# Patient Record
Sex: FEMALE | Race: WHITE | NOT HISPANIC OR LATINO | Employment: OTHER | URBAN - METROPOLITAN AREA
[De-identification: names, ages, dates, MRNs, and addresses within clinical notes are randomized per-mention and may not be internally consistent; named-entity substitution may affect disease eponyms.]

---

## 2017-01-09 DIAGNOSIS — E78.5 HLD (HYPERLIPIDEMIA): ICD-10-CM

## 2017-01-09 RX ORDER — SIMVASTATIN 40 MG/1
TABLET, FILM COATED ORAL
Qty: 90 TABLET | Refills: 0 | Status: SHIPPED | OUTPATIENT
Start: 2017-01-09 | End: 2017-04-09 | Stop reason: SDUPTHER

## 2017-01-11 ENCOUNTER — PATIENT MESSAGE (OUTPATIENT)
Dept: FAMILY MEDICINE | Facility: CLINIC | Age: 67
End: 2017-01-11

## 2017-01-12 RX ORDER — BUPROPION HYDROCHLORIDE 150 MG/1
150 TABLET, EXTENDED RELEASE ORAL 2 TIMES DAILY
Qty: 60 TABLET | Refills: 2 | Status: SHIPPED | OUTPATIENT
Start: 2017-01-12 | End: 2017-04-07 | Stop reason: SDUPTHER

## 2017-01-12 NOTE — TELEPHONE ENCOUNTER
I have started on the cpap machine and saw Dr Chavira on 10-. She was suppose to send the results to Dr Pimentel. Since all the fresh air has been going to my brain the craving for smoking has increase since I began using the sleep machine. If Dr Pimentel could re-fill the prescription for the meds that help stop the cravings I would be grateful. Not the heavy duty drugs but the simple one. I have some meds from Dr Kohler but Dr Pimentel like the one she prescribed for me in the beginning. Let me know. Thanks

## 2017-03-26 ENCOUNTER — PATIENT MESSAGE (OUTPATIENT)
Dept: FAMILY MEDICINE | Facility: CLINIC | Age: 67
End: 2017-03-26

## 2017-03-27 ENCOUNTER — PATIENT MESSAGE (OUTPATIENT)
Dept: FAMILY MEDICINE | Facility: CLINIC | Age: 67
End: 2017-03-27

## 2017-04-07 RX ORDER — BUPROPION HYDROCHLORIDE 150 MG/1
TABLET, EXTENDED RELEASE ORAL
Qty: 60 TABLET | Refills: 5 | Status: SHIPPED | OUTPATIENT
Start: 2017-04-07 | End: 2017-10-17 | Stop reason: SDUPTHER

## 2017-04-09 DIAGNOSIS — E78.5 HLD (HYPERLIPIDEMIA): ICD-10-CM

## 2017-04-10 RX ORDER — SIMVASTATIN 40 MG/1
TABLET, FILM COATED ORAL
Qty: 90 TABLET | Refills: 0 | Status: SHIPPED | OUTPATIENT
Start: 2017-04-10 | End: 2017-07-10 | Stop reason: SDUPTHER

## 2017-04-11 ENCOUNTER — HOSPITAL ENCOUNTER (OUTPATIENT)
Dept: RADIOLOGY | Facility: HOSPITAL | Age: 67
Discharge: HOME | End: 2017-04-11
Attending: FAMILY MEDICINE
Payer: MEDICARE

## 2017-04-11 DIAGNOSIS — Z12.11 SCREEN FOR COLON CANCER: ICD-10-CM

## 2017-04-11 PROCEDURE — 77080 DXA BONE DENSITY AXIAL: CPT | Mod: TC,GZ,PO

## 2017-04-11 PROCEDURE — 77080 DXA BONE DENSITY AXIAL: CPT | Mod: 26,,, | Performed by: RADIOLOGY

## 2017-04-18 ENCOUNTER — OFFICE VISIT (OUTPATIENT)
Dept: FAMILY MEDICINE | Facility: CLINIC | Age: 67
End: 2017-04-18
Payer: MEDICARE

## 2017-04-18 ENCOUNTER — TELEPHONE (OUTPATIENT)
Dept: FAMILY MEDICINE | Facility: CLINIC | Age: 67
End: 2017-04-18

## 2017-04-18 VITALS
SYSTOLIC BLOOD PRESSURE: 130 MMHG | HEART RATE: 64 BPM | HEIGHT: 66 IN | WEIGHT: 259.25 LBS | BODY MASS INDEX: 41.66 KG/M2 | TEMPERATURE: 98 F | DIASTOLIC BLOOD PRESSURE: 84 MMHG

## 2017-04-18 DIAGNOSIS — E55.9 VITAMIN D DEFICIENCY: ICD-10-CM

## 2017-04-18 DIAGNOSIS — R53.83 FATIGUE, UNSPECIFIED TYPE: ICD-10-CM

## 2017-04-18 DIAGNOSIS — R60.9 FLUID RETENTION: ICD-10-CM

## 2017-04-18 DIAGNOSIS — M17.0 OSTEOARTHRITIS OF BOTH KNEES, UNSPECIFIED OSTEOARTHRITIS TYPE: Primary | ICD-10-CM

## 2017-04-18 DIAGNOSIS — R80.9 PROTEINURIA, UNSPECIFIED TYPE: ICD-10-CM

## 2017-04-18 DIAGNOSIS — M25.562 CHRONIC PAIN OF BOTH KNEES: Primary | ICD-10-CM

## 2017-04-18 DIAGNOSIS — I48.0 PAROXYSMAL ATRIAL FIBRILLATION: ICD-10-CM

## 2017-04-18 DIAGNOSIS — R73.09 ELEVATED HEMOGLOBIN A1C: ICD-10-CM

## 2017-04-18 DIAGNOSIS — G89.29 CHRONIC PAIN OF BOTH KNEES: Primary | ICD-10-CM

## 2017-04-18 DIAGNOSIS — L21.9 SEBORRHEA: ICD-10-CM

## 2017-04-18 DIAGNOSIS — E04.1 THYROID NODULE: ICD-10-CM

## 2017-04-18 DIAGNOSIS — M25.561 CHRONIC PAIN OF BOTH KNEES: Primary | ICD-10-CM

## 2017-04-18 PROCEDURE — 1160F RVW MEDS BY RX/DR IN RCRD: CPT | Mod: S$GLB,,, | Performed by: FAMILY MEDICINE

## 2017-04-18 PROCEDURE — 99215 OFFICE O/P EST HI 40 MIN: CPT | Mod: S$GLB,,, | Performed by: FAMILY MEDICINE

## 2017-04-18 PROCEDURE — 1125F AMNT PAIN NOTED PAIN PRSNT: CPT | Mod: S$GLB,,, | Performed by: FAMILY MEDICINE

## 2017-04-18 PROCEDURE — 3079F DIAST BP 80-89 MM HG: CPT | Mod: S$GLB,,, | Performed by: FAMILY MEDICINE

## 2017-04-18 PROCEDURE — 1159F MED LIST DOCD IN RCRD: CPT | Mod: S$GLB,,, | Performed by: FAMILY MEDICINE

## 2017-04-18 PROCEDURE — 3075F SYST BP GE 130 - 139MM HG: CPT | Mod: S$GLB,,, | Performed by: FAMILY MEDICINE

## 2017-04-18 PROCEDURE — 99499 UNLISTED E&M SERVICE: CPT | Mod: S$GLB,,, | Performed by: FAMILY MEDICINE

## 2017-04-18 PROCEDURE — 99999 PR PBB SHADOW E&M-EST. PATIENT-LVL III: CPT | Mod: PBBFAC,,, | Performed by: FAMILY MEDICINE

## 2017-04-18 RX ORDER — KETOCONAZOLE 20 MG/ML
SHAMPOO, SUSPENSION TOPICAL WEEKLY
Qty: 120 ML | Refills: 5 | Status: SHIPPED | OUTPATIENT
Start: 2017-04-18 | End: 2017-11-06

## 2017-04-18 NOTE — MR AVS SNAPSHOT
[facility]  [address]  [place] [address]  Phone: [phone]  Fax: [phone]                  Allyson Henriquez   2017 10:20 AM   Office Visit    Description:  Female : 1950   Provider:  Gilma Pimentel MD   Department:  [facility]           Reason for Visit     Knee Pain           Diagnoses this Visit        Comments    Chronic pain of both knees    -  Primary     Thyroid nodule                To Do List           Future Appointments        Provider Department Dept Phone    2017 2:20 PM Gilma Pimentel MD [facility] [phone]      Goals (5 Years of Data)     None      Follow-Up and Disposition     Return if symptoms worsen or fail to improve.       These Medications        Disp Refills Start End    ketoconazole (NIZORAL) 2 % shampoo 120 mL 5 2017     Apply topically once a week. - Topical (Top)    Pharmacy: [facility] [address] [place], [place] [address] [address] AT [address] #: [phone]         [facility]     [facility] Nurse Care Line -  Assistance  Unless otherwise directed by your provider, please contact [facility], our nurse care line that is available for  assistance.     Registered nurses in the [facility] provide: appointment scheduling, clinical advisement, health education, and other advisory services.  Call: [phone] (toll free)               Medications           Message regarding Medications     Verify the changes and/or additions to your medication regime listed below are the same as discussed with your clinician today.  If any of these changes or additions are incorrect, please notify your healthcare provider.        START taking these NEW medications        Refills    ketoconazole (NIZORAL) 2 % shampoo 5    Sig: Apply topically once a week.    Class: Normal    Route: Topical (Top)           Verify that the  "below list of medications is an accurate representation of the medications you are currently taking.  If none reported, the list may be blank. If incorrect, please contact your healthcare provider. Carry this list with you in case of emergency.           Current Medications     fluticasone (FLONASE) 50 mcg/actuation nasal spray 2 sprays by Each Nare route once daily.    hydrochlorothiazide (HYDRODIURIL) 12.5 MG Tab TAKE 1 TABLET(12.5 MG) BY MOUTH EVERY DAY    losartan (COZAAR) 50 MG tablet Take 1 tablet (50 mg total) by mouth once daily.    metoprolol tartrate (LOPRESSOR) 50 MG tablet Take 25 mg by mouth 2 (two) times daily.     simvastatin (ZOCOR) 40 MG tablet TAKE 1 TABLET(40 MG) BY MOUTH EVERY EVENING    buPROPion (WELLBUTRIN SR) 150 MG TBSR 12 hr tablet TAKE 1 TABLET(150 MG) BY MOUTH TWICE DAILY    ketoconazole (NIZORAL) 2 % shampoo Apply topically once a week.    multivitamin (ONE DAILY MULTIVITAMIN) per tablet Take 1 tablet by mouth once daily.           Clinical Reference Information           Your Vitals Were     BP Pulse Temp Height Weight BMI    130/84 64 98.3 °F (36.8 °C) 5' 6" (1.676 m) 117.6 kg (259 lb 4.2 oz) 41.85 kg/m2      Blood Pressure          Most Recent Value    BP  130/84      Allergies as of 4/18/2017     Contrast Media    Albuterol    Pcn [Penicillins]      Immunizations Administered on Date of Encounter - 4/18/2017     None      Orders Placed During Today's Visit     Future Labs/Procedures Expected by Expires    US Soft Tissue Head Neck Thyroid  4/18/2017 4/18/2018    X-ray Knee Ortho Bilateral with Flexion  4/18/2017 4/18/2018      Language Assistance Services     ATTENTION: Language assistance services are available, free of charge. Please call [phone].      ATENCIÓN: Si lonny padilla, tiene a zuñiga disposición servicios gratuitos de asistencia lingüística. Llame al [phone].     CHÚ Ý: N?u b?n nói Ti?ng Vi?t, có các d?ch v? h? tr? ngôn ng? mi?n phí dành cho b?n. G?i s? " [phone].         [facility] complies with applicable Federal civil rights laws and does not discriminate on the basis of race, color, national origin, age, disability, or sex.

## 2017-04-18 NOTE — PROGRESS NOTES
"Subjective:       Patient ID: Allyson Henriquez is a 67 y.o. female.    Chief Complaint: Knee Pain (pt is c/o extreme bilat knee pain when she rises from a sitting position, pain when she walks, lower back apin x6-8 weeks. )    HPI   Patient here today, accompanied by her , to discuss knee pain.  1. Knee pain x several weeks. Worse when rising from a sitting position or inclined walking. Pain is well-localized to the knee joint, and does not occur with rest or other activities. Both knees equally affected. Current exercise is a circuit class that incl leg extensions, recently increased to 20reps at 15#.  2. Concerned about vit D deficiency, not currently supplementing.  3. Went to a screening at [facility]. Of note thyroid nodule. She recalls that this was found by previous physician and has been imaged for stability before. Thyroid labs reviewed from 2016 and normal.  4. Not currently on the wellbutrin. Finds that tobacco cravings are ok, and thus doesn't need it. Also, was concerned that it was contributing to some morning blurriness.   Eye exams are up to date, and prev cataract repair.  5. Concerned about intermittent swelling to BLE. Does not time it to intake of salt. Sx occur and resolve spontaneously. Has not seen it in at least a few days.   Recalls previously worse during afib.  6. Echo was updated with [facility] this past week. Few episodes of recurrent afib, otherwise doing ok.  7. Ongoing and chronic fatigue. Worse by the afternoons. Discussed multiple contributors to this incl medications. However, admittedly improved from previous 6mos ago or so.  8. Previous dx of seborrhea to scalp. Otc shampoos affected the texture and color of her hair. Would like alternatives.   9. Due for updated renal functions re: proteinuria.  10. Has cleaned up her diet, still with some "cheats" like ice cream, eating out less. Unhappy with weight gain despite this and exercise. Offered nutrition consult.   Has episodes of " urgency with diarrhea, particularly after eating out.    Review of Systems   Constitutional: Positive for fatigue and unexpected weight change. Negative for activity change.   HENT: Positive for trouble swallowing (attributes this to her thyroid nodule). Negative for hearing loss and rhinorrhea.    Eyes: Positive for visual disturbance. Negative for discharge.   Respiratory: Negative for cough, chest tightness, shortness of breath and wheezing.    Cardiovascular: Positive for palpitations (afib) and leg swelling (intermittent). Negative for chest pain.   Gastrointestinal: Positive for diarrhea (see hpi). Negative for blood in stool, constipation and vomiting.   Endocrine: Negative for polydipsia and polyuria.   Genitourinary: Negative for difficulty urinating, dysuria, hematuria and menstrual problem.   Musculoskeletal: Positive for arthralgias (knee pain, bilateral, see hpi). Negative for joint swelling.   Neurological: Negative for weakness and headaches.   Psychiatric/Behavioral: Negative for confusion and dysphoric mood.       Objective:      Physical Exam   Constitutional: She is oriented to person, place, and time. She appears well-developed and well-nourished. No distress.   HENT:   Head: Normocephalic and atraumatic.   Eyes: Conjunctivae are normal. Right eye exhibits no discharge. Left eye exhibits no discharge. No scleral icterus.   Neck: Normal range of motion. Neck supple.   Cardiovascular: Normal rate and regular rhythm.    Pulmonary/Chest: Effort normal. No respiratory distress.   Abdominal: Soft. She exhibits no distension.   Musculoskeletal: Normal range of motion. She exhibits no edema.   Neurological: She is alert and oriented to person, place, and time.   Skin: Skin is warm and dry. No rash noted.   Psychiatric: Judgment and thought content normal.   Nursing note and vitals reviewed.        Chronic pain of both knees  -     X-ray Knee Ortho Bilateral with Flexion; Future; Expected date:  4/18/17  Update imaging, and can decide PT vs ortho/MRI. Reviewed need for quad strengthening, continued weight loss.  Thyroid nodule  -     US Soft Tissue Head Neck Thyroid; Future; Expected date: 4/18/17  Update imaging for stability, ensure no worrisome traits noted. Reviewed tsh/ft4 from 2016 wnl.  Vitamin D deficiency  Supplement 5000iu/day and recheck with next lab draw. Plan to decrease dose if within range.  Fluid retention  Discussed contribution of salt intake, encouraged moderation, particularly when eating out, and maintain physical activity.  Paroxysmal atrial fibrillation  Per cards, reported stable with few recurrences.  Fatigue, unspecified type  Likely multifactorial. Of note, sleep apnea is managed, no previously noted metabolic contributions. I do find that she is improved from previous, but still with afternoon sx.    May need to consider further discussion re: depression sx which can contribute to this.  Seborrhea  Trial rx ketoconazole and test patch hair. If still affecting hair color/texture, can stop apply lamisil topical.  Proteinuria, unspecified type  Update lab and urine and send to nephrology for review.  Other orders  -     ketoconazole (NIZORAL) 2 % shampoo; Apply topically once a week.  Dispense: 120 mL; Refill: 5

## 2017-04-18 NOTE — TELEPHONE ENCOUNTER
1. Arthritis to both knees with changes noted to the joints as well. I'd like for her to see ortho as this may require more than PT for the time being. They will also be able to order an MRI if needed.  Referral done.    2. Thyroid nodule confirmed. Based on size and surrounding calcifications, it is recommended that we biopsy. This is done through the endocrine dept. Referral done.

## 2017-04-20 ENCOUNTER — PATIENT MESSAGE (OUTPATIENT)
Dept: FAMILY MEDICINE | Facility: CLINIC | Age: 67
End: 2017-04-20

## 2017-04-20 NOTE — TELEPHONE ENCOUNTER
Reviewed results with pt, she expressed understanding. Offered to schedule consults. Pt refused and stated she wants time to think about it and asked for names of [facility] providers in [place], gave her list of our providers. She stated she will send a [facility] message when she is ready to schedule.

## 2017-04-20 NOTE — TELEPHONE ENCOUNTER
----- Message from Alexandria Odell sent at 4/20/2017 10:31 AM CDT -----  Returning your call.  Please call [phone]

## 2017-04-21 ENCOUNTER — PATIENT MESSAGE (OUTPATIENT)
Dept: FAMILY MEDICINE | Facility: CLINIC | Age: 67
End: 2017-04-21

## 2017-04-21 NOTE — TELEPHONE ENCOUNTER
I think Dr Pimentel would prefer Dr Daniels as that is the one she mentioned at the office visit. Please confirm this is her preference.   Also, with the thyroid - if the biopsy is done - while I am still 'on the table' is the sample immediately reviewed by a pathologist?? and if there is an 'issue' can removal be done at that time??

## 2017-04-23 ENCOUNTER — PATIENT MESSAGE (OUTPATIENT)
Dept: FAMILY MEDICINE | Facility: CLINIC | Age: 67
End: 2017-04-23

## 2017-04-24 ENCOUNTER — PATIENT MESSAGE (OUTPATIENT)
Dept: FAMILY MEDICINE | Facility: CLINIC | Age: 67
End: 2017-04-24

## 2017-05-03 ENCOUNTER — PATIENT MESSAGE (OUTPATIENT)
Dept: FAMILY MEDICINE | Facility: CLINIC | Age: 67
End: 2017-05-03

## 2017-05-03 ENCOUNTER — PATIENT MESSAGE (OUTPATIENT)
Dept: ORTHOPEDICS | Facility: CLINIC | Age: 67
End: 2017-05-03

## 2017-05-03 ENCOUNTER — TELEPHONE (OUTPATIENT)
Dept: ENDOCRINOLOGY | Facility: CLINIC | Age: 67
End: 2017-05-03

## 2017-05-03 ENCOUNTER — OFFICE VISIT (OUTPATIENT)
Dept: ORTHOPEDICS | Facility: CLINIC | Age: 67
End: 2017-05-03
Payer: MEDICARE

## 2017-05-03 VITALS
HEIGHT: 66 IN | HEART RATE: 63 BPM | BODY MASS INDEX: 38.57 KG/M2 | SYSTOLIC BLOOD PRESSURE: 148 MMHG | DIASTOLIC BLOOD PRESSURE: 78 MMHG | WEIGHT: 240 LBS

## 2017-05-03 DIAGNOSIS — M17.0 PRIMARY OSTEOARTHRITIS OF BOTH KNEES: Primary | ICD-10-CM

## 2017-05-03 PROCEDURE — 1159F MED LIST DOCD IN RCRD: CPT | Mod: S$GLB,,, | Performed by: ORTHOPAEDIC SURGERY

## 2017-05-03 PROCEDURE — 3078F DIAST BP <80 MM HG: CPT | Mod: S$GLB,,, | Performed by: ORTHOPAEDIC SURGERY

## 2017-05-03 PROCEDURE — 1160F RVW MEDS BY RX/DR IN RCRD: CPT | Mod: S$GLB,,, | Performed by: ORTHOPAEDIC SURGERY

## 2017-05-03 PROCEDURE — 1125F AMNT PAIN NOTED PAIN PRSNT: CPT | Mod: S$GLB,,, | Performed by: ORTHOPAEDIC SURGERY

## 2017-05-03 PROCEDURE — 99999 PR PBB SHADOW E&M-EST. PATIENT-LVL III: CPT | Mod: PBBFAC,,, | Performed by: ORTHOPAEDIC SURGERY

## 2017-05-03 PROCEDURE — 3077F SYST BP >= 140 MM HG: CPT | Mod: S$GLB,,, | Performed by: ORTHOPAEDIC SURGERY

## 2017-05-03 PROCEDURE — 99203 OFFICE O/P NEW LOW 30 MIN: CPT | Mod: S$GLB,,, | Performed by: ORTHOPAEDIC SURGERY

## 2017-05-03 RX ORDER — LOSARTAN POTASSIUM 50 MG/1
TABLET ORAL
Qty: 90 TABLET | Refills: 1 | Status: SHIPPED | OUTPATIENT
Start: 2017-05-03 | End: 2018-06-11 | Stop reason: SDUPTHER

## 2017-05-03 NOTE — TELEPHONE ENCOUNTER
----- Message from Charisse Joe sent at 5/2/2017  4:28 PM CDT -----  Contact: self  Pt called in about wanting to get an urgent appt for her thyroid. Pt would like the nurse to give her a call back in regards to this matter        Pt can be reached at [phone]      Thank You

## 2017-05-03 NOTE — PROGRESS NOTES
Past Medical History:   Diagnosis Date    Acquired deafness of left ear     Bell's palsy     with fatigue and stress    COPD (chronic obstructive pulmonary disease)     History of tobacco abuse     Hyperlipidemia     Hypertension     TRISHA (obstructive sleep apnea)     Proteinuria        Past Surgical History:   Procedure Laterality Date    ADENOIDECTOMY      APPENDECTOMY      COLONOSCOPY N/A 11/14/2016    Procedure: COLONOSCOPY;  Surgeon: Destin Cardona MD;  [facility];  Service: Endoscopy;  Laterality: N/A;    EYE SURGERY      cataract OU    NEUROMA SURGERY Left     acoustic    TONSILLECTOMY         Current Outpatient Prescriptions   Medication Sig    buPROPion (WELLBUTRIN SR) 150 MG TBSR 12 hr tablet TAKE 1 TABLET(150 MG) BY MOUTH TWICE DAILY    cholecalciferol, vitamin D3, (VITAMIN D3) 2,000 unit Cap Take 1 capsule by mouth once daily.    fluticasone (FLONASE) 50 mcg/actuation nasal spray 2 sprays by Each Nare route once daily.    hydrochlorothiazide (HYDRODIURIL) 12.5 MG Tab TAKE 1 TABLET(12.5 MG) BY MOUTH EVERY DAY    ketoconazole (NIZORAL) 2 % shampoo Apply topically once a week.    losartan (COZAAR) 50 MG tablet Take 1 tablet (50 mg total) by mouth once daily.    metoprolol tartrate (LOPRESSOR) 50 MG tablet Take 25 mg by mouth 2 (two) times daily.     multivitamin (ONE DAILY MULTIVITAMIN) per tablet Take 1 tablet by mouth once daily.    simvastatin (ZOCOR) 40 MG tablet TAKE 1 TABLET(40 MG) BY MOUTH EVERY EVENING     No current facility-administered medications for this visit.        Review of patient's allergies indicates:   Allergen Reactions    Contrast media Anaphylaxis    Albuterol Other (See Comments)     Used during PFTs and put pt in afib    Pcn [penicillins]      Pt states did well on cephalexin.  No adverse reaction or side effect.        Family History   Problem Relation Age of Onset    Cancer Paternal Uncle      colon cancer    Diabetes Neg Hx     Kidney  disease Neg Hx     Stroke Neg Hx     Heart disease Neg Hx        Social History     Social History    Marital status:      Spouse name: N/A    Number of children: N/A    Years of education: N/A     Occupational History    Not on file.     Social History Main Topics    Smoking status: Former Smoker     Packs/day: 1.00     Years: 40.00     Quit date: 3/6/2016    Smokeless tobacco: Never Used    Alcohol use 0.0 oz/week     0 Standard drinks or equivalent per week      Comment: occ social    Drug use: No    Sexual activity: Not on file     Other Topics Concern    Not on file     Social History Narrative    Works in insurance collection.       Chief Complaint:   Chief Complaint   Patient presents with    Knee Pain     bilateral       History of present illness: Is a 67-year-old female seen for bilateral knee pain since about December.  Patient notes that it started after she stopped smoking and recently started exercising.  She is to take some Aleve which helped but was asked to stop due to some questionable kidney issues.  Patient denies an injury or trauma.  Pain is with walking or standing for prolonged periods of time.  Majority of the pain is getting up from a sitting position.  She denies swelling or mechanical symptoms.  Pain is an 8 out of 10.    Answers for HPI/ROS submitted by the patient on 5/1/2017   Leg pain  unexpected weight change: Yes  appetite change : Yes  sleep disturbance: Yes  IMMUNOCOMPROMISED: Yes  nervous/ anxious: Yes  dysphoric mood: Yes  rash: No  visual disturbance: Yes  eye redness: No  eye pain: No  ear pain: No  tinnitus: No  hearing loss: Yes  sinus pressure : No  nosebleeds: No  enviro allergies: No  food allergies: No  cough: No  shortness of breath: Yes  sweating: Yes  dysuria: No  frequency: No  difficulty urinating: No  hematuria: Yes  painful intercourse: No  chest pain: No  palpitations: Yes  nausea: No  vomiting: No  diarrhea: Yes  constipation: No  headaches:  No  dizziness: No  numbness: No  seizures: No  joint swelling: No  myalgia: No  weakness: Yes  back pain: Yes  Pain Chronicity: chronic  History of trauma: No  Onset: more than 1 month ago  Frequency: constantly  Progression since onset: gradually worsening  injury location: at school  pain- numeric: 6/10  pain location: other  pain quality: sharp  Radiating Pain: No  Aggravating factors: standing  fever: No  inability to bear weight: Yes  itching: No  joint locking: No  limited range of motion: Yes  stiffness: Yes  tingling: No  Treatments tried: exercise  physical therapy: not tried  Improvement on treatment: no relief      Physical Examination:    Vital Signs:    Vitals:    05/03/17 0821   BP: (!) 148/78   Pulse: 63       Body mass index is 38.74 kg/(m^2).    This a well-developed, well nourished patient in no acute distress.  They are alert and oriented and cooperative to examination.  Pt. walks without an antalgic gait.      Examination of bilateral knees shows no rashes or erythema. There are no masses ecchymosis or effusion. Patient has full range of motion from 0-130°. Patient is nontender to palpation over lateral joint line and mildly tender to palpation over the medial joint line. Patient has a - Lachman exam, - anterior drawer exam, and - posterior drawer exam. - Vilma's exam. Knee is stable to varus and valgus stress. 5 out of 5 motor strength. Palpable distal pulses. Intact light touch sensation. Negative Patellofemoral crepitus      X-rays: X-rays of both knees are available for review which show some mild to moderate arthritic changes     Assessment:: Bilateral knee arthritis    Plan:  I spent a long time discussing her knee arthritis with her today.  I reviewed her x-rays.  We discussed treatments for knee arthritis including NSAIDs, exercise, injections and ultimately surgery.  The patient cannot take NSAIDs at this time.  I recommended a Visco supplementation series as well as instructed her on  a knee conditioning program which she will start immediately.  Follow-up in a couple of weeks to start the Euflexxa.    This note was created using Dragon voice recognition software that occasionally misinterpreted phrases or words.    Consult note is delivered via Epic messaging service.

## 2017-05-03 NOTE — LETTER
May 3, 2017      Gilma Pimentel MD  [address]  [facility] [address]           [facility]  [address] [facility] [address] [place] [address]  Phone: [phone]          Patient: Allyson Henriquez   MR Number: [id number]   YOB: 1950   Date of Visit: 5/3/2017       Dear Dr. Gilma Pimentel:    Thank you for referring Allyson Henriquez to me for evaluation. Attached you will find relevant portions of my assessment and plan of care.    If you have questions, please do not hesitate to call me. I look forward to following Allyson Henriquez along with you.    Sincerely,    Jose Rafael Barney MD    Enclosure  CC:  No Recipients    If you would like to receive this communication electronically, please contact [email or web address] or [phone] to request more information on [employer] Link access.    For providers and/or their staff who would like to refer a patient to [facility], please contact us through our one-stop-shop provider referral line, [facility], at [phone].    If you feel you have received this communication in error or would no longer like to receive these types of communications, please e-mail [email or web address]

## 2017-05-08 ENCOUNTER — OFFICE VISIT (OUTPATIENT)
Dept: ENDOCRINOLOGY | Facility: CLINIC | Age: 67
End: 2017-05-08
Payer: MEDICARE

## 2017-05-08 VITALS
HEIGHT: 66 IN | SYSTOLIC BLOOD PRESSURE: 158 MMHG | DIASTOLIC BLOOD PRESSURE: 84 MMHG | BODY MASS INDEX: 42.1 KG/M2 | WEIGHT: 261.94 LBS | HEART RATE: 67 BPM

## 2017-05-08 DIAGNOSIS — E04.2 MULTINODULAR GOITER (NONTOXIC): ICD-10-CM

## 2017-05-08 PROCEDURE — 3077F SYST BP >= 140 MM HG: CPT | Mod: S$GLB,,, | Performed by: INTERNAL MEDICINE

## 2017-05-08 PROCEDURE — 1160F RVW MEDS BY RX/DR IN RCRD: CPT | Mod: S$GLB,,, | Performed by: INTERNAL MEDICINE

## 2017-05-08 PROCEDURE — 3079F DIAST BP 80-89 MM HG: CPT | Mod: S$GLB,,, | Performed by: INTERNAL MEDICINE

## 2017-05-08 PROCEDURE — 1126F AMNT PAIN NOTED NONE PRSNT: CPT | Mod: S$GLB,,, | Performed by: INTERNAL MEDICINE

## 2017-05-08 PROCEDURE — 99499 UNLISTED E&M SERVICE: CPT | Mod: S$GLB,,, | Performed by: INTERNAL MEDICINE

## 2017-05-08 PROCEDURE — 99999 PR PBB SHADOW E&M-EST. PATIENT-LVL III: CPT | Mod: PBBFAC,,, | Performed by: INTERNAL MEDICINE

## 2017-05-08 PROCEDURE — 1159F MED LIST DOCD IN RCRD: CPT | Mod: S$GLB,,, | Performed by: INTERNAL MEDICINE

## 2017-05-08 PROCEDURE — 99204 OFFICE O/P NEW MOD 45 MIN: CPT | Mod: S$GLB,,, | Performed by: INTERNAL MEDICINE

## 2017-05-08 NOTE — PATIENT INSTRUCTIONS
Thyroid Fine Needle Aspiration (FNA) Biopsy    The thyroid is a gland at the front of the neck. A thyroid fine needle aspiration (FNA) biopsy is a procedure to remove a small piece of tissue from your thyroid gland. The tissue is removed with a small, hollow needle. The sample is sent to a lab to be examined to find a diagnosis.  Why thyroid FNA biopsy is done  Hard nodules can sometimes form inside the thyroid gland. You may notice a small bump in the gland area. Thyroid nodules are common. The nodules are often not dangerous. But in some cases they can be thyroid cancer. A thyroid FNA biopsy can test for cancer.  Risks of thyroid FNA biopsy  All procedures have some risks. The risks of thyroid FNA biopsy include:  · Bleeding at the biopsy site  · Infection  · Damage to areas near the thyroid (rare)  · Need for a repeat biopsy if the test result is in doubt  Getting ready for your procedure  Talk with your healthcare provider how to get ready. Tell him or her about all the medicines you take. This includes over-the-counter medicines such as ibuprofen. It also includes vitamins, herbs, and other supplements. You may need to stop taking some medicines before the procedure, such as blood thinners and aspirin. You should be able to eat and drink normally before the procedure.  On the day of your procedure  Your procedure may be done in a hospital or a medical clinic. You should be able to go home the same day. A typical procedure goes like this:  · You may be given a medicine to help you relax, if needed.        · A local anesthetic (numbing medicine) may be injected in the area in the front of your neck. Because the biopsy needle is so small, this may not be needed.  · Your healthcare provider may use an ultrasound machine during the biopsy. This machine uses sound waves and a computer to show live images of the tissues in your neck. This helps your healthcare provider guide the needle to the right spot. A gel will  be put on your neck to help the ultrasound wand maintain contact with your skin and enhance the signal generated by the sound waves.   · The biopsy area will be cleaned. A thin, fine (narrow) needle will be put through your skin and into your thyroid gland. You may feel a small amount of pain or pressure. The needle will be gently pushed into the nodule. A syringe attached to the needle will use gentle suction to remove a small piece of tissue from the nodule. This process may be repeated several times in the same nodule to get different samples from all parts of the nodule. You will need to be very still and not cough, talk, or swallow while the needle is put in.   · The needle will then be removed. A small bandage will be put on the needle insertion site. The tissue will be sent to a pathology lab to be looked at for signs of cancer.  After your procedure  Youll likely be able to go home that day and can go back to your normal activities right away. You can remove your bandage within a few hours.  The site of the biopsy may be sore for a day or two after the procedure. You can take over-the-counter pain medicine if needed. Follow any other instructions that your healthcare provider gives you.  Follow-up care  Ask your healthcare provider when to expect to get your results from the biopsy. It may take several days. In some cases, the biopsy result may be inconclusive. This means the lab cant be sure if your nodule is cancer. You may need a repeat biopsy or surgery.  If your thyroid nodule is not cancer, you may not need any treatment. Your healthcare provider may want to keep track of your nodule. You may need another biopsy in the future.  If your nodule is cancer, you may need surgery or other treatment. Your healthcare provider will tell you more about what to expect and what needs to be done next.     When to call your healthcare provider  Call your healthcare provider right away if you have any of the  following:  · Fever of 100.4°F (38°C) or higher  · Redness, swelling, bleeding, or fluid leaking from the biopsy site  · Pain around the biopsy site that gets worse  Be sure you know how to reach your healthcare provider after office hours and on weekends and holidays.   Date Last Reviewed: 2/9/2016  © 2330-2785 [employer]. [address] [place] [address]. All rights reserved. This information is not intended as a substitute for professional medical care. Always follow your healthcare professional's instructions.

## 2017-05-08 NOTE — MR AVS SNAPSHOT
[place] - Endocrinology  [address] [facility] [address]  [facility] [address]  Phone: [phone]  Fax: [phone]                  Allyson Henriquez   2017 2:00 PM   Office Visit    Description:  Female : 1950   Provider:  Courtney Trinidad MD   Department:  [place] - Endocrinology           Reason for Visit     Thyroid Nodule                To Do List           Future Appointments        Provider Department Dept Phone    2017 8:15 AM Jose Rafael Barney MD [facility] [phone]    2017 3:45 PM Jose Rafael Barney MD [facility] [phone]    2017 2:20 PM Gilma Pimentel MD [facility] [phone]    2017 3:45 PM Jose Rafael Barney MD [facility] [phone]    10/18/2017 8:00 AM [facility] [phone]      Goals (5 Years of Data)     None      [facility]     [facility] Care [facility] -  Assistance  Unless otherwise directed by your provider, please contact [facility], our nurse care line that is available for  assistance.     Registered nurses in the [facility] provide: appointment scheduling, clinical advisement, health education, and other advisory services.  Call: [phone] (toll free)               Medications           Message regarding Medications     Verify the changes and/or additions to your medication regime listed below are the same as discussed with your clinician today.  If any of these changes or additions are incorrect, please notify your healthcare provider.        STOP taking these medications     multivitamin (ONE DAILY MULTIVITAMIN) per tablet Take 1 tablet by mouth once daily.           Verify that the below list of medications is an accurate representation of the medications you are currently taking.  If none reported, the list may be blank. If incorrect, please contact your healthcare provider. Carry this list with you in  "case of emergency.           Current Medications     buPROPion (WELLBUTRIN SR) 150 MG TBSR 12 hr tablet TAKE 1 TABLET(150 MG) BY MOUTH TWICE DAILY    cholecalciferol, vitamin D3, (VITAMIN D3) 2,000 unit Cap Take 1 capsule by mouth once daily.    fluticasone (FLONASE) 50 mcg/actuation nasal spray 2 sprays by Each Nare route once daily.    hydrochlorothiazide (HYDRODIURIL) 12.5 MG Tab TAKE 1 TABLET(12.5 MG) BY MOUTH EVERY DAY    ketoconazole (NIZORAL) 2 % shampoo Apply topically once a week.    losartan (COZAAR) 50 MG tablet TAKE 1 TABLET(50 MG) BY MOUTH EVERY DAY    metoprolol tartrate (LOPRESSOR) 50 MG tablet Take 25 mg by mouth 2 (two) times daily.     simvastatin (ZOCOR) 40 MG tablet TAKE 1 TABLET(40 MG) BY MOUTH EVERY EVENING           Clinical Reference Information           Your Vitals Were     BP Pulse Height Weight BMI    158/84 (BP Method: Manual) 67 5' 6" (1.676 m) 118.8 kg (261 lb 14.5 oz) 42.27 kg/m2      Blood Pressure          Most Recent Value    BP  (!)  158/84      Allergies as of 5/8/2017     Contrast Media    Albuterol    Pcn [Penicillins]      Immunizations Administered on Date of Encounter - 5/8/2017     None      Instructions      Thyroid Fine Needle Aspiration (FNA) Biopsy    The thyroid is a gland at the front of the neck. A thyroid fine needle aspiration (FNA) biopsy is a procedure to remove a small piece of tissue from your thyroid gland. The tissue is removed with a small, hollow needle. The sample is sent to a lab to be examined to find a diagnosis.  Why thyroid FNA biopsy is done  Hard nodules can sometimes form inside the thyroid gland. You may notice a small bump in the gland area. Thyroid nodules are common. The nodules are often not dangerous. But in some cases they can be thyroid cancer. A thyroid FNA biopsy can test for cancer.  Risks of thyroid FNA biopsy  All procedures have some risks. The risks of thyroid FNA biopsy include:  · Bleeding at the biopsy site  · Infection  · Damage " to areas near the thyroid (rare)  · Need for a repeat biopsy if the test result is in doubt  Getting ready for your procedure  Talk with your healthcare provider how to get ready. Tell him or her about all the medicines you take. This includes over-the-counter medicines such as ibuprofen. It also includes vitamins, herbs, and other supplements. You may need to stop taking some medicines before the procedure, such as blood thinners and aspirin. You should be able to eat and drink normally before the procedure.  On the day of your procedure  Your procedure may be done in a hospital or a medical clinic. You should be able to go home the same day. A typical procedure goes like this:  · You may be given a medicine to help you relax, if needed.        · A local anesthetic (numbing medicine) may be injected in the area in the front of your neck. Because the biopsy needle is so small, this may not be needed.  · Your healthcare provider may use an ultrasound machine during the biopsy. This machine uses sound waves and a computer to show live images of the tissues in your neck. This helps your healthcare provider guide the needle to the right spot. A gel will be put on your neck to help the ultrasound wand maintain contact with your skin and enhance the signal generated by the sound waves.   · The biopsy area will be cleaned. A thin, fine (narrow) needle will be put through your skin and into your thyroid gland. You may feel a small amount of pain or pressure. The needle will be gently pushed into the nodule. A syringe attached to the needle will use gentle suction to remove a small piece of tissue from the nodule. This process may be repeated several times in the same nodule to get different samples from all parts of the nodule. You will need to be very still and not cough, talk, or swallow while the needle is put in.   · The needle will then be removed. A small bandage will be put on the needle insertion site. The tissue  will be sent to a pathology lab to be looked at for signs of cancer.  After your procedure  Youll likely be able to go home that day and can go back to your normal activities right away. You can remove your bandage within a few hours.  The site of the biopsy may be sore for a day or two after the procedure. You can take over-the-counter pain medicine if needed. Follow any other instructions that your healthcare provider gives you.  Follow-up care  Ask your healthcare provider when to expect to get your results from the biopsy. It may take several days. In some cases, the biopsy result may be inconclusive. This means the lab cant be sure if your nodule is cancer. You may need a repeat biopsy or surgery.  If your thyroid nodule is not cancer, you may not need any treatment. Your healthcare provider may want to keep track of your nodule. You may need another biopsy in the future.  If your nodule is cancer, you may need surgery or other treatment. Your healthcare provider will tell you more about what to expect and what needs to be done next.     When to call your healthcare provider  Call your healthcare provider right away if you have any of the following:  · Fever of 100.4°F (38°C) or higher  · Redness, swelling, bleeding, or fluid leaking from the biopsy site  · Pain around the biopsy site that gets worse  Be sure you know how to reach your healthcare provider after office hours and on weekends and holidays.   Date Last Reviewed: 2/9/2016  © 1979-6900 [employer]. [address] [place] [address]. All rights reserved. This information is not intended as a substitute for professional medical care. Always follow your healthcare professional's instructions.             Language Assistance Services     ATTENTION: Language assistance services are available, free of charge. Please call [phone].      ATENCIÓN: Si habla español, tiene a zuñiga disposición servicios gratuitos de asistencia  lingüística. Ángel al [phone].     LINDSEY Ý: N?u b?n nói Ti?ng Vi?t, có các d?ch v? h? tr? ngôn ng? mi?n phí dành cho b?n. G?i s? [phone].         [facility] complies with applicable Federal civil rights laws and does not discriminate on the basis of race, color, national origin, age, disability, or sex.

## 2017-05-08 NOTE — LETTER
May 8, 2017      Gilma Pimentel MD  [address]  [place] [address]           [facility]  [address] [facility] [address] [place] [address]  Phone: [phone]  Fax: [phone]          Patient: Allyson Henriquez   MR Number: [id number]   YOB: 1950   Date of Visit: 5/8/2017       Dear Dr. Gilma Pimentel:    Thank you for referring Allyson Henriquez to me for evaluation. Attached you will find relevant portions of my assessment and plan of care.    If you have questions, please do not hesitate to call me. I look forward to following Allyson Henriquez along with you.    Sincerely,    Courtney Trinidad MD    Enclosure  CC:  No Recipients    If you would like to receive this communication electronically, please contact [email or web address] or [phone] to request more information on [employer] Link access.    For providers and/or their staff who would like to refer a patient to [facility], please contact us through our one-stop-shop provider referral line, [facility], at [phone].    If you feel you have received this communication in error or would no longer like to receive these types of communications, please e-mail [email or web address]

## 2017-05-08 NOTE — PROGRESS NOTES
Subjective:      Patient ID: Allyson Henriquez is a 67 y.o. female.    Chief Complaint:  Thyroid Nodule      History of Present Illness    Mrs.Barbara BETY Henriquez is referred to [facility]     Found to have nodule in her thyroid on routine screening at [facility] fair     Denies change in size of neck, difficulty swallowing, shortness of breath in the recumbent position, pain or pressure around the neck. Denies history of hypo or hyperthyroidism.     Patient denies history of radiation to the head or neck. Denies history of colon or breast cancer. Denies family history of thyroid cancer or thyroid disease.       Sister and nephew with thyroid cancer   Daughter with thyroid nodule   Former smoker        Review of Systems   HENT: Negative for trouble swallowing and voice change.    Eyes: Negative for visual disturbance.   Respiratory: Positive for shortness of breath (COPD).    Cardiovascular: Negative for chest pain.   Gastrointestinal: Positive for diarrhea (occassional ).   Musculoskeletal: Positive for arthralgias (follows for right knee pain - arthritis ).       Objective:   Physical Exam   Constitutional: She appears well-developed.   HENT:   Right Ear: External ear normal.   Left Ear: External ear normal.   Nose: Nose normal.   Neck: No tracheal deviation present. Thyromegaly present.   Pulmonary/Chest: Effort normal. No respiratory distress.   Abdominal: Soft. There is no tenderness. No hernia.   Musculoskeletal: She exhibits no edema.   Neurological: She displays normal reflexes. No cranial nerve deficit.   Skin: No rash noted.          Psychiatric: She has a normal mood and affect. Judgment normal.   Vitals reviewed.      Lab Review:   ULTRASOUND THYROID    CPT: 04089    Clinical data:  Enlarged thyroid    Ultrasound of the thyroid was performed.    Findings: The right thyroid lobe is 5.2 x 2.4 x 3.1 cm. The isthmus measures 2.8 mm. The left thyroid lobe is 4.0 x 1.1 x 1.6 cm. there is a 2.6 x 2.3 x 2.9 cm  predominantly cystic nodule with multiple shadowing calcifications within measures 2.6 x 2.3 x 2.9 cm.  This is located lower pole right thyroid lobe.  Within the upper pole of the right thyroid lobe there is a 0.7 x 0.4 x 0.7 cm nodule.  Within the lower pole left thyroid lobe there is a 0.7 x 0.4 x 0.6 cm nodule.       Impression        1. Complex dominant nodule of the lower pole right thyroid lobe cystic and solid with multiple calcifications within.  Recommend biopsy as this nodule does meet biopsy criteria.      Electronically signed by: SHANNA LINARES MD  Date: 04/18/17  Time: 14:36              Assessment:     1. Multinodular goiter (nontoxic)          #MNG  TSH yearly  Last TSH normal   Risk factors FH of thyroid cancer   Reviewed US thyroid independently complex nodule with mainly cystic component with some solid tissue     I have reviewed management options including observation, FNA or surgery.  All of the patients questions were answered and handout was provided.     Discussed indications for a FNA  Will observe for now as per her choice  Will schedule for FNA if she will request        RTC in 6 months  with TSH and thyroid u/s prior        Plan:     Follow up:  TSH with 's blood work   US thyroid in 6 months   RTC in 6 months

## 2017-05-25 ENCOUNTER — PATIENT MESSAGE (OUTPATIENT)
Dept: FAMILY MEDICINE | Facility: CLINIC | Age: 67
End: 2017-05-25

## 2017-05-25 ENCOUNTER — PATIENT MESSAGE (OUTPATIENT)
Dept: ORTHOPEDICS | Facility: CLINIC | Age: 67
End: 2017-05-25

## 2017-05-29 ENCOUNTER — PATIENT OUTREACH (OUTPATIENT)
Dept: ADMINISTRATIVE | Facility: HOSPITAL | Age: 67
End: 2017-05-29

## 2017-05-31 ENCOUNTER — OFFICE VISIT (OUTPATIENT)
Dept: ORTHOPEDICS | Facility: CLINIC | Age: 67
End: 2017-05-31
Payer: MEDICARE

## 2017-05-31 ENCOUNTER — PATIENT MESSAGE (OUTPATIENT)
Dept: FAMILY MEDICINE | Facility: CLINIC | Age: 67
End: 2017-05-31

## 2017-05-31 VITALS — BODY MASS INDEX: 41.95 KG/M2 | HEIGHT: 66 IN | WEIGHT: 261 LBS

## 2017-05-31 DIAGNOSIS — M17.0 PRIMARY OSTEOARTHRITIS OF BOTH KNEES: Primary | ICD-10-CM

## 2017-05-31 PROCEDURE — 20610 DRAIN/INJ JOINT/BURSA W/O US: CPT | Mod: 50,S$GLB,, | Performed by: ORTHOPAEDIC SURGERY

## 2017-05-31 PROCEDURE — 99999 PR PBB SHADOW E&M-EST. PATIENT-LVL II: CPT | Mod: PBBFAC,,, | Performed by: ORTHOPAEDIC SURGERY

## 2017-05-31 PROCEDURE — 99499 UNLISTED E&M SERVICE: CPT | Mod: S$GLB,,, | Performed by: ORTHOPAEDIC SURGERY

## 2017-05-31 RX ORDER — HYALURONATE SODIUM 20 MG/2 ML
20 SYRINGE (ML) INTRAARTICULAR
Status: DISCONTINUED | OUTPATIENT
Start: 2017-05-31 | End: 2017-05-31 | Stop reason: HOSPADM

## 2017-05-31 RX ORDER — HYDROCHLOROTHIAZIDE 12.5 MG/1
TABLET ORAL
Qty: 90 TABLET | Refills: 1 | Status: SHIPPED | OUTPATIENT
Start: 2017-05-31 | End: 2017-11-26 | Stop reason: SDUPTHER

## 2017-05-31 RX ADMIN — Medication 20 MG: at 07:05

## 2017-05-31 NOTE — PROCEDURES
Large Joint Aspiration/Injection  Date/Time: 5/31/2017 7:58 AM  Performed by: MERVAT AVILA  Authorized by: MERVAT AVILA     Consent Done?:  Yes (Verbal)  Indications:  Pain  Procedure site marked: Yes    Timeout: Prior to procedure the correct patient, procedure, and site was verified      Location:  Knee  Site:  L knee and R knee  Prep: Patient was prepped and draped in usual sterile fashion    Needle size:  20 G  Approach:  Anterolateral  Medications:  20 mg EUFLEXXA 10 mg/mL(mw 2.4 -3.6 million); 20 mg EUFLEXXA 10 mg/mL(mw 2.4 -3.6 million)  Patient tolerance:  Patient tolerated the procedure well with no immediate complications

## 2017-05-31 NOTE — PROGRESS NOTES
Past Medical History:   Diagnosis Date    Acquired deafness of left ear     Bell's palsy     with fatigue and stress    COPD (chronic obstructive pulmonary disease)     History of tobacco abuse     Hyperlipidemia     Hypertension     Multinodular goiter (nontoxic) 5/8/2017    TRISHA (obstructive sleep apnea)     Proteinuria        Past Surgical History:   Procedure Laterality Date    ADENOIDECTOMY      APPENDECTOMY      COLONOSCOPY N/A 11/14/2016    Procedure: COLONOSCOPY;  Surgeon: Destin Cardona MD;  Location: [facility];  Service: Endoscopy;  Laterality: N/A;    EYE SURGERY      cataract OU    NEUROMA SURGERY Left     acoustic    TONSILLECTOMY         Current Outpatient Prescriptions   Medication Sig    buPROPion (WELLBUTRIN SR) 150 MG TBSR 12 hr tablet TAKE 1 TABLET(150 MG) BY MOUTH TWICE DAILY    cholecalciferol, vitamin D3, (VITAMIN D3) 2,000 unit Cap Take 1 capsule by mouth once daily.    fluticasone (FLONASE) 50 mcg/actuation nasal spray 2 sprays by Each Nare route once daily.    hydrochlorothiazide (HYDRODIURIL) 12.5 MG Tab TAKE 1 TABLET(12.5 MG) BY MOUTH EVERY DAY    ketoconazole (NIZORAL) 2 % shampoo Apply topically once a week.    losartan (COZAAR) 50 MG tablet TAKE 1 TABLET(50 MG) BY MOUTH EVERY DAY    metoprolol tartrate (LOPRESSOR) 50 MG tablet Take 25 mg by mouth 2 (two) times daily.     simvastatin (ZOCOR) 40 MG tablet TAKE 1 TABLET(40 MG) BY MOUTH EVERY EVENING     No current facility-administered medications for this visit.        Review of patient's allergies indicates:   Allergen Reactions    Contrast media Anaphylaxis    Albuterol Other (See Comments)     Used during PFTs and put pt in afib    Pcn [penicillins]      Pt states did well on cephalexin.  No adverse reaction or side effect.        Family History   Problem Relation Age of Onset    Cancer Paternal Uncle      colon cancer    Diabetes Neg Hx     Kidney disease Neg Hx     Stroke Neg Hx     Heart disease  Neg Hx        Social History     Social History    Marital status:      Spouse name: N/A    Number of children: N/A    Years of education: N/A     Occupational History    Not on file.     Social History Main Topics    Smoking status: Former Smoker     Packs/day: 1.00     Years: 40.00     Quit date: 3/6/2016    Smokeless tobacco: Never Used    Alcohol use 0.0 oz/week      Comment: occ social    Drug use: No    Sexual activity: Not on file     Other Topics Concern    Not on file     Social History Narrative    Works in insurance collection.       Chief Complaint:   Chief Complaint   Patient presents with    Left Knee - Injections    Right Knee - Injections       History of present illness: Is a 67-year-old female seen for bilateral knee pain since about December.  Patient notes that it started after she stopped smoking and recently started exercising.  She is to take some Aleve which helped but was asked to stop due to some questionable kidney issues.  Patient denies an injury or trauma.  Pain is with walking or standing for prolonged periods of time.  Majority of the pain is getting up from a sitting position.  She denies swelling or mechanical symptoms.  Pain is a 7 out of 10.  Here for Euflexxa.    Answers for HPI/ROS submitted by the patient on 5/1/2017   Leg pain  unexpected weight change: Yes  appetite change : Yes  sleep disturbance: Yes  IMMUNOCOMPROMISED: Yes  nervous/ anxious: Yes  dysphoric mood: Yes  rash: No  visual disturbance: Yes  eye redness: No  eye pain: No  ear pain: No  tinnitus: No  hearing loss: Yes  sinus pressure : No  nosebleeds: No  enviro allergies: No  food allergies: No  cough: No  shortness of breath: Yes  sweating: Yes  dysuria: No  frequency: No  difficulty urinating: No  hematuria: Yes  painful intercourse: No  chest pain: No  palpitations: Yes  nausea: No  vomiting: No  diarrhea: Yes  constipation: No  headaches: No  dizziness: No  numbness: No  seizures: No  joint  swelling: No  myalgia: No  weakness: Yes  back pain: Yes  Pain Chronicity: chronic  History of trauma: No  Onset: more than 1 month ago  Frequency: constantly  Progression since onset: gradually worsening  injury location: at school  pain- numeric: 6/10  pain location: other  pain quality: sharp  Radiating Pain: No  Aggravating factors: standing  fever: No  inability to bear weight: Yes  itching: No  joint locking: No  limited range of motion: Yes  stiffness: Yes  tingling: No  Treatments tried: exercise  physical therapy: not tried  Improvement on treatment: no relief      Physical Examination:    Vital Signs:    There were no vitals filed for this visit.    Body mass index is 42.13 kg/m².    This a well-developed, well nourished patient in no acute distress.  They are alert and oriented and cooperative to examination.  Pt. walks without an antalgic gait.      Examination of bilateral knees shows no rashes or erythema. There are no masses ecchymosis or effusion. Patient has full range of motion from 0-130°. Patient is nontender to palpation over lateral joint line and mildly tender to palpation over the medial joint line. Patient has a - Lachman exam, - anterior drawer exam, and - posterior drawer exam. - Vilma's exam. Knee is stable to varus and valgus stress. 5 out of 5 motor strength. Palpable distal pulses. Intact light touch sensation. Negative Patellofemoral crepitus      X-rays: X-rays of both knees are available for review which show some mild to moderate arthritic changes     Assessment:: Bilateral knee arthritis    Plan:  I injected both knees with Euflexxa 1 of 3.  Follow-up next week    This note was created using Dragon voice recognition software that occasionally misinterpreted phrases or words.    Consult note is delivered via Epic messaging service.

## 2017-06-07 ENCOUNTER — OFFICE VISIT (OUTPATIENT)
Dept: ORTHOPEDICS | Facility: CLINIC | Age: 67
End: 2017-06-07
Payer: MEDICARE

## 2017-06-07 DIAGNOSIS — M17.0 PRIMARY OSTEOARTHRITIS OF BOTH KNEES: Primary | ICD-10-CM

## 2017-06-07 PROCEDURE — 99999 PR PBB SHADOW E&M-EST. PATIENT-LVL II: CPT | Mod: PBBFAC,,, | Performed by: ORTHOPAEDIC SURGERY

## 2017-06-07 PROCEDURE — 20610 DRAIN/INJ JOINT/BURSA W/O US: CPT | Mod: 50,S$GLB,, | Performed by: ORTHOPAEDIC SURGERY

## 2017-06-07 PROCEDURE — 99499 UNLISTED E&M SERVICE: CPT | Mod: S$GLB,,, | Performed by: ORTHOPAEDIC SURGERY

## 2017-06-07 RX ORDER — HYALURONATE SODIUM 20 MG/2 ML
20 SYRINGE (ML) INTRAARTICULAR
Status: DISCONTINUED | OUTPATIENT
Start: 2017-06-07 | End: 2017-06-07 | Stop reason: HOSPADM

## 2017-06-07 RX ADMIN — Medication 20 MG: at 03:06

## 2017-06-07 NOTE — PROCEDURES
Large Joint Aspiration/Injection  Date/Time: 6/7/2017 3:43 PM  Performed by: MERVAT AVILA  Authorized by: MERVAT AVILA     Consent Done?:  Yes (Verbal)  Indications:  Pain  Procedure site marked: Yes    Timeout: Prior to procedure the correct patient, procedure, and site was verified      Location:  Knee  Site:  L knee and R knee  Prep: Patient was prepped and draped in usual sterile fashion    Needle size:  20 G  Approach:  Anterolateral  Medications:  20 mg EUFLEXXA 10 mg/mL(mw 2.4 -3.6 million); 20 mg EUFLEXXA 10 mg/mL(mw 2.4 -3.6 million)  Patient tolerance:  Patient tolerated the procedure well with no immediate complications

## 2017-06-07 NOTE — PROGRESS NOTES
Past Medical History:   Diagnosis Date    Acquired deafness of left ear     Bell's palsy     with fatigue and stress    COPD (chronic obstructive pulmonary disease)     History of tobacco abuse     Hyperlipidemia     Hypertension     Multinodular goiter (nontoxic) 5/8/2017    TRISHA (obstructive sleep apnea)     Proteinuria        Past Surgical History:   Procedure Laterality Date    ADENOIDECTOMY      APPENDECTOMY      COLONOSCOPY N/A 11/14/2016    Procedure: COLONOSCOPY;  Surgeon: Destin Cardona MD;  Location: [facility];  Service: Endoscopy;  Laterality: N/A;    EYE SURGERY      cataract OU    NEUROMA SURGERY Left     acoustic    TONSILLECTOMY         Current Outpatient Prescriptions   Medication Sig    buPROPion (WELLBUTRIN SR) 150 MG TBSR 12 hr tablet TAKE 1 TABLET(150 MG) BY MOUTH TWICE DAILY    cholecalciferol, vitamin D3, (VITAMIN D3) 2,000 unit Cap Take 1 capsule by mouth once daily.    fluticasone (FLONASE) 50 mcg/actuation nasal spray 2 sprays by Each Nare route once daily.    hydrochlorothiazide (HYDRODIURIL) 12.5 MG Tab TAKE 1 TABLET(12.5 MG) BY MOUTH EVERY DAY    ketoconazole (NIZORAL) 2 % shampoo Apply topically once a week.    losartan (COZAAR) 50 MG tablet TAKE 1 TABLET(50 MG) BY MOUTH EVERY DAY    metoprolol tartrate (LOPRESSOR) 50 MG tablet Take 25 mg by mouth 2 (two) times daily.     simvastatin (ZOCOR) 40 MG tablet TAKE 1 TABLET(40 MG) BY MOUTH EVERY EVENING     No current facility-administered medications for this visit.        Review of patient's allergies indicates:   Allergen Reactions    Contrast media Anaphylaxis    Albuterol Other (See Comments)     Used during PFTs and put pt in afib    Pcn [penicillins]      Pt states did well on cephalexin.  No adverse reaction or side effect.        Family History   Problem Relation Age of Onset    Cancer Paternal Uncle      colon cancer    Diabetes Neg Hx     Kidney disease Neg Hx     Stroke Neg Hx     Heart disease  Neg Hx        Social History     Social History    Marital status:      Spouse name: N/A    Number of children: N/A    Years of education: N/A     Occupational History    Not on file.     Social History Main Topics    Smoking status: Former Smoker     Packs/day: 1.00     Years: 40.00     Quit date: 3/6/2016    Smokeless tobacco: Never Used    Alcohol use 0.0 oz/week      Comment: occ social    Drug use: No    Sexual activity: Not on file     Other Topics Concern    Not on file     Social History Narrative    Works in insurance collection.       Chief Complaint:   Chief Complaint   Patient presents with    Knee Pain     bilateral knee-Euflexxa 2/3        History of present illness: Is a 67-year-old female seen for bilateral knee pain since about December.  Patient notes that it started after she stopped smoking and recently started exercising.  She is to take some Aleve which helped but was asked to stop due to some questionable kidney issues.  Patient denies an injury or trauma.  Pain is with walking or standing for prolonged periods of time.  Majority of the pain is getting up from a sitting position.  She denies swelling or mechanical symptoms.  Pain is a 7 out of 10.  Here for Euflexxa.    Answers for HPI/ROS submitted by the patient on 5/1/2017   Leg pain  unexpected weight change: Yes  appetite change : Yes  sleep disturbance: Yes  IMMUNOCOMPROMISED: Yes  nervous/ anxious: Yes  dysphoric mood: Yes  rash: No  visual disturbance: Yes  eye redness: No  eye pain: No  ear pain: No  tinnitus: No  hearing loss: Yes  sinus pressure : No  nosebleeds: No  enviro allergies: No  food allergies: No  cough: No  shortness of breath: Yes  sweating: Yes  dysuria: No  frequency: No  difficulty urinating: No  hematuria: Yes  painful intercourse: No  chest pain: No  palpitations: Yes  nausea: No  vomiting: No  diarrhea: Yes  constipation: No  headaches: No  dizziness: No  numbness: No  seizures: No  joint  swelling: No  myalgia: No  weakness: Yes  back pain: Yes  Pain Chronicity: chronic  History of trauma: No  Onset: more than 1 month ago  Frequency: constantly  Progression since onset: gradually worsening  injury location: at school  pain- numeric: 6/10  pain location: other  pain quality: sharp  Radiating Pain: No  Aggravating factors: standing  fever: No  inability to bear weight: Yes  itching: No  joint locking: No  limited range of motion: Yes  stiffness: Yes  tingling: No  Treatments tried: exercise  physical therapy: not tried  Improvement on treatment: no relief      Physical Examination:    Vital Signs:    There were no vitals filed for this visit.    There is no height or weight on file to calculate BMI.    This a well-developed, well nourished patient in no acute distress.  They are alert and oriented and cooperative to examination.  Pt. walks without an antalgic gait.      Examination of bilateral knees shows no rashes or erythema. There are no masses ecchymosis or effusion. Patient has full range of motion from 0-130°. Patient is nontender to palpation over lateral joint line and mildly tender to palpation over the medial joint line. Patient has a - Lachman exam, - anterior drawer exam, and - posterior drawer exam. - Vilma's exam. Knee is stable to varus and valgus stress. 5 out of 5 motor strength. Palpable distal pulses. Intact light touch sensation. Negative Patellofemoral crepitus      X-rays: X-rays of both knees are available for review which show some mild to moderate arthritic changes     Assessment:: Bilateral knee arthritis    Plan:  I injected both knees with Euflexxa 2 of 3.  Follow-up next week    This note was created using Dragon voice recognition software that occasionally misinterpreted phrases or words.    Consult note is delivered via Epic messaging service.

## 2017-06-12 ENCOUNTER — OFFICE VISIT (OUTPATIENT)
Dept: FAMILY MEDICINE | Facility: CLINIC | Age: 67
End: 2017-06-12
Payer: MEDICARE

## 2017-06-12 VITALS
SYSTOLIC BLOOD PRESSURE: 110 MMHG | DIASTOLIC BLOOD PRESSURE: 60 MMHG | HEART RATE: 60 BPM | TEMPERATURE: 98 F | BODY MASS INDEX: 41.57 KG/M2 | HEIGHT: 66 IN | WEIGHT: 258.69 LBS | OXYGEN SATURATION: 97 %

## 2017-06-12 DIAGNOSIS — G47.33 OSA (OBSTRUCTIVE SLEEP APNEA): ICD-10-CM

## 2017-06-12 DIAGNOSIS — R80.9 PROTEINURIA, UNSPECIFIED TYPE: ICD-10-CM

## 2017-06-12 DIAGNOSIS — L68.9 EXCESSIVE HAIR GROWTH: ICD-10-CM

## 2017-06-12 DIAGNOSIS — Z87.891 HISTORY OF TOBACCO ABUSE: ICD-10-CM

## 2017-06-12 DIAGNOSIS — E04.2 MULTINODULAR GOITER (NONTOXIC): ICD-10-CM

## 2017-06-12 DIAGNOSIS — N18.30 BENIGN HYPERTENSIVE HEART AND KIDNEY DISEASE WITH CHF, NYHA CLASS 1 AND CKD STAGE 3: ICD-10-CM

## 2017-06-12 DIAGNOSIS — I13.0 BENIGN HYPERTENSIVE HEART AND KIDNEY DISEASE WITH CHF, NYHA CLASS 1 AND CKD STAGE 3: ICD-10-CM

## 2017-06-12 DIAGNOSIS — R73.09 ELEVATED HEMOGLOBIN A1C: ICD-10-CM

## 2017-06-12 DIAGNOSIS — I48.91 ATRIAL FIBRILLATION, UNSPECIFIED TYPE: ICD-10-CM

## 2017-06-12 DIAGNOSIS — J44.9 CHRONIC OBSTRUCTIVE PULMONARY DISEASE, UNSPECIFIED COPD TYPE: Primary | ICD-10-CM

## 2017-06-12 PROCEDURE — 99214 OFFICE O/P EST MOD 30 MIN: CPT | Mod: S$GLB,,, | Performed by: FAMILY MEDICINE

## 2017-06-12 PROCEDURE — 1125F AMNT PAIN NOTED PAIN PRSNT: CPT | Mod: S$GLB,,, | Performed by: FAMILY MEDICINE

## 2017-06-12 PROCEDURE — 1159F MED LIST DOCD IN RCRD: CPT | Mod: S$GLB,,, | Performed by: FAMILY MEDICINE

## 2017-06-12 PROCEDURE — 99999 PR PBB SHADOW E&M-EST. PATIENT-LVL III: CPT | Mod: PBBFAC,,, | Performed by: FAMILY MEDICINE

## 2017-06-12 PROCEDURE — 99499 UNLISTED E&M SERVICE: CPT | Mod: S$GLB,,, | Performed by: FAMILY MEDICINE

## 2017-06-12 NOTE — PROGRESS NOTES
Subjective:       Patient ID: Allyson Henriquez is a 67 y.o. female.    Chief Complaint: Follow-up (knee pain much improved. )    HPI   Patient here today for f/u.  Interrupted sleep bc her  reads on his phone late during the night. Attributes some fatigue to this.  2 afib related episodes ~2am. Woke up in a sweat. 1 event was evaluated in the ER.    Appt in mid-July for cpap re-eval, new mask.  Since then, she has had a lot more burping which is not generally an issue.  Worried about med interactions, but is seeing a lot of facial hair - blond,    Knee inj have made significant improvement in chronic knee pain.   Quit wellbutrin bc of worry re: interactions with other meds. Does recognize some depression sx. Her brother-in-law recently passed.   Considering long term.     Review of Systems   Constitutional: Negative for activity change and unexpected weight change.   HENT: Positive for hearing loss. Negative for rhinorrhea and trouble swallowing.    Eyes: Negative for discharge and visual disturbance.   Respiratory: Negative for chest tightness and wheezing.    Cardiovascular: Positive for palpitations. Negative for chest pain.   Gastrointestinal: Negative for blood in stool, constipation, diarrhea and vomiting.   Endocrine: Negative for polydipsia and polyuria.   Genitourinary: Negative for difficulty urinating, hematuria and menstrual problem.   Musculoskeletal: Positive for arthralgias. Negative for joint swelling and neck pain.   Neurological: Positive for weakness. Negative for headaches.   Psychiatric/Behavioral: Negative for confusion and dysphoric mood.       Objective:      Physical Exam   Constitutional: She is oriented to person, place, and time. She appears well-developed and well-nourished. No distress.   HENT:   Head: Normocephalic and atraumatic.   Eyes: Conjunctivae are normal. Right eye exhibits no discharge. Left eye exhibits no discharge. No scleral icterus.   Neck: Normal range of  motion. Neck supple.   Cardiovascular: Normal rate.    Pulmonary/Chest: Effort normal. No respiratory distress.   Abdominal: Soft. She exhibits no distension.   Musculoskeletal: Normal range of motion. She exhibits no edema.   Neurological: She is alert and oriented to person, place, and time.   Skin: Skin is warm and dry. No rash noted.   Psychiatric: She has a normal mood and affect. Her behavior is normal.   Nursing note and vitals reviewed.        Chronic obstructive pulmonary disease, unspecified COPD type  Stable, doing well overall.  Atrial fibrillation, unspecified type  2 recurrences.   Proteinuria, unspecified type  -     Urinalysis; Future  Need improved BS control. Follow with routine lab. Patient previously est with nephr.  Benign hypertensive heart and kidney disease with CHF, NYHA class 1 and CKD stage 3  Per cards.   History of tobacco abuse  Very rare cig use. Recommend she resume wellbutrin for anxiety and mood.  Multinodular goiter (nontoxic)  F/u with endocrine with labus scheduled later this year.  TRISHA (obstructive sleep apnea)  Cont mask compliance. appt next month with sleep clinic for mask eval.  Elevated hemoglobin A1c  -     Comprehensive metabolic panel; Future; Expected date: 06/12/2017  -     Hemoglobin A1c; Future; Expected date: 06/12/2017  In range that is diagnostic of DM. Needs recheck with cbc. Discussed dietary mods such as high-protein/low-carb. If confirmed, we'll need to discuss metformin, nutrition consult, etc.  Excess hair growth  -     eflornithine (VANIQA) 13.9 % cream; Apply topically 2 (two) times daily with meals.  Dispense: 45 g; Refill: 1 patient aware that this is likely not covered by her ins, and she will need to pay oop.    Time spent in room on counseling and coordination of care, 25mins.

## 2017-06-14 ENCOUNTER — OFFICE VISIT (OUTPATIENT)
Dept: ORTHOPEDICS | Facility: CLINIC | Age: 67
End: 2017-06-14
Payer: MEDICARE

## 2017-06-14 DIAGNOSIS — M17.0 PRIMARY OSTEOARTHRITIS OF BOTH KNEES: Primary | ICD-10-CM

## 2017-06-14 PROCEDURE — 99999 PR PBB SHADOW E&M-EST. PATIENT-LVL II: CPT | Mod: PBBFAC,,, | Performed by: ORTHOPAEDIC SURGERY

## 2017-06-14 PROCEDURE — 20610 DRAIN/INJ JOINT/BURSA W/O US: CPT | Mod: 50,S$GLB,, | Performed by: ORTHOPAEDIC SURGERY

## 2017-06-14 PROCEDURE — 99499 UNLISTED E&M SERVICE: CPT | Mod: S$GLB,,, | Performed by: ORTHOPAEDIC SURGERY

## 2017-06-14 RX ORDER — HYALURONATE SODIUM 20 MG/2 ML
20 SYRINGE (ML) INTRAARTICULAR
Status: DISCONTINUED | OUTPATIENT
Start: 2017-06-14 | End: 2017-06-14 | Stop reason: HOSPADM

## 2017-06-14 RX ADMIN — Medication 20 MG: at 04:06

## 2017-06-14 NOTE — PROGRESS NOTES
Past Medical History:   Diagnosis Date    Acquired deafness of left ear     Bell's palsy     with fatigue and stress    COPD (chronic obstructive pulmonary disease)     History of tobacco abuse     Hyperlipidemia     Hypertension     Multinodular goiter (nontoxic) 5/8/2017    TRISHA (obstructive sleep apnea)     Proteinuria        Past Surgical History:   Procedure Laterality Date    ADENOIDECTOMY      APPENDECTOMY      COLONOSCOPY N/A 11/14/2016    Procedure: COLONOSCOPY;  Surgeon: Destin Cardona MD;  Location: [facility];  Service: Endoscopy;  Laterality: N/A;    EYE SURGERY      cataract OU    NEUROMA SURGERY Left     acoustic    TONSILLECTOMY         Current Outpatient Prescriptions   Medication Sig    buPROPion (WELLBUTRIN SR) 150 MG TBSR 12 hr tablet TAKE 1 TABLET(150 MG) BY MOUTH TWICE DAILY    cholecalciferol, vitamin D3, (VITAMIN D3) 2,000 unit Cap Take 1 capsule by mouth once daily.    eflornithine (VANIQA) 13.9 % cream Apply topically 2 (two) times daily with meals.    fluticasone (FLONASE) 50 mcg/actuation nasal spray 2 sprays by Each Nare route once daily.    hydrochlorothiazide (HYDRODIURIL) 12.5 MG Tab TAKE 1 TABLET(12.5 MG) BY MOUTH EVERY DAY    ketoconazole (NIZORAL) 2 % shampoo Apply topically once a week.    losartan (COZAAR) 50 MG tablet TAKE 1 TABLET(50 MG) BY MOUTH EVERY DAY    metoprolol tartrate (LOPRESSOR) 50 MG tablet Take 25 mg by mouth 2 (two) times daily.     simvastatin (ZOCOR) 40 MG tablet TAKE 1 TABLET(40 MG) BY MOUTH EVERY EVENING    vitamins  A,C,E-zinc-copper (PRESERVISION AREDS) 14,320-226-200 unit-mg-unit Cap Take by mouth 2 (two) times daily.     No current facility-administered medications for this visit.        Review of patient's allergies indicates:   Allergen Reactions    Contrast media Anaphylaxis    Albuterol Other (See Comments)     Used during PFTs and put pt in afib    Pcn [penicillins]      Pt states did well on cephalexin.  No adverse  reaction or side effect.        Family History   Problem Relation Age of Onset    Cancer Paternal Uncle      colon cancer    Diabetes Neg Hx     Kidney disease Neg Hx     Stroke Neg Hx     Heart disease Neg Hx        Social History     Social History    Marital status:      Spouse name: N/A    Number of children: N/A    Years of education: N/A     Occupational History    Not on file.     Social History Main Topics    Smoking status: Former Smoker     Packs/day: 1.00     Years: 40.00     Quit date: 3/6/2016    Smokeless tobacco: Never Used    Alcohol use 0.0 oz/week      Comment: occ social    Drug use: No    Sexual activity: Not on file     Other Topics Concern    Not on file     Social History Narrative    Works in insurance collection.       Chief Complaint:   Chief Complaint   Patient presents with    Knee Pain     bilateral knee-Euflexxa 3/3       Interval history: Is a 67-year-old female seen for bilateral knee pain since about December.  Patient notes that it started after she stopped smoking and recently started exercising.  She is to take some Aleve which helped but was asked to stop due to some questionable kidney issues.  Patient denies an injury or trauma.  Pain is with walking or standing for prolonged periods of time.  Majority of the pain is getting up from a sitting position.  She denies swelling or mechanical symptoms.     Present: Here for Euflexxa.  They are helping.  Her pain is down to a 2 out of 10.    Answers for HPI/ROS submitted by the patient on 5/1/2017   Leg pain  unexpected weight change: Yes  appetite change : Yes  sleep disturbance: Yes  IMMUNOCOMPROMISED: Yes  nervous/ anxious: Yes  dysphoric mood: Yes  rash: No  visual disturbance: Yes  eye redness: No  eye pain: No  ear pain: No  tinnitus: No  hearing loss: Yes  sinus pressure : No  nosebleeds: No  enviro allergies: No  food allergies: No  cough: No  shortness of breath: Yes  sweating: Yes  dysuria:  No  frequency: No  difficulty urinating: No  hematuria: Yes  painful intercourse: No  chest pain: No  palpitations: Yes  nausea: No  vomiting: No  diarrhea: Yes  constipation: No  headaches: No  dizziness: No  numbness: No  seizures: No  joint swelling: No  myalgia: No  weakness: Yes  back pain: Yes  Pain Chronicity: chronic  History of trauma: No  Onset: more than 1 month ago  Frequency: constantly  Progression since onset: gradually worsening  injury location: at school  pain- numeric: 6/10  pain location: other  pain quality: sharp  Radiating Pain: No  Aggravating factors: standing  fever: No  inability to bear weight: Yes  itching: No  joint locking: No  limited range of motion: Yes  stiffness: Yes  tingling: No  Treatments tried: exercise  physical therapy: not tried  Improvement on treatment: no relief      Physical Examination:    Vital Signs:    There were no vitals filed for this visit.    There is no height or weight on file to calculate BMI.    This a well-developed, well nourished patient in no acute distress.  They are alert and oriented and cooperative to examination.  Pt. walks without an antalgic gait.      Examination of bilateral knees shows no rashes or erythema. There are no masses ecchymosis or effusion. Patient has full range of motion from 0-130°. Patient is nontender to palpation over lateral joint line and mildly tender to palpation over the medial joint line. Patient has a - Lachman exam, - anterior drawer exam, and - posterior drawer exam. - Vilma's exam. Knee is stable to varus and valgus stress. 5 out of 5 motor strength. Palpable distal pulses. Intact light touch sensation. Negative Patellofemoral crepitus      X-rays: X-rays of both knees are available for review which show some mild to moderate arthritic changes     Assessment:: Bilateral knee arthritis    Plan:  I injected both knees with Euflexxa 3 of 3.  Follow-up about 6 weeks.    This note was created using Dragon voice  recognition software that occasionally misinterpreted phrases or words.    Consult note is delivered via Epic messaging service.

## 2017-06-14 NOTE — PROCEDURES
Large Joint Aspiration/Injection  Date/Time: 6/14/2017 4:05 PM  Performed by: MERVAT AVILA  Authorized by: MERVAT AVILA     Consent Done?:  Yes (Verbal)  Indications:  Pain  Procedure site marked: Yes    Timeout: Prior to procedure the correct patient, procedure, and site was verified      Location:  Knee  Site:  L knee and R knee  Prep: Patient was prepped and draped in usual sterile fashion    Needle size:  20 G  Approach:  Anterolateral  Medications:  20 mg EUFLEXXA 10 mg/mL(mw 2.4 -3.6 million); 20 mg EUFLEXXA 10 mg/mL(mw 2.4 -3.6 million)  Patient tolerance:  Patient tolerated the procedure well with no immediate complications

## 2017-06-26 RX ORDER — HYALURONATE SODIUM 20 MG/2 ML
20 SYRINGE (ML) INTRAARTICULAR
Status: SHIPPED | OUTPATIENT
Start: 2017-06-14

## 2017-07-10 DIAGNOSIS — E78.5 HLD (HYPERLIPIDEMIA): ICD-10-CM

## 2017-07-10 RX ORDER — SIMVASTATIN 40 MG/1
TABLET, FILM COATED ORAL
Qty: 90 TABLET | Refills: 1 | Status: SHIPPED | OUTPATIENT
Start: 2017-07-10 | End: 2018-01-05 | Stop reason: SDUPTHER

## 2017-07-18 ENCOUNTER — PATIENT MESSAGE (OUTPATIENT)
Dept: FAMILY MEDICINE | Facility: CLINIC | Age: 67
End: 2017-07-18

## 2017-07-18 ENCOUNTER — OFFICE VISIT (OUTPATIENT)
Dept: FAMILY MEDICINE | Facility: CLINIC | Age: 67
End: 2017-07-18
Payer: MEDICARE

## 2017-07-18 VITALS
HEIGHT: 66 IN | TEMPERATURE: 99 F | HEART RATE: 68 BPM | DIASTOLIC BLOOD PRESSURE: 72 MMHG | SYSTOLIC BLOOD PRESSURE: 130 MMHG | WEIGHT: 255.38 LBS | BODY MASS INDEX: 41.04 KG/M2

## 2017-07-18 DIAGNOSIS — H60.20 MALIGNANT OTITIS EXTERNA, UNSPECIFIED CHRONICITY, UNSPECIFIED LATERALITY: ICD-10-CM

## 2017-07-18 DIAGNOSIS — H92.02 OTALGIA, LEFT: Primary | ICD-10-CM

## 2017-07-18 PROCEDURE — 99214 OFFICE O/P EST MOD 30 MIN: CPT | Mod: S$GLB,,, | Performed by: FAMILY MEDICINE

## 2017-07-18 PROCEDURE — 1125F AMNT PAIN NOTED PAIN PRSNT: CPT | Mod: S$GLB,,, | Performed by: FAMILY MEDICINE

## 2017-07-18 PROCEDURE — 1159F MED LIST DOCD IN RCRD: CPT | Mod: S$GLB,,, | Performed by: FAMILY MEDICINE

## 2017-07-18 PROCEDURE — 99999 PR PBB SHADOW E&M-EST. PATIENT-LVL III: CPT | Mod: PBBFAC,,, | Performed by: FAMILY MEDICINE

## 2017-07-18 RX ORDER — DOXYCYCLINE 100 MG/1
100 CAPSULE ORAL 2 TIMES DAILY
Qty: 14 CAPSULE | Refills: 0 | Status: SHIPPED | OUTPATIENT
Start: 2017-07-18 | End: 2017-07-25

## 2017-07-18 RX ORDER — CIPROFLOXACIN AND DEXAMETHASONE 3; 1 MG/ML; MG/ML
4 SUSPENSION/ DROPS AURICULAR (OTIC) 2 TIMES DAILY
Qty: 7.5 ML | Refills: 1 | Status: SHIPPED | OUTPATIENT
Start: 2017-07-18 | End: 2017-07-28

## 2017-07-18 NOTE — PROGRESS NOTES
Subjective:       Patient ID: Allyson Henriquez is a 67 y.o. female.    Chief Complaint: Otalgia (pt is c/o left ear pain, started itchign a couple days ago, feels swelling, has been using cipro drops since yesterday morning. )    HPI   Patient here today with c/o L ear pain x 2 days with itching. Started using ciprodex subsequent, and has seen some improvement. Gets to visit with friends at the beach this week, and worried about progression. She reports sensation of swelling of ear canal and pain last night.   Afebrile. The ear is already deaf, hearing unaffected.  R ear is unaffected.     Review of Systems   Constitutional: Negative for fatigue and unexpected weight change.   HENT: Positive for ear pain and hearing loss (chronic loss, L). Negative for congestion, ear discharge, facial swelling, postnasal drip and rhinorrhea.    Respiratory: Negative for cough and shortness of breath.    Gastrointestinal: Negative for constipation and diarrhea.   Genitourinary: Negative for difficulty urinating and dysuria.   Musculoskeletal: Negative for arthralgias and back pain.   Neurological: Negative for dizziness and headaches.       Objective:      Physical Exam   Constitutional: She is oriented to person, place, and time. She appears well-developed and well-nourished. No distress.   HENT:   Head: Normocephalic and atraumatic.   Left Ear: There is swelling (TM not visualized due to canal swelling) and tenderness (on traction of pinna). Decreased hearing is noted.   Nose: Nose normal.   Mouth/Throat: Oropharynx is clear and moist. No oropharyngeal exudate.   Eyes: Conjunctivae are normal. Right eye exhibits no discharge. Left eye exhibits no discharge. No scleral icterus.   Neck: Normal range of motion. Neck supple.   Cardiovascular: Normal rate.    Pulmonary/Chest: Effort normal. No respiratory distress.   Abdominal: Soft. She exhibits no distension.   Musculoskeletal: Normal range of motion. She exhibits no edema.    Neurological: She is alert and oriented to person, place, and time.   Skin: Skin is warm and dry. No rash noted.   Psychiatric: She has a normal mood and affect. Her behavior is normal.   Nursing note and vitals reviewed.        Otalgia, left  Malignant otitis externa, unspecified chronicity, unspecified laterality  -     doxycycline (MONODOX) 100 MG capsule; Take 1 capsule (100 mg total) by mouth 2 (two) times daily.  Dispense: 14 capsule; Refill: 0  -     ciprofloxacin-dexamethasone 0.3-0.1% (CIPRODEX) 0.3-0.1 % DrpS; Place 4 drops into both ears 2 (two) times daily.  Dispense: 7.5 mL; Refill: 1  Side effects and precautions of medication use reviewed with patient, expressed understanding. No questions or concerns. Expected course of illness and sx tx incl otc med use reviewed. Notify MD if sx persist or worsen.   Keep ear clean and dry.

## 2017-08-01 ENCOUNTER — PATIENT MESSAGE (OUTPATIENT)
Dept: ORTHOPEDICS | Facility: CLINIC | Age: 67
End: 2017-08-01

## 2017-10-17 RX ORDER — BUPROPION HYDROCHLORIDE 150 MG/1
TABLET, EXTENDED RELEASE ORAL
Qty: 60 TABLET | Refills: 0 | Status: SHIPPED | OUTPATIENT
Start: 2017-10-17 | End: 2017-11-06

## 2017-10-18 ENCOUNTER — HOSPITAL ENCOUNTER (OUTPATIENT)
Dept: RADIOLOGY | Facility: HOSPITAL | Age: 67
Discharge: HOME | End: 2017-10-18
Attending: FAMILY MEDICINE
Payer: MEDICARE

## 2017-10-18 DIAGNOSIS — E04.2 MULTINODULAR GOITER: ICD-10-CM

## 2017-10-18 PROCEDURE — 76536 US EXAM OF HEAD AND NECK: CPT | Mod: 26,,, | Performed by: RADIOLOGY

## 2017-10-18 PROCEDURE — 76536 US EXAM OF HEAD AND NECK: CPT | Mod: TC,PO

## 2017-10-20 ENCOUNTER — TELEPHONE (OUTPATIENT)
Dept: FAMILY MEDICINE | Facility: CLINIC | Age: 67
End: 2017-10-20

## 2017-10-20 DIAGNOSIS — Z12.39 SCREENING BREAST EXAMINATION: Primary | ICD-10-CM

## 2017-10-20 NOTE — TELEPHONE ENCOUNTER
Pt is requesting a mammo and lab work.     Repeated orders from previous mammo.     Lab work from last office visit still open, will schedule from those.      Pt message from anna marie:   I would like Dr Pimentel to order my annual mammogram - I usually go to [facility] on [address] ([phone]) but if can be done on a Monday 11- or 11- either [facility] or [facility] that is fine - whatever Dr Pimentel wants.   I would like Dr Pimentel to order the blood work at [facility] - I have an appointment w/Dr Triniadd on [facility] [address] on Wednesday 10- @ 8:00 am - if I could do the blood work either @ 7am thru 7:45 am that would be great or right after the [facility] visit.    After the Dr Trinidad visit and the blood work is done - I will need a follow up visit w/Dr Pimentel. What does her November schedule look like - early in morning - if possible. She wanted to see me when she got back from leave.   Thanks   Allyson Foster)

## 2017-10-23 NOTE — TELEPHONE ENCOUNTER
Spoke w/ pt. Informed pt about lab orders and mammo placed. appts for lab and 3 month f/u made per pt. Pt verbalized understanding.     Lab 10-24-17 @ 930.    3 month f/u 11-6-17 @ 1040.

## 2017-10-25 ENCOUNTER — LAB VISIT (OUTPATIENT)
Dept: LAB | Facility: HOSPITAL | Age: 67
End: 2017-10-25
Attending: FAMILY MEDICINE
Payer: MEDICARE

## 2017-10-25 ENCOUNTER — OFFICE VISIT (OUTPATIENT)
Dept: ENDOCRINOLOGY | Facility: CLINIC | Age: 67
End: 2017-10-25
Payer: MEDICARE

## 2017-10-25 VITALS
BODY MASS INDEX: 41.38 KG/M2 | SYSTOLIC BLOOD PRESSURE: 130 MMHG | DIASTOLIC BLOOD PRESSURE: 72 MMHG | HEIGHT: 66 IN | WEIGHT: 257.5 LBS | HEART RATE: 65 BPM

## 2017-10-25 DIAGNOSIS — R73.9 HYPERGLYCEMIA: ICD-10-CM

## 2017-10-25 DIAGNOSIS — R73.09 ELEVATED HEMOGLOBIN A1C: ICD-10-CM

## 2017-10-25 DIAGNOSIS — E04.2 MULTINODULAR GOITER (NONTOXIC): Primary | ICD-10-CM

## 2017-10-25 LAB
ALBUMIN SERPL BCP-MCNC: 3.5 G/DL
ALP SERPL-CCNC: 71 U/L
ALT SERPL W/O P-5'-P-CCNC: 26 U/L
ANION GAP SERPL CALC-SCNC: 10 MMOL/L
ANION GAP SERPL CALC-SCNC: 10 MMOL/L
AST SERPL-CCNC: 21 U/L
BASOPHILS # BLD AUTO: 0.06 K/UL
BASOPHILS NFR BLD: 0.7 %
BILIRUB SERPL-MCNC: 0.6 MG/DL
BUN SERPL-MCNC: 17 MG/DL
BUN SERPL-MCNC: 17 MG/DL
CALCIUM SERPL-MCNC: 9.6 MG/DL
CALCIUM SERPL-MCNC: 9.6 MG/DL
CHLORIDE SERPL-SCNC: 106 MMOL/L
CHLORIDE SERPL-SCNC: 106 MMOL/L
CO2 SERPL-SCNC: 25 MMOL/L
CO2 SERPL-SCNC: 25 MMOL/L
CREAT SERPL-MCNC: 1.3 MG/DL
CREAT SERPL-MCNC: 1.3 MG/DL
DIFFERENTIAL METHOD: ABNORMAL
EOSINOPHIL # BLD AUTO: 0.2 K/UL
EOSINOPHIL NFR BLD: 2.9 %
ERYTHROCYTE [DISTWIDTH] IN BLOOD BY AUTOMATED COUNT: 15 %
EST. GFR  (AFRICAN AMERICAN): 48.7 ML/MIN/1.73 M^2
EST. GFR  (AFRICAN AMERICAN): 48.7 ML/MIN/1.73 M^2
EST. GFR  (NON AFRICAN AMERICAN): 42.3 ML/MIN/1.73 M^2
EST. GFR  (NON AFRICAN AMERICAN): 42.3 ML/MIN/1.73 M^2
ESTIMATED AVG GLUCOSE: 134 MG/DL
ESTIMATED AVG GLUCOSE: 134 MG/DL
GLUCOSE SERPL-MCNC: 144 MG/DL
GLUCOSE SERPL-MCNC: 144 MG/DL
HBA1C MFR BLD HPLC: 6.3 %
HBA1C MFR BLD HPLC: 6.3 %
HCT VFR BLD AUTO: 39.7 %
HGB BLD-MCNC: 13 G/DL
IMM GRANULOCYTES # BLD AUTO: 0.03 K/UL
IMM GRANULOCYTES NFR BLD AUTO: 0.4 %
LYMPHOCYTES # BLD AUTO: 2.1 K/UL
LYMPHOCYTES NFR BLD: 25.3 %
MCH RBC QN AUTO: 27.3 PG
MCHC RBC AUTO-ENTMCNC: 32.7 G/DL
MCV RBC AUTO: 83 FL
MONOCYTES # BLD AUTO: 0.7 K/UL
MONOCYTES NFR BLD: 8 %
NEUTROPHILS # BLD AUTO: 5.2 K/UL
NEUTROPHILS NFR BLD: 62.7 %
NRBC BLD-RTO: 0 /100 WBC
PLATELET # BLD AUTO: 220 K/UL
PMV BLD AUTO: 12.3 FL
POTASSIUM SERPL-SCNC: 3.9 MMOL/L
POTASSIUM SERPL-SCNC: 3.9 MMOL/L
PROT SERPL-MCNC: 6.9 G/DL
RBC # BLD AUTO: 4.76 M/UL
SODIUM SERPL-SCNC: 141 MMOL/L
SODIUM SERPL-SCNC: 141 MMOL/L
WBC # BLD AUTO: 8.23 K/UL

## 2017-10-25 PROCEDURE — 99214 OFFICE O/P EST MOD 30 MIN: CPT | Mod: S$GLB,,, | Performed by: INTERNAL MEDICINE

## 2017-10-25 PROCEDURE — 99499 UNLISTED E&M SERVICE: CPT | Mod: S$GLB,,, | Performed by: INTERNAL MEDICINE

## 2017-10-25 PROCEDURE — 36415 COLL VENOUS BLD VENIPUNCTURE: CPT | Mod: PO

## 2017-10-25 PROCEDURE — 85025 COMPLETE CBC W/AUTO DIFF WBC: CPT

## 2017-10-25 PROCEDURE — 83036 HEMOGLOBIN GLYCOSYLATED A1C: CPT

## 2017-10-25 PROCEDURE — 80053 COMPREHEN METABOLIC PANEL: CPT

## 2017-10-25 PROCEDURE — 99999 PR PBB SHADOW E&M-EST. PATIENT-LVL III: CPT | Mod: PBBFAC,,, | Performed by: INTERNAL MEDICINE

## 2017-10-25 RX ORDER — METOPROLOL TARTRATE 25 MG/1
TABLET, FILM COATED ORAL
Refills: 3 | COMMUNITY
Start: 2017-10-03 | End: 2018-06-11 | Stop reason: SDUPTHER

## 2017-10-25 NOTE — PROGRESS NOTES
Subjective:      Patient ID: Allyson Henriquez is a 67 y.o. female.    Chief Complaint:  Goiter      History of Present Illness    Mrs.Barbara BETY Henriquez is referred to me [facility]     Comes for f/u today     Found to have nodule in her thyroid on [facility]     Denies change in size of neck, difficulty swallowing, shortness of breath in the recumbent position, pain or pressure around the neck. Denies history of hypo or hyperthyroidism.     Patient denies history of radiation to the head or neck. Denies history of colon or breast cancer. Denies family history of thyroid cancer or thyroid disease.       Sister and nephew with thyroid cancer   Daughter with thyroid nodule   Former smoker        Review of Systems   Constitutional: Positive for unexpected weight change (unable to lose weight ).   HENT: Negative for trouble swallowing and voice change.    Eyes: Negative for visual disturbance.   Respiratory: Positive for shortness of breath (COPD).    Cardiovascular: Negative for chest pain.   Musculoskeletal: Positive for arthralgias (follows for right knee pain - arthritis ).       Objective:   Physical Exam   Neck: Thyromegaly (right ) present.   Cardiovascular: Normal rate.    Vitals reviewed.      Lab Review:   ULTRASOUND THYROID    CPT: 55789    Clinical data:  Enlarged thyroid    Ultrasound of the thyroid was performed.    Findings: The right thyroid lobe is 5.2 x 2.4 x 3.1 cm. The isthmus measures 2.8 mm. The left thyroid lobe is 4.0 x 1.1 x 1.6 cm. there is a 2.6 x 2.3 x 2.9 cm predominantly cystic nodule with multiple shadowing calcifications within measures 2.6 x 2.3 x 2.9 cm.  This is located lower pole right thyroid lobe.  Within the upper pole of the right thyroid lobe there is a 0.7 x 0.4 x 0.7 cm nodule.  Within the lower pole left thyroid lobe there is a 0.7 x 0.4 x 0.6 cm nodule.       Impression        1. Complex dominant nodule of the lower pole right thyroid lobe cystic and solid with  multiple calcifications within.  Recommend biopsy as this nodule does meet biopsy criteria.      Electronically signed by: SHANNA LINARES MD  Date: 04/18/17  Time: 14:36              Assessment:     1. Multinodular goiter (nontoxic)  US Soft Tissue Head Neck Thyroid        #[facility]  TSH yearly  Last TSH normal   Risk factors FH of thyroid cancer   Reviewed US thyroid independently complex nodule with mainly cystic component with some solid tissue   Increase in cystic area, offered FNA of solid part ( with high chances of unsat. Or FLUS ) vs surgery vs observation.    I have reviewed management options including observation, FNA or surgery.  All of the patients questions were answered and handout was provided.     Discussed indications for a FNA      RTC in 6 months  with TSH and thyroid u/s prior    # 2 T2DM newly diagnosed   She has repeat blood work scheduled with    Advised diet and exercise       Plan:     Follow up:    US thyroid in 6 months   RTC in 6 months

## 2017-10-25 NOTE — LETTER
October 25, 2017      Gilma Pimentel MD  [address]  [place] [address]           [facility]  [address] [facility] [address] [place] [address]  Phone: [phone]  Fax: [phone]          Patient: Allyson Henriquez   MR Number: [id number]   YOB: 1950   Date of Visit: 10/25/2017       Dear Dr. Gilma Pimentel:    Thank you for referring Allyson Henriquez to me for evaluation. Attached you will find relevant portions of my assessment and plan of care.    If you have questions, please do not hesitate to call me. I look forward to following Allyson Henriquez along with you.    Sincerely,    Courtney Trinidad MD    Enclosure  CC:  No Recipients    If you would like to receive this communication electronically, please contact [email or web address] or [phone] to request more information on [employer] Link access.    For providers and/or their staff who would like to refer a patient to [facility], please contact us through our one-stop-shop provider referral line, [facility] [place], at [phone].    If you feel you have received this communication in error or would no longer like to receive these types of communications, please e-mail [email or web address]

## 2017-10-30 RX ORDER — LOSARTAN POTASSIUM 50 MG/1
TABLET ORAL
Qty: 90 TABLET | Refills: 1 | Status: SHIPPED | OUTPATIENT
Start: 2017-10-30 | End: 2017-11-06 | Stop reason: SDUPTHER

## 2017-11-06 ENCOUNTER — OFFICE VISIT (OUTPATIENT)
Dept: FAMILY MEDICINE | Facility: CLINIC | Age: 67
End: 2017-11-06
Payer: MEDICARE

## 2017-11-06 VITALS
SYSTOLIC BLOOD PRESSURE: 136 MMHG | WEIGHT: 252.75 LBS | RESPIRATION RATE: 16 BRPM | HEART RATE: 63 BPM | TEMPERATURE: 99 F | OXYGEN SATURATION: 97 % | DIASTOLIC BLOOD PRESSURE: 74 MMHG | HEIGHT: 66 IN | BODY MASS INDEX: 40.62 KG/M2

## 2017-11-06 DIAGNOSIS — L21.9 SEBORRHEA: ICD-10-CM

## 2017-11-06 DIAGNOSIS — I48.91 ATRIAL FIBRILLATION, UNSPECIFIED TYPE: ICD-10-CM

## 2017-11-06 DIAGNOSIS — E66.01 MORBID OBESITY: ICD-10-CM

## 2017-11-06 DIAGNOSIS — R73.09 ELEVATED HEMOGLOBIN A1C: Primary | ICD-10-CM

## 2017-11-06 DIAGNOSIS — N18.30 CKD (CHRONIC KIDNEY DISEASE) STAGE 3, GFR 30-59 ML/MIN: ICD-10-CM

## 2017-11-06 PROCEDURE — 99499 UNLISTED E&M SERVICE: CPT | Mod: S$GLB,,, | Performed by: FAMILY MEDICINE

## 2017-11-06 PROCEDURE — 99214 OFFICE O/P EST MOD 30 MIN: CPT | Mod: S$GLB,,, | Performed by: FAMILY MEDICINE

## 2017-11-06 PROCEDURE — 99999 PR PBB SHADOW E&M-EST. PATIENT-LVL III: CPT | Mod: PBBFAC,,, | Performed by: FAMILY MEDICINE

## 2017-11-06 RX ORDER — L. ACIDOPHILUS/L.BULGARICUS 100MM CELL
1 GRANULES IN PACKET (EA) ORAL 2 TIMES DAILY
COMMUNITY
End: 2018-03-28

## 2017-11-06 NOTE — Clinical Note
Navdeep Rogers, I'm having Allyson schedule a f/u with you. Do you have specific labs you want prior. I'll make a note for future ref. Thanks, gisselle

## 2017-11-06 NOTE — PROGRESS NOTES
"Subjective:       Patient ID: Allyson Henriquez is a 67 y.o. female.    Chief Complaint: Follow-up (3 month); Atrial Fibrillation (11/04/2017 lasted 2 hours, did not go to ED); and Knee Pain (bilat knee pain)    HPI   Patient here today for f/u, accompanied by .  Labs 10/2017 rev. Of note, A1c improved back under 6.5%. She is making some dietary changes. Lots of cravings. Discussed contrave now available and indications/risks.   Episode of afib this week that lasted 2 hours. BP and pulse were "very high". Unprovoked as far as they know. She refused to go to the ER, but had it not resolved within another 10 mins she would have gone. Has not yet notified cards. Not currently on anti-coag or rhythm control.   Fatigue still an ongoing issue.  CKD3 with gfr changes noted. Will fwd to nephr/Elliott.     Review of Systems   Constitutional: Positive for activity change. Negative for unexpected weight change.   HENT: Negative for hearing loss, rhinorrhea and trouble swallowing.    Eyes: Negative for discharge and visual disturbance.   Respiratory: Negative for chest tightness and wheezing.    Cardiovascular: Negative for chest pain and palpitations.   Gastrointestinal: Negative for blood in stool, constipation, diarrhea and vomiting.   Endocrine: Positive for polydipsia. Negative for polyuria.   Genitourinary: Negative for difficulty urinating, dysuria, hematuria and menstrual problem.   Musculoskeletal: Negative for joint swelling and neck pain.   Neurological: Negative for weakness and headaches.   Psychiatric/Behavioral: Negative for confusion and dysphoric mood.       Objective:      Physical Exam   Constitutional: She is oriented to person, place, and time. She appears well-developed and well-nourished. No distress.   HENT:   Head: Normocephalic and atraumatic.   Right Ear: External ear normal.   Left Ear: External ear normal.   Nose: Nose normal.   Mouth/Throat: Oropharynx is clear and moist. No oropharyngeal " exudate.   Eyes: Conjunctivae and EOM are normal. Pupils are equal, round, and reactive to light.   Neck: Normal range of motion. Neck supple. No thyromegaly present.   Cardiovascular: Normal rate and regular rhythm.    Pulmonary/Chest: Effort normal and breath sounds normal. No respiratory distress. She has no wheezes.   Abdominal: Soft. Bowel sounds are normal. She exhibits no distension and no mass. There is no tenderness. There is no rebound and no guarding.   Musculoskeletal: Normal range of motion. She exhibits no edema.   Lymphadenopathy:     She has no cervical adenopathy.   Neurological: She is alert and oriented to person, place, and time.   Skin: Skin is warm and dry.   Psychiatric: She has a normal mood and affect. Her behavior is normal.   Nursing note and vitals reviewed.        Seborrhea  otc t-sal and topical lotrimin bid until clear.  Elevated hemoglobin A1c  Cont dietary mods. Recheck lab 4-6 mos. Reviewed risks of progression to DM2.   Atrial fibrillation, unspecified type  Patient instructed to notify cards of episode.   To ER with any recurrence.   CKD (chronic kidney disease) stage 3, GFR 30-59 ml/min  Needs review with nephro. Some dip in GFR possibly related to BS increase at previous lab draw as well.   Obesity  Goal weight reviewed as well as need for lifestyle modifications to incl caloric restriction, high protein/low carb, water intake.

## 2017-11-07 PROBLEM — E66.01 MORBID OBESITY: Status: ACTIVE | Noted: 2017-11-07

## 2017-11-07 RX ORDER — BUPROPION HYDROCHLORIDE 150 MG/1
TABLET, EXTENDED RELEASE ORAL
Refills: 5 | COMMUNITY
Start: 2017-09-16 | End: 2018-03-28

## 2017-11-10 ENCOUNTER — LAB VISIT (OUTPATIENT)
Dept: LAB | Facility: HOSPITAL | Age: 67
End: 2017-11-10
Attending: FAMILY MEDICINE
Payer: MEDICARE

## 2017-11-10 DIAGNOSIS — R80.9 PROTEINURIA, UNSPECIFIED TYPE: ICD-10-CM

## 2017-11-10 PROCEDURE — 81001 URINALYSIS AUTO W/SCOPE: CPT

## 2017-11-13 ENCOUNTER — PATIENT MESSAGE (OUTPATIENT)
Dept: FAMILY MEDICINE | Facility: CLINIC | Age: 67
End: 2017-11-13

## 2017-11-13 LAB
BACTERIA #/AREA URNS AUTO: NORMAL /HPF
BILIRUB UR QL STRIP: NEGATIVE
CLARITY UR REFRACT.AUTO: CLEAR
COLOR UR AUTO: ABNORMAL
GLUCOSE UR QL STRIP: NEGATIVE
HGB UR QL STRIP: NEGATIVE
HYALINE CASTS UR QL AUTO: 0 /LPF
KETONES UR QL STRIP: NEGATIVE
LEUKOCYTE ESTERASE UR QL STRIP: NEGATIVE
MICROSCOPIC COMMENT: NORMAL
NITRITE UR QL STRIP: NEGATIVE
PH UR STRIP: 6 [PH] (ref 5–8)
PROT UR QL STRIP: ABNORMAL
RBC #/AREA URNS AUTO: 0 /HPF (ref 0–4)
SP GR UR STRIP: 1.01 (ref 1–1.03)
SQUAMOUS #/AREA URNS AUTO: 1 /HPF
URN SPEC COLLECT METH UR: ABNORMAL
UROBILINOGEN UR STRIP-ACNC: NEGATIVE EU/DL
WBC #/AREA URNS AUTO: 2 /HPF (ref 0–5)

## 2017-11-27 RX ORDER — HYDROCHLOROTHIAZIDE 12.5 MG/1
TABLET ORAL
Qty: 90 TABLET | Refills: 1 | Status: SHIPPED | OUTPATIENT
Start: 2017-11-27 | End: 2019-05-30 | Stop reason: SDUPTHER

## 2018-01-05 DIAGNOSIS — E78.5 HLD (HYPERLIPIDEMIA): ICD-10-CM

## 2018-01-05 RX ORDER — SIMVASTATIN 40 MG/1
TABLET, FILM COATED ORAL
Qty: 90 TABLET | Refills: 1 | Status: SHIPPED | OUTPATIENT
Start: 2018-01-05 | End: 2019-08-27 | Stop reason: SDUPTHER

## 2018-03-26 ENCOUNTER — HOSPITAL ENCOUNTER (OUTPATIENT)
Dept: RADIOLOGY | Facility: HOSPITAL | Age: 68
Discharge: HOME | End: 2018-03-26
Attending: INTERNAL MEDICINE
Payer: MEDICARE

## 2018-03-26 DIAGNOSIS — E04.2 MULTINODULAR GOITER (NONTOXIC): ICD-10-CM

## 2018-03-26 PROCEDURE — 76536 US EXAM OF HEAD AND NECK: CPT | Mod: TC,PO

## 2018-03-26 PROCEDURE — 76536 US EXAM OF HEAD AND NECK: CPT | Mod: 26,,, | Performed by: RADIOLOGY

## 2018-03-28 ENCOUNTER — OFFICE VISIT (OUTPATIENT)
Dept: ENDOCRINOLOGY | Facility: CLINIC | Age: 68
End: 2018-03-28
Payer: MEDICARE

## 2018-03-28 ENCOUNTER — LAB VISIT (OUTPATIENT)
Dept: LAB | Facility: HOSPITAL | Age: 68
End: 2018-03-28
Attending: INTERNAL MEDICINE
Payer: MEDICARE

## 2018-03-28 VITALS
BODY MASS INDEX: 41.43 KG/M2 | HEART RATE: 71 BPM | WEIGHT: 257.81 LBS | DIASTOLIC BLOOD PRESSURE: 80 MMHG | HEIGHT: 66 IN | SYSTOLIC BLOOD PRESSURE: 162 MMHG

## 2018-03-28 DIAGNOSIS — E04.2 MULTINODULAR GOITER (NONTOXIC): Primary | ICD-10-CM

## 2018-03-28 DIAGNOSIS — E04.2 MULTINODULAR GOITER (NONTOXIC): ICD-10-CM

## 2018-03-28 LAB — TSH SERPL DL<=0.005 MIU/L-ACNC: 1.14 UIU/ML

## 2018-03-28 PROCEDURE — 84305 ASSAY OF SOMATOMEDIN: CPT

## 2018-03-28 PROCEDURE — 36415 COLL VENOUS BLD VENIPUNCTURE: CPT | Mod: PO

## 2018-03-28 PROCEDURE — 84443 ASSAY THYROID STIM HORMONE: CPT

## 2018-03-28 PROCEDURE — 99213 OFFICE O/P EST LOW 20 MIN: CPT | Mod: S$GLB,,, | Performed by: INTERNAL MEDICINE

## 2018-03-28 PROCEDURE — 3077F SYST BP >= 140 MM HG: CPT | Mod: S$GLB,,, | Performed by: INTERNAL MEDICINE

## 2018-03-28 PROCEDURE — 99999 PR PBB SHADOW E&M-EST. PATIENT-LVL III: CPT | Mod: PBBFAC,,, | Performed by: INTERNAL MEDICINE

## 2018-03-28 PROCEDURE — 3079F DIAST BP 80-89 MM HG: CPT | Mod: S$GLB,,, | Performed by: INTERNAL MEDICINE

## 2018-03-28 NOTE — PROGRESS NOTES
Subjective:      Patient ID: Allyson Henriquez is a 68 y.o. female.    Chief Complaint:  Multinodular goiter      History of Present Illness    Mrs.Barbara BETY Henriquez is referred to me MNG     Comes for f/u today     Found to have nodule in her thyroid on routine screening [facility]     Denies change in size of neck, difficulty swallowing, shortness of breath in the recumbent position, pain or pressure around the neck. Denies history of hypo or hyperthyroidism.     Patient denies history of radiation to the head or neck. Denies history of colon or breast cancer. Denies family history of thyroid cancer or thyroid disease.       Sister and nephew with thyroid cancer   Daughter with thyroid nodule   Former smoker        Review of Systems   Constitutional: Positive for fatigue and unexpected weight change (unable to lose weight ).   HENT: Negative for trouble swallowing and voice change.    Eyes: Negative for visual disturbance.   Respiratory: Positive for shortness of breath (COPD).    Cardiovascular: Negative for chest pain.   Musculoskeletal: Positive for arthralgias (follows for right knee pain - arthritis ).   ring is tighter so not wearing ehr wedding ring     Objective:   Physical Exam   Neck: Thyromegaly (right ) present.   Cardiovascular: Normal rate.    Vitals reviewed.      Lab Review:   ULTRASOUND THYROID    CPT: 01490    Clinical data:  Enlarged thyroid    Ultrasound of the thyroid was performed.    Findings: The right thyroid lobe is 5.2 x 2.4 x 3.1 cm. The isthmus measures 2.8 mm. The left thyroid lobe is 4.0 x 1.1 x 1.6 cm. there is a 2.6 x 2.3 x 2.9 cm predominantly cystic nodule with multiple shadowing calcifications within measures 2.6 x 2.3 x 2.9 cm.  This is located lower pole right thyroid lobe.  Within the upper pole of the right thyroid lobe there is a 0.7 x 0.4 x 0.7 cm nodule.  Within the lower pole left thyroid lobe there is a 0.7 x 0.4 x 0.6 cm nodule.       Impression        1. Complex  dominant nodule of the lower pole right thyroid lobe cystic and solid with multiple calcifications within.  Recommend biopsy as this nodule does meet biopsy criteria.      Electronically signed by: SHANNA LINARES MD  Date: 04/18/17  Time: 14:36              Assessment:     No diagnosis found.     #MNG  Get TSH  Last TSH normal   Risk factors FH of thyroid cancer   Reviewed US thyroid independently complex nodule with mainly cystic component with some solid tissue   Increase in cystic area last US now stable, offered FNA of solid part ( with high chances of unsat. Or FLUS ) vs surgery vs observation.    I have reviewed management options including observation, FNA or surgery.  All of the patients questions were answered and handout was provided.     Discussed indications for a FNA  She opted for observation     RTC in 6 months  with  thyroid u/s prior    #  T2DM controlled   Follow with     # get IGF today      Plan:     Follow up:  TSH and IGF today   US thyroid in 6 months   RTC in 6 months

## 2018-04-03 LAB
IGF-I SERPL-MCNC: 113 NG/ML (ref 34–194)
IGF-I Z-SCORE SERPL: 0.52 SD

## 2018-04-30 RX ORDER — LOSARTAN POTASSIUM 50 MG/1
TABLET ORAL
Qty: 90 TABLET | Refills: 1 | Status: SHIPPED | OUTPATIENT
Start: 2018-04-30 | End: 2018-09-17 | Stop reason: DRUGHIGH

## 2018-05-07 ENCOUNTER — PATIENT MESSAGE (OUTPATIENT)
Dept: FAMILY MEDICINE | Facility: CLINIC | Age: 68
End: 2018-05-07

## 2018-05-18 ENCOUNTER — PATIENT MESSAGE (OUTPATIENT)
Dept: FAMILY MEDICINE | Facility: CLINIC | Age: 68
End: 2018-05-18

## 2018-05-30 ENCOUNTER — PATIENT MESSAGE (OUTPATIENT)
Dept: FAMILY MEDICINE | Facility: CLINIC | Age: 68
End: 2018-05-30

## 2018-06-04 ENCOUNTER — PATIENT MESSAGE (OUTPATIENT)
Dept: FAMILY MEDICINE | Facility: CLINIC | Age: 68
End: 2018-06-04

## 2018-06-04 ENCOUNTER — PATIENT MESSAGE (OUTPATIENT)
Dept: CARDIOLOGY | Facility: CLINIC | Age: 68
End: 2018-06-04

## 2018-06-07 ENCOUNTER — PATIENT MESSAGE (OUTPATIENT)
Dept: FAMILY MEDICINE | Facility: CLINIC | Age: 68
End: 2018-06-07

## 2018-06-11 ENCOUNTER — OFFICE VISIT (OUTPATIENT)
Dept: CARDIOLOGY | Facility: CLINIC | Age: 68
End: 2018-06-11
Payer: MEDICARE

## 2018-06-11 VITALS
DIASTOLIC BLOOD PRESSURE: 66 MMHG | HEART RATE: 94 BPM | BODY MASS INDEX: 41.17 KG/M2 | SYSTOLIC BLOOD PRESSURE: 138 MMHG | WEIGHT: 256.19 LBS | HEIGHT: 66 IN

## 2018-06-11 DIAGNOSIS — I48.92 ATRIAL FLUTTER WITH RAPID VENTRICULAR RESPONSE: Primary | ICD-10-CM

## 2018-06-11 DIAGNOSIS — Z79.01 CURRENT USE OF LONG TERM ANTICOAGULATION: ICD-10-CM

## 2018-06-11 DIAGNOSIS — Z79.899 LONG TERM CURRENT USE OF ANTIARRHYTHMIC DRUG: ICD-10-CM

## 2018-06-11 DIAGNOSIS — E66.01 OBESITY, CLASS III, BMI 40-49.9 (MORBID OBESITY): ICD-10-CM

## 2018-06-11 DIAGNOSIS — I10 ESSENTIAL HYPERTENSION: ICD-10-CM

## 2018-06-11 DIAGNOSIS — I51.7 LVH (LEFT VENTRICULAR HYPERTROPHY): ICD-10-CM

## 2018-06-11 DIAGNOSIS — R93.1 AGATSTON CAC SCORE, <100: ICD-10-CM

## 2018-06-11 PROBLEM — E66.813 OBESITY, CLASS III, BMI 40-49.9 (MORBID OBESITY): Status: ACTIVE | Noted: 2017-11-07

## 2018-06-11 PROCEDURE — 99499 UNLISTED E&M SERVICE: CPT | Mod: S$GLB,,, | Performed by: INTERNAL MEDICINE

## 2018-06-11 PROCEDURE — 99999 PR PBB SHADOW E&M-EST. PATIENT-LVL III: CPT | Mod: PBBFAC,,, | Performed by: INTERNAL MEDICINE

## 2018-06-11 PROCEDURE — 3075F SYST BP GE 130 - 139MM HG: CPT | Mod: S$GLB,,, | Performed by: INTERNAL MEDICINE

## 2018-06-11 PROCEDURE — 99203 OFFICE O/P NEW LOW 30 MIN: CPT | Mod: S$GLB,,, | Performed by: INTERNAL MEDICINE

## 2018-06-11 PROCEDURE — 3078F DIAST BP <80 MM HG: CPT | Mod: S$GLB,,, | Performed by: INTERNAL MEDICINE

## 2018-06-11 RX ORDER — METOPROLOL TARTRATE 25 MG/1
TABLET, FILM COATED ORAL
Qty: 60 TABLET | Refills: 1 | Status: SHIPPED | OUTPATIENT
Start: 2018-06-11 | End: 2018-06-21 | Stop reason: ALTCHOICE

## 2018-06-11 RX ORDER — DABIGATRAN ETEXILATE 150 MG/1
150 CAPSULE ORAL 2 TIMES DAILY
Qty: 60 CAPSULE | Refills: 1 | Status: SHIPPED | OUTPATIENT
Start: 2018-06-11 | End: 2018-07-02 | Stop reason: SDUPTHER

## 2018-06-11 RX ORDER — PROPAFENONE HYDROCHLORIDE 225 MG/1
TABLET, FILM COATED ORAL
COMMUNITY
End: 2018-06-11

## 2018-06-11 NOTE — LETTER
June 12, 2018      Gilma Pimentel MD  [address]  [facility] [address]           [facility]  [address] [facility] [address] [place] [address]  Phone: [phone]          Patient: Allyson Henriquez   MR Number: [id number]   YOB: 1950   Date of Visit: 6/11/2018       Dear Dr. Gilma Pimentel:    Thank you for referring Allyson Henriquez to me for evaluation. Attached you will find relevant portions of my assessment and plan of care.    If you have questions, please do not hesitate to call me. I look forward to following Allyson Henriquez along with you.    Sincerely,    Simone Humphries MD    Enclosure  CC:  No Recipients    If you would like to receive this communication electronically, please contact [email or web address] or [phone] to request more information on [facility] Link access.    For providers and/or their staff who would like to refer a patient to [facility], please contact us through our one-stop-shop provider referral line, [facility], at [phone].    If you feel you have received this communication in error or would no longer like to receive these types of communications, please e-mail [email or web address]

## 2018-06-11 NOTE — PROGRESS NOTES
Subjective:    Patient ID:  Allyson Henriquez is a 68 y.o. female who presents for Atrial Fibrillation (Fluttgerson alelfego and sees Dr. Willis) and Hypertension        Atrial Fibrillation   Presents for follow-up visit. Symptoms include palpitations, shortness of breath and tachycardia. Symptoms are negative for chest pain, dizziness, syncope and weakness. The symptoms have been worsening. Past medical history includes atrial fibrillation and hyperlipidemia.   Hypertension   Associated symptoms include palpitations and shortness of breath. Pertinent negatives include no blurred vision, chest pain, malaise/fatigue, orthopnea or PND.   Hyperlipidemia   Associated symptoms include shortness of breath. Pertinent negatives include no chest pain or focal weakness.     NEW PATIENT EVALUATION, A FIB, HAS BEEN DX 6 MO AGO, SAW DR WILLIS,, TX WITH MEDS, PROPAFENONE, COULD NOT TOLERATE  E SOTALOL, PROPAFENONE INCREASED,HELPED FOR SOME TIME BUT LATELY INCREASED HR, DOES NOT FEEL BAD, HAD AN ECHO, LVH,  TRACE M R, CA SCORE 91, HR UP ON FIT BIT, SAW DR WILLIS CARDIOLOGY THIS AM, WAS GIVEN METOPROLOL,WAS ON PRADAXA 2 YEARS , SEE ROS        Past Medical History:   Diagnosis Date    Acquired deafness of left ear     Atrial fibrillation     Bell's palsy     with fatigue and stress    COPD (chronic obstructive pulmonary disease)     History of tobacco abuse     Hyperlipidemia     Hypertension     Multinodular goiter (nontoxic) 5/8/2017    TRISHA (obstructive sleep apnea)     Proteinuria      Past Surgical History:   Procedure Laterality Date    ADENOIDECTOMY      APPENDECTOMY      COLONOSCOPY N/A 11/14/2016    Procedure: COLONOSCOPY;  Surgeon: Destin Cardona MD;  Location: [facility];  Service: Endoscopy;  Laterality: N/A;    EYE SURGERY      cataract OU    NEUROMA SURGERY Left     acoustic    TONSILLECTOMY       Family History   Problem Relation Age of Onset    Cancer Paternal Uncle         colon cancer    Diabetes  "Neg Hx     Kidney disease Neg Hx     Stroke Neg Hx     Heart disease Neg Hx      Social History     Social History    Marital status:      Spouse name: N/A    Number of children: N/A    Years of education: N/A     Social History Main Topics    Smoking status: Former Smoker     Packs/day: 1.00     Years: 40.00     Quit date: 3/6/2016    Smokeless tobacco: Never Used    Alcohol use 0.0 oz/week      Comment: occ social    Drug use: No    Sexual activity: Not Asked     Other Topics Concern    None     Social History Narrative    Works in insurance collection.       Review of patient's allergies indicates:   Allergen Reactions    Contrast media Anaphylaxis    Albuterol Other (See Comments)     Used during PFTs and put pt in afib    Pcn [penicillins]      Pt states did well on cephalexin.  No adverse reaction or side effect.        Current Outpatient Prescriptions:     cholecalciferol, vitamin D3, (VITAMIN D3) 2,000 unit Cap, Take 1 capsule by mouth once daily., Disp: , Rfl:     hydroCHLOROthiazide (HYDRODIURIL) 12.5 MG Tab, TAKE 1 TABLET(12.5 MG) BY MOUTH EVERY DAY, Disp: 90 tablet, Rfl: 1    losartan (COZAAR) 50 MG tablet, TAKE 1 TABLET(50 MG) BY MOUTH EVERY DAY, Disp: 90 tablet, Rfl: 1    simvastatin (ZOCOR) 40 MG tablet, TAKE 1 TABLET(40 MG) BY MOUTH EVERY EVENING, Disp: 90 tablet, Rfl: 1    vitamins  A,C,E-zinc-copper (PRESERVISION AREDS) 14,320-226-200 unit-mg-unit Cap, Take by mouth 2 (two) times daily., Disp: , Rfl:     dabigatran etexilate (PRADAXA) 150 mg Cap, Take 1 capsule (150 mg total) by mouth 2 (two) times daily. "Do NOT break, chew, or open capsules.", Disp: 60 capsule, Rfl: 1    metoprolol tartrate (LOPRESSOR) 25 MG tablet, TK 1 T PO BID, Disp: , Rfl: 3  No current facility-administered medications for this visit.     Facility-Administered Medications Ordered in Other Visits:     EUFLEXXA 10 mg/mL(mw 2.4 -3.6 million) injection Syrg 20 mg, 20 mg, Intra-articular, , Jose Rafael " "John Barney MD, 20 mg at 06/14/17 1605    Review of Systems   Constitution: Negative for chills, diaphoresis, weakness, malaise/fatigue and night sweats.   HENT: Negative for congestion, hearing loss and nosebleeds.    Eyes: Negative for blurred vision, discharge, double vision and visual disturbance.   Cardiovascular: Positive for irregular heartbeat and palpitations. Negative for chest pain, claudication, cyanosis, dyspnea on exertion, leg swelling, near-syncope, orthopnea, paroxysmal nocturnal dyspnea and syncope.   Respiratory: Positive for shortness of breath. Negative for cough, hemoptysis, sleep disturbances due to breathing, sputum production and wheezing.    Endocrine: Negative for cold intolerance, heat intolerance and polyuria.   Hematologic/Lymphatic: Negative for adenopathy and bleeding problem. Does not bruise/bleed easily.   Skin: Negative for color change, itching and rash.   Musculoskeletal: Negative for back pain, falls and joint pain (KNEES).   Gastrointestinal: Negative for abdominal pain, change in bowel habit, constipation, dysphagia, heartburn, hematemesis, jaundice, melena and vomiting.   Genitourinary: Negative for dysuria, flank pain and frequency.   Neurological: Negative for brief paralysis, dizziness, focal weakness, light-headedness, loss of balance, numbness (OCC HANDS), paresthesias, seizures, sensory change and tremors.   Psychiatric/Behavioral: Negative for altered mental status, depression and memory loss. The patient is not nervous/anxious.    Allergic/Immunologic: Negative for hives and persistent infections.        Objective:      Vitals:    06/11/18 1502   BP: 138/66   Pulse: 94   Weight: 116.2 kg (256 lb 2.8 oz)   Height: 5' 6" (1.676 m)   PainSc: 0-No pain     Body mass index is 41.35 kg/m².    Physical Exam   Constitutional: She is oriented to person, place, and time. She appears well-developed and well-nourished. She is active.   HENT:   Head: Normocephalic and " atraumatic.   Mouth/Throat: Oropharynx is clear and moist and mucous membranes are normal.   Eyes: Conjunctivae and EOM are normal. Pupils are equal, round, and reactive to light.   Neck: Trachea normal and normal range of motion. Neck supple. Normal carotid pulses, no hepatojugular reflux and no JVD present. Carotid bruit is not present. No tracheal deviation, no edema and no erythema present. No thyromegaly present.   Cardiovascular: Normal heart sounds.  An irregular rhythm present.  No extrasystoles are present. Tachycardia present.  PMI is not displaced.  Exam reveals no gallop and no friction rub.    No murmur heard.  Pulses:       Carotid pulses are 2+ on the right side, and 2+ on the left side.       Radial pulses are 2+ on the right side, and 2+ on the left side.        Femoral pulses are 2+ on the right side, and 2+ on the left side.       Dorsalis pedis pulses are 2+ on the right side, and 2+ on the left side.        Posterior tibial pulses are 2+ on the right side, and 2+ on the left side.   Pulmonary/Chest: Effort normal and breath sounds normal. No tachypnea and no bradypnea. She has no wheezes. She has no rales. She exhibits no tenderness.   Abdominal: Soft. Bowel sounds are normal. She exhibits no distension and no mass. There is no hepatosplenomegaly. There is no tenderness. There is no guarding and no CVA tenderness.   Musculoskeletal: Normal range of motion. She exhibits no edema or tenderness.   Lymphadenopathy:     She has no cervical adenopathy.   Neurological: She is alert and oriented to person, place, and time. She has normal strength. She displays no tremor. No cranial nerve deficit. She exhibits normal muscle tone. Coordination normal.   Skin: Skin is warm and dry. No rash noted. No cyanosis or erythema. No pallor.   Psychiatric: She has a normal mood and affect. Her speech is normal and behavior is normal. Judgment and thought content normal.               ..    Chemistry        Component  Value Date/Time     10/25/2017 0858     10/25/2017 0858    K 3.9 10/25/2017 0858    K 3.9 10/25/2017 0858     10/25/2017 0858     10/25/2017 0858    CO2 25 10/25/2017 0858    CO2 25 10/25/2017 0858    BUN 17 10/25/2017 0858    BUN 17 10/25/2017 0858    CREATININE 1.3 10/25/2017 0858    CREATININE 1.3 10/25/2017 0858     (H) 10/25/2017 0858     (H) 10/25/2017 0858        Component Value Date/Time    CALCIUM 9.6 10/25/2017 0858    CALCIUM 9.6 10/25/2017 0858    ALKPHOS 71 10/25/2017 0858    AST 21 10/25/2017 0858    ALT 26 10/25/2017 0858    BILITOT 0.6 10/25/2017 0858    ESTGFRAFRICA 48.7 (A) 10/25/2017 0858    ESTGFRAFRICA 48.7 (A) 10/25/2017 0858    EGFRNONAA 42.3 (A) 10/25/2017 0858    EGFRNONAA 42.3 (A) 10/25/2017 0858            ..  Lab Results   Component Value Date    CHOL 164 06/06/2017    CHOL 174 10/06/2016    CHOL 227 (H) 07/12/2016     Lab Results   Component Value Date    HDL 45 06/06/2017    HDL 49 10/06/2016    HDL 43 07/12/2016     Lab Results   Component Value Date    LDLCALC 81.8 06/06/2017    LDLCALC 79.6 10/06/2016    LDLCALC 144.2 07/12/2016     Lab Results   Component Value Date    TRIG 186 (H) 06/06/2017    TRIG 227 (H) 10/06/2016    TRIG 199 (H) 07/12/2016     Lab Results   Component Value Date    CHOLHDL 27.4 06/06/2017    CHOLHDL 28.2 10/06/2016    CHOLHDL 18.9 (L) 07/12/2016     ..  Lab Results   Component Value Date    WBC 8.23 10/25/2017    HGB 13.0 10/25/2017    HCT 39.7 10/25/2017    MCV 83 10/25/2017     10/25/2017       Test(s) Reviewed  I have reviewed the following in detail:  [] Stress test   [] Angiography   [x] Echocardiogram   [x] Labs   [x] Other:       Assessment:         ICD-10-CM ICD-9-CM   1. Atrial flutter with rapid ventricular response I48.92 427.32   2. Essential hypertension I10 401.9   3. Current use of long term anticoagulation Z79.01 V58.61     Problem List Items Addressed This Visit        Cardiac/Vascular    Atrial  "flutter with rapid ventricular response - Primary    Essential hypertension       Hematology    Current use of long term anticoagulation           Plan:     EKG A FLUTTER WITH RVR, DC PROPAFENONE PROBABLY CAUSING A FLUTTER, INTOLERANT OF SOTALOL, , START METOPROLOL, PRADAXA, CHADS-VAS SCORE OF 2, THEN VICENTE CADIOVERSION, PT IS RELUCTANT, ALSO DISCUSSED AMIODARONE, PT AND  HAD RELATIVES WITH BAD EXPIRIENCE WITH AMIODARONE, LONG DISCUSSION ABOUT OPTIONS, RFA--, ALL OTHER CV CLINICALLY STABLE, NO ANGINA, NO HF, NO TIA,CHANGE  CURRENT MEDS, EDUCATION, DIET, EXERCISE, WEIGHT LOSS, RTC IN 1 WEEK WITH AN EKG      Atrial flutter with rapid ventricular response    Essential hypertension    Current use of long term anticoagulation    Other orders  -     dabigatran etexilate (PRADAXA) 150 mg Cap; Take 1 capsule (150 mg total) by mouth 2 (two) times daily. "Do NOT break, chew, or open capsules."  Dispense: 60 capsule; Refill: 1    RTC Low level/low impact aerobic exercise 5x's/wk. Heart healthy diet and risk factor modification.    See labs and med orders.    Aerobic exercise 5x's/wk. Heart healthy diet and risk factor modification.    See labs and med orders.        "

## 2018-06-12 PROBLEM — Z79.899 LONG TERM CURRENT USE OF ANTIARRHYTHMIC DRUG: Status: ACTIVE | Noted: 2018-06-12

## 2018-06-12 PROBLEM — I51.7 LVH (LEFT VENTRICULAR HYPERTROPHY): Status: ACTIVE | Noted: 2018-06-12

## 2018-06-12 PROBLEM — R93.1 AGATSTON CAC SCORE, <100: Status: ACTIVE | Noted: 2018-06-12

## 2018-06-14 ENCOUNTER — PATIENT MESSAGE (OUTPATIENT)
Dept: CARDIOLOGY | Facility: CLINIC | Age: 68
End: 2018-06-14

## 2018-06-21 ENCOUNTER — PATIENT MESSAGE (OUTPATIENT)
Dept: CARDIOLOGY | Facility: CLINIC | Age: 68
End: 2018-06-21

## 2018-06-21 ENCOUNTER — OFFICE VISIT (OUTPATIENT)
Dept: CARDIOLOGY | Facility: CLINIC | Age: 68
End: 2018-06-21
Payer: MEDICARE

## 2018-06-21 VITALS
HEIGHT: 66 IN | DIASTOLIC BLOOD PRESSURE: 79 MMHG | SYSTOLIC BLOOD PRESSURE: 139 MMHG | HEART RATE: 74 BPM | WEIGHT: 256.81 LBS | BODY MASS INDEX: 41.27 KG/M2

## 2018-06-21 DIAGNOSIS — I48.0 PAF (PAROXYSMAL ATRIAL FIBRILLATION): Primary | ICD-10-CM

## 2018-06-21 DIAGNOSIS — I10 ESSENTIAL HYPERTENSION: ICD-10-CM

## 2018-06-21 DIAGNOSIS — Z79.01 CURRENT USE OF LONG TERM ANTICOAGULATION: ICD-10-CM

## 2018-06-21 DIAGNOSIS — E66.01 OBESITY, CLASS III, BMI 40-49.9 (MORBID OBESITY): ICD-10-CM

## 2018-06-21 PROCEDURE — 3075F SYST BP GE 130 - 139MM HG: CPT | Mod: S$GLB,,, | Performed by: INTERNAL MEDICINE

## 2018-06-21 PROCEDURE — 99999 PR PBB SHADOW E&M-EST. PATIENT-LVL III: CPT | Mod: PBBFAC,,, | Performed by: INTERNAL MEDICINE

## 2018-06-21 PROCEDURE — 3078F DIAST BP <80 MM HG: CPT | Mod: S$GLB,,, | Performed by: INTERNAL MEDICINE

## 2018-06-21 PROCEDURE — 99213 OFFICE O/P EST LOW 20 MIN: CPT | Mod: S$GLB,,, | Performed by: INTERNAL MEDICINE

## 2018-06-21 RX ORDER — METOPROLOL SUCCINATE 50 MG/1
50 TABLET, EXTENDED RELEASE ORAL DAILY
Qty: 30 TABLET | Refills: 2 | Status: SHIPPED | OUTPATIENT
Start: 2018-06-21 | End: 2018-07-02 | Stop reason: SDUPTHER

## 2018-06-21 NOTE — PROGRESS NOTES
Subjective:    Patient ID:  Allyson Henriquez is a 68 y.o. female who presents for Atrial Fibrillation        HPI   HAD EKG AT DR WILLIS LAST WEEK 6/15, WAS NSR, STILL HAS INTERMITTENT PAF, LAST NIGHT, LASTED AROUND 40 MIN,FELT BAD WITH IT, HR 'S, FIT BIT LOG NOTED,  SEE ROS      Past Medical History:   Diagnosis Date    Acquired deafness of left ear     Atrial fibrillation     Bell's palsy     with fatigue and stress    COPD (chronic obstructive pulmonary disease)     History of tobacco abuse     Hyperlipidemia     Hypertension     Multinodular goiter (nontoxic) 5/8/2017    TRISHA (obstructive sleep apnea)     Proteinuria      Past Surgical History:   Procedure Laterality Date    ADENOIDECTOMY      APPENDECTOMY      COLONOSCOPY N/A 11/14/2016    Procedure: COLONOSCOPY;  Surgeon: Destin Cardona MD;  Location: [facility];  Service: Endoscopy;  Laterality: N/A;    EYE SURGERY      cataract OU    NEUROMA SURGERY Left     acoustic    TONSILLECTOMY       Family History   Problem Relation Age of Onset    Cancer Paternal Uncle         colon cancer    Diabetes Neg Hx     Kidney disease Neg Hx     Stroke Neg Hx     Heart disease Neg Hx      Social History     Social History    Marital status:      Spouse name: N/A    Number of children: N/A    Years of education: N/A     Social History Main Topics    Smoking status: Former Smoker     Packs/day: 1.00     Years: 40.00     Quit date: 3/6/2016    Smokeless tobacco: Never Used    Alcohol use 0.0 oz/week      Comment: occ social    Drug use: No    Sexual activity: Not on file     Other Topics Concern    Not on file     Social History Narrative    Works in insurance collection.       Review of patient's allergies indicates:   Allergen Reactions    Contrast media Anaphylaxis    Albuterol Other (See Comments)     Used during PFTs and put pt in afib    Pcn [penicillins]      Pt states did well on cephalexin.  No adverse reaction or side  "effect.        Current Outpatient Prescriptions:     cholecalciferol, vitamin D3, (VITAMIN D3) 2,000 unit Cap, Take 1 capsule by mouth once daily., Disp: , Rfl:     dabigatran etexilate (PRADAXA) 150 mg Cap, Take 1 capsule (150 mg total) by mouth 2 (two) times daily. "Do NOT break, chew, or open capsules.", Disp: 60 capsule, Rfl: 1    hydroCHLOROthiazide (HYDRODIURIL) 12.5 MG Tab, TAKE 1 TABLET(12.5 MG) BY MOUTH EVERY DAY, Disp: 90 tablet, Rfl: 1    losartan (COZAAR) 50 MG tablet, TAKE 1 TABLET(50 MG) BY MOUTH EVERY DAY, Disp: 90 tablet, Rfl: 1    metoprolol tartrate (LOPRESSOR) 25 MG tablet, Take one tablet by mouth twice daily, Disp: 60 tablet, Rfl: 1    simvastatin (ZOCOR) 40 MG tablet, TAKE 1 TABLET(40 MG) BY MOUTH EVERY EVENING, Disp: 90 tablet, Rfl: 1    vitamins  A,C,E-zinc-copper (PRESERVISION AREDS) 14,320-226-200 unit-mg-unit Cap, Take by mouth 2 (two) times daily., Disp: , Rfl:   No current facility-administered medications for this visit.     Facility-Administered Medications Ordered in Other Visits:     EUFLEXXA 10 mg/mL(mw 2.4 -3.6 million) injection Syrg 20 mg, 20 mg, Intra-articular, , Jose Rafael Barney MD, 20 mg at 06/14/17 1605    Review of Systems   Constitution: Negative for chills, diaphoresis, weakness, malaise/fatigue and night sweats.   HENT: Negative for congestion, hearing loss and nosebleeds.    Eyes: Negative for blurred vision and visual disturbance.   Cardiovascular: Positive for palpitations. Negative for chest pain, claudication, cyanosis, dyspnea on exertion, leg swelling, near-syncope, orthopnea, paroxysmal nocturnal dyspnea and syncope.   Respiratory: Negative for cough, hemoptysis, shortness of breath, sleep disturbances due to breathing, sputum production and wheezing.    Endocrine: Negative for cold intolerance, heat intolerance and polyuria.   Hematologic/Lymphatic: Negative for adenopathy and bleeding problem. Does not bruise/bleed easily.   Skin: Negative for " "color change, itching and rash.   Musculoskeletal: Negative for back pain, falls and joint pain (KNEES).   Gastrointestinal: Negative for abdominal pain, change in bowel habit, heartburn, hematemesis, jaundice, melena and vomiting.   Genitourinary: Negative for dysuria, flank pain and frequency.   Neurological: Negative for brief paralysis, dizziness, focal weakness, light-headedness, numbness (OCC HANDS), paresthesias, seizures, sensory change and tremors.   Psychiatric/Behavioral: Negative for altered mental status and memory loss.        Objective:      Vitals:    06/21/18 1435   BP: 139/79   Pulse: 74   Weight: 116.5 kg (256 lb 13.4 oz)   Height: 5' 6" (1.676 m)   PainSc: 0-No pain     Body mass index is 41.45 kg/m².    Physical Exam   Constitutional: She is oriented to person, place, and time. She appears well-developed and well-nourished. She is active.   HENT:   Head: Normocephalic and atraumatic.   Mouth/Throat: Oropharynx is clear and moist and mucous membranes are normal.   Eyes: Conjunctivae and EOM are normal. Pupils are equal, round, and reactive to light.   Neck: Trachea normal and normal range of motion. Neck supple. Normal carotid pulses, no hepatojugular reflux and no JVD present. Carotid bruit is not present. No tracheal deviation, no edema and no erythema present. No thyromegaly present.   Cardiovascular: Normal rate, regular rhythm and normal heart sounds.   No extrasystoles are present. PMI is not displaced.  Exam reveals no gallop and no friction rub.    No murmur heard.  Pulses:       Carotid pulses are 2+ on the right side, and 2+ on the left side.       Radial pulses are 2+ on the right side, and 2+ on the left side.        Femoral pulses are 2+ on the right side, and 2+ on the left side.       Dorsalis pedis pulses are 2+ on the right side, and 2+ on the left side.        Posterior tibial pulses are 2+ on the right side, and 2+ on the left side.   Pulmonary/Chest: Effort normal and breath " sounds normal. No tachypnea and no bradypnea. She has no wheezes. She has no rales. She exhibits no tenderness.   Abdominal: Soft. Bowel sounds are normal. She exhibits no distension and no mass. There is no hepatosplenomegaly. There is no tenderness. There is no guarding and no CVA tenderness.   Musculoskeletal: Normal range of motion. She exhibits no edema or tenderness.   Lymphadenopathy:     She has no cervical adenopathy.   Neurological: She is alert and oriented to person, place, and time. She has normal strength. She displays no tremor. No cranial nerve deficit. She exhibits normal muscle tone. Coordination normal.   Skin: Skin is warm and dry. No rash noted. No cyanosis or erythema. No pallor.   Psychiatric: She has a normal mood and affect. Her speech is normal and behavior is normal. Judgment and thought content normal.               ..    Chemistry        Component Value Date/Time     10/25/2017 0858     10/25/2017 0858    K 3.9 10/25/2017 0858    K 3.9 10/25/2017 0858     10/25/2017 0858     10/25/2017 0858    CO2 25 10/25/2017 0858    CO2 25 10/25/2017 0858    BUN 17 10/25/2017 0858    BUN 17 10/25/2017 0858    CREATININE 1.3 10/25/2017 0858    CREATININE 1.3 10/25/2017 0858     (H) 10/25/2017 0858     (H) 10/25/2017 0858        Component Value Date/Time    CALCIUM 9.6 10/25/2017 0858    CALCIUM 9.6 10/25/2017 0858    ALKPHOS 71 10/25/2017 0858    AST 21 10/25/2017 0858    ALT 26 10/25/2017 0858    BILITOT 0.6 10/25/2017 0858    ESTGFRAFRICA 48.7 (A) 10/25/2017 0858    ESTGFRAFRICA 48.7 (A) 10/25/2017 0858    EGFRNONAA 42.3 (A) 10/25/2017 0858    EGFRNONAA 42.3 (A) 10/25/2017 0858            ..  Lab Results   Component Value Date    CHOL 164 06/06/2017    CHOL 174 10/06/2016    CHOL 227 (H) 07/12/2016     Lab Results   Component Value Date    HDL 45 06/06/2017    HDL 49 10/06/2016    HDL 43 07/12/2016     Lab Results   Component Value Date    LDLCALC 81.8 06/06/2017     LDLCALC 79.6 10/06/2016    LDLCALC 144.2 07/12/2016     Lab Results   Component Value Date    TRIG 186 (H) 06/06/2017    TRIG 227 (H) 10/06/2016    TRIG 199 (H) 07/12/2016     Lab Results   Component Value Date    CHOLHDL 27.4 06/06/2017    CHOLHDL 28.2 10/06/2016    CHOLHDL 18.9 (L) 07/12/2016     ..  Lab Results   Component Value Date    WBC 8.23 10/25/2017    HGB 13.0 10/25/2017    HCT 39.7 10/25/2017    MCV 83 10/25/2017     10/25/2017       Test(s) Reviewed  I have reviewed the following in detail:  [] Stress test   [] Angiography   [] Echocardiogram   [] Labs   [x] Other:       Assessment:         ICD-10-CM ICD-9-CM   1. PAF (paroxysmal atrial fibrillation) I48.0 427.31   2. Current use of long term anticoagulation Z79.01 V58.61   3. Obesity, Class III, BMI 40-49.9 (morbid obesity) E66.01 278.01     Problem List Items Addressed This Visit        Hematology    Current use of long term anticoagulation       Endocrine    Obesity, Class III, BMI 40-49.9 (morbid obesity)      Other Visit Diagnoses     PAF (paroxysmal atrial fibrillation)    -  Primary           Plan:     PA BREAKTHROUGH, DISCUSSED OPTIONS, INTOLERANT OF ANTI-ARRHYTHMIC MEDS, DECLINES AMIODARONE, NEXT RFA, FOR NOW CHANGE METOPROLOL TO LONG ACTING,  PRN EXTRA 1/2 TOPROL, DISCUSSED WITH PT/ , AVOID STIMULANTS, AND ETOH, DIET , WEIGHT LOSS,RTC IN 3 MO      PAF (paroxysmal atrial fibrillation)    Current use of long term anticoagulation    Obesity, Class III, BMI 40-49.9 (morbid obesity)    RTC Low level/low impact aerobic exercise 5x's/wk. Heart healthy diet and risk factor modification.    See labs and med orders.    Aerobic exercise 5x's/wk. Heart healthy diet and risk factor modification.    See labs and med orders.

## 2018-06-25 ENCOUNTER — PATIENT MESSAGE (OUTPATIENT)
Dept: FAMILY MEDICINE | Facility: CLINIC | Age: 68
End: 2018-06-25

## 2018-06-25 ENCOUNTER — PATIENT MESSAGE (OUTPATIENT)
Dept: CARDIOLOGY | Facility: CLINIC | Age: 68
End: 2018-06-25

## 2018-06-25 DIAGNOSIS — Z00.00 ROUTINE GENERAL MEDICAL EXAMINATION AT A HEALTH CARE FACILITY: Primary | ICD-10-CM

## 2018-07-02 RX ORDER — METOPROLOL SUCCINATE 50 MG/1
50 TABLET, EXTENDED RELEASE ORAL DAILY
Qty: 90 TABLET | Refills: 3 | Status: SHIPPED | OUTPATIENT
Start: 2018-07-02 | End: 2019-01-10 | Stop reason: SDUPTHER

## 2018-07-02 RX ORDER — DABIGATRAN ETEXILATE 150 MG/1
150 CAPSULE ORAL 2 TIMES DAILY
Qty: 180 CAPSULE | Refills: 1 | Status: SHIPPED | OUTPATIENT
Start: 2018-07-02 | End: 2018-12-31 | Stop reason: SDUPTHER

## 2018-07-13 ENCOUNTER — PATIENT MESSAGE (OUTPATIENT)
Dept: CARDIOLOGY | Facility: CLINIC | Age: 68
End: 2018-07-13

## 2018-07-17 ENCOUNTER — PATIENT MESSAGE (OUTPATIENT)
Dept: CARDIOLOGY | Facility: CLINIC | Age: 68
End: 2018-07-17

## 2018-07-25 ENCOUNTER — PATIENT MESSAGE (OUTPATIENT)
Dept: FAMILY MEDICINE | Facility: CLINIC | Age: 68
End: 2018-07-25

## 2018-08-14 ENCOUNTER — PATIENT MESSAGE (OUTPATIENT)
Dept: CARDIOLOGY | Facility: CLINIC | Age: 68
End: 2018-08-14

## 2018-08-14 ENCOUNTER — TELEPHONE (OUTPATIENT)
Dept: CARDIOLOGY | Facility: CLINIC | Age: 68
End: 2018-08-14

## 2018-08-14 ENCOUNTER — NURSE TRIAGE (OUTPATIENT)
Dept: ADMINISTRATIVE | Facility: CLINIC | Age: 68
End: 2018-08-14

## 2018-08-14 NOTE — TELEPHONE ENCOUNTER
Spoke to pt and advised to take the metoprolol as prescribed by Dr. Humphries & also that if chest pain occurs at all to go to the ED. Pt verbalized understanding.

## 2018-08-14 NOTE — TELEPHONE ENCOUNTER
Reason for Disposition   Heart beating very rapidly (e.g., > 140 / minute) and not present now (EXCEPTION: during exercise)    Protocols used: ST HEART RATE AND HEARTBEAT [id number]    Allyson is calling because her heart rate is running high.  Allyson states she sent an email this AM to Dr. Humphries and has been awaiting reply but she is getting anxious.  States heart rate jumps up to the 150's then back down to 100's.  States she has been taking 2.5 mg of metoprolol as instructed for elevated heart rate but does not know how often she should do so.  At time of call heart rate is 105.  No symptoms to report. Please contact Allyson as soon as possible to advise.  She can be reached at [phone].

## 2018-08-14 NOTE — TELEPHONE ENCOUNTER
----- Message from John Paul SAUL Nitzaalexsandra sent at 8/14/2018 10:49 AM CDT -----  Contact: same  FYI--Patient called in and stated she sent a message thru [facility] about her heart rate being high.  Patient stated currently it is at 145. Patient stated she didn't know if she needed the ER or not. Patient transferred to [facility]  RN On Call per protocol.

## 2018-08-31 ENCOUNTER — PATIENT MESSAGE (OUTPATIENT)
Dept: FAMILY MEDICINE | Facility: CLINIC | Age: 68
End: 2018-08-31

## 2018-09-06 ENCOUNTER — PATIENT MESSAGE (OUTPATIENT)
Dept: FAMILY MEDICINE | Facility: CLINIC | Age: 68
End: 2018-09-06

## 2018-09-06 ENCOUNTER — LAB VISIT (OUTPATIENT)
Dept: LAB | Facility: HOSPITAL | Age: 68
End: 2018-09-06
Attending: FAMILY MEDICINE
Payer: MEDICARE

## 2018-09-06 DIAGNOSIS — Z87.891 HISTORY OF TOBACCO ABUSE: ICD-10-CM

## 2018-09-06 DIAGNOSIS — J44.9 CHRONIC OBSTRUCTIVE PULMONARY DISEASE, UNSPECIFIED COPD TYPE: Primary | ICD-10-CM

## 2018-09-06 DIAGNOSIS — Z00.00 ROUTINE GENERAL MEDICAL EXAMINATION AT A HEALTH CARE FACILITY: ICD-10-CM

## 2018-09-06 LAB
ALBUMIN SERPL BCP-MCNC: 3.7 G/DL
ALP SERPL-CCNC: 74 U/L
ALT SERPL W/O P-5'-P-CCNC: 17 U/L
ANION GAP SERPL CALC-SCNC: 10 MMOL/L
AST SERPL-CCNC: 17 U/L
BILIRUB SERPL-MCNC: 0.6 MG/DL
BUN SERPL-MCNC: 18 MG/DL
CALCIUM SERPL-MCNC: 10.2 MG/DL
CHLORIDE SERPL-SCNC: 103 MMOL/L
CHOLEST SERPL-MCNC: 156 MG/DL
CHOLEST/HDLC SERPL: 3.8 {RATIO}
CO2 SERPL-SCNC: 27 MMOL/L
CREAT SERPL-MCNC: 1.2 MG/DL
EST. GFR  (AFRICAN AMERICAN): 53.3 ML/MIN/1.73 M^2
EST. GFR  (NON AFRICAN AMERICAN): 46.2 ML/MIN/1.73 M^2
ESTIMATED AVG GLUCOSE: 134 MG/DL
GLUCOSE SERPL-MCNC: 147 MG/DL
HBA1C MFR BLD HPLC: 6.3 %
HDLC SERPL-MCNC: 41 MG/DL
HDLC SERPL: 26.3 %
LDLC SERPL CALC-MCNC: 72 MG/DL
NONHDLC SERPL-MCNC: 115 MG/DL
POTASSIUM SERPL-SCNC: 4.2 MMOL/L
PROT SERPL-MCNC: 7.1 G/DL
SODIUM SERPL-SCNC: 140 MMOL/L
TRIGL SERPL-MCNC: 215 MG/DL

## 2018-09-06 PROCEDURE — 83036 HEMOGLOBIN GLYCOSYLATED A1C: CPT

## 2018-09-06 PROCEDURE — 36415 COLL VENOUS BLD VENIPUNCTURE: CPT | Mod: PO

## 2018-09-06 PROCEDURE — 80053 COMPREHEN METABOLIC PANEL: CPT

## 2018-09-06 PROCEDURE — 80061 LIPID PANEL: CPT

## 2018-09-07 ENCOUNTER — PATIENT MESSAGE (OUTPATIENT)
Dept: FAMILY MEDICINE | Facility: CLINIC | Age: 68
End: 2018-09-07

## 2018-09-07 NOTE — TELEPHONE ENCOUNTER
Please see message and advise. Labs completed yesterday were lipid, cmp and a1c. Chest xray was not ordered and I do not see a urine. Please advise if you would like to order these test.

## 2018-09-17 ENCOUNTER — OFFICE VISIT (OUTPATIENT)
Dept: CARDIOLOGY | Facility: CLINIC | Age: 68
End: 2018-09-17
Payer: MEDICARE

## 2018-09-17 VITALS
SYSTOLIC BLOOD PRESSURE: 139 MMHG | HEIGHT: 66 IN | DIASTOLIC BLOOD PRESSURE: 78 MMHG | BODY MASS INDEX: 41.27 KG/M2 | HEART RATE: 65 BPM | WEIGHT: 256.81 LBS

## 2018-09-17 DIAGNOSIS — I51.7 LVH (LEFT VENTRICULAR HYPERTROPHY): ICD-10-CM

## 2018-09-17 DIAGNOSIS — I48.0 PAF (PAROXYSMAL ATRIAL FIBRILLATION): Primary | ICD-10-CM

## 2018-09-17 DIAGNOSIS — E66.01 OBESITY, CLASS III, BMI 40-49.9 (MORBID OBESITY): ICD-10-CM

## 2018-09-17 DIAGNOSIS — I10 ESSENTIAL HYPERTENSION: ICD-10-CM

## 2018-09-17 DIAGNOSIS — Z79.01 CURRENT USE OF LONG TERM ANTICOAGULATION: ICD-10-CM

## 2018-09-17 PROCEDURE — 3075F SYST BP GE 130 - 139MM HG: CPT | Mod: ,,, | Performed by: INTERNAL MEDICINE

## 2018-09-17 PROCEDURE — 99213 OFFICE O/P EST LOW 20 MIN: CPT | Mod: PBBFAC,PO | Performed by: INTERNAL MEDICINE

## 2018-09-17 PROCEDURE — 99214 OFFICE O/P EST MOD 30 MIN: CPT | Mod: S$PBB,,, | Performed by: INTERNAL MEDICINE

## 2018-09-17 PROCEDURE — 1101F PT FALLS ASSESS-DOCD LE1/YR: CPT | Mod: ,,, | Performed by: INTERNAL MEDICINE

## 2018-09-17 PROCEDURE — 99999 PR PBB SHADOW E&M-EST. PATIENT-LVL III: CPT | Mod: PBBFAC,,, | Performed by: INTERNAL MEDICINE

## 2018-09-17 PROCEDURE — 3078F DIAST BP <80 MM HG: CPT | Mod: ,,, | Performed by: INTERNAL MEDICINE

## 2018-09-17 RX ORDER — LOSARTAN POTASSIUM 100 MG/1
100 TABLET ORAL DAILY
Qty: 90 TABLET | Refills: 1 | Status: SHIPPED | OUTPATIENT
Start: 2018-09-17 | End: 2019-01-10 | Stop reason: SDUPTHER

## 2018-09-17 RX ORDER — METOPROLOL TARTRATE 25 MG/1
TABLET, FILM COATED ORAL
Qty: 60 TABLET | Refills: 1 | Status: SHIPPED | OUTPATIENT
Start: 2018-09-17 | End: 2018-11-12 | Stop reason: SDUPTHER

## 2018-09-17 RX ORDER — METOPROLOL TARTRATE 25 MG/1
TABLET, FILM COATED ORAL
COMMUNITY
End: 2018-09-17 | Stop reason: SDUPTHER

## 2018-09-17 RX ORDER — METOPROLOL TARTRATE 25 MG/1
TABLET, FILM COATED ORAL
Qty: 180 TABLET | Refills: 1 | OUTPATIENT
Start: 2018-09-17

## 2018-09-17 RX ORDER — METRONIDAZOLE 7.5 MG/G
CREAM TOPICAL
Refills: 6 | COMMUNITY
Start: 2018-09-12 | End: 2019-03-11

## 2018-09-17 NOTE — PROGRESS NOTES
Subjective:    Patient ID:  Allyson Henriquez is a 68 y.o. female who presents for Atrial Fibrillation (Labs) and Hypertension        HPI  RECENT LABS NOTED, LDL 72, OCC TACHYCARDIA BETTER WITH METOPROLOL, BP ??? MOSTLY  OK, SOMETIMES UP, NOT CHECKED FREQUENTLY, SEE ROS    Past Medical History:   Diagnosis Date    Acquired deafness of left ear     Atrial fibrillation     Bell's palsy     with fatigue and stress    COPD (chronic obstructive pulmonary disease)     History of tobacco abuse     Hyperlipidemia     Hypertension     Multinodular goiter (nontoxic) 2017    TRISHA (obstructive sleep apnea)     Proteinuria      Past Surgical History:   Procedure Laterality Date    ADENOIDECTOMY      APPENDECTOMY      COLONOSCOPY N/A 2016    Procedure: COLONOSCOPY;  Surgeon: Destin Cardona MD;  Location: [facility] ENDO;  Service: Endoscopy;  Laterality: N/A;    COLONOSCOPY N/A 2016    Performed by Destin Cardona MD at [facility] ENDO    EYE SURGERY      cataract OU    NEUROMA SURGERY Left     acoustic    TONSILLECTOMY       Family History   Problem Relation Age of Onset    Cancer Paternal Uncle         colon cancer    Diabetes Neg Hx     Kidney disease Neg Hx     Stroke Neg Hx     Heart disease Neg Hx      Social History     Socioeconomic History    Marital status:      Spouse name: Not on file    Number of children: Not on file    Years of education: Not on file    Highest education level: Not on file   Social Needs    Financial resource strain: Not on file    Food insecurity - worry: Not on file    Food insecurity - inability: Not on file    Transportation needs - medical: Not on file    Transportation needs - non-medical: Not on file   Occupational History    Not on file   Tobacco Use    Smoking status: Former Smoker     Packs/day: 1.00     Years: 40.00     Pack years: 40.00     Last attempt to quit: 3/6/2016     Years since quittin.5    Smokeless tobacco: Never Used  "  Substance and Sexual Activity    Alcohol use: Yes     Alcohol/week: 0.0 oz     Comment: occ social    Drug use: No    Sexual activity: Not on file   Other Topics Concern    Not on file   Social History Narrative    Works in insurance collection.       Review of patient's allergies indicates:   Allergen Reactions    Contrast media Anaphylaxis    Albuterol Other (See Comments)     Used during PFTs and put pt in afib    Pcn [penicillins]      Pt states did well on cephalexin.  No adverse reaction or side effect.     Doxycycline Palpitations       Current Outpatient Medications:     cholecalciferol, vitamin D3, (VITAMIN D3) 2,000 unit Cap, Take 1 capsule by mouth once daily., Disp: , Rfl:     dabigatran etexilate (PRADAXA) 150 mg Cap, Take 1 capsule (150 mg total) by mouth 2 (two) times daily. "Do NOT break, chew, or open capsules.", Disp: 180 capsule, Rfl: 1    hydroCHLOROthiazide (HYDRODIURIL) 12.5 MG Tab, TAKE 1 TABLET(12.5 MG) BY MOUTH EVERY DAY, Disp: 90 tablet, Rfl: 1    metoprolol succinate (TOPROL-XL) 50 MG 24 hr tablet, Take 1 tablet (50 mg total) by mouth once daily., Disp: 90 tablet, Rfl: 3    metoprolol tartrate (LOPRESSOR) 25 MG tablet, metoprolol tartrate 25 mg tablet  TK 1 T PO BID, Disp: , Rfl:     metronidazole 0.75% (METROCREAM) 0.75 % Crea, BERNA TOPICALLY AA ON THE SKIN D., Disp: , Rfl: 6    simvastatin (ZOCOR) 40 MG tablet, TAKE 1 TABLET(40 MG) BY MOUTH EVERY EVENING, Disp: 90 tablet, Rfl: 1    vitamins  A,C,E-zinc-copper (PRESERVISION AREDS) 14,320-226-200 unit-mg-unit Cap, Take by mouth 2 (two) times daily., Disp: , Rfl:     losartan (COZAAR) 100 MG tablet, Take 1 tablet (100 mg total) by mouth once daily., Disp: 90 tablet, Rfl: 1  No current facility-administered medications for this visit.     Facility-Administered Medications Ordered in Other Visits:     EUFLEXXA 10 mg/mL(mw 2.4 -3.6 million) injection Syrg 20 mg, 20 mg, Intra-articular, , Jose Rafael Barney MD, 20 mg at " "06/14/17 1605    Review of Systems   Constitution: Negative for chills, diaphoresis, weakness, malaise/fatigue and night sweats.   HENT: Negative for congestion, hearing loss and nosebleeds.    Eyes: Negative for blurred vision and visual disturbance.   Cardiovascular: Negative for chest pain, claudication, cyanosis, dyspnea on exertion, irregular heartbeat (OCC), leg swelling, near-syncope, orthopnea (OCC), palpitations, paroxysmal nocturnal dyspnea and syncope.   Respiratory: Negative for cough, hemoptysis, shortness of breath and wheezing.    Endocrine: Negative for cold intolerance, heat intolerance and polyuria.   Hematologic/Lymphatic: Negative for adenopathy and bleeding problem. Does not bruise/bleed easily.   Skin: Negative for color change, itching and rash.   Musculoskeletal: Negative for back pain, falls and joint pain (KNEES).   Gastrointestinal: Negative for abdominal pain, change in bowel habit, heartburn, hematemesis, jaundice, melena and vomiting.   Genitourinary: Negative for dysuria, flank pain and frequency.   Neurological: Negative for brief paralysis, dizziness, focal weakness, light-headedness, numbness and paresthesias.   Psychiatric/Behavioral: Negative for altered mental status and memory loss.        Objective:      Vitals:    09/17/18 0912   BP: 139/78   Pulse: 65   Weight: 116.5 kg (256 lb 13.4 oz)   Height: 5' 6" (1.676 m)   PainSc: 0-No pain     Body mass index is 41.45 kg/m².    Physical Exam   Constitutional: She is oriented to person, place, and time. She appears well-developed and well-nourished. She is active.   HENT:   Head: Normocephalic and atraumatic.   Mouth/Throat: Oropharynx is clear and moist and mucous membranes are normal.   Eyes: Conjunctivae and EOM are normal. Pupils are equal, round, and reactive to light.   Neck: Trachea normal and normal range of motion. Neck supple. Normal carotid pulses, no hepatojugular reflux and no JVD present. Carotid bruit is not present. No " tracheal deviation, no edema and no erythema present. No thyromegaly present.   Cardiovascular: Normal rate, regular rhythm and normal heart sounds.  No extrasystoles are present. PMI is not displaced. Exam reveals no gallop and no friction rub.   No murmur heard.  Pulses:       Carotid pulses are 2+ on the right side, and 2+ on the left side.       Radial pulses are 2+ on the right side, and 2+ on the left side.        Femoral pulses are 2+ on the right side, and 2+ on the left side.       Dorsalis pedis pulses are 2+ on the right side, and 2+ on the left side.        Posterior tibial pulses are 2+ on the right side, and 2+ on the left side.   Pulmonary/Chest: Effort normal and breath sounds normal. No tachypnea and no bradypnea. She has no wheezes. She has no rales. She exhibits no tenderness.   Abdominal: Soft. Bowel sounds are normal. She exhibits no distension and no mass. There is no hepatosplenomegaly. There is no tenderness. There is no guarding and no CVA tenderness.   Musculoskeletal: Normal range of motion. She exhibits no edema or tenderness.   Lymphadenopathy:     She has no cervical adenopathy.   Neurological: She is alert and oriented to person, place, and time. She has normal strength. She displays no tremor. No cranial nerve deficit.   Skin: Skin is warm and dry. No rash noted. No cyanosis or erythema. No pallor.   Psychiatric: She has a normal mood and affect. Her speech is normal and behavior is normal.               ..    Chemistry        Component Value Date/Time     09/06/2018 0901    K 4.2 09/06/2018 0901     09/06/2018 0901    CO2 27 09/06/2018 0901    BUN 18 09/06/2018 0901    CREATININE 1.2 09/06/2018 0901     (H) 09/06/2018 0901        Component Value Date/Time    CALCIUM 10.2 09/06/2018 0901    ALKPHOS 74 09/06/2018 0901    AST 17 09/06/2018 0901    ALT 17 09/06/2018 0901    BILITOT 0.6 09/06/2018 0901    ESTGFRAFRICA 53.3 (A) 09/06/2018 0901    EGFRNONAA 46.2 (A)  09/06/2018 0901            ..  Lab Results   Component Value Date    CHOL 156 09/06/2018    CHOL 164 06/06/2017    CHOL 174 10/06/2016     Lab Results   Component Value Date    HDL 41 09/06/2018    HDL 45 06/06/2017    HDL 49 10/06/2016     Lab Results   Component Value Date    LDLCALC 72.0 09/06/2018    LDLCALC 81.8 06/06/2017    LDLCALC 79.6 10/06/2016     Lab Results   Component Value Date    TRIG 215 (H) 09/06/2018    TRIG 186 (H) 06/06/2017    TRIG 227 (H) 10/06/2016     Lab Results   Component Value Date    CHOLHDL 26.3 09/06/2018    CHOLHDL 27.4 06/06/2017    CHOLHDL 28.2 10/06/2016     ..  Lab Results   Component Value Date    WBC 8.23 10/25/2017    HGB 13.0 10/25/2017    HCT 39.7 10/25/2017    MCV 83 10/25/2017     10/25/2017       Test(s) Reviewed  I have reviewed the following in detail:  [] Stress test   [] Angiography   [] Echocardiogram   [] Labs   [] Other:       Assessment:         ICD-10-CM ICD-9-CM   1. PAF (paroxysmal atrial fibrillation) I48.0 427.31   2. Essential hypertension I10 401.9   3. Current use of long term anticoagulation Z79.01 V58.61   4. Obesity, Class III, BMI 40-49.9 (morbid obesity) E66.01 278.01   5. LVH (left ventricular hypertrophy) I51.7 429.3     Problem List Items Addressed This Visit        Cardiac/Vascular    PAF (paroxysmal atrial fibrillation) - Primary    Essential hypertension    LVH (left ventricular hypertrophy)       Hematology    Current use of long term anticoagulation       Endocrine    Obesity, Class III, BMI 40-49.9 (morbid obesity)           Plan:     INCREASE LOSARTAN  MG, WATCH BP, ALL OTHER  CV CLINICALLY STABLE, NO ANGINA, NO HF, NO TIA, STABLE CLINICAL ARRHYTHMIA,CONTINUE CURRENT MEDS, EDUCATION, DIET, EXERCISE, WEIGHT L;OSS, RTC IN 6 MO       PAF (paroxysmal atrial fibrillation)    Essential hypertension    Current use of long term anticoagulation    Obesity, Class III, BMI 40-49.9 (morbid obesity)    LVH (left ventricular  hypertrophy)    Other orders  -     losartan (COZAAR) 100 MG tablet; Take 1 tablet (100 mg total) by mouth once daily.  Dispense: 90 tablet; Refill: 1    RTC Low level/low impact aerobic exercise 5x's/wk. Heart healthy diet and risk factor modification.    See labs and med orders.    Aerobic exercise 5x's/wk. Heart healthy diet and risk factor modification.    See labs and med orders.

## 2018-09-24 ENCOUNTER — HOSPITAL ENCOUNTER (OUTPATIENT)
Dept: RADIOLOGY | Facility: HOSPITAL | Age: 68
Discharge: HOME | End: 2018-09-24
Attending: INTERNAL MEDICINE
Payer: MEDICARE

## 2018-09-24 DIAGNOSIS — E04.2 MULTINODULAR GOITER (NONTOXIC): ICD-10-CM

## 2018-09-24 PROCEDURE — 76536 US EXAM OF HEAD AND NECK: CPT | Mod: 26,,, | Performed by: RADIOLOGY

## 2018-09-24 PROCEDURE — 76536 US EXAM OF HEAD AND NECK: CPT | Mod: TC,PO

## 2018-10-02 ENCOUNTER — OFFICE VISIT (OUTPATIENT)
Dept: FAMILY MEDICINE | Facility: CLINIC | Age: 68
End: 2018-10-02
Payer: MEDICARE

## 2018-10-02 ENCOUNTER — HOSPITAL ENCOUNTER (OUTPATIENT)
Dept: RADIOLOGY | Facility: HOSPITAL | Age: 68
Discharge: HOME | End: 2018-10-02
Attending: FAMILY MEDICINE
Payer: MEDICARE

## 2018-10-02 VITALS
SYSTOLIC BLOOD PRESSURE: 118 MMHG | TEMPERATURE: 98 F | DIASTOLIC BLOOD PRESSURE: 84 MMHG | WEIGHT: 253.44 LBS | RESPIRATION RATE: 18 BRPM | HEART RATE: 140 BPM | OXYGEN SATURATION: 98 % | BODY MASS INDEX: 40.73 KG/M2 | HEIGHT: 66 IN

## 2018-10-02 DIAGNOSIS — I10 ESSENTIAL HYPERTENSION: ICD-10-CM

## 2018-10-02 DIAGNOSIS — Z00.00 ROUTINE HEALTH MAINTENANCE: Primary | ICD-10-CM

## 2018-10-02 DIAGNOSIS — J44.9 CHRONIC OBSTRUCTIVE PULMONARY DISEASE, UNSPECIFIED COPD TYPE: ICD-10-CM

## 2018-10-02 DIAGNOSIS — H91.90 HEARING LOSS, UNSPECIFIED HEARING LOSS TYPE, UNSPECIFIED LATERALITY: ICD-10-CM

## 2018-10-02 DIAGNOSIS — R73.03 PREDIABETES: ICD-10-CM

## 2018-10-02 DIAGNOSIS — Z23 IMMUNIZATION DUE: ICD-10-CM

## 2018-10-02 DIAGNOSIS — R80.9 PROTEINURIA, UNSPECIFIED TYPE: ICD-10-CM

## 2018-10-02 DIAGNOSIS — I13.0 BENIGN HYPERTENSIVE HEART AND KIDNEY DISEASE WITH CHF, NYHA CLASS 1 AND CKD STAGE 3: ICD-10-CM

## 2018-10-02 DIAGNOSIS — Z87.891 HISTORY OF TOBACCO ABUSE: ICD-10-CM

## 2018-10-02 DIAGNOSIS — N18.30 BENIGN HYPERTENSIVE HEART AND KIDNEY DISEASE WITH CHF, NYHA CLASS 1 AND CKD STAGE 3: ICD-10-CM

## 2018-10-02 LAB
CREAT UR-MCNC: 33 MG/DL
PROT UR-MCNC: 157 MG/DL
PROT/CREAT UR: 4.76 MG/G{CREAT}

## 2018-10-02 PROCEDURE — 3079F DIAST BP 80-89 MM HG: CPT | Mod: ,,, | Performed by: FAMILY MEDICINE

## 2018-10-02 PROCEDURE — 71046 X-RAY EXAM CHEST 2 VIEWS: CPT | Mod: 26,,, | Performed by: RADIOLOGY

## 2018-10-02 PROCEDURE — 99214 OFFICE O/P EST MOD 30 MIN: CPT | Mod: PBBFAC,25,PN | Performed by: FAMILY MEDICINE

## 2018-10-02 PROCEDURE — 71046 X-RAY EXAM CHEST 2 VIEWS: CPT | Mod: TC,PN

## 2018-10-02 PROCEDURE — 82570 ASSAY OF URINE CREATININE: CPT

## 2018-10-02 PROCEDURE — 81001 URINALYSIS AUTO W/SCOPE: CPT

## 2018-10-02 PROCEDURE — 99397 PER PM REEVAL EST PAT 65+ YR: CPT | Mod: S$PBB,,, | Performed by: FAMILY MEDICINE

## 2018-10-02 PROCEDURE — 3074F SYST BP LT 130 MM HG: CPT | Mod: ,,, | Performed by: FAMILY MEDICINE

## 2018-10-02 PROCEDURE — 99999 PR PBB SHADOW E&M-EST. PATIENT-LVL IV: CPT | Mod: PBBFAC,,, | Performed by: FAMILY MEDICINE

## 2018-10-02 RX ORDER — FLUTICASONE PROPIONATE 50 MCG
1 SPRAY, SUSPENSION (ML) NASAL 2 TIMES DAILY
Status: ON HOLD | COMMUNITY
End: 2021-09-14 | Stop reason: HOSPADM

## 2018-10-02 RX ORDER — CIPROFLOXACIN AND DEXAMETHASONE 3; 1 MG/ML; MG/ML
4 SUSPENSION/ DROPS AURICULAR (OTIC) 2 TIMES DAILY
Qty: 7.5 ML | Refills: 5 | Status: SHIPPED | OUTPATIENT
Start: 2018-10-02 | End: 2020-07-10 | Stop reason: SDUPTHER

## 2018-10-02 NOTE — PROGRESS NOTES
"Subjective:       Patient ID: Allyson Henriquez is a 68 y.o. female.    Chief Complaint: Annual Exam      Allyson Henriquez is in the office for annual exam.    HPI   No updates to medical hx.  Past Medical History:   Diagnosis Date    Acquired deafness of left ear     Atrial fibrillation     Bell's palsy     with fatigue and stress    COPD (chronic obstructive pulmonary disease)     History of tobacco abuse     Hyperlipidemia     Hypertension     Multinodular goiter (nontoxic) 5/8/2017    TRISHA (obstructive sleep apnea)     Proteinuria      Seeing derm for rosacea that is attributed to the cpap. Topicals were not as quick or effective. Notes that she had a spike in heart rate attributed to one dose of doxy.   Discussed vaccinations. Pt declines flu.  Pt in for annual physical with  who is also a Pt. Notes an episode of palpitations for 1.5 hours this morning consistent with her previous episodes and below the three hour kashmir that her cardiologist has prescribed for her to present to the ED. Notes fatigue after the palpitations consistent with her previous episodes. Pt also notes she sleeps with CPAP and has been having 3-4 hours of deep sleep followed by awakening and several hours of what she describes as poor quality "twilight sleep". Pt also notes over the last 6 weeks she has been experiencing episodes of numbness without tingling to one or both hands at random times through the day, occasionally waking her from sleep.    Current Outpatient Medications:     cholecalciferol, vitamin D3, (VITAMIN D3) 2,000 unit Cap, Take 1 capsule by mouth once daily., Disp: , Rfl:     dabigatran etexilate (PRADAXA) 150 mg Cap, Take 1 capsule (150 mg total) by mouth 2 (two) times daily. "Do NOT break, chew, or open capsules.", Disp: 180 capsule, Rfl: 1    fluticasone (FLONASE ALLERGY RELIEF) 50 mcg/actuation nasal spray, 1 spray by Nasal route 2 (two) times daily as needed., Disp: , Rfl:     hydroCHLOROthiazide " (HYDRODIURIL) 12.5 MG Tab, TAKE 1 TABLET(12.5 MG) BY MOUTH EVERY DAY, Disp: 90 tablet, Rfl: 1    losartan (COZAAR) 100 MG tablet, Take 1 tablet (100 mg total) by mouth once daily., Disp: 90 tablet, Rfl: 1    metoprolol succinate (TOPROL-XL) 50 MG 24 hr tablet, Take 1 tablet (50 mg total) by mouth once daily., Disp: 90 tablet, Rfl: 3    metoprolol tartrate (LOPRESSOR) 25 MG tablet, metoprolol tartrate 25 mg tablet  TK 1 T PO BID PRN (Patient taking differently: Take 25 mg by mouth 2 (two) times daily. metoprolol tartrate 25 mg tablet  TK 1 T PO BID PRN), Disp: 60 tablet, Rfl: 1    metronidazole 0.75% (METROCREAM) 0.75 % Crea, BERNA TOPICALLY AA ON THE SKIN D., Disp: , Rfl: 6    simvastatin (ZOCOR) 40 MG tablet, TAKE 1 TABLET(40 MG) BY MOUTH EVERY EVENING, Disp: 90 tablet, Rfl: 1    vit C/E/Zn/coppr/lutein/zeaxan (OCUVITE LUTEIN AND ZEAXANTHIN ORAL), Take 1 capsule by mouth once daily., Disp: , Rfl:   No current facility-administered medications for this visit.     Facility-Administered Medications Ordered in Other Visits:     EUFLEXXA 10 mg/mL(mw 2.4 -3.6 million) injection Syrg 20 mg, 20 mg, Intra-articular, , Jose Rafael Barney MD, 20 mg at 06/14/17 1605    The 10-year ASCVD risk score (Tevin GEORGIA Jr., et al., 2013) is: 9.6%    Values used to calculate the score:      Age: 68 years      Sex: Female      Is Non- : No      Diabetic: No      Tobacco smoker: No      Systolic Blood Pressure: 118 mmHg      Is BP treated: Yes      HDL Cholesterol: 41 mg/dL      Total Cholesterol: 156 mg/dL     Lab Results   Component Value Date    HGBA1C 6.3 (H) 09/06/2018    HGBA1C 6.3 (H) 10/25/2017    HGBA1C 6.3 (H) 10/25/2017     Lab Results   Component Value Date    LDLCALC 72.0 09/06/2018    CREATININE 1.2 09/06/2018   Labs 2018 rev.    Review of Systems   Constitutional: Negative for activity change, fatigue (pm fatigue) and unexpected weight change.   HENT: Negative for congestion, hearing loss,  rhinorrhea, sore throat and trouble swallowing.    Eyes: Negative for discharge and visual disturbance.   Respiratory: Negative for cough, chest tightness, shortness of breath and wheezing.    Cardiovascular: Positive for palpitations. Negative for chest pain and leg swelling.   Gastrointestinal: Negative for abdominal pain, blood in stool, constipation, diarrhea, nausea and vomiting.        No gerd c/o. +belching. Pt notes she continues to experience a feeling of urgency before BM after eating out.   Endocrine: Negative for polydipsia and polyuria.   Genitourinary: Negative for difficulty urinating, dysuria, hematuria and menstrual problem.   Musculoskeletal: Negative for arthralgias, joint swelling and neck pain.   Neurological: Negative for dizziness, weakness, light-headedness and headaches.   Psychiatric/Behavioral: Positive for sleep disturbance (issues with her cpap). Negative for confusion and dysphoric mood.       Objective:      Physical Exam   Constitutional: She is oriented to person, place, and time. She appears well-developed and well-nourished. No distress.   HENT:   Head: Normocephalic and atraumatic.   Right Ear: External ear normal.   Left Ear: External ear normal.   Nose: Nose normal.   Mouth/Throat: Oropharynx is clear and moist. No oropharyngeal exudate.   Eyes: Conjunctivae and EOM are normal. Pupils are equal, round, and reactive to light.   Neck: Normal range of motion. Neck supple. No thyromegaly present.   Cardiovascular: Regular rhythm and intact distal pulses.   Tachycardic at approximately 130   Pulmonary/Chest: Effort normal and breath sounds normal. No respiratory distress. She has no wheezes.   Abdominal: Soft. Bowel sounds are normal. She exhibits no distension and no mass. There is no tenderness. There is no rebound and no guarding.   Exam limited by habitus.   Musculoskeletal: Normal range of motion. She exhibits no edema.   Lymphadenopathy:     She has no cervical adenopathy.    Neurological: She is alert and oriented to person, place, and time.   Skin: Skin is warm and dry.   Psychiatric: She has a normal mood and affect. Her behavior is normal.   Nursing note and vitals reviewed.      Screening recommendations appropriate to age and health status were reviewed.    Immunization due  In cov.  Routine health maintenance  Anticipatory guidance reviewed.  Essential hypertension  -     Urinalysis; Future  -     Protein / creatinine ratio, urine  Controlled, cont regimen.  Discussed fluid retention, monitoring, possible increase prior to/during afib episodes. Pt will monitor. We discussed diuresis if needed.  Proteinuria, unspecified type  Update studies today.  Chronic obstructive pulmonary disease, unspecified COPD type  Stable. Cont regimen.  Benign hypertensive heart and kidney disease with CHF, NYHA class 1 and CKD stage 3  Stable.   Obesity  Encouraged continued exercise, weight loss.  Other orders  -     (In Office Administered) Pneumococcal Conjugate Vaccine (13 Valent) (IM)  Prediabetes  Cont dietary mods, activity to improve.

## 2018-10-03 ENCOUNTER — PATIENT MESSAGE (OUTPATIENT)
Dept: CARDIOLOGY | Facility: CLINIC | Age: 68
End: 2018-10-03

## 2018-10-03 ENCOUNTER — PATIENT MESSAGE (OUTPATIENT)
Dept: FAMILY MEDICINE | Facility: CLINIC | Age: 68
End: 2018-10-03

## 2018-10-03 LAB
BACTERIA #/AREA URNS AUTO: NORMAL /HPF
BILIRUB UR QL STRIP: NEGATIVE
CLARITY UR REFRACT.AUTO: CLEAR
COLOR UR AUTO: ABNORMAL
GLUCOSE UR QL STRIP: NEGATIVE
HGB UR QL STRIP: NEGATIVE
HYALINE CASTS UR QL AUTO: 0 /LPF
KETONES UR QL STRIP: NEGATIVE
LEUKOCYTE ESTERASE UR QL STRIP: NEGATIVE
MICROSCOPIC COMMENT: NORMAL
NITRITE UR QL STRIP: NEGATIVE
PH UR STRIP: 6 [PH] (ref 5–8)
PROT UR QL STRIP: ABNORMAL
RBC #/AREA URNS AUTO: 1 /HPF (ref 0–4)
SP GR UR STRIP: 1 (ref 1–1.03)
SQUAMOUS #/AREA URNS AUTO: 1 /HPF
URN SPEC COLLECT METH UR: ABNORMAL
UROBILINOGEN UR STRIP-ACNC: NEGATIVE EU/DL
WBC #/AREA URNS AUTO: 0 /HPF (ref 0–5)

## 2018-10-11 ENCOUNTER — OFFICE VISIT (OUTPATIENT)
Dept: ENDOCRINOLOGY | Facility: CLINIC | Age: 68
End: 2018-10-11
Payer: MEDICARE

## 2018-10-11 VITALS
DIASTOLIC BLOOD PRESSURE: 64 MMHG | HEART RATE: 63 BPM | BODY MASS INDEX: 40.85 KG/M2 | HEIGHT: 66 IN | WEIGHT: 254.19 LBS | SYSTOLIC BLOOD PRESSURE: 130 MMHG

## 2018-10-11 DIAGNOSIS — E04.2 MULTINODULAR GOITER: Primary | ICD-10-CM

## 2018-10-11 DIAGNOSIS — I10 BENIGN ESSENTIAL HTN: ICD-10-CM

## 2018-10-11 PROCEDURE — 99213 OFFICE O/P EST LOW 20 MIN: CPT | Mod: PBBFAC,PO | Performed by: INTERNAL MEDICINE

## 2018-10-11 PROCEDURE — 3075F SYST BP GE 130 - 139MM HG: CPT | Mod: ,,, | Performed by: INTERNAL MEDICINE

## 2018-10-11 PROCEDURE — 99999 PR PBB SHADOW E&M-EST. PATIENT-LVL III: CPT | Mod: PBBFAC,,, | Performed by: INTERNAL MEDICINE

## 2018-10-11 PROCEDURE — 1101F PT FALLS ASSESS-DOCD LE1/YR: CPT | Mod: ,,, | Performed by: INTERNAL MEDICINE

## 2018-10-11 PROCEDURE — 99214 OFFICE O/P EST MOD 30 MIN: CPT | Mod: S$PBB,,, | Performed by: INTERNAL MEDICINE

## 2018-10-11 PROCEDURE — 3078F DIAST BP <80 MM HG: CPT | Mod: ,,, | Performed by: INTERNAL MEDICINE

## 2018-10-11 NOTE — PROGRESS NOTES
CHIEF COMPLAINT: MNG  68 year old being seen as a new patient to me. Had been seeing Dr. Trinidad and last seen by her 3/28/18. Pt opted for observation at that time. . No Dysphagia. No change in voice. Overall feeling well.       PAST MEDICAL HISTORY/PAST SURGICAL HISTORY:  Reviewed in EPIC    SOCIAL HISTORY: Smoker and quit in 60's    FAMILY HISTORY:  Sister with thyroid cancer.     MEDICATIONS/ALLERGIES: The patient's MedCard has been updated and reviewed.      ROS:   Constitutional: No recent significant weight change  Eyes: No recent visual changes  ENT: No dysphagia  Cardiovascular: Denies current anginal symptoms  Respiratory: Denies current respiratory difficulty  Gastrointestinal: Denies recent bowel disturbances  GenitoUrinary - No dysuria  Skin: No new skin rash  Neurologic: No focal neurologic complaints  Remainder ROS negative        PE:    GENERAL: Well developed, well nourished.  PSYCH:  appropriate mood and affect  EYES:  PERRL, EOM intact.  ENT: Nares patent, oropharynx clear, mucosa pink,   NECK: Supple, trachea midline, left nodule palpable.   CHEST: Resp even and unlabored, CTA bilateral.  CARDIAC: RRR, S1, S2 heard, no murmurs, rubs, S3, or S4  ABDOMEN: Soft, non-tender, non-distended;  No organomegaly  VASCULAR:  DP pulses +2/4 bilaterally, no edema  NEURO: Gait steady, CN II-VII grossly intact  SKIN: No areas of breakdown, no acanthosis nigracans.    LABS   US SOFT TISSUE HEAD NECK THYROID    CLINICAL HISTORY:  Nontoxic multinodular goiter    TECHNIQUE:  Ultrasound of the thyroid and cervical lymph nodes was performed.    COMPARISON:  Thyroid ultrasound dated 03/26/2018.    FINDINGS:  The thyroid gland remains mildly enlarged with a multinodular appearance.  The right lobe measures 5.6 x 2.2 x 2.4 cm with an estimated volume of 15 mL.  This is 3 times is largest of the left lobe.  The thyroid isthmus measures 4 mm in thickness.  The left lobe measures 4.6 x 1.8 x 1.2 cm with an estimated volume  of 5 mL.  Total thyroid volume is 20 mL, no significant change compared to 19 mL on the prior study.    There is a stable 7 x 6 x 7 mm solid nodule in the superior pole of the right lobe.  Similarly, there is a complex mostly cystic nodule with macrocalcifications in the lower pole measuring 3.2 x 2.8 x 3.2 cm without change.    There is a 1.2 x 0.6 x 0.7 cm cystic nodule in the lower pole of the left lobe.  There is now a 1.2 x 0.7 x 0.9 cm solid nodule with microcalcification in the upper pole of the left lobe which was not clearly present on the prior study.    There are sonographically normal bilateral neck lymph nodes.      Impression       1. There is a subtle change in the appearance of the thyroid gland compared to the prior study.  The thyroid gland is mildly enlarged with a multinodular appearance.  The largest complex nodule with calcifications in the right lower pole is unchanged.  There are small nodules in the superior pole of the right lobe and lower pole of the left lobe.  2. There is a 1.2 x 0.7 x 0.7 cm hypoechoic nodule with microcalcification in the upper pole of the left lobe which was not clearly demonstrated on the prior study.  This nodule could be considered for FNA or biopsy.  3. There is no pathologic lymphadenopathy.  There are normal bilateral neck lymph nodes.  This report was flagged in Epic as abnormal.         ASSESSMENT/PLAN:  1. Multiple thyroid Nodules- Discussed that the US does show a nodule that meets criteria for FNA.  Pt does not want FNA and opted to monitor at this time. Aware of nodule with microcalcifications. Discussed risks,. TSH WNL and no evidence of thyroid hormone dysfunction.     2. HTN- BP controlled.     FOLLOWUP  F/U 6 months with thyroid US

## 2018-10-25 ENCOUNTER — PATIENT MESSAGE (OUTPATIENT)
Dept: CARDIOLOGY | Facility: CLINIC | Age: 68
End: 2018-10-25

## 2018-10-25 ENCOUNTER — TELEPHONE (OUTPATIENT)
Dept: CARDIOLOGY | Facility: CLINIC | Age: 68
End: 2018-10-25

## 2018-11-12 RX ORDER — METOPROLOL TARTRATE 25 MG/1
TABLET, FILM COATED ORAL
Qty: 60 TABLET | Refills: 3 | Status: SHIPPED | OUTPATIENT
Start: 2018-11-12 | End: 2019-03-11 | Stop reason: ALTCHOICE

## 2018-11-16 ENCOUNTER — PES CALL (OUTPATIENT)
Dept: ADMINISTRATIVE | Facility: CLINIC | Age: 68
End: 2018-11-16

## 2018-11-18 ENCOUNTER — PATIENT MESSAGE (OUTPATIENT)
Dept: FAMILY MEDICINE | Facility: CLINIC | Age: 68
End: 2018-11-18

## 2018-11-19 ENCOUNTER — PATIENT MESSAGE (OUTPATIENT)
Dept: FAMILY MEDICINE | Facility: CLINIC | Age: 68
End: 2018-11-19

## 2018-11-27 ENCOUNTER — PATIENT MESSAGE (OUTPATIENT)
Dept: FAMILY MEDICINE | Facility: CLINIC | Age: 68
End: 2018-11-27

## 2018-11-29 ENCOUNTER — OFFICE VISIT (OUTPATIENT)
Dept: FAMILY MEDICINE | Facility: CLINIC | Age: 68
End: 2018-11-29
Payer: MEDICARE

## 2018-11-29 ENCOUNTER — PES CALL (OUTPATIENT)
Dept: ADMINISTRATIVE | Facility: CLINIC | Age: 68
End: 2018-11-29

## 2018-11-29 VITALS
HEIGHT: 66 IN | BODY MASS INDEX: 40.32 KG/M2 | DIASTOLIC BLOOD PRESSURE: 78 MMHG | HEART RATE: 74 BPM | WEIGHT: 250.88 LBS | RESPIRATION RATE: 19 BRPM | SYSTOLIC BLOOD PRESSURE: 134 MMHG | OXYGEN SATURATION: 98 % | TEMPERATURE: 98 F

## 2018-11-29 DIAGNOSIS — I70.0 CALCIFICATION OF ABDOMINAL AORTA: ICD-10-CM

## 2018-11-29 DIAGNOSIS — M54.30 SCIATICA, UNSPECIFIED LATERALITY: Primary | ICD-10-CM

## 2018-11-29 DIAGNOSIS — M54.50 LOW BACK PAIN, NON-SPECIFIC: ICD-10-CM

## 2018-11-29 DIAGNOSIS — R29.898 TRANSIENT RIGHT LEG WEAKNESS: ICD-10-CM

## 2018-11-29 PROCEDURE — 99214 OFFICE O/P EST MOD 30 MIN: CPT | Mod: S$GLB,,, | Performed by: FAMILY MEDICINE

## 2018-11-29 PROCEDURE — 3075F SYST BP GE 130 - 139MM HG: CPT | Mod: S$GLB,,, | Performed by: FAMILY MEDICINE

## 2018-11-29 PROCEDURE — 3078F DIAST BP <80 MM HG: CPT | Mod: S$GLB,,, | Performed by: FAMILY MEDICINE

## 2018-11-29 PROCEDURE — 1101F PT FALLS ASSESS-DOCD LE1/YR: CPT | Mod: S$GLB,,, | Performed by: FAMILY MEDICINE

## 2018-11-29 PROCEDURE — 99999 PR PBB SHADOW E&M-EST. PATIENT-LVL IV: CPT | Mod: PBBFAC,,, | Performed by: FAMILY MEDICINE

## 2018-11-29 RX ORDER — TIZANIDINE 4 MG/1
4 TABLET ORAL EVERY 8 HOURS PRN
Qty: 30 TABLET | Refills: 0 | Status: SHIPPED | OUTPATIENT
Start: 2018-11-29 | End: 2018-12-03 | Stop reason: SDUPTHER

## 2018-11-29 RX ORDER — DIAZEPAM 5 MG/1
5 TABLET ORAL
Qty: 1 TABLET | Refills: 0 | Status: SHIPPED | OUTPATIENT
Start: 2018-11-29 | End: 2018-12-13 | Stop reason: ALTCHOICE

## 2018-11-29 NOTE — TELEPHONE ENCOUNTER
She can come at 1 tomorrow, but may have a wait.  i'm not scheduling any in next week bc of jury duty

## 2018-11-29 NOTE — PROGRESS NOTES
"Subjective:       Patient ID: Allyson Henriquez is a 68 y.o. female.    Chief Complaint: Back Pain    HPI  Patient in the office for f/u back pain.  Notes significant lower back pain starting last weekend after several days of travel by car. Initially [place]. Moved to [place]. Was seen emergently by chiro/fay due to severity of pain. Adjustment did help marginally. However, she started having a pinch in her thigh which worsened. She was retreated. Significant improvement when in traction, also with walking. Pain recurs at random. R leg has tried to give out on her at times, particularly when pain peaks.    She notes normal bowel function, but seeing bladder urgency moreso than normal. No persistent areas of numbness, but transient sensory change with pain, primarily to thigh. No swelling or color change.   Has been doing treadmill, icepacks, and taking ibuprofen tid. Limiting activity at home other than treadmill walking.     Also, imaging in chiro office noted for calcium in aorta.     Review of Systems:  Review of Systems   Constitutional: Negative for fatigue and unexpected weight change.   Respiratory: Negative for cough and shortness of breath.    Cardiovascular: Negative for leg swelling.   Gastrointestinal: Negative for constipation, diarrhea and nausea.   Genitourinary: Positive for urgency (associated with the back pain, noticed a loss of some control related to pain). Negative for difficulty urinating, dysuria and hematuria.   Musculoskeletal: Positive for back pain, gait problem and myalgias.        With back pain, shuffling gait, walking with limp   Skin: Negative for rash and wound.       Objective:     Vitals:    11/29/18 1745   BP: 134/78   BP Location: Right arm   Patient Position: Sitting   BP Method: X-Large (Manual)   Pulse: 74   Resp: 19   Temp: 97.9 °F (36.6 °C)   TempSrc: Oral   SpO2: 98%   Weight: 113.8 kg (250 lb 14.1 oz)   Height: 5' 6" (1.676 m)        Physical Exam   Constitutional: She is " oriented to person, place, and time. She appears well-developed and well-nourished. No distress.   HENT:   Head: Normocephalic and atraumatic.   Eyes: Conjunctivae are normal. Right eye exhibits no discharge. Left eye exhibits no discharge. No scleral icterus.   Neck: Normal range of motion. Neck supple.   Cardiovascular: Normal rate.   Pulmonary/Chest: Effort normal. No respiratory distress.   Abdominal: Soft. She exhibits no distension.   Musculoskeletal: She exhibits no edema.        Lumbar back: She exhibits decreased range of motion (flex/ext limited by pain, no side bending or rotation; modified slr neg, leg ext precipitates pain), bony tenderness (R sciatic notch) and spasm (parapsinal, R>L).   Ambulates with assistance, slow, R leg with mild shuffle.   Neurological: She is alert and oriented to person, place, and time.   Skin: Skin is warm and dry. No rash noted.   Psychiatric: Her behavior is normal. Her mood appears anxious.   Nursing note and vitals reviewed.        Assessment & Plan:  Sciatica, unspecified laterality  Transient right leg weakness  Significant pain, some paresthesias. Bladder effect with urgency (new). Some improvement on traction.   Reviewed nsaids. zanaflex prn. Side effects and precautions of medication use reviewed with patient, expressed understanding. No questions or concerns.   Pt declined pain med.   MRI lumbar with valium for sedation prn.   Calcification of abdominal aorta  -     US Abdominal Aorta; Future; Expected date: 11/29/2018  Update and f/u with cards.   Low back pain, non-specific  -     MRI Lumbar Spine Without Contrast; Future; Expected date: 11/29/2018  Other orders  -     tiZANidine (ZANAFLEX) 4 MG tablet; Take 1 tablet (4 mg total) by mouth every 8 (eight) hours as needed.  Dispense: 30 tablet; Refill: 0  -     diazePAM (VALIUM) 5 MG tablet; Take 1 tablet (5 mg total) by mouth On call Procedure for Anxiety.  Dispense: 1 tablet; Refill: 0  Side effects and  precautions of medication use reviewed with patient, expressed understanding. No questions or concerns.

## 2018-12-03 RX ORDER — TIZANIDINE 4 MG/1
4 TABLET ORAL EVERY 8 HOURS PRN
Qty: 30 TABLET | Refills: 3 | Status: SHIPPED | OUTPATIENT
Start: 2018-12-03 | End: 2018-12-06 | Stop reason: SDUPTHER

## 2018-12-03 NOTE — TELEPHONE ENCOUNTER
Pt Of Gilma Pimentel MD    Last seen on: 11/29/2018    Next appt: 12/13/2018    Last refill: 11/29/2018    Allergies:   Review of patient's allergies indicates:   Allergen Reactions    Contrast media Anaphylaxis    Albuterol Other (See Comments)     Used during PFTs and put pt in afib    Pcn [penicillins]      Pt states did well on cephalexin.  No adverse reaction or side effect.     Doxycycline Palpitations       Pharmacy: [address] [id number]  Please review! Thank you!

## 2018-12-06 ENCOUNTER — PATIENT MESSAGE (OUTPATIENT)
Dept: FAMILY MEDICINE | Facility: CLINIC | Age: 68
End: 2018-12-06

## 2018-12-06 NOTE — TELEPHONE ENCOUNTER
Pt Of Gilma Pimentel MD    Last seen on: 11/29/2018    Next appt: 12/13/2018    Last refill: 12/3/2018    PHARM REQUESTING 90 DAY REFILL    Allergies:   Review of patient's allergies indicates:   Allergen Reactions    Contrast media Anaphylaxis    Albuterol Other (See Comments)     Used during PFTs and put pt in afib    Pcn [penicillins]      Pt states did well on cephalexin.  No adverse reaction or side effect.     Doxycycline Palpitations       Pharmacy: [address] [id number]    Please review! Thank you!

## 2018-12-07 RX ORDER — TIZANIDINE 4 MG/1
4 TABLET ORAL EVERY 8 HOURS PRN
Qty: 30 TABLET | Refills: 3 | Status: SHIPPED | OUTPATIENT
Start: 2018-12-07 | End: 2018-12-07 | Stop reason: SDUPTHER

## 2018-12-07 RX ORDER — TIZANIDINE 4 MG/1
4 TABLET ORAL EVERY 8 HOURS PRN
Qty: 90 TABLET | Refills: 3 | Status: SHIPPED | OUTPATIENT
Start: 2018-12-07 | End: 2018-12-17

## 2018-12-12 ENCOUNTER — HOSPITAL ENCOUNTER (OUTPATIENT)
Dept: RADIOLOGY | Facility: HOSPITAL | Age: 68
Discharge: HOME | End: 2018-12-12
Attending: FAMILY MEDICINE
Payer: MEDICARE

## 2018-12-12 DIAGNOSIS — M54.50 LOW BACK PAIN, NON-SPECIFIC: ICD-10-CM

## 2018-12-12 DIAGNOSIS — I70.0 CALCIFICATION OF ABDOMINAL AORTA: ICD-10-CM

## 2018-12-12 PROCEDURE — 76775 US EXAM ABDO BACK WALL LIM: CPT | Mod: TC,PO

## 2018-12-12 PROCEDURE — 72148 MRI LUMBAR SPINE W/O DYE: CPT | Mod: 26,,, | Performed by: RADIOLOGY

## 2018-12-12 PROCEDURE — 72148 MRI LUMBAR SPINE W/O DYE: CPT | Mod: TC,PO

## 2018-12-12 PROCEDURE — 76775 US EXAM ABDO BACK WALL LIM: CPT | Mod: 26,,, | Performed by: RADIOLOGY

## 2018-12-13 ENCOUNTER — OFFICE VISIT (OUTPATIENT)
Dept: FAMILY MEDICINE | Facility: CLINIC | Age: 68
End: 2018-12-13
Payer: MEDICARE

## 2018-12-13 VITALS
OXYGEN SATURATION: 97 % | SYSTOLIC BLOOD PRESSURE: 118 MMHG | RESPIRATION RATE: 18 BRPM | HEIGHT: 66 IN | WEIGHT: 249.56 LBS | DIASTOLIC BLOOD PRESSURE: 72 MMHG | HEART RATE: 72 BPM | BODY MASS INDEX: 40.11 KG/M2 | TEMPERATURE: 98 F

## 2018-12-13 DIAGNOSIS — G89.29 CHRONIC BILATERAL LOW BACK PAIN WITH RIGHT-SIDED SCIATICA: Primary | ICD-10-CM

## 2018-12-13 DIAGNOSIS — M54.41 CHRONIC BILATERAL LOW BACK PAIN WITH RIGHT-SIDED SCIATICA: Primary | ICD-10-CM

## 2018-12-13 PROCEDURE — 1101F PT FALLS ASSESS-DOCD LE1/YR: CPT | Mod: S$GLB,,, | Performed by: FAMILY MEDICINE

## 2018-12-13 PROCEDURE — 3074F SYST BP LT 130 MM HG: CPT | Mod: S$GLB,,, | Performed by: FAMILY MEDICINE

## 2018-12-13 PROCEDURE — 99213 OFFICE O/P EST LOW 20 MIN: CPT | Mod: S$GLB,,, | Performed by: FAMILY MEDICINE

## 2018-12-13 PROCEDURE — 3078F DIAST BP <80 MM HG: CPT | Mod: S$GLB,,, | Performed by: FAMILY MEDICINE

## 2018-12-13 PROCEDURE — 99999 PR PBB SHADOW E&M-EST. PATIENT-LVL IV: CPT | Mod: PBBFAC,,, | Performed by: FAMILY MEDICINE

## 2018-12-13 NOTE — LETTER
December 13, 2018                 [facility]  [address]  [place] [address]  Phone: [phone]  Fax: [phone]   December 13, 2018     Patient: Allyson Henriquez   YOB: 1950   Date of Visit: 12/13/2018       To Whom it May Concern:    Allyson Henriquez was seen in my clinic on 12/13/2018. She may return to work on 12/17/2018.    If you have any questions or concerns, please don't hesitate to call.    Sincerely,         Gilma Pimentel MD

## 2018-12-13 NOTE — PROGRESS NOTES
"Subjective:       Patient ID: Allyson Henriquez is a 68 y.o. female.    Chief Complaint: Back Pain    HPI  Patient in the office for f/u of back pain and sciatica. She notes that it is continuing to improve. She walks on the treadmill daily for 20 mins. She has continued chiro adjustments. No leg weakness or paresthesias. No bowel or bladder sx.   Agreeable to pain mgmt consult in the event that sx recur.    Review of Systems:  Review of Systems   Constitutional: Positive for activity change (improving). Negative for fever.   HENT: Positive for hearing loss. Negative for ear discharge and ear pain.    Cardiovascular: Negative for chest pain and leg swelling.   Gastrointestinal: Negative for abdominal pain and blood in stool.   Genitourinary: Negative for dysuria, hematuria and pelvic pain.   Musculoskeletal: Positive for arthralgias and back pain (improved).   Neurological: Positive for numbness (improving from prev). Negative for weakness and headaches.       Objective:     Vitals:    12/13/18 0935   BP: 118/72   BP Location: Left arm   Patient Position: Sitting   BP Method: X-Large (Manual)   Pulse: 72   Resp: 18   Temp: 98.1 °F (36.7 °C)   TempSrc: Oral   SpO2: 97%   Weight: 113.2 kg (249 lb 9 oz)   Height: 5' 6" (1.676 m)        Physical Exam   Constitutional: She is oriented to person, place, and time. She appears well-developed and well-nourished. No distress.   HENT:   Head: Normocephalic and atraumatic.   Eyes: Conjunctivae are normal. Right eye exhibits no discharge. Left eye exhibits no discharge. No scleral icterus.   Neck: Normal range of motion. Neck supple.   Cardiovascular: Normal rate.   Pulmonary/Chest: Effort normal. No respiratory distress.   Abdominal: Soft. She exhibits no distension.   Musculoskeletal: She exhibits no edema.        Lumbar back: She exhibits normal range of motion, no bony tenderness (R sciatic notch) and no spasm.   Normal ambulation.   Neurological: She is alert and oriented " to person, place, and time.   Skin: Skin is warm and dry. No rash noted.   Psychiatric: She has a normal mood and affect. Her behavior is normal.   Nursing note and vitals reviewed.      Assessment & Plan:  Chronic bilateral low back pain with right-sided sciatica  -     Ambulatory referral to Pain Clinic  -     Ambulatory consult to Audiology    schedule consult with pain mgmt at convenience to discuss tx options in the event sx recur. Cont walking, stretches, chiro prn.

## 2018-12-31 DIAGNOSIS — N18.30 BENIGN HYPERTENSIVE HEART AND KIDNEY DISEASE WITH CHF, NYHA CLASS 1 AND CKD STAGE 3: Primary | ICD-10-CM

## 2018-12-31 DIAGNOSIS — I13.0 BENIGN HYPERTENSIVE HEART AND KIDNEY DISEASE WITH CHF, NYHA CLASS 1 AND CKD STAGE 3: Primary | ICD-10-CM

## 2018-12-31 RX ORDER — DABIGATRAN ETEXILATE MESYLATE 150 MG/1
CAPSULE ORAL
Qty: 180 CAPSULE | Refills: 0 | Status: SHIPPED | OUTPATIENT
Start: 2018-12-31 | End: 2019-03-11 | Stop reason: SDUPTHER

## 2018-12-31 RX ORDER — DABIGATRAN ETEXILATE MESYLATE 150 MG/1
CAPSULE ORAL
Qty: 180 CAPSULE | Refills: 0 | Status: SHIPPED | OUTPATIENT
Start: 2018-12-31 | End: 2019-01-16 | Stop reason: SDUPTHER

## 2019-01-10 ENCOUNTER — PATIENT MESSAGE (OUTPATIENT)
Dept: FAMILY MEDICINE | Facility: CLINIC | Age: 69
End: 2019-01-10

## 2019-01-10 RX ORDER — LOSARTAN POTASSIUM 100 MG/1
100 TABLET ORAL DAILY
Qty: 90 TABLET | Refills: 0 | Status: SHIPPED | OUTPATIENT
Start: 2019-01-10 | End: 2019-05-30 | Stop reason: SDUPTHER

## 2019-01-10 RX ORDER — METOPROLOL SUCCINATE 50 MG/1
50 TABLET, EXTENDED RELEASE ORAL DAILY
Qty: 90 TABLET | Refills: 0 | Status: SHIPPED | OUTPATIENT
Start: 2019-01-10 | End: 2019-05-30 | Stop reason: SDUPTHER

## 2019-01-15 ENCOUNTER — PATIENT MESSAGE (OUTPATIENT)
Dept: OTOLARYNGOLOGY | Facility: CLINIC | Age: 69
End: 2019-01-15

## 2019-01-16 ENCOUNTER — OFFICE VISIT (OUTPATIENT)
Dept: OTOLARYNGOLOGY | Facility: CLINIC | Age: 69
End: 2019-01-16
Payer: MEDICARE

## 2019-01-16 ENCOUNTER — CLINICAL SUPPORT (OUTPATIENT)
Dept: AUDIOLOGY | Facility: CLINIC | Age: 69
End: 2019-01-16
Payer: MEDICARE

## 2019-01-16 VITALS
WEIGHT: 252.44 LBS | BODY MASS INDEX: 40.57 KG/M2 | DIASTOLIC BLOOD PRESSURE: 67 MMHG | HEART RATE: 66 BPM | HEIGHT: 66 IN | SYSTOLIC BLOOD PRESSURE: 135 MMHG

## 2019-01-16 DIAGNOSIS — H90.3 ASYMMETRIC SNHL (SENSORINEURAL HEARING LOSS): Primary | ICD-10-CM

## 2019-01-16 DIAGNOSIS — H61.22 IMPACTED CERUMEN OF LEFT EAR: Primary | ICD-10-CM

## 2019-01-16 DIAGNOSIS — H93.8X3 PRESSURE SENSATION IN EAR, BILATERAL: ICD-10-CM

## 2019-01-16 DIAGNOSIS — G47.33 OBSTRUCTIVE SLEEP APNEA ON CPAP: ICD-10-CM

## 2019-01-16 DIAGNOSIS — H91.92 DEAFNESS IN LEFT EAR: ICD-10-CM

## 2019-01-16 DIAGNOSIS — H90.5 HIGH FREQUENCY SENSORINEURAL HEARING LOSS OF RIGHT EAR: ICD-10-CM

## 2019-01-16 PROCEDURE — 1101F PR PT FALLS ASSESS DOC 0-1 FALLS W/OUT INJ PAST YR: ICD-10-PCS | Mod: S$GLB,,, | Performed by: NURSE PRACTITIONER

## 2019-01-16 PROCEDURE — G0268 PR REMOVAL OF IMPACTED WAX MD: ICD-10-PCS | Mod: S$GLB,,, | Performed by: NURSE PRACTITIONER

## 2019-01-16 PROCEDURE — 1101F PT FALLS ASSESS-DOCD LE1/YR: CPT | Mod: S$GLB,,, | Performed by: NURSE PRACTITIONER

## 2019-01-16 PROCEDURE — 99999 PR PBB SHADOW E&M-EST. PATIENT-LVL III: CPT | Mod: PBBFAC,,, | Performed by: NURSE PRACTITIONER

## 2019-01-16 PROCEDURE — 99999 PR PBB SHADOW E&M-EST. PATIENT-LVL I: CPT | Mod: PBBFAC,,,

## 2019-01-16 PROCEDURE — 99999 PR PBB SHADOW E&M-EST. PATIENT-LVL III: ICD-10-PCS | Mod: PBBFAC,,, | Performed by: NURSE PRACTITIONER

## 2019-01-16 PROCEDURE — 92557 COMPREHENSIVE HEARING TEST: CPT | Mod: 52,PBBFAC,PO | Performed by: AUDIOLOGIST

## 2019-01-16 PROCEDURE — 99999 PR PBB SHADOW E&M-EST. PATIENT-LVL I: ICD-10-PCS | Mod: PBBFAC,,,

## 2019-01-16 PROCEDURE — 92567 TYMPANOMETRY: CPT | Mod: PBBFAC,PO | Performed by: AUDIOLOGIST

## 2019-01-16 PROCEDURE — G0268 REMOVAL OF IMPACTED WAX MD: HCPCS | Mod: S$GLB,,, | Performed by: NURSE PRACTITIONER

## 2019-01-16 PROCEDURE — 99203 OFFICE O/P NEW LOW 30 MIN: CPT | Mod: 25,S$GLB,, | Performed by: NURSE PRACTITIONER

## 2019-01-16 PROCEDURE — 99211 OFF/OP EST MAY X REQ PHY/QHP: CPT | Mod: PBBFAC,PO,25

## 2019-01-16 PROCEDURE — 99203 PR OFFICE/OUTPT VISIT, NEW, LEVL III, 30-44 MIN: ICD-10-PCS | Mod: 25,S$GLB,, | Performed by: NURSE PRACTITIONER

## 2019-01-16 RX ORDER — OXYMETAZOLINE HCL 0.05 %
2 SPRAY, NON-AEROSOL (ML) NASAL WEEKLY
COMMUNITY
End: 2020-09-21

## 2019-01-16 NOTE — PROGRESS NOTES
Allyson Henriquez was seen 01/16/2019 for an audiological evaluation.     Patient is deaf in her left ear secondary to an acoustic neuroma.  She does not wear amplification.  She has some decreased hearing in her right ear.  She has difficulty hearing speech clearly, especially when in noisy environments.     Audiogram results revealed normal hearing from 250-1KHz sloping to a mild-to-moderate sensorineural hearing loss for the right ear, and a profound sensorineural hearing loss for the left ear.  A Speech Reception Threshold (SRT) was obtained at  30 dBHL with excellent word discrimination for the right ear.   Tympanograms were Type Ad for the right ear and Type A for the left ear.    Audiogram results were reviewed in detail with patient and all questions were answered. Results will be reviewed by ENT at the completion of this note.     Recommendations:   1. BICROS amplification pending medical clearance  2. Repeat audiogram in one year due to asymmetrical SNHL

## 2019-01-16 NOTE — PROGRESS NOTES
Subjective:       Patient ID: Allyson Henriquez is a 68 y.o. female.    Chief Complaint: Cerumen Impaction; Hearing Loss; and Ear Fullness (right ear)    HPI   Patient is deaf AS following treatment of left acoustic neuroma with gamma-knife radiation in 2004 by Dr. Michael Solis at [facility].   Patient finds slowly progressing difficulty hearing particularly if ambient noise is present or when person speaking is not directly facing her.   She denies noise exposure. No family h/o HL. No other otologic hx. She is on BiPAP/CPAP for OSAS which causes ear pressure, particularly in early morning then progressively improves throughout the day.     Review of Systems   Constitutional: Negative.    HENT: Positive for hearing loss and tinnitus.    Eyes: Negative.    Respiratory: Negative.    Cardiovascular: Negative.    Gastrointestinal: Negative.    Musculoskeletal: Negative.    Skin: Negative.    Neurological: Negative.    Hematological: Negative.    Psychiatric/Behavioral: Negative.        Objective:      Physical Exam   Constitutional: She is oriented to person, place, and time. Vital signs are normal. She appears well-developed and well-nourished. She is cooperative. She does not appear ill. No distress.   HENT:   Head: Normocephalic and atraumatic.   Right Ear: Hearing, tympanic membrane, external ear and ear canal normal. Tympanic membrane is not erythematous. No middle ear effusion.   Left Ear: Hearing, tympanic membrane, external ear and ear canal normal. Tympanic membrane is not erythematous.  No middle ear effusion.   Nose: Nose normal.   Mouth/Throat: Uvula is midline, oropharynx is clear and moist and mucous membranes are normal.     SEPARATE PROCEDURE IN OFFICE:   Procedure: Removal of impacted cerumen, LEFT   Pre Procedure Diagnosis: Cerumen Impaction   Post Procedure Diagnosis: Cerumen Impaction   Verbal informed consent in regards to risk of trauma to ear canal, ear drum or hearing, discomfort during procedure  and/or inability to remove cerumen impaction in one session or unforeseen events or complications.   No anesthesia.     Procedure in detail:   Ear canal visualized bilateral with appropriate size ear speculum utilizing Operating Head Binocular Otomicroscope   Utilizing the following: suction cannula was used. The impacted cerumen of the ear canals was removed atraumatically. The TM and EAC were then inspected and found to be clear of wax. See description of TMs/EACs in PE above.   Complications: No   Condition: Improved/Good     Eyes: EOM and lids are normal. Right eye exhibits no discharge. Left eye exhibits no discharge. No scleral icterus.   Neck: Trachea normal and normal range of motion. Neck supple. No tracheal deviation present.   Cardiovascular: Normal rate.   Pulmonary/Chest: Effort normal. No stridor. No respiratory distress. She has no wheezes.   Musculoskeletal: Normal range of motion.   Neurological: She is alert and oriented to person, place, and time. She has normal strength. Coordination and gait normal.   Skin: Skin is warm, dry and intact. She is not diaphoretic. No cyanosis. No pallor.   Psychiatric: She has a normal mood and affect. Her speech is normal and behavior is normal. Judgment and thought content normal. Cognition and memory are normal.   Nursing note and vitals reviewed.      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   Today's audiogram reveals the patient is a candidate for amplification. Audiogram is reviewed in detail with the patient. The audiologist's recommendation that the patient have amplification/hearing aids is discussed and questions answered. Patient has been given information by the audiologist on how to schedule a hearing aid consultation. Patient is  encouraged to wear ear protection in loud noise and return annually for hearing test. Return to clinic as needed for further ENT concerns.

## 2019-01-16 NOTE — LETTER
January 16, 2019      Gilma Pimentel MD  [address]  [place] [address]           [place] - [facility]  [address] [facility] [address] [place] [address]  Phone: [phone]  Fax: [phone]          Patient: Allyson Henriquez   MR Number: [id number]   YOB: 1950   Date of Visit: 1/16/2019       Dear Dr. Gilma Pimentel:    Thank you for referring Allyson Henriquez to me for evaluation. Attached you will find relevant portions of my assessment and plan of care.    If you have questions, please do not hesitate to call me. I look forward to following Allyson Henriquez along with you.    Sincerely,    Yocasta Babcock NP    Enclosure  CC:  No Recipients    If you would like to receive this communication electronically, please contact [email or web address] or [phone] to request more information on [employer] Link access.    For providers and/or their staff who would like to refer a patient to [facility], please contact us through our one-stop-shop provider referral line, [facility], at [phone].    If you feel you have received this communication in error or would no longer like to receive these types of communications, please e-mail [email or web address]

## 2019-01-30 ENCOUNTER — CLINICAL SUPPORT (OUTPATIENT)
Dept: AUDIOLOGY | Facility: CLINIC | Age: 69
End: 2019-01-30
Payer: MEDICARE

## 2019-01-30 DIAGNOSIS — H90.3 ASYMMETRIC SNHL (SENSORINEURAL HEARING LOSS): Primary | ICD-10-CM

## 2019-01-30 DIAGNOSIS — H93.11 TINNITUS, RIGHT: ICD-10-CM

## 2019-01-30 PROCEDURE — 92557 COMPREHENSIVE HEARING TEST: CPT | Mod: 52,PBBFAC | Performed by: AUDIOLOGIST

## 2019-01-30 PROCEDURE — 99499 NO LOS: ICD-10-PCS | Mod: S$GLB,,, | Performed by: OTOLARYNGOLOGY

## 2019-01-30 PROCEDURE — 99499 UNLISTED E&M SERVICE: CPT | Mod: S$GLB,,, | Performed by: OTOLARYNGOLOGY

## 2019-01-30 NOTE — PROGRESS NOTES
Mrs. Henriquez was seen for a hearing aid consultation.  We discussed BiCros hearing aids and discussed her options that would be best for her hearing.  Her  asked numerous questions.  Pricing was discussed.  She and her  both need aids and they are trying to decide how to handle the situation financially.    They will call if they decide to stay at [facility].

## 2019-01-30 NOTE — PROGRESS NOTES
1/30/2019    AUDIOLOGICAL EVALUATION:    Allyson Henriquez was seen for an audiological evaluation on 1/30/2019 due to tinnitus in her right ear following recent audiometric testing.  Mrs. Henriquez reported a loud noise during testing with tinnitus following in the right ear for >48hrs.  She has a history of profound sensorineural hearing loss in her left ear following acoustic tumor removal.  Mrs. Henriquez reported difficulty with communication especially in environments with background noise present.    Pure tone threshold testing revealed a high frequency sensorineural hearing loss for the right ear.  There was no significant change in hearing levels for her right ear since her last audiogram.      The results of audiometric testing were reviewed today and compared to previous test results.  The patient was reassured.  Amplification options were reviewed today.    Recommend:  1.  Hearing aid evaluation.  2.  Annual evaluation to monitor hearing levels.

## 2019-03-11 ENCOUNTER — OFFICE VISIT (OUTPATIENT)
Dept: CARDIOLOGY | Facility: CLINIC | Age: 69
End: 2019-03-11
Payer: MEDICARE

## 2019-03-11 VITALS
DIASTOLIC BLOOD PRESSURE: 67 MMHG | HEIGHT: 66 IN | BODY MASS INDEX: 40.26 KG/M2 | SYSTOLIC BLOOD PRESSURE: 134 MMHG | HEART RATE: 64 BPM | WEIGHT: 250.5 LBS

## 2019-03-11 DIAGNOSIS — Z79.01 CURRENT USE OF LONG TERM ANTICOAGULATION: ICD-10-CM

## 2019-03-11 DIAGNOSIS — E66.01 OBESITY, CLASS III, BMI 40-49.9 (MORBID OBESITY): ICD-10-CM

## 2019-03-11 DIAGNOSIS — G47.33 OBSTRUCTIVE SLEEP APNEA: ICD-10-CM

## 2019-03-11 DIAGNOSIS — I10 ESSENTIAL HYPERTENSION: ICD-10-CM

## 2019-03-11 DIAGNOSIS — I51.7 LVH (LEFT VENTRICULAR HYPERTROPHY): ICD-10-CM

## 2019-03-11 DIAGNOSIS — I48.0 PAF (PAROXYSMAL ATRIAL FIBRILLATION): Primary | ICD-10-CM

## 2019-03-11 DIAGNOSIS — R93.1 AGATSTON CAC SCORE, <100: ICD-10-CM

## 2019-03-11 PROBLEM — Z79.899 LONG TERM CURRENT USE OF ANTIARRHYTHMIC DRUG: Status: RESOLVED | Noted: 2018-06-12 | Resolved: 2019-03-11

## 2019-03-11 PROCEDURE — 3075F SYST BP GE 130 - 139MM HG: CPT | Mod: S$GLB,,, | Performed by: INTERNAL MEDICINE

## 2019-03-11 PROCEDURE — 1101F PT FALLS ASSESS-DOCD LE1/YR: CPT | Mod: S$GLB,,, | Performed by: INTERNAL MEDICINE

## 2019-03-11 PROCEDURE — 99214 OFFICE O/P EST MOD 30 MIN: CPT | Mod: S$GLB,,, | Performed by: INTERNAL MEDICINE

## 2019-03-11 PROCEDURE — 99999 PR PBB SHADOW E&M-EST. PATIENT-LVL IV: CPT | Mod: PBBFAC,,, | Performed by: INTERNAL MEDICINE

## 2019-03-11 PROCEDURE — 3078F PR MOST RECENT DIASTOLIC BLOOD PRESSURE < 80 MM HG: ICD-10-PCS | Mod: S$GLB,,, | Performed by: INTERNAL MEDICINE

## 2019-03-11 PROCEDURE — 3078F DIAST BP <80 MM HG: CPT | Mod: S$GLB,,, | Performed by: INTERNAL MEDICINE

## 2019-03-11 PROCEDURE — 99999 PR PBB SHADOW E&M-EST. PATIENT-LVL IV: ICD-10-PCS | Mod: PBBFAC,,, | Performed by: INTERNAL MEDICINE

## 2019-03-11 PROCEDURE — 3075F PR MOST RECENT SYSTOLIC BLOOD PRESS GE 130-139MM HG: ICD-10-PCS | Mod: S$GLB,,, | Performed by: INTERNAL MEDICINE

## 2019-03-11 PROCEDURE — 99214 PR OFFICE/OUTPT VISIT, EST, LEVL IV, 30-39 MIN: ICD-10-PCS | Mod: S$GLB,,, | Performed by: INTERNAL MEDICINE

## 2019-03-11 PROCEDURE — 1101F PR PT FALLS ASSESS DOC 0-1 FALLS W/OUT INJ PAST YR: ICD-10-PCS | Mod: S$GLB,,, | Performed by: INTERNAL MEDICINE

## 2019-03-11 RX ORDER — DABIGATRAN ETEXILATE 150 MG/1
CAPSULE ORAL
Qty: 180 CAPSULE | Refills: 1 | Status: SHIPPED | OUTPATIENT
Start: 2019-03-11 | End: 2019-07-10 | Stop reason: SDUPTHER

## 2019-03-11 RX ORDER — METOPROLOL SUCCINATE 50 MG/1
50 TABLET, EXTENDED RELEASE ORAL DAILY
COMMUNITY
End: 2019-10-21 | Stop reason: SDUPTHER

## 2019-03-11 NOTE — PROGRESS NOTES
Subjective:    Patient ID:  Allyson Henriquez is a 69 y.o. female who presents for Atrial Fibrillation and Hypertension        HPI  NO LABS,LAST IN October,  INTERMITTENT PAF RESPONDS TO EXTRA METOPROLOL, RETIRED, NO CP,USES CPAP AND BENEFITS FROM IT,  SEE ROS    Past Medical History:   Diagnosis Date    Acoustic neuroma     left ear    Acquired deafness of left ear     Atrial fibrillation     Bell's palsy     with fatigue and stress    COPD (chronic obstructive pulmonary disease)     History of tobacco abuse     Hyperlipidemia     Hypertension     Multinodular goiter (nontoxic) 5/8/2017    TRISHA (obstructive sleep apnea)     Proteinuria      Past Surgical History:   Procedure Laterality Date    ADENOIDECTOMY      APPENDECTOMY      COLONOSCOPY N/A 11/14/2016    Performed [facility] Destin [facility] at [facility] ENDO    EYE SURGERY      cataract OU    NEUROMA SURGERY Left     acoustic    TONSILLECTOMY       Family History   Problem Relation Age of Onset    Cancer Paternal Uncle         colon cancer    Diabetes Neg Hx     Kidney disease Neg Hx     Stroke Neg Hx     Heart disease Neg Hx      Social History     Socioeconomic History    Marital status:      Spouse name: None    Number of children: None    Years of education: None    Highest education level: None   Social Needs    Financial resource strain: None    Food insecurity - worry: None    Food insecurity - inability: None    Transportation needs - medical: None    Transportation needs - non-medical: None   Occupational History    None   Tobacco Use    Smoking status: Former Smoker     Packs/day: 1.00     Years: 40.00     Pack years: 40.00     Last attempt to quit: 3/6/2016     Years since quitting: 3.0    Smokeless tobacco: Never Used   Substance and Sexual Activity    Alcohol use: Yes     Alcohol/week: 0.0 oz     Comment: occ social    Drug use: No    Sexual activity: None   Other Topics Concern    None   Social History  "Narrative    Works in insurance collection.       Review of patient's allergies indicates:   Allergen Reactions    Contrast media Anaphylaxis    Albuterol Other (See Comments)     Used during PFTs and put pt in afib    Pcn [penicillins]      Pt states did well on cephalexin.  No adverse reaction or side effect.     Doxycycline Palpitations       Current Outpatient Medications:     cholecalciferol, vitamin D3, (VITAMIN D3) 2,000 unit Cap, Take 1 capsule by mouth once daily., Disp: , Rfl:     ciprofloxacin-dexamethasone 0.3-0.1% (CIPRODEX) 0.3-0.1 % DrpS, Place 4 drops into the right ear 2 (two) times daily., Disp: 7.5 mL, Rfl: 5    dabigatran etexilate (PRADAXA) 150 mg Cap, TAKE 1 CAPSULE(150 MG) BY MOUTH TWICE DAILY. DO NOT BREAK, CHEW, OR OPEN CAPSULES.", Disp: 180 capsule, Rfl: 1    fluticasone (FLONASE ALLERGY RELIEF) 50 mcg/actuation nasal spray, 1 spray by Nasal route 2 (two) times daily as needed., Disp: , Rfl:     hydroCHLOROthiazide (HYDRODIURIL) 12.5 MG Tab, TAKE 1 TABLET(12.5 MG) BY MOUTH EVERY DAY, Disp: 90 tablet, Rfl: 1    losartan (COZAAR) 100 MG tablet, Take 1 tablet (100 mg total) by mouth once daily., Disp: 90 tablet, Rfl: 0    metoprolol succinate (TOPROL-XL) 50 MG 24 hr tablet, Take 1 tablet (50 mg total) by mouth once daily., Disp: 90 tablet, Rfl: 0    metoprolol succinate (TOPROL-XL) 50 MG 24 hr tablet, Take 50 mg by mouth once daily., Disp: , Rfl:     oxymetazoline (AFRIN) 0.05 % nasal spray, 2 sprays by Nasal route once a week., Disp: , Rfl:     simvastatin (ZOCOR) 40 MG tablet, TAKE 1 TABLET(40 MG) BY MOUTH EVERY EVENING, Disp: 90 tablet, Rfl: 1    vit C/E/Zn/coppr/lutein/zeaxan (OCUVITE LUTEIN AND ZEAXANTHIN ORAL), Take 1 capsule by mouth once daily., Disp: , Rfl:   No current facility-administered medications for this visit.     Facility-Administered Medications Ordered in Other Visits:     EUFLEXXA 10 mg/mL(mw 2.4 -3.6 million) injection Syrg 20 mg, 20 mg, Intra-articular, " ", Jose Rafael Barney MD, 20 mg at 06/14/17 1605    Review of Systems   Constitution: Negative for chills, diaphoresis, fever, weakness, malaise/fatigue and night sweats. Weight gain: SOME.   HENT: Negative for congestion, hearing loss and nosebleeds.    Eyes: Negative for blurred vision and visual disturbance.   Cardiovascular: Negative for chest pain, claudication, cyanosis, dyspnea on exertion, irregular heartbeat (OCC), leg swelling, near-syncope, orthopnea, paroxysmal nocturnal dyspnea and syncope.   Respiratory: Negative for cough, hemoptysis, shortness of breath and wheezing.    Endocrine: Negative for cold intolerance, heat intolerance and polyuria.   Hematologic/Lymphatic: Negative for adenopathy and bleeding problem. Does not bruise/bleed easily.   Skin: Negative for color change, itching and rash.   Musculoskeletal: Negative for back pain, falls and joint pain (KNEES).   Gastrointestinal: Negative for abdominal pain, change in bowel habit, heartburn, hematemesis, jaundice, melena and vomiting.   Genitourinary: Negative for dysuria, flank pain and frequency.   Neurological: Negative for brief paralysis, dizziness, focal weakness, light-headedness and numbness (OCC POSITIONAL).   Psychiatric/Behavioral: Negative for altered mental status and memory loss.   Allergic/Immunologic: Negative for hives and persistent infections.        Objective:      Vitals:    03/11/19 0901   BP: 134/67   Pulse: 64   Weight: 113.6 kg (250 lb 8.1 oz)   Height: 5' 6" (1.676 m)   PainSc: 0-No pain     Body mass index is 40.43 kg/m².    Physical Exam   Constitutional: She is oriented to person, place, and time. She appears well-developed and well-nourished. She is active.   HENT:   Head: Normocephalic and atraumatic.   Mouth/Throat: Oropharynx is clear and moist and mucous membranes are normal.   Eyes: Conjunctivae and EOM are normal. Pupils are equal, round, and reactive to light.   Neck: Trachea normal and normal range of " motion. Neck supple. Normal carotid pulses, no hepatojugular reflux and no JVD present. Carotid bruit is not present. No edema and no erythema present. No thyromegaly present.   Cardiovascular: Normal rate, regular rhythm and normal heart sounds.  No extrasystoles are present. PMI is not displaced. Exam reveals no gallop and no friction rub.   No murmur heard.  Pulses:       Carotid pulses are 2+ on the right side, and 2+ on the left side.       Radial pulses are 2+ on the right side, and 2+ on the left side.        Femoral pulses are 2+ on the right side, and 2+ on the left side.       Dorsalis pedis pulses are 2+ on the right side, and 2+ on the left side.        Posterior tibial pulses are 2+ on the right side, and 2+ on the left side.   Pulmonary/Chest: Effort normal and breath sounds normal. No tachypnea and no bradypnea. She has no wheezes. She has no rales. She exhibits no tenderness.   Abdominal: Soft. Bowel sounds are normal. She exhibits no distension and no mass. There is no hepatosplenomegaly. There is no guarding and no CVA tenderness.   Musculoskeletal: Normal range of motion. She exhibits no edema or tenderness.   Lymphadenopathy:     She has no cervical adenopathy.   Neurological: She is alert and oriented to person, place, and time. She has normal strength. She displays no tremor. No cranial nerve deficit.   Skin: Skin is warm and dry. No cyanosis or erythema. No pallor.   Psychiatric: She has a normal mood and affect. Her speech is normal and behavior is normal.               ..    Chemistry        Component Value Date/Time     09/06/2018 0901    K 4.2 09/06/2018 0901     09/06/2018 0901    CO2 27 09/06/2018 0901    BUN 18 09/06/2018 0901    CREATININE 1.2 09/06/2018 0901     (H) 09/06/2018 0901        Component Value Date/Time    CALCIUM 10.2 09/06/2018 0901    ALKPHOS 74 09/06/2018 0901    AST 17 09/06/2018 0901    ALT 17 09/06/2018 0901    BILITOT 0.6 09/06/2018 0901     ESTGFRAFRICA 53.3 (A) 09/06/2018 0901    EGFRNONAA 46.2 (A) 09/06/2018 0901            ..  Lab Results   Component Value Date    CHOL 156 09/06/2018    CHOL 164 06/06/2017    CHOL 174 10/06/2016     Lab Results   Component Value Date    HDL 41 09/06/2018    HDL 45 06/06/2017    HDL 49 10/06/2016     Lab Results   Component Value Date    LDLCALC 72.0 09/06/2018    LDLCALC 81.8 06/06/2017    LDLCALC 79.6 10/06/2016     Lab Results   Component Value Date    TRIG 215 (H) 09/06/2018    TRIG 186 (H) 06/06/2017    TRIG 227 (H) 10/06/2016     Lab Results   Component Value Date    CHOLHDL 26.3 09/06/2018    CHOLHDL 27.4 06/06/2017    CHOLHDL 28.2 10/06/2016     ..  Lab Results   Component Value Date    WBC 8.23 10/25/2017    HGB 13.0 10/25/2017    HCT 39.7 10/25/2017    MCV 83 10/25/2017     10/25/2017       Test(s) Reviewed  I have reviewed the following in detail:  [] Stress test   [] Angiography   [] Echocardiogram   [] Labs   [x] Other:       Assessment:         ICD-10-CM ICD-9-CM   1. PAF (paroxysmal atrial fibrillation) I48.0 427.31   2. Essential hypertension I10 401.9   3. LVH (left ventricular hypertrophy) I51.7 429.3   4. Current use of long term anticoagulation Z79.01 V58.61   5. Agatston CAC score, <100 R93.1 793.2   6. Obesity, Class III, BMI 40-49.9 (morbid obesity) E66.01 278.01   7. Obstructive sleep apnea G47.33 327.23     Problem List Items Addressed This Visit        Cardiac/Vascular    PAF (paroxysmal atrial fibrillation) - Primary    Relevant Orders    Comprehensive metabolic panel    Essential hypertension    Relevant Orders    Comprehensive metabolic panel    Agatston CAC score, <100    Overview     93         Relevant Orders    Comprehensive metabolic panel    LVH (left ventricular hypertrophy)    Relevant Orders    Comprehensive metabolic panel       Hematology    Current use of long term anticoagulation    Relevant Orders    Comprehensive metabolic panel    Hemoglobin       Endocrine     "Obesity, Class III, BMI 40-49.9 (morbid obesity)       Other    Obstructive sleep apnea    Relevant Orders    Comprehensive metabolic panel           Plan:     ALL CV CLINICALLY RELATIVELYSTABLE, NO ANGINA, NO HF, NO TIA, STABLE CLINICAL ARRHYTHMIA,CONTINUE CURRENT MEDS, EDUCATION, DIET, EXERCISE, WEIGHT LOSS, DISCUSSED RFA, RTC IN 6 MO WITH LABS      PAF (paroxysmal atrial fibrillation)  -     Comprehensive metabolic panel; Future; Expected date: 03/11/2019    Essential hypertension  -     Comprehensive metabolic panel; Future; Expected date: 03/11/2019    LVH (left ventricular hypertrophy)  -     Comprehensive metabolic panel; Future; Expected date: 03/11/2019    Current use of long term anticoagulation  -     Comprehensive metabolic panel; Future; Expected date: 03/11/2019  -     Hemoglobin; Future; Expected date: 03/11/2019    Agatston CAC score, <100  -     Comprehensive metabolic panel; Future; Expected date: 03/11/2019    Obesity, Class III, BMI 40-49.9 (morbid obesity)    Obstructive sleep apnea  -     Comprehensive metabolic panel; Future; Expected date: 03/11/2019    Other orders  -     dabigatran etexilate (PRADAXA) 150 mg Cap; TAKE 1 CAPSULE(150 MG) BY MOUTH TWICE DAILY. DO NOT BREAK, CHEW, OR OPEN CAPSULES."  Dispense: 180 capsule; Refill: 1    RTC Low level/low impact aerobic exercise 5x's/wk. Heart healthy diet and risk factor modification.    See labs and med orders.    Aerobic exercise 5x's/wk. Heart healthy diet and risk factor modification.    See labs and med orders.        "

## 2019-03-12 ENCOUNTER — OFFICE VISIT (OUTPATIENT)
Dept: PAIN MEDICINE | Facility: CLINIC | Age: 69
End: 2019-03-12
Payer: MEDICARE

## 2019-03-12 VITALS
OXYGEN SATURATION: 97 % | SYSTOLIC BLOOD PRESSURE: 145 MMHG | TEMPERATURE: 97 F | RESPIRATION RATE: 18 BRPM | HEIGHT: 66 IN | WEIGHT: 252.56 LBS | DIASTOLIC BLOOD PRESSURE: 63 MMHG | HEART RATE: 64 BPM | BODY MASS INDEX: 40.59 KG/M2

## 2019-03-12 DIAGNOSIS — M51.36 DDD (DEGENERATIVE DISC DISEASE), LUMBAR: ICD-10-CM

## 2019-03-12 DIAGNOSIS — M48.061 SPINAL STENOSIS OF LUMBAR REGION WITHOUT NEUROGENIC CLAUDICATION: Primary | ICD-10-CM

## 2019-03-12 DIAGNOSIS — M47.816 LUMBAR SPONDYLOSIS: ICD-10-CM

## 2019-03-12 PROCEDURE — 99204 PR OFFICE/OUTPT VISIT, NEW, LEVL IV, 45-59 MIN: ICD-10-PCS | Mod: S$GLB,,, | Performed by: ANESTHESIOLOGY

## 2019-03-12 PROCEDURE — 99204 OFFICE O/P NEW MOD 45 MIN: CPT | Mod: S$GLB,,, | Performed by: ANESTHESIOLOGY

## 2019-03-12 PROCEDURE — 3078F PR MOST RECENT DIASTOLIC BLOOD PRESSURE < 80 MM HG: ICD-10-PCS | Mod: S$GLB,,, | Performed by: ANESTHESIOLOGY

## 2019-03-12 PROCEDURE — 99999 PR PBB SHADOW E&M-EST. PATIENT-LVL IV: ICD-10-PCS | Mod: PBBFAC,,, | Performed by: ANESTHESIOLOGY

## 2019-03-12 PROCEDURE — 1101F PR PT FALLS ASSESS DOC 0-1 FALLS W/OUT INJ PAST YR: ICD-10-PCS | Mod: S$GLB,,, | Performed by: ANESTHESIOLOGY

## 2019-03-12 PROCEDURE — 1101F PT FALLS ASSESS-DOCD LE1/YR: CPT | Mod: S$GLB,,, | Performed by: ANESTHESIOLOGY

## 2019-03-12 PROCEDURE — 99999 PR PBB SHADOW E&M-EST. PATIENT-LVL IV: CPT | Mod: PBBFAC,,, | Performed by: ANESTHESIOLOGY

## 2019-03-12 PROCEDURE — 3077F PR MOST RECENT SYSTOLIC BLOOD PRESSURE >= 140 MM HG: ICD-10-PCS | Mod: S$GLB,,, | Performed by: ANESTHESIOLOGY

## 2019-03-12 PROCEDURE — 3078F DIAST BP <80 MM HG: CPT | Mod: S$GLB,,, | Performed by: ANESTHESIOLOGY

## 2019-03-12 PROCEDURE — 3077F SYST BP >= 140 MM HG: CPT | Mod: S$GLB,,, | Performed by: ANESTHESIOLOGY

## 2019-03-12 NOTE — LETTER
March 12, 2019      Gilma Pimentel MD  [address]  [place] [address]           [place] - [facility]  [address] [facility] [address]  Phone: [phone]  Fax: [phone]          Patient: Allyson Henriquez   MR Number: [id number]   YOB: 1950   Date of Visit: 3/12/2019       Dear Dr. Gilma Pimentel:    Thank you for referring Allyson Henriquez to me for evaluation. Attached you will find relevant portions of my assessment and plan of care.    If you have questions, please do not hesitate to call me. I look forward to following Allyson Henriquez along with you.    Sincerely,    Dionisio Nguyen MD    Enclosure  CC:  No Recipients    If you would like to receive this communication electronically, please contact [email or web address] or [phone] to request more information on [employer] Link access.    For providers and/or their staff who would like to refer a patient to [facility], please contact us through our one-stop-shop provider referral line, [facility], at [phone].    If you feel you have received this communication in error or would no longer like to receive these types of communications, please e-mail [email or web address]

## 2019-03-12 NOTE — PROGRESS NOTES
This note was completed with dictation software and grammatical errors may exist.    CC:  Back pain, bilateral leg heaviness    HPI:  The patient is a 69-year-old woman with a history of atrial fibrillation, COPD, TRISHA who presents in referral from Dr. Pimentel for back pain.  The patient reports that she has had no major back pain over the years but around Thanksgiving, 2018 she was much more active and feels that this may have caused some pain in the low back.  However got so severe that it was causing pain into the legs and difficulty walking to the point where she was not able to walk for about four days.  She ended up seeing a chiropractor once every two days for several weeks and this began to improve her symptoms.  She currently states that her pain has almost completely resolved and she does not have any major symptoms.  She does get some discomfort on the right side occasionally.  Her pain can be worse with standing or walking but instead of pain she describes it as more heaviness in her legs, left side worse than right side.  When she did have pain she described as grabbing, deep, sharp and shooting.  Worse with sitting too long or standing and walking.  She does get some relief with activity and walking.  She has been walking on a treadmill recently, trying to exercise more regularly.  She denies any bowel or bladder incontinence, no renetta weakness.      ROS:  She reports back pain, skin changes with rosacea.  Otherwise balance of review of systems is negative.    Past Medical History:   Diagnosis Date    Acoustic neuroma     left ear    Acquired deafness of left ear     Atrial fibrillation     Bell's palsy     with fatigue and stress    COPD (chronic obstructive pulmonary disease)     History of tobacco abuse     Hyperlipidemia     Hypertension     Multinodular goiter (nontoxic) 5/8/2017    TRISHA (obstructive sleep apnea)     Proteinuria        Past Surgical History:   Procedure Laterality Date     "ADENOIDECTOMY      APPENDECTOMY      COLONOSCOPY N/A 11/14/2016    Performed by Destin Cardona MD at [facility]    EYE SURGERY      cataract OU    NEUROMA SURGERY Left     acoustic    TONSILLECTOMY         Social History     Socioeconomic History    Marital status:      Spouse name: None    Number of children: None    Years of education: None    Highest education level: None   Social Needs    Financial resource strain: None    Food insecurity - worry: None    Food insecurity - inability: None    Transportation needs - medical: None    Transportation needs - non-medical: None   Occupational History    None   Tobacco Use    Smoking status: Former Smoker     Packs/day: 1.00     Years: 40.00     Pack years: 40.00     Last attempt to quit: 3/6/2016     Years since quitting: 3.0    Smokeless tobacco: Never Used   Substance and Sexual Activity    Alcohol use: Yes     Alcohol/week: 0.0 oz     Comment: occ social    Drug use: No    Sexual activity: None   Other Topics Concern    None   Social History Narrative    Works in insurance collection.         Medications/Allergies: See med card    Vitals:    03/12/19 1017   BP: (!) 145/63   Pulse: 64   Resp: 18   Temp: 97.4 °F (36.3 °C)   TempSrc: Oral   SpO2: 97%   Weight: 114.5 kg (252 lb 8.6 oz)   Height: 5' 6" (1.676 m)   PainSc: 0-No pain         Physical exam:  Gen: A and O x3, pleasant, well-groomed  Skin: No rashes or obvious lesions  HEENT: PERRLA, no obvious deformities on ears or in canals. Trachea midline.  CVS: Regular rate and rhythm, normal palpable pulses.  Resp:No increased work of breathing, symmetrical chest rise.  Abdomen: Soft, NT/ND.  Musculoskeletal: No antalgic gait.     Neuro:  Lower extremities: 5/5 strength bilaterally  Reflexes: Patellar 2+, Achilles 2+ bilaterally.  Sensory:  Intact and symmetrical to light touch and pinprick in L2-S1 dermatomes bilaterally.    Lumbar spine:  Lumbar spine: ROM is full with flexion " extension and oblique extension with no increased pain.    Travis's test causes no increased pain on either side.    Supine straight leg raise is negative bilaterally.    Internal and external rotation of the hip causes no increased pain on either side.  Myofascial exam: No tenderness to palpation across lumbar paraspinous muscles.    Imagin18 MRI L-spine  T12-L1: There is a mild disc bulge and mild facet joint arthropathy.  There is no spinal canal or significant foraminal stenosis.  L1-2: There is mild-to-moderate facet joint arthropathy.  There is no spinal canal or significant foraminal stenosis.  L2-3: There is a diffuse disc bulge.  There is moderate-to-marked facet joint arthropathy with ligamentum flavum thickening and there is prominent dorsal epidural fat.  There is marked spinal stenosis.  AP measurement of the dural sac is 6.3 mm.  There is no significant foraminal stenosis.  L3-4: There is a diffuse disc bulge.  There is marked facet joint arthropathy with ligamentum flavum thickening.  There is severe spinal stenosis.  AP measurement of the dural sac is 4 mm.  There is crowding of the lateral recess bilaterally and there is mild bilateral foraminal stenosis.  L4-5: There is marked facet joint arthropathy with ligamentum flavum thickening.  There is a diffuse disc bulge eccentric to the right.  There is severe spinal stenosis.  AP measurement of the dural sac is approximately 3.6 mm.  There is severe right lateral recess stenosis.  There is mild-to-moderate right and mild left foraminal stenosis.  L5-S1: There is marked disc space narrowing.  There is moderate facet joint arthropathy.  There is a diffuse disc bulge with osteophytic ridging.  There is a superimposed central disc protrusion.  There is mild spinal stenosis with mild-to-moderate left and moderate right foraminal stenosis.    Assessment:  The patient is a 69-year-old woman with a history of atrial fibrillation, COPD, TRISHA who  presents in referral from Dr. Pimentel for back pain.   1. Spinal stenosis of lumbar region without neurogenic claudication     2. DDD (degenerative disc disease), lumbar     3. Lumbar spondylosis           Plan:  1.  We discussed that she has severe stenosis at L3/4 and L4/5 related to her significant facet hypertrophy and disc bulging.  Since she is relatively asymptomatic, I explained that there is nothing first do right now but I had a long discussion about exercise, weight loss, appropriate activities.  She plans to continue working on the treadmill and join a Pilates class.  At this point I will see her as needed, contact me if having any severe worsening.    Thank you for referring this interesting patient, and I look forward to continuing to collaborate in her care.

## 2019-05-12 ENCOUNTER — PATIENT MESSAGE (OUTPATIENT)
Dept: ENDOCRINOLOGY | Facility: CLINIC | Age: 69
End: 2019-05-12

## 2019-05-13 ENCOUNTER — PATIENT MESSAGE (OUTPATIENT)
Dept: ENDOCRINOLOGY | Facility: CLINIC | Age: 69
End: 2019-05-13

## 2019-05-16 ENCOUNTER — HOSPITAL ENCOUNTER (OUTPATIENT)
Dept: RADIOLOGY | Facility: HOSPITAL | Age: 69
Discharge: HOME | End: 2019-05-16
Attending: INTERNAL MEDICINE
Payer: MEDICARE

## 2019-05-16 DIAGNOSIS — E04.2 MULTINODULAR GOITER: ICD-10-CM

## 2019-05-16 PROCEDURE — 76536 US SOFT TISSUE HEAD NECK THYROID: ICD-10-PCS | Mod: 26,,, | Performed by: RADIOLOGY

## 2019-05-16 PROCEDURE — 76536 US EXAM OF HEAD AND NECK: CPT | Mod: 26,,, | Performed by: RADIOLOGY

## 2019-05-16 PROCEDURE — 76536 US EXAM OF HEAD AND NECK: CPT | Mod: TC,PO

## 2019-05-18 ENCOUNTER — PATIENT MESSAGE (OUTPATIENT)
Dept: FAMILY MEDICINE | Facility: CLINIC | Age: 69
End: 2019-05-18

## 2019-05-23 ENCOUNTER — PATIENT MESSAGE (OUTPATIENT)
Dept: ENDOCRINOLOGY | Facility: CLINIC | Age: 69
End: 2019-05-23

## 2019-05-30 ENCOUNTER — PATIENT MESSAGE (OUTPATIENT)
Dept: CARDIOLOGY | Facility: CLINIC | Age: 69
End: 2019-05-30

## 2019-05-30 DIAGNOSIS — I13.0 BENIGN HYPERTENSIVE HEART AND KIDNEY DISEASE WITH CHF, NYHA CLASS 1 AND CKD STAGE 3: ICD-10-CM

## 2019-05-30 DIAGNOSIS — N18.30 BENIGN HYPERTENSIVE HEART AND KIDNEY DISEASE WITH CHF, NYHA CLASS 1 AND CKD STAGE 3: ICD-10-CM

## 2019-05-30 RX ORDER — LOSARTAN POTASSIUM 100 MG/1
100 TABLET ORAL DAILY
Qty: 90 TABLET | Refills: 1 | Status: SHIPPED | OUTPATIENT
Start: 2019-05-30 | End: 2019-11-22 | Stop reason: SDUPTHER

## 2019-05-30 RX ORDER — METOPROLOL SUCCINATE 50 MG/1
50 TABLET, EXTENDED RELEASE ORAL DAILY
Qty: 90 TABLET | Refills: 1 | Status: SHIPPED | OUTPATIENT
Start: 2019-05-30 | End: 2019-11-22 | Stop reason: SDUPTHER

## 2019-05-30 RX ORDER — HYDROCHLOROTHIAZIDE 12.5 MG/1
TABLET ORAL
Qty: 90 TABLET | Refills: 1 | Status: SHIPPED | OUTPATIENT
Start: 2019-05-30 | End: 2019-11-29 | Stop reason: SDUPTHER

## 2019-06-24 ENCOUNTER — OFFICE VISIT (OUTPATIENT)
Dept: ENDOCRINOLOGY | Facility: CLINIC | Age: 69
End: 2019-06-24
Payer: MEDICARE

## 2019-06-24 VITALS
HEIGHT: 66 IN | HEART RATE: 151 BPM | WEIGHT: 251.31 LBS | DIASTOLIC BLOOD PRESSURE: 82 MMHG | BODY MASS INDEX: 40.39 KG/M2 | SYSTOLIC BLOOD PRESSURE: 124 MMHG

## 2019-06-24 DIAGNOSIS — I48.91 ATRIAL FIBRILLATION, UNSPECIFIED TYPE: ICD-10-CM

## 2019-06-24 DIAGNOSIS — E04.2 MULTINODULAR GOITER: Primary | ICD-10-CM

## 2019-06-24 DIAGNOSIS — I10 BENIGN ESSENTIAL HTN: ICD-10-CM

## 2019-06-24 PROCEDURE — 1101F PR PT FALLS ASSESS DOC 0-1 FALLS W/OUT INJ PAST YR: ICD-10-PCS | Mod: CPTII,S$GLB,, | Performed by: INTERNAL MEDICINE

## 2019-06-24 PROCEDURE — 99214 PR OFFICE/OUTPT VISIT, EST, LEVL IV, 30-39 MIN: ICD-10-PCS | Mod: S$GLB,,, | Performed by: INTERNAL MEDICINE

## 2019-06-24 PROCEDURE — 3079F DIAST BP 80-89 MM HG: CPT | Mod: CPTII,S$GLB,, | Performed by: INTERNAL MEDICINE

## 2019-06-24 PROCEDURE — 99214 OFFICE O/P EST MOD 30 MIN: CPT | Mod: S$GLB,,, | Performed by: INTERNAL MEDICINE

## 2019-06-24 PROCEDURE — 1101F PT FALLS ASSESS-DOCD LE1/YR: CPT | Mod: CPTII,S$GLB,, | Performed by: INTERNAL MEDICINE

## 2019-06-24 PROCEDURE — 99499 RISK ADDL DX/OHS AUDIT: ICD-10-PCS | Mod: S$GLB,,, | Performed by: INTERNAL MEDICINE

## 2019-06-24 PROCEDURE — 99499 UNLISTED E&M SERVICE: CPT | Mod: S$GLB,,, | Performed by: INTERNAL MEDICINE

## 2019-06-24 PROCEDURE — 3074F PR MOST RECENT SYSTOLIC BLOOD PRESSURE < 130 MM HG: ICD-10-PCS | Mod: CPTII,S$GLB,, | Performed by: INTERNAL MEDICINE

## 2019-06-24 PROCEDURE — 3079F PR MOST RECENT DIASTOLIC BLOOD PRESSURE 80-89 MM HG: ICD-10-PCS | Mod: CPTII,S$GLB,, | Performed by: INTERNAL MEDICINE

## 2019-06-24 PROCEDURE — 3074F SYST BP LT 130 MM HG: CPT | Mod: CPTII,S$GLB,, | Performed by: INTERNAL MEDICINE

## 2019-06-24 PROCEDURE — 99999 PR PBB SHADOW E&M-EST. PATIENT-LVL III: CPT | Mod: PBBFAC,,, | Performed by: INTERNAL MEDICINE

## 2019-06-24 PROCEDURE — 99999 PR PBB SHADOW E&M-EST. PATIENT-LVL III: ICD-10-PCS | Mod: PBBFAC,,, | Performed by: INTERNAL MEDICINE

## 2019-06-24 RX ORDER — METOPROLOL TARTRATE 25 MG/1
25 TABLET, FILM COATED ORAL DAILY PRN
COMMUNITY
End: 2020-09-21 | Stop reason: SDUPTHER

## 2019-06-24 NOTE — PROGRESS NOTES
CHIEF COMPLAINT: MNG  69 year old being seen as a f/u. Had been seeing Dr. Trinidad and last seen by her 3/28/18. Pt opted for observation at that time. No difficulty swallowing. Overall feeling well. No change in voice. No tremors          PAST MEDICAL HISTORY/PAST SURGICAL HISTORY:  Reviewed in EPIC    SOCIAL HISTORY: Smoker and quit in 60's    FAMILY HISTORY:  Sister with thyroid cancer.     MEDICATIONS/ALLERGIES: The patient's MedCard has been updated and reviewed.      ROS:   Constitutional: weight stable.   Eyes: No recent visual changes  ENT: No dysphagia  Cardiovascular: seeing Cardiology for afib.   Respiratory: Denies current respiratory difficulty  Gastrointestinal: Denies recent bowel disturbances  GenitoUrinary - No dysuria  Skin: No new skin rash  Neurologic: No focal neurologic complaints  Remainder ROS negative        PE:    GENERAL: Well developed, well nourished.  NECK: Supple, trachea midline, left nodule palpable.   CHEST: Resp even and unlabored, CTA bilateral.  CARDIAC: Tachycardic, S1, S2 heard, no murmurs, rubs, S3, or S4      US SOFT TISSUE HEAD NECK THYROID    CLINICAL HISTORY:  Nontoxic multinodular goiter    TECHNIQUE:  Ultrasound of the thyroid and cervical lymph nodes was performed.    COMPARISON:  09/24/2018    FINDINGS:  The right hemithyroid measures 5.5 x 2.6 x 2.2 cm and the left 4.8 x 1.6 x 1.7 cm.  The thyroid isthmus measures 4 mm.  Total thyroid volume is 24 cc.    The thyroid parenchyma is homogeneous without hypervascularity.    Multiple nodules are present bilaterally.  The largest on the right is a lower pole predominately cystic nodule within nodular soft tissue component, measuring 3.3 cm in total without significant change..  The largest on the left is a 1.1 cm upper pole nodule demonstrating hypoechoic solid echotexture with a few microcalcifications, without significant change.  There is also a predominantly isoechoic lower pole solid nodule measuring 11 mm, without  significant change in size however previously appearing cystic..    No evidence for cervical lymphadenopathy.      Impression       Mildly enlarged, multinodular thyroid gland.  Stable appearance of a complex predominantly cystic lower pole nodule on the right measuring 3.3 cm.  Stable appearance of a solid 11 mm upper pole nodule on the left with a few microcalcifications, meeting criteria for fine-needle aspiration.  Stable size of a left lower pole nodule although now appearing solid rather than cystic.             ASSESSMENT/PLAN:  1. Multiple thyroid Nodules- Discussed that the US does show a nodule that meets criteria for FNA.  Pt does not want FNA and opted to monitor at this time. Left lower nodule has more of a solid component. Overall size not increased. Continue to monitor.      2. HTN- BP controlled.     3. Afib- HR decreased to 110. Just took beta blocker. Seeing cardiology.     FOLLOWUP  F/U 1 yr with thyroid US, TSH

## 2019-06-27 ENCOUNTER — TELEPHONE (OUTPATIENT)
Dept: PAIN MEDICINE | Facility: CLINIC | Age: 69
End: 2019-06-27

## 2019-06-27 ENCOUNTER — PATIENT MESSAGE (OUTPATIENT)
Dept: PAIN MEDICINE | Facility: CLINIC | Age: 69
End: 2019-06-27

## 2019-06-27 NOTE — TELEPHONE ENCOUNTER
----- Message from Teri Ruiz sent at 6/27/2019  8:03 AM CDT -----  Contact: Allyson gooden  Type:  Sooner Apoointment Request    Caller is requesting a sooner appointment.  Caller declined first available appointment listed below.  Caller will not accept being placed on the waitlist and is requesting a message be sent to doctor.    Name of Caller:  Allyson  When is the first available appointment?  N/a dept book  Symptoms:  Intense pain in her lower back  Best Call Back Number:  [phone]  Additional Information:  Pls call pt to see if she can be fit in today

## 2019-07-10 ENCOUNTER — PATIENT MESSAGE (OUTPATIENT)
Dept: CARDIOLOGY | Facility: CLINIC | Age: 69
End: 2019-07-10

## 2019-07-10 RX ORDER — DABIGATRAN ETEXILATE 150 MG/1
CAPSULE ORAL
Qty: 180 CAPSULE | Refills: 1 | Status: SHIPPED | OUTPATIENT
Start: 2019-07-10 | End: 2019-09-09 | Stop reason: SDUPTHER

## 2019-08-15 ENCOUNTER — PATIENT MESSAGE (OUTPATIENT)
Dept: FAMILY MEDICINE | Facility: CLINIC | Age: 69
End: 2019-08-15

## 2019-08-20 ENCOUNTER — PATIENT MESSAGE (OUTPATIENT)
Dept: FAMILY MEDICINE | Facility: CLINIC | Age: 69
End: 2019-08-20

## 2019-08-21 ENCOUNTER — PATIENT MESSAGE (OUTPATIENT)
Dept: FAMILY MEDICINE | Facility: CLINIC | Age: 69
End: 2019-08-21

## 2019-08-26 ENCOUNTER — PATIENT MESSAGE (OUTPATIENT)
Dept: CARDIOLOGY | Facility: CLINIC | Age: 69
End: 2019-08-26

## 2019-08-26 DIAGNOSIS — E78.5 HLD (HYPERLIPIDEMIA): ICD-10-CM

## 2019-08-27 RX ORDER — SIMVASTATIN 40 MG/1
TABLET, FILM COATED ORAL
Qty: 90 TABLET | Refills: 0 | Status: SHIPPED | OUTPATIENT
Start: 2019-08-27 | End: 2019-11-22 | Stop reason: SDUPTHER

## 2019-09-06 ENCOUNTER — LAB VISIT (OUTPATIENT)
Dept: LAB | Facility: HOSPITAL | Age: 69
End: 2019-09-06
Attending: INTERNAL MEDICINE
Payer: MEDICARE

## 2019-09-06 DIAGNOSIS — I51.7 LVH (LEFT VENTRICULAR HYPERTROPHY): ICD-10-CM

## 2019-09-06 DIAGNOSIS — G47.33 OBSTRUCTIVE SLEEP APNEA: ICD-10-CM

## 2019-09-06 DIAGNOSIS — R93.1 AGATSTON CAC SCORE, <100: ICD-10-CM

## 2019-09-06 DIAGNOSIS — Z79.01 CURRENT USE OF LONG TERM ANTICOAGULATION: ICD-10-CM

## 2019-09-06 DIAGNOSIS — I10 ESSENTIAL HYPERTENSION: ICD-10-CM

## 2019-09-06 DIAGNOSIS — I48.0 PAF (PAROXYSMAL ATRIAL FIBRILLATION): ICD-10-CM

## 2019-09-06 LAB
ALBUMIN SERPL BCP-MCNC: 3.7 G/DL (ref 3.5–5.2)
ALP SERPL-CCNC: 62 U/L (ref 55–135)
ALT SERPL W/O P-5'-P-CCNC: 15 U/L (ref 10–44)
ANION GAP SERPL CALC-SCNC: 11 MMOL/L (ref 8–16)
AST SERPL-CCNC: 14 U/L (ref 10–40)
BILIRUB SERPL-MCNC: 0.7 MG/DL (ref 0.1–1)
BUN SERPL-MCNC: 23 MG/DL (ref 8–23)
CALCIUM SERPL-MCNC: 10.2 MG/DL (ref 8.7–10.5)
CHLORIDE SERPL-SCNC: 104 MMOL/L (ref 95–110)
CO2 SERPL-SCNC: 27 MMOL/L (ref 23–29)
CREAT SERPL-MCNC: 1.3 MG/DL (ref 0.5–1.4)
EST. GFR  (AFRICAN AMERICAN): 48 ML/MIN/1.73 M^2
EST. GFR  (NON AFRICAN AMERICAN): 41.7 ML/MIN/1.73 M^2
GLUCOSE SERPL-MCNC: 142 MG/DL (ref 70–110)
HGB BLD-MCNC: 13 G/DL (ref 12–16)
POTASSIUM SERPL-SCNC: 4.4 MMOL/L (ref 3.5–5.1)
PROT SERPL-MCNC: 6.7 G/DL (ref 6–8.4)
SODIUM SERPL-SCNC: 142 MMOL/L (ref 136–145)

## 2019-09-06 PROCEDURE — 36415 COLL VENOUS BLD VENIPUNCTURE: CPT | Mod: PO

## 2019-09-06 PROCEDURE — 80053 COMPREHEN METABOLIC PANEL: CPT

## 2019-09-06 PROCEDURE — 85018 HEMOGLOBIN: CPT

## 2019-09-09 ENCOUNTER — PATIENT MESSAGE (OUTPATIENT)
Dept: ENDOCRINOLOGY | Facility: CLINIC | Age: 69
End: 2019-09-09

## 2019-09-09 ENCOUNTER — OFFICE VISIT (OUTPATIENT)
Dept: CARDIOLOGY | Facility: CLINIC | Age: 69
End: 2019-09-09
Payer: MEDICARE

## 2019-09-09 VITALS
WEIGHT: 251.13 LBS | DIASTOLIC BLOOD PRESSURE: 66 MMHG | SYSTOLIC BLOOD PRESSURE: 104 MMHG | BODY MASS INDEX: 40.36 KG/M2 | HEART RATE: 62 BPM | HEIGHT: 66 IN

## 2019-09-09 DIAGNOSIS — G47.33 OBSTRUCTIVE SLEEP APNEA: ICD-10-CM

## 2019-09-09 DIAGNOSIS — I48.0 PAF (PAROXYSMAL ATRIAL FIBRILLATION): ICD-10-CM

## 2019-09-09 DIAGNOSIS — E66.01 OBESITY, CLASS III, BMI 40-49.9 (MORBID OBESITY): ICD-10-CM

## 2019-09-09 DIAGNOSIS — Z79.01 CURRENT USE OF LONG TERM ANTICOAGULATION: ICD-10-CM

## 2019-09-09 DIAGNOSIS — I10 ESSENTIAL HYPERTENSION: ICD-10-CM

## 2019-09-09 DIAGNOSIS — E78.00 HYPERCHOLESTEROLEMIA: ICD-10-CM

## 2019-09-09 DIAGNOSIS — R07.89 ATYPICAL CHEST PAIN: Primary | ICD-10-CM

## 2019-09-09 PROCEDURE — 1101F PR PT FALLS ASSESS DOC 0-1 FALLS W/OUT INJ PAST YR: ICD-10-PCS | Mod: CPTII,S$GLB,, | Performed by: INTERNAL MEDICINE

## 2019-09-09 PROCEDURE — 99499 UNLISTED E&M SERVICE: CPT | Mod: S$GLB,,, | Performed by: INTERNAL MEDICINE

## 2019-09-09 PROCEDURE — 3074F SYST BP LT 130 MM HG: CPT | Mod: CPTII,S$GLB,, | Performed by: INTERNAL MEDICINE

## 2019-09-09 PROCEDURE — 99999 PR PBB SHADOW E&M-EST. PATIENT-LVL III: CPT | Mod: PBBFAC,,, | Performed by: INTERNAL MEDICINE

## 2019-09-09 PROCEDURE — 99499 RISK ADDL DX/OHS AUDIT: ICD-10-PCS | Mod: S$GLB,,, | Performed by: INTERNAL MEDICINE

## 2019-09-09 PROCEDURE — 99999 PR PBB SHADOW E&M-EST. PATIENT-LVL III: ICD-10-PCS | Mod: PBBFAC,,, | Performed by: INTERNAL MEDICINE

## 2019-09-09 PROCEDURE — 99214 PR OFFICE/OUTPT VISIT, EST, LEVL IV, 30-39 MIN: ICD-10-PCS | Mod: S$GLB,,, | Performed by: INTERNAL MEDICINE

## 2019-09-09 PROCEDURE — 3078F PR MOST RECENT DIASTOLIC BLOOD PRESSURE < 80 MM HG: ICD-10-PCS | Mod: CPTII,S$GLB,, | Performed by: INTERNAL MEDICINE

## 2019-09-09 PROCEDURE — 1101F PT FALLS ASSESS-DOCD LE1/YR: CPT | Mod: CPTII,S$GLB,, | Performed by: INTERNAL MEDICINE

## 2019-09-09 PROCEDURE — 3074F PR MOST RECENT SYSTOLIC BLOOD PRESSURE < 130 MM HG: ICD-10-PCS | Mod: CPTII,S$GLB,, | Performed by: INTERNAL MEDICINE

## 2019-09-09 PROCEDURE — 3078F DIAST BP <80 MM HG: CPT | Mod: CPTII,S$GLB,, | Performed by: INTERNAL MEDICINE

## 2019-09-09 PROCEDURE — 99214 OFFICE O/P EST MOD 30 MIN: CPT | Mod: S$GLB,,, | Performed by: INTERNAL MEDICINE

## 2019-09-09 RX ORDER — DABIGATRAN ETEXILATE 150 MG/1
CAPSULE ORAL
Qty: 180 CAPSULE | Refills: 1 | Status: SHIPPED | OUTPATIENT
Start: 2019-09-09 | End: 2020-01-09 | Stop reason: SDUPTHER

## 2019-09-09 NOTE — PROGRESS NOTES
Subjective:    Patient ID:  Allyson Henriquez is a 69 y.o. female who presents for Atrial Fibrillation (labs) and Hypertension        HPI  DISCUSSED LABS AND GOALS, CMP OK, , , HB 13, DOING OK, OCC CP, L SIDED, NON EXERTIONAL, USES CPAP AND BENEFITS FROM IT, SOME  CHEST PAIN ATYPICAL FOR ANGINA, NO SIGNIFICANT PALPITATION NO TIA TYPE SYMPTOMS NO NEAR-SYNCOPE, SEE ROS    Past Medical History:   Diagnosis Date    Acoustic neuroma     left ear    Acquired deafness of left ear     Atrial fibrillation     Bell's palsy     with fatigue and stress    COPD (chronic obstructive pulmonary disease)     History of tobacco abuse     Hyperlipidemia     Hypertension     Multinodular goiter (nontoxic) 5/8/2017    TRISHA (obstructive sleep apnea)     Proteinuria      Past Surgical History:   Procedure Laterality Date    ADENOIDECTOMY      APPENDECTOMY      COLONOSCOPY N/A 11/14/2016    [facility] by [facility]    EYE SURGERY      cataract OU    NEUROMA SURGERY Left     acoustic    TONSILLECTOMY       Family History   Problem Relation Age of Onset    Cancer Paternal Uncle         colon cancer    Diabetes Neg Hx     Kidney disease Neg Hx     Stroke Neg Hx     Heart disease Neg Hx      Social History     Socioeconomic History    Marital status:      Spouse name: Not on file    Number of children: Not on file    Years of education: Not on file    Highest education level: Not on file   Occupational History    Not on file   Social Needs    Financial resource strain: Not on file    Food insecurity:     Worry: Not on file     Inability: Not on file    Transportation needs:     Medical: Not on file     Non-medical: Not on file   Tobacco Use    Smoking status: Former Smoker     Packs/day: 1.00     Years: 40.00     Pack years: 40.00     Last attempt to quit: 3/6/2016     Years since quitting: 3.5    Smokeless tobacco: Never Used   Substance and Sexual Activity    Alcohol use:  "Yes     Alcohol/week: 0.0 oz     Comment: occ social    Drug use: No    Sexual activity: Not on file   Lifestyle    Physical activity:     Days per week: Not on file     Minutes per session: Not on file    Stress: Not on file   Relationships    Social connections:     Talks on phone: Not on file     Gets together: Not on file     Attends Mandaen service: Not on file     Active member of club or organization: Not on file     Attends meetings of clubs or organizations: Not on file     Relationship status: Not on file   Other Topics Concern    Not on file   Social History Narrative    Works in insurance collection.       Review of patient's allergies indicates:   Allergen Reactions    Contrast media Anaphylaxis    Albuterol Other (See Comments)     Used during PFTs and put pt in afib    Pcn [penicillins]      Pt states did well on cephalexin.  No adverse reaction or side effect.     Doxycycline Palpitations       Current Outpatient Medications:     cholecalciferol, vitamin D3, (VITAMIN D3) 2,000 unit Cap, Take 1 capsule by mouth once daily., Disp: , Rfl:     ciprofloxacin-dexamethasone 0.3-0.1% (CIPRODEX) 0.3-0.1 % DrpS, Place 4 drops into the right ear 2 (two) times daily., Disp: 7.5 mL, Rfl: 5    dabigatran etexilate (PRADAXA) 150 mg Cap, TAKE 1 CAPSULE(150 MG) BY MOUTH TWICE DAILY. DO NOT BREAK, CHEW, OR OPEN CAPSULES.", Disp: 180 capsule, Rfl: 1    fluticasone (FLONASE ALLERGY RELIEF) 50 mcg/actuation nasal spray, 1 spray by Nasal route 2 (two) times daily as needed., Disp: , Rfl:     hydroCHLOROthiazide (HYDRODIURIL) 12.5 MG Tab, TAKE 1 TABLET(12.5 MG) BY MOUTH EVERY DAY, Disp: 90 tablet, Rfl: 1    losartan (COZAAR) 100 MG tablet, Take 1 tablet (100 mg total) by mouth once daily., Disp: 90 tablet, Rfl: 1    metoprolol succinate (TOPROL-XL) 50 MG 24 hr tablet, Take 50 mg by mouth once daily., Disp: , Rfl:     metoprolol succinate (TOPROL-XL) 50 MG 24 hr tablet, Take 1 tablet (50 mg total) by " mouth once daily., Disp: 90 tablet, Rfl: 1    metoprolol tartrate (LOPRESSOR) 25 MG tablet, Take 25 mg by mouth daily as needed., Disp: , Rfl:     oxymetazoline (AFRIN) 0.05 % nasal spray, 2 sprays by Nasal route once a week., Disp: , Rfl:     simvastatin (ZOCOR) 40 MG tablet, TAKE 1 TABLET(40 MG) BY MOUTH EVERY EVENING, Disp: 90 tablet, Rfl: 0    vit C/E/Zn/coppr/lutein/zeaxan (OCUVITE LUTEIN AND ZEAXANTHIN ORAL), Take 1 capsule by mouth once daily., Disp: , Rfl:   No current facility-administered medications for this visit.     Facility-Administered Medications Ordered in Other Visits:     EUFLEXXA 10 mg/mL(mw 2.4 -3.6 million) injection Syrg 20 mg, 20 mg, Intra-articular, , Jose Rafael Barney MD, 20 mg at 06/14/17 1605    Review of Systems   Constitution: Negative for chills, diaphoresis, fever, malaise/fatigue (SOME) and night sweats. Weight loss: DIET.   HENT: Positive for hearing loss. Negative for congestion and nosebleeds.    Eyes: Negative for blurred vision and visual disturbance.   Cardiovascular: Negative for chest pain (OCC SHARP L SIDED), claudication, cyanosis, dyspnea on exertion (MILD), irregular heartbeat (OCC), leg swelling (SOME), near-syncope, orthopnea, palpitations (OCC), paroxysmal nocturnal dyspnea and syncope.   Respiratory: Negative for cough, hemoptysis, shortness of breath and wheezing.    Endocrine: Negative for cold intolerance, heat intolerance and polyuria.   Hematologic/Lymphatic: Negative for adenopathy and bleeding problem. Does not bruise/bleed easily.   Skin: Negative for color change, itching and rash.   Musculoskeletal: Negative for back pain, falls and joint pain (KNEES).   Gastrointestinal: Negative for abdominal pain, change in bowel habit, heartburn, hematemesis, jaundice, melena and vomiting.   Genitourinary: Negative for dysuria, flank pain and frequency.   Neurological: Negative for brief paralysis, dizziness, focal weakness, light-headedness, numbness  "(HANDS,NIGHT) and weakness.   Psychiatric/Behavioral: Negative for altered mental status and memory loss.   Allergic/Immunologic: Negative for hives and persistent infections.        Objective:      Vitals:    09/09/19 0831   BP: 104/66   Pulse: 62   Weight: 113.9 kg (251 lb 1.7 oz)   Height: 5' 6" (1.676 m)   PainSc: 0-No pain     Body mass index is 40.53 kg/m².    Physical Exam   Constitutional: She is oriented to person, place, and time. She appears well-developed and well-nourished. She is active.   HENT:   Head: Normocephalic and atraumatic.   Mouth/Throat: Oropharynx is clear and moist and mucous membranes are normal.   Eyes: Pupils are equal, round, and reactive to light. Conjunctivae and EOM are normal.   Neck: Trachea normal and normal range of motion. Neck supple. Normal carotid pulses, no hepatojugular reflux and no JVD present. Carotid bruit is not present. No edema and no erythema present. No thyromegaly present.   Cardiovascular: Normal rate, regular rhythm and normal heart sounds.  No extrasystoles are present. PMI is not displaced. Exam reveals no gallop and no friction rub.   No murmur heard.  Pulses:       Carotid pulses are 2+ on the right side, and 2+ on the left side.       Radial pulses are 2+ on the right side, and 2+ on the left side.        Femoral pulses are 2+ on the right side, and 2+ on the left side.       Dorsalis pedis pulses are 2+ on the right side, and 2+ on the left side.        Posterior tibial pulses are 2+ on the right side, and 2+ on the left side.   Pulmonary/Chest: Effort normal and breath sounds normal. No tachypnea and no bradypnea. She has no wheezes. She has no rales. She exhibits no tenderness.   Abdominal: Soft. Bowel sounds are normal. She exhibits no distension and no mass. There is no hepatosplenomegaly. There is no guarding and no CVA tenderness.   Musculoskeletal: Normal range of motion. She exhibits no edema or tenderness.   Lymphadenopathy:     She has no " cervical adenopathy.   Neurological: She is alert and oriented to person, place, and time. She has normal strength. She displays no tremor. No cranial nerve deficit (HEARING AID).   Skin: Skin is warm and dry. No cyanosis or erythema. No pallor.   Psychiatric: She has a normal mood and affect. Her speech is normal and behavior is normal.               ..    Chemistry        Component Value Date/Time     09/06/2019 0825    K 4.4 09/06/2019 0825     09/06/2019 0825    CO2 27 09/06/2019 0825    BUN 23 09/06/2019 0825    CREATININE 1.3 09/06/2019 0825     (H) 09/06/2019 0825        Component Value Date/Time    CALCIUM 10.2 09/06/2019 0825    ALKPHOS 62 09/06/2019 0825    AST 14 09/06/2019 0825    ALT 15 09/06/2019 0825    BILITOT 0.7 09/06/2019 0825    ESTGFRAFRICA 48.0 (A) 09/06/2019 0825    EGFRNONAA 41.7 (A) 09/06/2019 0825            ..  Lab Results   Component Value Date    CHOL 156 09/06/2018    CHOL 164 06/06/2017    CHOL 174 10/06/2016     Lab Results   Component Value Date    HDL 41 09/06/2018    HDL 45 06/06/2017    HDL 49 10/06/2016     Lab Results   Component Value Date    LDLCALC 72.0 09/06/2018    LDLCALC 81.8 06/06/2017    LDLCALC 79.6 10/06/2016     Lab Results   Component Value Date    TRIG 215 (H) 09/06/2018    TRIG 186 (H) 06/06/2017    TRIG 227 (H) 10/06/2016     Lab Results   Component Value Date    CHOLHDL 26.3 09/06/2018    CHOLHDL 27.4 06/06/2017    CHOLHDL 28.2 10/06/2016     ..  Lab Results   Component Value Date    WBC 8.23 10/25/2017    HGB 13.0 09/06/2019    HCT 39.7 10/25/2017    MCV 83 10/25/2017     10/25/2017       Test(s) Reviewed  I have reviewed the following in detail:  [] Stress test   [] Angiography   [] Echocardiogram   [x] Labs   [] Other:       Assessment:         ICD-10-CM ICD-9-CM   1. Atypical chest pain R07.89 786.59   2. Essential hypertension I10 401.9   3. PAF (paroxysmal atrial fibrillation) I48.0 427.31   4. Current use of long term  anticoagulation Z79.01 V58.61   5. Obesity, Class III, BMI 40-49.9 (morbid obesity) E66.01 278.01   6. Obstructive sleep apnea G47.33 327.23   7. Hypercholesterolemia E78.00 272.0     Problem List Items Addressed This Visit        Cardiac/Vascular    PAF (paroxysmal atrial fibrillation)    Relevant Orders    Stress Echo    Comprehensive metabolic panel    Essential hypertension    Relevant Orders    Stress Echo    Comprehensive metabolic panel    Hypercholesterolemia    Relevant Orders    Comprehensive metabolic panel    Lipid panel       Hematology    Current use of long term anticoagulation    Relevant Orders    Stress Echo    Comprehensive metabolic panel       Endocrine    Obesity, Class III, BMI 40-49.9 (morbid obesity)    Relevant Orders    Stress Echo       Other    Obstructive sleep apnea    Relevant Orders    Stress Echo    Comprehensive metabolic panel      Other Visit Diagnoses     Atypical chest pain    -  Primary    Relevant Orders    Stress Echo    Comprehensive metabolic panel           Plan:         WILL NEED FURTHER EVALUATION, CHECK DOBUTAMINE STRESS ECHO/ DSE,ALL OTHER CV CLINICALLY STABLE, NO DEFINITE ANGINA, NO HF, NO TIA, NO CLINICAL ARRHYTHMIA,CONTINUE CURRENT MEDS, EDUCATION, DIET, EXERCISE WEIGHT LOSS, RETURN TO CLINIC IN 6 MO WITH LABS, EXPLAINED PLAN TO THE PATIENT AND HER   Atypical chest pain  -     Stress Echo; Future  -     Comprehensive metabolic panel; Future; Expected date: 03/09/2020    Essential hypertension  -     Stress Echo; Future  -     Comprehensive metabolic panel; Future; Expected date: 03/09/2020    PAF (paroxysmal atrial fibrillation)  -     Stress Echo; Future  -     Comprehensive metabolic panel; Future; Expected date: 03/09/2020    Current use of long term anticoagulation  -     Stress Echo; Future  -     Comprehensive metabolic panel; Future; Expected date: 03/09/2020    Obesity, Class III, BMI 40-49.9 (morbid obesity)  -     Stress Echo; Future    Obstructive  "sleep apnea  -     Stress Echo; Future  -     Comprehensive metabolic panel; Future; Expected date: 03/09/2020    Hypercholesterolemia  -     Comprehensive metabolic panel; Future; Expected date: 03/09/2020  -     Lipid panel; Future; Expected date: 03/09/2020    Other orders  -     dabigatran etexilate (PRADAXA) 150 mg Cap; TAKE 1 CAPSULE(150 MG) BY MOUTH TWICE DAILY. DO NOT BREAK, CHEW, OR OPEN CAPSULES."  Dispense: 180 capsule; Refill: 1    RTC Low level/low impact aerobic exercise 5x's/wk. Heart healthy diet and risk factor modification.    See labs and med orders.    Aerobic exercise 5x's/wk. Heart healthy diet and risk factor modification.    See labs and med orders.        "

## 2019-09-14 ENCOUNTER — PATIENT MESSAGE (OUTPATIENT)
Dept: CARDIOLOGY | Facility: CLINIC | Age: 69
End: 2019-09-14

## 2019-09-16 ENCOUNTER — PATIENT MESSAGE (OUTPATIENT)
Dept: CARDIOLOGY | Facility: CLINIC | Age: 69
End: 2019-09-16

## 2019-09-17 ENCOUNTER — PATIENT MESSAGE (OUTPATIENT)
Dept: CARDIOLOGY | Facility: CLINIC | Age: 69
End: 2019-09-17

## 2019-09-20 ENCOUNTER — PATIENT MESSAGE (OUTPATIENT)
Dept: CARDIOLOGY | Facility: CLINIC | Age: 69
End: 2019-09-20

## 2019-09-24 ENCOUNTER — CLINICAL SUPPORT (OUTPATIENT)
Dept: CARDIOLOGY | Facility: CLINIC | Age: 69
End: 2019-09-24
Attending: INTERNAL MEDICINE
Payer: MEDICARE

## 2019-09-24 DIAGNOSIS — I48.0 PAF (PAROXYSMAL ATRIAL FIBRILLATION): ICD-10-CM

## 2019-09-24 DIAGNOSIS — E66.01 OBESITY, CLASS III, BMI 40-49.9 (MORBID OBESITY): ICD-10-CM

## 2019-09-24 DIAGNOSIS — G47.33 OBSTRUCTIVE SLEEP APNEA: ICD-10-CM

## 2019-09-24 DIAGNOSIS — I10 ESSENTIAL HYPERTENSION: ICD-10-CM

## 2019-09-24 DIAGNOSIS — R07.89 ATYPICAL CHEST PAIN: ICD-10-CM

## 2019-09-24 DIAGNOSIS — Z79.01 CURRENT USE OF LONG TERM ANTICOAGULATION: ICD-10-CM

## 2019-10-01 ENCOUNTER — CLINICAL SUPPORT (OUTPATIENT)
Dept: CARDIOLOGY | Facility: CLINIC | Age: 69
End: 2019-10-01
Attending: INTERNAL MEDICINE
Payer: MEDICARE

## 2019-10-01 DIAGNOSIS — I48.92 ATRIAL FLUTTER WITH RAPID VENTRICULAR RESPONSE: ICD-10-CM

## 2019-10-01 DIAGNOSIS — I10 ESSENTIAL HYPERTENSION: Primary | ICD-10-CM

## 2019-10-01 DIAGNOSIS — I13.0 BENIGN HYPERTENSIVE HEART AND KIDNEY DISEASE WITH CHF, NYHA CLASS 1 AND CKD STAGE 3: ICD-10-CM

## 2019-10-01 DIAGNOSIS — R94.31 ABNORMAL ELECTROCARDIOGRAM (ECG) (EKG): ICD-10-CM

## 2019-10-01 DIAGNOSIS — N18.30 BENIGN HYPERTENSIVE HEART AND KIDNEY DISEASE WITH CHF, NYHA CLASS 1 AND CKD STAGE 3: ICD-10-CM

## 2019-10-03 ENCOUNTER — PATIENT MESSAGE (OUTPATIENT)
Dept: CARDIOLOGY | Facility: CLINIC | Age: 69
End: 2019-10-03

## 2019-10-09 ENCOUNTER — HOSPITAL ENCOUNTER (OUTPATIENT)
Dept: RADIOLOGY | Facility: HOSPITAL | Age: 69
Discharge: HOME | End: 2019-10-09
Attending: INTERNAL MEDICINE
Payer: MEDICARE

## 2019-10-09 DIAGNOSIS — N18.30 BENIGN HYPERTENSIVE HEART AND KIDNEY DISEASE WITH CHF, NYHA CLASS 1 AND CKD STAGE 3: ICD-10-CM

## 2019-10-09 DIAGNOSIS — I48.92 ATRIAL FLUTTER WITH RAPID VENTRICULAR RESPONSE: ICD-10-CM

## 2019-10-09 DIAGNOSIS — I13.0 BENIGN HYPERTENSIVE HEART AND KIDNEY DISEASE WITH CHF, NYHA CLASS 1 AND CKD STAGE 3: ICD-10-CM

## 2019-10-09 DIAGNOSIS — R94.31 ABNORMAL ELECTROCARDIOGRAM (ECG) (EKG): ICD-10-CM

## 2019-10-09 DIAGNOSIS — I10 ESSENTIAL HYPERTENSION: ICD-10-CM

## 2019-10-09 PROCEDURE — 93015 STRESS TEST WITH MYOCARDIAL PERFUSION (CUPID ONLY): ICD-10-PCS | Mod: ,,, | Performed by: INTERNAL MEDICINE

## 2019-10-09 PROCEDURE — 78452 HT MUSCLE IMAGE SPECT MULT: CPT | Mod: 26,,, | Performed by: INTERNAL MEDICINE

## 2019-10-09 PROCEDURE — 93015 CV STRESS TEST SUPVJ I&R: CPT | Mod: ,,, | Performed by: INTERNAL MEDICINE

## 2019-10-09 PROCEDURE — 78452 STRESS TEST WITH MYOCARDIAL PERFUSION (CUPID ONLY): ICD-10-PCS | Mod: 26,,, | Performed by: INTERNAL MEDICINE

## 2019-10-10 ENCOUNTER — PATIENT MESSAGE (OUTPATIENT)
Dept: FAMILY MEDICINE | Facility: CLINIC | Age: 69
End: 2019-10-10

## 2019-10-10 DIAGNOSIS — R73.03 PREDIABETES: ICD-10-CM

## 2019-10-10 DIAGNOSIS — Z79.899 HIGH RISK MEDICATIONS (NOT ANTICOAGULANTS) LONG-TERM USE: ICD-10-CM

## 2019-10-10 DIAGNOSIS — I10 ESSENTIAL HYPERTENSION: Primary | ICD-10-CM

## 2019-10-10 LAB
CV STRESS BASE HR: 110 BPM
DIASTOLIC BLOOD PRESSURE: 86 MMHG
NUC REST EJECTION FRACTION: 50
NUC STRESS EJECTION FRACTION: 49 %
OHS CV CPX 1 MINUTE RECOVERY HEART RATE: 142 BPM
OHS CV CPX 85 PERCENT MAX PREDICTED HEART RATE MALE: 123
OHS CV CPX MAX PREDICTED HEART RATE: 144
OHS CV CPX PATIENT IS FEMALE: 1
OHS CV CPX PATIENT IS MALE: 0
OHS CV CPX PEAK DIASTOLIC BLOOD PRESSURE: 98 MMHG
OHS CV CPX PEAK HEAR RATE: 164 BPM
OHS CV CPX PEAK RATE PRESSURE PRODUCT: NORMAL
OHS CV CPX PEAK SYSTOLIC BLOOD PRESSURE: 166 MMHG
OHS CV CPX PERCENT MAX PREDICTED HEART RATE ACHIEVED: 114
OHS CV CPX RATE PRESSURE PRODUCT PRESENTING: NORMAL
STRESS ECHO TARGET HR: 127.5 BPM
SYSTOLIC BLOOD PRESSURE: 151 MMHG

## 2019-10-11 ENCOUNTER — PATIENT MESSAGE (OUTPATIENT)
Dept: FAMILY MEDICINE | Facility: CLINIC | Age: 69
End: 2019-10-11

## 2019-10-11 ENCOUNTER — PATIENT MESSAGE (OUTPATIENT)
Dept: CARDIOLOGY | Facility: CLINIC | Age: 69
End: 2019-10-11

## 2019-10-15 ENCOUNTER — LAB VISIT (OUTPATIENT)
Dept: LAB | Facility: HOSPITAL | Age: 69
End: 2019-10-15
Attending: FAMILY MEDICINE
Payer: MEDICARE

## 2019-10-15 DIAGNOSIS — I10 ESSENTIAL HYPERTENSION: ICD-10-CM

## 2019-10-15 DIAGNOSIS — Z79.899 HIGH RISK MEDICATIONS (NOT ANTICOAGULANTS) LONG-TERM USE: ICD-10-CM

## 2019-10-15 DIAGNOSIS — R73.03 PREDIABETES: ICD-10-CM

## 2019-10-15 LAB
ANION GAP SERPL CALC-SCNC: 9 MMOL/L (ref 8–16)
BUN SERPL-MCNC: 14 MG/DL (ref 8–23)
CALCIUM SERPL-MCNC: 10 MG/DL (ref 8.7–10.5)
CHLORIDE SERPL-SCNC: 105 MMOL/L (ref 95–110)
CHOLEST SERPL-MCNC: 139 MG/DL (ref 120–199)
CHOLEST/HDLC SERPL: 3.6 {RATIO} (ref 2–5)
CO2 SERPL-SCNC: 28 MMOL/L (ref 23–29)
CREAT SERPL-MCNC: 1.2 MG/DL (ref 0.5–1.4)
EST. GFR  (AFRICAN AMERICAN): 52.9 ML/MIN/1.73 M^2
EST. GFR  (NON AFRICAN AMERICAN): 45.9 ML/MIN/1.73 M^2
ESTIMATED AVG GLUCOSE: 128 MG/DL (ref 68–131)
GLUCOSE SERPL-MCNC: 138 MG/DL (ref 70–110)
HBA1C MFR BLD HPLC: 6.1 % (ref 4–5.6)
HDLC SERPL-MCNC: 39 MG/DL (ref 40–75)
HDLC SERPL: 28.1 % (ref 20–50)
LDLC SERPL CALC-MCNC: 63 MG/DL (ref 63–159)
NONHDLC SERPL-MCNC: 100 MG/DL
POTASSIUM SERPL-SCNC: 3.9 MMOL/L (ref 3.5–5.1)
SODIUM SERPL-SCNC: 142 MMOL/L (ref 136–145)
TRIGL SERPL-MCNC: 185 MG/DL (ref 30–150)
TSH SERPL DL<=0.005 MIU/L-ACNC: 1.43 UIU/ML (ref 0.4–4)

## 2019-10-15 PROCEDURE — 84443 ASSAY THYROID STIM HORMONE: CPT

## 2019-10-15 PROCEDURE — 80061 LIPID PANEL: CPT

## 2019-10-15 PROCEDURE — 83036 HEMOGLOBIN GLYCOSYLATED A1C: CPT

## 2019-10-15 PROCEDURE — 80048 BASIC METABOLIC PNL TOTAL CA: CPT

## 2019-10-15 PROCEDURE — 36415 COLL VENOUS BLD VENIPUNCTURE: CPT | Mod: PO

## 2019-10-21 ENCOUNTER — OFFICE VISIT (OUTPATIENT)
Dept: FAMILY MEDICINE | Facility: CLINIC | Age: 69
End: 2019-10-21
Payer: MEDICARE

## 2019-10-21 VITALS
TEMPERATURE: 98 F | RESPIRATION RATE: 16 BRPM | HEART RATE: 78 BPM | HEIGHT: 66 IN | BODY MASS INDEX: 39.97 KG/M2 | SYSTOLIC BLOOD PRESSURE: 132 MMHG | DIASTOLIC BLOOD PRESSURE: 80 MMHG | WEIGHT: 248.69 LBS

## 2019-10-21 DIAGNOSIS — Z87.891 PERSONAL HISTORY OF NICOTINE DEPENDENCE: ICD-10-CM

## 2019-10-21 DIAGNOSIS — Z23 IMMUNIZATION DUE: ICD-10-CM

## 2019-10-21 DIAGNOSIS — E11.22 TYPE 2 DIABETES MELLITUS WITH CHRONIC KIDNEY DISEASE, WITHOUT LONG-TERM CURRENT USE OF INSULIN, UNSPECIFIED CKD STAGE: ICD-10-CM

## 2019-10-21 DIAGNOSIS — Z12.9 SCREENING FOR CANCER: ICD-10-CM

## 2019-10-21 DIAGNOSIS — J44.9 CHRONIC OBSTRUCTIVE PULMONARY DISEASE, UNSPECIFIED COPD TYPE: ICD-10-CM

## 2019-10-21 DIAGNOSIS — Z00.00 ROUTINE GENERAL MEDICAL EXAMINATION AT A HEALTH CARE FACILITY: Primary | ICD-10-CM

## 2019-10-21 PROCEDURE — 90662 FLU VACCINE - HIGH DOSE (65+) PRESERVATIVE FREE IM: ICD-10-PCS | Mod: S$GLB,,, | Performed by: FAMILY MEDICINE

## 2019-10-21 PROCEDURE — 3075F PR MOST RECENT SYSTOLIC BLOOD PRESS GE 130-139MM HG: ICD-10-PCS | Mod: CPTII,S$GLB,, | Performed by: FAMILY MEDICINE

## 2019-10-21 PROCEDURE — 3044F PR MOST RECENT HEMOGLOBIN A1C LEVEL <7.0%: ICD-10-PCS | Mod: CPTII,S$GLB,, | Performed by: FAMILY MEDICINE

## 2019-10-21 PROCEDURE — 99499 UNLISTED E&M SERVICE: CPT | Mod: S$GLB,,, | Performed by: FAMILY MEDICINE

## 2019-10-21 PROCEDURE — 3044F HG A1C LEVEL LT 7.0%: CPT | Mod: CPTII,S$GLB,, | Performed by: FAMILY MEDICINE

## 2019-10-21 PROCEDURE — G0008 FLU VACCINE - HIGH DOSE (65+) PRESERVATIVE FREE IM: ICD-10-PCS | Mod: S$GLB,,, | Performed by: FAMILY MEDICINE

## 2019-10-21 PROCEDURE — 99214 PR OFFICE/OUTPT VISIT, EST, LEVL IV, 30-39 MIN: ICD-10-PCS | Mod: 25,S$GLB,, | Performed by: FAMILY MEDICINE

## 2019-10-21 PROCEDURE — G0008 ADMIN INFLUENZA VIRUS VAC: HCPCS | Mod: S$GLB,,, | Performed by: FAMILY MEDICINE

## 2019-10-21 PROCEDURE — 3075F SYST BP GE 130 - 139MM HG: CPT | Mod: CPTII,S$GLB,, | Performed by: FAMILY MEDICINE

## 2019-10-21 PROCEDURE — 3079F PR MOST RECENT DIASTOLIC BLOOD PRESSURE 80-89 MM HG: ICD-10-PCS | Mod: CPTII,S$GLB,, | Performed by: FAMILY MEDICINE

## 2019-10-21 PROCEDURE — 99499 RISK ADDL DX/OHS AUDIT: ICD-10-PCS | Mod: S$GLB,,, | Performed by: FAMILY MEDICINE

## 2019-10-21 PROCEDURE — 99999 PR PBB SHADOW E&M-EST. PATIENT-LVL IV: CPT | Mod: PBBFAC,,, | Performed by: FAMILY MEDICINE

## 2019-10-21 PROCEDURE — 3079F DIAST BP 80-89 MM HG: CPT | Mod: CPTII,S$GLB,, | Performed by: FAMILY MEDICINE

## 2019-10-21 PROCEDURE — 99214 OFFICE O/P EST MOD 30 MIN: CPT | Mod: 25,S$GLB,, | Performed by: FAMILY MEDICINE

## 2019-10-21 PROCEDURE — 99999 PR PBB SHADOW E&M-EST. PATIENT-LVL IV: ICD-10-PCS | Mod: PBBFAC,,, | Performed by: FAMILY MEDICINE

## 2019-10-21 PROCEDURE — 90662 IIV NO PRSV INCREASED AG IM: CPT | Mod: S$GLB,,, | Performed by: FAMILY MEDICINE

## 2019-10-21 RX ORDER — FLUOCINOLONE ACETONIDE 0.1 MG/G
CREAM TOPICAL
COMMUNITY
End: 2020-09-21

## 2019-10-23 PROBLEM — E11.22 TYPE 2 DIABETES MELLITUS WITH CHRONIC KIDNEY DISEASE, WITHOUT LONG-TERM CURRENT USE OF INSULIN: Status: ACTIVE | Noted: 2019-10-23

## 2019-10-23 NOTE — PROGRESS NOTES
"Subjective:       Patient ID: Allyson Henriquez is a 69 y.o. female.    Chief Complaint: Annual Exam      Allyson Henriquez is in the office for annual exam and review.    HPI  Medical hx reviewed.   She recently underwent cardiac screening with cards/amkieh - all reassuring. She is maintained on pradaxa for aflutter.   Past Medical History:   Diagnosis Date    Acoustic neuroma     left ear    Acquired deafness of left ear     Atrial fibrillation     Bell's palsy     with fatigue and stress    COPD (chronic obstructive pulmonary disease)     History of tobacco abuse     Hyperlipidemia     Hypertension     Multinodular goiter (nontoxic) 5/8/2017    TRISHA (obstructive sleep apnea)     Proteinuria      Feeling relatively well. Energy levels can dip after too much activity. Aware she needs to start exercising more regularly.     Current Outpatient Medications:     ciprofloxacin-dexamethasone 0.3-0.1% (CIPRODEX) 0.3-0.1 % DrpS, Place 4 drops into the right ear 2 (two) times daily., Disp: 7.5 mL, Rfl: 5    dabigatran etexilate (PRADAXA) 150 mg Cap, TAKE 1 CAPSULE(150 MG) BY MOUTH TWICE DAILY. DO NOT BREAK, CHEW, OR OPEN CAPSULES.", Disp: 180 capsule, Rfl: 1    fluocinolone (VANOS) 0.01 % cream, fluocinolone 0.01 % topical solution, Disp: , Rfl:     fluticasone (FLONASE ALLERGY RELIEF) 50 mcg/actuation nasal spray, 1 spray by Nasal route 2 (two) times daily as needed., Disp: , Rfl:     hydroCHLOROthiazide (HYDRODIURIL) 12.5 MG Tab, TAKE 1 TABLET(12.5 MG) BY MOUTH EVERY DAY, Disp: 90 tablet, Rfl: 1    losartan (COZAAR) 100 MG tablet, Take 1 tablet (100 mg total) by mouth once daily., Disp: 90 tablet, Rfl: 1    metoprolol succinate (TOPROL-XL) 50 MG 24 hr tablet, Take 1 tablet (50 mg total) by mouth once daily., Disp: 90 tablet, Rfl: 1    metoprolol tartrate (LOPRESSOR) 25 MG tablet, Take 25 mg by mouth daily as needed., Disp: , Rfl:     oxymetazoline (AFRIN) 0.05 % nasal spray, 2 sprays by Nasal route once " a week., Disp: , Rfl:     simvastatin (ZOCOR) 40 MG tablet, TAKE 1 TABLET(40 MG) BY MOUTH EVERY EVENING, Disp: 90 tablet, Rfl: 0    vit C/E/Zn/coppr/lutein/zeaxan (OCUVITE LUTEIN AND ZEAXANTHIN ORAL), Take 1 capsule by mouth once daily., Disp: , Rfl:   No current facility-administered medications for this visit.     Facility-Administered Medications Ordered in Other Visits:     EUFLEXXA 10 mg/mL(mw 2.4 -3.6 million) injection Syrg 20 mg, 20 mg, Intra-articular, , Jose Rafael Barney MD, 20 mg at 06/14/17 1605    The 10-year ASCVD risk score (Tevin GEORGIA Jr., et al., 2013) is: 12.8%    Values used to calculate the score:      Age: 69 years      Sex: Female      Is Non- : No      Diabetic: No      Tobacco smoker: No      Systolic Blood Pressure: 132 mmHg      Is BP treated: Yes      HDL Cholesterol: 39 mg/dL      Total Cholesterol: 139 mg/dL   On asa/statin.     Lab Results   Component Value Date    HGBA1C 6.1 (H) 10/15/2019    HGBA1C 6.3 (H) 09/06/2018    HGBA1C 6.3 (H) 10/25/2017    HGBA1C 6.3 (H) 10/25/2017     Lab Results   Component Value Date    LDLCALC 63.0 10/15/2019    CREATININE 1.2 10/15/2019   labs 2019 rev.    Review of Systems   Constitutional: Negative for activity change, fatigue (pm fatigue) and unexpected weight change.   HENT: Negative for congestion, hearing loss, rhinorrhea and sore throat.    Eyes: Negative for discharge and visual disturbance.   Respiratory: Negative for cough, shortness of breath and wheezing.    Cardiovascular: Positive for palpitations (occ). Negative for chest pain and leg swelling.   Gastrointestinal: Negative for blood in stool, constipation, diarrhea and nausea.        No gerd c/o.    Endocrine: Negative for polydipsia and polyuria.   Genitourinary: Negative for difficulty urinating and dysuria.   Musculoskeletal: Negative for arthralgias, joint swelling and neck pain.   Neurological: Negative for dizziness and headaches.    Psychiatric/Behavioral: Positive for sleep disturbance (issues with her cpap). Negative for confusion and dysphoric mood.           Objective:      Physical Exam   Constitutional: She is oriented to person, place, and time. She appears well-developed and well-nourished. No distress.   HENT:   Head: Normocephalic and atraumatic.   Right Ear: External ear normal.   Left Ear: External ear normal.   Nose: Nose normal.   Mouth/Throat: Oropharynx is clear and moist. No oropharyngeal exudate.   Eyes: Pupils are equal, round, and reactive to light. Conjunctivae and EOM are normal. Right eye exhibits no discharge. Left eye exhibits no discharge. No scleral icterus.   Neck: Normal range of motion. Neck supple. No thyromegaly present.   Cardiovascular: Normal rate and intact distal pulses.   Pulmonary/Chest: Effort normal and breath sounds normal. No respiratory distress. She has no wheezes.   Abdominal: Soft. There is no guarding.   Exam limited by habitus.   Musculoskeletal: Normal range of motion. She exhibits no edema.   Neurological: She is alert and oriented to person, place, and time.   Skin: Skin is warm and dry. No rash noted.   Psychiatric: She has a normal mood and affect. Her behavior is normal.   Nursing note and vitals reviewed.          Screening recommendations appropriate to age and health status were reviewed.    Assessment & Plan:    Routine general medical examination at a health care facility  Anticipatory guidance reviewed.   Encouraged continued tobacco avoidance.  Screening for cancer  -     CT Chest Lung Screening Low Dose; Future; Expected date: 10/21/2019  Personal history of nicotine dependence   -     CT Chest Lung Screening Low Dose; Future; Expected date: 10/21/2019  Reviewed screening recs.  Type 2 diabetes mellitus with chronic kidney disease, without long-term current use of insulin, unspecified CKD stage  Controlled per previous. Cont q6mo monitoring. Encouraged exercise as able.  Chronic  obstructive pulmonary disease, unspecified COPD type  Breathing is stable. Currently does not require any controller meds.   Other orders  -     Influenza - High Dose (65+) (PF) (IM)

## 2019-11-04 ENCOUNTER — PATIENT MESSAGE (OUTPATIENT)
Dept: FAMILY MEDICINE | Facility: CLINIC | Age: 69
End: 2019-11-04

## 2019-11-04 ENCOUNTER — TELEPHONE (OUTPATIENT)
Dept: ORTHOPEDICS | Facility: CLINIC | Age: 69
End: 2019-11-04

## 2019-11-04 DIAGNOSIS — M79.603 PAIN OF UPPER EXTREMITY, UNSPECIFIED LATERALITY: Primary | ICD-10-CM

## 2019-11-04 NOTE — TELEPHONE ENCOUNTER
Dr. Pimentel has ordered a Low Dose Cat Scan as part of a routine screening for lung cancer detection.   Your Pneumovax 23 Vaccine that you are due for is the 2nd dose of the 2 part series. You first had the Prevnar 13 on 10/11/2018 and are now due for the Pneumovax 23 as the Booster, this is available at the pharmacy as well. If you need assistance in scheduling the Cat Scan, please give us a call at [phone].

## 2019-11-05 ENCOUNTER — TELEPHONE (OUTPATIENT)
Dept: FAMILY MEDICINE | Facility: CLINIC | Age: 69
End: 2019-11-05

## 2019-11-05 ENCOUNTER — PATIENT MESSAGE (OUTPATIENT)
Dept: FAMILY MEDICINE | Facility: CLINIC | Age: 69
End: 2019-11-05

## 2019-11-05 DIAGNOSIS — M79.601 RIGHT ARM PAIN: Primary | ICD-10-CM

## 2019-11-05 DIAGNOSIS — N28.1 RENAL CYST: Primary | ICD-10-CM

## 2019-11-06 ENCOUNTER — OFFICE VISIT (OUTPATIENT)
Dept: ORTHOPEDICS | Facility: CLINIC | Age: 69
End: 2019-11-06
Payer: MEDICARE

## 2019-11-06 ENCOUNTER — PATIENT MESSAGE (OUTPATIENT)
Dept: ORTHOPEDICS | Facility: CLINIC | Age: 69
End: 2019-11-06

## 2019-11-06 ENCOUNTER — IMMUNIZATION (OUTPATIENT)
Dept: PHARMACY | Facility: CLINIC | Age: 69
End: 2019-11-06
Payer: MEDICARE

## 2019-11-06 ENCOUNTER — HOSPITAL ENCOUNTER (OUTPATIENT)
Dept: RADIOLOGY | Facility: HOSPITAL | Age: 69
Discharge: HOME | End: 2019-11-06
Attending: ORTHOPAEDIC SURGERY
Payer: MEDICARE

## 2019-11-06 ENCOUNTER — HOSPITAL ENCOUNTER (OUTPATIENT)
Dept: RADIOLOGY | Facility: HOSPITAL | Age: 69
Discharge: HOME | End: 2019-11-06
Attending: FAMILY MEDICINE
Payer: MEDICARE

## 2019-11-06 VITALS — HEIGHT: 66 IN | BODY MASS INDEX: 39.86 KG/M2 | WEIGHT: 248 LBS

## 2019-11-06 DIAGNOSIS — M25.511 CHRONIC RIGHT SHOULDER PAIN: ICD-10-CM

## 2019-11-06 DIAGNOSIS — Z12.9 SCREENING FOR CANCER: ICD-10-CM

## 2019-11-06 DIAGNOSIS — M75.81 ROTATOR CUFF TENDINITIS, RIGHT: Primary | ICD-10-CM

## 2019-11-06 DIAGNOSIS — E11.22 TYPE 2 DIABETES MELLITUS WITH CHRONIC KIDNEY DISEASE, WITHOUT LONG-TERM CURRENT USE OF INSULIN, UNSPECIFIED CKD STAGE: ICD-10-CM

## 2019-11-06 DIAGNOSIS — J44.9 CHRONIC OBSTRUCTIVE PULMONARY DISEASE, UNSPECIFIED COPD TYPE: Primary | ICD-10-CM

## 2019-11-06 DIAGNOSIS — G89.29 CHRONIC RIGHT SHOULDER PAIN: ICD-10-CM

## 2019-11-06 DIAGNOSIS — Z87.891 PERSONAL HISTORY OF NICOTINE DEPENDENCE: ICD-10-CM

## 2019-11-06 DIAGNOSIS — M79.603 PAIN OF UPPER EXTREMITY, UNSPECIFIED LATERALITY: ICD-10-CM

## 2019-11-06 DIAGNOSIS — M79.601 RIGHT ARM PAIN: ICD-10-CM

## 2019-11-06 DIAGNOSIS — E66.01 OBESITY, CLASS III, BMI 40-49.9 (MORBID OBESITY): ICD-10-CM

## 2019-11-06 PROCEDURE — 73060 X-RAY EXAM OF HUMERUS: CPT | Mod: 26,RT,, | Performed by: RADIOLOGY

## 2019-11-06 PROCEDURE — 99213 OFFICE O/P EST LOW 20 MIN: CPT | Mod: S$GLB,,, | Performed by: ORTHOPAEDIC SURGERY

## 2019-11-06 PROCEDURE — 99999 PR PBB SHADOW E&M-EST. PATIENT-LVL III: ICD-10-PCS | Mod: PBBFAC,,, | Performed by: ORTHOPAEDIC SURGERY

## 2019-11-06 PROCEDURE — 73060 XR HUMERUS 2 VIEW RIGHT: ICD-10-PCS | Mod: 26,RT,, | Performed by: RADIOLOGY

## 2019-11-06 PROCEDURE — 3044F HG A1C LEVEL LT 7.0%: CPT | Mod: CPTII,S$GLB,, | Performed by: ORTHOPAEDIC SURGERY

## 2019-11-06 PROCEDURE — 99999 PR PBB SHADOW E&M-EST. PATIENT-LVL III: CPT | Mod: PBBFAC,,, | Performed by: ORTHOPAEDIC SURGERY

## 2019-11-06 PROCEDURE — 73060 X-RAY EXAM OF HUMERUS: CPT | Mod: TC,PO,RT

## 2019-11-06 PROCEDURE — 99213 PR OFFICE/OUTPT VISIT, EST, LEVL III, 20-29 MIN: ICD-10-PCS | Mod: S$GLB,,, | Performed by: ORTHOPAEDIC SURGERY

## 2019-11-06 PROCEDURE — G0297 LDCT FOR LUNG CA SCREEN: HCPCS | Mod: 26,,, | Performed by: RADIOLOGY

## 2019-11-06 PROCEDURE — 1101F PR PT FALLS ASSESS DOC 0-1 FALLS W/OUT INJ PAST YR: ICD-10-PCS | Mod: CPTII,S$GLB,, | Performed by: ORTHOPAEDIC SURGERY

## 2019-11-06 PROCEDURE — 1101F PT FALLS ASSESS-DOCD LE1/YR: CPT | Mod: CPTII,S$GLB,, | Performed by: ORTHOPAEDIC SURGERY

## 2019-11-06 PROCEDURE — G0297 LDCT FOR LUNG CA SCREEN: HCPCS | Mod: TC,PO

## 2019-11-06 PROCEDURE — G0297 CT CHEST LUNG SCREENING LOW DOSE: ICD-10-PCS | Mod: 26,,, | Performed by: RADIOLOGY

## 2019-11-06 PROCEDURE — 3044F PR MOST RECENT HEMOGLOBIN A1C LEVEL <7.0%: ICD-10-PCS | Mod: CPTII,S$GLB,, | Performed by: ORTHOPAEDIC SURGERY

## 2019-11-06 NOTE — TELEPHONE ENCOUNTER
Discussed with patient. Needs orders, I will schedule after 9am Tuesday through Thursday next week preferably

## 2019-11-06 NOTE — LETTER
November 6, 2019      Gilma Pimentel MD  [address]  [facility] [address]           [facility]  [address] [place] [address]  Phone: [phone]          Patient: Allyson Henriquez   MR Number: [id number]   YOB: 1950   Date of Visit: 11/6/2019       Dear Dr. Gilma Pimentel:    Thank you for referring Allyson Henriquez to me for evaluation. Attached you will find relevant portions of my assessment and plan of care.    If you have questions, please do not hesitate to call me. I look forward to following Allyson Henriquez along with you.    Sincerely,    Adams Stephen MD    Enclosure  CC:  No Recipients    If you would like to receive this communication electronically, please contact [email or web address] or [phone] to request more information on [facility] access.    For providers and/or their staff who would like to refer a patient to [facility], please contact us through our one-stop-shop provider referral line, [facility], at [phone].    If you feel you have received this communication in error or would no longer like to receive these types of communications, please e-mail [email or web address]

## 2019-11-06 NOTE — TELEPHONE ENCOUNTER
Lung screening had no concerning lung findings. To repeat in 1 year.   A cyst on the R kidney was noted. This was seen on us in 2016. It May have increased in size. I'd like to u/s it for review and she should plan to f/u with nephrology/boo.

## 2019-11-06 NOTE — PROGRESS NOTES
"Dictation #1  MRN:[id number]  CSN:[id number]  Further History  Aching pain  Worse with activity  Relieved with rest  No other associated symptoms  No other radiation    Further Exam  Alert and oriented  Pleasant  Contralateral limb has appropriate range of motion for age and condition  Contralateral limb has appropriate strength for age and condition  Contralateral limb has appropriate stability  for age and condition  No adenopathy  Pulses are appropriate for current condition  Skin is intact        Chief Complaint    Chief Complaint   Patient presents with    Right Upper Arm - Pain       HPI  Allyson Henriquez is a 69 y.o.  female who presents with       Past Medical History  Past Medical History:   Diagnosis Date    Acoustic neuroma     left ear    Acquired deafness of left ear     Atrial fibrillation     Bell's palsy     with fatigue and stress    COPD (chronic obstructive pulmonary disease)     History of tobacco abuse     Hyperlipidemia     Hypertension     Multinodular goiter (nontoxic) 5/8/2017    TRISHA (obstructive sleep apnea)     Proteinuria        Past Surgical History  Past Surgical History:   Procedure Laterality Date    ADENOIDECTOMY      APPENDECTOMY      COLONOSCOPY N/A 11/14/2016    Procedure: COLONOSCOPY;  Surgeon: Destin Cardona MD;  [facility];  Service: Endoscopy;  Laterality: N/A;    EYE SURGERY      cataract OU    NEUROMA SURGERY Left     acoustic    TONSILLECTOMY         Medications  Current Outpatient Medications   Medication Sig    ciprofloxacin-dexamethasone 0.3-0.1% (CIPRODEX) 0.3-0.1 % DrpS Place 4 drops into the right ear 2 (two) times daily.    dabigatran etexilate (PRADAXA) 150 mg Cap TAKE 1 CAPSULE(150 MG) BY MOUTH TWICE DAILY. DO NOT BREAK, CHEW, OR OPEN CAPSULES."    fluocinolone (VANOS) 0.01 % cream fluocinolone 0.01 % topical solution    fluticasone (FLONASE ALLERGY RELIEF) 50 mcg/actuation nasal spray 1 spray by Nasal route 2 (two) times daily as " needed.    hydroCHLOROthiazide (HYDRODIURIL) 12.5 MG Tab TAKE 1 TABLET(12.5 MG) BY MOUTH EVERY DAY    losartan (COZAAR) 100 MG tablet Take 1 tablet (100 mg total) by mouth once daily.    metoprolol succinate (TOPROL-XL) 50 MG 24 hr tablet Take 1 tablet (50 mg total) by mouth once daily.    metoprolol tartrate (LOPRESSOR) 25 MG tablet Take 25 mg by mouth daily as needed.    oxymetazoline (AFRIN) 0.05 % nasal spray 2 sprays by Nasal route once a week.    simvastatin (ZOCOR) 40 MG tablet TAKE 1 TABLET(40 MG) BY MOUTH EVERY EVENING    vit C/E/Zn/coppr/lutein/zeaxan (OCUVITE LUTEIN AND ZEAXANTHIN ORAL) Take 1 capsule by mouth once daily.     No current facility-administered medications for this visit.      Facility-Administered Medications Ordered in Other Visits   Medication    EUFLEXXA 10 mg/mL(mw 2.4 -3.6 million) injection Syrg 20 mg       Allergies  Review of patient's allergies indicates:   Allergen Reactions    Contrast media Anaphylaxis    Albuterol Other (See Comments)     Used during PFTs and put pt in afib    Pcn [penicillins]      Pt states did well on cephalexin.  No adverse reaction or side effect.     Doxycycline Palpitations       Family History  Family History   Problem Relation Age of Onset    Cancer Paternal Uncle         colon cancer    Diabetes Neg Hx     Kidney disease Neg Hx     Stroke Neg Hx     Heart disease Neg Hx        Social History  Social History     Socioeconomic History    Marital status:      Spouse name: Not on file    Number of children: Not on file    Years of education: Not on file    Highest education level: Not on file   Occupational History    Not on file   Social Needs    Financial resource strain: Not on file    Food insecurity:     Worry: Not on file     Inability: Not on file    Transportation needs:     Medical: Not on file     Non-medical: Not on file   Tobacco Use    Smoking status: Former Smoker     Packs/day: 1.00     Years: 40.00     Pack  years: 40.00     Last attempt to quit: 3/6/2016     Years since quitting: 3.6    Smokeless tobacco: Never Used   Substance and Sexual Activity    Alcohol use: Yes     Alcohol/week: 0.0 standard drinks     Comment: occ social    Drug use: No    Sexual activity: Not on file   Lifestyle    Physical activity:     Days per week: Not on file     Minutes per session: Not on file    Stress: Not at all   Relationships    Social connections:     Talks on phone: Not on file     Gets together: Not on file     Attends Restorationist service: Not on file     Active member of club or organization: Not on file     Attends meetings of clubs or organizations: Not on file     Relationship status: Not on file   Other Topics Concern    Not on file   Social History Narrative    Works in insurance collection.               Review of Systems     Constitutional: Negative    HENT: Negative  Eyes: Negative  Respiratory: Negative  Cardiovascular: Negative  Musculoskeletal: HPI  Skin: Negative  Neurological: Negative  Hematological: Negative  Endocrine: Negative                 Physical Exam    There were no vitals filed for this visit.  Body mass index is 40.03 kg/m².  Physical Examination:     General appearance -  well appearing, and in no distress  Mental status - awake  Neck - supple  Chest -  symmetric air entry  Heart - normal rate   Abdomen - soft      Assessment     1. Chronic obstructive pulmonary disease, unspecified COPD type    2. Pain of upper extremity, unspecified laterality    3. Obesity, Class III, BMI 40-49.9 (morbid obesity)    4. Type 2 diabetes mellitus with chronic kidney disease, without long-term current use of insulin, unspecified CKD stage          Plan

## 2019-11-07 NOTE — PROGRESS NOTES
HISTORY OF PRESENT ILLNESS:  A 69 years old, complaining of pain in the right   shoulder and arm area for about 6 weeks' time.  No trauma.  Aching pain, 8/10 on   the pain scale, worse with overhead activity and reaching behind her back.    PHYSICAL EXAMINATION:  Today shows cervical motion does not reproduce her   symptoms.  Positive Neer, Osorio and impingement sign, weak and painful cuff   strength.    X-rays show degenerative type changes.    ASSESSMENT:  Rotator cuff disease, probable tear in this 69-year-old.    PLAN:  Physical therapy.  We offered her Kenalog injection.  She wants to hold   off on that.  We will check her back in several weeks' time to see how things   are coming along.      MAXIM/DENISE  dd: 11/06/2019 08:38:22 (CST)  td: 11/06/2019 19:28:09 (CST)  Doc ID   #[id number]  Job ID #[id number]    CC:

## 2019-11-11 ENCOUNTER — PATIENT MESSAGE (OUTPATIENT)
Dept: CARDIOLOGY | Facility: CLINIC | Age: 69
End: 2019-11-11

## 2019-11-12 ENCOUNTER — PATIENT MESSAGE (OUTPATIENT)
Dept: FAMILY MEDICINE | Facility: CLINIC | Age: 69
End: 2019-11-12

## 2019-11-12 ENCOUNTER — HOSPITAL ENCOUNTER (OUTPATIENT)
Dept: RADIOLOGY | Facility: HOSPITAL | Age: 69
Discharge: HOME | End: 2019-11-12
Attending: FAMILY MEDICINE
Payer: MEDICARE

## 2019-11-12 ENCOUNTER — PATIENT MESSAGE (OUTPATIENT)
Dept: NEPHROLOGY | Facility: CLINIC | Age: 69
End: 2019-11-12

## 2019-11-12 DIAGNOSIS — N28.1 RENAL CYST: ICD-10-CM

## 2019-11-12 PROCEDURE — 76770 US EXAM ABDO BACK WALL COMP: CPT | Mod: 26,,, | Performed by: RADIOLOGY

## 2019-11-12 PROCEDURE — 76770 US EXAM ABDO BACK WALL COMP: CPT | Mod: TC,PO

## 2019-11-12 PROCEDURE — 76770 US RETROPERITONEAL COMPLETE: ICD-10-PCS | Mod: 26,,, | Performed by: RADIOLOGY

## 2019-11-13 ENCOUNTER — PATIENT MESSAGE (OUTPATIENT)
Dept: CARDIOLOGY | Facility: CLINIC | Age: 69
End: 2019-11-13

## 2019-11-13 NOTE — TELEPHONE ENCOUNTER
Multiple, but relatively small renal cysts noted on US. No alarming findings on the u/s, but a dedicated CT may still be needed given the difference in the reports. Ok to see Elliott in Dec.

## 2019-11-14 ENCOUNTER — CLINICAL SUPPORT (OUTPATIENT)
Dept: REHABILITATION | Facility: HOSPITAL | Age: 69
End: 2019-11-14
Attending: ORTHOPAEDIC SURGERY
Payer: MEDICARE

## 2019-11-14 ENCOUNTER — PATIENT MESSAGE (OUTPATIENT)
Dept: ORTHOPEDICS | Facility: CLINIC | Age: 69
End: 2019-11-14

## 2019-11-14 DIAGNOSIS — R26.89 DECREASED FUNCTIONAL MOBILITY: ICD-10-CM

## 2019-11-14 DIAGNOSIS — M25.60 DECREASED RANGE OF MOTION: ICD-10-CM

## 2019-11-14 DIAGNOSIS — M62.81 MUSCLE WEAKNESS: ICD-10-CM

## 2019-11-14 DIAGNOSIS — R52 PAIN: ICD-10-CM

## 2019-11-14 PROCEDURE — 97161 PT EVAL LOW COMPLEX 20 MIN: CPT | Mod: PN

## 2019-11-14 PROCEDURE — G8979 MOBILITY GOAL STATUS: HCPCS | Mod: CJ,PN

## 2019-11-14 PROCEDURE — 97110 THERAPEUTIC EXERCISES: CPT | Mod: PN

## 2019-11-14 PROCEDURE — G8978 MOBILITY CURRENT STATUS: HCPCS | Mod: CK,PN

## 2019-11-14 NOTE — PLAN OF CARE
"[facility]  Physical Therapy Initial Evaluation    Name: Allyson Henriquez  Clinic Number: [id number]    Therapy Diagnosis:   Encounter Diagnoses   Name Primary?    Muscle weakness     Decreased range of motion     Decreased functional mobility     Pain      Physician: Adams Stephen MD    Physician Orders: PT Eval and Treat   Medical Diagnosis from Referral: Rotator cuff tendinitis, right, Chronic right shoulder pain  Evaluation Date: 11/14/2019  Authorization Period Expiration: 12/10/19  Plan of Care Expiration: 1/10/20  Visit # / Visits authorized: 1/ 6    Time In: 2:00  Time Out: 3:10  Total Billable Time: 60 minutes    Precautions: Standard and Afib    Subjective   Date of onset: 6 weeks ago, saw orthopedist on 11/7/19  History of current condition - Allyson reports: pt states she started having a "twinge" to R arm while swiping on her tablet and it gradually got worse.     Medical History:   Past Medical History:   Diagnosis Date    Acoustic neuroma     left ear    Acquired deafness of left ear     Atrial fibrillation     Bell's palsy     with fatigue and stress    COPD (chronic obstructive pulmonary disease)     History of tobacco abuse     Hyperlipidemia     Hypertension     Multinodular goiter (nontoxic) 5/8/2017    TRISHA (obstructive sleep apnea)     Proteinuria        Surgical History:   Allyson Henriquez  has a past surgical history that includes Adenoidectomy; Appendectomy; Tonsillectomy; Neuroma surgery (Left); Eye surgery; and Colonoscopy (N/A, 11/14/2016).    Medications:   Allyson has a current medication list which includes the following prescription(s): ciprofloxacin-dexamethasone 0.3-0.1%, dabigatran etexilate, fluocinolone, fluticasone propionate, hydrochlorothiazide, losartan, metoprolol succinate, metoprolol tartrate, oxymetazoline, simvastatin, and vit c/e/zn/coppr/lutein/zeaxan, and the following Facility-Administered Medications: " euflexxa.    Allergies:   Review of patient's allergies indicates:   Allergen Reactions    Contrast media Anaphylaxis    Albuterol Other (See Comments)     Used during PFTs and put pt in afib    Pcn [penicillins]      Pt states did well on cephalexin.  No adverse reaction or side effect.     Doxycycline Palpitations        Imaging, x-ray: There is moderate hypertrophic degenerative change of the right acromioclavicular joint.  No fracture, dislocation, or osseous destructive process appreciated radiographically.  There is triceps tendon insertion olecranon enthesophyte formation.  There is enthesophyte formation at the lateral epicondyle of the distal right humerus.  Please correlate for symptoms of lateral epicondylitis.    Prior Therapy: none this year, PT in low back  Social History: pt lives with their spouse in a 2 story house  Occupation: retired  Prior Level of Function: able to bathe, dress and groom hair without pain  Current Level of Function: difficulty and pain with lifting overhead, grooming hair, donning deodarant, donning bras    Pain:  Current 8/10, worst 8/10, best 4/10   Location: R shoulder and upper R arm  Description: dull aching  Aggravating Factors: reaching overhead, donning shirt/jacket/deodarant/bras  Easing Factors: biofreeze     Pt states she wakes up early in the morning to use restroom, but does not wake up with pain to R shoulder    Pts goals: decreased R shoulder pain    Objective     Observation: pt is pleasant and cooperative    Posture: fwd head,  rounded shoulders, kyphosis, R scap more protracted     Active Range of Motion: (deg)  Shoulder Left Right   Flexion 136 80 ant sh pain   Abduction 125 57, pain to ant sh   ER at 0 50 50   ER at 90 68 Unable to perform   IR L1, but unable to get to midline To R side with pain to bicep      Upper Extremity Strength  (R) UE  (L) UE    Shoulder flexion: 3-/5 Shoulder flexion: 4+/5   Shoulder Abduction: 3-/5 Shoulder abduction: 4+/5    Shoulder ER at side 4/5 Shoulder ER 4/5   Shoulder IR at side 4+/5 Shoulder IR 4+/5       Special Tests:  AC Joint Left Right   Empty Can Test - +   Speed's test - +       Joint Mobility: hypomobility inferior GH glide of R shoulder    Palpation: TTP to R supraspinatus, R bicep tendon insertions (long head and short head)    Sensation: grossly intact to light touch    Scapular Control/Dyskinesis:    Normal / Subtle / Obvious  Comments    Left  normal     Right  Obvious scap protracted     CMS Impairment/Limitation/Restriction for FOTO Shoulder Survey  Status Limitation G-Code CMS Severity Modifier  Intake 48% 52% Current Status CK - At least 40 percent but less than 60 percent  Predicted 66% 34% Goal Status+ CJ - At least 20 percent but less than 40 percent      TREATMENT   Treatment Time In: 2:50  Treatment Time Out: 3:10  Total Treatment time separate from Evaluation: 20 minutes    Allyson received therapeutic exercises to develop strength, ROM, flexibility and posture for 10 minutes including:   SL scap depression 10x5 sec   Supine AA sh flex 1# dowel 2x5  Seated scap retraction x10    Also performed SL sh flex x5 and SL sh ER x10 (not included in HEP due to requiring cues to perform correctly and without pain)    Allyson received the following manual therapy techniques:  for 5 minutes, including: inferior GH jt mobs (grd III), cross friction massage to R bicep tendon      Home Exercises and Patient Education Provided    Education provided:   - pt instructed in avoiding painful motions and applying ice pack to decrease inflammation  -role of PT  -anatomy of shoulder girdle    Written Home Exercises Provided: yes.  Exercises were reviewed and Allyson was able to demonstrate them prior to the end of the session.  Allyson demonstrated good  understanding of the education provided.     See EMR under Patient Instructions for exercises provided 11/14/10.    Assessment   Allyson is a 69 y.o. female referred to  outpatient Physical Therapy with a medical diagnosis of Rotator cuff tendinitis, right, chronic R shoulder pain. Pt presents with R shoulder pain, decreased R shoulder ROM, decreased R UE strength, decreased functional mobility    Pt prognosis is Good.   Pt will benefit from skilled outpatient Physical Therapy to address the deficits stated above and in the chart below, provide pt/family education, and to maximize pt's level of independence.     Plan of care discussed with patient: Yes  Pt's spiritual, cultural and educational needs considered and patient is agreeable to the plan of care and goals as stated below:     Anticipated Barriers for therapy: none    Medical Necessity is demonstrated by the following  History  Co-morbidities and personal factors that may impact the plan of care Co-morbidities:   high BMI and HTN    Personal Factors:   lifestyle     high   Examination  Body Structures and Functions, activity limitations and participation restrictions that may impact the plan of care Body Regions:   upper extremities  trunk    Body Systems:    ROM  strength    Participation Restrictions:       Activity limitations:   Learning and applying knowledge  no deficits    General Tasks and Commands  no deficits    Communication  no deficits    Mobility  lifting and carrying objects    Self care  washing oneself (bathing, drying, washing hands)  caring for body parts (brushing teeth, shaving, grooming)  dressing    Domestic Life  shopping  cooking  doing house work (cleaning house, washing dishes, laundry)    Interactions/Relationships  no deficits    Life Areas  no deficits    Community and Social Life  community life         high   Clinical Presentation stable and uncomplicated low   Decision Making/ Complexity Score: low       GOALS: Short Term Goals:  4 weeks  1.Report decreased    R shoulder    pain  <   / =  4  /10 at its worst  to increase tolerance for donning shirt  2. Increased R shoulder AROM flexion to  100-110 deg for increased ease with reaching overhead  3. Increased R shoulder AROM abduction to  deg for increased ease reaching overhead.  4. Increased R UE strength by 1/3 muscle grade in all deficient planes to increase tolerance for ADLs.  5. Pt to tolerate HEP to improve ROM and independence with ADL's  6. Pt able to reach behind back to midline with R UE for increased ease with toileting.    Long Term Goals: 8 weeks  1.Report decreased    R shoulder    pain  <   / =  2  /10  to increase tolerance for ADLs  2. Increased R shoulder AROM flexion to 120 deg for increased ease with reaching overhead  3.  Increased R shoulder AROM abduction to 120 deg for increased ease reaching overhead.  3. Increased R UE strength by 1 muscle grade in all deficient planes to increase tolerance for ADLs.  5. Pt to tolerate HEP to improve ROM and independence with ADL's        Plan   Plan of care Certification: 11/14/2019 to 1/10/20.    Outpatient Physical Therapy 2 times weekly for 8 weeks to include the following interventions: Electrical Stimulation  , Manual Therapy, Moist Heat/ Ice, Patient Education, Self Care, Therapeutic Activites, Therapeutic Exercise, Ultrasound and dry needling.     Geri Vieyra, PT

## 2019-11-18 ENCOUNTER — CLINICAL SUPPORT (OUTPATIENT)
Dept: REHABILITATION | Facility: HOSPITAL | Age: 69
End: 2019-11-18
Attending: ORTHOPAEDIC SURGERY
Payer: MEDICARE

## 2019-11-18 DIAGNOSIS — M25.60 DECREASED RANGE OF MOTION: ICD-10-CM

## 2019-11-18 DIAGNOSIS — R52 PAIN: ICD-10-CM

## 2019-11-18 DIAGNOSIS — R26.89 DECREASED FUNCTIONAL MOBILITY: ICD-10-CM

## 2019-11-18 DIAGNOSIS — M62.81 MUSCLE WEAKNESS: ICD-10-CM

## 2019-11-18 PROCEDURE — 97110 THERAPEUTIC EXERCISES: CPT | Mod: PN

## 2019-11-18 PROCEDURE — 97140 MANUAL THERAPY 1/> REGIONS: CPT | Mod: PN

## 2019-11-18 NOTE — PROGRESS NOTES
Physical Therapy Daily Treatment Note     Name: Allyson Henriquez  Clinic Number: [id number]    Therapy Diagnosis:   Encounter Diagnoses   Name Primary?    Muscle weakness     Decreased range of motion     Decreased functional mobility     Pain      Physician: Adams Stephen MD    Visit Date: 11/18/2019    Physician Orders: PT Eval and Treat   Medical Diagnosis from Referral: Rotator cuff tendinitis, right, Chronic right shoulder pain  Evaluation Date: 11/14/2019  Authorization Period Expiration: 12/10/19  Plan of Care Expiration: 1/10/20  Visit # / Visits authorized: 2/ 6    Time In: 2:00  Time Out: 3:10  Total Billable Time: 60 minutes    Precautions: Standard and Afib      Subjective     Pt reports: pt states she iced her R shoulder all weekend which helped to decrease her pain.  She states she was able to bring her R arm up higher to groom hair, dress, and cruz deodarant.  She was not compliant with home exercise program.  Response to previous treatment: decreased overall pain  Functional change: increased R shoulder ROM for ADLs     Pain: 1/10  Location: right shoulder      Objective     Allyson received therapeutic exercises to develop strength, ROM, flexibility and posture for 40 minutes including:  SL scap depression 10x5 sec   Supine AA sh flex 1# dowel x10, 5 sec hold  Seated scap retraction x10  SL sh flex 2x5  (vc and mc for scap depression and to maintain elbow extension)   SL sh ER x10 (vc and mc to maintain elbow flex and for scap retraction)  R bicep stretch 3x20 sec (vc to go to gentle pull, not pain)  pulleys for flex x2 min  sh IR with YTB x10    Will add:  bicep curls,  ER with YTB    Allyson received the following manual therapy techniques:  for 15 minutes, including: inferior GH jt mobs (grd III), STM to R sub      Home Exercises Provided and Patient Education Provided     Education provided:   - pt educated on importance of compliance with HEP  -need to place a barrier (pillow case or  shirt) between skin and ice pack  Pt gave verbal understanding to all education provided    Written Home Exercises Provided: Patient instructed to cont prior HEP.  Exercises were reviewed and Allyson was able to demonstrate them prior to the end of the session.  Allyson demonstrated good  understanding of the education provided.     See EMR under Patient Instructions for exercises provided 11/14/19.    Assessment     Pt presented with decreased overall R shoulder pain.  She required frequent verbal and manual cues to perform ex with proper form as listed above.  She presented with decreased R GH inferior glides and tightness to R subscapularis, which both improved after manual therapy.  Increased PROM and AAROM for R shoulder compared to initial eval noted.  She reported 0/10 pain to R shoulder post tx.  Pt requires encouragement to perform exercises during session and for HEP to achieve goals.  Allyson is progressing well towards her goals.   Pt prognosis is Good.     Pt will continue to benefit from skilled outpatient physical therapy to address the deficits listed in the problem list box on initial evaluation, provide pt/family education and to maximize pt's level of independence in the home and community environment.     Pt's spiritual, cultural and educational needs considered and pt agreeable to plan of care and goals.     Anticipated barriers to physical therapy: motivation to perform HEP    Goals:   Short Term Goals:  4 weeks (progressing)  1.Report decreased    R shoulder    pain  <   / =  4  /10 at its worst  to increase tolerance for donning shirt  2. Increased R shoulder AROM flexion to 100-110 deg for increased ease with reaching overhead  3. Increased R shoulder AROM abduction to  deg for increased ease reaching overhead.  4. Increased R UE strength by 1/3 muscle grade in all deficient planes to increase tolerance for ADLs.  5. Pt to tolerate HEP to improve ROM and independence with ADL's  6. Pt  able to reach behind back to midline with R UE for increased ease with toileting.    Long Term Goals: 8 weeks (progressing)  1.Report decreased    R shoulder    pain  <   / =  2  /10  to increase tolerance for ADLs  2. Increased R shoulder AROM flexion to 120 deg for increased ease with reaching overhead  3.  Increased R shoulder AROM abduction to 120 deg for increased ease reaching overhead.  3. Increased R UE strength by 1 muscle grade in all deficient planes to increase tolerance for ADLs.  5. Pt to tolerate HEP to improve ROM and independence with ADL's      Plan     Continue PT towards established goals.  Will add:  sari pond,  SHARON with DEVONTE Vieyra, PT

## 2019-11-20 ENCOUNTER — PATIENT MESSAGE (OUTPATIENT)
Dept: FAMILY MEDICINE | Facility: CLINIC | Age: 69
End: 2019-11-20

## 2019-11-20 ENCOUNTER — CLINICAL SUPPORT (OUTPATIENT)
Dept: REHABILITATION | Facility: HOSPITAL | Age: 69
End: 2019-11-20
Attending: ORTHOPAEDIC SURGERY
Payer: MEDICARE

## 2019-11-20 DIAGNOSIS — R52 PAIN: ICD-10-CM

## 2019-11-20 DIAGNOSIS — M62.81 MUSCLE WEAKNESS: ICD-10-CM

## 2019-11-20 DIAGNOSIS — M25.60 DECREASED RANGE OF MOTION: ICD-10-CM

## 2019-11-20 DIAGNOSIS — R26.89 DECREASED FUNCTIONAL MOBILITY: ICD-10-CM

## 2019-11-20 PROCEDURE — 97110 THERAPEUTIC EXERCISES: CPT | Mod: PN

## 2019-11-20 PROCEDURE — 97140 MANUAL THERAPY 1/> REGIONS: CPT | Mod: PN

## 2019-11-20 NOTE — PROGRESS NOTES
Physical Therapy Daily Treatment Note     Name: Allyson Henriquez  Clinic Number: [id number]    Therapy Diagnosis:   Encounter Diagnoses   Name Primary?    Muscle weakness     Decreased range of motion     Decreased functional mobility     Pain      Physician: Adams Stephen MD    Visit Date: 11/20/2019    Physician Orders: PT Eval and Treat   Medical Diagnosis from Referral: Rotator cuff tendinitis, right, Chronic right shoulder pain  Evaluation Date: 11/14/2019  Authorization Period Expiration: 12/10/19  Plan of Care Expiration: 1/10/20  Visit # / Visits authorized: 3/ 6    Time In: 10:05  Time Out: 11:00  Total Billable Time: 60 minutes    Precautions: Standard and Afib      Subjective     Pt reports: pt states she did her exercises but had to put ice on afterwards because her shoulder was hurting.   She was compliant with home exercise program.  Response to previous treatment: decreased overall pain  Functional change: increased R shoulder ROM for ADLs     Pain: 2/10  Location: right shoulder      Objective     Allyson received therapeutic exercises to develop strength, ROM, flexibility and posture for 40 minutes including:  SL scap depression 10x5 sec   Supine AA sh flex 1# dowel x10, 5 sec hold  Seated scap retraction x10  SL sh flex 2x5  (vc and mc for scap depression and to maintain elbow extension)   SL sh ER x10 (vc and mc to maintain elbow flex and for scap retraction)  SL sh abd 2 x 5 (vc and mc for scap depression and pain free range)   R bicep stretch 3x20 sec (vc to go to gentle pull, not pain)  pulleys for flex x2 min  sh IR with YTB x10  sh ER with YTB x10 (vc and mc for scap depression)    Will add:  bicep curls    Allyson received the following manual therapy techniques:  for 15 minutes, including: inferior GH jt mobs (grd III), STM to R sub and R biceps tendons     Allyson received an ice pack for 10 mins to the R shoulder to reduce pain and soreness       Home Exercises Provided and  Patient Education Provided     Education provided:   - pt educated on importance of compliance with HEP  - Icing shoulder 20 minutes at a time.   Pt gave verbal understanding to all education provided    Written Home Exercises Provided: Patient instructed to cont prior HEP.  Exercises were reviewed and Allyson was able to demonstrate them prior to the end of the session.  Allyson demonstrated good  understanding of the education provided.     See EMR under Patient Instructions for exercises provided 11/14/19.    Assessment   Pt presented with decreased mobility with R GH inferior glides and tightness and TTP to the R bicep tendons, which improved with manual therapy. Pt demonstrated improved tolerance for exercise and exercise progressions with no increase in pain, but continues to require verbal and manual cues for scap retraction and to avoid UT compensations.     Allyson is progressing well towards her goals.   Pt prognosis is Good.     Pt will continue to benefit from skilled outpatient physical therapy to address the deficits listed in the problem list box on initial evaluation, provide pt/family education and to maximize pt's level of independence in the home and community environment.     Pt's spiritual, cultural and educational needs considered and pt agreeable to plan of care and goals.     Anticipated barriers to physical therapy: motivation to perform HEP    Goals:   Short Term Goals:  4 weeks (progressing)  1.Report decreased    R shoulder    pain  <   / =  4  /10 at its worst  to increase tolerance for donning shirt  2. Increased R shoulder AROM flexion to 100-110 deg for increased ease with reaching overhead  3. Increased R shoulder AROM abduction to  deg for increased ease reaching overhead.  4. Increased R UE strength by 1/3 muscle grade in all deficient planes to increase tolerance for ADLs.  5. Pt to tolerate HEP to improve ROM and independence with ADL's  6. Pt able to reach behind back to  midline with R UE for increased ease with toileting.    Long Term Goals: 8 weeks (progressing)  1.Report decreased    R shoulder    pain  <   / =  2  /10  to increase tolerance for ADLs  2. Increased R shoulder AROM flexion to 120 deg for increased ease with reaching overhead  3.  Increased R shoulder AROM abduction to 120 deg for increased ease reaching overhead.  3. Increased R UE strength by 1 muscle grade in all deficient planes to increase tolerance for ADLs.  5. Pt to tolerate HEP to improve ROM and independence with ADL's      Plan     Continue PT towards established goals.  Will add:  sari Vieyra, PT

## 2019-11-21 ENCOUNTER — PATIENT MESSAGE (OUTPATIENT)
Dept: ADMINISTRATIVE | Facility: HOSPITAL | Age: 69
End: 2019-11-21

## 2019-11-22 DIAGNOSIS — N18.30 BENIGN HYPERTENSIVE HEART AND KIDNEY DISEASE WITH CHF, NYHA CLASS 1 AND CKD STAGE 3: ICD-10-CM

## 2019-11-22 DIAGNOSIS — I13.0 BENIGN HYPERTENSIVE HEART AND KIDNEY DISEASE WITH CHF, NYHA CLASS 1 AND CKD STAGE 3: ICD-10-CM

## 2019-11-22 DIAGNOSIS — E78.5 HLD (HYPERLIPIDEMIA): ICD-10-CM

## 2019-11-22 RX ORDER — LOSARTAN POTASSIUM 100 MG/1
TABLET ORAL
Qty: 90 TABLET | Refills: 0 | Status: SHIPPED | OUTPATIENT
Start: 2019-11-22 | End: 2020-02-18

## 2019-11-22 RX ORDER — METOPROLOL SUCCINATE 50 MG/1
TABLET, EXTENDED RELEASE ORAL
Qty: 90 TABLET | Refills: 0 | Status: SHIPPED | OUTPATIENT
Start: 2019-11-22 | End: 2020-02-18

## 2019-11-22 RX ORDER — SIMVASTATIN 40 MG/1
TABLET, FILM COATED ORAL
Qty: 90 TABLET | Refills: 0 | Status: SHIPPED | OUTPATIENT
Start: 2019-11-22 | End: 2020-02-18

## 2019-11-22 RX ORDER — METOPROLOL TARTRATE 25 MG/1
TABLET, FILM COATED ORAL
Qty: 60 TABLET | Refills: 0 | Status: SHIPPED | OUTPATIENT
Start: 2019-11-22 | End: 2020-07-28

## 2019-11-25 ENCOUNTER — CLINICAL SUPPORT (OUTPATIENT)
Dept: REHABILITATION | Facility: HOSPITAL | Age: 69
End: 2019-11-25
Attending: ORTHOPAEDIC SURGERY
Payer: MEDICARE

## 2019-11-25 DIAGNOSIS — R52 PAIN: ICD-10-CM

## 2019-11-25 DIAGNOSIS — R26.89 DECREASED FUNCTIONAL MOBILITY: ICD-10-CM

## 2019-11-25 DIAGNOSIS — M25.60 DECREASED RANGE OF MOTION: ICD-10-CM

## 2019-11-25 DIAGNOSIS — M62.81 MUSCLE WEAKNESS: ICD-10-CM

## 2019-11-25 PROCEDURE — 97140 MANUAL THERAPY 1/> REGIONS: CPT | Mod: PN

## 2019-11-25 PROCEDURE — 97110 THERAPEUTIC EXERCISES: CPT | Mod: PN

## 2019-11-25 NOTE — PROGRESS NOTES
Physical Therapy Daily Treatment Note     Name: Allyson Henriquez  Clinic Number: [id number]    Therapy Diagnosis:   Encounter Diagnoses   Name Primary?    Muscle weakness     Decreased range of motion     Decreased functional mobility     Pain      Physician: Adams Stephen MD    Visit Date: 11/25/2019    Physician Orders: PT Eval and Treat   Medical Diagnosis from Referral: Rotator cuff tendinitis, right, Chronic right shoulder pain  Evaluation Date: 11/14/2019  Authorization Period Expiration: 12/10/19  Plan of Care Expiration: 1/10/20  Visit # / Visits authorized: 4/ 6    Time In: 2:55  Time Out: 3:55  Total Billable Time: 50 minutes    Precautions: Standard and Afib      Subjective     Pt reports: pt states her R shoulder is feeling okay. Not much pain usually but it bothers her every now and then. She states her R shoulder does not hurt like it did in the past.   She was compliant with home exercise program.  Response to previous treatment: decreased overall pain  Functional change: increased R shoulder ROM for ADLs     Pain: 3/10   Location: right shoulder      Objective     Allyson received therapeutic exercises to develop strength, ROM, flexibility and posture for 40 minutes including:  SL scap depression 10x5 sec   Supine AA sh flex 1# dowel x10, 5 sec hold  Seated scap retraction x10  SL sh flex 2x5  (vc and mc for scap depression and to maintain elbow extension)   SL sh ER x10 (vc and mc to maintain elbow flex and for scap retraction)  SL sh abd 2 x 5 (vc and mc for scap depression and pain free range)   R bicep stretch 3x20 sec (vc to go to gentle pull, not pain)  pulleys for flex x2 min  sh IR with YTB x10  sh ER with YTB x10 (vc and mc for scap depression)  Standing Row YTB x 10   Brueggers x 10   Bicep Curl x 10     Will add: shoulder ext YTB    Allyson received the following manual therapy techniques:  for 10 minutes, including: inferior GH jt mobs (grd III), STM to R sub and R biceps tendons      Allyson received an ice pack for 10 mins to the R shoulder to reduce pain and soreness       Home Exercises Provided and Patient Education Provided     Education provided:   - New exercise and updated HEP.   Pt gave verbal understanding to all education provided    Written Home Exercises Provided: yes.  Exercises were reviewed and Allyson was able to demonstrate them prior to the end of the session.  Allyson demonstrated good  understanding of the education provided.     See EMR under Patient Instructions for exercises provided 11/14/19, 11/25/2019    Assessment   Pt presented with mild tightness and TTP to the R bicep tendons and decreased R GH inferior glide mobility, which improved with manual therapy. Pt demonstrated improved tolerance for exercise and had no increase in pain with progressions made today. Pt demonstrated improved technique with exercises and only required minimal verbal and manual cues to avoid UT compensations. She reported 0/10 pain to R shoulder post tx.    Allyson is progressing well towards her goals.   Pt prognosis is Good.     Pt will continue to benefit from skilled outpatient physical therapy to address the deficits listed in the problem list box on initial evaluation, provide pt/family education and to maximize pt's level of independence in the home and community environment.     Pt's spiritual, cultural and educational needs considered and pt agreeable to plan of care and goals.     Anticipated barriers to physical therapy: motivation to perform HEP    Goals:   Short Term Goals:  4 weeks (progressing)  1.Report decreased    R shoulder    pain  <   / =  4  /10 at its worst  to increase tolerance for donning shirt  2. Increased R shoulder AROM flexion to 100-110 deg for increased ease with reaching overhead  3. Increased R shoulder AROM abduction to  deg for increased ease reaching overhead.  4. Increased R UE strength by 1/3 muscle grade in all deficient planes to increase  tolerance for ADLs.  5. Pt to tolerate HEP to improve ROM and independence with ADL's  6. Pt able to reach behind back to midline with R UE for increased ease with toileting.    Long Term Goals: 8 weeks (progressing)  1.Report decreased    R shoulder    pain  <   / =  2  /10  to increase tolerance for ADLs  2. Increased R shoulder AROM flexion to 120 deg for increased ease with reaching overhead  3.  Increased R shoulder AROM abduction to 120 deg for increased ease reaching overhead.  3. Increased R UE strength by 1 muscle grade in all deficient planes to increase tolerance for ADLs.  5. Pt to tolerate HEP to improve ROM and independence with ADL's      Plan     Continue PT towards established goals. Will add shoulder ext with YTB next visit.       Geri Vieyra, PT

## 2019-11-26 ENCOUNTER — PATIENT MESSAGE (OUTPATIENT)
Dept: CARDIOLOGY | Facility: CLINIC | Age: 69
End: 2019-11-26

## 2019-11-29 ENCOUNTER — PATIENT MESSAGE (OUTPATIENT)
Dept: FAMILY MEDICINE | Facility: CLINIC | Age: 69
End: 2019-11-29

## 2019-11-29 DIAGNOSIS — I51.7 LVH (LEFT VENTRICULAR HYPERTROPHY): Primary | ICD-10-CM

## 2019-11-30 RX ORDER — HYDROCHLOROTHIAZIDE 12.5 MG/1
TABLET ORAL
Qty: 90 TABLET | Refills: 1 | Status: SHIPPED | OUTPATIENT
Start: 2019-11-30 | End: 2020-03-16 | Stop reason: SDUPTHER

## 2019-12-02 ENCOUNTER — PATIENT MESSAGE (OUTPATIENT)
Dept: CARDIOLOGY | Facility: CLINIC | Age: 69
End: 2019-12-02

## 2019-12-02 ENCOUNTER — CLINICAL SUPPORT (OUTPATIENT)
Dept: REHABILITATION | Facility: HOSPITAL | Age: 69
End: 2019-12-02
Attending: ORTHOPAEDIC SURGERY
Payer: MEDICARE

## 2019-12-02 DIAGNOSIS — R26.89 DECREASED FUNCTIONAL MOBILITY: ICD-10-CM

## 2019-12-02 DIAGNOSIS — M62.81 MUSCLE WEAKNESS: ICD-10-CM

## 2019-12-02 DIAGNOSIS — R52 PAIN: ICD-10-CM

## 2019-12-02 DIAGNOSIS — M25.60 DECREASED RANGE OF MOTION: ICD-10-CM

## 2019-12-02 PROCEDURE — 97110 THERAPEUTIC EXERCISES: CPT | Mod: PN

## 2019-12-02 PROCEDURE — 97140 MANUAL THERAPY 1/> REGIONS: CPT | Mod: PN

## 2019-12-02 NOTE — TELEPHONE ENCOUNTER
Please check with nephrology.   And let pt know that we are checking with them as to whether any additional lab/urine is needed prior to visit.

## 2019-12-02 NOTE — PROGRESS NOTES
Physical Therapy Daily Treatment Note     Name: Allyson Henriquez  Clinic Number: [id number]    Therapy Diagnosis:   Encounter Diagnoses   Name Primary?    Muscle weakness     Decreased range of motion     Decreased functional mobility     Pain      Physician: Adams Stephen MD    Visit Date: 12/2/2019    Physician Orders: PT Eval and Treat   Medical Diagnosis from Referral: Rotator cuff tendinitis, right, Chronic right shoulder pain  Evaluation Date: 11/14/2019  Authorization Period Expiration: 12/10/19  Plan of Care Expiration: 1/10/20  Visit # / Visits authorized: 5/ 6    Time In: 2:05  Time Out: 3:05  Total Billable Time: 50 minutes    Precautions: Standard and Afib      Subjective     Pt reports: pt states she is starting to have some shooting pains down her right arm (2-3 times since previous visit). Pt reports she also has been waking up with numb hands but that is nothing new.   She was not compliant with home exercise program.  Response to previous treatment: decreased overall pain  Functional change: increased R shoulder ROM for ADLs     Pain: 3/10   Location: right shoulder      Objective     Allyson received therapeutic exercises to develop strength, ROM, flexibility and posture for 40 minutes including:  SL scap depression 10x5 sec   Supine AA sh flex 1# dowel x10, 5 sec hold  (NP) Seated scap retraction x10  SL sh flex x10  (vc and mc for scap depression and to maintain elbow extension)   SL sh ER x10 (vc and mc to maintain elbow flex and for scap retraction)  SL sh abd 2 x 5 (vc and mc for scap depression and pain free range)   R bicep stretch 3x20 sec (vc to go to gentle pull, not pain)  pulleys for flex x2 min  sh IR with YTB x10  sh ER with YTB x10 (vc and mc for scap depression)  Standing Row YTB 2 x 10   Brueggers x 10   Bicep Curl x 10   Shoulder ext YTB x 10     Allyson received the following manual therapy techniques:  for 10 minutes, including: inferior GH jt mobs (grd III), STM to R  biceps tendons     Allyson received an ice pack for 10 mins to the R shoulder to reduce pain and soreness       Home Exercises Provided and Patient Education Provided     Education provided:   - New exercise and updated HEP.   - Avoiding aggravating movements and retracting scapula with R UE use.   Pt gave verbal understanding to all education provided    Written Home Exercises Provided: yes.  Exercises were reviewed and Allyson was able to demonstrate them prior to the end of the session.  Allyson demonstrated good  understanding of the education provided.     See EMR under Patient Instructions for exercises provided 11/14/19, 11/25/2019    Assessment   Pt continues to present with increased tightness and TTP to the R bicep tendons, which improves with manual. Pt required constant verbal and tactile cueing during exercises today to avoid UT compensation and to retract the scapula. Pt demonstrated good tolerance for exercise with no increase in pain when cued to perform proper technique. Pt responded well overall to treatment with 0/10 pain post treatment.     Allyson is progressing well towards her goals.   Pt prognosis is Good.     Pt will continue to benefit from skilled outpatient physical therapy to address the deficits listed in the problem list box on initial evaluation, provide pt/family education and to maximize pt's level of independence in the home and community environment.     Pt's spiritual, cultural and educational needs considered and pt agreeable to plan of care and goals.     Anticipated barriers to physical therapy: motivation to perform HEP    Goals:   Short Term Goals:  4 weeks (progressing)  1.Report decreased    R shoulder    pain  <   / =  4  /10 at its worst  to increase tolerance for donning shirt  2. Increased R shoulder AROM flexion to 100-110 deg for increased ease with reaching overhead  3. Increased R shoulder AROM abduction to  deg for increased ease reaching overhead.  4.  Increased R UE strength by 1/3 muscle grade in all deficient planes to increase tolerance for ADLs.  5. Pt to tolerate HEP to improve ROM and independence with ADL's  6. Pt able to reach behind back to midline with R UE for increased ease with toileting.    Long Term Goals: 8 weeks (progressing)  1.Report decreased    R shoulder    pain  <   / =  2  /10  to increase tolerance for ADLs  2. Increased R shoulder AROM flexion to 120 deg for increased ease with reaching overhead  3.  Increased R shoulder AROM abduction to 120 deg for increased ease reaching overhead.  3. Increased R UE strength by 1 muscle grade in all deficient planes to increase tolerance for ADLs.  5. Pt to tolerate HEP to improve ROM and independence with ADL's      Plan     Continue PT towards established goals.       Geri Vieyra, PT

## 2019-12-03 ENCOUNTER — PATIENT MESSAGE (OUTPATIENT)
Dept: CARDIOLOGY | Facility: CLINIC | Age: 69
End: 2019-12-03

## 2019-12-04 ENCOUNTER — CLINICAL SUPPORT (OUTPATIENT)
Dept: REHABILITATION | Facility: HOSPITAL | Age: 69
End: 2019-12-04
Attending: ORTHOPAEDIC SURGERY
Payer: MEDICARE

## 2019-12-04 DIAGNOSIS — M25.60 DECREASED RANGE OF MOTION: ICD-10-CM

## 2019-12-04 DIAGNOSIS — M62.81 MUSCLE WEAKNESS: ICD-10-CM

## 2019-12-04 DIAGNOSIS — R52 PAIN: ICD-10-CM

## 2019-12-04 DIAGNOSIS — R26.89 DECREASED FUNCTIONAL MOBILITY: ICD-10-CM

## 2019-12-04 PROCEDURE — 97140 MANUAL THERAPY 1/> REGIONS: CPT | Mod: PN

## 2019-12-04 PROCEDURE — G8979 MOBILITY GOAL STATUS: HCPCS | Mod: CJ,PN

## 2019-12-04 PROCEDURE — 97110 THERAPEUTIC EXERCISES: CPT | Mod: PN

## 2019-12-04 PROCEDURE — G8978 MOBILITY CURRENT STATUS: HCPCS | Mod: CJ,PN

## 2019-12-04 NOTE — PROGRESS NOTES
Physical Therapy Daily Treatment Note     Name: Allyson Henriquez  Clinic Number: [id number]    Therapy Diagnosis:   Encounter Diagnoses   Name Primary?    Muscle weakness     Decreased range of motion     Decreased functional mobility     Pain      Physician: Adams Stephen MD    Visit Date: 12/4/2019    Physician Orders: PT Eval and Treat   Medical Diagnosis from Referral: Rotator cuff tendinitis, right, Chronic right shoulder pain  Evaluation Date: 11/14/2019  Authorization Period Expiration: 12/10/19  Plan of Care Expiration: 1/10/20  Visit # / Visits authorized: 6/ 6    Time In: 10:00  Time Out: 11:05  Total Billable Time: 55 minutes    Precautions: Standard and Afib      Subjective     Pt reports: pt reports she did her HEP and it really helps. Able to wash her hair without pain.   She was compliant with home exercise program.  Response to previous treatment: decreased overall pain, able to wash hair no pain.   Functional change: increased R shoulder ROM for ADLs     Pain: 2/10 ; 3/10 at worst over the last week.  Location: right shoulder      Objective     Posture: fwd head,  rounded shoulders, kyphosis, R scap more protracted     Active Range of Motion: (deg)  Shoulder Left Right   Flexion 136 95 ant sh pain   Abduction 125 60, pain to ant sh   ER at 0 50 50   ER at 90 68 Unable to perform   IR L1, but unable to get to midline To R glute, no pain.       Upper Extremity Strength  (R) UE   (L) UE     Shoulder flexion: 4-/5 Shoulder flexion: 4+/5   Shoulder Abduction: 3+/5 Shoulder abduction: 4+/5   Shoulder ER at side 4+/5 Shoulder ER 4+/5   Shoulder IR at side 5/5 Shoulder IR 5/5         Special Tests:  AC Joint Left Right   Empty Can Test - +   Speed's test - +         Joint Mobility: hypomobility inferior GH glide of R shoulder     Palpation: R bicep tendon insertions (long head and short head)     Sensation: grossly intact to light touch     Scapular Control/Dyskinesis:     Normal / Subtle / Obvious   Comments    Left  normal     Right  Obvious scap protracted       Allyson received therapeutic exercises to develop strength, ROM, flexibility and posture for 45 minutes including:  SL scap depression 10x5 sec   Supine AA sh flex 1# dowel 2x10, 5 sec hold  (NP) Seated scap retraction x10  SL sh flex x10  (vc and mc for scap depression and to maintain elbow extension)   SL sh ER x10 (vc and mc to maintain elbow flex and for scap retraction)  SL sh abd x 10 (vc and mc for scap depression and pain free range)   (NP) R bicep stretch 3x20 sec (vc to go to gentle pull, not pain)  pulleys for flex x2 min  sh IR with YTB x10  sh ER with YTB x10 (vc and mc for scap depression)  Standing Row YTB 2 x 10   Brueggers x 10   Bicep Curl 2 x 10   Shoulder ext YTB 2 x 10     Allyson received the following manual therapy techniques:  for 10 minutes, including: inferior GH jt mobs (grd III), STM to R biceps tendons     Allyson received an ice pack for 10 mins to the R shoulder to reduce pain and soreness       Home Exercises Provided and Patient Education Provided     Education provided:   -  Updated HEP.   - Avoiding aggravating movements and retracting scapula with R UE use.   Pt gave verbal understanding to all education provided    Written Home Exercises Provided: yes.  Exercises were reviewed and Allyson was able to demonstrate them prior to the end of the session.  Allyson demonstrated good  understanding of the education provided.     See EMR under Patient Instructions for exercises provided 12/4/2019    CMS Impairment/Limitation/Restriction for FOTO Shoulder Survey  Status Limitation G-Code CMS Severity Modifier  Intake 48% 52%  Predicted 66% 34% Goal Status+ CJ - At least 20 percent but less than 40 percent  12/4/2019 63% 37% Current Status CJ - At least 20 percent but less than 40 percent    Assessment   Pt reassessed today and demonstrated improvements in R shoulder AROM and strength. Pt has also reported a significant  reduction in pain and improvements in function of the right shoulder. Pt has met 50% of her short term goals and is progressing well towards those goals she has not met. However, pt still presents with pain and deficits in the strength and ROM of the right shoulder that would benefit from continued therapy services. Pt presented with improved soft tissue quality with less tightness and TTP in the R bicep tendons, and continues to respond well to manual therapy. Pt demonstrated improved exercise tolerance requiring fewer rest breaks and was able to increase sets in repetitions today. Pt required less cuing with exercises to avoid UT compensation and to retract her scapula. Plan to continue POC and progress as tolerated toward pt goals.     Allyson is progressing well towards her goals.   Pt prognosis is Good.     Pt will continue to benefit from skilled outpatient physical therapy to address the deficits listed in the problem list box on initial evaluation, provide pt/family education and to maximize pt's level of independence in the home and community environment.     Pt's spiritual, cultural and educational needs considered and pt agreeable to plan of care and goals.     Anticipated barriers to physical therapy: motivation to perform HEP    Goals:   Short Term Goals:  4 weeks (progressing)  1.Report decreased    R shoulder    pain  <   / =  4  /10 at its worst  to increase tolerance for donning shirt (met)   2. Increased R shoulder AROM flexion to 100-110 deg for increased ease with reaching overhead (progressing)  3. Increased R shoulder AROM abduction to  deg for increased ease reaching overhead. (progressing)  4. Increased R UE strength by 1/3 muscle grade in all deficient planes to increase tolerance for ADLs. (met)  5. Pt to tolerate HEP to improve ROM and independence with ADL's (met)  6. Pt able to reach behind back to midline with R UE for increased ease with toileting. (progressing)    Long Term  Goals: 8 weeks (progressing)  1.Report decreased    R shoulder    pain  <   / =  2  /10  to increase tolerance for ADLs  2. Increased R shoulder AROM flexion to 120 deg for increased ease with reaching overhead  3.  Increased R shoulder AROM abduction to 120 deg for increased ease reaching overhead.  3. Increased R UE strength by 1 muscle grade in all deficient planes to increase tolerance for ADLs.  5. Pt to tolerate HEP to improve ROM and independence with ADL's    Unmet goals remain appropriate within 4 weeks.      Plan     Continue PT towards established goals. Progress shoulder ROM and periscap strengthening as tolerated.      Geri Vieyra, PT

## 2019-12-05 ENCOUNTER — PATIENT MESSAGE (OUTPATIENT)
Dept: CARDIOLOGY | Facility: CLINIC | Age: 69
End: 2019-12-05

## 2019-12-05 ENCOUNTER — TELEPHONE (OUTPATIENT)
Dept: CARDIOLOGY | Facility: CLINIC | Age: 69
End: 2019-12-05

## 2019-12-05 ENCOUNTER — OFFICE VISIT (OUTPATIENT)
Dept: NEPHROLOGY | Facility: CLINIC | Age: 69
End: 2019-12-05
Payer: MEDICARE

## 2019-12-05 VITALS
HEIGHT: 66 IN | SYSTOLIC BLOOD PRESSURE: 138 MMHG | WEIGHT: 251.56 LBS | HEART RATE: 94 BPM | BODY MASS INDEX: 40.43 KG/M2 | DIASTOLIC BLOOD PRESSURE: 76 MMHG | OXYGEN SATURATION: 97 %

## 2019-12-05 DIAGNOSIS — E11.22 TYPE 2 DIABETES MELLITUS WITH STAGE 2 CHRONIC KIDNEY DISEASE, WITHOUT LONG-TERM CURRENT USE OF INSULIN: ICD-10-CM

## 2019-12-05 DIAGNOSIS — N18.2 TYPE 2 DIABETES MELLITUS WITH STAGE 2 CHRONIC KIDNEY DISEASE, WITHOUT LONG-TERM CURRENT USE OF INSULIN: ICD-10-CM

## 2019-12-05 DIAGNOSIS — N28.1 ACQUIRED CYST OF KIDNEY: ICD-10-CM

## 2019-12-05 DIAGNOSIS — I10 ESSENTIAL HYPERTENSION: ICD-10-CM

## 2019-12-05 DIAGNOSIS — R80.9 PROTEINURIA, UNSPECIFIED TYPE: Primary | ICD-10-CM

## 2019-12-05 PROCEDURE — 1159F PR MEDICATION LIST DOCUMENTED IN MEDICAL RECORD: ICD-10-PCS | Mod: S$GLB,,, | Performed by: INTERNAL MEDICINE

## 2019-12-05 PROCEDURE — 99999 PR PBB SHADOW E&M-EST. PATIENT-LVL III: ICD-10-PCS | Mod: PBBFAC,,, | Performed by: INTERNAL MEDICINE

## 2019-12-05 PROCEDURE — 3078F PR MOST RECENT DIASTOLIC BLOOD PRESSURE < 80 MM HG: ICD-10-PCS | Mod: CPTII,S$GLB,, | Performed by: INTERNAL MEDICINE

## 2019-12-05 PROCEDURE — 3075F SYST BP GE 130 - 139MM HG: CPT | Mod: CPTII,S$GLB,, | Performed by: INTERNAL MEDICINE

## 2019-12-05 PROCEDURE — 99204 OFFICE O/P NEW MOD 45 MIN: CPT | Mod: S$GLB,,, | Performed by: INTERNAL MEDICINE

## 2019-12-05 PROCEDURE — 1159F MED LIST DOCD IN RCRD: CPT | Mod: S$GLB,,, | Performed by: INTERNAL MEDICINE

## 2019-12-05 PROCEDURE — 3044F PR MOST RECENT HEMOGLOBIN A1C LEVEL <7.0%: ICD-10-PCS | Mod: CPTII,S$GLB,, | Performed by: INTERNAL MEDICINE

## 2019-12-05 PROCEDURE — 1101F PR PT FALLS ASSESS DOC 0-1 FALLS W/OUT INJ PAST YR: ICD-10-PCS | Mod: CPTII,S$GLB,, | Performed by: INTERNAL MEDICINE

## 2019-12-05 PROCEDURE — 1101F PT FALLS ASSESS-DOCD LE1/YR: CPT | Mod: CPTII,S$GLB,, | Performed by: INTERNAL MEDICINE

## 2019-12-05 PROCEDURE — 3044F HG A1C LEVEL LT 7.0%: CPT | Mod: CPTII,S$GLB,, | Performed by: INTERNAL MEDICINE

## 2019-12-05 PROCEDURE — 99204 PR OFFICE/OUTPT VISIT, NEW, LEVL IV, 45-59 MIN: ICD-10-PCS | Mod: S$GLB,,, | Performed by: INTERNAL MEDICINE

## 2019-12-05 PROCEDURE — 3078F DIAST BP <80 MM HG: CPT | Mod: CPTII,S$GLB,, | Performed by: INTERNAL MEDICINE

## 2019-12-05 PROCEDURE — 99999 PR PBB SHADOW E&M-EST. PATIENT-LVL III: CPT | Mod: PBBFAC,,, | Performed by: INTERNAL MEDICINE

## 2019-12-05 PROCEDURE — 3075F PR MOST RECENT SYSTOLIC BLOOD PRESS GE 130-139MM HG: ICD-10-PCS | Mod: CPTII,S$GLB,, | Performed by: INTERNAL MEDICINE

## 2019-12-05 NOTE — LETTER
December 5, 2019      Gilma Pimentel MD  [address]  [place] [address]           [facility] [address] [place] [address]  Phone: [phone]          Patient: Allyson Henriquez   MR Number: [id number]   YOB: 1950   Date of Visit: 12/5/2019       Dear Dr. Gilma Pimentel:    Thank you for referring Allyson Henriquez to me for evaluation. Attached you will find relevant portions of my assessment and plan of care.    If you have questions, please do not hesitate to call me. I look forward to following Allyson Henriquez along with you.    Sincerely,    Ken Gallagher MD    Enclosure  CC:  No Recipients    If you would like to receive this communication electronically, please contact [email or web address] or [phone] to request more information on [email or web address] Link access.    For providers and/or their staff who would like to refer a patient to [facility], please contact us through our one-stop-shop provider referral line, [facility], at [phone].    If you feel you have received this communication in error or would no longer like to receive these types of communications, please e-mail [email or web address]

## 2019-12-05 NOTE — TELEPHONE ENCOUNTER
----- Message from Esthre Thomas sent at 12/5/2019 11:48 AM CST -----  Contact: self [phone]   Patient is requesting a call back from the nurse concerning PRADAXA, patient upset she unable to speak to any of the nursing staff at the clinic.    Please call the patient upon request at phone number [phone]

## 2019-12-05 NOTE — TELEPHONE ENCOUNTER
Spoke to patient and informed that we have received a fax from [employer] and that the fax number they had was not to our direct office. Informed patient that I would fax back the form we had and include her most recent stress test and office note. Patient verbalized understanding and stated she would check with them this afternoon to make sure it is received.

## 2019-12-05 NOTE — PROGRESS NOTES
"Subjective:       Patient ID: Allyson Henriquez is a 69 y.o. White female who presents for return patient evaluation for chronic renal failure.    She has been lost to follow-up since 2016.  She has no uremic or urinary symptoms and is in her usual state of health.  There have been no recent illnesses, hospitalizations or procedures.        Review of Systems   Constitutional: Negative for appetite change, chills and fever.   Eyes: Negative for visual disturbance.   Respiratory: Negative for cough and shortness of breath.    Cardiovascular: Negative for chest pain and leg swelling.   Gastrointestinal: Negative for diarrhea, nausea and vomiting.   Genitourinary: Negative for difficulty urinating, dysuria and hematuria.   Musculoskeletal: Negative for myalgias.   Skin: Negative for rash.   Neurological: Negative for headaches.   Psychiatric/Behavioral: Negative for sleep disturbance.       The past medical, family and social histories were reviewed for this encounter.     /76 (BP Location: Left arm, Patient Position: Sitting)   Pulse 94   Ht 5' 6" (1.676 m)   Wt 114.1 kg (251 lb 8.7 oz)   SpO2 97%   BMI 40.60 kg/m²     Objective:      Physical Exam   Constitutional: She is oriented to person, place, and time. She appears well-developed and well-nourished. No distress.   HENT:   Head: Normocephalic and atraumatic.   Eyes: Conjunctivae are normal.   Neck: Neck supple. No JVD present.   Cardiovascular: Normal rate, regular rhythm and normal heart sounds. Exam reveals no gallop and no friction rub.   No murmur heard.  Pulmonary/Chest: Effort normal and breath sounds normal. No respiratory distress. She has no wheezes. She has no rales.   Abdominal: Soft. Bowel sounds are normal. She exhibits no distension. There is no tenderness.   Musculoskeletal: She exhibits no edema.   Neurological: She is alert and oriented to person, place, and time.   Skin: Skin is warm and dry. No rash noted.   Psychiatric: She has a " normal mood and affect.   Vitals reviewed.      Assessment:       1. Proteinuria, unspecified type    2. Acquired cyst of kidney    3. Type 2 diabetes mellitus with stage 2 chronic kidney disease, without long-term current use of insulin    4. Essential hypertension        Plan:   Return to clinic in 6 months.  Labs for next visit include rp, ua, upc.  UA and upc today.  Baseline creatinine is 1.2.  Renal US shows R 11.0 cm, L 10.2 cm.  Her cysts are stable radiographically.  Blood pressure is controlled on the current regimen.  Please have patient follow-up with your PCP or Endocrinology regarding the control and management of diabetes.  Continue ARB for her proteinuria.  We discussed making lifestyle changes and using a Mediterranean diet for health benefits and weight loss.    Renal US shows R 11.0 cm, L 10.2 cm.

## 2019-12-06 ENCOUNTER — PATIENT MESSAGE (OUTPATIENT)
Dept: NEPHROLOGY | Facility: CLINIC | Age: 69
End: 2019-12-06

## 2019-12-09 ENCOUNTER — TELEPHONE (OUTPATIENT)
Dept: CARDIOLOGY | Facility: CLINIC | Age: 69
End: 2019-12-09

## 2019-12-09 NOTE — TELEPHONE ENCOUNTER
----- Message from Patito Hammonds sent at 12/9/2019 11:52 AM CST -----  Contact: Geri([facility])  Would like authorization on (dabigatran etexilate (PRADAXA) 150 mg Cap)    Please advise, can be reached at [phone] Geri([facility])

## 2019-12-10 ENCOUNTER — TELEPHONE (OUTPATIENT)
Dept: CARDIOLOGY | Facility: CLINIC | Age: 69
End: 2019-12-10

## 2019-12-10 NOTE — TELEPHONE ENCOUNTER
----- Message from Ladarius Prescott sent at 12/10/2019  3:22 PM CST -----  Contact: Angle with [facility]  Type: Needs Medical Advice    Who Called:  Angle    Best Call Back Number: [phone] ext [phone]  Additional Information: Calling to addtional information for a PA for dabigatran etexilate (PRADAXA) 150 mg Cap.

## 2020-01-09 ENCOUNTER — PATIENT MESSAGE (OUTPATIENT)
Dept: CARDIOLOGY | Facility: CLINIC | Age: 70
End: 2020-01-09

## 2020-01-09 ENCOUNTER — PATIENT MESSAGE (OUTPATIENT)
Dept: FAMILY MEDICINE | Facility: CLINIC | Age: 70
End: 2020-01-09

## 2020-01-10 RX ORDER — DABIGATRAN ETEXILATE 150 MG/1
CAPSULE ORAL
Qty: 180 CAPSULE | Refills: 1 | Status: SHIPPED | OUTPATIENT
Start: 2020-01-10 | End: 2020-04-01 | Stop reason: SDUPTHER

## 2020-02-11 ENCOUNTER — PATIENT MESSAGE (OUTPATIENT)
Dept: FAMILY MEDICINE | Facility: CLINIC | Age: 70
End: 2020-02-11

## 2020-02-12 ENCOUNTER — PATIENT MESSAGE (OUTPATIENT)
Dept: FAMILY MEDICINE | Facility: CLINIC | Age: 70
End: 2020-02-12

## 2020-02-12 NOTE — TELEPHONE ENCOUNTER
Ok to overbook after lunch. We'll look at doing a possible steroid injection and muscle relaxer. She may still end up needing to see Mino for an adjustment.

## 2020-02-18 ENCOUNTER — PATIENT MESSAGE (OUTPATIENT)
Dept: FAMILY MEDICINE | Facility: CLINIC | Age: 70
End: 2020-02-18

## 2020-02-18 DIAGNOSIS — I13.0 BENIGN HYPERTENSIVE HEART AND KIDNEY DISEASE WITH CHF, NYHA CLASS 1 AND CKD STAGE 3: ICD-10-CM

## 2020-02-18 DIAGNOSIS — E78.5 HLD (HYPERLIPIDEMIA): ICD-10-CM

## 2020-02-18 DIAGNOSIS — N18.30 BENIGN HYPERTENSIVE HEART AND KIDNEY DISEASE WITH CHF, NYHA CLASS 1 AND CKD STAGE 3: ICD-10-CM

## 2020-02-18 RX ORDER — LOSARTAN POTASSIUM 100 MG/1
TABLET ORAL
Qty: 90 TABLET | Refills: 0 | Status: SHIPPED | OUTPATIENT
Start: 2020-02-18 | End: 2020-05-15

## 2020-02-18 RX ORDER — METOPROLOL SUCCINATE 50 MG/1
TABLET, EXTENDED RELEASE ORAL
Qty: 90 TABLET | Refills: 0 | Status: SHIPPED | OUTPATIENT
Start: 2020-02-18 | End: 2020-05-15

## 2020-02-18 RX ORDER — SIMVASTATIN 40 MG/1
TABLET, FILM COATED ORAL
Qty: 90 TABLET | Refills: 0 | Status: SHIPPED | OUTPATIENT
Start: 2020-02-18 | End: 2020-05-15

## 2020-02-28 ENCOUNTER — PATIENT MESSAGE (OUTPATIENT)
Dept: ORTHOPEDICS | Facility: CLINIC | Age: 70
End: 2020-02-28

## 2020-02-28 ENCOUNTER — PATIENT OUTREACH (OUTPATIENT)
Dept: ADMINISTRATIVE | Facility: OTHER | Age: 70
End: 2020-02-28

## 2020-03-02 ENCOUNTER — OFFICE VISIT (OUTPATIENT)
Dept: ORTHOPEDICS | Facility: CLINIC | Age: 70
End: 2020-03-02
Payer: MEDICARE

## 2020-03-02 ENCOUNTER — PATIENT MESSAGE (OUTPATIENT)
Dept: CARDIOLOGY | Facility: CLINIC | Age: 70
End: 2020-03-02

## 2020-03-02 VITALS — BODY MASS INDEX: 40.34 KG/M2 | HEIGHT: 66 IN | WEIGHT: 251 LBS

## 2020-03-02 DIAGNOSIS — E11.22 TYPE 2 DIABETES MELLITUS WITH STAGE 2 CHRONIC KIDNEY DISEASE, WITHOUT LONG-TERM CURRENT USE OF INSULIN: ICD-10-CM

## 2020-03-02 DIAGNOSIS — M25.511 CHRONIC RIGHT SHOULDER PAIN: ICD-10-CM

## 2020-03-02 DIAGNOSIS — E66.01 OBESITY, CLASS III, BMI 40-49.9 (MORBID OBESITY): ICD-10-CM

## 2020-03-02 DIAGNOSIS — N18.2 TYPE 2 DIABETES MELLITUS WITH STAGE 2 CHRONIC KIDNEY DISEASE, WITHOUT LONG-TERM CURRENT USE OF INSULIN: ICD-10-CM

## 2020-03-02 DIAGNOSIS — G89.29 CHRONIC RIGHT SHOULDER PAIN: ICD-10-CM

## 2020-03-02 DIAGNOSIS — M75.81 ROTATOR CUFF TENDINITIS, RIGHT: Primary | ICD-10-CM

## 2020-03-02 PROCEDURE — 20610 DRAIN/INJ JOINT/BURSA W/O US: CPT | Mod: RT,S$GLB,, | Performed by: ORTHOPAEDIC SURGERY

## 2020-03-02 PROCEDURE — 1101F PT FALLS ASSESS-DOCD LE1/YR: CPT | Mod: S$GLB,,, | Performed by: ORTHOPAEDIC SURGERY

## 2020-03-02 PROCEDURE — 99214 PR OFFICE/OUTPT VISIT, EST, LEVL IV, 30-39 MIN: ICD-10-PCS | Mod: 25,S$GLB,, | Performed by: ORTHOPAEDIC SURGERY

## 2020-03-02 PROCEDURE — 1159F MED LIST DOCD IN RCRD: CPT | Mod: S$GLB,,, | Performed by: ORTHOPAEDIC SURGERY

## 2020-03-02 PROCEDURE — 1125F PR PAIN SEVERITY QUANTIFIED, PAIN PRESENT: ICD-10-PCS | Mod: S$GLB,,, | Performed by: ORTHOPAEDIC SURGERY

## 2020-03-02 PROCEDURE — 1125F AMNT PAIN NOTED PAIN PRSNT: CPT | Mod: S$GLB,,, | Performed by: ORTHOPAEDIC SURGERY

## 2020-03-02 PROCEDURE — 99999 PR PBB SHADOW E&M-EST. PATIENT-LVL III: CPT | Mod: PBBFAC,,, | Performed by: ORTHOPAEDIC SURGERY

## 2020-03-02 PROCEDURE — 20610 LARGE JOINT ASPIRATION/INJECTION: R SUBACROMIAL BURSA: ICD-10-PCS | Mod: RT,S$GLB,, | Performed by: ORTHOPAEDIC SURGERY

## 2020-03-02 PROCEDURE — 99999 PR PBB SHADOW E&M-EST. PATIENT-LVL III: ICD-10-PCS | Mod: PBBFAC,,, | Performed by: ORTHOPAEDIC SURGERY

## 2020-03-02 PROCEDURE — 1159F PR MEDICATION LIST DOCUMENTED IN MEDICAL RECORD: ICD-10-PCS | Mod: S$GLB,,, | Performed by: ORTHOPAEDIC SURGERY

## 2020-03-02 PROCEDURE — 99214 OFFICE O/P EST MOD 30 MIN: CPT | Mod: 25,S$GLB,, | Performed by: ORTHOPAEDIC SURGERY

## 2020-03-02 PROCEDURE — 1101F PR PT FALLS ASSESS DOC 0-1 FALLS W/OUT INJ PAST YR: ICD-10-PCS | Mod: S$GLB,,, | Performed by: ORTHOPAEDIC SURGERY

## 2020-03-02 RX ORDER — TRIAMCINOLONE ACETONIDE 40 MG/ML
80 INJECTION, SUSPENSION INTRA-ARTICULAR; INTRAMUSCULAR
Status: DISCONTINUED | OUTPATIENT
Start: 2020-03-02 | End: 2020-03-02 | Stop reason: HOSPADM

## 2020-03-02 RX ADMIN — TRIAMCINOLONE ACETONIDE 80 MG: 40 INJECTION, SUSPENSION INTRA-ARTICULAR; INTRAMUSCULAR at 09:03

## 2020-03-02 NOTE — PROGRESS NOTES
69 years old seen here today in follow-up of her right shoulder pain which she has had now for several months time. She had gone to therapy with some relief.  Symptoms have recurred.  Pain mostly with overhead activity or reaching behind her back.  Ice seems to help    Exam shows weak and painful cuff strength, positive Neer Osorio impingement sign    Assessment:  Rotator cuff disease, probable cuff tear in this 69-year-old    Plan:  Kenalog injection right shoulder, physical therapy, follow-up as needed

## 2020-03-02 NOTE — PROCEDURES
Large Joint Aspiration/Injection: R subacromial bursa  Performed by: Adams Stephen MD  Authorized by: Adams Stephen MD  Date/Time: 3/2/2020 9:15 AM    Consent Done?:  Yes (Verbal)  Indications: Prep:Patient was prepped and draped in the usual sterile fashion.  Procedure site marked: Yes     Details:   Needle size: 21 G   Approach: posterior  Location:  Shoulder  Site:  R subacromial bursa    Medications: 80 mg triamcinolone acetonide 40 mg/mL  Patient tolerance:  patient tolerated the procedure well with no immediate complications

## 2020-03-03 ENCOUNTER — PATIENT MESSAGE (OUTPATIENT)
Dept: FAMILY MEDICINE | Facility: CLINIC | Age: 70
End: 2020-03-03

## 2020-03-03 ENCOUNTER — PATIENT MESSAGE (OUTPATIENT)
Dept: ORTHOPEDICS | Facility: CLINIC | Age: 70
End: 2020-03-03

## 2020-03-04 ENCOUNTER — PATIENT MESSAGE (OUTPATIENT)
Dept: ORTHOPEDICS | Facility: CLINIC | Age: 70
End: 2020-03-04

## 2020-03-06 ENCOUNTER — LAB VISIT (OUTPATIENT)
Dept: LAB | Facility: HOSPITAL | Age: 70
End: 2020-03-06
Attending: INTERNAL MEDICINE
Payer: MEDICARE

## 2020-03-06 DIAGNOSIS — R07.89 ATYPICAL CHEST PAIN: ICD-10-CM

## 2020-03-06 DIAGNOSIS — Z79.01 CURRENT USE OF LONG TERM ANTICOAGULATION: ICD-10-CM

## 2020-03-06 DIAGNOSIS — E78.00 HYPERCHOLESTEROLEMIA: ICD-10-CM

## 2020-03-06 DIAGNOSIS — I10 ESSENTIAL HYPERTENSION: ICD-10-CM

## 2020-03-06 DIAGNOSIS — I48.0 PAF (PAROXYSMAL ATRIAL FIBRILLATION): ICD-10-CM

## 2020-03-06 DIAGNOSIS — G47.33 OBSTRUCTIVE SLEEP APNEA: ICD-10-CM

## 2020-03-06 LAB
ALBUMIN SERPL BCP-MCNC: 3.8 G/DL (ref 3.5–5.2)
ALP SERPL-CCNC: 69 U/L (ref 55–135)
ALT SERPL W/O P-5'-P-CCNC: 18 U/L (ref 10–44)
ANION GAP SERPL CALC-SCNC: 10 MMOL/L (ref 8–16)
AST SERPL-CCNC: 16 U/L (ref 10–40)
BILIRUB SERPL-MCNC: 0.8 MG/DL (ref 0.1–1)
BUN SERPL-MCNC: 26 MG/DL (ref 8–23)
CALCIUM SERPL-MCNC: 9.9 MG/DL (ref 8.7–10.5)
CHLORIDE SERPL-SCNC: 104 MMOL/L (ref 95–110)
CHOLEST SERPL-MCNC: 162 MG/DL (ref 120–199)
CHOLEST/HDLC SERPL: 3.4 {RATIO} (ref 2–5)
CO2 SERPL-SCNC: 28 MMOL/L (ref 23–29)
CREAT SERPL-MCNC: 1.4 MG/DL (ref 0.5–1.4)
EST. GFR  (AFRICAN AMERICAN): 43.6 ML/MIN/1.73 M^2
EST. GFR  (NON AFRICAN AMERICAN): 37.8 ML/MIN/1.73 M^2
GLUCOSE SERPL-MCNC: 137 MG/DL (ref 70–110)
HDLC SERPL-MCNC: 48 MG/DL (ref 40–75)
HDLC SERPL: 29.6 % (ref 20–50)
LDLC SERPL CALC-MCNC: 77.2 MG/DL (ref 63–159)
NONHDLC SERPL-MCNC: 114 MG/DL
POTASSIUM SERPL-SCNC: 3.9 MMOL/L (ref 3.5–5.1)
PROT SERPL-MCNC: 7 G/DL (ref 6–8.4)
SODIUM SERPL-SCNC: 142 MMOL/L (ref 136–145)
TRIGL SERPL-MCNC: 184 MG/DL (ref 30–150)

## 2020-03-06 PROCEDURE — 80053 COMPREHEN METABOLIC PANEL: CPT

## 2020-03-06 PROCEDURE — 36415 COLL VENOUS BLD VENIPUNCTURE: CPT | Mod: PN

## 2020-03-06 PROCEDURE — 80061 LIPID PANEL: CPT

## 2020-03-10 ENCOUNTER — PATIENT MESSAGE (OUTPATIENT)
Dept: CARDIOLOGY | Facility: CLINIC | Age: 70
End: 2020-03-10

## 2020-03-12 ENCOUNTER — PATIENT OUTREACH (OUTPATIENT)
Dept: ADMINISTRATIVE | Facility: OTHER | Age: 70
End: 2020-03-12

## 2020-03-16 ENCOUNTER — OFFICE VISIT (OUTPATIENT)
Dept: CARDIOLOGY | Facility: CLINIC | Age: 70
End: 2020-03-16
Payer: MEDICARE

## 2020-03-16 VITALS
SYSTOLIC BLOOD PRESSURE: 137 MMHG | DIASTOLIC BLOOD PRESSURE: 73 MMHG | BODY MASS INDEX: 39.25 KG/M2 | HEART RATE: 102 BPM | HEIGHT: 66 IN | WEIGHT: 244.25 LBS

## 2020-03-16 DIAGNOSIS — E66.01 SEVERE OBESITY (BMI 35.0-39.9) WITH COMORBIDITY: ICD-10-CM

## 2020-03-16 DIAGNOSIS — Z79.01 CURRENT USE OF LONG TERM ANTICOAGULATION: ICD-10-CM

## 2020-03-16 DIAGNOSIS — I51.7 LVH (LEFT VENTRICULAR HYPERTROPHY): ICD-10-CM

## 2020-03-16 DIAGNOSIS — I48.0 PAF (PAROXYSMAL ATRIAL FIBRILLATION): Primary | ICD-10-CM

## 2020-03-16 DIAGNOSIS — I10 ESSENTIAL HYPERTENSION: ICD-10-CM

## 2020-03-16 PROCEDURE — 1126F AMNT PAIN NOTED NONE PRSNT: CPT | Mod: S$GLB,,, | Performed by: INTERNAL MEDICINE

## 2020-03-16 PROCEDURE — 1126F PR PAIN SEVERITY QUANTIFIED, NO PAIN PRESENT: ICD-10-PCS | Mod: S$GLB,,, | Performed by: INTERNAL MEDICINE

## 2020-03-16 PROCEDURE — 99999 PR PBB SHADOW E&M-EST. PATIENT-LVL III: CPT | Mod: PBBFAC,,, | Performed by: INTERNAL MEDICINE

## 2020-03-16 PROCEDURE — 1159F MED LIST DOCD IN RCRD: CPT | Mod: S$GLB,,, | Performed by: INTERNAL MEDICINE

## 2020-03-16 PROCEDURE — 3075F PR MOST RECENT SYSTOLIC BLOOD PRESS GE 130-139MM HG: ICD-10-PCS | Mod: S$GLB,,, | Performed by: INTERNAL MEDICINE

## 2020-03-16 PROCEDURE — 1159F PR MEDICATION LIST DOCUMENTED IN MEDICAL RECORD: ICD-10-PCS | Mod: S$GLB,,, | Performed by: INTERNAL MEDICINE

## 2020-03-16 PROCEDURE — 3078F PR MOST RECENT DIASTOLIC BLOOD PRESSURE < 80 MM HG: ICD-10-PCS | Mod: S$GLB,,, | Performed by: INTERNAL MEDICINE

## 2020-03-16 PROCEDURE — 1101F PR PT FALLS ASSESS DOC 0-1 FALLS W/OUT INJ PAST YR: ICD-10-PCS | Mod: S$GLB,,, | Performed by: INTERNAL MEDICINE

## 2020-03-16 PROCEDURE — 1101F PT FALLS ASSESS-DOCD LE1/YR: CPT | Mod: S$GLB,,, | Performed by: INTERNAL MEDICINE

## 2020-03-16 PROCEDURE — 3075F SYST BP GE 130 - 139MM HG: CPT | Mod: S$GLB,,, | Performed by: INTERNAL MEDICINE

## 2020-03-16 PROCEDURE — 99214 OFFICE O/P EST MOD 30 MIN: CPT | Mod: S$GLB,,, | Performed by: INTERNAL MEDICINE

## 2020-03-16 PROCEDURE — 3078F DIAST BP <80 MM HG: CPT | Mod: S$GLB,,, | Performed by: INTERNAL MEDICINE

## 2020-03-16 PROCEDURE — 99999 PR PBB SHADOW E&M-EST. PATIENT-LVL III: ICD-10-PCS | Mod: PBBFAC,,, | Performed by: INTERNAL MEDICINE

## 2020-03-16 PROCEDURE — 99214 PR OFFICE/OUTPT VISIT, EST, LEVL IV, 30-39 MIN: ICD-10-PCS | Mod: S$GLB,,, | Performed by: INTERNAL MEDICINE

## 2020-03-16 RX ORDER — HYDROCHLOROTHIAZIDE 12.5 MG/1
TABLET ORAL
Qty: 90 TABLET | Refills: 1 | Status: SHIPPED | OUTPATIENT
Start: 2020-03-16 | End: 2020-08-14

## 2020-03-16 NOTE — PROGRESS NOTES
Subjective:    Patient ID:  Allyson Henriquez is a 70 y.o. female who presents for Atrial Fibrillation (Labs) and Hypertension        HPI    DISCUSSED LABS AND GOALS CR 1.40, LDL 77 UP, HR MOSTLY OK,OCC EXTRA PRN METOPROLOL, HAS BEEN EXERCISING, NO CHEST PAIN NO SIGNIFICANT SHORTNESS OF BREATH NO TIA TYPE SYMPTOMS NO NEAR-SYNCOPE, , SEE ROS  Past Medical History:   Diagnosis Date    Acoustic neuroma     left ear    Acquired deafness of left ear     Atrial fibrillation     Bell's palsy     with fatigue and stress    COPD (chronic obstructive pulmonary disease)     History of tobacco abuse     Hyperlipidemia     Hypertension     Multinodular goiter (nontoxic) 2017    TRISHA (obstructive sleep apnea)     Proteinuria      Past Surgical History:   Procedure Laterality Date    ADENOIDECTOMY      APPENDECTOMY      CATARACT EXTRACTION      COLONOSCOPY N/A 2016    Procedure: COLONOSCOPY;  Surgeon: Destin Cardona MD;  Location: [facility];  Service: Endoscopy;  Laterality: N/A;    EYE SURGERY      cataract OU    NEUROMA SURGERY Left     acoustic    TONSILLECTOMY       Family History   Problem Relation Age of Onset    Cancer Paternal Uncle         colon cancer    Diabetes Neg Hx     Kidney disease Neg Hx     Stroke Neg Hx     Heart disease Neg Hx      Social History     Socioeconomic History    Marital status:      Spouse name: Not on file    Number of children: Not on file    Years of education: Not on file    Highest education level: Not on file   Occupational History    Not on file   Social Needs    Financial resource strain: Not on file    Food insecurity:     Worry: Not on file     Inability: Not on file    Transportation needs:     Medical: Not on file     Non-medical: Not on file   Tobacco Use    Smoking status: Former Smoker     Packs/day: 1.00     Years: 40.00     Pack years: 40.00     Last attempt to quit: 3/6/2016     Years since quittin.0    Smokeless tobacco:  "Never Used   Substance and Sexual Activity    Alcohol use: Yes     Alcohol/week: 0.0 standard drinks     Comment: occ social    Drug use: No    Sexual activity: Not on file   Lifestyle    Physical activity:     Days per week: Not on file     Minutes per session: Not on file    Stress: Not at all   Relationships    Social connections:     Talks on phone: Not on file     Gets together: Not on file     Attends [employer] service: Not on file     Active member of club or organization: Not on file     Attends meetings of clubs or organizations: Not on file     Relationship status: Not on file   Other Topics Concern    Not on file   Social History Narrative    Works in insurance collection.       Review of patient's allergies indicates:   Allergen Reactions    Contrast media Anaphylaxis    Albuterol Other (See Comments)     Used during PFTs and put pt in afib    Dye     Pcn [penicillins]      Pt states did well on cephalexin.  No adverse reaction or side effect.     Doxycycline Palpitations       Current Outpatient Medications:     dabigatran etexilate (PRADAXA) 150 mg Cap, TAKE 1 CAPSULE(150 MG) BY MOUTH TWICE DAILY. DO NOT BREAK, CHEW, OR OPEN CAPSULES.", Disp: 180 capsule, Rfl: 1    fluocinolone (VANOS) 0.01 % cream, fluocinolone 0.01 % topical solution, Disp: , Rfl:     fluticasone (FLONASE ALLERGY RELIEF) 50 mcg/actuation nasal spray, 1 spray by Nasal route 2 (two) times daily as needed., Disp: , Rfl:     hydroCHLOROthiazide (HYDRODIURIL) 12.5 MG Tab, TAKE 1 TABLET(12.5 MG) BY MOUTH EVERY DAY, Disp: 90 tablet, Rfl: 1    losartan (COZAAR) 100 MG tablet, TAKE 1 TABLET(100 MG) BY MOUTH EVERY DAY, Disp: 90 tablet, Rfl: 0    metoprolol succinate (TOPROL-XL) 50 MG 24 hr tablet, TAKE 1 TABLET(50 MG) BY MOUTH EVERY DAY, Disp: 90 tablet, Rfl: 0    metoprolol tartrate (LOPRESSOR) 25 MG tablet, Take 25 mg by mouth daily as needed., Disp: , Rfl:     metoprolol tartrate (LOPRESSOR) 25 MG tablet, TAKE 1 TABLET " BY MOUTH TWICE DAILY AS NEEDED, Disp: 60 tablet, Rfl: 0    oxymetazoline (AFRIN) 0.05 % nasal spray, 2 sprays by Nasal route once a week., Disp: , Rfl:     simvastatin (ZOCOR) 40 MG tablet, TAKE 1 TABLET(40 MG) BY MOUTH EVERY EVENING, Disp: 90 tablet, Rfl: 0    vit C/E/Zn/coppr/lutein/zeaxan (OCUVITE LUTEIN AND ZEAXANTHIN ORAL), Take 1 capsule by mouth once daily. , Disp: , Rfl:     ciprofloxacin-dexamethasone 0.3-0.1% (CIPRODEX) 0.3-0.1 % DrpS, Place 4 drops into the right ear 2 (two) times daily. (Patient not taking: Reported on 12/5/2019), Disp: 7.5 mL, Rfl: 5  No current facility-administered medications for this visit.     Facility-Administered Medications Ordered in Other Visits:     EUFLEXXA 10 mg/mL(mw 2.4 -3.6 million) injection Syrg 20 mg, 20 mg, Intra-articular, , Jose Rafael Barney MD, 20 mg at 06/14/17 1605    Review of Systems   Constitution: Negative for chills, diaphoresis, fever, malaise/fatigue (SOME) and night sweats. Weight loss: DIET.   HENT: Negative for congestion and nosebleeds.    Eyes: Negative for blurred vision and visual disturbance.   Cardiovascular: Negative for chest pain, claudication, cyanosis, dyspnea on exertion (MILD,STAIRS), irregular heartbeat (OCC), leg swelling (OCC ANKLES), near-syncope, orthopnea, palpitations (OCC), paroxysmal nocturnal dyspnea and syncope.   Respiratory: Negative for cough, hemoptysis, shortness of breath and wheezing.    Endocrine: Negative for cold intolerance, heat intolerance and polyuria.   Hematologic/Lymphatic: Negative for adenopathy and bleeding problem. Does not bruise/bleed easily.   Skin: Negative for color change, itching and rash.   Musculoskeletal: Negative for back pain, falls and joint pain ( R ROTATOR CUFF BETTER, INJECTIONS).   Gastrointestinal: Negative for abdominal pain, change in bowel habit, heartburn, hematemesis, jaundice, melena and vomiting.   Genitourinary: Negative for dysuria, flank pain and frequency.  "  Neurological: Negative for brief paralysis, dizziness, focal weakness, light-headedness, numbness (HANDS,NIGHT) and weakness.   Psychiatric/Behavioral: Negative for altered mental status and memory loss.   Allergic/Immunologic: Negative for hives and persistent infections.        Objective:      Vitals:    03/16/20 0811   BP: 137/73   Pulse: 102   Weight: 110.8 kg (244 lb 4.3 oz)   Height: 5' 6" (1.676 m)   PainSc: 0-No pain     Body mass index is 39.43 kg/m².    Physical Exam   Constitutional: She is oriented to person, place, and time. She appears well-developed and well-nourished. She is active.   HENT:   Head: Normocephalic and atraumatic.   Mouth/Throat: Oropharynx is clear and moist and mucous membranes are normal.   Eyes: Pupils are equal, round, and reactive to light. Conjunctivae and EOM are normal.   Neck: Trachea normal and normal range of motion. Neck supple. Normal carotid pulses, no hepatojugular reflux and no JVD present. Carotid bruit is not present. No edema and no erythema present. No thyromegaly present.   Cardiovascular: Normal rate, regular rhythm and normal heart sounds.  No extrasystoles are present. PMI is not displaced. Exam reveals no gallop and no friction rub.   No murmur heard.  Pulses:       Carotid pulses are 2+ on the right side, and 2+ on the left side.       Radial pulses are 2+ on the right side, and 2+ on the left side.        Femoral pulses are 2+ on the right side, and 2+ on the left side.       Dorsalis pedis pulses are 2+ on the right side, and 2+ on the left side.        Posterior tibial pulses are 2+ on the right side, and 2+ on the left side.   Pulmonary/Chest: Effort normal and breath sounds normal. No tachypnea and no bradypnea. She has no wheezes. She has no rales. She exhibits no tenderness.   Abdominal: Soft. Bowel sounds are normal. She exhibits no distension and no mass. There is no hepatosplenomegaly. There is no guarding and no CVA tenderness.   Musculoskeletal: " Normal range of motion. She exhibits no edema or tenderness.   Lymphadenopathy:     She has no cervical adenopathy.   Neurological: She is alert and oriented to person, place, and time. She has normal strength. She displays no tremor. No cranial nerve deficit (HEARING AID).   Skin: Skin is warm and dry. No cyanosis or erythema. No pallor.   Psychiatric: She has a normal mood and affect. Her speech is normal and behavior is normal.               ..    Chemistry        Component Value Date/Time     03/06/2020 1017    K 3.9 03/06/2020 1017     03/06/2020 1017    CO2 28 03/06/2020 1017    BUN 26 (H) 03/06/2020 1017    CREATININE 1.4 03/06/2020 1017     (H) 03/06/2020 1017        Component Value Date/Time    CALCIUM 9.9 03/06/2020 1017    ALKPHOS 69 03/06/2020 1017    AST 16 03/06/2020 1017    ALT 18 03/06/2020 1017    BILITOT 0.8 03/06/2020 1017    ESTGFRAFRICA 43.6 (A) 03/06/2020 1017    EGFRNONAA 37.8 (A) 03/06/2020 1017            ..  Lab Results   Component Value Date    CHOL 162 03/06/2020    CHOL 139 10/15/2019    CHOL 156 09/06/2018     Lab Results   Component Value Date    HDL 48 03/06/2020    HDL 39 (L) 10/15/2019    HDL 41 09/06/2018     Lab Results   Component Value Date    LDLCALC 77.2 03/06/2020    LDLCALC 63.0 10/15/2019    LDLCALC 72.0 09/06/2018     Lab Results   Component Value Date    TRIG 184 (H) 03/06/2020    TRIG 185 (H) 10/15/2019    TRIG 215 (H) 09/06/2018     Lab Results   Component Value Date    CHOLHDL 29.6 03/06/2020    CHOLHDL 28.1 10/15/2019    CHOLHDL 26.3 09/06/2018     ..  Lab Results   Component Value Date    WBC 8.23 10/25/2017    HGB 13.0 09/06/2019    HCT 39.7 10/25/2017    MCV 83 10/25/2017     10/25/2017       Test(s) Reviewed  I have reviewed the following in detail:  [] Stress test   [] Angiography   [] Echocardiogram   [x] Labs   [] Other:       Assessment:         ICD-10-CM ICD-9-CM   1. PAF (paroxysmal atrial fibrillation) I48.0 427.31   2. Essential  hypertension I10 401.9   3. Current use of long term anticoagulation Z79.01 V58.61   4. LVH (left ventricular hypertrophy) I51.7 429.3   5. Severe obesity (BMI 35.0-39.9) with comorbidity E66.01 278.01     Problem List Items Addressed This Visit        Cardiac/Vascular    PAF (paroxysmal atrial fibrillation) - Primary    Relevant Orders    Comprehensive metabolic panel    Essential hypertension    Relevant Orders    Comprehensive metabolic panel    LVH (left ventricular hypertrophy)    Relevant Medications    hydroCHLOROthiazide (HYDRODIURIL) 12.5 MG Tab    Other Relevant Orders    Comprehensive metabolic panel       Hematology    Current use of long term anticoagulation    Relevant Orders    Comprehensive metabolic panel    Hemoglobin       Endocrine    Severe obesity (BMI 35.0-39.9) with comorbidity           Plan:     INTERMITTENT PAROXYSMAL AFIB PATIENT STILL NOT WILLING TO CHANGE ANYTHING MORE PROCEED WITH ABLATION CLINICALLY OVERALL STABLE,ALL CV CLINICALLY STABLE, NO ANGINA, NO HF, NO TIA, STABLE CLINICAL ARRHYTHMIA,CONTINUE CURRENT MEDS, EDUCATION, DIET, EXERCISE, WEIGHT LOSS, WATCH HEART RATE AND BLOOD PRESSURE, RETURN TO CLINIC IN 6 MO WITH LABS, DISCUSSED PLAN WITH THE PATIENT AND HER       PAF (paroxysmal atrial fibrillation)  -     Comprehensive metabolic panel; Future; Expected date: 09/16/2020    Essential hypertension  -     Comprehensive metabolic panel; Future; Expected date: 09/16/2020    Current use of long term anticoagulation  -     Comprehensive metabolic panel; Future; Expected date: 09/16/2020  -     Hemoglobin; Future; Expected date: 09/16/2020    LVH (left ventricular hypertrophy)  -     hydroCHLOROthiazide (HYDRODIURIL) 12.5 MG Tab; TAKE 1 TABLET(12.5 MG) BY MOUTH EVERY DAY  Dispense: 90 tablet; Refill: 1  -     Comprehensive metabolic panel; Future; Expected date: 09/16/2020    Severe obesity (BMI 35.0-39.9) with comorbidity    RTC Low level/low impact aerobic exercise 5x's/wk.  Heart healthy diet and risk factor modification.    See labs and med orders.    Aerobic exercise 5x's/wk. Heart healthy diet and risk factor modification.    See labs and med orders.

## 2020-03-23 ENCOUNTER — DOCUMENTATION ONLY (OUTPATIENT)
Dept: REHABILITATION | Facility: HOSPITAL | Age: 70
End: 2020-03-23

## 2020-03-23 DIAGNOSIS — R26.89 DECREASED FUNCTIONAL MOBILITY: ICD-10-CM

## 2020-03-23 DIAGNOSIS — M25.60 DECREASED RANGE OF MOTION: ICD-10-CM

## 2020-03-23 DIAGNOSIS — R52 PAIN: ICD-10-CM

## 2020-03-23 DIAGNOSIS — M62.81 MUSCLE WEAKNESS: ICD-10-CM

## 2020-03-23 NOTE — PROGRESS NOTES
Outpatient Therapy Discharge Summary     Name: Allyson Henriquez  Clinic Number: [id number]    Therapy Diagnosis:   Encounter Diagnoses   Name Primary?    Muscle weakness     Decreased range of motion     Decreased functional mobility     Pain      Physician: Adams Stephen MD  Physician Orders: PT Eval and Treat   Medical Diagnosis: Rotator cuff tendinitis, right, Chronic right shoulder pain  Evaluation Date: 11/14/2019      Date of Last visit: 12/4/2019  Total Visits Received: 6  Cancelled Visits: 5  No Show Visits: 0    Assessment    Goals:   Short Term Goals:  4 weeks (progressing)  1.Report decreased    R shoulder    pain  <   / =  4  /10 at its worst  to increase tolerance for donning shirt (met)   2. Increased R shoulder AROM flexion to 100-110 deg for increased ease with reaching overhead (progressing)  3. Increased R shoulder AROM abduction to  deg for increased ease reaching overhead. (progressing)  4. Increased R UE strength by 1/3 muscle grade in all deficient planes to increase tolerance for ADLs. (met)  5. Pt to tolerate HEP to improve ROM and independence with ADL's (met)  6. Pt able to reach behind back to midline with R UE for increased ease with toileting. (progressing)     Long Term Goals: 8 weeks (progressing)  1.Report decreased    R shoulder    pain  <   / =  2  /10  to increase tolerance for ADLs  2. Increased R shoulder AROM flexion to 120 deg for increased ease with reaching overhead  3.  Increased R shoulder AROM abduction to 120 deg for increased ease reaching overhead.  3. Increased R UE strength by 1 muscle grade in all deficient planes to increase tolerance for ADLs.  5. Pt to tolerate HEP to improve ROM and independence with ADL's    Discharge reason: Patient has not attended therapy since 12/4/19    Plan   This patient is discharged from Physical Therapy

## 2020-03-25 ENCOUNTER — PATIENT MESSAGE (OUTPATIENT)
Dept: NEPHROLOGY | Facility: CLINIC | Age: 70
End: 2020-03-25

## 2020-03-25 ENCOUNTER — LAB VISIT (OUTPATIENT)
Dept: LAB | Facility: HOSPITAL | Age: 70
End: 2020-03-25
Attending: INTERNAL MEDICINE
Payer: MEDICARE

## 2020-03-25 DIAGNOSIS — N30.00 ACUTE CYSTITIS WITHOUT HEMATURIA: Primary | ICD-10-CM

## 2020-03-25 DIAGNOSIS — N30.00 ACUTE CYSTITIS WITHOUT HEMATURIA: ICD-10-CM

## 2020-03-25 LAB
BACTERIA #/AREA URNS AUTO: ABNORMAL /HPF
BILIRUB UR QL STRIP: NEGATIVE
CLARITY UR REFRACT.AUTO: ABNORMAL
COLOR UR AUTO: YELLOW
GLUCOSE UR QL STRIP: NEGATIVE
HGB UR QL STRIP: ABNORMAL
HYALINE CASTS UR QL AUTO: 0 /LPF
KETONES UR QL STRIP: NEGATIVE
LEUKOCYTE ESTERASE UR QL STRIP: ABNORMAL
MICROSCOPIC COMMENT: ABNORMAL
NITRITE UR QL STRIP: NEGATIVE
PH UR STRIP: 5 [PH] (ref 5–8)
PROT UR QL STRIP: ABNORMAL
RBC #/AREA URNS AUTO: 2 /HPF (ref 0–4)
SP GR UR STRIP: 1.01 (ref 1–1.03)
SQUAMOUS #/AREA URNS AUTO: 0 /HPF
URN SPEC COLLECT METH UR: ABNORMAL
WBC #/AREA URNS AUTO: 38 /HPF (ref 0–5)
WBC CLUMPS UR QL AUTO: ABNORMAL

## 2020-03-25 PROCEDURE — 87186 SC STD MICRODIL/AGAR DIL: CPT

## 2020-03-25 PROCEDURE — 87077 CULTURE AEROBIC IDENTIFY: CPT

## 2020-03-25 PROCEDURE — 87088 URINE BACTERIA CULTURE: CPT

## 2020-03-25 PROCEDURE — 81001 URINALYSIS AUTO W/SCOPE: CPT

## 2020-03-25 PROCEDURE — 87086 URINE CULTURE/COLONY COUNT: CPT

## 2020-03-26 ENCOUNTER — PATIENT MESSAGE (OUTPATIENT)
Dept: NEPHROLOGY | Facility: CLINIC | Age: 70
End: 2020-03-26

## 2020-03-26 RX ORDER — CIPROFLOXACIN 250 MG/1
250 TABLET, FILM COATED ORAL EVERY 12 HOURS
Qty: 14 TABLET | Refills: 0 | Status: SHIPPED | OUTPATIENT
Start: 2020-03-26 | End: 2020-06-26 | Stop reason: SDUPTHER

## 2020-03-27 LAB — BACTERIA UR CULT: ABNORMAL

## 2020-04-01 ENCOUNTER — PATIENT MESSAGE (OUTPATIENT)
Dept: CARDIOLOGY | Facility: CLINIC | Age: 70
End: 2020-04-01

## 2020-04-01 ENCOUNTER — PATIENT MESSAGE (OUTPATIENT)
Dept: ENDOCRINOLOGY | Facility: CLINIC | Age: 70
End: 2020-04-01

## 2020-04-01 RX ORDER — DABIGATRAN ETEXILATE 150 MG/1
CAPSULE ORAL
Qty: 180 CAPSULE | Refills: 1 | Status: SHIPPED | OUTPATIENT
Start: 2020-04-01 | End: 2020-09-28 | Stop reason: SDUPTHER

## 2020-04-02 ENCOUNTER — PATIENT MESSAGE (OUTPATIENT)
Dept: FAMILY MEDICINE | Facility: CLINIC | Age: 70
End: 2020-04-02

## 2020-04-02 ENCOUNTER — PATIENT MESSAGE (OUTPATIENT)
Dept: ENDOCRINOLOGY | Facility: CLINIC | Age: 70
End: 2020-04-02

## 2020-04-08 ENCOUNTER — PATIENT MESSAGE (OUTPATIENT)
Dept: NEPHROLOGY | Facility: CLINIC | Age: 70
End: 2020-04-08

## 2020-05-06 ENCOUNTER — PATIENT MESSAGE (OUTPATIENT)
Dept: ADMINISTRATIVE | Facility: HOSPITAL | Age: 70
End: 2020-05-06

## 2020-05-15 DIAGNOSIS — E78.5 HLD (HYPERLIPIDEMIA): ICD-10-CM

## 2020-05-15 DIAGNOSIS — N18.30 BENIGN HYPERTENSIVE HEART AND KIDNEY DISEASE WITH CHF, NYHA CLASS 1 AND CKD STAGE 3: ICD-10-CM

## 2020-05-15 DIAGNOSIS — I13.0 BENIGN HYPERTENSIVE HEART AND KIDNEY DISEASE WITH CHF, NYHA CLASS 1 AND CKD STAGE 3: ICD-10-CM

## 2020-05-15 RX ORDER — METOPROLOL SUCCINATE 50 MG/1
TABLET, EXTENDED RELEASE ORAL
Qty: 90 TABLET | Refills: 0 | Status: SHIPPED | OUTPATIENT
Start: 2020-05-15 | End: 2020-08-14

## 2020-05-15 RX ORDER — SIMVASTATIN 40 MG/1
TABLET, FILM COATED ORAL
Qty: 90 TABLET | Refills: 0 | Status: SHIPPED | OUTPATIENT
Start: 2020-05-15 | End: 2020-08-14

## 2020-05-15 RX ORDER — LOSARTAN POTASSIUM 100 MG/1
TABLET ORAL
Qty: 90 TABLET | Refills: 0 | Status: SHIPPED | OUTPATIENT
Start: 2020-05-15 | End: 2020-08-14

## 2020-05-27 ENCOUNTER — PATIENT MESSAGE (OUTPATIENT)
Dept: NEPHROLOGY | Facility: CLINIC | Age: 70
End: 2020-05-27

## 2020-05-29 ENCOUNTER — PATIENT MESSAGE (OUTPATIENT)
Dept: CARDIOLOGY | Facility: CLINIC | Age: 70
End: 2020-05-29

## 2020-05-29 ENCOUNTER — PATIENT MESSAGE (OUTPATIENT)
Dept: NEPHROLOGY | Facility: CLINIC | Age: 70
End: 2020-05-29

## 2020-05-29 DIAGNOSIS — E11.22 TYPE 2 DIABETES MELLITUS WITH STAGE 2 CHRONIC KIDNEY DISEASE, WITHOUT LONG-TERM CURRENT USE OF INSULIN: Primary | ICD-10-CM

## 2020-05-29 DIAGNOSIS — N18.2 TYPE 2 DIABETES MELLITUS WITH STAGE 2 CHRONIC KIDNEY DISEASE, WITHOUT LONG-TERM CURRENT USE OF INSULIN: Primary | ICD-10-CM

## 2020-05-31 ENCOUNTER — PATIENT MESSAGE (OUTPATIENT)
Dept: NEPHROLOGY | Facility: CLINIC | Age: 70
End: 2020-05-31

## 2020-06-02 ENCOUNTER — LAB VISIT (OUTPATIENT)
Dept: LAB | Facility: HOSPITAL | Age: 70
End: 2020-06-02
Attending: INTERNAL MEDICINE
Payer: MEDICARE

## 2020-06-02 DIAGNOSIS — R80.9 PROTEINURIA, UNSPECIFIED TYPE: ICD-10-CM

## 2020-06-02 LAB
BACTERIA #/AREA URNS AUTO: ABNORMAL /HPF
BILIRUB UR QL STRIP: NEGATIVE
CLARITY UR REFRACT.AUTO: ABNORMAL
COLOR UR AUTO: YELLOW
GLUCOSE UR QL STRIP: NEGATIVE
HGB UR QL STRIP: NEGATIVE
HYALINE CASTS UR QL AUTO: 0 /LPF
KETONES UR QL STRIP: NEGATIVE
LEUKOCYTE ESTERASE UR QL STRIP: ABNORMAL
MICROSCOPIC COMMENT: ABNORMAL
NITRITE UR QL STRIP: NEGATIVE
PH UR STRIP: 7 [PH] (ref 5–8)
PROT UR QL STRIP: ABNORMAL
RBC #/AREA URNS AUTO: 1 /HPF (ref 0–4)
SP GR UR STRIP: 1.01 (ref 1–1.03)
SQUAMOUS #/AREA URNS AUTO: 7 /HPF
URN SPEC COLLECT METH UR: ABNORMAL
WBC #/AREA URNS AUTO: 6 /HPF (ref 0–5)

## 2020-06-02 PROCEDURE — 84156 ASSAY OF PROTEIN URINE: CPT

## 2020-06-02 PROCEDURE — 81001 URINALYSIS AUTO W/SCOPE: CPT

## 2020-06-03 LAB
CREAT UR-MCNC: 100 MG/DL (ref 15–325)
PROT UR-MCNC: 179 MG/DL (ref 0–15)
PROT/CREAT UR: 1.79 MG/G{CREAT} (ref 0–0.2)

## 2020-06-08 ENCOUNTER — PATIENT OUTREACH (OUTPATIENT)
Dept: ADMINISTRATIVE | Facility: OTHER | Age: 70
End: 2020-06-08

## 2020-06-10 ENCOUNTER — OFFICE VISIT (OUTPATIENT)
Dept: NEPHROLOGY | Facility: CLINIC | Age: 70
End: 2020-06-10
Payer: MEDICARE

## 2020-06-10 ENCOUNTER — OFFICE VISIT (OUTPATIENT)
Dept: ORTHOPEDICS | Facility: CLINIC | Age: 70
End: 2020-06-10
Payer: MEDICARE

## 2020-06-10 VITALS
DIASTOLIC BLOOD PRESSURE: 80 MMHG | OXYGEN SATURATION: 98 % | SYSTOLIC BLOOD PRESSURE: 122 MMHG | HEIGHT: 66 IN | WEIGHT: 249.13 LBS | HEIGHT: 66 IN | HEART RATE: 110 BPM | WEIGHT: 249.13 LBS | BODY MASS INDEX: 40.04 KG/M2 | BODY MASS INDEX: 40.04 KG/M2

## 2020-06-10 DIAGNOSIS — M25.511 CHRONIC RIGHT SHOULDER PAIN: ICD-10-CM

## 2020-06-10 DIAGNOSIS — E11.22 TYPE 2 DIABETES MELLITUS WITH STAGE 2 CHRONIC KIDNEY DISEASE, WITHOUT LONG-TERM CURRENT USE OF INSULIN: ICD-10-CM

## 2020-06-10 DIAGNOSIS — N18.2 TYPE 2 DIABETES MELLITUS WITH STAGE 2 CHRONIC KIDNEY DISEASE, WITHOUT LONG-TERM CURRENT USE OF INSULIN: ICD-10-CM

## 2020-06-10 DIAGNOSIS — R80.9 PROTEINURIA, UNSPECIFIED TYPE: Primary | ICD-10-CM

## 2020-06-10 DIAGNOSIS — I10 ESSENTIAL HYPERTENSION: ICD-10-CM

## 2020-06-10 DIAGNOSIS — G89.29 CHRONIC RIGHT SHOULDER PAIN: ICD-10-CM

## 2020-06-10 DIAGNOSIS — N28.1 ACQUIRED CYST OF KIDNEY: ICD-10-CM

## 2020-06-10 DIAGNOSIS — M75.81 ROTATOR CUFF TENDINITIS, RIGHT: Primary | ICD-10-CM

## 2020-06-10 PROCEDURE — 99214 PR OFFICE/OUTPT VISIT, EST, LEVL IV, 30-39 MIN: ICD-10-PCS | Mod: S$GLB,,, | Performed by: INTERNAL MEDICINE

## 2020-06-10 PROCEDURE — 1159F MED LIST DOCD IN RCRD: CPT | Mod: S$GLB,,, | Performed by: INTERNAL MEDICINE

## 2020-06-10 PROCEDURE — 3074F PR MOST RECENT SYSTOLIC BLOOD PRESSURE < 130 MM HG: ICD-10-PCS | Mod: S$GLB,,, | Performed by: ORTHOPAEDIC SURGERY

## 2020-06-10 PROCEDURE — 3044F PR MOST RECENT HEMOGLOBIN A1C LEVEL <7.0%: ICD-10-PCS | Mod: S$GLB,,, | Performed by: INTERNAL MEDICINE

## 2020-06-10 PROCEDURE — 3078F PR MOST RECENT DIASTOLIC BLOOD PRESSURE < 80 MM HG: ICD-10-PCS | Mod: S$GLB,,, | Performed by: ORTHOPAEDIC SURGERY

## 2020-06-10 PROCEDURE — 99999 PR PBB SHADOW E&M-EST. PATIENT-LVL II: CPT | Mod: PBBFAC,,, | Performed by: ORTHOPAEDIC SURGERY

## 2020-06-10 PROCEDURE — 3079F PR MOST RECENT DIASTOLIC BLOOD PRESSURE 80-89 MM HG: ICD-10-PCS | Mod: S$GLB,,, | Performed by: INTERNAL MEDICINE

## 2020-06-10 PROCEDURE — 1159F MED LIST DOCD IN RCRD: CPT | Mod: S$GLB,,, | Performed by: ORTHOPAEDIC SURGERY

## 2020-06-10 PROCEDURE — 99213 PR OFFICE/OUTPT VISIT, EST, LEVL III, 20-29 MIN: ICD-10-PCS | Mod: S$GLB,,, | Performed by: ORTHOPAEDIC SURGERY

## 2020-06-10 PROCEDURE — 99213 OFFICE O/P EST LOW 20 MIN: CPT | Mod: S$GLB,,, | Performed by: ORTHOPAEDIC SURGERY

## 2020-06-10 PROCEDURE — 99999 PR PBB SHADOW E&M-EST. PATIENT-LVL III: CPT | Mod: PBBFAC,,, | Performed by: INTERNAL MEDICINE

## 2020-06-10 PROCEDURE — 3044F HG A1C LEVEL LT 7.0%: CPT | Mod: S$GLB,,, | Performed by: INTERNAL MEDICINE

## 2020-06-10 PROCEDURE — 99999 PR PBB SHADOW E&M-EST. PATIENT-LVL III: ICD-10-PCS | Mod: PBBFAC,,, | Performed by: INTERNAL MEDICINE

## 2020-06-10 PROCEDURE — 1101F PT FALLS ASSESS-DOCD LE1/YR: CPT | Mod: S$GLB,,, | Performed by: ORTHOPAEDIC SURGERY

## 2020-06-10 PROCEDURE — 1101F PR PT FALLS ASSESS DOC 0-1 FALLS W/OUT INJ PAST YR: ICD-10-PCS | Mod: S$GLB,,, | Performed by: ORTHOPAEDIC SURGERY

## 2020-06-10 PROCEDURE — 1159F PR MEDICATION LIST DOCUMENTED IN MEDICAL RECORD: ICD-10-PCS | Mod: S$GLB,,, | Performed by: INTERNAL MEDICINE

## 2020-06-10 PROCEDURE — 99214 OFFICE O/P EST MOD 30 MIN: CPT | Mod: S$GLB,,, | Performed by: INTERNAL MEDICINE

## 2020-06-10 PROCEDURE — 1101F PT FALLS ASSESS-DOCD LE1/YR: CPT | Mod: S$GLB,,, | Performed by: INTERNAL MEDICINE

## 2020-06-10 PROCEDURE — 3074F SYST BP LT 130 MM HG: CPT | Mod: S$GLB,,, | Performed by: ORTHOPAEDIC SURGERY

## 2020-06-10 PROCEDURE — 99999 PR PBB SHADOW E&M-EST. PATIENT-LVL II: ICD-10-PCS | Mod: PBBFAC,,, | Performed by: ORTHOPAEDIC SURGERY

## 2020-06-10 PROCEDURE — 1125F AMNT PAIN NOTED PAIN PRSNT: CPT | Mod: S$GLB,,, | Performed by: ORTHOPAEDIC SURGERY

## 2020-06-10 PROCEDURE — 3074F SYST BP LT 130 MM HG: CPT | Mod: S$GLB,,, | Performed by: INTERNAL MEDICINE

## 2020-06-10 PROCEDURE — 1125F PR PAIN SEVERITY QUANTIFIED, PAIN PRESENT: ICD-10-PCS | Mod: S$GLB,,, | Performed by: ORTHOPAEDIC SURGERY

## 2020-06-10 PROCEDURE — 3074F PR MOST RECENT SYSTOLIC BLOOD PRESSURE < 130 MM HG: ICD-10-PCS | Mod: S$GLB,,, | Performed by: INTERNAL MEDICINE

## 2020-06-10 PROCEDURE — 3078F DIAST BP <80 MM HG: CPT | Mod: S$GLB,,, | Performed by: ORTHOPAEDIC SURGERY

## 2020-06-10 PROCEDURE — 1159F PR MEDICATION LIST DOCUMENTED IN MEDICAL RECORD: ICD-10-PCS | Mod: S$GLB,,, | Performed by: ORTHOPAEDIC SURGERY

## 2020-06-10 PROCEDURE — 3079F DIAST BP 80-89 MM HG: CPT | Mod: S$GLB,,, | Performed by: INTERNAL MEDICINE

## 2020-06-10 PROCEDURE — 1101F PR PT FALLS ASSESS DOC 0-1 FALLS W/OUT INJ PAST YR: ICD-10-PCS | Mod: S$GLB,,, | Performed by: INTERNAL MEDICINE

## 2020-06-10 NOTE — PROGRESS NOTES
70 years old and has improved since we gave her an injection into right shoulder 3 months ago.  Pain is starting to return slightly.  She was unable go to therapy but does have therapy set up here in the near future.  Comes to our office today because she was in the area and thought that she should get an injection into her shoulder    Assessment:  Rotator cuff disease, probable cuff tear in this 69-year-old    Plan:  Physical therapy, follow up as needed

## 2020-06-10 NOTE — PROGRESS NOTES
"Subjective:       Patient ID: Allyson Henriquez is a 70 y.o. White female who presents for return patient evaluation for chronic renal failure.    She has been lost to follow-up since 2016.  She has no uremic or urinary symptoms and is in her usual state of health.  There have been no recent illnesses, hospitalizations or procedures.        Review of Systems   Constitutional: Negative for appetite change, chills and fever.   Eyes: Negative for visual disturbance.   Respiratory: Positive for cough (chronic) and shortness of breath (with exertion).    Cardiovascular: Positive for leg swelling (ankles B). Negative for chest pain.   Gastrointestinal: Negative for diarrhea, nausea and vomiting.   Genitourinary: Negative for difficulty urinating, dysuria and hematuria.   Musculoskeletal: Positive for arthralgias (R shoulder). Negative for myalgias.   Skin: Negative for rash.   Neurological: Negative for headaches.   Psychiatric/Behavioral: Negative for sleep disturbance.        The past medical, family and social histories were reviewed for this encounter.     /80 (BP Location: Right arm, Patient Position: Sitting)   Pulse 110   Ht 5' 6" (1.676 m)   Wt 113 kg (249 lb 1.9 oz)   SpO2 98%   BMI 40.21 kg/m²     Objective:      Physical Exam   Constitutional: She is oriented to person, place, and time. She appears well-developed and well-nourished. No distress.   HENT:   Head: Normocephalic and atraumatic.   Eyes: Conjunctivae are normal.   Neck: Neck supple. No JVD present.   Cardiovascular: Normal rate and normal heart sounds. Exam reveals no gallop and no friction rub.   No murmur heard.  Irregularly irregular   Pulmonary/Chest: Effort normal and breath sounds normal. No respiratory distress. She has no wheezes. She has no rales.   Abdominal: Soft. Bowel sounds are normal. She exhibits no distension. There is no tenderness.   Musculoskeletal: She exhibits edema (trace-1+ BLE).   Neurological: She is alert and " oriented to person, place, and time.   Skin: Skin is warm and dry. No rash noted.   Psychiatric: She has a normal mood and affect.   Vitals reviewed.      Assessment:       1. Proteinuria, unspecified type    2. Essential hypertension    3. Acquired cyst of kidney    4. Type 2 diabetes mellitus with stage 2 chronic kidney disease, without long-term current use of insulin        Plan:   Return to clinic in 8 months.  Labs for next visit include rp, upc, renal US.    Baseline creatinine is 1.2.  Renal US shows R 11.0 cm, L 10.2 cm.  Her cysts are stable radiographically.  Blood pressure is controlled on the current regimen.  Please have patient follow-up with your PCP or Endocrinology regarding the control and management of diabetes.  UPC is 1.79.  Continue ARB for her proteinuria.  We discussed making lifestyle changes and using a Mediterranean diet for health benefits and weight loss.  Renal US shows R 11.0 cm, L 10.2 cm.

## 2020-06-23 ENCOUNTER — LAB VISIT (OUTPATIENT)
Dept: LAB | Facility: HOSPITAL | Age: 70
End: 2020-06-23
Attending: INTERNAL MEDICINE
Payer: MEDICARE

## 2020-06-23 ENCOUNTER — PATIENT MESSAGE (OUTPATIENT)
Dept: NEPHROLOGY | Facility: CLINIC | Age: 70
End: 2020-06-23

## 2020-06-23 DIAGNOSIS — R30.0 DYSURIA: Primary | ICD-10-CM

## 2020-06-23 DIAGNOSIS — R30.0 DYSURIA: ICD-10-CM

## 2020-06-23 LAB
BACTERIA #/AREA URNS AUTO: ABNORMAL /HPF
BILIRUB UR QL STRIP: NEGATIVE
CLARITY UR REFRACT.AUTO: CLEAR
COLOR UR AUTO: ABNORMAL
GLUCOSE UR QL STRIP: NEGATIVE
HGB UR QL STRIP: ABNORMAL
HYALINE CASTS UR QL AUTO: 0 /LPF
KETONES UR QL STRIP: NEGATIVE
LEUKOCYTE ESTERASE UR QL STRIP: ABNORMAL
MICROSCOPIC COMMENT: ABNORMAL
NITRITE UR QL STRIP: NEGATIVE
PH UR STRIP: 6 [PH] (ref 5–8)
PROT UR QL STRIP: ABNORMAL
RBC #/AREA URNS AUTO: 7 /HPF (ref 0–4)
SP GR UR STRIP: 1 (ref 1–1.03)
SQUAMOUS #/AREA URNS AUTO: 3 /HPF
URN SPEC COLLECT METH UR: ABNORMAL
WBC #/AREA URNS AUTO: 27 /HPF (ref 0–5)
WBC CLUMPS UR QL AUTO: ABNORMAL

## 2020-06-23 PROCEDURE — 87186 SC STD MICRODIL/AGAR DIL: CPT

## 2020-06-23 PROCEDURE — 87088 URINE BACTERIA CULTURE: CPT

## 2020-06-23 PROCEDURE — 87077 CULTURE AEROBIC IDENTIFY: CPT

## 2020-06-23 PROCEDURE — 87086 URINE CULTURE/COLONY COUNT: CPT

## 2020-06-23 PROCEDURE — 81001 URINALYSIS AUTO W/SCOPE: CPT

## 2020-06-24 ENCOUNTER — TELEPHONE (OUTPATIENT)
Dept: NEPHROLOGY | Facility: CLINIC | Age: 70
End: 2020-06-24

## 2020-06-24 DIAGNOSIS — R30.0 DYSURIA: Primary | ICD-10-CM

## 2020-06-24 RX ORDER — NITROFURANTOIN (MACROCRYSTALS) 100 MG/1
100 CAPSULE ORAL EVERY 12 HOURS
Qty: 14 CAPSULE | Refills: 0 | Status: SHIPPED | OUTPATIENT
Start: 2020-06-24 | End: 2020-06-26 | Stop reason: ALTCHOICE

## 2020-06-24 NOTE — TELEPHONE ENCOUNTER
Urine is very cloudy, burning when using the bathroom in morning (first time in morning). Pressure in morning, she is drinking plenty of water.

## 2020-06-25 ENCOUNTER — PATIENT MESSAGE (OUTPATIENT)
Dept: ORTHOPEDICS | Facility: CLINIC | Age: 70
End: 2020-06-25

## 2020-06-25 DIAGNOSIS — M75.81 ROTATOR CUFF TENDINITIS, RIGHT: Primary | ICD-10-CM

## 2020-06-26 DIAGNOSIS — M75.81 ROTATOR CUFF TENDINITIS, RIGHT: Primary | ICD-10-CM

## 2020-06-26 LAB — BACTERIA UR CULT: ABNORMAL

## 2020-06-26 RX ORDER — CIPROFLOXACIN 250 MG/1
250 TABLET, FILM COATED ORAL EVERY 12 HOURS
Qty: 14 TABLET | Refills: 0 | Status: SHIPPED | OUTPATIENT
Start: 2020-06-26 | End: 2020-09-21

## 2020-06-29 ENCOUNTER — PATIENT MESSAGE (OUTPATIENT)
Dept: NEPHROLOGY | Facility: CLINIC | Age: 70
End: 2020-06-29

## 2020-06-29 NOTE — TELEPHONE ENCOUNTER
Spoke to the pt and she was very upset that she was prescribed an antibiotic and then it was changed.  She had the understanding that someone other then Dr Gallagher called in the medication until the final results were in.    It was explained to her that when she is symptomatic an antibiotic is ordered to make her feel better, but then it may not always be sensitive to it.  She is on the correct medication and is feeling much better, no pain, fever or dysuria.    The pt also requested that her chart be marked that NO antibiotic will be ordered until the final results are received.

## 2020-06-30 LAB
LEFT EYE DM RETINOPATHY: NEGATIVE
RIGHT EYE DM RETINOPATHY: NEGATIVE

## 2020-07-10 ENCOUNTER — PATIENT MESSAGE (OUTPATIENT)
Dept: FAMILY MEDICINE | Facility: CLINIC | Age: 70
End: 2020-07-10

## 2020-07-10 RX ORDER — CIPROFLOXACIN AND DEXAMETHASONE 3; 1 MG/ML; MG/ML
4 SUSPENSION/ DROPS AURICULAR (OTIC) 2 TIMES DAILY
Qty: 7.5 ML | Refills: 5 | Status: SHIPPED | OUTPATIENT
Start: 2020-07-10 | End: 2021-06-21

## 2020-09-02 ENCOUNTER — TELEPHONE (OUTPATIENT)
Dept: FAMILY MEDICINE | Facility: CLINIC | Age: 70
End: 2020-09-02

## 2020-09-02 ENCOUNTER — PATIENT MESSAGE (OUTPATIENT)
Dept: FAMILY MEDICINE | Facility: CLINIC | Age: 70
End: 2020-09-02

## 2020-09-02 DIAGNOSIS — E78.00 HYPERCHOLESTEROLEMIA: Primary | ICD-10-CM

## 2020-09-02 NOTE — TELEPHONE ENCOUNTER
Spoke with pt and pt's  regarding upcoming annual appointment and [employer] message requesting labs prior to visit. Rescheduled pt and pt's  for annual on 9/21/20 and scheduled both pt's for lab work on 9/11/20.

## 2020-09-02 NOTE — TELEPHONE ENCOUNTER
Pt requesting lab work done prior to her annual appointment with Dr. Pimentel. Lipid and A1C WOG orders placed.

## 2020-09-10 ENCOUNTER — PATIENT OUTREACH (OUTPATIENT)
Dept: ADMINISTRATIVE | Facility: HOSPITAL | Age: 70
End: 2020-09-10

## 2020-09-10 NOTE — PROGRESS NOTES
Health Maintenance Due   Topic Date Due    Foot Exam  1960    Shingles Vaccine (2 of 3) 2014    Eye Exam  2017    DEXA SCAN  2020    Influenza Vaccine (1) 2020       Chart review completed 09/10/2020.  Care Everywhere updates requested and reviewed.  Immunizations reconciled. Media reports reviewed.  Duplicate HM overrides and  orders removed.  Overdue HM topic chart audit and/or requested.  Overdue lab testing linked to upcoming lab appointments if applies.

## 2020-09-11 ENCOUNTER — LAB VISIT (OUTPATIENT)
Dept: LAB | Facility: HOSPITAL | Age: 70
End: 2020-09-11
Attending: INTERNAL MEDICINE
Payer: MEDICARE

## 2020-09-11 DIAGNOSIS — I51.7 LVH (LEFT VENTRICULAR HYPERTROPHY): ICD-10-CM

## 2020-09-11 DIAGNOSIS — E78.00 HYPERCHOLESTEROLEMIA: ICD-10-CM

## 2020-09-11 DIAGNOSIS — I48.0 PAF (PAROXYSMAL ATRIAL FIBRILLATION): ICD-10-CM

## 2020-09-11 DIAGNOSIS — Z79.01 CURRENT USE OF LONG TERM ANTICOAGULATION: ICD-10-CM

## 2020-09-11 DIAGNOSIS — I10 ESSENTIAL HYPERTENSION: ICD-10-CM

## 2020-09-11 LAB — HGB BLD-MCNC: 14 G/DL (ref 12–16)

## 2020-09-11 PROCEDURE — 80053 COMPREHEN METABOLIC PANEL: CPT

## 2020-09-11 PROCEDURE — 80061 LIPID PANEL: CPT

## 2020-09-11 PROCEDURE — 36415 COLL VENOUS BLD VENIPUNCTURE: CPT | Mod: PN

## 2020-09-11 PROCEDURE — 83036 HEMOGLOBIN GLYCOSYLATED A1C: CPT

## 2020-09-11 PROCEDURE — 85018 HEMOGLOBIN: CPT

## 2020-09-12 LAB
ALBUMIN SERPL BCP-MCNC: 3.8 G/DL (ref 3.5–5.2)
ALP SERPL-CCNC: 65 U/L (ref 55–135)
ALT SERPL W/O P-5'-P-CCNC: 13 U/L (ref 10–44)
ANION GAP SERPL CALC-SCNC: 11 MMOL/L (ref 8–16)
AST SERPL-CCNC: 16 U/L (ref 10–40)
BILIRUB SERPL-MCNC: 0.8 MG/DL (ref 0.1–1)
BUN SERPL-MCNC: 22 MG/DL (ref 8–23)
CALCIUM SERPL-MCNC: 10 MG/DL (ref 8.7–10.5)
CHLORIDE SERPL-SCNC: 102 MMOL/L (ref 95–110)
CHOLEST SERPL-MCNC: 145 MG/DL (ref 120–199)
CHOLEST/HDLC SERPL: 3.3 {RATIO} (ref 2–5)
CO2 SERPL-SCNC: 28 MMOL/L (ref 23–29)
CREAT SERPL-MCNC: 1.4 MG/DL (ref 0.5–1.4)
EST. GFR  (AFRICAN AMERICAN): 43.6 ML/MIN/1.73 M^2
EST. GFR  (NON AFRICAN AMERICAN): 37.8 ML/MIN/1.73 M^2
ESTIMATED AVG GLUCOSE: 134 MG/DL (ref 68–131)
GLUCOSE SERPL-MCNC: 133 MG/DL (ref 70–110)
HBA1C MFR BLD HPLC: 6.3 % (ref 4–5.6)
HDLC SERPL-MCNC: 44 MG/DL (ref 40–75)
HDLC SERPL: 30.3 % (ref 20–50)
LDLC SERPL CALC-MCNC: 49.6 MG/DL (ref 63–159)
NONHDLC SERPL-MCNC: 101 MG/DL
POTASSIUM SERPL-SCNC: 4.6 MMOL/L (ref 3.5–5.1)
PROT SERPL-MCNC: 6.8 G/DL (ref 6–8.4)
SODIUM SERPL-SCNC: 141 MMOL/L (ref 136–145)
TRIGL SERPL-MCNC: 257 MG/DL (ref 30–150)

## 2020-09-21 ENCOUNTER — OFFICE VISIT (OUTPATIENT)
Dept: FAMILY MEDICINE | Facility: CLINIC | Age: 70
End: 2020-09-21
Payer: MEDICARE

## 2020-09-21 VITALS
BODY MASS INDEX: 39.9 KG/M2 | TEMPERATURE: 98 F | HEART RATE: 84 BPM | HEIGHT: 66 IN | OXYGEN SATURATION: 97 % | DIASTOLIC BLOOD PRESSURE: 74 MMHG | SYSTOLIC BLOOD PRESSURE: 130 MMHG | WEIGHT: 248.25 LBS

## 2020-09-21 DIAGNOSIS — M25.512 CHRONIC PAIN OF BOTH SHOULDERS: Primary | ICD-10-CM

## 2020-09-21 DIAGNOSIS — R26.89 BALANCE PROBLEM: ICD-10-CM

## 2020-09-21 DIAGNOSIS — G89.29 CHRONIC PAIN OF BOTH SHOULDERS: Primary | ICD-10-CM

## 2020-09-21 DIAGNOSIS — J44.9 CHRONIC OBSTRUCTIVE PULMONARY DISEASE, UNSPECIFIED COPD TYPE: ICD-10-CM

## 2020-09-21 DIAGNOSIS — N28.1 RENAL CYST: ICD-10-CM

## 2020-09-21 DIAGNOSIS — I13.0 BENIGN HYPERTENSIVE HEART AND KIDNEY DISEASE WITH CHF, NYHA CLASS 1 AND CKD STAGE 3: ICD-10-CM

## 2020-09-21 DIAGNOSIS — M25.511 CHRONIC PAIN OF BOTH SHOULDERS: Primary | ICD-10-CM

## 2020-09-21 DIAGNOSIS — R73.03 PREDIABETES: ICD-10-CM

## 2020-09-21 DIAGNOSIS — N18.30 BENIGN HYPERTENSIVE HEART AND KIDNEY DISEASE WITH CHF, NYHA CLASS 1 AND CKD STAGE 3: ICD-10-CM

## 2020-09-21 DIAGNOSIS — Z78.0 POSTMENOPAUSAL STATE: ICD-10-CM

## 2020-09-21 DIAGNOSIS — I73.9 CLAUDICATION: ICD-10-CM

## 2020-09-21 DIAGNOSIS — Z00.00 ROUTINE GENERAL MEDICAL EXAMINATION AT A HEALTH CARE FACILITY: ICD-10-CM

## 2020-09-21 PROCEDURE — 3078F PR MOST RECENT DIASTOLIC BLOOD PRESSURE < 80 MM HG: ICD-10-PCS | Mod: S$GLB,,, | Performed by: FAMILY MEDICINE

## 2020-09-21 PROCEDURE — 3075F SYST BP GE 130 - 139MM HG: CPT | Mod: S$GLB,,, | Performed by: FAMILY MEDICINE

## 2020-09-21 PROCEDURE — 99397 PR PREVENTIVE VISIT,EST,65 & OVER: ICD-10-PCS | Mod: S$GLB,,, | Performed by: FAMILY MEDICINE

## 2020-09-21 PROCEDURE — 3075F PR MOST RECENT SYSTOLIC BLOOD PRESS GE 130-139MM HG: ICD-10-PCS | Mod: S$GLB,,, | Performed by: FAMILY MEDICINE

## 2020-09-21 PROCEDURE — 99999 PR PBB SHADOW E&M-EST. PATIENT-LVL IV: CPT | Mod: PBBFAC,,, | Performed by: FAMILY MEDICINE

## 2020-09-21 PROCEDURE — 99397 PER PM REEVAL EST PAT 65+ YR: CPT | Mod: S$GLB,,, | Performed by: FAMILY MEDICINE

## 2020-09-21 PROCEDURE — 3078F DIAST BP <80 MM HG: CPT | Mod: S$GLB,,, | Performed by: FAMILY MEDICINE

## 2020-09-21 PROCEDURE — 99999 PR PBB SHADOW E&M-EST. PATIENT-LVL IV: ICD-10-PCS | Mod: PBBFAC,,, | Performed by: FAMILY MEDICINE

## 2020-09-21 NOTE — PROGRESS NOTES
"Subjective:       Patient ID: Allyson Henriquez is a 70 y.o. female.    Chief Complaint: Annual Exam      Allyson Henriquez is in the office for annual exam.    HPI  No updates to medical hx.   Past Medical History:   Diagnosis Date    Acoustic neuroma     left ear    Acquired deafness of left ear     Atrial fibrillation     Bell's palsy     with fatigue and stress    COPD (chronic obstructive pulmonary disease)     History of tobacco abuse     Hyperlipidemia     Hypertension     Multinodular goiter (nontoxic) 5/8/2017    TRISHA (obstructive sleep apnea)     Proteinuria      R shoulder pain - improved with injection and PT. Would like to go to outside PT for continued work.  occ pain the RLQ in space of prev appy scar. Intermittent and infrequent (1/mth), lasts for 7-10 mins before resolving. Improves when she changes position, walks around.  Was walking on the treadmill, previously, she started having increased pain in her thigh, going down legs. Muscle tension while walking and resolved after.   BMs improved when she switched coffee types.   occ feels shaky and has to eat to feel better.   Has upcoming appt with cards/[facility]. occ squeeze under the L breast, lasts a second or two and then clears. Not exertional.       Current Outpatient Medications:     ciprofloxacin-dexamethasone 0.3-0.1% (CIPRODEX) 0.3-0.1 % DrpS, Place 4 drops into the right ear 2 (two) times daily., Disp: 7.5 mL, Rfl: 5    dabigatran etexilate (PRADAXA) 150 mg Cap, TAKE 1 CAPSULE(150 MG) BY MOUTH TWICE DAILY. DO NOT BREAK, CHEW, OR OPEN CAPSULES.", Disp: 180 capsule, Rfl: 1    fluticasone (FLONASE ALLERGY RELIEF) 50 mcg/actuation nasal spray, 1 spray by Nasal route 2 (two) times daily as needed., Disp: , Rfl:     hydroCHLOROthiazide (HYDRODIURIL) 12.5 MG Tab, TAKE 1 TABLET(12.5 MG) BY MOUTH EVERY DAY, Disp: 90 tablet, Rfl: 1    losartan (COZAAR) 100 MG tablet, TAKE 1 TABLET(100 MG) BY MOUTH EVERY DAY, Disp: 90 tablet, Rfl: 0    " metoprolol succinate (TOPROL-XL) 50 MG 24 hr tablet, TAKE 1 TABLET BY MOUTH EVERY DAY, Disp: 90 tablet, Rfl: 0    metoprolol tartrate (LOPRESSOR) 25 MG tablet, TAKE 1 TABLET BY MOUTH TWICE DAILY AS NEEDED, Disp: 60 tablet, Rfl: 1    simvastatin (ZOCOR) 40 MG tablet, TAKE 1 TABLET(40 MG) BY MOUTH EVERY EVENING, Disp: 90 tablet, Rfl: 0    vit C/E/Zn/coppr/lutein/zeaxan (OCUVITE LUTEIN AND ZEAXANTHIN ORAL), Take 1 capsule by mouth once daily. , Disp: , Rfl:   No current facility-administered medications for this visit.     Facility-Administered Medications Ordered in Other Visits:     EUFLEXXA 10 mg/mL(mw 2.4 -3.6 million) injection Syrg 20 mg, 20 mg, Intra-articular, , Jose Rafael Barney MD, 20 mg at 06/14/17 1605    The 10-year ASCVD risk score (Tevinayanna HO Jr., et al., 2013) is: 24.9%    Values used to calculate the score:      Age: 70 years      Sex: Female      Is Non- : No      Diabetic: Yes      Tobacco smoker: No      Systolic Blood Pressure: 130 mmHg      Is BP treated: Yes      HDL Cholesterol: 44 mg/dL      Total Cholesterol: 145 mg/dL     Lab Results   Component Value Date    HGBA1C 6.3 (H) 09/11/2020    HGBA1C 6.1 (H) 06/02/2020    HGBA1C 6.1 (H) 10/15/2019     Lab Results   Component Value Date    LDLCALC 49.6 (L) 09/11/2020    CREATININE 1.4 09/11/2020   labs 2020 rev.    Review of Systems   Constitutional: Negative for activity change (decreased treadmill use given leg symptoms), fatigue and unexpected weight change.   HENT: Negative for congestion, hearing loss, rhinorrhea and sore throat.    Eyes: Negative for discharge and visual disturbance.   Respiratory: Negative for cough, shortness of breath and wheezing.    Cardiovascular: Negative for chest pain, palpitations (occ) and leg swelling.   Gastrointestinal: Negative for blood in stool, constipation, diarrhea and nausea.        No gerd c/o.    Genitourinary: Negative for difficulty urinating and dysuria.    Musculoskeletal: Positive for arthralgias (bilateral shoulders) and myalgias (legs start to bother her after walking on the treadmill). Negative for joint swelling and neck pain.        Notices that she's less secure on her feet in certain activities   Neurological: Negative for dizziness and headaches.   Psychiatric/Behavioral: Negative for dysphoric mood and sleep disturbance.           Objective:      Physical Exam  Vitals signs and nursing note reviewed.   Constitutional:       General: She is not in acute distress.     Appearance: She is well-developed. She is obese.   HENT:      Head: Normocephalic and atraumatic.      Right Ear: External ear normal.      Left Ear: External ear normal.      Nose: Nose normal.      Mouth/Throat:      Pharynx: No oropharyngeal exudate.   Eyes:      General: No scleral icterus.        Right eye: No discharge.         Left eye: No discharge.      Conjunctiva/sclera: Conjunctivae normal.      Pupils: Pupils are equal, round, and reactive to light.   Neck:      Musculoskeletal: Normal range of motion and neck supple.      Thyroid: No thyromegaly.   Cardiovascular:      Rate and Rhythm: Normal rate.      Heart sounds: No murmur.   Pulmonary:      Effort: Pulmonary effort is normal. No respiratory distress.      Breath sounds: Normal breath sounds. No wheezing.   Abdominal:      Palpations: Abdomen is soft.      Tenderness: There is no guarding.      Comments: Exam limited by habitus.   Musculoskeletal: Normal range of motion.   Skin:     General: Skin is warm and dry.      Findings: No rash.   Neurological:      General: No focal deficit present.      Mental Status: She is alert and oriented to person, place, and time.      Cranial Nerves: No cranial nerve deficit.   Psychiatric:         Mood and Affect: Mood normal.         Behavior: Behavior normal.             Screening recommendations appropriate to age and health status were reviewed.    Assessment & Plan:    Chronic pain of  both shoulders  Comments:  ongoing issues with both shoulders  Orders:  -     Ambulatory referral/consult to Physical/Occupational Therapy; Future; Expected date: 09/28/2020    Balance problem  Comments:  PT recommended for fall prevention  Orders:  -     Ambulatory referral/consult to Physical/Occupational Therapy; Future; Expected date: 09/28/2020    Claudication  Comments:  leg symptoms occur only with focused exercise, update CHELLY for possible claudication, sx may also be related to known issues in lumbar spine  Orders:  -     VAS US Ankle Brachial Indices with Exercise; Future    Postmenopausal state  -     DXA Bone Density Spine And Hip; Future; Expected date: 09/21/2020    Prediabetes  Comments:  A1c is stable, patient aware of need to continue healthy eating habits and regular exercise    Renal cyst  Comments:  maintains follow-up with nephro/boo    Routine general medical examination at a health care facility  Comments:  anticipatory guidance reviewed    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

## 2020-09-22 ENCOUNTER — PATIENT MESSAGE (OUTPATIENT)
Dept: FAMILY MEDICINE | Facility: CLINIC | Age: 70
End: 2020-09-22

## 2020-09-22 DIAGNOSIS — I73.9 CLAUDICATION: Primary | ICD-10-CM

## 2020-09-22 PROBLEM — E11.22 TYPE 2 DIABETES MELLITUS WITH CHRONIC KIDNEY DISEASE, WITHOUT LONG-TERM CURRENT USE OF INSULIN: Status: RESOLVED | Noted: 2019-10-23 | Resolved: 2020-09-22

## 2020-09-22 PROBLEM — Z86.39 HISTORY OF DIABETES MELLITUS: Status: RESOLVED | Noted: 2020-09-22 | Resolved: 2020-09-22

## 2020-09-22 PROBLEM — Z86.39 HISTORY OF DIABETES MELLITUS: Status: ACTIVE | Noted: 2020-09-22

## 2020-09-22 NOTE — TELEPHONE ENCOUNTER
CHELLY's were ordered as VAS and need to be ordered as RAD (US) order pended  Please sign and I will call pt

## 2020-09-22 NOTE — TELEPHONE ENCOUNTER
The referral I placed was for [facility]. I'm not sure why [facility] called her. Please fax the referral to [facility].

## 2020-09-25 ENCOUNTER — PATIENT OUTREACH (OUTPATIENT)
Dept: ADMINISTRATIVE | Facility: OTHER | Age: 70
End: 2020-09-25

## 2020-09-25 NOTE — PROGRESS NOTES
LINKS immunization registry updated  Care Everywhere updated  Health Maintenance updated  Chart reviewed for overdue Proactive [facility] Encounters (JT) health maintenance testing (CRS, Breast Ca, Diabetic Eye Exam)   Orders entered:N/A

## 2020-09-28 ENCOUNTER — PATIENT MESSAGE (OUTPATIENT)
Dept: CARDIOLOGY | Facility: CLINIC | Age: 70
End: 2020-09-28

## 2020-09-28 ENCOUNTER — HOSPITAL ENCOUNTER (OUTPATIENT)
Dept: RADIOLOGY | Facility: HOSPITAL | Age: 70
Discharge: HOME | End: 2020-09-28
Attending: FAMILY MEDICINE
Payer: MEDICARE

## 2020-09-28 ENCOUNTER — OFFICE VISIT (OUTPATIENT)
Dept: CARDIOLOGY | Facility: CLINIC | Age: 70
End: 2020-09-28
Payer: MEDICARE

## 2020-09-28 VITALS
DIASTOLIC BLOOD PRESSURE: 72 MMHG | SYSTOLIC BLOOD PRESSURE: 124 MMHG | OXYGEN SATURATION: 97 % | HEIGHT: 66 IN | HEART RATE: 75 BPM | WEIGHT: 248.88 LBS | BODY MASS INDEX: 40 KG/M2

## 2020-09-28 DIAGNOSIS — I48.0 PAF (PAROXYSMAL ATRIAL FIBRILLATION): Primary | ICD-10-CM

## 2020-09-28 DIAGNOSIS — E66.01 OBESITY, MORBID, BMI 40.0-49.9: ICD-10-CM

## 2020-09-28 DIAGNOSIS — E78.00 HYPERCHOLESTEROLEMIA: ICD-10-CM

## 2020-09-28 DIAGNOSIS — Z78.0 POSTMENOPAUSAL STATE: ICD-10-CM

## 2020-09-28 DIAGNOSIS — I10 ESSENTIAL HYPERTENSION: ICD-10-CM

## 2020-09-28 DIAGNOSIS — G47.33 OBSTRUCTIVE SLEEP APNEA: ICD-10-CM

## 2020-09-28 PROCEDURE — 3074F SYST BP LT 130 MM HG: CPT | Mod: S$GLB,,, | Performed by: INTERNAL MEDICINE

## 2020-09-28 PROCEDURE — 3074F PR MOST RECENT SYSTOLIC BLOOD PRESSURE < 130 MM HG: ICD-10-PCS | Mod: S$GLB,,, | Performed by: INTERNAL MEDICINE

## 2020-09-28 PROCEDURE — 99214 PR OFFICE/OUTPT VISIT, EST, LEVL IV, 30-39 MIN: ICD-10-PCS | Mod: S$GLB,,, | Performed by: INTERNAL MEDICINE

## 2020-09-28 PROCEDURE — 1126F AMNT PAIN NOTED NONE PRSNT: CPT | Mod: S$GLB,,, | Performed by: INTERNAL MEDICINE

## 2020-09-28 PROCEDURE — 77080 DXA BONE DENSITY AXIAL: CPT | Mod: TC,PO

## 2020-09-28 PROCEDURE — 77080 DXA BONE DENSITY AXIAL: CPT | Mod: 26,,, | Performed by: RADIOLOGY

## 2020-09-28 PROCEDURE — 3008F PR BODY MASS INDEX (BMI) DOCUMENTED: ICD-10-PCS | Mod: S$GLB,,, | Performed by: INTERNAL MEDICINE

## 2020-09-28 PROCEDURE — 3078F PR MOST RECENT DIASTOLIC BLOOD PRESSURE < 80 MM HG: ICD-10-PCS | Mod: S$GLB,,, | Performed by: INTERNAL MEDICINE

## 2020-09-28 PROCEDURE — 1159F PR MEDICATION LIST DOCUMENTED IN MEDICAL RECORD: ICD-10-PCS | Mod: S$GLB,,, | Performed by: INTERNAL MEDICINE

## 2020-09-28 PROCEDURE — 3078F DIAST BP <80 MM HG: CPT | Mod: S$GLB,,, | Performed by: INTERNAL MEDICINE

## 2020-09-28 PROCEDURE — 99214 OFFICE O/P EST MOD 30 MIN: CPT | Mod: S$GLB,,, | Performed by: INTERNAL MEDICINE

## 2020-09-28 PROCEDURE — 1159F MED LIST DOCD IN RCRD: CPT | Mod: S$GLB,,, | Performed by: INTERNAL MEDICINE

## 2020-09-28 PROCEDURE — 1101F PR PT FALLS ASSESS DOC 0-1 FALLS W/OUT INJ PAST YR: ICD-10-PCS | Mod: S$GLB,,, | Performed by: INTERNAL MEDICINE

## 2020-09-28 PROCEDURE — 99999 PR PBB SHADOW E&M-EST. PATIENT-LVL IV: ICD-10-PCS | Mod: PBBFAC,,, | Performed by: INTERNAL MEDICINE

## 2020-09-28 PROCEDURE — 77080 DEXA BONE DENSITY SPINE HIP: ICD-10-PCS | Mod: 26,,, | Performed by: RADIOLOGY

## 2020-09-28 PROCEDURE — 99999 PR PBB SHADOW E&M-EST. PATIENT-LVL IV: CPT | Mod: PBBFAC,,, | Performed by: INTERNAL MEDICINE

## 2020-09-28 PROCEDURE — 1101F PT FALLS ASSESS-DOCD LE1/YR: CPT | Mod: S$GLB,,, | Performed by: INTERNAL MEDICINE

## 2020-09-28 PROCEDURE — 3008F BODY MASS INDEX DOCD: CPT | Mod: S$GLB,,, | Performed by: INTERNAL MEDICINE

## 2020-09-28 PROCEDURE — 1126F PR PAIN SEVERITY QUANTIFIED, NO PAIN PRESENT: ICD-10-PCS | Mod: S$GLB,,, | Performed by: INTERNAL MEDICINE

## 2020-09-28 RX ORDER — DABIGATRAN ETEXILATE 150 MG/1
CAPSULE ORAL
Qty: 180 CAPSULE | Refills: 1 | Status: SHIPPED | OUTPATIENT
Start: 2020-09-28 | End: 2021-04-01

## 2020-09-28 NOTE — PROGRESS NOTES
Subjective:    Patient ID:  Allyson Henriquez is a 70 y.o. female who presents for Atrial Fibrillation (labs), Hypertension, and Hyperlipidemia        HPI    DISCUSSED LABS AND GOALS, LDL 49.6, CMP OK, HBA1C 6.3 #, INTERMITTENT PALPITATIONS, , OOD DAYS AND BAD DAYS,LEGS HURTING , TO HAVE BONE SCAN,  USES CPAP REG AND BENEFITS FROM IT, SEE ROS  Past Medical History:   Diagnosis Date    Acoustic neuroma     left ear    Acquired deafness of left ear     Atrial fibrillation     Bell's palsy     with fatigue and stress    COPD (chronic obstructive pulmonary disease)     History of diabetes mellitus 9/22/2020    History of tobacco abuse     Hyperlipidemia     Hypertension     Multinodular goiter (nontoxic) 5/8/2017    TRISHA (obstructive sleep apnea)     Proteinuria      Past Surgical History:   Procedure Laterality Date    ADENOIDECTOMY      APPENDECTOMY      CATARACT EXTRACTION      COLONOSCOPY N/A 11/14/2016    Procedure: COLONOSCOPY;  Surgeon: Destin Cardona MD;  Location: [facility];  Service: Endoscopy;  Laterality: N/A;    EYE SURGERY      cataract OU    NEUROMA SURGERY Left     acoustic    TONSILLECTOMY       Family History   Problem Relation Age of Onset    Cancer Paternal Uncle         colon cancer    Diabetes Neg Hx     Kidney disease Neg Hx     Stroke Neg Hx     Heart disease Neg Hx      Social History     Socioeconomic History    Marital status:      Spouse name: Not on file    Number of children: Not on file    Years of education: Not on file    Highest education level: Not on file   Occupational History    Not on file   Social Needs    Financial resource strain: Not on file    Food insecurity     Worry: Not on file     Inability: Not on file    Transportation needs     Medical: Not on file     Non-medical: Not on file   Tobacco Use    Smoking status: Former Smoker     Packs/day: 1.00     Years: 40.00     Pack years: 40.00     Quit date: 3/6/2016     Years since  "quittin.5    Smokeless tobacco: Never Used   Substance and Sexual Activity    Alcohol use: Yes     Alcohol/week: 0.0 standard drinks     Comment: occ social    Drug use: No    Sexual activity: Not on file   Lifestyle    Physical activity     Days per week: Not on file     Minutes per session: Not on file    Stress: Not at all   Relationships    Social connections     Talks on phone: Not on file     Gets together: Not on file     Attends [employer] service: Not on file     Active member of club or organization: Not on file     Attends meetings of clubs or organizations: Not on file     Relationship status: Not on file   Other Topics Concern    Not on file   Social History Narrative    Works in insurance collection.       Review of patient's allergies indicates:   Allergen Reactions    Contrast media Anaphylaxis    Albuterol Other (See Comments)     Used during PFTs and put pt in afib    Dye     Pcn [penicillins]      Pt states did well on cephalexin.  No adverse reaction or side effect.     Doxycycline Palpitations       Current Outpatient Medications:     dabigatran etexilate (PRADAXA) 150 mg Cap, TAKE 1 CAPSULE(150 MG) BY MOUTH TWICE DAILY. DO NOT BREAK, CHEW, OR OPEN CAPSULES.", Disp: 180 capsule, Rfl: 1    fluticasone (FLONASE ALLERGY RELIEF) 50 mcg/actuation nasal spray, 1 spray by Nasal route 2 (two) times daily as needed., Disp: , Rfl:     hydroCHLOROthiazide (HYDRODIURIL) 12.5 MG Tab, TAKE 1 TABLET(12.5 MG) BY MOUTH EVERY DAY, Disp: 90 tablet, Rfl: 1    losartan (COZAAR) 100 MG tablet, TAKE 1 TABLET(100 MG) BY MOUTH EVERY DAY, Disp: 90 tablet, Rfl: 0    metoprolol succinate (TOPROL-XL) 50 MG 24 hr tablet, TAKE 1 TABLET BY MOUTH EVERY DAY, Disp: 90 tablet, Rfl: 0    metoprolol tartrate (LOPRESSOR) 25 MG tablet, TAKE 1 TABLET BY MOUTH TWICE DAILY AS NEEDED (Patient taking differently: 25 mg. TAKE 1 TABLET BY MOUTH DAILY AS NEEDED), Disp: 60 tablet, Rfl: 1    simvastatin (ZOCOR) 40 MG " tablet, TAKE 1 TABLET(40 MG) BY MOUTH EVERY EVENING, Disp: 90 tablet, Rfl: 0    vit C/E/Zn/coppr/lutein/zeaxan (OCUVITE LUTEIN AND ZEAXANTHIN ORAL), Take 1 capsule by mouth 2 (two) times a day. , Disp: , Rfl:     ciprofloxacin-dexamethasone 0.3-0.1% (CIPRODEX) 0.3-0.1 % DrpS, Place 4 drops into the right ear 2 (two) times daily., Disp: 7.5 mL, Rfl: 5  No current facility-administered medications for this visit.     Facility-Administered Medications Ordered in Other Visits:     EUFLEXXA 10 mg/mL(mw 2.4 -3.6 million) injection Syrg 20 mg, 20 mg, Intra-articular, , Jose Rafael Barney MD, 20 mg at 06/14/17 1605    Review of Systems   Constitution: Positive for malaise/fatigue and weight gain. Negative for chills, diaphoresis, fever and night sweats.   HENT: Negative for congestion and nosebleeds.    Eyes: Negative for blurred vision and visual disturbance.   Cardiovascular: Positive for palpitations. Negative for chest pain, claudication, cyanosis, dyspnea on exertion (MILD,STAIRS), irregular heartbeat (MORE FREQUENT), leg swelling, near-syncope, orthopnea, paroxysmal nocturnal dyspnea and syncope.   Respiratory: Negative for cough, hemoptysis, shortness of breath and wheezing.    Endocrine: Negative for cold intolerance, heat intolerance and polyuria.   Hematologic/Lymphatic: Negative for adenopathy and bleeding problem. Does not bruise/bleed easily.   Skin: Negative for color change, itching and rash.   Musculoskeletal: Positive for joint pain (LEGS). Negative for back pain and falls.   Gastrointestinal: Negative for abdominal pain, change in bowel habit, heartburn, hematemesis, jaundice, melena and vomiting.   Genitourinary: Negative for dysuria, flank pain and frequency.   Neurological: Negative for brief paralysis, dizziness, focal weakness, light-headedness, numbness (HANDS,NIGHT) and weakness.   Psychiatric/Behavioral: Negative for altered mental status and memory loss.   Allergic/Immunologic: Negative for  "hives and persistent infections.        Objective:      Vitals:    09/28/20 0831   BP: 124/72   Pulse: 75   SpO2: 97%   Weight: 112.9 kg (248 lb 14.4 oz)   Height: 5' 6" (1.676 m)   PainSc: 0-No pain     Body mass index is 40.17 kg/m².    Physical Exam   Constitutional: She is oriented to person, place, and time. She appears well-developed and well-nourished. She is active.   OBESE   HENT:   Head: Normocephalic and atraumatic.   Mouth/Throat: Oropharynx is clear and moist and mucous membranes are normal.   Eyes: Pupils are equal, round, and reactive to light. Conjunctivae and EOM are normal.   Neck: Trachea normal. Normal carotid pulses, no hepatojugular reflux and no JVD present. Carotid bruit is not present. No thyromegaly present.   Cardiovascular: Normal rate and normal heart sounds. An irregular rhythm present.  No extrasystoles are present. PMI is not displaced. Exam reveals no gallop and no friction rub.   No murmur heard.  Pulses:       Carotid pulses are 2+ on the right side and 2+ on the left side.       Radial pulses are 2+ on the right side and 2+ on the left side.        Femoral pulses are 2+ on the right side and 2+ on the left side.       Posterior tibial pulses are 2+ on the right side and 2+ on the left side.   Pulmonary/Chest: Effort normal and breath sounds normal. No tachypnea and no bradypnea. She has no wheezes. She has no rales. She exhibits no tenderness.   Abdominal: Soft. Bowel sounds are normal. There is no hepatosplenomegaly. There is no abdominal tenderness. There is no CVA tenderness.   Musculoskeletal: Normal range of motion.         General: No tenderness or edema.      Comments: NO ANKLE EDEMA   Neurological: She is alert and oriented to person, place, and time. She has normal strength. She displays no tremor. No cranial nerve deficit (HEARING AID).   Skin: Skin is warm and dry. No rash noted. No cyanosis.   Psychiatric: She has a normal mood and affect. Her speech is normal and " behavior is normal.               ..    Chemistry        Component Value Date/Time     09/11/2020 0926    K 4.6 09/11/2020 0926     09/11/2020 0926    CO2 28 09/11/2020 0926    BUN 22 09/11/2020 0926    CREATININE 1.4 09/11/2020 0926     (H) 09/11/2020 0926        Component Value Date/Time    CALCIUM 10.0 09/11/2020 0926    ALKPHOS 65 09/11/2020 0926    AST 16 09/11/2020 0926    ALT 13 09/11/2020 0926    BILITOT 0.8 09/11/2020 0926    ESTGFRAFRICA 43.6 (A) 09/11/2020 0926    EGFRNONAA 37.8 (A) 09/11/2020 0926            ..  Lab Results   Component Value Date    CHOL 145 09/11/2020    CHOL 162 03/06/2020    CHOL 139 10/15/2019     Lab Results   Component Value Date    HDL 44 09/11/2020    HDL 48 03/06/2020    HDL 39 (L) 10/15/2019     Lab Results   Component Value Date    LDLCALC 49.6 (L) 09/11/2020    LDLCALC 77.2 03/06/2020    LDLCALC 63.0 10/15/2019     Lab Results   Component Value Date    TRIG 257 (H) 09/11/2020    TRIG 184 (H) 03/06/2020    TRIG 185 (H) 10/15/2019     Lab Results   Component Value Date    CHOLHDL 30.3 09/11/2020    CHOLHDL 29.6 03/06/2020    CHOLHDL 28.1 10/15/2019     ..  Lab Results   Component Value Date    WBC 8.23 10/25/2017    HGB 14.0 09/11/2020    HCT 39.7 10/25/2017    MCV 83 10/25/2017     10/25/2017       Test(s) Reviewed  I have reviewed the following in detail:  [] Stress test   [] Angiography   [] Echocardiogram   [x] Labs   [] Other:       Assessment:         ICD-10-CM ICD-9-CM   1. PAF (paroxysmal atrial fibrillation)  I48.0 427.31   2. Essential hypertension  I10 401.9   3. Obstructive sleep apnea  G47.33 327.23   4. Obesity, morbid, BMI 40.0-49.9  E66.01 278.01   5. Hypercholesterolemia  E78.00 272.0     Problem List Items Addressed This Visit        Cardiac/Vascular    PAF (paroxysmal atrial fibrillation) - Primary    Relevant Orders    Holter monitor - 24 hour    Ambulatory referral/consult to Electrophysiology    Essential hypertension     "Hypercholesterolemia       Endocrine    Obesity, morbid, BMI 40.0-49.9       Other    Obstructive sleep apnea           Plan:     CONSIDER ANTI-ARRHYTMIC TX, PATIENT IS HESITANT, VS RFA, REFER TO EPS, MORE SYMPTOMS AND MORE PAROXYSMAL ATRIAL FIBRILLATION EPISODE AS ALSO NOTED ON HER APPLE WATCH, WILL CHECK HOLTER SOON, DAILY METOPROLOL EXTRA P.R.N. AS INSTRUCTED ,ALL OTHER CV CLINICALLY STABLE, NO ANGINA, NO HF, NO TIA, WORSENING CLINICAL ARRHYTHMIA,CONTINUE CURRENT MEDS, EDUCATION, DIET, EXERCISE, WEIGHT LOSS, DISCUSSED PLAN WITH PATIENT AND HER       PAF (paroxysmal atrial fibrillation)  -     Holter monitor - 24 hour; Future  -     Ambulatory referral/consult to Electrophysiology; Future; Expected date: 10/05/2020    Essential hypertension    Obstructive sleep apnea    Obesity, morbid, BMI 40.0-49.9    Hypercholesterolemia    Other orders  -     dabigatran etexilate (PRADAXA) 150 mg Cap; TAKE 1 CAPSULE(150 MG) BY MOUTH TWICE DAILY. DO NOT BREAK, CHEW, OR OPEN CAPSULES."  Dispense: 180 capsule; Refill: 1    RTC Low level/low impact aerobic exercise 5x's/wk. Heart healthy diet and risk factor modification.    See labs and med orders.    Aerobic exercise 5x's/wk. Heart healthy diet and risk factor modification.    See labs and med orders.          "

## 2020-09-29 ENCOUNTER — PATIENT MESSAGE (OUTPATIENT)
Dept: ENDOCRINOLOGY | Facility: CLINIC | Age: 70
End: 2020-09-29

## 2020-09-30 ENCOUNTER — PATIENT MESSAGE (OUTPATIENT)
Dept: ENDOCRINOLOGY | Facility: CLINIC | Age: 70
End: 2020-09-30

## 2020-10-02 ENCOUNTER — TELEPHONE (OUTPATIENT)
Dept: NEPHROLOGY | Facility: CLINIC | Age: 70
End: 2020-10-02

## 2020-10-02 ENCOUNTER — HOSPITAL ENCOUNTER (OUTPATIENT)
Dept: RADIOLOGY | Facility: HOSPITAL | Age: 70
Discharge: HOME | End: 2020-10-02
Attending: FAMILY MEDICINE
Payer: MEDICARE

## 2020-10-02 DIAGNOSIS — I73.9 CLAUDICATION: ICD-10-CM

## 2020-10-02 PROCEDURE — 93922 US ANKLE/BRACH INDICES EXT LTD W/O STR 1-2 LEVELS: ICD-10-PCS | Mod: 26,,, | Performed by: RADIOLOGY

## 2020-10-02 PROCEDURE — 93922 UPR/L XTREMITY ART 2 LEVELS: CPT | Mod: TC,PO

## 2020-10-02 PROCEDURE — 93922 UPR/L XTREMITY ART 2 LEVELS: CPT | Mod: 26,,, | Performed by: RADIOLOGY

## 2020-10-04 ENCOUNTER — PATIENT MESSAGE (OUTPATIENT)
Dept: NEPHROLOGY | Facility: CLINIC | Age: 70
End: 2020-10-04

## 2020-10-04 ENCOUNTER — PATIENT MESSAGE (OUTPATIENT)
Dept: FAMILY MEDICINE | Facility: CLINIC | Age: 70
End: 2020-10-04

## 2020-10-15 ENCOUNTER — OFFICE VISIT (OUTPATIENT)
Dept: FAMILY MEDICINE | Facility: CLINIC | Age: 70
End: 2020-10-15
Payer: MEDICARE

## 2020-10-15 VITALS
HEART RATE: 104 BPM | OXYGEN SATURATION: 98 % | TEMPERATURE: 98 F | HEIGHT: 66 IN | DIASTOLIC BLOOD PRESSURE: 80 MMHG | SYSTOLIC BLOOD PRESSURE: 146 MMHG | BODY MASS INDEX: 40.16 KG/M2 | WEIGHT: 249.88 LBS

## 2020-10-15 DIAGNOSIS — R73.03 PREDIABETES: Primary | ICD-10-CM

## 2020-10-15 DIAGNOSIS — M25.512 CHRONIC PAIN OF BOTH SHOULDERS: ICD-10-CM

## 2020-10-15 DIAGNOSIS — M25.511 CHRONIC PAIN OF BOTH SHOULDERS: ICD-10-CM

## 2020-10-15 DIAGNOSIS — G89.29 CHRONIC PAIN OF BOTH SHOULDERS: ICD-10-CM

## 2020-10-15 DIAGNOSIS — R05.9 COUGH: ICD-10-CM

## 2020-10-15 PROCEDURE — 99999 PR PBB SHADOW E&M-EST. PATIENT-LVL III: ICD-10-PCS | Mod: PBBFAC,,, | Performed by: FAMILY MEDICINE

## 2020-10-15 PROCEDURE — 1159F PR MEDICATION LIST DOCUMENTED IN MEDICAL RECORD: ICD-10-PCS | Mod: S$GLB,,, | Performed by: FAMILY MEDICINE

## 2020-10-15 PROCEDURE — 3079F DIAST BP 80-89 MM HG: CPT | Mod: S$GLB,,, | Performed by: FAMILY MEDICINE

## 2020-10-15 PROCEDURE — 1101F PR PT FALLS ASSESS DOC 0-1 FALLS W/OUT INJ PAST YR: ICD-10-PCS | Mod: S$GLB,,, | Performed by: FAMILY MEDICINE

## 2020-10-15 PROCEDURE — 90694 VACC AIIV4 NO PRSRV 0.5ML IM: CPT | Mod: S$GLB,,, | Performed by: FAMILY MEDICINE

## 2020-10-15 PROCEDURE — 3079F PR MOST RECENT DIASTOLIC BLOOD PRESSURE 80-89 MM HG: ICD-10-PCS | Mod: S$GLB,,, | Performed by: FAMILY MEDICINE

## 2020-10-15 PROCEDURE — 3077F SYST BP >= 140 MM HG: CPT | Mod: S$GLB,,, | Performed by: FAMILY MEDICINE

## 2020-10-15 PROCEDURE — G0008 FLU VACCINE - QUADRIVALENT - ADJUVANTED: ICD-10-PCS | Mod: S$GLB,,, | Performed by: FAMILY MEDICINE

## 2020-10-15 PROCEDURE — 3008F BODY MASS INDEX DOCD: CPT | Mod: S$GLB,,, | Performed by: FAMILY MEDICINE

## 2020-10-15 PROCEDURE — 99214 PR OFFICE/OUTPT VISIT, EST, LEVL IV, 30-39 MIN: ICD-10-PCS | Mod: 25,S$GLB,, | Performed by: FAMILY MEDICINE

## 2020-10-15 PROCEDURE — 3077F PR MOST RECENT SYSTOLIC BLOOD PRESSURE >= 140 MM HG: ICD-10-PCS | Mod: S$GLB,,, | Performed by: FAMILY MEDICINE

## 2020-10-15 PROCEDURE — 90694 FLU VACCINE - QUADRIVALENT - ADJUVANTED: ICD-10-PCS | Mod: S$GLB,,, | Performed by: FAMILY MEDICINE

## 2020-10-15 PROCEDURE — 3008F PR BODY MASS INDEX (BMI) DOCUMENTED: ICD-10-PCS | Mod: S$GLB,,, | Performed by: FAMILY MEDICINE

## 2020-10-15 PROCEDURE — 1101F PT FALLS ASSESS-DOCD LE1/YR: CPT | Mod: S$GLB,,, | Performed by: FAMILY MEDICINE

## 2020-10-15 PROCEDURE — 99999 PR PBB SHADOW E&M-EST. PATIENT-LVL III: CPT | Mod: PBBFAC,,, | Performed by: FAMILY MEDICINE

## 2020-10-15 PROCEDURE — 1159F MED LIST DOCD IN RCRD: CPT | Mod: S$GLB,,, | Performed by: FAMILY MEDICINE

## 2020-10-15 PROCEDURE — G0008 ADMIN INFLUENZA VIRUS VAC: HCPCS | Mod: S$GLB,,, | Performed by: FAMILY MEDICINE

## 2020-10-15 PROCEDURE — 99214 OFFICE O/P EST MOD 30 MIN: CPT | Mod: 25,S$GLB,, | Performed by: FAMILY MEDICINE

## 2020-10-15 NOTE — PROGRESS NOTES
"Subjective:       Patient ID: Allyson Henriquez is a 70 y.o. female.    Chief Complaint: Follow-up (review results) and abdominal issues    HPI  1. Lower abdominal ache that woke her from sleep on 10/6, L>R that lasted approx 1hr and then resolved. Has not recurred.   2. Coughing after she eats. Throat clearing type cough. Some phlegm. Does not occur after talking for a period of time. Not all meals.   3. Had great results with L shoulder thru [facility], 100% improvement.     Review of Systems:  Review of Systems   Constitutional: Negative for fever and unexpected weight change.   HENT: Negative for congestion and sinus pressure.    Respiratory: Negative for cough and shortness of breath.    Cardiovascular: Negative for palpitations and leg swelling.   Gastrointestinal: Positive for abdominal pain. Negative for constipation, diarrhea, nausea and vomiting.   Genitourinary: Negative for dysuria, frequency and hematuria.   Musculoskeletal: Negative for arthralgias and myalgias.   Neurological: Negative for headaches.   Psychiatric/Behavioral: Negative for dysphoric mood and sleep disturbance.       Objective:     Vitals:    10/15/20 1410   BP: (!) 146/80   BP Location: Right arm   Patient Position: Sitting   BP Method: Large (Manual)   Pulse: 104   Temp: 98.1 °F (36.7 °C)   TempSrc: Temporal   SpO2: 98%   Weight: 113.3 kg (249 lb 14.3 oz)   Height: 5' 6" (1.676 m)          Physical Exam  Vitals signs and nursing note reviewed.   Constitutional:       General: She is not in acute distress.     Appearance: She is well-developed. She is obese.   HENT:      Head: Normocephalic and atraumatic.   Eyes:      General: No scleral icterus.        Right eye: No discharge.         Left eye: No discharge.      Conjunctiva/sclera: Conjunctivae normal.   Neck:      Musculoskeletal: Normal range of motion and neck supple.   Cardiovascular:      Rate and Rhythm: Normal rate and regular rhythm.   Pulmonary:      Effort: Pulmonary " effort is normal. No respiratory distress.      Breath sounds: Normal breath sounds.   Abdominal:      General: There is no distension.      Tenderness: There is no guarding.   Musculoskeletal: Normal range of motion.   Skin:     General: Skin is warm and dry.      Findings: No rash.   Neurological:      General: No focal deficit present.      Mental Status: She is alert and oriented to person, place, and time.   Psychiatric:         Mood and Affect: Mood normal.         Behavior: Behavior normal.           Assessment & Plan:  There are no diagnoses linked to this encounter.

## 2020-10-16 ENCOUNTER — LAB VISIT (OUTPATIENT)
Dept: LAB | Facility: HOSPITAL | Age: 70
End: 2020-10-16
Attending: INTERNAL MEDICINE
Payer: MEDICARE

## 2020-10-16 DIAGNOSIS — E04.2 MULTINODULAR GOITER: ICD-10-CM

## 2020-10-16 LAB — TSH SERPL DL<=0.005 MIU/L-ACNC: 1.25 UIU/ML (ref 0.4–4)

## 2020-10-16 PROCEDURE — 36415 COLL VENOUS BLD VENIPUNCTURE: CPT | Mod: PN

## 2020-10-16 PROCEDURE — 84443 ASSAY THYROID STIM HORMONE: CPT

## 2020-10-19 ENCOUNTER — HOSPITAL ENCOUNTER (OUTPATIENT)
Dept: RADIOLOGY | Facility: HOSPITAL | Age: 70
Discharge: HOME | End: 2020-10-19
Attending: INTERNAL MEDICINE
Payer: MEDICARE

## 2020-10-19 DIAGNOSIS — E04.2 MULTINODULAR GOITER: ICD-10-CM

## 2020-10-19 PROCEDURE — 76536 US EXAM OF HEAD AND NECK: CPT | Mod: 26,,, | Performed by: RADIOLOGY

## 2020-10-19 PROCEDURE — 76536 US EXAM OF HEAD AND NECK: CPT | Mod: TC,PO

## 2020-10-19 PROCEDURE — 76536 US SOFT TISSUE HEAD NECK THYROID: ICD-10-PCS | Mod: 26,,, | Performed by: RADIOLOGY

## 2020-10-23 ENCOUNTER — PATIENT MESSAGE (OUTPATIENT)
Dept: ENDOCRINOLOGY | Facility: CLINIC | Age: 70
End: 2020-10-23

## 2020-10-24 ENCOUNTER — PATIENT MESSAGE (OUTPATIENT)
Dept: FAMILY MEDICINE | Facility: CLINIC | Age: 70
End: 2020-10-24

## 2020-10-26 ENCOUNTER — OFFICE VISIT (OUTPATIENT)
Dept: ENDOCRINOLOGY | Facility: CLINIC | Age: 70
End: 2020-10-26
Payer: MEDICARE

## 2020-10-26 VITALS
HEART RATE: 120 BPM | BODY MASS INDEX: 40.41 KG/M2 | SYSTOLIC BLOOD PRESSURE: 128 MMHG | RESPIRATION RATE: 18 BRPM | DIASTOLIC BLOOD PRESSURE: 80 MMHG | WEIGHT: 251.44 LBS | HEIGHT: 66 IN

## 2020-10-26 DIAGNOSIS — E04.2 MULTINODULAR GOITER: Primary | ICD-10-CM

## 2020-10-26 DIAGNOSIS — I10 BENIGN ESSENTIAL HTN: ICD-10-CM

## 2020-10-26 PROCEDURE — 99213 PR OFFICE/OUTPT VISIT, EST, LEVL III, 20-29 MIN: ICD-10-PCS | Mod: S$GLB,,, | Performed by: INTERNAL MEDICINE

## 2020-10-26 PROCEDURE — 3079F DIAST BP 80-89 MM HG: CPT | Mod: S$GLB,,, | Performed by: INTERNAL MEDICINE

## 2020-10-26 PROCEDURE — 3074F PR MOST RECENT SYSTOLIC BLOOD PRESSURE < 130 MM HG: ICD-10-PCS | Mod: S$GLB,,, | Performed by: INTERNAL MEDICINE

## 2020-10-26 PROCEDURE — 99213 OFFICE O/P EST LOW 20 MIN: CPT | Mod: S$GLB,,, | Performed by: INTERNAL MEDICINE

## 2020-10-26 PROCEDURE — 3008F BODY MASS INDEX DOCD: CPT | Mod: S$GLB,,, | Performed by: INTERNAL MEDICINE

## 2020-10-26 PROCEDURE — 1159F MED LIST DOCD IN RCRD: CPT | Mod: S$GLB,,, | Performed by: INTERNAL MEDICINE

## 2020-10-26 PROCEDURE — 1126F PR PAIN SEVERITY QUANTIFIED, NO PAIN PRESENT: ICD-10-PCS | Mod: S$GLB,,, | Performed by: INTERNAL MEDICINE

## 2020-10-26 PROCEDURE — 3079F PR MOST RECENT DIASTOLIC BLOOD PRESSURE 80-89 MM HG: ICD-10-PCS | Mod: S$GLB,,, | Performed by: INTERNAL MEDICINE

## 2020-10-26 PROCEDURE — 99999 PR PBB SHADOW E&M-EST. PATIENT-LVL IV: CPT | Mod: PBBFAC,,, | Performed by: INTERNAL MEDICINE

## 2020-10-26 PROCEDURE — 99999 PR PBB SHADOW E&M-EST. PATIENT-LVL IV: ICD-10-PCS | Mod: PBBFAC,,, | Performed by: INTERNAL MEDICINE

## 2020-10-26 PROCEDURE — 1101F PT FALLS ASSESS-DOCD LE1/YR: CPT | Mod: S$GLB,,, | Performed by: INTERNAL MEDICINE

## 2020-10-26 PROCEDURE — 1126F AMNT PAIN NOTED NONE PRSNT: CPT | Mod: S$GLB,,, | Performed by: INTERNAL MEDICINE

## 2020-10-26 PROCEDURE — 1101F PR PT FALLS ASSESS DOC 0-1 FALLS W/OUT INJ PAST YR: ICD-10-PCS | Mod: S$GLB,,, | Performed by: INTERNAL MEDICINE

## 2020-10-26 PROCEDURE — 1159F PR MEDICATION LIST DOCUMENTED IN MEDICAL RECORD: ICD-10-PCS | Mod: S$GLB,,, | Performed by: INTERNAL MEDICINE

## 2020-10-26 PROCEDURE — 3008F PR BODY MASS INDEX (BMI) DOCUMENTED: ICD-10-PCS | Mod: S$GLB,,, | Performed by: INTERNAL MEDICINE

## 2020-10-26 PROCEDURE — 3074F SYST BP LT 130 MM HG: CPT | Mod: S$GLB,,, | Performed by: INTERNAL MEDICINE

## 2020-10-26 NOTE — PROGRESS NOTES
CHIEF COMPLAINT: MNG  70 y.o.  being seen as a f/u. Had been seeing Dr. Trinidad and last seen by her 3/28/18. Pt opted for observation at that time. No XRT to head/neck. No difficulty swallowing. No change in voice. No tremors          PAST MEDICAL HISTORY/PAST SURGICAL HISTORY:  Reviewed in [facility]    SOCIAL HISTORY: Smoker and quit in 60's    FAMILY HISTORY:  Sister with thyroid cancer.     MEDICATIONS/ALLERGIES: The patient's MedCard has been updated and reviewed.      ROS:   Constitutional: weight stable.   Eyes: No recent visual changes  ENT: No dysphagia  Cardiovascular: seeing Cardiology for afib. Awaiting Holter but states will do it next year.   Respiratory: Denies current respiratory difficulty  Gastrointestinal: No Diarrhea or constipation.   GenitoUrinary - No dysuria  Skin: No new skin rash  Neurologic: No focal neurologic complaints  Remainder ROS negative        PE:    GENERAL: Well developed, well nourished.  NECK: Supple, trachea midline, left nodule palpable.   CHEST: Resp even and unlabored, CTA bilateral.  CARDIAC: Irreg Irreg no murmurs, rubs, S3, or S4    Results for BLANCA SINGLETON (MRN [id number]) as of 10/26/2020 13:34   Ref. Range 10/16/2020 10:08   TSH Latest Ref Range: 0.400 - 4.000 uIU/mL 1.253       US SOFT TISSUE HEAD NECK THYROID     CLINICAL HISTORY:  Nontoxic multinodular goiter     TECHNIQUE:  Ultrasound of the thyroid and cervical lymph nodes was performed.     COMPARISON:  May 16, 2019     FINDINGS:  The right lobe of the thyroid measures 5.7 x 2.4 x 2.1 cm in the left lobe measures 4.8 x 1.4 x 1.7 cm there are multiple thyroid nodules the largest of which is seen within the inferior pole of the right lobe of the thyroid.  This nodule is over 50% cystic with a large coarse calcification.  Its appearance is unchanged from prior exam.  Overall the thyroid and its nodules appear similar to what was seen in 2019.     There are reactive appearing lymph nodes seen within the  neck.     Impression:     There are multiple thyroid nodules that appears similar to what was seen on May 16, 2019.  There are no nodules meeting criteria for FNA recommendation.         ASSESSMENT/PLAN:  1. Multiple thyroid Nodules- Discussed that the US does show a nodule that meets criteria for FNA.  Pt does not want FNA and opted to monitor at this time. Left lower nodule has more of a solid component. Overall size not increased. Continue to monitor.      2. HTN- BP controlled after repeat check      FOLLOWUP  Check BP again  F/U 1 yr with thyroid US, TSH

## 2020-11-03 ENCOUNTER — PATIENT MESSAGE (OUTPATIENT)
Dept: CARDIOLOGY | Facility: CLINIC | Age: 70
End: 2020-11-03

## 2020-11-09 ENCOUNTER — PATIENT MESSAGE (OUTPATIENT)
Dept: FAMILY MEDICINE | Facility: CLINIC | Age: 70
End: 2020-11-09

## 2020-11-09 ENCOUNTER — TELEPHONE (OUTPATIENT)
Dept: FAMILY MEDICINE | Facility: CLINIC | Age: 70
End: 2020-11-09

## 2020-11-09 DIAGNOSIS — R10.9 ABDOMINAL PAIN, UNSPECIFIED ABDOMINAL LOCATION: ICD-10-CM

## 2020-11-09 NOTE — TELEPHONE ENCOUNTER
Responded to message via [employer]  Advised pt will contact per service team    Hyponatremia - better at 126; off paxil and Buspar but still on Prolixin which can also cause SIADH, t/c changing if possible  Add Nacl tabs; keep POF to 1000 cc/day  CKD creat sl better; sono unremarkable  need baseline creatinine; check creat clearance once creat stable  Anemia - check iron panel  may need Procrit if not better  FOBT

## 2020-11-09 NOTE — TELEPHONE ENCOUNTER
----- Message from Karyn Tomlinson sent at 11/9/2020  2:30 PM CST -----  Pt is calling regarding CT scan schedule for 11/10.The scan needs authorization [facility]. Please call back at [phone]

## 2020-11-09 NOTE — TELEPHONE ENCOUNTER
Spoke with patient and scheduled CT for 11/10 at 2:00. Patient adamant that she will not drink or have IV dye due to anaphylactic response previously.

## 2020-11-10 ENCOUNTER — HOSPITAL ENCOUNTER (OUTPATIENT)
Dept: RADIOLOGY | Facility: HOSPITAL | Age: 70
Discharge: HOME | End: 2020-11-10
Attending: FAMILY MEDICINE
Payer: MEDICARE

## 2020-11-10 DIAGNOSIS — R10.9 ABDOMINAL PAIN, UNSPECIFIED ABDOMINAL LOCATION: ICD-10-CM

## 2020-11-10 PROCEDURE — 74176 CT ABD & PELVIS W/O CONTRAST: CPT | Mod: 26,,, | Performed by: RADIOLOGY

## 2020-11-10 PROCEDURE — 74176 CT ABDOMEN PELVIS WITHOUT CONTRAST: ICD-10-PCS | Mod: 26,,, | Performed by: RADIOLOGY

## 2020-11-10 PROCEDURE — 74176 CT ABD & PELVIS W/O CONTRAST: CPT | Mod: TC,PO

## 2020-11-13 ENCOUNTER — PATIENT MESSAGE (OUTPATIENT)
Dept: FAMILY MEDICINE | Facility: CLINIC | Age: 70
End: 2020-11-13

## 2020-11-20 ENCOUNTER — PATIENT MESSAGE (OUTPATIENT)
Dept: CARDIOLOGY | Facility: CLINIC | Age: 70
End: 2020-11-20

## 2020-11-20 DIAGNOSIS — I48.92 ATRIAL FLUTTER WITH RAPID VENTRICULAR RESPONSE: Primary | ICD-10-CM

## 2020-11-20 RX ORDER — METOPROLOL TARTRATE 25 MG/1
TABLET, FILM COATED ORAL
Qty: 90 TABLET | Refills: 1 | Status: SHIPPED | OUTPATIENT
Start: 2020-11-20 | End: 2021-04-05

## 2020-12-14 ENCOUNTER — PATIENT MESSAGE (OUTPATIENT)
Dept: CARDIOLOGY | Facility: CLINIC | Age: 70
End: 2020-12-14

## 2020-12-14 DIAGNOSIS — I13.0 BENIGN HYPERTENSIVE HEART AND KIDNEY DISEASE WITH CHF, NYHA CLASS 1 AND CKD STAGE 3: ICD-10-CM

## 2020-12-14 DIAGNOSIS — N18.30 BENIGN HYPERTENSIVE HEART AND KIDNEY DISEASE WITH CHF, NYHA CLASS 1 AND CKD STAGE 3: ICD-10-CM

## 2020-12-14 RX ORDER — METOPROLOL SUCCINATE 50 MG/1
50 TABLET, EXTENDED RELEASE ORAL DAILY
Qty: 90 TABLET | Refills: 0 | Status: SHIPPED | OUTPATIENT
Start: 2020-12-14 | End: 2021-02-12

## 2020-12-14 NOTE — TELEPHONE ENCOUNTER
Refill for metoprolol succinate 50 mg  and eliquis 5 mg was sent , for a 90 day supply per pt request .

## 2021-01-25 ENCOUNTER — PATIENT MESSAGE (OUTPATIENT)
Dept: FAMILY MEDICINE | Facility: CLINIC | Age: 71
End: 2021-01-25

## 2021-01-25 ENCOUNTER — PATIENT MESSAGE (OUTPATIENT)
Dept: NEPHROLOGY | Facility: CLINIC | Age: 71
End: 2021-01-25

## 2021-01-25 DIAGNOSIS — M54.9 BACK PAIN, UNSPECIFIED BACK LOCATION, UNSPECIFIED BACK PAIN LATERALITY, UNSPECIFIED CHRONICITY: Primary | ICD-10-CM

## 2021-01-26 ENCOUNTER — PATIENT MESSAGE (OUTPATIENT)
Dept: NEPHROLOGY | Facility: CLINIC | Age: 71
End: 2021-01-26

## 2021-01-26 ENCOUNTER — PATIENT MESSAGE (OUTPATIENT)
Dept: FAMILY MEDICINE | Facility: CLINIC | Age: 71
End: 2021-01-26

## 2021-02-01 ENCOUNTER — PATIENT MESSAGE (OUTPATIENT)
Dept: CARDIOLOGY | Facility: CLINIC | Age: 71
End: 2021-02-01

## 2021-02-01 ENCOUNTER — PATIENT MESSAGE (OUTPATIENT)
Dept: FAMILY MEDICINE | Facility: CLINIC | Age: 71
End: 2021-02-01

## 2021-02-01 DIAGNOSIS — E78.00 HYPERCHOLESTEROLEMIA: ICD-10-CM

## 2021-02-01 DIAGNOSIS — I10 ESSENTIAL HYPERTENSION: ICD-10-CM

## 2021-02-01 DIAGNOSIS — R73.03 PREDIABETES: Primary | ICD-10-CM

## 2021-02-02 ENCOUNTER — PATIENT MESSAGE (OUTPATIENT)
Dept: CARDIOLOGY | Facility: CLINIC | Age: 71
End: 2021-02-02

## 2021-02-08 ENCOUNTER — PATIENT MESSAGE (OUTPATIENT)
Dept: NEPHROLOGY | Facility: CLINIC | Age: 71
End: 2021-02-08

## 2021-02-10 ENCOUNTER — PATIENT MESSAGE (OUTPATIENT)
Dept: NEPHROLOGY | Facility: CLINIC | Age: 71
End: 2021-02-10

## 2021-02-11 ENCOUNTER — PATIENT MESSAGE (OUTPATIENT)
Dept: ADMINISTRATIVE | Facility: HOSPITAL | Age: 71
End: 2021-02-11

## 2021-02-11 ENCOUNTER — PATIENT MESSAGE (OUTPATIENT)
Dept: NEPHROLOGY | Facility: CLINIC | Age: 71
End: 2021-02-11

## 2021-02-11 ENCOUNTER — PATIENT OUTREACH (OUTPATIENT)
Dept: ADMINISTRATIVE | Facility: HOSPITAL | Age: 71
End: 2021-02-11

## 2021-03-04 ENCOUNTER — PATIENT MESSAGE (OUTPATIENT)
Dept: CARDIOLOGY | Facility: CLINIC | Age: 71
End: 2021-03-04

## 2021-03-22 ENCOUNTER — PATIENT MESSAGE (OUTPATIENT)
Dept: FAMILY MEDICINE | Facility: CLINIC | Age: 71
End: 2021-03-22

## 2021-03-23 ENCOUNTER — PATIENT MESSAGE (OUTPATIENT)
Dept: FAMILY MEDICINE | Facility: CLINIC | Age: 71
End: 2021-03-23

## 2021-03-23 ENCOUNTER — PATIENT OUTREACH (OUTPATIENT)
Dept: ADMINISTRATIVE | Facility: OTHER | Age: 71
End: 2021-03-23

## 2021-03-23 DIAGNOSIS — Z12.31 ENCOUNTER FOR SCREENING MAMMOGRAM FOR MALIGNANT NEOPLASM OF BREAST: Primary | ICD-10-CM

## 2021-03-24 ENCOUNTER — PATIENT MESSAGE (OUTPATIENT)
Dept: ADMINISTRATIVE | Facility: OTHER | Age: 71
End: 2021-03-24

## 2021-03-24 ENCOUNTER — LAB VISIT (OUTPATIENT)
Dept: LAB | Facility: HOSPITAL | Age: 71
End: 2021-03-24
Attending: FAMILY MEDICINE
Payer: MEDICARE

## 2021-03-24 DIAGNOSIS — E78.00 HYPERCHOLESTEROLEMIA: ICD-10-CM

## 2021-03-24 DIAGNOSIS — R73.03 PREDIABETES: ICD-10-CM

## 2021-03-24 DIAGNOSIS — I10 ESSENTIAL HYPERTENSION: ICD-10-CM

## 2021-03-24 LAB
ALBUMIN SERPL BCP-MCNC: 3.7 G/DL (ref 3.5–5.2)
ALP SERPL-CCNC: 58 U/L (ref 55–135)
ALT SERPL W/O P-5'-P-CCNC: 17 U/L (ref 10–44)
ANION GAP SERPL CALC-SCNC: 10 MMOL/L (ref 8–16)
AST SERPL-CCNC: 22 U/L (ref 10–40)
BILIRUB SERPL-MCNC: 1 MG/DL (ref 0.1–1)
BUN SERPL-MCNC: 18 MG/DL (ref 8–23)
CALCIUM SERPL-MCNC: 9.4 MG/DL (ref 8.7–10.5)
CHLORIDE SERPL-SCNC: 103 MMOL/L (ref 95–110)
CHOLEST SERPL-MCNC: 141 MG/DL (ref 120–199)
CHOLEST/HDLC SERPL: 3.4 {RATIO} (ref 2–5)
CO2 SERPL-SCNC: 27 MMOL/L (ref 23–29)
CREAT SERPL-MCNC: 1.3 MG/DL (ref 0.5–1.4)
ERYTHROCYTE [DISTWIDTH] IN BLOOD BY AUTOMATED COUNT: 16.3 % (ref 11.5–14.5)
EST. GFR  (AFRICAN AMERICAN): 47.4 ML/MIN/1.73 M^2
EST. GFR  (NON AFRICAN AMERICAN): 41.1 ML/MIN/1.73 M^2
ESTIMATED AVG GLUCOSE: 148 MG/DL (ref 68–131)
GLUCOSE SERPL-MCNC: 130 MG/DL (ref 70–110)
HBA1C MFR BLD: 6.8 % (ref 4–5.6)
HCT VFR BLD AUTO: 44 % (ref 37–48.5)
HDLC SERPL-MCNC: 41 MG/DL (ref 40–75)
HDLC SERPL: 29.1 % (ref 20–50)
HGB BLD-MCNC: 13.8 G/DL (ref 12–16)
LDLC SERPL CALC-MCNC: 60 MG/DL (ref 63–159)
MCH RBC QN AUTO: 27.5 PG (ref 27–31)
MCHC RBC AUTO-ENTMCNC: 31.4 G/DL (ref 32–36)
MCV RBC AUTO: 88 FL (ref 82–98)
NONHDLC SERPL-MCNC: 100 MG/DL
PLATELET # BLD AUTO: 223 K/UL (ref 150–350)
PMV BLD AUTO: 12.9 FL (ref 9.2–12.9)
POTASSIUM SERPL-SCNC: 3.9 MMOL/L (ref 3.5–5.1)
PROT SERPL-MCNC: 7 G/DL (ref 6–8.4)
RBC # BLD AUTO: 5.01 M/UL (ref 4–5.4)
SODIUM SERPL-SCNC: 140 MMOL/L (ref 136–145)
TRIGL SERPL-MCNC: 200 MG/DL (ref 30–150)
TSH SERPL DL<=0.005 MIU/L-ACNC: 1.44 UIU/ML (ref 0.4–4)
WBC # BLD AUTO: 9.23 K/UL (ref 3.9–12.7)

## 2021-03-24 PROCEDURE — 80061 LIPID PANEL: CPT | Performed by: FAMILY MEDICINE

## 2021-03-24 PROCEDURE — 84443 ASSAY THYROID STIM HORMONE: CPT | Performed by: FAMILY MEDICINE

## 2021-03-24 PROCEDURE — 36415 COLL VENOUS BLD VENIPUNCTURE: CPT | Mod: PN | Performed by: FAMILY MEDICINE

## 2021-03-24 PROCEDURE — 85027 COMPLETE CBC AUTOMATED: CPT | Performed by: FAMILY MEDICINE

## 2021-03-24 PROCEDURE — 83036 HEMOGLOBIN GLYCOSYLATED A1C: CPT | Performed by: FAMILY MEDICINE

## 2021-03-24 PROCEDURE — 80053 COMPREHEN METABOLIC PANEL: CPT | Performed by: FAMILY MEDICINE

## 2021-03-25 ENCOUNTER — OFFICE VISIT (OUTPATIENT)
Dept: NEPHROLOGY | Facility: CLINIC | Age: 71
End: 2021-03-25
Payer: MEDICARE

## 2021-03-25 VITALS
HEIGHT: 66 IN | DIASTOLIC BLOOD PRESSURE: 72 MMHG | WEIGHT: 257.94 LBS | OXYGEN SATURATION: 98 % | BODY MASS INDEX: 41.45 KG/M2 | SYSTOLIC BLOOD PRESSURE: 130 MMHG | HEART RATE: 94 BPM

## 2021-03-25 DIAGNOSIS — N18.2 TYPE 2 DIABETES MELLITUS WITH STAGE 2 CHRONIC KIDNEY DISEASE, WITHOUT LONG-TERM CURRENT USE OF INSULIN: ICD-10-CM

## 2021-03-25 DIAGNOSIS — R80.9 PROTEINURIA, UNSPECIFIED TYPE: Primary | ICD-10-CM

## 2021-03-25 DIAGNOSIS — N28.1 ACQUIRED CYST OF KIDNEY: ICD-10-CM

## 2021-03-25 DIAGNOSIS — I10 ESSENTIAL HYPERTENSION: ICD-10-CM

## 2021-03-25 DIAGNOSIS — E11.22 TYPE 2 DIABETES MELLITUS WITH STAGE 2 CHRONIC KIDNEY DISEASE, WITHOUT LONG-TERM CURRENT USE OF INSULIN: ICD-10-CM

## 2021-03-25 PROCEDURE — 99999 PR PBB SHADOW E&M-EST. PATIENT-LVL III: CPT | Mod: PBBFAC,,, | Performed by: INTERNAL MEDICINE

## 2021-03-25 PROCEDURE — 3078F DIAST BP <80 MM HG: CPT | Mod: S$GLB,,, | Performed by: INTERNAL MEDICINE

## 2021-03-25 PROCEDURE — 99214 PR OFFICE/OUTPT VISIT, EST, LEVL IV, 30-39 MIN: ICD-10-PCS | Mod: S$GLB,,, | Performed by: INTERNAL MEDICINE

## 2021-03-25 PROCEDURE — 3008F BODY MASS INDEX DOCD: CPT | Mod: S$GLB,,, | Performed by: INTERNAL MEDICINE

## 2021-03-25 PROCEDURE — 1159F PR MEDICATION LIST DOCUMENTED IN MEDICAL RECORD: ICD-10-PCS | Mod: S$GLB,,, | Performed by: INTERNAL MEDICINE

## 2021-03-25 PROCEDURE — 99999 PR PBB SHADOW E&M-EST. PATIENT-LVL III: ICD-10-PCS | Mod: PBBFAC,,, | Performed by: INTERNAL MEDICINE

## 2021-03-25 PROCEDURE — 3044F HG A1C LEVEL LT 7.0%: CPT | Mod: S$GLB,,, | Performed by: INTERNAL MEDICINE

## 2021-03-25 PROCEDURE — 3288F FALL RISK ASSESSMENT DOCD: CPT | Mod: S$GLB,,, | Performed by: INTERNAL MEDICINE

## 2021-03-25 PROCEDURE — 1101F PT FALLS ASSESS-DOCD LE1/YR: CPT | Mod: S$GLB,,, | Performed by: INTERNAL MEDICINE

## 2021-03-25 PROCEDURE — 1159F MED LIST DOCD IN RCRD: CPT | Mod: S$GLB,,, | Performed by: INTERNAL MEDICINE

## 2021-03-25 PROCEDURE — 3078F PR MOST RECENT DIASTOLIC BLOOD PRESSURE < 80 MM HG: ICD-10-PCS | Mod: S$GLB,,, | Performed by: INTERNAL MEDICINE

## 2021-03-25 PROCEDURE — 3008F PR BODY MASS INDEX (BMI) DOCUMENTED: ICD-10-PCS | Mod: S$GLB,,, | Performed by: INTERNAL MEDICINE

## 2021-03-25 PROCEDURE — 3075F SYST BP GE 130 - 139MM HG: CPT | Mod: S$GLB,,, | Performed by: INTERNAL MEDICINE

## 2021-03-25 PROCEDURE — 3044F PR MOST RECENT HEMOGLOBIN A1C LEVEL <7.0%: ICD-10-PCS | Mod: S$GLB,,, | Performed by: INTERNAL MEDICINE

## 2021-03-25 PROCEDURE — 3288F PR FALLS RISK ASSESSMENT DOCUMENTED: ICD-10-PCS | Mod: S$GLB,,, | Performed by: INTERNAL MEDICINE

## 2021-03-25 PROCEDURE — 99214 OFFICE O/P EST MOD 30 MIN: CPT | Mod: S$GLB,,, | Performed by: INTERNAL MEDICINE

## 2021-03-25 PROCEDURE — 1101F PR PT FALLS ASSESS DOC 0-1 FALLS W/OUT INJ PAST YR: ICD-10-PCS | Mod: S$GLB,,, | Performed by: INTERNAL MEDICINE

## 2021-03-25 PROCEDURE — 3075F PR MOST RECENT SYSTOLIC BLOOD PRESS GE 130-139MM HG: ICD-10-PCS | Mod: S$GLB,,, | Performed by: INTERNAL MEDICINE

## 2021-03-30 ENCOUNTER — OFFICE VISIT (OUTPATIENT)
Dept: FAMILY MEDICINE | Facility: CLINIC | Age: 71
End: 2021-03-30
Payer: MEDICARE

## 2021-03-30 VITALS
DIASTOLIC BLOOD PRESSURE: 84 MMHG | TEMPERATURE: 98 F | SYSTOLIC BLOOD PRESSURE: 126 MMHG | HEART RATE: 76 BPM | WEIGHT: 245.94 LBS | HEIGHT: 66 IN | BODY MASS INDEX: 39.53 KG/M2 | RESPIRATION RATE: 14 BRPM

## 2021-03-30 DIAGNOSIS — Z12.2 ENCOUNTER FOR SCREENING FOR MALIGNANT NEOPLASM OF RESPIRATORY ORGANS: ICD-10-CM

## 2021-03-30 DIAGNOSIS — Z00.00 ROUTINE GENERAL MEDICAL EXAMINATION AT A HEALTH CARE FACILITY: Primary | ICD-10-CM

## 2021-03-30 DIAGNOSIS — E11.22 TYPE 2 DIABETES MELLITUS WITH CHRONIC KIDNEY DISEASE, WITHOUT LONG-TERM CURRENT USE OF INSULIN, UNSPECIFIED CKD STAGE: ICD-10-CM

## 2021-03-30 DIAGNOSIS — J44.9 CHRONIC OBSTRUCTIVE PULMONARY DISEASE, UNSPECIFIED COPD TYPE: ICD-10-CM

## 2021-03-30 DIAGNOSIS — I13.0 BENIGN HYPERTENSIVE HEART AND KIDNEY DISEASE WITH CHF, NYHA CLASS 1 AND CKD STAGE 3: ICD-10-CM

## 2021-03-30 DIAGNOSIS — I73.9 CLAUDICATION: ICD-10-CM

## 2021-03-30 DIAGNOSIS — R05.9 COUGH: ICD-10-CM

## 2021-03-30 DIAGNOSIS — N18.30 BENIGN HYPERTENSIVE HEART AND KIDNEY DISEASE WITH CHF, NYHA CLASS 1 AND CKD STAGE 3: ICD-10-CM

## 2021-03-30 DIAGNOSIS — I48.0 PAF (PAROXYSMAL ATRIAL FIBRILLATION): ICD-10-CM

## 2021-03-30 DIAGNOSIS — E66.01 SEVERE OBESITY (BMI 35.0-35.9 WITH COMORBIDITY): ICD-10-CM

## 2021-03-30 PROCEDURE — 3288F FALL RISK ASSESSMENT DOCD: CPT | Mod: S$GLB,,, | Performed by: FAMILY MEDICINE

## 2021-03-30 PROCEDURE — 3079F PR MOST RECENT DIASTOLIC BLOOD PRESSURE 80-89 MM HG: ICD-10-PCS | Mod: S$GLB,,, | Performed by: FAMILY MEDICINE

## 2021-03-30 PROCEDURE — 99213 PR OFFICE/OUTPT VISIT, EST, LEVL III, 20-29 MIN: ICD-10-PCS | Mod: S$GLB,,, | Performed by: FAMILY MEDICINE

## 2021-03-30 PROCEDURE — 3044F HG A1C LEVEL LT 7.0%: CPT | Mod: S$GLB,,, | Performed by: FAMILY MEDICINE

## 2021-03-30 PROCEDURE — 1101F PT FALLS ASSESS-DOCD LE1/YR: CPT | Mod: S$GLB,,, | Performed by: FAMILY MEDICINE

## 2021-03-30 PROCEDURE — 1159F MED LIST DOCD IN RCRD: CPT | Mod: S$GLB,,, | Performed by: FAMILY MEDICINE

## 2021-03-30 PROCEDURE — 1101F PR PT FALLS ASSESS DOC 0-1 FALLS W/OUT INJ PAST YR: ICD-10-PCS | Mod: S$GLB,,, | Performed by: FAMILY MEDICINE

## 2021-03-30 PROCEDURE — 3074F SYST BP LT 130 MM HG: CPT | Mod: S$GLB,,, | Performed by: FAMILY MEDICINE

## 2021-03-30 PROCEDURE — 3079F DIAST BP 80-89 MM HG: CPT | Mod: S$GLB,,, | Performed by: FAMILY MEDICINE

## 2021-03-30 PROCEDURE — 3288F PR FALLS RISK ASSESSMENT DOCUMENTED: ICD-10-PCS | Mod: S$GLB,,, | Performed by: FAMILY MEDICINE

## 2021-03-30 PROCEDURE — 1126F AMNT PAIN NOTED NONE PRSNT: CPT | Mod: S$GLB,,, | Performed by: FAMILY MEDICINE

## 2021-03-30 PROCEDURE — 3008F PR BODY MASS INDEX (BMI) DOCUMENTED: ICD-10-PCS | Mod: S$GLB,,, | Performed by: FAMILY MEDICINE

## 2021-03-30 PROCEDURE — 99999 PR PBB SHADOW E&M-EST. PATIENT-LVL V: ICD-10-PCS | Mod: PBBFAC,,, | Performed by: FAMILY MEDICINE

## 2021-03-30 PROCEDURE — 3008F BODY MASS INDEX DOCD: CPT | Mod: S$GLB,,, | Performed by: FAMILY MEDICINE

## 2021-03-30 PROCEDURE — 99213 OFFICE O/P EST LOW 20 MIN: CPT | Mod: S$GLB,,, | Performed by: FAMILY MEDICINE

## 2021-03-30 PROCEDURE — 1126F PR PAIN SEVERITY QUANTIFIED, NO PAIN PRESENT: ICD-10-PCS | Mod: S$GLB,,, | Performed by: FAMILY MEDICINE

## 2021-03-30 PROCEDURE — 3074F PR MOST RECENT SYSTOLIC BLOOD PRESSURE < 130 MM HG: ICD-10-PCS | Mod: S$GLB,,, | Performed by: FAMILY MEDICINE

## 2021-03-30 PROCEDURE — 1159F PR MEDICATION LIST DOCUMENTED IN MEDICAL RECORD: ICD-10-PCS | Mod: S$GLB,,, | Performed by: FAMILY MEDICINE

## 2021-03-30 PROCEDURE — 99999 PR PBB SHADOW E&M-EST. PATIENT-LVL V: CPT | Mod: PBBFAC,,, | Performed by: FAMILY MEDICINE

## 2021-03-30 PROCEDURE — 3044F PR MOST RECENT HEMOGLOBIN A1C LEVEL <7.0%: ICD-10-PCS | Mod: S$GLB,,, | Performed by: FAMILY MEDICINE

## 2021-04-01 ENCOUNTER — TELEPHONE (OUTPATIENT)
Dept: ENDOCRINOLOGY | Facility: CLINIC | Age: 71
End: 2021-04-01

## 2021-04-01 PROBLEM — I73.9 CLAUDICATION: Status: ACTIVE | Noted: 2021-04-01

## 2021-04-05 ENCOUNTER — TELEPHONE (OUTPATIENT)
Dept: CARDIOLOGY | Facility: CLINIC | Age: 71
End: 2021-04-05

## 2021-04-05 ENCOUNTER — OFFICE VISIT (OUTPATIENT)
Dept: CARDIOLOGY | Facility: CLINIC | Age: 71
End: 2021-04-05
Payer: MEDICARE

## 2021-04-05 VITALS
SYSTOLIC BLOOD PRESSURE: 116 MMHG | DIASTOLIC BLOOD PRESSURE: 70 MMHG | HEART RATE: 95 BPM | OXYGEN SATURATION: 97 % | WEIGHT: 246.5 LBS | HEIGHT: 66 IN | BODY MASS INDEX: 39.62 KG/M2

## 2021-04-05 DIAGNOSIS — E66.01 SEVERE OBESITY (BMI 35.0-39.9) WITH COMORBIDITY: Chronic | ICD-10-CM

## 2021-04-05 DIAGNOSIS — Z79.899 LONG TERM CURRENT USE OF ANTIARRHYTHMIC DRUG: Primary | ICD-10-CM

## 2021-04-05 DIAGNOSIS — I48.0 PAF (PAROXYSMAL ATRIAL FIBRILLATION): Chronic | ICD-10-CM

## 2021-04-05 DIAGNOSIS — N18.30 STAGE 3 CHRONIC KIDNEY DISEASE, UNSPECIFIED WHETHER STAGE 3A OR 3B CKD: ICD-10-CM

## 2021-04-05 DIAGNOSIS — I10 ESSENTIAL HYPERTENSION: Chronic | ICD-10-CM

## 2021-04-05 DIAGNOSIS — Z79.01 CURRENT USE OF LONG TERM ANTICOAGULATION: Chronic | ICD-10-CM

## 2021-04-05 PROCEDURE — 99214 PR OFFICE/OUTPT VISIT, EST, LEVL IV, 30-39 MIN: ICD-10-PCS | Mod: S$GLB,,, | Performed by: INTERNAL MEDICINE

## 2021-04-05 PROCEDURE — 1159F PR MEDICATION LIST DOCUMENTED IN MEDICAL RECORD: ICD-10-PCS | Mod: S$GLB,,, | Performed by: INTERNAL MEDICINE

## 2021-04-05 PROCEDURE — 3074F SYST BP LT 130 MM HG: CPT | Mod: S$GLB,,, | Performed by: INTERNAL MEDICINE

## 2021-04-05 PROCEDURE — 1101F PR PT FALLS ASSESS DOC 0-1 FALLS W/OUT INJ PAST YR: ICD-10-PCS | Mod: S$GLB,,, | Performed by: INTERNAL MEDICINE

## 2021-04-05 PROCEDURE — 3008F BODY MASS INDEX DOCD: CPT | Mod: S$GLB,,, | Performed by: INTERNAL MEDICINE

## 2021-04-05 PROCEDURE — 99214 OFFICE O/P EST MOD 30 MIN: CPT | Mod: S$GLB,,, | Performed by: INTERNAL MEDICINE

## 2021-04-05 PROCEDURE — 99999 PR PBB SHADOW E&M-EST. PATIENT-LVL IV: ICD-10-PCS | Mod: PBBFAC,,, | Performed by: INTERNAL MEDICINE

## 2021-04-05 PROCEDURE — 1126F PR PAIN SEVERITY QUANTIFIED, NO PAIN PRESENT: ICD-10-PCS | Mod: S$GLB,,, | Performed by: INTERNAL MEDICINE

## 2021-04-05 PROCEDURE — 3078F DIAST BP <80 MM HG: CPT | Mod: S$GLB,,, | Performed by: INTERNAL MEDICINE

## 2021-04-05 PROCEDURE — 3078F PR MOST RECENT DIASTOLIC BLOOD PRESSURE < 80 MM HG: ICD-10-PCS | Mod: S$GLB,,, | Performed by: INTERNAL MEDICINE

## 2021-04-05 PROCEDURE — 1159F MED LIST DOCD IN RCRD: CPT | Mod: S$GLB,,, | Performed by: INTERNAL MEDICINE

## 2021-04-05 PROCEDURE — 3288F PR FALLS RISK ASSESSMENT DOCUMENTED: ICD-10-PCS | Mod: S$GLB,,, | Performed by: INTERNAL MEDICINE

## 2021-04-05 PROCEDURE — 1126F AMNT PAIN NOTED NONE PRSNT: CPT | Mod: S$GLB,,, | Performed by: INTERNAL MEDICINE

## 2021-04-05 PROCEDURE — 3008F PR BODY MASS INDEX (BMI) DOCUMENTED: ICD-10-PCS | Mod: S$GLB,,, | Performed by: INTERNAL MEDICINE

## 2021-04-05 PROCEDURE — 3288F FALL RISK ASSESSMENT DOCD: CPT | Mod: S$GLB,,, | Performed by: INTERNAL MEDICINE

## 2021-04-05 PROCEDURE — 1101F PT FALLS ASSESS-DOCD LE1/YR: CPT | Mod: S$GLB,,, | Performed by: INTERNAL MEDICINE

## 2021-04-05 PROCEDURE — 3074F PR MOST RECENT SYSTOLIC BLOOD PRESSURE < 130 MM HG: ICD-10-PCS | Mod: S$GLB,,, | Performed by: INTERNAL MEDICINE

## 2021-04-05 PROCEDURE — 99999 PR PBB SHADOW E&M-EST. PATIENT-LVL IV: CPT | Mod: PBBFAC,,, | Performed by: INTERNAL MEDICINE

## 2021-04-05 RX ORDER — CA/D3/MAG OX/ZINC/COP/MANG/BOR 600 MG-800
TABLET,CHEWABLE ORAL DAILY
Status: ON HOLD | COMMUNITY
End: 2021-05-13 | Stop reason: CLARIF

## 2021-04-05 RX ORDER — SOTALOL HYDROCHLORIDE 80 MG/1
40 TABLET ORAL 2 TIMES DAILY
Qty: 30 TABLET | Refills: 3 | Status: ON HOLD | OUTPATIENT
Start: 2021-04-05 | End: 2021-09-14 | Stop reason: HOSPADM

## 2021-04-06 ENCOUNTER — PATIENT MESSAGE (OUTPATIENT)
Dept: FAMILY MEDICINE | Facility: CLINIC | Age: 71
End: 2021-04-06

## 2021-04-08 ENCOUNTER — PATIENT MESSAGE (OUTPATIENT)
Dept: FAMILY MEDICINE | Facility: CLINIC | Age: 71
End: 2021-04-08

## 2021-04-08 ENCOUNTER — HOSPITAL ENCOUNTER (OUTPATIENT)
Dept: RADIOLOGY | Facility: HOSPITAL | Age: 71
Discharge: HOME | End: 2021-04-08
Attending: FAMILY MEDICINE
Payer: MEDICARE

## 2021-04-08 ENCOUNTER — CLINICAL SUPPORT (OUTPATIENT)
Dept: CARDIOLOGY | Facility: CLINIC | Age: 71
End: 2021-04-08
Payer: MEDICARE

## 2021-04-08 DIAGNOSIS — R10.9 ABDOMINAL PAIN, UNSPECIFIED ABDOMINAL LOCATION: ICD-10-CM

## 2021-04-08 DIAGNOSIS — Z79.899 LONG TERM CURRENT USE OF ANTIARRHYTHMIC DRUG: ICD-10-CM

## 2021-04-08 DIAGNOSIS — Z12.2 ENCOUNTER FOR SCREENING FOR MALIGNANT NEOPLASM OF RESPIRATORY ORGANS: ICD-10-CM

## 2021-04-08 DIAGNOSIS — R05.9 COUGH: Primary | ICD-10-CM

## 2021-04-08 DIAGNOSIS — R91.1 LUNG NODULE: ICD-10-CM

## 2021-04-08 DIAGNOSIS — I48.0 PAF (PAROXYSMAL ATRIAL FIBRILLATION): Chronic | ICD-10-CM

## 2021-04-08 DIAGNOSIS — R05.9 COUGH: ICD-10-CM

## 2021-04-08 PROCEDURE — 71271 CT THORAX LUNG CANCER SCR C-: CPT | Mod: TC,PO

## 2021-04-08 PROCEDURE — 71271 CT THORAX LUNG CANCER SCR C-: CPT | Mod: 26,,, | Performed by: RADIOLOGY

## 2021-04-08 PROCEDURE — 71271 CT CHEST LUNG SCREENING LOW DOSE: ICD-10-PCS | Mod: 26,,, | Performed by: RADIOLOGY

## 2021-04-08 PROCEDURE — 93000 ELECTROCARDIOGRAM COMPLETE: CPT | Mod: S$GLB,,, | Performed by: INTERNAL MEDICINE

## 2021-04-08 PROCEDURE — 93000 EKG 12-LEAD: ICD-10-PCS | Mod: S$GLB,,, | Performed by: INTERNAL MEDICINE

## 2021-04-13 PROBLEM — R59.0 HILAR ADENOPATHY: Status: ACTIVE | Noted: 2021-04-13

## 2021-04-13 PROBLEM — R91.1 PULMONARY NODULE: Status: ACTIVE | Noted: 2021-04-13

## 2021-04-15 ENCOUNTER — TELEPHONE (OUTPATIENT)
Dept: CARDIOLOGY | Facility: CLINIC | Age: 71
End: 2021-04-15

## 2021-04-26 ENCOUNTER — PATIENT MESSAGE (OUTPATIENT)
Dept: FAMILY MEDICINE | Facility: CLINIC | Age: 71
End: 2021-04-26

## 2021-04-26 ENCOUNTER — CLINICAL SUPPORT (OUTPATIENT)
Dept: CARDIOLOGY | Facility: CLINIC | Age: 71
End: 2021-04-26
Attending: INTERNAL MEDICINE
Payer: MEDICARE

## 2021-04-26 ENCOUNTER — TELEPHONE (OUTPATIENT)
Dept: CARDIOLOGY | Facility: CLINIC | Age: 71
End: 2021-04-26

## 2021-04-26 DIAGNOSIS — I48.0 PAF (PAROXYSMAL ATRIAL FIBRILLATION): ICD-10-CM

## 2021-04-26 PROCEDURE — 93224 XTRNL ECG REC UP TO 48 HRS: CPT | Mod: S$GLB,,, | Performed by: INTERNAL MEDICINE

## 2021-04-26 PROCEDURE — 93224 HOLTER MONITOR - 48 HOUR (CUPID ONLY): ICD-10-PCS | Mod: S$GLB,,, | Performed by: INTERNAL MEDICINE

## 2021-04-29 LAB
OHS CV EVENT MONITOR DAY: 0
OHS CV HOLTER LENGTH DECIMAL HOURS: 48
OHS CV HOLTER LENGTH HOURS: 48
OHS CV HOLTER LENGTH MINUTES: 0

## 2021-04-30 DIAGNOSIS — I48.0 PAF (PAROXYSMAL ATRIAL FIBRILLATION): Primary | ICD-10-CM

## 2021-04-30 DIAGNOSIS — I48.91 ATRIAL FIBRILLATION: ICD-10-CM

## 2021-05-03 ENCOUNTER — TELEPHONE (OUTPATIENT)
Dept: CARDIOLOGY | Facility: CLINIC | Age: 71
End: 2021-05-03

## 2021-05-04 ENCOUNTER — TELEPHONE (OUTPATIENT)
Dept: CARDIOLOGY | Facility: CLINIC | Age: 71
End: 2021-05-04

## 2021-05-05 ENCOUNTER — PATIENT MESSAGE (OUTPATIENT)
Dept: FAMILY MEDICINE | Facility: CLINIC | Age: 71
End: 2021-05-05

## 2021-05-05 ENCOUNTER — TELEPHONE (OUTPATIENT)
Dept: CARDIOLOGY | Facility: CLINIC | Age: 71
End: 2021-05-05

## 2021-05-07 ENCOUNTER — TELEPHONE (OUTPATIENT)
Dept: CARDIOLOGY | Facility: CLINIC | Age: 71
End: 2021-05-07

## 2021-05-11 ENCOUNTER — PATIENT MESSAGE (OUTPATIENT)
Dept: FAMILY MEDICINE | Facility: CLINIC | Age: 71
End: 2021-05-11

## 2021-05-11 ENCOUNTER — TELEPHONE (OUTPATIENT)
Dept: CARDIOLOGY | Facility: CLINIC | Age: 71
End: 2021-05-11

## 2021-05-13 ENCOUNTER — PATIENT MESSAGE (OUTPATIENT)
Dept: FAMILY MEDICINE | Facility: CLINIC | Age: 71
End: 2021-05-13

## 2021-05-13 ENCOUNTER — PATIENT MESSAGE (OUTPATIENT)
Dept: CARDIOLOGY | Facility: CLINIC | Age: 71
End: 2021-05-13

## 2021-05-13 ENCOUNTER — LAB VISIT (OUTPATIENT)
Dept: LAB | Facility: HOSPITAL | Age: 71
End: 2021-05-13
Attending: FAMILY MEDICINE
Payer: MEDICARE

## 2021-05-13 ENCOUNTER — TELEPHONE (OUTPATIENT)
Dept: CARDIOLOGY | Facility: CLINIC | Age: 71
End: 2021-05-13

## 2021-05-13 ENCOUNTER — TELEPHONE (OUTPATIENT)
Dept: FAMILY MEDICINE | Facility: CLINIC | Age: 71
End: 2021-05-13

## 2021-05-13 DIAGNOSIS — K62.5 BRBPR (BRIGHT RED BLOOD PER RECTUM): ICD-10-CM

## 2021-05-13 DIAGNOSIS — K92.1 BLACK STOOL: Primary | ICD-10-CM

## 2021-05-13 PROBLEM — Z71.89 ADVANCE CARE PLANNING: Status: ACTIVE | Noted: 2021-05-13

## 2021-05-13 PROBLEM — K92.2 GI BLEED: Status: ACTIVE | Noted: 2021-05-13

## 2021-05-13 PROBLEM — R91.8 LUNG NODULES: Status: ACTIVE | Noted: 2021-04-13

## 2021-05-13 PROBLEM — E83.52 HYPERCALCEMIA: Status: ACTIVE | Noted: 2021-05-13

## 2021-05-13 LAB
APTT BLDCRRT: 27.5 SEC (ref 21–32)
BASOPHILS # BLD AUTO: 0.06 K/UL (ref 0–0.2)
BASOPHILS NFR BLD: 0.5 % (ref 0–1.9)
DIFFERENTIAL METHOD: ABNORMAL
EOSINOPHIL # BLD AUTO: 0.2 K/UL (ref 0–0.5)
EOSINOPHIL NFR BLD: 1.7 % (ref 0–8)
ERYTHROCYTE [DISTWIDTH] IN BLOOD BY AUTOMATED COUNT: 15.4 % (ref 11.5–14.5)
HCT VFR BLD AUTO: 43.6 % (ref 37–48.5)
HGB BLD-MCNC: 14 G/DL (ref 12–16)
IMM GRANULOCYTES # BLD AUTO: 0.05 K/UL (ref 0–0.04)
IMM GRANULOCYTES NFR BLD AUTO: 0.4 % (ref 0–0.5)
INR PPP: 1.1 (ref 0.8–1.2)
LYMPHOCYTES # BLD AUTO: 2.1 K/UL (ref 1–4.8)
LYMPHOCYTES NFR BLD: 17.8 % (ref 18–48)
MCH RBC QN AUTO: 27.4 PG (ref 27–31)
MCHC RBC AUTO-ENTMCNC: 32.1 G/DL (ref 32–36)
MCV RBC AUTO: 85 FL (ref 82–98)
MONOCYTES # BLD AUTO: 0.8 K/UL (ref 0.3–1)
MONOCYTES NFR BLD: 7 % (ref 4–15)
NEUTROPHILS # BLD AUTO: 8.4 K/UL (ref 1.8–7.7)
NEUTROPHILS NFR BLD: 72.6 % (ref 38–73)
NRBC BLD-RTO: 0 /100 WBC
PLATELET # BLD AUTO: 250 K/UL (ref 150–450)
PMV BLD AUTO: 13 FL (ref 9.2–12.9)
PROTHROMBIN TIME: 11.7 SEC (ref 9–12.5)
RBC # BLD AUTO: 5.11 M/UL (ref 4–5.4)
WBC # BLD AUTO: 11.61 K/UL (ref 3.9–12.7)

## 2021-05-13 PROCEDURE — 80053 COMPREHEN METABOLIC PANEL: CPT | Mod: 91 | Performed by: FAMILY MEDICINE

## 2021-05-13 PROCEDURE — 85730 THROMBOPLASTIN TIME PARTIAL: CPT | Performed by: FAMILY MEDICINE

## 2021-05-13 PROCEDURE — 36415 COLL VENOUS BLD VENIPUNCTURE: CPT | Mod: PN | Performed by: FAMILY MEDICINE

## 2021-05-13 PROCEDURE — 85610 PROTHROMBIN TIME: CPT | Performed by: FAMILY MEDICINE

## 2021-05-13 PROCEDURE — 85025 COMPLETE CBC W/AUTO DIFF WBC: CPT | Mod: 91 | Performed by: FAMILY MEDICINE

## 2021-05-14 PROBLEM — K62.5 RECTAL BLEEDING: Status: ACTIVE | Noted: 2021-05-14

## 2021-05-14 LAB
ALBUMIN SERPL BCP-MCNC: 3.7 G/DL (ref 3.5–5.2)
ALP SERPL-CCNC: 63 U/L (ref 55–135)
ALT SERPL W/O P-5'-P-CCNC: 16 U/L (ref 10–44)
ANION GAP SERPL CALC-SCNC: 10 MMOL/L (ref 8–16)
AST SERPL-CCNC: 22 U/L (ref 10–40)
BILIRUB SERPL-MCNC: 0.9 MG/DL (ref 0.1–1)
BUN SERPL-MCNC: 19 MG/DL (ref 8–23)
CALCIUM SERPL-MCNC: 12.1 MG/DL (ref 8.7–10.5)
CHLORIDE SERPL-SCNC: 100 MMOL/L (ref 95–110)
CO2 SERPL-SCNC: 29 MMOL/L (ref 23–29)
CREAT SERPL-MCNC: 1.4 MG/DL (ref 0.5–1.4)
EST. GFR  (AFRICAN AMERICAN): 43.3 ML/MIN/1.73 M^2
EST. GFR  (NON AFRICAN AMERICAN): 37.6 ML/MIN/1.73 M^2
GLUCOSE SERPL-MCNC: 153 MG/DL (ref 70–110)
POTASSIUM SERPL-SCNC: 3.8 MMOL/L (ref 3.5–5.1)
PROT SERPL-MCNC: 7.1 G/DL (ref 6–8.4)
SODIUM SERPL-SCNC: 139 MMOL/L (ref 136–145)

## 2021-05-17 ENCOUNTER — PATIENT MESSAGE (OUTPATIENT)
Dept: CARDIOLOGY | Facility: CLINIC | Age: 71
End: 2021-05-17

## 2021-05-17 ENCOUNTER — PATIENT MESSAGE (OUTPATIENT)
Dept: FAMILY MEDICINE | Facility: CLINIC | Age: 71
End: 2021-05-17

## 2021-05-18 PROBLEM — R59.1 LYMPHADENOPATHY: Status: ACTIVE | Noted: 2021-05-18

## 2021-05-19 ENCOUNTER — PATIENT MESSAGE (OUTPATIENT)
Dept: FAMILY MEDICINE | Facility: CLINIC | Age: 71
End: 2021-05-19

## 2021-05-20 ENCOUNTER — TELEPHONE (OUTPATIENT)
Dept: HEMATOLOGY/ONCOLOGY | Facility: CLINIC | Age: 71
End: 2021-05-20

## 2021-05-20 PROBLEM — C34.91 SMALL CELL CARCINOMA OF LUNG, RIGHT: Status: ACTIVE | Noted: 2021-05-20

## 2021-05-21 ENCOUNTER — TELEPHONE (OUTPATIENT)
Dept: HEMATOLOGY/ONCOLOGY | Facility: CLINIC | Age: 71
End: 2021-05-21

## 2021-05-21 ENCOUNTER — OFFICE VISIT (OUTPATIENT)
Dept: HEMATOLOGY/ONCOLOGY | Facility: CLINIC | Age: 71
End: 2021-05-21
Payer: MEDICARE

## 2021-05-21 ENCOUNTER — TELEPHONE (OUTPATIENT)
Dept: CARDIOLOGY | Facility: CLINIC | Age: 71
End: 2021-05-21

## 2021-05-21 VITALS
HEIGHT: 66 IN | HEART RATE: 122 BPM | BODY MASS INDEX: 38.44 KG/M2 | WEIGHT: 239.19 LBS | SYSTOLIC BLOOD PRESSURE: 165 MMHG | DIASTOLIC BLOOD PRESSURE: 105 MMHG | OXYGEN SATURATION: 99 %

## 2021-05-21 DIAGNOSIS — C34.91 SMALL CELL CARCINOMA OF LUNG, RIGHT: ICD-10-CM

## 2021-05-21 PROCEDURE — 3288F FALL RISK ASSESSMENT DOCD: CPT | Mod: S$GLB,,, | Performed by: INTERNAL MEDICINE

## 2021-05-21 PROCEDURE — 99205 OFFICE O/P NEW HI 60 MIN: CPT | Mod: S$GLB,,, | Performed by: INTERNAL MEDICINE

## 2021-05-21 PROCEDURE — 99999 PR PBB SHADOW E&M-EST. PATIENT-LVL IV: ICD-10-PCS | Mod: PBBFAC,,, | Performed by: INTERNAL MEDICINE

## 2021-05-21 PROCEDURE — 3288F PR FALLS RISK ASSESSMENT DOCUMENTED: ICD-10-PCS | Mod: S$GLB,,, | Performed by: INTERNAL MEDICINE

## 2021-05-21 PROCEDURE — 3008F PR BODY MASS INDEX (BMI) DOCUMENTED: ICD-10-PCS | Mod: S$GLB,,, | Performed by: INTERNAL MEDICINE

## 2021-05-21 PROCEDURE — 99999 PR PBB SHADOW E&M-EST. PATIENT-LVL IV: CPT | Mod: PBBFAC,,, | Performed by: INTERNAL MEDICINE

## 2021-05-21 PROCEDURE — 1101F PR PT FALLS ASSESS DOC 0-1 FALLS W/OUT INJ PAST YR: ICD-10-PCS | Mod: S$GLB,,, | Performed by: INTERNAL MEDICINE

## 2021-05-21 PROCEDURE — 1101F PT FALLS ASSESS-DOCD LE1/YR: CPT | Mod: S$GLB,,, | Performed by: INTERNAL MEDICINE

## 2021-05-21 PROCEDURE — 1126F PR PAIN SEVERITY QUANTIFIED, NO PAIN PRESENT: ICD-10-PCS | Mod: S$GLB,,, | Performed by: INTERNAL MEDICINE

## 2021-05-21 PROCEDURE — 1159F MED LIST DOCD IN RCRD: CPT | Mod: S$GLB,,, | Performed by: INTERNAL MEDICINE

## 2021-05-21 PROCEDURE — 1126F AMNT PAIN NOTED NONE PRSNT: CPT | Mod: S$GLB,,, | Performed by: INTERNAL MEDICINE

## 2021-05-21 PROCEDURE — 3008F BODY MASS INDEX DOCD: CPT | Mod: S$GLB,,, | Performed by: INTERNAL MEDICINE

## 2021-05-21 PROCEDURE — 99205 PR OFFICE/OUTPT VISIT, NEW, LEVL V, 60-74 MIN: ICD-10-PCS | Mod: S$GLB,,, | Performed by: INTERNAL MEDICINE

## 2021-05-21 PROCEDURE — 1159F PR MEDICATION LIST DOCUMENTED IN MEDICAL RECORD: ICD-10-PCS | Mod: S$GLB,,, | Performed by: INTERNAL MEDICINE

## 2021-05-24 ENCOUNTER — PATIENT MESSAGE (OUTPATIENT)
Dept: CARDIOLOGY | Facility: CLINIC | Age: 71
End: 2021-05-24

## 2021-05-24 ENCOUNTER — TELEPHONE (OUTPATIENT)
Dept: HEMATOLOGY/ONCOLOGY | Facility: CLINIC | Age: 71
End: 2021-05-24

## 2021-05-24 ENCOUNTER — PATIENT MESSAGE (OUTPATIENT)
Dept: FAMILY MEDICINE | Facility: CLINIC | Age: 71
End: 2021-05-24

## 2021-05-25 ENCOUNTER — PATIENT MESSAGE (OUTPATIENT)
Dept: HEMATOLOGY/ONCOLOGY | Facility: CLINIC | Age: 71
End: 2021-05-25

## 2021-05-26 ENCOUNTER — PATIENT MESSAGE (OUTPATIENT)
Dept: HEMATOLOGY/ONCOLOGY | Facility: CLINIC | Age: 71
End: 2021-05-26

## 2021-05-26 ENCOUNTER — DOCUMENTATION ONLY (OUTPATIENT)
Dept: ADMINISTRATIVE | Facility: OTHER | Age: 71
End: 2021-05-26

## 2021-05-26 DIAGNOSIS — D50.9 MICROCYTIC ANEMIA: Primary | ICD-10-CM

## 2021-05-28 ENCOUNTER — TELEPHONE (OUTPATIENT)
Dept: HEMATOLOGY/ONCOLOGY | Facility: CLINIC | Age: 71
End: 2021-05-28

## 2021-05-28 DIAGNOSIS — C34.91 SMALL CELL CARCINOMA OF LUNG, RIGHT: Primary | ICD-10-CM

## 2021-06-01 ENCOUNTER — PATIENT MESSAGE (OUTPATIENT)
Dept: CARDIOLOGY | Facility: CLINIC | Age: 71
End: 2021-06-01

## 2021-06-01 ENCOUNTER — PATIENT MESSAGE (OUTPATIENT)
Dept: FAMILY MEDICINE | Facility: CLINIC | Age: 71
End: 2021-06-01

## 2021-06-01 ENCOUNTER — LAB VISIT (OUTPATIENT)
Dept: LAB | Facility: HOSPITAL | Age: 71
End: 2021-06-01
Attending: NURSE PRACTITIONER
Payer: MEDICARE

## 2021-06-01 ENCOUNTER — PATIENT MESSAGE (OUTPATIENT)
Dept: HEMATOLOGY/ONCOLOGY | Facility: CLINIC | Age: 71
End: 2021-06-01

## 2021-06-01 DIAGNOSIS — D50.9 MICROCYTIC ANEMIA: ICD-10-CM

## 2021-06-01 LAB
FERRITIN SERPL-MCNC: 22 NG/ML (ref 12–264)
IRON SATN MFR SERPL: 10 % (ref 20–50)
IRON SERPL-MCNC: 40 UG/DL (ref 30–160)
TOTAL IRON BINDING CAPACITY: 395 UG/DL (ref 265–497)

## 2021-06-01 PROCEDURE — 82728 ASSAY OF FERRITIN: CPT | Mod: PN | Performed by: INTERNAL MEDICINE

## 2021-06-01 PROCEDURE — 84238 ASSAY NONENDOCRINE RECEPTOR: CPT | Performed by: INTERNAL MEDICINE

## 2021-06-01 PROCEDURE — 83540 ASSAY OF IRON: CPT | Mod: PN | Performed by: INTERNAL MEDICINE

## 2021-06-01 PROCEDURE — 82728 ASSAY OF FERRITIN: CPT | Performed by: INTERNAL MEDICINE

## 2021-06-01 PROCEDURE — 83550 IRON BINDING TEST: CPT | Performed by: INTERNAL MEDICINE

## 2021-06-01 PROCEDURE — 83550 IRON BINDING TEST: CPT | Mod: PN | Performed by: INTERNAL MEDICINE

## 2021-06-03 ENCOUNTER — PATIENT MESSAGE (OUTPATIENT)
Dept: HEMATOLOGY/ONCOLOGY | Facility: CLINIC | Age: 71
End: 2021-06-03

## 2021-06-03 ENCOUNTER — TELEPHONE (OUTPATIENT)
Dept: CARDIOLOGY | Facility: CLINIC | Age: 71
End: 2021-06-03

## 2021-06-03 DIAGNOSIS — C34.91 SMALL CELL CARCINOMA OF LUNG, RIGHT: Primary | ICD-10-CM

## 2021-06-03 DIAGNOSIS — D50.9 IRON DEFICIENCY ANEMIA, UNSPECIFIED IRON DEFICIENCY ANEMIA TYPE: ICD-10-CM

## 2021-06-03 LAB — STFR SERPL-MCNC: 10.1 MG/L (ref 1.8–4.6)

## 2021-06-03 RX ORDER — FAMOTIDINE 10 MG/ML
20 INJECTION INTRAVENOUS DAILY
Status: CANCELLED
Start: 2021-06-03

## 2021-06-03 RX ORDER — SODIUM CHLORIDE 0.9 % (FLUSH) 0.9 %
10 SYRINGE (ML) INJECTION
Status: CANCELLED | OUTPATIENT
Start: 2021-06-03

## 2021-06-03 RX ORDER — HEPARIN 100 UNIT/ML
500 SYRINGE INTRAVENOUS
Status: CANCELLED | OUTPATIENT
Start: 2021-06-03

## 2021-06-03 RX ORDER — DIPHENHYDRAMINE HYDROCHLORIDE 50 MG/ML
25 INJECTION INTRAMUSCULAR; INTRAVENOUS ONCE
Status: CANCELLED
Start: 2021-06-03

## 2021-06-04 ENCOUNTER — DOCUMENTATION ONLY (OUTPATIENT)
Dept: INFUSION THERAPY | Facility: HOSPITAL | Age: 71
End: 2021-06-04

## 2021-06-04 ENCOUNTER — CLINICAL SUPPORT (OUTPATIENT)
Dept: HEMATOLOGY/ONCOLOGY | Facility: CLINIC | Age: 71
End: 2021-06-04
Payer: MEDICARE

## 2021-06-04 ENCOUNTER — APPOINTMENT (OUTPATIENT)
Dept: INFUSION THERAPY | Facility: HOSPITAL | Age: 71
End: 2021-06-04
Attending: FAMILY MEDICINE
Payer: MEDICARE

## 2021-06-04 ENCOUNTER — PATIENT MESSAGE (OUTPATIENT)
Dept: HEMATOLOGY/ONCOLOGY | Facility: CLINIC | Age: 71
End: 2021-06-04

## 2021-06-04 ENCOUNTER — TELEPHONE (OUTPATIENT)
Dept: INFUSION THERAPY | Facility: HOSPITAL | Age: 71
End: 2021-06-04

## 2021-06-04 VITALS
DIASTOLIC BLOOD PRESSURE: 74 MMHG | HEART RATE: 78 BPM | OXYGEN SATURATION: 98 % | WEIGHT: 234.56 LBS | TEMPERATURE: 98 F | HEIGHT: 66 IN | BODY MASS INDEX: 37.69 KG/M2 | SYSTOLIC BLOOD PRESSURE: 146 MMHG

## 2021-06-04 DIAGNOSIS — F41.9 ANXIETY: ICD-10-CM

## 2021-06-04 DIAGNOSIS — J44.9 CHRONIC OBSTRUCTIVE PULMONARY DISEASE, UNSPECIFIED COPD TYPE: ICD-10-CM

## 2021-06-04 DIAGNOSIS — C34.91 SMALL CELL CARCINOMA OF LUNG, RIGHT: Primary | ICD-10-CM

## 2021-06-04 PROCEDURE — 99999 PR PBB SHADOW E&M-EST. PATIENT-LVL IV: ICD-10-PCS | Mod: PBBFAC,,, | Performed by: NURSE PRACTITIONER

## 2021-06-04 PROCEDURE — 99214 OFFICE O/P EST MOD 30 MIN: CPT | Mod: S$GLB,,, | Performed by: NURSE PRACTITIONER

## 2021-06-04 PROCEDURE — 99214 PR OFFICE/OUTPT VISIT, EST, LEVL IV, 30-39 MIN: ICD-10-PCS | Mod: S$GLB,,, | Performed by: NURSE PRACTITIONER

## 2021-06-04 PROCEDURE — 99999 PR PBB SHADOW E&M-EST. PATIENT-LVL IV: CPT | Mod: PBBFAC,,, | Performed by: NURSE PRACTITIONER

## 2021-06-04 RX ORDER — LORAZEPAM 1 MG/1
TABLET ORAL
COMMUNITY
Start: 2021-05-24 | End: 2021-06-04

## 2021-06-04 RX ORDER — LIDOCAINE AND PRILOCAINE 25; 25 MG/G; MG/G
CREAM TOPICAL
Qty: 30 G | Refills: 2 | Status: ON HOLD | OUTPATIENT
Start: 2021-06-04 | End: 2021-09-14 | Stop reason: HOSPADM

## 2021-06-04 RX ORDER — ONDANSETRON 8 MG/1
8 TABLET, ORALLY DISINTEGRATING ORAL EVERY 12 HOURS PRN
Qty: 30 TABLET | Refills: 1 | Status: SHIPPED | OUTPATIENT
Start: 2021-06-04 | End: 2021-06-21

## 2021-06-04 RX ORDER — PROCHLORPERAZINE MALEATE 10 MG
10 TABLET ORAL EVERY 6 HOURS PRN
Qty: 30 TABLET | Refills: 6 | Status: SHIPPED | OUTPATIENT
Start: 2021-06-04 | End: 2021-06-21

## 2021-06-07 ENCOUNTER — PATIENT MESSAGE (OUTPATIENT)
Dept: FAMILY MEDICINE | Facility: CLINIC | Age: 71
End: 2021-06-07

## 2021-06-07 ENCOUNTER — PATIENT MESSAGE (OUTPATIENT)
Dept: HEMATOLOGY/ONCOLOGY | Facility: CLINIC | Age: 71
End: 2021-06-07

## 2021-06-07 ENCOUNTER — DOCUMENTATION ONLY (OUTPATIENT)
Dept: INFUSION THERAPY | Facility: HOSPITAL | Age: 71
End: 2021-06-07

## 2021-06-08 ENCOUNTER — INFUSION (OUTPATIENT)
Dept: INFUSION THERAPY | Facility: HOSPITAL | Age: 71
End: 2021-06-08
Attending: INTERNAL MEDICINE
Payer: MEDICARE

## 2021-06-08 ENCOUNTER — OFFICE VISIT (OUTPATIENT)
Dept: HEMATOLOGY/ONCOLOGY | Facility: CLINIC | Age: 71
End: 2021-06-08
Payer: MEDICARE

## 2021-06-08 VITALS
RESPIRATION RATE: 18 BRPM | SYSTOLIC BLOOD PRESSURE: 143 MMHG | TEMPERATURE: 98 F | OXYGEN SATURATION: 98 % | HEIGHT: 69 IN | BODY MASS INDEX: 35.53 KG/M2 | WEIGHT: 239.88 LBS | HEART RATE: 96 BPM | DIASTOLIC BLOOD PRESSURE: 86 MMHG

## 2021-06-08 VITALS
SYSTOLIC BLOOD PRESSURE: 136 MMHG | DIASTOLIC BLOOD PRESSURE: 98 MMHG | RESPIRATION RATE: 18 BRPM | WEIGHT: 239.88 LBS | OXYGEN SATURATION: 97 % | HEIGHT: 69 IN | BODY MASS INDEX: 35.53 KG/M2 | HEART RATE: 73 BPM

## 2021-06-08 DIAGNOSIS — C34.91 SMALL CELL CARCINOMA OF LUNG, RIGHT: Primary | ICD-10-CM

## 2021-06-08 DIAGNOSIS — E61.1 IRON DEFICIENCY: ICD-10-CM

## 2021-06-08 DIAGNOSIS — F41.9 ANXIETY: ICD-10-CM

## 2021-06-08 DIAGNOSIS — J44.9 CHRONIC OBSTRUCTIVE PULMONARY DISEASE, UNSPECIFIED COPD TYPE: ICD-10-CM

## 2021-06-08 PROCEDURE — 99214 OFFICE O/P EST MOD 30 MIN: CPT | Mod: S$GLB,,, | Performed by: INTERNAL MEDICINE

## 2021-06-08 PROCEDURE — 96417 CHEMO IV INFUS EACH ADDL SEQ: CPT | Mod: PN

## 2021-06-08 PROCEDURE — 99999 PR PBB SHADOW E&M-EST. PATIENT-LVL III: CPT | Mod: PBBFAC,,, | Performed by: INTERNAL MEDICINE

## 2021-06-08 PROCEDURE — A4216 STERILE WATER/SALINE, 10 ML: HCPCS | Mod: PN | Performed by: INTERNAL MEDICINE

## 2021-06-08 PROCEDURE — 96375 TX/PRO/DX INJ NEW DRUG ADDON: CPT | Mod: PN

## 2021-06-08 PROCEDURE — 1126F PR PAIN SEVERITY QUANTIFIED, NO PAIN PRESENT: ICD-10-PCS | Mod: S$GLB,,, | Performed by: INTERNAL MEDICINE

## 2021-06-08 PROCEDURE — 3008F PR BODY MASS INDEX (BMI) DOCUMENTED: ICD-10-PCS | Mod: S$GLB,,, | Performed by: INTERNAL MEDICINE

## 2021-06-08 PROCEDURE — 1101F PT FALLS ASSESS-DOCD LE1/YR: CPT | Mod: S$GLB,,, | Performed by: INTERNAL MEDICINE

## 2021-06-08 PROCEDURE — 1159F PR MEDICATION LIST DOCUMENTED IN MEDICAL RECORD: ICD-10-PCS | Mod: S$GLB,,, | Performed by: INTERNAL MEDICINE

## 2021-06-08 PROCEDURE — 1126F AMNT PAIN NOTED NONE PRSNT: CPT | Mod: S$GLB,,, | Performed by: INTERNAL MEDICINE

## 2021-06-08 PROCEDURE — 63600175 PHARM REV CODE 636 W HCPCS: Mod: PN | Performed by: INTERNAL MEDICINE

## 2021-06-08 PROCEDURE — 99214 PR OFFICE/OUTPT VISIT, EST, LEVL IV, 30-39 MIN: ICD-10-PCS | Mod: S$GLB,,, | Performed by: INTERNAL MEDICINE

## 2021-06-08 PROCEDURE — 3008F BODY MASS INDEX DOCD: CPT | Mod: S$GLB,,, | Performed by: INTERNAL MEDICINE

## 2021-06-08 PROCEDURE — 1159F MED LIST DOCD IN RCRD: CPT | Mod: S$GLB,,, | Performed by: INTERNAL MEDICINE

## 2021-06-08 PROCEDURE — 96367 TX/PROPH/DG ADDL SEQ IV INF: CPT | Mod: PN

## 2021-06-08 PROCEDURE — 3288F PR FALLS RISK ASSESSMENT DOCUMENTED: ICD-10-PCS | Mod: S$GLB,,, | Performed by: INTERNAL MEDICINE

## 2021-06-08 PROCEDURE — 1101F PR PT FALLS ASSESS DOC 0-1 FALLS W/OUT INJ PAST YR: ICD-10-PCS | Mod: S$GLB,,, | Performed by: INTERNAL MEDICINE

## 2021-06-08 PROCEDURE — 99999 PR PBB SHADOW E&M-EST. PATIENT-LVL III: ICD-10-PCS | Mod: PBBFAC,,, | Performed by: INTERNAL MEDICINE

## 2021-06-08 PROCEDURE — 25000003 PHARM REV CODE 250: Mod: PN | Performed by: INTERNAL MEDICINE

## 2021-06-08 PROCEDURE — 3288F FALL RISK ASSESSMENT DOCD: CPT | Mod: S$GLB,,, | Performed by: INTERNAL MEDICINE

## 2021-06-08 PROCEDURE — 96413 CHEMO IV INFUSION 1 HR: CPT | Mod: PN

## 2021-06-08 RX ORDER — HEPARIN 100 UNIT/ML
500 SYRINGE INTRAVENOUS
Status: DISCONTINUED | OUTPATIENT
Start: 2021-06-08 | End: 2021-06-08 | Stop reason: HOSPADM

## 2021-06-08 RX ORDER — HEPARIN 100 UNIT/ML
500 SYRINGE INTRAVENOUS
Status: CANCELLED | OUTPATIENT
Start: 2021-06-10

## 2021-06-08 RX ORDER — SODIUM CHLORIDE 0.9 % (FLUSH) 0.9 %
10 SYRINGE (ML) INJECTION
Status: CANCELLED | OUTPATIENT
Start: 2021-06-08

## 2021-06-08 RX ORDER — HEPARIN 100 UNIT/ML
500 SYRINGE INTRAVENOUS
Status: CANCELLED | OUTPATIENT
Start: 2021-06-09

## 2021-06-08 RX ORDER — HEPARIN 100 UNIT/ML
500 SYRINGE INTRAVENOUS
Status: CANCELLED | OUTPATIENT
Start: 2021-06-08

## 2021-06-08 RX ORDER — SODIUM CHLORIDE 0.9 % (FLUSH) 0.9 %
10 SYRINGE (ML) INJECTION
Status: CANCELLED | OUTPATIENT
Start: 2021-06-10

## 2021-06-08 RX ORDER — SODIUM CHLORIDE 0.9 % (FLUSH) 0.9 %
10 SYRINGE (ML) INJECTION
Status: DISCONTINUED | OUTPATIENT
Start: 2021-06-08 | End: 2021-06-08 | Stop reason: HOSPADM

## 2021-06-08 RX ORDER — SODIUM CHLORIDE 0.9 % (FLUSH) 0.9 %
10 SYRINGE (ML) INJECTION
Status: CANCELLED | OUTPATIENT
Start: 2021-06-09

## 2021-06-08 RX ADMIN — DEXAMETHASONE SODIUM PHOSPHATE 0.25 MG: 4 INJECTION, SOLUTION INTRA-ARTICULAR; INTRALESIONAL; INTRAMUSCULAR; INTRAVENOUS; SOFT TISSUE at 01:06

## 2021-06-08 RX ADMIN — ETOPOSIDE 200 MG: 20 INJECTION INTRAVENOUS at 02:06

## 2021-06-08 RX ADMIN — CARBOPLATIN 395 MG: 10 INJECTION, SOLUTION INTRAVENOUS at 02:06

## 2021-06-08 RX ADMIN — SODIUM CHLORIDE: 9 INJECTION, SOLUTION INTRAVENOUS at 12:06

## 2021-06-08 RX ADMIN — Medication 10 ML: at 03:06

## 2021-06-08 RX ADMIN — APREPITANT 130 MG: 130 INJECTION, EMULSION INTRAVENOUS at 01:06

## 2021-06-09 ENCOUNTER — INFUSION (OUTPATIENT)
Dept: INFUSION THERAPY | Facility: HOSPITAL | Age: 71
End: 2021-06-09
Attending: INTERNAL MEDICINE
Payer: MEDICARE

## 2021-06-09 VITALS
RESPIRATION RATE: 18 BRPM | TEMPERATURE: 98 F | OXYGEN SATURATION: 98 % | HEIGHT: 69 IN | WEIGHT: 239.88 LBS | DIASTOLIC BLOOD PRESSURE: 80 MMHG | HEART RATE: 93 BPM | BODY MASS INDEX: 35.53 KG/M2 | SYSTOLIC BLOOD PRESSURE: 130 MMHG

## 2021-06-09 DIAGNOSIS — C34.91 SMALL CELL CARCINOMA OF LUNG, RIGHT: Primary | ICD-10-CM

## 2021-06-09 DIAGNOSIS — D50.9 IRON DEFICIENCY ANEMIA, UNSPECIFIED IRON DEFICIENCY ANEMIA TYPE: ICD-10-CM

## 2021-06-09 PROCEDURE — 25000003 PHARM REV CODE 250: Mod: PN | Performed by: INTERNAL MEDICINE

## 2021-06-09 PROCEDURE — 96413 CHEMO IV INFUSION 1 HR: CPT | Mod: PN

## 2021-06-09 PROCEDURE — 63600175 PHARM REV CODE 636 W HCPCS: Mod: PN | Performed by: INTERNAL MEDICINE

## 2021-06-09 PROCEDURE — A4216 STERILE WATER/SALINE, 10 ML: HCPCS | Mod: PN | Performed by: INTERNAL MEDICINE

## 2021-06-09 PROCEDURE — 96367 TX/PROPH/DG ADDL SEQ IV INF: CPT | Mod: PN

## 2021-06-09 PROCEDURE — 96375 TX/PRO/DX INJ NEW DRUG ADDON: CPT | Mod: PN

## 2021-06-09 RX ORDER — FAMOTIDINE 10 MG/ML
20 INJECTION INTRAVENOUS DAILY
Status: DISCONTINUED | OUTPATIENT
Start: 2021-06-09 | End: 2021-06-09 | Stop reason: HOSPADM

## 2021-06-09 RX ORDER — DIPHENHYDRAMINE HYDROCHLORIDE 50 MG/ML
25 INJECTION INTRAMUSCULAR; INTRAVENOUS ONCE
Status: COMPLETED | OUTPATIENT
Start: 2021-06-09 | End: 2021-06-09

## 2021-06-09 RX ORDER — DEXAMETHASONE SODIUM PHOSPHATE 4 MG/ML
4 INJECTION, SOLUTION INTRA-ARTICULAR; INTRALESIONAL; INTRAMUSCULAR; INTRAVENOUS; SOFT TISSUE
Status: CANCELLED
Start: 2021-06-09

## 2021-06-09 RX ORDER — HEPARIN 100 UNIT/ML
500 SYRINGE INTRAVENOUS
Status: CANCELLED | OUTPATIENT
Start: 2021-06-09

## 2021-06-09 RX ORDER — DEXAMETHASONE SODIUM PHOSPHATE 4 MG/ML
4 INJECTION, SOLUTION INTRA-ARTICULAR; INTRALESIONAL; INTRAMUSCULAR; INTRAVENOUS; SOFT TISSUE
Status: COMPLETED | OUTPATIENT
Start: 2021-06-09 | End: 2021-06-09

## 2021-06-09 RX ORDER — SODIUM CHLORIDE 0.9 % (FLUSH) 0.9 %
10 SYRINGE (ML) INJECTION
Status: DISCONTINUED | OUTPATIENT
Start: 2021-06-09 | End: 2021-06-09 | Stop reason: HOSPADM

## 2021-06-09 RX ORDER — FAMOTIDINE 10 MG/ML
20 INJECTION INTRAVENOUS DAILY
Status: CANCELLED
Start: 2021-06-09

## 2021-06-09 RX ORDER — DIPHENHYDRAMINE HYDROCHLORIDE 50 MG/ML
25 INJECTION INTRAMUSCULAR; INTRAVENOUS ONCE
Status: CANCELLED
Start: 2021-06-09

## 2021-06-09 RX ORDER — SODIUM CHLORIDE 0.9 % (FLUSH) 0.9 %
10 SYRINGE (ML) INJECTION
Status: CANCELLED | OUTPATIENT
Start: 2021-06-09

## 2021-06-09 RX ADMIN — FAMOTIDINE 20 MG: 10 INJECTION INTRAVENOUS at 12:06

## 2021-06-09 RX ADMIN — DEXAMETHASONE SODIUM PHOSPHATE 4 MG: 4 INJECTION INTRA-ARTICULAR; INTRALESIONAL; INTRAMUSCULAR; INTRAVENOUS; SOFT TISSUE at 12:06

## 2021-06-09 RX ADMIN — DIPHENHYDRAMINE HYDROCHLORIDE 25 MG: 50 INJECTION INTRAMUSCULAR; INTRAVENOUS at 12:06

## 2021-06-09 RX ADMIN — Medication 10 ML: at 02:06

## 2021-06-09 RX ADMIN — SODIUM CHLORIDE: 9 INJECTION, SOLUTION INTRAVENOUS at 12:06

## 2021-06-09 RX ADMIN — FERUMOXYTOL 510 MG: 510 INJECTION INTRAVENOUS at 12:06

## 2021-06-09 RX ADMIN — ETOPOSIDE 200 MG: 20 INJECTION INTRAVENOUS at 01:06

## 2021-06-10 ENCOUNTER — INFUSION (OUTPATIENT)
Dept: INFUSION THERAPY | Facility: HOSPITAL | Age: 71
End: 2021-06-10
Attending: INTERNAL MEDICINE
Payer: MEDICARE

## 2021-06-10 VITALS
HEIGHT: 69 IN | WEIGHT: 239.88 LBS | SYSTOLIC BLOOD PRESSURE: 133 MMHG | RESPIRATION RATE: 18 BRPM | HEART RATE: 51 BPM | DIASTOLIC BLOOD PRESSURE: 80 MMHG | BODY MASS INDEX: 35.53 KG/M2 | TEMPERATURE: 98 F

## 2021-06-10 DIAGNOSIS — C34.91 SMALL CELL CARCINOMA OF LUNG, RIGHT: Primary | ICD-10-CM

## 2021-06-10 PROCEDURE — A4216 STERILE WATER/SALINE, 10 ML: HCPCS | Mod: PN | Performed by: INTERNAL MEDICINE

## 2021-06-10 PROCEDURE — 63600175 PHARM REV CODE 636 W HCPCS: Mod: PN | Performed by: INTERNAL MEDICINE

## 2021-06-10 PROCEDURE — 96413 CHEMO IV INFUSION 1 HR: CPT | Mod: PN

## 2021-06-10 PROCEDURE — 25000003 PHARM REV CODE 250: Mod: PN | Performed by: INTERNAL MEDICINE

## 2021-06-10 RX ORDER — HEPARIN 100 UNIT/ML
500 SYRINGE INTRAVENOUS
Status: DISCONTINUED | OUTPATIENT
Start: 2021-06-10 | End: 2021-06-10 | Stop reason: HOSPADM

## 2021-06-10 RX ORDER — SODIUM CHLORIDE 0.9 % (FLUSH) 0.9 %
10 SYRINGE (ML) INJECTION
Status: DISCONTINUED | OUTPATIENT
Start: 2021-06-10 | End: 2021-06-10 | Stop reason: HOSPADM

## 2021-06-10 RX ADMIN — ETOPOSIDE 200 MG: 20 INJECTION INTRAVENOUS at 12:06

## 2021-06-10 RX ADMIN — SODIUM CHLORIDE: 0.9 INJECTION, SOLUTION INTRAVENOUS at 12:06

## 2021-06-10 RX ADMIN — Medication 10 ML: at 12:06

## 2021-06-10 RX ADMIN — Medication 10 ML: at 02:06

## 2021-06-14 ENCOUNTER — TELEPHONE (OUTPATIENT)
Dept: HEMATOLOGY/ONCOLOGY | Facility: CLINIC | Age: 71
End: 2021-06-14

## 2021-06-14 ENCOUNTER — INFUSION (OUTPATIENT)
Dept: INFUSION THERAPY | Facility: HOSPITAL | Age: 71
End: 2021-06-14
Attending: INTERNAL MEDICINE
Payer: MEDICARE

## 2021-06-14 VITALS
SYSTOLIC BLOOD PRESSURE: 114 MMHG | RESPIRATION RATE: 16 BRPM | HEIGHT: 69 IN | TEMPERATURE: 98 F | DIASTOLIC BLOOD PRESSURE: 71 MMHG | BODY MASS INDEX: 35.53 KG/M2 | WEIGHT: 239.88 LBS | HEART RATE: 96 BPM

## 2021-06-14 DIAGNOSIS — D50.9 IRON DEFICIENCY ANEMIA, UNSPECIFIED IRON DEFICIENCY ANEMIA TYPE: Primary | ICD-10-CM

## 2021-06-14 PROCEDURE — 96365 THER/PROPH/DIAG IV INF INIT: CPT | Mod: PN

## 2021-06-14 PROCEDURE — A4216 STERILE WATER/SALINE, 10 ML: HCPCS | Mod: PN | Performed by: INTERNAL MEDICINE

## 2021-06-14 PROCEDURE — 96375 TX/PRO/DX INJ NEW DRUG ADDON: CPT | Mod: PN

## 2021-06-14 PROCEDURE — 25000003 PHARM REV CODE 250: Mod: PN | Performed by: INTERNAL MEDICINE

## 2021-06-14 PROCEDURE — 63600175 PHARM REV CODE 636 W HCPCS: Mod: PN | Performed by: INTERNAL MEDICINE

## 2021-06-14 RX ORDER — DEXAMETHASONE SODIUM PHOSPHATE 4 MG/ML
4 INJECTION, SOLUTION INTRA-ARTICULAR; INTRALESIONAL; INTRAMUSCULAR; INTRAVENOUS; SOFT TISSUE
Status: COMPLETED | OUTPATIENT
Start: 2021-06-14 | End: 2021-06-14

## 2021-06-14 RX ORDER — DIPHENHYDRAMINE HYDROCHLORIDE 50 MG/ML
25 INJECTION INTRAMUSCULAR; INTRAVENOUS ONCE
Status: COMPLETED | OUTPATIENT
Start: 2021-06-14 | End: 2021-06-14

## 2021-06-14 RX ORDER — DEXAMETHASONE SODIUM PHOSPHATE 4 MG/ML
4 INJECTION, SOLUTION INTRA-ARTICULAR; INTRALESIONAL; INTRAMUSCULAR; INTRAVENOUS; SOFT TISSUE
Status: CANCELLED
Start: 2021-06-14

## 2021-06-14 RX ORDER — HEPARIN 100 UNIT/ML
500 SYRINGE INTRAVENOUS
Status: CANCELLED | OUTPATIENT
Start: 2021-06-14

## 2021-06-14 RX ORDER — FAMOTIDINE 10 MG/ML
20 INJECTION INTRAVENOUS DAILY
Status: DISCONTINUED | OUTPATIENT
Start: 2021-06-14 | End: 2021-06-14 | Stop reason: HOSPADM

## 2021-06-14 RX ORDER — DIPHENHYDRAMINE HYDROCHLORIDE 50 MG/ML
25 INJECTION INTRAMUSCULAR; INTRAVENOUS ONCE
Status: CANCELLED
Start: 2021-06-14

## 2021-06-14 RX ORDER — SODIUM CHLORIDE 0.9 % (FLUSH) 0.9 %
10 SYRINGE (ML) INJECTION
Status: DISCONTINUED | OUTPATIENT
Start: 2021-06-14 | End: 2021-06-14 | Stop reason: HOSPADM

## 2021-06-14 RX ORDER — FAMOTIDINE 10 MG/ML
20 INJECTION INTRAVENOUS DAILY
Status: CANCELLED
Start: 2021-06-14

## 2021-06-14 RX ORDER — SODIUM CHLORIDE 0.9 % (FLUSH) 0.9 %
10 SYRINGE (ML) INJECTION
Status: CANCELLED | OUTPATIENT
Start: 2021-06-14

## 2021-06-14 RX ADMIN — DIPHENHYDRAMINE HYDROCHLORIDE 25 MG: 50 INJECTION INTRAMUSCULAR; INTRAVENOUS at 02:06

## 2021-06-14 RX ADMIN — Medication 10 ML: at 03:06

## 2021-06-14 RX ADMIN — DEXAMETHASONE SODIUM PHOSPHATE 4 MG: 4 INJECTION INTRA-ARTICULAR; INTRALESIONAL; INTRAMUSCULAR; INTRAVENOUS; SOFT TISSUE at 02:06

## 2021-06-14 RX ADMIN — FAMOTIDINE 20 MG: 10 INJECTION INTRAVENOUS at 02:06

## 2021-06-14 RX ADMIN — SODIUM CHLORIDE: 9 INJECTION, SOLUTION INTRAVENOUS at 02:06

## 2021-06-14 RX ADMIN — FERUMOXYTOL 510 MG: 510 INJECTION INTRAVENOUS at 02:06

## 2021-06-21 ENCOUNTER — PATIENT MESSAGE (OUTPATIENT)
Dept: HEMATOLOGY/ONCOLOGY | Facility: CLINIC | Age: 71
End: 2021-06-21

## 2021-06-21 ENCOUNTER — TELEPHONE (OUTPATIENT)
Dept: HEMATOLOGY/ONCOLOGY | Facility: CLINIC | Age: 71
End: 2021-06-21

## 2021-06-21 PROBLEM — D70.9 NEUTROPENIC FEVER: Status: ACTIVE | Noted: 2021-06-21

## 2021-06-21 PROBLEM — D69.59 THROMBOCYTOPENIA DUE TO DRUGS: Status: ACTIVE | Noted: 2021-06-21

## 2021-06-21 PROBLEM — R50.81 NEUTROPENIC FEVER: Status: ACTIVE | Noted: 2021-06-21

## 2021-06-21 PROBLEM — T50.905A THROMBOCYTOPENIA DUE TO DRUGS: Status: ACTIVE | Noted: 2021-06-21

## 2021-06-25 ENCOUNTER — PATIENT MESSAGE (OUTPATIENT)
Dept: HEMATOLOGY/ONCOLOGY | Facility: CLINIC | Age: 71
End: 2021-06-25

## 2021-06-28 ENCOUNTER — OFFICE VISIT (OUTPATIENT)
Dept: HEMATOLOGY/ONCOLOGY | Facility: CLINIC | Age: 71
End: 2021-06-28
Payer: MEDICARE

## 2021-06-28 ENCOUNTER — TELEPHONE (OUTPATIENT)
Dept: HEMATOLOGY/ONCOLOGY | Facility: CLINIC | Age: 71
End: 2021-06-28

## 2021-06-28 ENCOUNTER — LAB VISIT (OUTPATIENT)
Dept: LAB | Facility: HOSPITAL | Age: 71
End: 2021-06-28
Attending: INTERNAL MEDICINE
Payer: MEDICARE

## 2021-06-28 ENCOUNTER — PATIENT MESSAGE (OUTPATIENT)
Dept: HEMATOLOGY/ONCOLOGY | Facility: CLINIC | Age: 71
End: 2021-06-28

## 2021-06-28 VITALS
WEIGHT: 232.94 LBS | HEIGHT: 66 IN | HEART RATE: 129 BPM | BODY MASS INDEX: 37.44 KG/M2 | DIASTOLIC BLOOD PRESSURE: 73 MMHG | OXYGEN SATURATION: 97 % | SYSTOLIC BLOOD PRESSURE: 115 MMHG | RESPIRATION RATE: 18 BRPM

## 2021-06-28 DIAGNOSIS — E61.1 IRON DEFICIENCY: ICD-10-CM

## 2021-06-28 DIAGNOSIS — J44.9 CHRONIC OBSTRUCTIVE PULMONARY DISEASE, UNSPECIFIED COPD TYPE: ICD-10-CM

## 2021-06-28 DIAGNOSIS — C34.91 SMALL CELL CARCINOMA OF LUNG, RIGHT: ICD-10-CM

## 2021-06-28 DIAGNOSIS — F41.9 ANXIETY: ICD-10-CM

## 2021-06-28 DIAGNOSIS — C34.91 SMALL CELL CARCINOMA OF LUNG, RIGHT: Primary | ICD-10-CM

## 2021-06-28 LAB
ALBUMIN SERPL BCP-MCNC: 3.4 G/DL (ref 3.5–5.2)
ALP SERPL-CCNC: 77 U/L (ref 55–135)
ALT SERPL W/O P-5'-P-CCNC: 20 U/L (ref 10–44)
ANION GAP SERPL CALC-SCNC: 13 MMOL/L (ref 8–16)
ANISOCYTOSIS BLD QL SMEAR: SLIGHT
AST SERPL-CCNC: 21 U/L (ref 10–40)
BASOPHILS # BLD AUTO: ABNORMAL K/UL (ref 0–0.2)
BASOPHILS NFR BLD: 0 % (ref 0–1.9)
BILIRUB SERPL-MCNC: 0.5 MG/DL (ref 0.1–1)
BUN SERPL-MCNC: 11 MG/DL (ref 8–23)
CALCIUM SERPL-MCNC: 10.6 MG/DL (ref 8.7–10.5)
CHLORIDE SERPL-SCNC: 99 MMOL/L (ref 95–110)
CO2 SERPL-SCNC: 28 MMOL/L (ref 23–29)
CREAT SERPL-MCNC: 1.2 MG/DL (ref 0.5–1.4)
DIFFERENTIAL METHOD: ABNORMAL
EOSINOPHIL # BLD AUTO: ABNORMAL K/UL (ref 0–0.5)
EOSINOPHIL NFR BLD: 1 % (ref 0–8)
ERYTHROCYTE [DISTWIDTH] IN BLOOD BY AUTOMATED COUNT: 16.5 % (ref 11.5–14.5)
EST. GFR  (AFRICAN AMERICAN): 52 ML/MIN/1.73 M^2
EST. GFR  (NON AFRICAN AMERICAN): 45 ML/MIN/1.73 M^2
GIANT PLATELETS BLD QL SMEAR: PRESENT
GLUCOSE SERPL-MCNC: 143 MG/DL (ref 70–110)
HCT VFR BLD AUTO: 36.6 % (ref 37–48.5)
HGB BLD-MCNC: 11.4 G/DL (ref 12–16)
IMM GRANULOCYTES # BLD AUTO: ABNORMAL K/UL (ref 0–0.04)
IMM GRANULOCYTES NFR BLD AUTO: ABNORMAL % (ref 0–0.5)
LDH SERPL L TO P-CCNC: 219 U/L (ref 110–260)
LYMPHOCYTES # BLD AUTO: ABNORMAL K/UL (ref 1–4.8)
LYMPHOCYTES NFR BLD: 18 % (ref 18–48)
MAGNESIUM SERPL-MCNC: 1.7 MG/DL (ref 1.6–2.6)
MCH RBC QN AUTO: 25.2 PG (ref 27–31)
MCHC RBC AUTO-ENTMCNC: 31.1 G/DL (ref 32–36)
MCV RBC AUTO: 81 FL (ref 82–98)
METAMYELOCYTES NFR BLD MANUAL: 2 %
MONOCYTES # BLD AUTO: ABNORMAL K/UL (ref 0.3–1)
MONOCYTES NFR BLD: 12 % (ref 4–15)
NEUTROPHILS NFR BLD: 62 % (ref 38–73)
NEUTS BAND NFR BLD MANUAL: 5 %
NRBC BLD-RTO: 0 /100 WBC
PLATELET # BLD AUTO: 448 K/UL (ref 150–450)
PLATELET BLD QL SMEAR: ABNORMAL
PMV BLD AUTO: 10 FL (ref 9.2–12.9)
POLYCHROMASIA BLD QL SMEAR: ABNORMAL
POTASSIUM SERPL-SCNC: 4.3 MMOL/L (ref 3.5–5.1)
PROT SERPL-MCNC: 6.7 G/DL (ref 6–8.4)
RBC # BLD AUTO: 4.52 M/UL (ref 4–5.4)
SODIUM SERPL-SCNC: 140 MMOL/L (ref 136–145)
WBC # BLD AUTO: 9.76 K/UL (ref 3.9–12.7)

## 2021-06-28 PROCEDURE — 80053 COMPREHEN METABOLIC PANEL: CPT | Mod: PN | Performed by: INTERNAL MEDICINE

## 2021-06-28 PROCEDURE — 99999 PR PBB SHADOW E&M-EST. PATIENT-LVL IV: CPT | Mod: PBBFAC,,, | Performed by: INTERNAL MEDICINE

## 2021-06-28 PROCEDURE — 85007 BL SMEAR W/DIFF WBC COUNT: CPT | Mod: PN | Performed by: INTERNAL MEDICINE

## 2021-06-28 PROCEDURE — 1159F PR MEDICATION LIST DOCUMENTED IN MEDICAL RECORD: ICD-10-PCS | Mod: S$GLB,,, | Performed by: INTERNAL MEDICINE

## 2021-06-28 PROCEDURE — 1126F PR PAIN SEVERITY QUANTIFIED, NO PAIN PRESENT: ICD-10-PCS | Mod: S$GLB,,, | Performed by: INTERNAL MEDICINE

## 2021-06-28 PROCEDURE — 36415 COLL VENOUS BLD VENIPUNCTURE: CPT | Mod: PN | Performed by: INTERNAL MEDICINE

## 2021-06-28 PROCEDURE — 1101F PR PT FALLS ASSESS DOC 0-1 FALLS W/OUT INJ PAST YR: ICD-10-PCS | Mod: S$GLB,,, | Performed by: INTERNAL MEDICINE

## 2021-06-28 PROCEDURE — 99214 PR OFFICE/OUTPT VISIT, EST, LEVL IV, 30-39 MIN: ICD-10-PCS | Mod: S$GLB,,, | Performed by: INTERNAL MEDICINE

## 2021-06-28 PROCEDURE — 1159F MED LIST DOCD IN RCRD: CPT | Mod: S$GLB,,, | Performed by: INTERNAL MEDICINE

## 2021-06-28 PROCEDURE — 1111F PR DISCHARGE MEDS RECONCILED W/ CURRENT OUTPATIENT MED LIST: ICD-10-PCS | Mod: S$GLB,,, | Performed by: INTERNAL MEDICINE

## 2021-06-28 PROCEDURE — 85027 COMPLETE CBC AUTOMATED: CPT | Mod: PN | Performed by: INTERNAL MEDICINE

## 2021-06-28 PROCEDURE — 1101F PT FALLS ASSESS-DOCD LE1/YR: CPT | Mod: S$GLB,,, | Performed by: INTERNAL MEDICINE

## 2021-06-28 PROCEDURE — 3008F BODY MASS INDEX DOCD: CPT | Mod: S$GLB,,, | Performed by: INTERNAL MEDICINE

## 2021-06-28 PROCEDURE — 1111F DSCHRG MED/CURRENT MED MERGE: CPT | Mod: S$GLB,,, | Performed by: INTERNAL MEDICINE

## 2021-06-28 PROCEDURE — 99214 OFFICE O/P EST MOD 30 MIN: CPT | Mod: S$GLB,,, | Performed by: INTERNAL MEDICINE

## 2021-06-28 PROCEDURE — 1126F AMNT PAIN NOTED NONE PRSNT: CPT | Mod: S$GLB,,, | Performed by: INTERNAL MEDICINE

## 2021-06-28 PROCEDURE — 3288F FALL RISK ASSESSMENT DOCD: CPT | Mod: S$GLB,,, | Performed by: INTERNAL MEDICINE

## 2021-06-28 PROCEDURE — 83735 ASSAY OF MAGNESIUM: CPT | Mod: PN | Performed by: INTERNAL MEDICINE

## 2021-06-28 PROCEDURE — 83615 LACTATE (LD) (LDH) ENZYME: CPT | Mod: PN | Performed by: INTERNAL MEDICINE

## 2021-06-28 PROCEDURE — 3288F PR FALLS RISK ASSESSMENT DOCUMENTED: ICD-10-PCS | Mod: S$GLB,,, | Performed by: INTERNAL MEDICINE

## 2021-06-28 PROCEDURE — 3008F PR BODY MASS INDEX (BMI) DOCUMENTED: ICD-10-PCS | Mod: S$GLB,,, | Performed by: INTERNAL MEDICINE

## 2021-06-28 PROCEDURE — 99999 PR PBB SHADOW E&M-EST. PATIENT-LVL IV: ICD-10-PCS | Mod: PBBFAC,,, | Performed by: INTERNAL MEDICINE

## 2021-06-28 RX ORDER — HEPARIN 100 UNIT/ML
500 SYRINGE INTRAVENOUS
Status: CANCELLED | OUTPATIENT
Start: 2021-07-01

## 2021-06-28 RX ORDER — SODIUM CHLORIDE 0.9 % (FLUSH) 0.9 %
10 SYRINGE (ML) INJECTION
Status: CANCELLED | OUTPATIENT
Start: 2021-06-29

## 2021-06-28 RX ORDER — HEPARIN 100 UNIT/ML
500 SYRINGE INTRAVENOUS
Status: CANCELLED | OUTPATIENT
Start: 2021-06-29

## 2021-06-28 RX ORDER — ONDANSETRON 8 MG/1
8 TABLET, ORALLY DISINTEGRATING ORAL EVERY 8 HOURS PRN
Qty: 30 TABLET | Refills: 6 | Status: ON HOLD | OUTPATIENT
Start: 2021-06-28 | End: 2021-09-14 | Stop reason: HOSPADM

## 2021-06-28 RX ORDER — SODIUM CHLORIDE 0.9 % (FLUSH) 0.9 %
10 SYRINGE (ML) INJECTION
Status: CANCELLED | OUTPATIENT
Start: 2021-07-01

## 2021-06-28 RX ORDER — PROCHLORPERAZINE MALEATE 10 MG
10 TABLET ORAL EVERY 6 HOURS PRN
Qty: 60 TABLET | Refills: 6 | Status: ON HOLD | OUTPATIENT
Start: 2021-06-28 | End: 2021-09-14 | Stop reason: HOSPADM

## 2021-06-28 RX ORDER — HEPARIN 100 UNIT/ML
500 SYRINGE INTRAVENOUS
Status: CANCELLED | OUTPATIENT
Start: 2021-06-30

## 2021-06-28 RX ORDER — SODIUM CHLORIDE 0.9 % (FLUSH) 0.9 %
10 SYRINGE (ML) INJECTION
Status: CANCELLED | OUTPATIENT
Start: 2021-06-30

## 2021-06-29 ENCOUNTER — INFUSION (OUTPATIENT)
Dept: INFUSION THERAPY | Facility: HOSPITAL | Age: 71
End: 2021-06-29
Attending: INTERNAL MEDICINE
Payer: MEDICARE

## 2021-06-29 ENCOUNTER — PATIENT MESSAGE (OUTPATIENT)
Dept: HEMATOLOGY/ONCOLOGY | Facility: CLINIC | Age: 71
End: 2021-06-29

## 2021-06-29 VITALS
WEIGHT: 235.44 LBS | SYSTOLIC BLOOD PRESSURE: 140 MMHG | BODY MASS INDEX: 37.84 KG/M2 | HEIGHT: 66 IN | DIASTOLIC BLOOD PRESSURE: 88 MMHG | OXYGEN SATURATION: 98 % | HEART RATE: 101 BPM | TEMPERATURE: 98 F

## 2021-06-29 DIAGNOSIS — C34.91 SMALL CELL CARCINOMA OF LUNG, RIGHT: Primary | ICD-10-CM

## 2021-06-29 PROCEDURE — A4216 STERILE WATER/SALINE, 10 ML: HCPCS | Mod: PN | Performed by: INTERNAL MEDICINE

## 2021-06-29 PROCEDURE — 96417 CHEMO IV INFUS EACH ADDL SEQ: CPT | Mod: PN

## 2021-06-29 PROCEDURE — 96367 TX/PROPH/DG ADDL SEQ IV INF: CPT | Mod: PN

## 2021-06-29 PROCEDURE — 63600175 PHARM REV CODE 636 W HCPCS: Mod: PN | Performed by: INTERNAL MEDICINE

## 2021-06-29 PROCEDURE — 96375 TX/PRO/DX INJ NEW DRUG ADDON: CPT | Mod: PN

## 2021-06-29 PROCEDURE — 96413 CHEMO IV INFUSION 1 HR: CPT | Mod: PN

## 2021-06-29 PROCEDURE — 25000003 PHARM REV CODE 250: Mod: PN | Performed by: INTERNAL MEDICINE

## 2021-06-29 RX ORDER — SODIUM CHLORIDE 0.9 % (FLUSH) 0.9 %
10 SYRINGE (ML) INJECTION
Status: DISCONTINUED | OUTPATIENT
Start: 2021-06-29 | End: 2021-06-29 | Stop reason: HOSPADM

## 2021-06-29 RX ADMIN — SODIUM CHLORIDE: 0.9 INJECTION, SOLUTION INTRAVENOUS at 11:06

## 2021-06-29 RX ADMIN — CARBOPLATIN 395 MG: 10 INJECTION, SOLUTION INTRAVENOUS at 12:06

## 2021-06-29 RX ADMIN — ETOPOSIDE 200 MG: 20 INJECTION INTRAVENOUS at 12:06

## 2021-06-29 RX ADMIN — Medication 10 ML: at 02:06

## 2021-06-29 RX ADMIN — APREPITANT 130 MG: 130 INJECTION, EMULSION INTRAVENOUS at 12:06

## 2021-06-29 RX ADMIN — PALONOSETRON HYDROCHLORIDE 0.25 MG: 0.25 INJECTION, SOLUTION INTRAVENOUS at 11:06

## 2021-06-30 ENCOUNTER — INFUSION (OUTPATIENT)
Dept: INFUSION THERAPY | Facility: HOSPITAL | Age: 71
End: 2021-06-30
Attending: INTERNAL MEDICINE
Payer: MEDICARE

## 2021-06-30 VITALS
RESPIRATION RATE: 20 BRPM | DIASTOLIC BLOOD PRESSURE: 74 MMHG | HEIGHT: 66 IN | BODY MASS INDEX: 37.84 KG/M2 | HEART RATE: 97 BPM | WEIGHT: 235.44 LBS | SYSTOLIC BLOOD PRESSURE: 125 MMHG | TEMPERATURE: 98 F

## 2021-06-30 DIAGNOSIS — C34.91 SMALL CELL CARCINOMA OF LUNG, RIGHT: Primary | ICD-10-CM

## 2021-06-30 PROCEDURE — 25000003 PHARM REV CODE 250: Mod: PN | Performed by: INTERNAL MEDICINE

## 2021-06-30 PROCEDURE — 96413 CHEMO IV INFUSION 1 HR: CPT | Mod: PN

## 2021-06-30 PROCEDURE — A4216 STERILE WATER/SALINE, 10 ML: HCPCS | Mod: PN | Performed by: INTERNAL MEDICINE

## 2021-06-30 PROCEDURE — 63600175 PHARM REV CODE 636 W HCPCS: Mod: PN | Performed by: INTERNAL MEDICINE

## 2021-06-30 RX ORDER — HEPARIN 100 UNIT/ML
500 SYRINGE INTRAVENOUS
Status: DISCONTINUED | OUTPATIENT
Start: 2021-06-30 | End: 2021-06-30 | Stop reason: HOSPADM

## 2021-06-30 RX ORDER — SODIUM CHLORIDE 0.9 % (FLUSH) 0.9 %
10 SYRINGE (ML) INJECTION
Status: DISCONTINUED | OUTPATIENT
Start: 2021-06-30 | End: 2021-06-30 | Stop reason: HOSPADM

## 2021-06-30 RX ADMIN — SODIUM CHLORIDE: 0.9 INJECTION, SOLUTION INTRAVENOUS at 12:06

## 2021-06-30 RX ADMIN — Medication 10 ML: at 12:06

## 2021-06-30 RX ADMIN — ETOPOSIDE 200 MG: 20 INJECTION INTRAVENOUS at 01:06

## 2021-07-01 ENCOUNTER — INFUSION (OUTPATIENT)
Dept: INFUSION THERAPY | Facility: HOSPITAL | Age: 71
End: 2021-07-01
Attending: INTERNAL MEDICINE
Payer: MEDICARE

## 2021-07-01 ENCOUNTER — PATIENT MESSAGE (OUTPATIENT)
Dept: HEMATOLOGY/ONCOLOGY | Facility: CLINIC | Age: 71
End: 2021-07-01

## 2021-07-01 VITALS
RESPIRATION RATE: 18 BRPM | HEART RATE: 92 BPM | DIASTOLIC BLOOD PRESSURE: 77 MMHG | WEIGHT: 235.44 LBS | HEIGHT: 66 IN | SYSTOLIC BLOOD PRESSURE: 147 MMHG | BODY MASS INDEX: 37.84 KG/M2 | TEMPERATURE: 98 F

## 2021-07-01 DIAGNOSIS — C34.91 SMALL CELL CARCINOMA OF LUNG, RIGHT: Primary | ICD-10-CM

## 2021-07-01 PROCEDURE — 96413 CHEMO IV INFUSION 1 HR: CPT | Mod: PN

## 2021-07-01 PROCEDURE — 63600175 PHARM REV CODE 636 W HCPCS: Mod: PN | Performed by: INTERNAL MEDICINE

## 2021-07-01 PROCEDURE — A4216 STERILE WATER/SALINE, 10 ML: HCPCS | Mod: PN | Performed by: INTERNAL MEDICINE

## 2021-07-01 PROCEDURE — 25000003 PHARM REV CODE 250: Mod: PN | Performed by: INTERNAL MEDICINE

## 2021-07-01 RX ORDER — SODIUM CHLORIDE 0.9 % (FLUSH) 0.9 %
10 SYRINGE (ML) INJECTION
Status: DISCONTINUED | OUTPATIENT
Start: 2021-07-01 | End: 2021-07-01 | Stop reason: HOSPADM

## 2021-07-01 RX ADMIN — ETOPOSIDE 200 MG: 20 INJECTION INTRAVENOUS at 01:07

## 2021-07-01 RX ADMIN — SODIUM CHLORIDE: 0.9 INJECTION, SOLUTION INTRAVENOUS at 12:07

## 2021-07-01 RX ADMIN — Medication 10 ML: at 02:07

## 2021-07-02 ENCOUNTER — INFUSION (OUTPATIENT)
Dept: INFUSION THERAPY | Facility: HOSPITAL | Age: 71
End: 2021-07-02
Attending: INTERNAL MEDICINE
Payer: MEDICARE

## 2021-07-02 VITALS
HEIGHT: 66 IN | DIASTOLIC BLOOD PRESSURE: 91 MMHG | WEIGHT: 235.44 LBS | SYSTOLIC BLOOD PRESSURE: 145 MMHG | HEART RATE: 54 BPM | RESPIRATION RATE: 18 BRPM | BODY MASS INDEX: 37.84 KG/M2

## 2021-07-02 DIAGNOSIS — C34.91 SMALL CELL CARCINOMA OF LUNG, RIGHT: Primary | ICD-10-CM

## 2021-07-02 PROCEDURE — 96372 THER/PROPH/DIAG INJ SC/IM: CPT | Mod: PN

## 2021-07-02 PROCEDURE — 63600175 PHARM REV CODE 636 W HCPCS: Mod: JG,PN | Performed by: INTERNAL MEDICINE

## 2021-07-02 RX ADMIN — FILGRASTIM 480 MCG: 480 INJECTION, SOLUTION INTRAVENOUS; SUBCUTANEOUS at 11:07

## 2021-07-05 ENCOUNTER — DOCUMENTATION ONLY (OUTPATIENT)
Dept: INFUSION THERAPY | Facility: HOSPITAL | Age: 71
End: 2021-07-05

## 2021-07-06 ENCOUNTER — PATIENT MESSAGE (OUTPATIENT)
Dept: FAMILY MEDICINE | Facility: CLINIC | Age: 71
End: 2021-07-06

## 2021-07-06 ENCOUNTER — INFUSION (OUTPATIENT)
Dept: INFUSION THERAPY | Facility: HOSPITAL | Age: 71
End: 2021-07-06
Attending: INTERNAL MEDICINE
Payer: MEDICARE

## 2021-07-06 VITALS
SYSTOLIC BLOOD PRESSURE: 130 MMHG | DIASTOLIC BLOOD PRESSURE: 72 MMHG | RESPIRATION RATE: 18 BRPM | TEMPERATURE: 98 F | BODY MASS INDEX: 37.72 KG/M2 | WEIGHT: 233.69 LBS | HEART RATE: 84 BPM

## 2021-07-06 DIAGNOSIS — C34.91 SMALL CELL CARCINOMA OF LUNG, RIGHT: Primary | ICD-10-CM

## 2021-07-06 PROCEDURE — 63600175 PHARM REV CODE 636 W HCPCS: Mod: JG,PN | Performed by: INTERNAL MEDICINE

## 2021-07-06 PROCEDURE — 96372 THER/PROPH/DIAG INJ SC/IM: CPT | Mod: PN

## 2021-07-06 RX ORDER — PANTOPRAZOLE SODIUM 40 MG/1
40 TABLET, DELAYED RELEASE ORAL DAILY
Qty: 90 TABLET | Refills: 3 | Status: ON HOLD | OUTPATIENT
Start: 2021-07-06 | End: 2021-09-14 | Stop reason: HOSPADM

## 2021-07-06 RX ADMIN — FILGRASTIM 480 MCG: 480 INJECTION, SOLUTION INTRAVENOUS; SUBCUTANEOUS at 11:07

## 2021-07-07 ENCOUNTER — INFUSION (OUTPATIENT)
Dept: INFUSION THERAPY | Facility: HOSPITAL | Age: 71
End: 2021-07-07
Attending: INTERNAL MEDICINE
Payer: MEDICARE

## 2021-07-07 VITALS
BODY MASS INDEX: 37.72 KG/M2 | SYSTOLIC BLOOD PRESSURE: 128 MMHG | RESPIRATION RATE: 20 BRPM | WEIGHT: 233.69 LBS | DIASTOLIC BLOOD PRESSURE: 76 MMHG | HEART RATE: 78 BPM

## 2021-07-07 DIAGNOSIS — C34.91 SMALL CELL CARCINOMA OF LUNG, RIGHT: Primary | ICD-10-CM

## 2021-07-07 PROCEDURE — 63600175 PHARM REV CODE 636 W HCPCS: Mod: JG,PN | Performed by: INTERNAL MEDICINE

## 2021-07-07 PROCEDURE — 96372 THER/PROPH/DIAG INJ SC/IM: CPT | Mod: PN

## 2021-07-07 RX ADMIN — FILGRASTIM 480 MCG: 480 INJECTION, SOLUTION INTRAVENOUS; SUBCUTANEOUS at 10:07

## 2021-07-09 ENCOUNTER — PATIENT MESSAGE (OUTPATIENT)
Dept: FAMILY MEDICINE | Facility: CLINIC | Age: 71
End: 2021-07-09

## 2021-07-09 ENCOUNTER — PATIENT MESSAGE (OUTPATIENT)
Dept: HEMATOLOGY/ONCOLOGY | Facility: CLINIC | Age: 71
End: 2021-07-09

## 2021-07-09 RX ORDER — TIZANIDINE 4 MG/1
4 TABLET ORAL
Qty: 60 TABLET | Refills: 0 | Status: SHIPPED | OUTPATIENT
Start: 2021-07-09 | End: 2021-07-19

## 2021-07-12 ENCOUNTER — PATIENT MESSAGE (OUTPATIENT)
Dept: HEMATOLOGY/ONCOLOGY | Facility: CLINIC | Age: 71
End: 2021-07-12

## 2021-07-19 ENCOUNTER — LAB VISIT (OUTPATIENT)
Dept: LAB | Facility: HOSPITAL | Age: 71
End: 2021-07-19
Attending: INTERNAL MEDICINE
Payer: MEDICARE

## 2021-07-19 ENCOUNTER — PATIENT MESSAGE (OUTPATIENT)
Dept: HEMATOLOGY/ONCOLOGY | Facility: CLINIC | Age: 71
End: 2021-07-19

## 2021-07-19 ENCOUNTER — OFFICE VISIT (OUTPATIENT)
Dept: HEMATOLOGY/ONCOLOGY | Facility: CLINIC | Age: 71
End: 2021-07-19
Payer: MEDICARE

## 2021-07-19 VITALS
RESPIRATION RATE: 18 BRPM | BODY MASS INDEX: 36.24 KG/M2 | DIASTOLIC BLOOD PRESSURE: 65 MMHG | OXYGEN SATURATION: 99 % | WEIGHT: 225.5 LBS | HEART RATE: 102 BPM | SYSTOLIC BLOOD PRESSURE: 100 MMHG | HEIGHT: 66 IN | TEMPERATURE: 97 F

## 2021-07-19 DIAGNOSIS — C34.91 SMALL CELL CARCINOMA OF LUNG, RIGHT: ICD-10-CM

## 2021-07-19 DIAGNOSIS — J44.9 CHRONIC OBSTRUCTIVE PULMONARY DISEASE, UNSPECIFIED COPD TYPE: ICD-10-CM

## 2021-07-19 DIAGNOSIS — T66.XXXA RADIATION ESOPHAGITIS: ICD-10-CM

## 2021-07-19 DIAGNOSIS — E61.1 IRON DEFICIENCY: ICD-10-CM

## 2021-07-19 DIAGNOSIS — F41.9 ANXIETY: ICD-10-CM

## 2021-07-19 DIAGNOSIS — K20.80 RADIATION ESOPHAGITIS: ICD-10-CM

## 2021-07-19 DIAGNOSIS — C34.91 SMALL CELL CARCINOMA OF LUNG, RIGHT: Primary | ICD-10-CM

## 2021-07-19 LAB
ALBUMIN SERPL BCP-MCNC: 3.3 G/DL (ref 3.5–5.2)
ALP SERPL-CCNC: 69 U/L (ref 55–135)
ALT SERPL W/O P-5'-P-CCNC: 12 U/L (ref 10–44)
ANION GAP SERPL CALC-SCNC: 15 MMOL/L (ref 8–16)
AST SERPL-CCNC: 12 U/L (ref 10–40)
BASOPHILS # BLD AUTO: 0.05 K/UL (ref 0–0.2)
BASOPHILS NFR BLD: 0.4 % (ref 0–1.9)
BILIRUB SERPL-MCNC: 0.8 MG/DL (ref 0.1–1)
BUN SERPL-MCNC: 19 MG/DL (ref 8–23)
CALCIUM SERPL-MCNC: 10.3 MG/DL (ref 8.7–10.5)
CHLORIDE SERPL-SCNC: 99 MMOL/L (ref 95–110)
CO2 SERPL-SCNC: 27 MMOL/L (ref 23–29)
CREAT SERPL-MCNC: 1.8 MG/DL (ref 0.5–1.4)
DIFFERENTIAL METHOD: ABNORMAL
EOSINOPHIL # BLD AUTO: 0 K/UL (ref 0–0.5)
EOSINOPHIL NFR BLD: 0.2 % (ref 0–8)
ERYTHROCYTE [DISTWIDTH] IN BLOOD BY AUTOMATED COUNT: 20.7 % (ref 11.5–14.5)
EST. GFR  (AFRICAN AMERICAN): 32 ML/MIN/1.73 M^2
EST. GFR  (NON AFRICAN AMERICAN): 28 ML/MIN/1.73 M^2
GLUCOSE SERPL-MCNC: 178 MG/DL (ref 70–110)
HCT VFR BLD AUTO: 31.6 % (ref 37–48.5)
HGB BLD-MCNC: 10 G/DL (ref 12–16)
IMM GRANULOCYTES # BLD AUTO: 0.13 K/UL (ref 0–0.04)
IMM GRANULOCYTES NFR BLD AUTO: 1.1 % (ref 0–0.5)
LDH SERPL L TO P-CCNC: 221 U/L (ref 110–260)
LYMPHOCYTES # BLD AUTO: 0.4 K/UL (ref 1–4.8)
LYMPHOCYTES NFR BLD: 3.6 % (ref 18–48)
MAGNESIUM SERPL-MCNC: 1.9 MG/DL (ref 1.6–2.6)
MCH RBC QN AUTO: 25.8 PG (ref 27–31)
MCHC RBC AUTO-ENTMCNC: 31.6 G/DL (ref 32–36)
MCV RBC AUTO: 81 FL (ref 82–98)
MONOCYTES # BLD AUTO: 1.4 K/UL (ref 0.3–1)
MONOCYTES NFR BLD: 12.2 % (ref 4–15)
NEUTROPHILS # BLD AUTO: 9.5 K/UL (ref 1.8–7.7)
NEUTROPHILS NFR BLD: 82.5 % (ref 38–73)
NRBC BLD-RTO: 0 /100 WBC
PLATELET # BLD AUTO: 241 K/UL (ref 150–450)
PMV BLD AUTO: 10.6 FL (ref 9.2–12.9)
POTASSIUM SERPL-SCNC: 3.9 MMOL/L (ref 3.5–5.1)
PROT SERPL-MCNC: 6.7 G/DL (ref 6–8.4)
RBC # BLD AUTO: 3.88 M/UL (ref 4–5.4)
SODIUM SERPL-SCNC: 141 MMOL/L (ref 136–145)
WBC # BLD AUTO: 11.52 K/UL (ref 3.9–12.7)

## 2021-07-19 PROCEDURE — 1125F AMNT PAIN NOTED PAIN PRSNT: CPT | Mod: S$GLB,,, | Performed by: INTERNAL MEDICINE

## 2021-07-19 PROCEDURE — 80053 COMPREHEN METABOLIC PANEL: CPT | Mod: PN | Performed by: INTERNAL MEDICINE

## 2021-07-19 PROCEDURE — 3008F BODY MASS INDEX DOCD: CPT | Mod: S$GLB,,, | Performed by: INTERNAL MEDICINE

## 2021-07-19 PROCEDURE — 1101F PR PT FALLS ASSESS DOC 0-1 FALLS W/OUT INJ PAST YR: ICD-10-PCS | Mod: S$GLB,,, | Performed by: INTERNAL MEDICINE

## 2021-07-19 PROCEDURE — 1125F PR PAIN SEVERITY QUANTIFIED, PAIN PRESENT: ICD-10-PCS | Mod: S$GLB,,, | Performed by: INTERNAL MEDICINE

## 2021-07-19 PROCEDURE — 99999 PR PBB SHADOW E&M-EST. PATIENT-LVL IV: ICD-10-PCS | Mod: PBBFAC,,, | Performed by: INTERNAL MEDICINE

## 2021-07-19 PROCEDURE — 1111F PR DISCHARGE MEDS RECONCILED W/ CURRENT OUTPATIENT MED LIST: ICD-10-PCS | Mod: S$GLB,,, | Performed by: INTERNAL MEDICINE

## 2021-07-19 PROCEDURE — 99999 PR PBB SHADOW E&M-EST. PATIENT-LVL IV: CPT | Mod: PBBFAC,,, | Performed by: INTERNAL MEDICINE

## 2021-07-19 PROCEDURE — 99215 OFFICE O/P EST HI 40 MIN: CPT | Mod: S$GLB,,, | Performed by: INTERNAL MEDICINE

## 2021-07-19 PROCEDURE — 3288F FALL RISK ASSESSMENT DOCD: CPT | Mod: S$GLB,,, | Performed by: INTERNAL MEDICINE

## 2021-07-19 PROCEDURE — 85025 COMPLETE CBC W/AUTO DIFF WBC: CPT | Mod: PN | Performed by: INTERNAL MEDICINE

## 2021-07-19 PROCEDURE — 3288F PR FALLS RISK ASSESSMENT DOCUMENTED: ICD-10-PCS | Mod: S$GLB,,, | Performed by: INTERNAL MEDICINE

## 2021-07-19 PROCEDURE — 83735 ASSAY OF MAGNESIUM: CPT | Mod: PN | Performed by: INTERNAL MEDICINE

## 2021-07-19 PROCEDURE — 1159F MED LIST DOCD IN RCRD: CPT | Mod: S$GLB,,, | Performed by: INTERNAL MEDICINE

## 2021-07-19 PROCEDURE — 1111F DSCHRG MED/CURRENT MED MERGE: CPT | Mod: S$GLB,,, | Performed by: INTERNAL MEDICINE

## 2021-07-19 PROCEDURE — 83615 LACTATE (LD) (LDH) ENZYME: CPT | Mod: PN | Performed by: INTERNAL MEDICINE

## 2021-07-19 PROCEDURE — 99215 PR OFFICE/OUTPT VISIT, EST, LEVL V, 40-54 MIN: ICD-10-PCS | Mod: S$GLB,,, | Performed by: INTERNAL MEDICINE

## 2021-07-19 PROCEDURE — 3008F PR BODY MASS INDEX (BMI) DOCUMENTED: ICD-10-PCS | Mod: S$GLB,,, | Performed by: INTERNAL MEDICINE

## 2021-07-19 PROCEDURE — 1101F PT FALLS ASSESS-DOCD LE1/YR: CPT | Mod: S$GLB,,, | Performed by: INTERNAL MEDICINE

## 2021-07-19 PROCEDURE — 36415 COLL VENOUS BLD VENIPUNCTURE: CPT | Mod: PN | Performed by: INTERNAL MEDICINE

## 2021-07-19 PROCEDURE — 1159F PR MEDICATION LIST DOCUMENTED IN MEDICAL RECORD: ICD-10-PCS | Mod: S$GLB,,, | Performed by: INTERNAL MEDICINE

## 2021-07-19 RX ORDER — HYDROCODONE BITARTRATE AND ACETAMINOPHEN 7.5; 325 MG/15ML; MG/15ML
15 SOLUTION ORAL EVERY 4 HOURS PRN
Qty: 473 ML | Refills: 0 | Status: SHIPPED | OUTPATIENT
Start: 2021-07-19 | End: 2021-10-05

## 2021-07-19 RX ORDER — SODIUM CHLORIDE 0.9 % (FLUSH) 0.9 %
10 SYRINGE (ML) INJECTION
Status: CANCELLED | OUTPATIENT
Start: 2021-07-20

## 2021-07-19 RX ORDER — HEPARIN 100 UNIT/ML
500 SYRINGE INTRAVENOUS
Status: CANCELLED | OUTPATIENT
Start: 2021-07-22

## 2021-07-19 RX ORDER — SODIUM CHLORIDE 0.9 % (FLUSH) 0.9 %
10 SYRINGE (ML) INJECTION
Status: CANCELLED | OUTPATIENT
Start: 2021-07-21

## 2021-07-19 RX ORDER — HEPARIN 100 UNIT/ML
500 SYRINGE INTRAVENOUS
Status: CANCELLED | OUTPATIENT
Start: 2021-07-20

## 2021-07-19 RX ORDER — HEPARIN 100 UNIT/ML
500 SYRINGE INTRAVENOUS
Status: CANCELLED | OUTPATIENT
Start: 2021-07-21

## 2021-07-19 RX ORDER — SODIUM CHLORIDE 0.9 % (FLUSH) 0.9 %
10 SYRINGE (ML) INJECTION
Status: CANCELLED | OUTPATIENT
Start: 2021-07-22

## 2021-07-20 ENCOUNTER — DOCUMENTATION ONLY (OUTPATIENT)
Dept: INFUSION THERAPY | Facility: HOSPITAL | Age: 71
End: 2021-07-20

## 2021-07-20 ENCOUNTER — INFUSION (OUTPATIENT)
Dept: INFUSION THERAPY | Facility: HOSPITAL | Age: 71
End: 2021-07-20
Attending: INTERNAL MEDICINE
Payer: MEDICARE

## 2021-07-20 VITALS
BODY MASS INDEX: 36.7 KG/M2 | HEIGHT: 66 IN | DIASTOLIC BLOOD PRESSURE: 64 MMHG | TEMPERATURE: 97 F | SYSTOLIC BLOOD PRESSURE: 87 MMHG | WEIGHT: 228.38 LBS | HEART RATE: 78 BPM | RESPIRATION RATE: 16 BRPM

## 2021-07-20 DIAGNOSIS — C34.91 SMALL CELL CARCINOMA OF LUNG, RIGHT: Primary | ICD-10-CM

## 2021-07-20 PROCEDURE — 96417 CHEMO IV INFUS EACH ADDL SEQ: CPT | Mod: PN

## 2021-07-20 PROCEDURE — 96375 TX/PRO/DX INJ NEW DRUG ADDON: CPT | Mod: PN

## 2021-07-20 PROCEDURE — 96413 CHEMO IV INFUSION 1 HR: CPT | Mod: PN

## 2021-07-20 PROCEDURE — 96367 TX/PROPH/DG ADDL SEQ IV INF: CPT | Mod: PN

## 2021-07-20 PROCEDURE — 25000003 PHARM REV CODE 250: Mod: PN | Performed by: INTERNAL MEDICINE

## 2021-07-20 PROCEDURE — A4216 STERILE WATER/SALINE, 10 ML: HCPCS | Mod: PN | Performed by: INTERNAL MEDICINE

## 2021-07-20 PROCEDURE — 63600175 PHARM REV CODE 636 W HCPCS: Mod: PN | Performed by: INTERNAL MEDICINE

## 2021-07-20 RX ORDER — SODIUM CHLORIDE 0.9 % (FLUSH) 0.9 %
10 SYRINGE (ML) INJECTION
Status: DISCONTINUED | OUTPATIENT
Start: 2021-07-20 | End: 2021-07-20 | Stop reason: HOSPADM

## 2021-07-20 RX ADMIN — SODIUM CHLORIDE: 0.9 INJECTION, SOLUTION INTRAVENOUS at 10:07

## 2021-07-20 RX ADMIN — CARBOPLATIN 395 MG: 10 INJECTION, SOLUTION INTRAVENOUS at 11:07

## 2021-07-20 RX ADMIN — PALONOSETRON HYDROCHLORIDE 0.25 MG: 0.25 INJECTION, SOLUTION INTRAVENOUS at 10:07

## 2021-07-20 RX ADMIN — ETOPOSIDE 200 MG: 20 INJECTION INTRAVENOUS at 11:07

## 2021-07-20 RX ADMIN — Medication 10 ML: at 12:07

## 2021-07-20 RX ADMIN — APREPITANT 130 MG: 130 INJECTION, EMULSION INTRAVENOUS at 10:07

## 2021-07-21 ENCOUNTER — PATIENT MESSAGE (OUTPATIENT)
Dept: HEMATOLOGY/ONCOLOGY | Facility: CLINIC | Age: 71
End: 2021-07-21

## 2021-07-21 ENCOUNTER — INFUSION (OUTPATIENT)
Dept: INFUSION THERAPY | Facility: HOSPITAL | Age: 71
End: 2021-07-21
Attending: INTERNAL MEDICINE
Payer: MEDICARE

## 2021-07-21 ENCOUNTER — DOCUMENTATION ONLY (OUTPATIENT)
Dept: INFUSION THERAPY | Facility: HOSPITAL | Age: 71
End: 2021-07-21

## 2021-07-21 VITALS
TEMPERATURE: 98 F | SYSTOLIC BLOOD PRESSURE: 108 MMHG | DIASTOLIC BLOOD PRESSURE: 66 MMHG | RESPIRATION RATE: 16 BRPM | HEART RATE: 86 BPM

## 2021-07-21 DIAGNOSIS — C34.91 SMALL CELL CARCINOMA OF LUNG, RIGHT: Primary | ICD-10-CM

## 2021-07-21 PROCEDURE — A4216 STERILE WATER/SALINE, 10 ML: HCPCS | Mod: PN | Performed by: INTERNAL MEDICINE

## 2021-07-21 PROCEDURE — 96413 CHEMO IV INFUSION 1 HR: CPT | Mod: PN

## 2021-07-21 PROCEDURE — 25000003 PHARM REV CODE 250: Mod: PN | Performed by: INTERNAL MEDICINE

## 2021-07-21 PROCEDURE — 63600175 PHARM REV CODE 636 W HCPCS: Mod: PN | Performed by: INTERNAL MEDICINE

## 2021-07-21 RX ORDER — SODIUM CHLORIDE 0.9 % (FLUSH) 0.9 %
10 SYRINGE (ML) INJECTION
Status: DISCONTINUED | OUTPATIENT
Start: 2021-07-21 | End: 2021-07-21 | Stop reason: HOSPADM

## 2021-07-21 RX ADMIN — Medication 10 ML: at 01:07

## 2021-07-21 RX ADMIN — ETOPOSIDE 200 MG: 20 INJECTION INTRAVENOUS at 12:07

## 2021-07-21 RX ADMIN — SODIUM CHLORIDE: 0.9 INJECTION, SOLUTION INTRAVENOUS at 11:07

## 2021-07-22 ENCOUNTER — INFUSION (OUTPATIENT)
Dept: INFUSION THERAPY | Facility: HOSPITAL | Age: 71
End: 2021-07-22
Attending: INTERNAL MEDICINE
Payer: MEDICARE

## 2021-07-22 ENCOUNTER — DOCUMENTATION ONLY (OUTPATIENT)
Dept: INFUSION THERAPY | Facility: HOSPITAL | Age: 71
End: 2021-07-22

## 2021-07-22 VITALS
RESPIRATION RATE: 16 BRPM | WEIGHT: 228.38 LBS | TEMPERATURE: 98 F | HEIGHT: 66 IN | HEART RATE: 80 BPM | SYSTOLIC BLOOD PRESSURE: 117 MMHG | BODY MASS INDEX: 36.7 KG/M2 | DIASTOLIC BLOOD PRESSURE: 69 MMHG

## 2021-07-22 DIAGNOSIS — C34.91 SMALL CELL CARCINOMA OF LUNG, RIGHT: Primary | ICD-10-CM

## 2021-07-22 PROCEDURE — 25000003 PHARM REV CODE 250: Mod: PN | Performed by: INTERNAL MEDICINE

## 2021-07-22 PROCEDURE — A4216 STERILE WATER/SALINE, 10 ML: HCPCS | Mod: PN | Performed by: INTERNAL MEDICINE

## 2021-07-22 PROCEDURE — 63600175 PHARM REV CODE 636 W HCPCS: Mod: PN | Performed by: INTERNAL MEDICINE

## 2021-07-22 PROCEDURE — 96413 CHEMO IV INFUSION 1 HR: CPT | Mod: PN

## 2021-07-22 RX ORDER — SODIUM CHLORIDE 0.9 % (FLUSH) 0.9 %
10 SYRINGE (ML) INJECTION
Status: DISCONTINUED | OUTPATIENT
Start: 2021-07-22 | End: 2021-07-22 | Stop reason: HOSPADM

## 2021-07-22 RX ORDER — HEPARIN 100 UNIT/ML
500 SYRINGE INTRAVENOUS
Status: DISCONTINUED | OUTPATIENT
Start: 2021-07-22 | End: 2021-07-22 | Stop reason: HOSPADM

## 2021-07-22 RX ADMIN — ETOPOSIDE 200 MG: 20 INJECTION INTRAVENOUS at 01:07

## 2021-07-22 RX ADMIN — Medication 10 ML: at 12:07

## 2021-07-22 RX ADMIN — SODIUM CHLORIDE: 0.9 INJECTION, SOLUTION INTRAVENOUS at 12:07

## 2021-07-23 ENCOUNTER — INFUSION (OUTPATIENT)
Dept: INFUSION THERAPY | Facility: HOSPITAL | Age: 71
End: 2021-07-23
Attending: INTERNAL MEDICINE
Payer: MEDICARE

## 2021-07-23 VITALS
DIASTOLIC BLOOD PRESSURE: 60 MMHG | BODY MASS INDEX: 36.7 KG/M2 | TEMPERATURE: 98 F | HEART RATE: 83 BPM | SYSTOLIC BLOOD PRESSURE: 107 MMHG | RESPIRATION RATE: 18 BRPM | HEIGHT: 66 IN | WEIGHT: 228.38 LBS | OXYGEN SATURATION: 98 %

## 2021-07-23 DIAGNOSIS — C34.91 SMALL CELL CARCINOMA OF LUNG, RIGHT: Primary | ICD-10-CM

## 2021-07-23 PROCEDURE — 63600175 PHARM REV CODE 636 W HCPCS: Mod: JG,PN | Performed by: INTERNAL MEDICINE

## 2021-07-23 PROCEDURE — 96372 THER/PROPH/DIAG INJ SC/IM: CPT | Mod: PN

## 2021-07-23 RX ADMIN — FILGRASTIM 480 MCG: 480 INJECTION, SOLUTION INTRAVENOUS; SUBCUTANEOUS at 10:07

## 2021-07-26 ENCOUNTER — INFUSION (OUTPATIENT)
Dept: INFUSION THERAPY | Facility: HOSPITAL | Age: 71
End: 2021-07-26
Attending: INTERNAL MEDICINE
Payer: MEDICARE

## 2021-07-26 VITALS
WEIGHT: 227.06 LBS | DIASTOLIC BLOOD PRESSURE: 69 MMHG | BODY MASS INDEX: 36.65 KG/M2 | RESPIRATION RATE: 18 BRPM | SYSTOLIC BLOOD PRESSURE: 104 MMHG | HEART RATE: 65 BPM

## 2021-07-26 DIAGNOSIS — C34.91 SMALL CELL CARCINOMA OF LUNG, RIGHT: Primary | ICD-10-CM

## 2021-07-26 PROCEDURE — 96372 THER/PROPH/DIAG INJ SC/IM: CPT | Mod: PN

## 2021-07-26 PROCEDURE — 63600175 PHARM REV CODE 636 W HCPCS: Mod: JG,PN | Performed by: INTERNAL MEDICINE

## 2021-07-26 RX ADMIN — FILGRASTIM 480 MCG: 480 INJECTION, SOLUTION INTRAVENOUS; SUBCUTANEOUS at 12:07

## 2021-07-27 ENCOUNTER — INFUSION (OUTPATIENT)
Dept: INFUSION THERAPY | Facility: HOSPITAL | Age: 71
End: 2021-07-27
Attending: INTERNAL MEDICINE
Payer: MEDICARE

## 2021-07-27 VITALS
OXYGEN SATURATION: 98 % | TEMPERATURE: 98 F | HEART RATE: 96 BPM | RESPIRATION RATE: 18 BRPM | SYSTOLIC BLOOD PRESSURE: 113 MMHG | DIASTOLIC BLOOD PRESSURE: 66 MMHG

## 2021-07-27 DIAGNOSIS — C34.91 SMALL CELL CARCINOMA OF LUNG, RIGHT: Primary | ICD-10-CM

## 2021-07-27 PROCEDURE — 96374 THER/PROPH/DIAG INJ IV PUSH: CPT | Mod: PN

## 2021-07-27 PROCEDURE — 96365 THER/PROPH/DIAG IV INF INIT: CPT | Mod: PN

## 2021-07-27 PROCEDURE — 96366 THER/PROPH/DIAG IV INF ADDON: CPT | Mod: PN

## 2021-07-27 PROCEDURE — 25000003 PHARM REV CODE 250: Mod: PN | Performed by: INTERNAL MEDICINE

## 2021-07-27 PROCEDURE — 63600175 PHARM REV CODE 636 W HCPCS: Mod: JG,PN | Performed by: INTERNAL MEDICINE

## 2021-07-27 PROCEDURE — A4216 STERILE WATER/SALINE, 10 ML: HCPCS | Mod: PN | Performed by: INTERNAL MEDICINE

## 2021-07-27 PROCEDURE — 96375 TX/PRO/DX INJ NEW DRUG ADDON: CPT | Mod: PN

## 2021-07-27 PROCEDURE — 96361 HYDRATE IV INFUSION ADD-ON: CPT | Mod: PN

## 2021-07-27 PROCEDURE — 63600175 PHARM REV CODE 636 W HCPCS: Mod: PN | Performed by: INTERNAL MEDICINE

## 2021-07-27 PROCEDURE — 96372 THER/PROPH/DIAG INJ SC/IM: CPT | Mod: PN

## 2021-07-27 RX ORDER — HEPARIN 100 UNIT/ML
500 SYRINGE INTRAVENOUS
Status: CANCELLED | OUTPATIENT
Start: 2021-07-27

## 2021-07-27 RX ORDER — HEPARIN 100 UNIT/ML
500 SYRINGE INTRAVENOUS
Status: DISCONTINUED | OUTPATIENT
Start: 2021-07-27 | End: 2021-07-27 | Stop reason: HOSPADM

## 2021-07-27 RX ORDER — ONDANSETRON 2 MG/ML
8 INJECTION INTRAMUSCULAR; INTRAVENOUS ONCE AS NEEDED
Status: COMPLETED | OUTPATIENT
Start: 2021-07-27 | End: 2021-07-27

## 2021-07-27 RX ORDER — SODIUM CHLORIDE 0.9 % (FLUSH) 0.9 %
10 SYRINGE (ML) INJECTION
Status: DISCONTINUED | OUTPATIENT
Start: 2021-07-27 | End: 2021-07-27 | Stop reason: HOSPADM

## 2021-07-27 RX ORDER — SODIUM CHLORIDE 0.9 % (FLUSH) 0.9 %
10 SYRINGE (ML) INJECTION
Status: CANCELLED | OUTPATIENT
Start: 2021-07-27

## 2021-07-27 RX ORDER — ONDANSETRON 2 MG/ML
8 INJECTION INTRAMUSCULAR; INTRAVENOUS ONCE AS NEEDED
Status: CANCELLED
Start: 2021-07-27

## 2021-07-27 RX ADMIN — SODIUM CHLORIDE 1000 ML: 0.9 INJECTION, SOLUTION INTRAVENOUS at 12:07

## 2021-07-27 RX ADMIN — ONDANSETRON 8 MG: 2 INJECTION INTRAMUSCULAR; INTRAVENOUS at 01:07

## 2021-07-27 RX ADMIN — FILGRASTIM 480 MCG: 480 INJECTION, SOLUTION INTRAVENOUS; SUBCUTANEOUS at 01:07

## 2021-07-27 RX ADMIN — Medication 10 ML: at 02:07

## 2021-07-28 ENCOUNTER — TELEPHONE (OUTPATIENT)
Dept: HEMATOLOGY/ONCOLOGY | Facility: CLINIC | Age: 71
End: 2021-07-28

## 2021-07-28 ENCOUNTER — INFUSION (OUTPATIENT)
Dept: INFUSION THERAPY | Facility: HOSPITAL | Age: 71
End: 2021-07-28
Attending: INTERNAL MEDICINE
Payer: MEDICARE

## 2021-07-28 VITALS
SYSTOLIC BLOOD PRESSURE: 141 MMHG | TEMPERATURE: 100 F | OXYGEN SATURATION: 97 % | HEIGHT: 66 IN | RESPIRATION RATE: 20 BRPM | WEIGHT: 227.06 LBS | DIASTOLIC BLOOD PRESSURE: 84 MMHG | HEART RATE: 142 BPM | BODY MASS INDEX: 36.49 KG/M2

## 2021-07-28 DIAGNOSIS — C34.91 SMALL CELL CARCINOMA OF LUNG, RIGHT: Primary | ICD-10-CM

## 2021-07-28 PROCEDURE — 25000003 PHARM REV CODE 250: Mod: PN | Performed by: INTERNAL MEDICINE

## 2021-07-28 PROCEDURE — 96374 THER/PROPH/DIAG INJ IV PUSH: CPT | Mod: PN

## 2021-07-28 PROCEDURE — 96360 HYDRATION IV INFUSION INIT: CPT | Mod: PN

## 2021-07-28 PROCEDURE — A4216 STERILE WATER/SALINE, 10 ML: HCPCS | Mod: PN | Performed by: INTERNAL MEDICINE

## 2021-07-28 PROCEDURE — 96361 HYDRATE IV INFUSION ADD-ON: CPT | Mod: PN

## 2021-07-28 PROCEDURE — 63600175 PHARM REV CODE 636 W HCPCS: Mod: PN | Performed by: INTERNAL MEDICINE

## 2021-07-28 RX ORDER — SODIUM CHLORIDE 0.9 % (FLUSH) 0.9 %
10 SYRINGE (ML) INJECTION
Status: CANCELLED | OUTPATIENT
Start: 2021-07-28

## 2021-07-28 RX ORDER — SODIUM CHLORIDE 9 MG/ML
1000 INJECTION, SOLUTION INTRAVENOUS
Status: COMPLETED | OUTPATIENT
Start: 2021-07-28 | End: 2021-07-28

## 2021-07-28 RX ORDER — SODIUM CHLORIDE 0.9 % (FLUSH) 0.9 %
10 SYRINGE (ML) INJECTION
Status: DISCONTINUED | OUTPATIENT
Start: 2021-07-28 | End: 2021-07-28 | Stop reason: HOSPADM

## 2021-07-28 RX ORDER — ONDANSETRON 2 MG/ML
8 INJECTION INTRAMUSCULAR; INTRAVENOUS ONCE AS NEEDED
Status: COMPLETED | OUTPATIENT
Start: 2021-07-28 | End: 2021-07-28

## 2021-07-28 RX ORDER — ONDANSETRON 2 MG/ML
8 INJECTION INTRAMUSCULAR; INTRAVENOUS ONCE AS NEEDED
Status: CANCELLED
Start: 2021-07-28

## 2021-07-28 RX ORDER — HEPARIN 100 UNIT/ML
500 SYRINGE INTRAVENOUS
Status: CANCELLED | OUTPATIENT
Start: 2021-07-28

## 2021-07-28 RX ADMIN — Medication 10 ML: at 05:07

## 2021-07-28 RX ADMIN — ONDANSETRON 8 MG: 2 INJECTION INTRAMUSCULAR; INTRAVENOUS at 04:07

## 2021-07-28 RX ADMIN — SODIUM CHLORIDE 1000 ML: 0.9 INJECTION, SOLUTION INTRAVENOUS at 03:07

## 2021-07-29 ENCOUNTER — DOCUMENTATION ONLY (OUTPATIENT)
Dept: INFUSION THERAPY | Facility: HOSPITAL | Age: 71
End: 2021-07-29

## 2021-07-29 PROBLEM — I48.0 PAROXYSMAL A-FIB: Status: ACTIVE | Noted: 2021-07-29

## 2021-07-29 PROBLEM — D61.811 DRUG-INDUCED PANCYTOPENIA: Status: ACTIVE | Noted: 2021-07-29

## 2021-07-29 PROBLEM — N17.9 AKI (ACUTE KIDNEY INJURY): Status: ACTIVE | Noted: 2021-07-29

## 2021-07-30 PROBLEM — B96.20 E COLI BACTEREMIA: Status: ACTIVE | Noted: 2021-07-30

## 2021-07-30 PROBLEM — E87.6 HYPOKALEMIA: Status: ACTIVE | Noted: 2021-07-30

## 2021-07-30 PROBLEM — R78.81 E COLI BACTEREMIA: Status: ACTIVE | Noted: 2021-07-30

## 2021-08-05 PROBLEM — A04.72 C. DIFFICILE COLITIS: Status: ACTIVE | Noted: 2021-08-05

## 2021-08-07 PROBLEM — A49.8 CLOSTRIDIUM DIFFICILE INFECTION: Status: ACTIVE | Noted: 2021-08-05

## 2021-08-07 PROBLEM — K20.90 ESOPHAGITIS: Status: ACTIVE | Noted: 2021-07-19

## 2021-08-10 PROBLEM — D70.9 NEUTROPENIC FEVER: Status: RESOLVED | Noted: 2021-06-21 | Resolved: 2021-08-10

## 2021-08-10 PROBLEM — R78.81 E COLI BACTEREMIA: Status: RESOLVED | Noted: 2021-07-30 | Resolved: 2021-08-10

## 2021-08-10 PROBLEM — A49.8 CLOSTRIDIUM DIFFICILE INFECTION: Status: RESOLVED | Noted: 2021-08-05 | Resolved: 2021-08-10

## 2021-08-10 PROBLEM — N17.9 AKI (ACUTE KIDNEY INJURY): Status: RESOLVED | Noted: 2021-07-29 | Resolved: 2021-08-10

## 2021-08-10 PROBLEM — R50.81 NEUTROPENIC FEVER: Status: RESOLVED | Noted: 2021-06-21 | Resolved: 2021-08-10

## 2021-08-10 PROBLEM — B96.20 E COLI BACTEREMIA: Status: RESOLVED | Noted: 2021-07-30 | Resolved: 2021-08-10

## 2021-08-15 PROBLEM — R53.81 DEBILITY: Status: ACTIVE | Noted: 2021-08-15

## 2021-08-17 PROBLEM — E83.42 HYPOMAGNESEMIA: Status: ACTIVE | Noted: 2021-08-17

## 2021-08-24 PROBLEM — D64.9 ANEMIA: Status: ACTIVE | Noted: 2021-08-24

## 2021-08-26 PROBLEM — Z75.8 DISCHARGE PLANNING ISSUES: Status: ACTIVE | Noted: 2021-08-26

## 2021-08-26 PROBLEM — Z02.9 DISCHARGE PLANNING ISSUES: Status: ACTIVE | Noted: 2021-08-26

## 2021-09-07 PROBLEM — N30.01 ACUTE CYSTITIS WITH HEMATURIA: Status: ACTIVE | Noted: 2021-09-07

## 2021-09-08 PROBLEM — E43 SEVERE MALNUTRITION: Status: ACTIVE | Noted: 2021-09-08

## 2021-09-15 DIAGNOSIS — C34.90 MALIGNANT NEOPLASM OF UNSPECIFIED PART OF UNSPECIFIED BRONCHUS OR LUNG: ICD-10-CM

## 2021-09-15 DIAGNOSIS — C34.31 MALIGNANT NEOPLASM OF LOWER LOBE, RIGHT BRONCHUS OR LUNG: ICD-10-CM

## 2021-09-15 DIAGNOSIS — C34.91 SMALL CELL CARCINOMA OF LUNG, RIGHT: Primary | ICD-10-CM

## 2021-09-24 ENCOUNTER — DOCUMENTATION ONLY (OUTPATIENT)
Dept: HEMATOLOGY/ONCOLOGY | Facility: CLINIC | Age: 71
End: 2021-09-24

## 2021-09-26 ENCOUNTER — PATIENT MESSAGE (OUTPATIENT)
Dept: HEMATOLOGY/ONCOLOGY | Facility: CLINIC | Age: 71
End: 2021-09-26

## 2021-09-27 ENCOUNTER — TELEPHONE (OUTPATIENT)
Dept: HEMATOLOGY/ONCOLOGY | Facility: CLINIC | Age: 71
End: 2021-09-27

## 2021-09-28 ENCOUNTER — PATIENT MESSAGE (OUTPATIENT)
Dept: CARDIOLOGY | Facility: CLINIC | Age: 71
End: 2021-09-28

## 2021-09-28 ENCOUNTER — TELEPHONE (OUTPATIENT)
Dept: HEMATOLOGY/ONCOLOGY | Facility: CLINIC | Age: 71
End: 2021-09-28

## 2021-09-29 ENCOUNTER — PATIENT MESSAGE (OUTPATIENT)
Dept: HEMATOLOGY/ONCOLOGY | Facility: CLINIC | Age: 71
End: 2021-09-29

## 2021-09-29 ENCOUNTER — TELEPHONE (OUTPATIENT)
Dept: ENDOCRINOLOGY | Facility: CLINIC | Age: 71
End: 2021-09-29

## 2021-09-29 ENCOUNTER — TELEPHONE (OUTPATIENT)
Dept: HEMATOLOGY/ONCOLOGY | Facility: CLINIC | Age: 71
End: 2021-09-29

## 2021-09-30 ENCOUNTER — OFFICE VISIT (OUTPATIENT)
Dept: HEMATOLOGY/ONCOLOGY | Facility: CLINIC | Age: 71
End: 2021-09-30
Payer: MEDICARE

## 2021-09-30 ENCOUNTER — PATIENT OUTREACH (OUTPATIENT)
Dept: ADMINISTRATIVE | Facility: OTHER | Age: 71
End: 2021-09-30

## 2021-09-30 ENCOUNTER — PATIENT MESSAGE (OUTPATIENT)
Dept: HEMATOLOGY/ONCOLOGY | Facility: CLINIC | Age: 71
End: 2021-09-30

## 2021-09-30 ENCOUNTER — PATIENT MESSAGE (OUTPATIENT)
Dept: ENDOCRINOLOGY | Facility: CLINIC | Age: 71
End: 2021-09-30

## 2021-09-30 DIAGNOSIS — C34.91 SMALL CELL CARCINOMA OF LUNG, RIGHT: Primary | ICD-10-CM

## 2021-09-30 DIAGNOSIS — I48.0 PAF (PAROXYSMAL ATRIAL FIBRILLATION): Primary | ICD-10-CM

## 2021-09-30 DIAGNOSIS — E44.0 MODERATE PROTEIN-CALORIE MALNUTRITION: ICD-10-CM

## 2021-09-30 DIAGNOSIS — A04.72 CLOSTRIDIUM DIFFICILE COLITIS: ICD-10-CM

## 2021-09-30 PROCEDURE — 1111F PR DISCHARGE MEDS RECONCILED W/ CURRENT OUTPATIENT MED LIST: ICD-10-PCS | Mod: 95,,, | Performed by: INTERNAL MEDICINE

## 2021-09-30 PROCEDURE — 3044F PR MOST RECENT HEMOGLOBIN A1C LEVEL <7.0%: ICD-10-PCS | Mod: 95,,, | Performed by: INTERNAL MEDICINE

## 2021-09-30 PROCEDURE — 1111F DSCHRG MED/CURRENT MED MERGE: CPT | Mod: 95,,, | Performed by: INTERNAL MEDICINE

## 2021-09-30 PROCEDURE — 99213 OFFICE O/P EST LOW 20 MIN: CPT | Mod: 95,,, | Performed by: INTERNAL MEDICINE

## 2021-09-30 PROCEDURE — 99213 PR OFFICE/OUTPT VISIT, EST, LEVL III, 20-29 MIN: ICD-10-PCS | Mod: 95,,, | Performed by: INTERNAL MEDICINE

## 2021-09-30 PROCEDURE — 4010F ACE/ARB THERAPY RXD/TAKEN: CPT | Mod: 95,,, | Performed by: INTERNAL MEDICINE

## 2021-09-30 PROCEDURE — 4010F PR ACE/ARB THEARPY RXD/TAKEN: ICD-10-PCS | Mod: 95,,, | Performed by: INTERNAL MEDICINE

## 2021-09-30 PROCEDURE — 3066F NEPHROPATHY DOC TX: CPT | Mod: 95,,, | Performed by: INTERNAL MEDICINE

## 2021-09-30 PROCEDURE — 1159F PR MEDICATION LIST DOCUMENTED IN MEDICAL RECORD: ICD-10-PCS | Mod: 95,,, | Performed by: INTERNAL MEDICINE

## 2021-09-30 PROCEDURE — 1159F MED LIST DOCD IN RCRD: CPT | Mod: 95,,, | Performed by: INTERNAL MEDICINE

## 2021-09-30 PROCEDURE — 3066F PR DOCUMENTATION OF TREATMENT FOR NEPHROPATHY: ICD-10-PCS | Mod: 95,,, | Performed by: INTERNAL MEDICINE

## 2021-09-30 PROCEDURE — 1160F RVW MEDS BY RX/DR IN RCRD: CPT | Mod: 95,,, | Performed by: INTERNAL MEDICINE

## 2021-09-30 PROCEDURE — 1160F PR REVIEW ALL MEDS BY PRESCRIBER/CLIN PHARMACIST DOCUMENTED: ICD-10-PCS | Mod: 95,,, | Performed by: INTERNAL MEDICINE

## 2021-09-30 PROCEDURE — 3044F HG A1C LEVEL LT 7.0%: CPT | Mod: 95,,, | Performed by: INTERNAL MEDICINE

## 2021-09-30 RX ORDER — SOTALOL HYDROCHLORIDE 80 MG/1
80 TABLET ORAL 2 TIMES DAILY
Qty: 60 TABLET | Refills: 0 | Status: SHIPPED | OUTPATIENT
Start: 2021-09-30 | End: 2021-09-30

## 2021-10-04 ENCOUNTER — PATIENT MESSAGE (OUTPATIENT)
Dept: FAMILY MEDICINE | Facility: CLINIC | Age: 71
End: 2021-10-04

## 2021-10-07 ENCOUNTER — OFFICE VISIT (OUTPATIENT)
Dept: HEMATOLOGY/ONCOLOGY | Facility: CLINIC | Age: 71
End: 2021-10-07
Payer: MEDICARE

## 2021-10-07 ENCOUNTER — LAB VISIT (OUTPATIENT)
Dept: LAB | Facility: HOSPITAL | Age: 71
End: 2021-10-07
Attending: INTERNAL MEDICINE
Payer: MEDICARE

## 2021-10-07 ENCOUNTER — OFFICE VISIT (OUTPATIENT)
Dept: CARDIOLOGY | Facility: CLINIC | Age: 71
End: 2021-10-07
Payer: MEDICARE

## 2021-10-07 ENCOUNTER — PATIENT MESSAGE (OUTPATIENT)
Dept: CARDIOLOGY | Facility: CLINIC | Age: 71
End: 2021-10-07

## 2021-10-07 ENCOUNTER — PATIENT MESSAGE (OUTPATIENT)
Dept: HEMATOLOGY/ONCOLOGY | Facility: CLINIC | Age: 71
End: 2021-10-07

## 2021-10-07 VITALS
TEMPERATURE: 97 F | WEIGHT: 194.69 LBS | HEIGHT: 66 IN | SYSTOLIC BLOOD PRESSURE: 114 MMHG | OXYGEN SATURATION: 99 % | RESPIRATION RATE: 17 BRPM | DIASTOLIC BLOOD PRESSURE: 71 MMHG | BODY MASS INDEX: 31.29 KG/M2 | HEART RATE: 73 BPM

## 2021-10-07 VITALS
HEART RATE: 65 BPM | HEIGHT: 66 IN | DIASTOLIC BLOOD PRESSURE: 65 MMHG | SYSTOLIC BLOOD PRESSURE: 109 MMHG | WEIGHT: 194.69 LBS | BODY MASS INDEX: 31.29 KG/M2

## 2021-10-07 DIAGNOSIS — E44.0 MODERATE PROTEIN-CALORIE MALNUTRITION: ICD-10-CM

## 2021-10-07 DIAGNOSIS — Z79.899 LONG TERM CURRENT USE OF ANTIARRHYTHMIC DRUG: Chronic | ICD-10-CM

## 2021-10-07 DIAGNOSIS — N18.31 STAGE 3A CHRONIC KIDNEY DISEASE: Chronic | ICD-10-CM

## 2021-10-07 DIAGNOSIS — C34.91 SMALL CELL CARCINOMA OF LUNG, RIGHT: ICD-10-CM

## 2021-10-07 DIAGNOSIS — J44.9 CHRONIC OBSTRUCTIVE PULMONARY DISEASE, UNSPECIFIED COPD TYPE: ICD-10-CM

## 2021-10-07 DIAGNOSIS — I48.0 PAF (PAROXYSMAL ATRIAL FIBRILLATION): Chronic | ICD-10-CM

## 2021-10-07 DIAGNOSIS — C34.91 SMALL CELL CARCINOMA OF LUNG, RIGHT: Primary | ICD-10-CM

## 2021-10-07 DIAGNOSIS — A04.72 CLOSTRIDIUM DIFFICILE COLITIS: ICD-10-CM

## 2021-10-07 DIAGNOSIS — C34.91 SMALL CELL CARCINOMA OF LUNG, RIGHT: Chronic | ICD-10-CM

## 2021-10-07 DIAGNOSIS — F41.9 ANXIETY: ICD-10-CM

## 2021-10-07 DIAGNOSIS — E87.6 HYPOKALEMIA: Primary | ICD-10-CM

## 2021-10-07 DIAGNOSIS — I10 ESSENTIAL HYPERTENSION: Chronic | ICD-10-CM

## 2021-10-07 PROBLEM — E66.811 OBESITY, CLASS I, BMI 30-34.9: Status: ACTIVE | Noted: 2017-11-07

## 2021-10-07 PROBLEM — E66.9 OBESITY, CLASS I, BMI 30-34.9: Status: ACTIVE | Noted: 2017-11-07

## 2021-10-07 LAB
ALBUMIN SERPL BCP-MCNC: 2.6 G/DL (ref 3.5–5.2)
ALP SERPL-CCNC: 102 U/L (ref 55–135)
ALT SERPL W/O P-5'-P-CCNC: 15 U/L (ref 10–44)
ANION GAP SERPL CALC-SCNC: 13 MMOL/L (ref 8–16)
AST SERPL-CCNC: 17 U/L (ref 10–40)
BASOPHILS # BLD AUTO: 0.04 K/UL (ref 0–0.2)
BASOPHILS NFR BLD: 0.4 % (ref 0–1.9)
BILIRUB SERPL-MCNC: 1.1 MG/DL (ref 0.1–1)
BUN SERPL-MCNC: 10 MG/DL (ref 8–23)
CALCIUM SERPL-MCNC: 9 MG/DL (ref 8.7–10.5)
CHLORIDE SERPL-SCNC: 105 MMOL/L (ref 95–110)
CO2 SERPL-SCNC: 19 MMOL/L (ref 23–29)
CREAT SERPL-MCNC: 1.2 MG/DL (ref 0.5–1.4)
DIFFERENTIAL METHOD: ABNORMAL
EOSINOPHIL # BLD AUTO: 0.1 K/UL (ref 0–0.5)
EOSINOPHIL NFR BLD: 0.9 % (ref 0–8)
ERYTHROCYTE [DISTWIDTH] IN BLOOD BY AUTOMATED COUNT: 17.5 % (ref 11.5–14.5)
EST. GFR  (AFRICAN AMERICAN): 52 ML/MIN/1.73 M^2
EST. GFR  (NON AFRICAN AMERICAN): 45 ML/MIN/1.73 M^2
GLUCOSE SERPL-MCNC: 113 MG/DL (ref 70–110)
HCT VFR BLD AUTO: 26.6 % (ref 37–48.5)
HGB BLD-MCNC: 8.5 G/DL (ref 12–16)
IMM GRANULOCYTES # BLD AUTO: 0.06 K/UL (ref 0–0.04)
IMM GRANULOCYTES NFR BLD AUTO: 0.6 % (ref 0–0.5)
LDH SERPL L TO P-CCNC: 229 U/L (ref 110–260)
LYMPHOCYTES # BLD AUTO: 1.6 K/UL (ref 1–4.8)
LYMPHOCYTES NFR BLD: 15.6 % (ref 18–48)
MAGNESIUM SERPL-MCNC: 1.4 MG/DL (ref 1.6–2.6)
MCH RBC QN AUTO: 27.3 PG (ref 27–31)
MCHC RBC AUTO-ENTMCNC: 32 G/DL (ref 32–36)
MCV RBC AUTO: 86 FL (ref 82–98)
MONOCYTES # BLD AUTO: 0.7 K/UL (ref 0.3–1)
MONOCYTES NFR BLD: 7.1 % (ref 4–15)
NEUTROPHILS # BLD AUTO: 7.5 K/UL (ref 1.8–7.7)
NEUTROPHILS NFR BLD: 75.4 % (ref 38–73)
NRBC BLD-RTO: 0 /100 WBC
PLATELET # BLD AUTO: 187 K/UL (ref 150–450)
PMV BLD AUTO: 11.1 FL (ref 9.2–12.9)
POTASSIUM SERPL-SCNC: 3.3 MMOL/L (ref 3.5–5.1)
PROT SERPL-MCNC: 5.6 G/DL (ref 6–8.4)
RBC # BLD AUTO: 3.11 M/UL (ref 4–5.4)
SODIUM SERPL-SCNC: 137 MMOL/L (ref 136–145)
WBC # BLD AUTO: 10 K/UL (ref 3.9–12.7)

## 2021-10-07 PROCEDURE — 1111F PR DISCHARGE MEDS RECONCILED W/ CURRENT OUTPATIENT MED LIST: ICD-10-PCS | Mod: S$GLB,,, | Performed by: INTERNAL MEDICINE

## 2021-10-07 PROCEDURE — 3078F PR MOST RECENT DIASTOLIC BLOOD PRESSURE < 80 MM HG: ICD-10-PCS | Mod: S$GLB,,, | Performed by: INTERNAL MEDICINE

## 2021-10-07 PROCEDURE — 3066F NEPHROPATHY DOC TX: CPT | Mod: S$GLB,,, | Performed by: INTERNAL MEDICINE

## 2021-10-07 PROCEDURE — 1126F PR PAIN SEVERITY QUANTIFIED, NO PAIN PRESENT: ICD-10-PCS | Mod: S$GLB,,, | Performed by: INTERNAL MEDICINE

## 2021-10-07 PROCEDURE — 1101F PR PT FALLS ASSESS DOC 0-1 FALLS W/OUT INJ PAST YR: ICD-10-PCS | Mod: S$GLB,,, | Performed by: INTERNAL MEDICINE

## 2021-10-07 PROCEDURE — 80053 COMPREHEN METABOLIC PANEL: CPT | Mod: PN | Performed by: INTERNAL MEDICINE

## 2021-10-07 PROCEDURE — 99214 OFFICE O/P EST MOD 30 MIN: CPT | Mod: S$GLB,,, | Performed by: INTERNAL MEDICINE

## 2021-10-07 PROCEDURE — 1101F PT FALLS ASSESS-DOCD LE1/YR: CPT | Mod: S$GLB,,, | Performed by: INTERNAL MEDICINE

## 2021-10-07 PROCEDURE — 99214 PR OFFICE/OUTPT VISIT, EST, LEVL IV, 30-39 MIN: ICD-10-PCS | Mod: S$GLB,,, | Performed by: INTERNAL MEDICINE

## 2021-10-07 PROCEDURE — 4010F ACE/ARB THERAPY RXD/TAKEN: CPT | Mod: S$GLB,,, | Performed by: INTERNAL MEDICINE

## 2021-10-07 PROCEDURE — 83615 LACTATE (LD) (LDH) ENZYME: CPT | Mod: PN | Performed by: INTERNAL MEDICINE

## 2021-10-07 PROCEDURE — 3288F FALL RISK ASSESSMENT DOCD: CPT | Mod: S$GLB,,, | Performed by: INTERNAL MEDICINE

## 2021-10-07 PROCEDURE — 3044F HG A1C LEVEL LT 7.0%: CPT | Mod: S$GLB,,, | Performed by: INTERNAL MEDICINE

## 2021-10-07 PROCEDURE — 85025 COMPLETE CBC W/AUTO DIFF WBC: CPT | Mod: PN | Performed by: INTERNAL MEDICINE

## 2021-10-07 PROCEDURE — 99999 PR PBB SHADOW E&M-EST. PATIENT-LVL V: CPT | Mod: PBBFAC,,, | Performed by: INTERNAL MEDICINE

## 2021-10-07 PROCEDURE — 83735 ASSAY OF MAGNESIUM: CPT | Mod: PN | Performed by: INTERNAL MEDICINE

## 2021-10-07 PROCEDURE — 3074F SYST BP LT 130 MM HG: CPT | Mod: S$GLB,,, | Performed by: INTERNAL MEDICINE

## 2021-10-07 PROCEDURE — 1159F PR MEDICATION LIST DOCUMENTED IN MEDICAL RECORD: ICD-10-PCS | Mod: S$GLB,,, | Performed by: INTERNAL MEDICINE

## 2021-10-07 PROCEDURE — 1126F AMNT PAIN NOTED NONE PRSNT: CPT | Mod: S$GLB,,, | Performed by: INTERNAL MEDICINE

## 2021-10-07 PROCEDURE — 1111F DSCHRG MED/CURRENT MED MERGE: CPT | Mod: S$GLB,,, | Performed by: INTERNAL MEDICINE

## 2021-10-07 PROCEDURE — 1160F PR REVIEW ALL MEDS BY PRESCRIBER/CLIN PHARMACIST DOCUMENTED: ICD-10-PCS | Mod: S$GLB,,, | Performed by: INTERNAL MEDICINE

## 2021-10-07 PROCEDURE — 1160F RVW MEDS BY RX/DR IN RCRD: CPT | Mod: S$GLB,,, | Performed by: INTERNAL MEDICINE

## 2021-10-07 PROCEDURE — 3066F PR DOCUMENTATION OF TREATMENT FOR NEPHROPATHY: ICD-10-PCS | Mod: S$GLB,,, | Performed by: INTERNAL MEDICINE

## 2021-10-07 PROCEDURE — 3078F DIAST BP <80 MM HG: CPT | Mod: S$GLB,,, | Performed by: INTERNAL MEDICINE

## 2021-10-07 PROCEDURE — 3008F BODY MASS INDEX DOCD: CPT | Mod: S$GLB,,, | Performed by: INTERNAL MEDICINE

## 2021-10-07 PROCEDURE — 99999 PR PBB SHADOW E&M-EST. PATIENT-LVL IV: CPT | Mod: PBBFAC,,, | Performed by: INTERNAL MEDICINE

## 2021-10-07 PROCEDURE — 3044F PR MOST RECENT HEMOGLOBIN A1C LEVEL <7.0%: ICD-10-PCS | Mod: S$GLB,,, | Performed by: INTERNAL MEDICINE

## 2021-10-07 PROCEDURE — 4010F PR ACE/ARB THEARPY RXD/TAKEN: ICD-10-PCS | Mod: S$GLB,,, | Performed by: INTERNAL MEDICINE

## 2021-10-07 PROCEDURE — 3008F PR BODY MASS INDEX (BMI) DOCUMENTED: ICD-10-PCS | Mod: S$GLB,,, | Performed by: INTERNAL MEDICINE

## 2021-10-07 PROCEDURE — 36415 COLL VENOUS BLD VENIPUNCTURE: CPT | Mod: PN | Performed by: INTERNAL MEDICINE

## 2021-10-07 PROCEDURE — 3074F PR MOST RECENT SYSTOLIC BLOOD PRESSURE < 130 MM HG: ICD-10-PCS | Mod: S$GLB,,, | Performed by: INTERNAL MEDICINE

## 2021-10-07 PROCEDURE — 99999 PR PBB SHADOW E&M-EST. PATIENT-LVL IV: ICD-10-PCS | Mod: PBBFAC,,, | Performed by: INTERNAL MEDICINE

## 2021-10-07 PROCEDURE — 1159F MED LIST DOCD IN RCRD: CPT | Mod: S$GLB,,, | Performed by: INTERNAL MEDICINE

## 2021-10-07 PROCEDURE — 99999 PR PBB SHADOW E&M-EST. PATIENT-LVL V: ICD-10-PCS | Mod: PBBFAC,,, | Performed by: INTERNAL MEDICINE

## 2021-10-07 PROCEDURE — 3288F PR FALLS RISK ASSESSMENT DOCUMENTED: ICD-10-PCS | Mod: S$GLB,,, | Performed by: INTERNAL MEDICINE

## 2021-10-07 RX ORDER — PANTOPRAZOLE SODIUM 40 MG/1
40 TABLET, DELAYED RELEASE ORAL DAILY
COMMUNITY
End: 2021-12-21 | Stop reason: SDUPTHER

## 2021-10-07 RX ORDER — POTASSIUM CHLORIDE 600 MG/1
8 TABLET, FILM COATED, EXTENDED RELEASE ORAL DAILY
Qty: 90 TABLET | Refills: 0 | Status: SHIPPED | OUTPATIENT
Start: 2021-10-07 | End: 2021-12-29

## 2021-10-07 RX ORDER — METOPROLOL SUCCINATE 25 MG/1
25 TABLET, EXTENDED RELEASE ORAL
COMMUNITY
Start: 2021-09-28 | End: 2021-10-18

## 2021-10-08 ENCOUNTER — PATIENT MESSAGE (OUTPATIENT)
Dept: HEMATOLOGY/ONCOLOGY | Facility: CLINIC | Age: 71
End: 2021-10-08

## 2021-10-08 ENCOUNTER — TELEPHONE (OUTPATIENT)
Dept: GASTROENTEROLOGY | Facility: CLINIC | Age: 71
End: 2021-10-08

## 2021-10-08 ENCOUNTER — TELEPHONE (OUTPATIENT)
Dept: HEMATOLOGY/ONCOLOGY | Facility: CLINIC | Age: 71
End: 2021-10-08

## 2021-10-11 ENCOUNTER — TELEPHONE (OUTPATIENT)
Dept: FAMILY MEDICINE | Facility: CLINIC | Age: 71
End: 2021-10-11

## 2021-10-11 DIAGNOSIS — K20.80 RADIATION ESOPHAGITIS: Primary | ICD-10-CM

## 2021-10-11 DIAGNOSIS — T66.XXXA RADIATION ESOPHAGITIS: Primary | ICD-10-CM

## 2021-10-11 DIAGNOSIS — D50.9 IRON DEFICIENCY ANEMIA, UNSPECIFIED IRON DEFICIENCY ANEMIA TYPE: ICD-10-CM

## 2021-10-11 RX ORDER — NYSTATIN 100000 [USP'U]/ML
6 SUSPENSION ORAL 4 TIMES DAILY
Qty: 240 ML | Refills: 0 | Status: SHIPPED | OUTPATIENT
Start: 2021-10-11 | End: 2021-10-21

## 2021-10-13 ENCOUNTER — TELEPHONE (OUTPATIENT)
Dept: INFUSION THERAPY | Facility: HOSPITAL | Age: 71
End: 2021-10-13

## 2021-10-14 ENCOUNTER — TELEPHONE (OUTPATIENT)
Dept: GASTROENTEROLOGY | Facility: CLINIC | Age: 71
End: 2021-10-14

## 2021-10-14 ENCOUNTER — OFFICE VISIT (OUTPATIENT)
Dept: FAMILY MEDICINE | Facility: CLINIC | Age: 71
End: 2021-10-14
Payer: MEDICARE

## 2021-10-14 ENCOUNTER — PATIENT MESSAGE (OUTPATIENT)
Dept: FAMILY MEDICINE | Facility: CLINIC | Age: 71
End: 2021-10-14

## 2021-10-14 DIAGNOSIS — E43 SEVERE MALNUTRITION: ICD-10-CM

## 2021-10-14 DIAGNOSIS — C34.91 SMALL CELL CARCINOMA OF LUNG, RIGHT: ICD-10-CM

## 2021-10-14 DIAGNOSIS — E87.6 HYPOKALEMIA: ICD-10-CM

## 2021-10-14 DIAGNOSIS — K20.80 RADIATION ESOPHAGITIS: Primary | ICD-10-CM

## 2021-10-14 DIAGNOSIS — D61.811 DRUG-INDUCED PANCYTOPENIA: ICD-10-CM

## 2021-10-14 DIAGNOSIS — E83.42 HYPOMAGNESEMIA: ICD-10-CM

## 2021-10-14 DIAGNOSIS — T66.XXXA RADIATION ESOPHAGITIS: Primary | ICD-10-CM

## 2021-10-14 DIAGNOSIS — E11.22 TYPE 2 DIABETES MELLITUS WITH CHRONIC KIDNEY DISEASE, WITHOUT LONG-TERM CURRENT USE OF INSULIN, UNSPECIFIED CKD STAGE: ICD-10-CM

## 2021-10-14 DIAGNOSIS — D64.9 ANEMIA, UNSPECIFIED TYPE: ICD-10-CM

## 2021-10-14 DIAGNOSIS — E61.1 IRON DEFICIENCY: ICD-10-CM

## 2021-10-14 DIAGNOSIS — I48.0 PAF (PAROXYSMAL ATRIAL FIBRILLATION): ICD-10-CM

## 2021-10-14 PROCEDURE — 1160F RVW MEDS BY RX/DR IN RCRD: CPT | Mod: 95,,, | Performed by: FAMILY MEDICINE

## 2021-10-14 PROCEDURE — 3044F PR MOST RECENT HEMOGLOBIN A1C LEVEL <7.0%: ICD-10-PCS | Mod: 95,,, | Performed by: FAMILY MEDICINE

## 2021-10-14 PROCEDURE — 3044F HG A1C LEVEL LT 7.0%: CPT | Mod: 95,,, | Performed by: FAMILY MEDICINE

## 2021-10-14 PROCEDURE — 4010F PR ACE/ARB THEARPY RXD/TAKEN: ICD-10-PCS | Mod: 95,,, | Performed by: FAMILY MEDICINE

## 2021-10-14 PROCEDURE — 3066F NEPHROPATHY DOC TX: CPT | Mod: 95,,, | Performed by: FAMILY MEDICINE

## 2021-10-14 PROCEDURE — 3066F PR DOCUMENTATION OF TREATMENT FOR NEPHROPATHY: ICD-10-PCS | Mod: 95,,, | Performed by: FAMILY MEDICINE

## 2021-10-14 PROCEDURE — 1111F DSCHRG MED/CURRENT MED MERGE: CPT | Mod: 95,,, | Performed by: FAMILY MEDICINE

## 2021-10-14 PROCEDURE — 99215 OFFICE O/P EST HI 40 MIN: CPT | Mod: 95,,, | Performed by: FAMILY MEDICINE

## 2021-10-14 PROCEDURE — 1111F PR DISCHARGE MEDS RECONCILED W/ CURRENT OUTPATIENT MED LIST: ICD-10-PCS | Mod: 95,,, | Performed by: FAMILY MEDICINE

## 2021-10-14 PROCEDURE — 99215 PR OFFICE/OUTPT VISIT, EST, LEVL V, 40-54 MIN: ICD-10-PCS | Mod: 95,,, | Performed by: FAMILY MEDICINE

## 2021-10-14 PROCEDURE — 1159F MED LIST DOCD IN RCRD: CPT | Mod: 95,,, | Performed by: FAMILY MEDICINE

## 2021-10-14 PROCEDURE — 4010F ACE/ARB THERAPY RXD/TAKEN: CPT | Mod: 95,,, | Performed by: FAMILY MEDICINE

## 2021-10-14 PROCEDURE — 1159F PR MEDICATION LIST DOCUMENTED IN MEDICAL RECORD: ICD-10-PCS | Mod: 95,,, | Performed by: FAMILY MEDICINE

## 2021-10-14 PROCEDURE — 1160F PR REVIEW ALL MEDS BY PRESCRIBER/CLIN PHARMACIST DOCUMENTED: ICD-10-PCS | Mod: 95,,, | Performed by: FAMILY MEDICINE

## 2021-10-15 ENCOUNTER — PATIENT MESSAGE (OUTPATIENT)
Dept: FAMILY MEDICINE | Facility: CLINIC | Age: 71
End: 2021-10-15

## 2021-10-15 ENCOUNTER — PATIENT MESSAGE (OUTPATIENT)
Dept: FAMILY MEDICINE | Facility: CLINIC | Age: 71
End: 2021-10-15
Payer: MEDICARE

## 2021-10-18 PROBLEM — K92.2 GI BLEED: Status: RESOLVED | Noted: 2021-05-13 | Resolved: 2021-10-18

## 2021-10-18 PROBLEM — N30.01 ACUTE CYSTITIS WITH HEMATURIA: Status: RESOLVED | Noted: 2021-09-07 | Resolved: 2021-10-18

## 2021-10-18 PROBLEM — Z75.8 DISCHARGE PLANNING ISSUES: Status: RESOLVED | Noted: 2021-08-26 | Resolved: 2021-10-18

## 2021-10-18 PROBLEM — K62.5 RECTAL BLEEDING: Status: RESOLVED | Noted: 2021-05-14 | Resolved: 2021-10-18

## 2021-10-18 PROBLEM — A04.72 CLOSTRIDIUM DIFFICILE COLITIS: Status: RESOLVED | Noted: 2021-08-05 | Resolved: 2021-10-18

## 2021-10-18 PROBLEM — Z02.9 DISCHARGE PLANNING ISSUES: Status: RESOLVED | Noted: 2021-08-26 | Resolved: 2021-10-18

## 2021-10-19 ENCOUNTER — TELEPHONE (OUTPATIENT)
Dept: FAMILY MEDICINE | Facility: CLINIC | Age: 71
End: 2021-10-19

## 2021-10-19 ENCOUNTER — PATIENT MESSAGE (OUTPATIENT)
Dept: HEMATOLOGY/ONCOLOGY | Facility: CLINIC | Age: 71
End: 2021-10-19
Payer: MEDICARE

## 2021-10-19 DIAGNOSIS — B37.0 ORAL CANDIDIASIS: Primary | ICD-10-CM

## 2021-10-19 RX ORDER — FLUCONAZOLE 100 MG/1
100 TABLET ORAL DAILY
Qty: 7 TABLET | Refills: 0 | Status: SHIPPED | OUTPATIENT
Start: 2021-10-19 | End: 2021-10-26

## 2021-10-20 ENCOUNTER — TELEPHONE (OUTPATIENT)
Dept: FAMILY MEDICINE | Facility: CLINIC | Age: 71
End: 2021-10-20

## 2021-10-21 ENCOUNTER — PATIENT MESSAGE (OUTPATIENT)
Dept: HEMATOLOGY/ONCOLOGY | Facility: CLINIC | Age: 71
End: 2021-10-21
Payer: MEDICARE

## 2021-10-22 ENCOUNTER — TELEPHONE (OUTPATIENT)
Dept: FAMILY MEDICINE | Facility: CLINIC | Age: 71
End: 2021-10-22

## 2021-10-22 RX ORDER — SULFAMETHOXAZOLE AND TRIMETHOPRIM 800; 160 MG/1; MG/1
1 TABLET ORAL 2 TIMES DAILY
Qty: 20 TABLET | Refills: 0 | Status: SHIPPED | OUTPATIENT
Start: 2021-10-22 | End: 2021-11-09

## 2021-10-25 ENCOUNTER — PATIENT MESSAGE (OUTPATIENT)
Dept: CARDIOLOGY | Facility: CLINIC | Age: 71
End: 2021-10-25
Payer: MEDICARE

## 2021-10-25 RX ORDER — METOPROLOL SUCCINATE 25 MG/1
25 TABLET, EXTENDED RELEASE ORAL EVERY 12 HOURS
Qty: 60 TABLET | Refills: 5
Start: 2021-10-25 | End: 2021-10-26

## 2021-10-28 RX ORDER — NYSTATIN 100000 U/G
OINTMENT TOPICAL 2 TIMES DAILY
Qty: 30 G | Refills: 1 | Status: SHIPPED | OUTPATIENT
Start: 2021-10-28 | End: 2022-07-12

## 2021-10-28 RX ORDER — ESCITALOPRAM OXALATE 10 MG/1
TABLET ORAL
Qty: 30 TABLET | Refills: 3 | Status: SHIPPED | OUTPATIENT
Start: 2021-10-28 | End: 2022-02-05 | Stop reason: SDUPTHER

## 2021-11-01 ENCOUNTER — PATIENT MESSAGE (OUTPATIENT)
Dept: FAMILY MEDICINE | Facility: CLINIC | Age: 71
End: 2021-11-01
Payer: MEDICARE

## 2021-11-01 DIAGNOSIS — R30.0 DYSURIA: Primary | ICD-10-CM

## 2021-11-03 ENCOUNTER — PATIENT MESSAGE (OUTPATIENT)
Dept: FAMILY MEDICINE | Facility: CLINIC | Age: 71
End: 2021-11-03
Payer: MEDICARE

## 2021-11-03 ENCOUNTER — TELEPHONE (OUTPATIENT)
Dept: UROLOGY | Facility: CLINIC | Age: 71
End: 2021-11-03
Payer: MEDICARE

## 2021-11-04 ENCOUNTER — PATIENT MESSAGE (OUTPATIENT)
Dept: FAMILY MEDICINE | Facility: CLINIC | Age: 71
End: 2021-11-04
Payer: MEDICARE

## 2021-11-04 DIAGNOSIS — R30.0 DYSURIA: Primary | ICD-10-CM

## 2021-11-11 ENCOUNTER — PATIENT MESSAGE (OUTPATIENT)
Dept: FAMILY MEDICINE | Facility: CLINIC | Age: 71
End: 2021-11-11
Payer: MEDICARE

## 2021-11-11 ENCOUNTER — TELEPHONE (OUTPATIENT)
Dept: FAMILY MEDICINE | Facility: CLINIC | Age: 71
End: 2021-11-11
Payer: MEDICARE

## 2021-11-11 DIAGNOSIS — R30.0 DYSURIA: Primary | ICD-10-CM

## 2021-11-11 RX ORDER — CEPHALEXIN 500 MG/1
500 CAPSULE ORAL EVERY 8 HOURS
Qty: 21 CAPSULE | Refills: 0 | Status: SHIPPED | OUTPATIENT
Start: 2021-11-11 | End: 2021-11-30 | Stop reason: ALTCHOICE

## 2021-11-12 ENCOUNTER — PATIENT MESSAGE (OUTPATIENT)
Dept: FAMILY MEDICINE | Facility: CLINIC | Age: 71
End: 2021-11-12
Payer: MEDICARE

## 2021-11-15 ENCOUNTER — PATIENT MESSAGE (OUTPATIENT)
Dept: FAMILY MEDICINE | Facility: CLINIC | Age: 71
End: 2021-11-15
Payer: MEDICARE

## 2021-11-16 ENCOUNTER — OFFICE VISIT (OUTPATIENT)
Dept: HEMATOLOGY/ONCOLOGY | Facility: CLINIC | Age: 71
End: 2021-11-16
Payer: MEDICARE

## 2021-11-16 ENCOUNTER — PATIENT MESSAGE (OUTPATIENT)
Dept: HEMATOLOGY/ONCOLOGY | Facility: CLINIC | Age: 71
End: 2021-11-16

## 2021-11-16 ENCOUNTER — LAB VISIT (OUTPATIENT)
Dept: LAB | Facility: HOSPITAL | Age: 71
End: 2021-11-16
Attending: INTERNAL MEDICINE
Payer: MEDICARE

## 2021-11-16 VITALS
HEART RATE: 80 BPM | TEMPERATURE: 97 F | OXYGEN SATURATION: 98 % | RESPIRATION RATE: 18 BRPM | BODY MASS INDEX: 31.5 KG/M2 | SYSTOLIC BLOOD PRESSURE: 138 MMHG | HEIGHT: 66 IN | WEIGHT: 196 LBS | DIASTOLIC BLOOD PRESSURE: 75 MMHG

## 2021-11-16 DIAGNOSIS — D50.9 IRON DEFICIENCY ANEMIA, UNSPECIFIED IRON DEFICIENCY ANEMIA TYPE: ICD-10-CM

## 2021-11-16 DIAGNOSIS — E44.0 MODERATE PROTEIN-CALORIE MALNUTRITION: ICD-10-CM

## 2021-11-16 DIAGNOSIS — C34.91 SMALL CELL CARCINOMA OF LUNG, RIGHT: Primary | ICD-10-CM

## 2021-11-16 DIAGNOSIS — C34.91 SMALL CELL CARCINOMA OF LUNG, RIGHT: ICD-10-CM

## 2021-11-16 DIAGNOSIS — E61.1 IRON DEFICIENCY: ICD-10-CM

## 2021-11-16 DIAGNOSIS — J44.9 CHRONIC OBSTRUCTIVE PULMONARY DISEASE, UNSPECIFIED COPD TYPE: ICD-10-CM

## 2021-11-16 LAB
ALBUMIN SERPL BCP-MCNC: 2.9 G/DL (ref 3.5–5.2)
ALP SERPL-CCNC: 59 U/L (ref 55–135)
ALT SERPL W/O P-5'-P-CCNC: 13 U/L (ref 10–44)
ANION GAP SERPL CALC-SCNC: 7 MMOL/L (ref 8–16)
AST SERPL-CCNC: 18 U/L (ref 10–40)
BASOPHILS # BLD AUTO: 0.01 K/UL (ref 0–0.2)
BASOPHILS NFR BLD: 0.1 % (ref 0–1.9)
BILIRUB SERPL-MCNC: 0.5 MG/DL (ref 0.1–1)
BUN SERPL-MCNC: 14 MG/DL (ref 8–23)
CALCIUM SERPL-MCNC: 9.8 MG/DL (ref 8.7–10.5)
CHLORIDE SERPL-SCNC: 110 MMOL/L (ref 95–110)
CO2 SERPL-SCNC: 24 MMOL/L (ref 23–29)
CREAT SERPL-MCNC: 0.9 MG/DL (ref 0.5–1.4)
DIFFERENTIAL METHOD: ABNORMAL
EOSINOPHIL # BLD AUTO: 0 K/UL (ref 0–0.5)
EOSINOPHIL NFR BLD: 0 % (ref 0–8)
ERYTHROCYTE [DISTWIDTH] IN BLOOD BY AUTOMATED COUNT: 17.3 % (ref 11.5–14.5)
EST. GFR  (AFRICAN AMERICAN): >60 ML/MIN/1.73 M^2
EST. GFR  (NON AFRICAN AMERICAN): >60 ML/MIN/1.73 M^2
GLUCOSE SERPL-MCNC: 108 MG/DL (ref 70–110)
HCT VFR BLD AUTO: 25.5 % (ref 37–48.5)
HGB BLD-MCNC: 7.9 G/DL (ref 12–16)
IMM GRANULOCYTES # BLD AUTO: 0.03 K/UL (ref 0–0.04)
IMM GRANULOCYTES NFR BLD AUTO: 0.4 % (ref 0–0.5)
LDH SERPL L TO P-CCNC: 166 U/L (ref 110–260)
LYMPHOCYTES # BLD AUTO: 1.5 K/UL (ref 1–4.8)
LYMPHOCYTES NFR BLD: 18.5 % (ref 18–48)
MAGNESIUM SERPL-MCNC: 1.6 MG/DL (ref 1.6–2.6)
MCH RBC QN AUTO: 27.2 PG (ref 27–31)
MCHC RBC AUTO-ENTMCNC: 31 G/DL (ref 32–36)
MCV RBC AUTO: 88 FL (ref 82–98)
MONOCYTES # BLD AUTO: 0.7 K/UL (ref 0.3–1)
MONOCYTES NFR BLD: 8 % (ref 4–15)
NEUTROPHILS # BLD AUTO: 6 K/UL (ref 1.8–7.7)
NEUTROPHILS NFR BLD: 73 % (ref 38–73)
NRBC BLD-RTO: 0 /100 WBC
PLATELET # BLD AUTO: 198 K/UL (ref 150–450)
PMV BLD AUTO: 9.9 FL (ref 9.2–12.9)
POTASSIUM SERPL-SCNC: 4.1 MMOL/L (ref 3.5–5.1)
PROT SERPL-MCNC: 5.8 G/DL (ref 6–8.4)
RBC # BLD AUTO: 2.9 M/UL (ref 4–5.4)
SODIUM SERPL-SCNC: 141 MMOL/L (ref 136–145)
WBC # BLD AUTO: 8.26 K/UL (ref 3.9–12.7)

## 2021-11-16 PROCEDURE — 99999 PR PBB SHADOW E&M-EST. PATIENT-LVL V: CPT | Mod: PBBFAC,,, | Performed by: INTERNAL MEDICINE

## 2021-11-16 PROCEDURE — 1126F AMNT PAIN NOTED NONE PRSNT: CPT | Mod: S$GLB,,, | Performed by: INTERNAL MEDICINE

## 2021-11-16 PROCEDURE — 1160F RVW MEDS BY RX/DR IN RCRD: CPT | Mod: S$GLB,,, | Performed by: INTERNAL MEDICINE

## 2021-11-16 PROCEDURE — 1100F PR PT FALLS ASSESS DOC 2+ FALLS/FALL W/INJURY/YR: ICD-10-PCS | Mod: S$GLB,,, | Performed by: INTERNAL MEDICINE

## 2021-11-16 PROCEDURE — 1100F PTFALLS ASSESS-DOCD GE2>/YR: CPT | Mod: S$GLB,,, | Performed by: INTERNAL MEDICINE

## 2021-11-16 PROCEDURE — 3008F PR BODY MASS INDEX (BMI) DOCUMENTED: ICD-10-PCS | Mod: S$GLB,,, | Performed by: INTERNAL MEDICINE

## 2021-11-16 PROCEDURE — 3078F DIAST BP <80 MM HG: CPT | Mod: S$GLB,,, | Performed by: INTERNAL MEDICINE

## 2021-11-16 PROCEDURE — 83615 LACTATE (LD) (LDH) ENZYME: CPT | Mod: PN | Performed by: INTERNAL MEDICINE

## 2021-11-16 PROCEDURE — 3075F SYST BP GE 130 - 139MM HG: CPT | Mod: S$GLB,,, | Performed by: INTERNAL MEDICINE

## 2021-11-16 PROCEDURE — 4010F ACE/ARB THERAPY RXD/TAKEN: CPT | Mod: S$GLB,,, | Performed by: INTERNAL MEDICINE

## 2021-11-16 PROCEDURE — 3044F HG A1C LEVEL LT 7.0%: CPT | Mod: S$GLB,,, | Performed by: INTERNAL MEDICINE

## 2021-11-16 PROCEDURE — 3066F NEPHROPATHY DOC TX: CPT | Mod: S$GLB,,, | Performed by: INTERNAL MEDICINE

## 2021-11-16 PROCEDURE — 1159F MED LIST DOCD IN RCRD: CPT | Mod: S$GLB,,, | Performed by: INTERNAL MEDICINE

## 2021-11-16 PROCEDURE — 80053 COMPREHEN METABOLIC PANEL: CPT | Mod: PN | Performed by: INTERNAL MEDICINE

## 2021-11-16 PROCEDURE — 1160F PR REVIEW ALL MEDS BY PRESCRIBER/CLIN PHARMACIST DOCUMENTED: ICD-10-PCS | Mod: S$GLB,,, | Performed by: INTERNAL MEDICINE

## 2021-11-16 PROCEDURE — 3044F PR MOST RECENT HEMOGLOBIN A1C LEVEL <7.0%: ICD-10-PCS | Mod: S$GLB,,, | Performed by: INTERNAL MEDICINE

## 2021-11-16 PROCEDURE — 3288F PR FALLS RISK ASSESSMENT DOCUMENTED: ICD-10-PCS | Mod: S$GLB,,, | Performed by: INTERNAL MEDICINE

## 2021-11-16 PROCEDURE — 36415 COLL VENOUS BLD VENIPUNCTURE: CPT | Mod: PN | Performed by: INTERNAL MEDICINE

## 2021-11-16 PROCEDURE — 99214 OFFICE O/P EST MOD 30 MIN: CPT | Mod: S$GLB,,, | Performed by: INTERNAL MEDICINE

## 2021-11-16 PROCEDURE — 3078F PR MOST RECENT DIASTOLIC BLOOD PRESSURE < 80 MM HG: ICD-10-PCS | Mod: S$GLB,,, | Performed by: INTERNAL MEDICINE

## 2021-11-16 PROCEDURE — 3008F BODY MASS INDEX DOCD: CPT | Mod: S$GLB,,, | Performed by: INTERNAL MEDICINE

## 2021-11-16 PROCEDURE — 3066F PR DOCUMENTATION OF TREATMENT FOR NEPHROPATHY: ICD-10-PCS | Mod: S$GLB,,, | Performed by: INTERNAL MEDICINE

## 2021-11-16 PROCEDURE — 4010F PR ACE/ARB THEARPY RXD/TAKEN: ICD-10-PCS | Mod: S$GLB,,, | Performed by: INTERNAL MEDICINE

## 2021-11-16 PROCEDURE — 99999 PR PBB SHADOW E&M-EST. PATIENT-LVL V: ICD-10-PCS | Mod: PBBFAC,,, | Performed by: INTERNAL MEDICINE

## 2021-11-16 PROCEDURE — 3075F PR MOST RECENT SYSTOLIC BLOOD PRESS GE 130-139MM HG: ICD-10-PCS | Mod: S$GLB,,, | Performed by: INTERNAL MEDICINE

## 2021-11-16 PROCEDURE — 1159F PR MEDICATION LIST DOCUMENTED IN MEDICAL RECORD: ICD-10-PCS | Mod: S$GLB,,, | Performed by: INTERNAL MEDICINE

## 2021-11-16 PROCEDURE — 1126F PR PAIN SEVERITY QUANTIFIED, NO PAIN PRESENT: ICD-10-PCS | Mod: S$GLB,,, | Performed by: INTERNAL MEDICINE

## 2021-11-16 PROCEDURE — 3288F FALL RISK ASSESSMENT DOCD: CPT | Mod: S$GLB,,, | Performed by: INTERNAL MEDICINE

## 2021-11-16 PROCEDURE — 99214 PR OFFICE/OUTPT VISIT, EST, LEVL IV, 30-39 MIN: ICD-10-PCS | Mod: S$GLB,,, | Performed by: INTERNAL MEDICINE

## 2021-11-16 PROCEDURE — 83735 ASSAY OF MAGNESIUM: CPT | Mod: PN | Performed by: INTERNAL MEDICINE

## 2021-11-16 PROCEDURE — 85025 COMPLETE CBC W/AUTO DIFF WBC: CPT | Mod: PN | Performed by: INTERNAL MEDICINE

## 2021-11-16 RX ORDER — CALCIUM CARB/VITAMIN D3/VIT K1 650MG-12.5
1 TABLET,CHEWABLE ORAL DAILY
COMMUNITY
Start: 2021-09-29 | End: 2022-07-05

## 2021-11-16 RX ORDER — FLUTICASONE PROPIONATE 50 UG/1
1 SPRAY, METERED NASAL DAILY PRN
COMMUNITY
Start: 2021-09-29 | End: 2022-07-05

## 2021-11-17 ENCOUNTER — TELEPHONE (OUTPATIENT)
Dept: HEMATOLOGY/ONCOLOGY | Facility: CLINIC | Age: 71
End: 2021-11-17
Payer: MEDICARE

## 2021-11-17 DIAGNOSIS — D50.9 IRON DEFICIENCY ANEMIA, UNSPECIFIED IRON DEFICIENCY ANEMIA TYPE: Primary | ICD-10-CM

## 2021-11-19 ENCOUNTER — LAB VISIT (OUTPATIENT)
Dept: LAB | Facility: HOSPITAL | Age: 71
End: 2021-11-19
Attending: INTERNAL MEDICINE
Payer: MEDICARE

## 2021-11-19 DIAGNOSIS — D50.0 ANEMIA DUE TO CHRONIC BLOOD LOSS: Primary | ICD-10-CM

## 2021-11-19 DIAGNOSIS — K92.1 BLOOD IN STOOL: ICD-10-CM

## 2021-11-19 DIAGNOSIS — C34.91 SMALL CELL CARCINOMA OF LUNG, RIGHT: ICD-10-CM

## 2021-11-19 DIAGNOSIS — R19.5 OCCULT BLOOD IN STOOLS: Primary | ICD-10-CM

## 2021-11-19 DIAGNOSIS — K92.1 BLOOD IN STOOL: Primary | ICD-10-CM

## 2021-11-19 DIAGNOSIS — D50.0 ANEMIA DUE TO CHRONIC BLOOD LOSS: ICD-10-CM

## 2021-11-19 DIAGNOSIS — D50.0 IRON DEFICIENCY ANEMIA DUE TO CHRONIC BLOOD LOSS: ICD-10-CM

## 2021-11-19 DIAGNOSIS — D50.9 IRON DEFICIENCY ANEMIA, UNSPECIFIED IRON DEFICIENCY ANEMIA TYPE: ICD-10-CM

## 2021-11-19 DIAGNOSIS — E61.1 IRON DEFICIENCY: ICD-10-CM

## 2021-11-19 LAB
OB PNL STL: NEGATIVE
OB PNL STL: POSITIVE
OB PNL STL: POSITIVE

## 2021-11-19 PROCEDURE — 82272 OCCULT BLD FECES 1-3 TESTS: CPT | Mod: 91,PN | Performed by: INTERNAL MEDICINE

## 2021-11-22 ENCOUNTER — TELEPHONE (OUTPATIENT)
Dept: HEMATOLOGY/ONCOLOGY | Facility: CLINIC | Age: 71
End: 2021-11-22
Payer: MEDICARE

## 2021-11-22 ENCOUNTER — PATIENT MESSAGE (OUTPATIENT)
Dept: HEMATOLOGY/ONCOLOGY | Facility: CLINIC | Age: 71
End: 2021-11-22
Payer: MEDICARE

## 2021-11-22 DIAGNOSIS — K92.1 BLOOD IN STOOL: Primary | ICD-10-CM

## 2021-11-22 DIAGNOSIS — D50.0 ANEMIA DUE TO CHRONIC BLOOD LOSS: ICD-10-CM

## 2021-11-22 NOTE — TELEPHONE ENCOUNTER
----- Message from Simone Humphries MD sent at 10/25/2018 12:57 PM CDT -----  RECEIVED A MESSAGE THAT SHE IS ON BOTH METOPROLOL SUCCINATE AND TARTARATE PLEASE CHECK SHOULD BE SUCCINATE ONLY   no

## 2021-11-23 ENCOUNTER — PATIENT MESSAGE (OUTPATIENT)
Dept: FAMILY MEDICINE | Facility: CLINIC | Age: 71
End: 2021-11-23
Payer: MEDICARE

## 2021-11-23 ENCOUNTER — TELEPHONE (OUTPATIENT)
Dept: GASTROENTEROLOGY | Facility: CLINIC | Age: 71
End: 2021-11-23
Payer: MEDICARE

## 2021-11-26 ENCOUNTER — DOCUMENTATION ONLY (OUTPATIENT)
Dept: INFUSION THERAPY | Facility: HOSPITAL | Age: 71
End: 2021-11-26

## 2021-11-26 ENCOUNTER — INFUSION (OUTPATIENT)
Dept: INFUSION THERAPY | Facility: HOSPITAL | Age: 71
End: 2021-11-26
Attending: INTERNAL MEDICINE
Payer: MEDICARE

## 2021-11-26 VITALS
HEART RATE: 79 BPM | HEIGHT: 66 IN | BODY MASS INDEX: 30.82 KG/M2 | DIASTOLIC BLOOD PRESSURE: 79 MMHG | WEIGHT: 191.81 LBS | TEMPERATURE: 99 F | RESPIRATION RATE: 18 BRPM | SYSTOLIC BLOOD PRESSURE: 163 MMHG

## 2021-11-26 DIAGNOSIS — D50.9 IRON DEFICIENCY ANEMIA, UNSPECIFIED IRON DEFICIENCY ANEMIA TYPE: Primary | ICD-10-CM

## 2021-11-26 PROCEDURE — A4216 STERILE WATER/SALINE, 10 ML: HCPCS | Mod: PN | Performed by: INTERNAL MEDICINE

## 2021-11-26 PROCEDURE — 96375 TX/PRO/DX INJ NEW DRUG ADDON: CPT | Mod: PN

## 2021-11-26 PROCEDURE — 25000003 PHARM REV CODE 250: Mod: PN | Performed by: INTERNAL MEDICINE

## 2021-11-26 PROCEDURE — 96365 THER/PROPH/DIAG IV INF INIT: CPT | Mod: PN

## 2021-11-26 PROCEDURE — 63600175 PHARM REV CODE 636 W HCPCS: Mod: PN | Performed by: INTERNAL MEDICINE

## 2021-11-26 RX ORDER — DEXAMETHASONE SODIUM PHOSPHATE 4 MG/ML
4 INJECTION, SOLUTION INTRA-ARTICULAR; INTRALESIONAL; INTRAMUSCULAR; INTRAVENOUS; SOFT TISSUE
Status: CANCELLED
Start: 2021-11-26

## 2021-11-26 RX ORDER — SODIUM CHLORIDE 0.9 % (FLUSH) 0.9 %
10 SYRINGE (ML) INJECTION
Status: DISCONTINUED | OUTPATIENT
Start: 2021-11-26 | End: 2021-11-26 | Stop reason: HOSPADM

## 2021-11-26 RX ORDER — FAMOTIDINE 10 MG/ML
20 INJECTION INTRAVENOUS DAILY
Status: CANCELLED
Start: 2021-11-26

## 2021-11-26 RX ORDER — DIPHENHYDRAMINE HYDROCHLORIDE 50 MG/ML
25 INJECTION INTRAMUSCULAR; INTRAVENOUS ONCE
Status: CANCELLED
Start: 2021-11-26

## 2021-11-26 RX ORDER — SODIUM CHLORIDE 0.9 % (FLUSH) 0.9 %
10 SYRINGE (ML) INJECTION
Status: CANCELLED | OUTPATIENT
Start: 2021-11-26

## 2021-11-26 RX ORDER — DEXAMETHASONE SODIUM PHOSPHATE 4 MG/ML
4 INJECTION, SOLUTION INTRA-ARTICULAR; INTRALESIONAL; INTRAMUSCULAR; INTRAVENOUS; SOFT TISSUE
Status: COMPLETED | OUTPATIENT
Start: 2021-11-26 | End: 2021-11-26

## 2021-11-26 RX ORDER — HEPARIN 100 UNIT/ML
500 SYRINGE INTRAVENOUS
Status: CANCELLED | OUTPATIENT
Start: 2021-11-26

## 2021-11-26 RX ORDER — DIPHENHYDRAMINE HYDROCHLORIDE 50 MG/ML
25 INJECTION INTRAMUSCULAR; INTRAVENOUS ONCE
Status: COMPLETED | OUTPATIENT
Start: 2021-11-26 | End: 2021-11-26

## 2021-11-26 RX ORDER — FAMOTIDINE 10 MG/ML
20 INJECTION INTRAVENOUS DAILY
Status: DISCONTINUED | OUTPATIENT
Start: 2021-11-26 | End: 2021-11-26 | Stop reason: HOSPADM

## 2021-11-26 RX ADMIN — DIPHENHYDRAMINE HYDROCHLORIDE 25 MG: 50 INJECTION INTRAMUSCULAR; INTRAVENOUS at 01:11

## 2021-11-26 RX ADMIN — FAMOTIDINE 20 MG: 10 INJECTION INTRAVENOUS at 01:11

## 2021-11-26 RX ADMIN — Medication 10 ML: at 02:11

## 2021-11-26 RX ADMIN — FERUMOXYTOL 510 MG: 510 INJECTION INTRAVENOUS at 01:11

## 2021-11-26 RX ADMIN — SODIUM CHLORIDE: 0.9 INJECTION, SOLUTION INTRAVENOUS at 01:11

## 2021-11-26 RX ADMIN — DEXAMETHASONE SODIUM PHOSPHATE 4 MG: 4 INJECTION INTRA-ARTICULAR; INTRALESIONAL; INTRAMUSCULAR; INTRAVENOUS; SOFT TISSUE at 01:11

## 2021-11-29 ENCOUNTER — INFUSION (OUTPATIENT)
Dept: INFUSION THERAPY | Facility: HOSPITAL | Age: 71
End: 2021-11-29
Attending: INTERNAL MEDICINE
Payer: MEDICARE

## 2021-11-29 VITALS
DIASTOLIC BLOOD PRESSURE: 76 MMHG | HEART RATE: 78 BPM | WEIGHT: 191.81 LBS | BODY MASS INDEX: 30.82 KG/M2 | OXYGEN SATURATION: 97 % | TEMPERATURE: 99 F | HEIGHT: 66 IN | SYSTOLIC BLOOD PRESSURE: 152 MMHG | RESPIRATION RATE: 18 BRPM

## 2021-11-29 DIAGNOSIS — D50.9 IRON DEFICIENCY ANEMIA, UNSPECIFIED IRON DEFICIENCY ANEMIA TYPE: Primary | ICD-10-CM

## 2021-11-29 PROCEDURE — 96365 THER/PROPH/DIAG IV INF INIT: CPT | Mod: PN

## 2021-11-29 PROCEDURE — 63600175 PHARM REV CODE 636 W HCPCS: Mod: JG,PN | Performed by: INTERNAL MEDICINE

## 2021-11-29 PROCEDURE — 96375 TX/PRO/DX INJ NEW DRUG ADDON: CPT | Mod: PN

## 2021-11-29 PROCEDURE — 25000003 PHARM REV CODE 250: Mod: PN | Performed by: INTERNAL MEDICINE

## 2021-11-29 RX ORDER — HEPARIN 100 UNIT/ML
500 SYRINGE INTRAVENOUS
Status: CANCELLED | OUTPATIENT
Start: 2021-11-29

## 2021-11-29 RX ORDER — FAMOTIDINE 10 MG/ML
20 INJECTION INTRAVENOUS DAILY
Status: DISCONTINUED | OUTPATIENT
Start: 2021-11-29 | End: 2021-11-29 | Stop reason: HOSPADM

## 2021-11-29 RX ORDER — FAMOTIDINE 10 MG/ML
20 INJECTION INTRAVENOUS DAILY
Status: CANCELLED
Start: 2021-11-29

## 2021-11-29 RX ORDER — DEXAMETHASONE SODIUM PHOSPHATE 4 MG/ML
4 INJECTION, SOLUTION INTRA-ARTICULAR; INTRALESIONAL; INTRAMUSCULAR; INTRAVENOUS; SOFT TISSUE
Status: COMPLETED | OUTPATIENT
Start: 2021-11-29 | End: 2021-11-29

## 2021-11-29 RX ORDER — DIPHENHYDRAMINE HYDROCHLORIDE 50 MG/ML
25 INJECTION INTRAMUSCULAR; INTRAVENOUS ONCE
Status: COMPLETED | OUTPATIENT
Start: 2021-11-29 | End: 2021-11-29

## 2021-11-29 RX ORDER — DEXAMETHASONE SODIUM PHOSPHATE 4 MG/ML
4 INJECTION, SOLUTION INTRA-ARTICULAR; INTRALESIONAL; INTRAMUSCULAR; INTRAVENOUS; SOFT TISSUE
Status: CANCELLED
Start: 2021-11-29

## 2021-11-29 RX ORDER — DIPHENHYDRAMINE HYDROCHLORIDE 50 MG/ML
25 INJECTION INTRAMUSCULAR; INTRAVENOUS ONCE
Status: CANCELLED
Start: 2021-11-29

## 2021-11-29 RX ADMIN — FERUMOXYTOL 510 MG: 510 INJECTION INTRAVENOUS at 01:11

## 2021-11-29 RX ADMIN — FAMOTIDINE 20 MG: 10 INJECTION INTRAVENOUS at 01:11

## 2021-11-29 RX ADMIN — DEXAMETHASONE SODIUM PHOSPHATE 4 MG: 4 INJECTION INTRA-ARTICULAR; INTRALESIONAL; INTRAMUSCULAR; INTRAVENOUS; SOFT TISSUE at 01:11

## 2021-11-29 RX ADMIN — DIPHENHYDRAMINE HYDROCHLORIDE 25 MG: 50 INJECTION INTRAMUSCULAR; INTRAVENOUS at 01:11

## 2021-11-30 ENCOUNTER — PATIENT MESSAGE (OUTPATIENT)
Dept: FAMILY MEDICINE | Facility: CLINIC | Age: 71
End: 2021-11-30
Payer: MEDICARE

## 2021-11-30 ENCOUNTER — PATIENT MESSAGE (OUTPATIENT)
Dept: GASTROENTEROLOGY | Facility: CLINIC | Age: 71
End: 2021-11-30

## 2021-11-30 ENCOUNTER — TELEPHONE (OUTPATIENT)
Dept: GASTROENTEROLOGY | Facility: CLINIC | Age: 71
End: 2021-11-30
Payer: MEDICARE

## 2021-11-30 ENCOUNTER — LAB VISIT (OUTPATIENT)
Dept: LAB | Facility: HOSPITAL | Age: 71
End: 2021-11-30
Attending: NURSE PRACTITIONER
Payer: MEDICARE

## 2021-11-30 ENCOUNTER — OFFICE VISIT (OUTPATIENT)
Dept: GASTROENTEROLOGY | Facility: CLINIC | Age: 71
End: 2021-11-30
Payer: MEDICARE

## 2021-11-30 ENCOUNTER — DOCUMENTATION ONLY (OUTPATIENT)
Dept: INFUSION THERAPY | Facility: HOSPITAL | Age: 71
End: 2021-11-30
Payer: MEDICARE

## 2021-11-30 VITALS — WEIGHT: 191 LBS | HEIGHT: 66 IN | BODY MASS INDEX: 30.7 KG/M2

## 2021-11-30 DIAGNOSIS — R30.0 DYSURIA: ICD-10-CM

## 2021-11-30 DIAGNOSIS — N30.00 ACUTE CYSTITIS WITHOUT HEMATURIA: Primary | ICD-10-CM

## 2021-11-30 DIAGNOSIS — D50.0 IRON DEFICIENCY ANEMIA DUE TO CHRONIC BLOOD LOSS: ICD-10-CM

## 2021-11-30 DIAGNOSIS — K92.1 BLACK STOOL: ICD-10-CM

## 2021-11-30 DIAGNOSIS — Z87.898 HISTORY OF SHORTNESS OF BREATH: ICD-10-CM

## 2021-11-30 DIAGNOSIS — R19.5 OCCULT BLOOD POSITIVE STOOL: ICD-10-CM

## 2021-11-30 DIAGNOSIS — Z85.118 HISTORY OF LUNG CANCER: ICD-10-CM

## 2021-11-30 DIAGNOSIS — R19.5 OCCULT BLOOD POSITIVE STOOL: Primary | ICD-10-CM

## 2021-11-30 LAB
ERYTHROCYTE [DISTWIDTH] IN BLOOD BY AUTOMATED COUNT: 16.6 % (ref 11.5–14.5)
HCT VFR BLD AUTO: 26.3 % (ref 37–48.5)
HGB BLD-MCNC: 8.3 G/DL (ref 12–16)
MCH RBC QN AUTO: 26.8 PG (ref 27–31)
MCHC RBC AUTO-ENTMCNC: 31.6 G/DL (ref 32–36)
MCV RBC AUTO: 85 FL (ref 82–98)
PLATELET # BLD AUTO: 236 K/UL (ref 150–450)
PMV BLD AUTO: 9.5 FL (ref 9.2–12.9)
RBC # BLD AUTO: 3.1 M/UL (ref 4–5.4)
WBC # BLD AUTO: 12.58 K/UL (ref 3.9–12.7)

## 2021-11-30 PROCEDURE — 99999 PR PBB SHADOW E&M-EST. PATIENT-LVL V: CPT | Mod: PBBFAC,,, | Performed by: NURSE PRACTITIONER

## 2021-11-30 PROCEDURE — 99999 PR PBB SHADOW E&M-EST. PATIENT-LVL V: ICD-10-PCS | Mod: PBBFAC,,, | Performed by: NURSE PRACTITIONER

## 2021-11-30 PROCEDURE — 4010F ACE/ARB THERAPY RXD/TAKEN: CPT | Mod: CPTII,S$GLB,, | Performed by: NURSE PRACTITIONER

## 2021-11-30 PROCEDURE — 4010F PR ACE/ARB THEARPY RXD/TAKEN: ICD-10-PCS | Mod: CPTII,S$GLB,, | Performed by: NURSE PRACTITIONER

## 2021-11-30 PROCEDURE — 3066F NEPHROPATHY DOC TX: CPT | Mod: CPTII,S$GLB,, | Performed by: NURSE PRACTITIONER

## 2021-11-30 PROCEDURE — 3066F PR DOCUMENTATION OF TREATMENT FOR NEPHROPATHY: ICD-10-PCS | Mod: CPTII,S$GLB,, | Performed by: NURSE PRACTITIONER

## 2021-11-30 PROCEDURE — 36415 COLL VENOUS BLD VENIPUNCTURE: CPT | Mod: PN | Performed by: NURSE PRACTITIONER

## 2021-11-30 PROCEDURE — 99215 PR OFFICE/OUTPT VISIT, EST, LEVL V, 40-54 MIN: ICD-10-PCS | Mod: S$GLB,,, | Performed by: NURSE PRACTITIONER

## 2021-11-30 PROCEDURE — 99215 OFFICE O/P EST HI 40 MIN: CPT | Mod: S$GLB,,, | Performed by: NURSE PRACTITIONER

## 2021-11-30 PROCEDURE — 85027 COMPLETE CBC AUTOMATED: CPT | Mod: PN | Performed by: NURSE PRACTITIONER

## 2021-12-07 ENCOUNTER — PATIENT MESSAGE (OUTPATIENT)
Dept: CARDIOLOGY | Facility: CLINIC | Age: 71
End: 2021-12-07
Payer: MEDICARE

## 2021-12-07 ENCOUNTER — LAB VISIT (OUTPATIENT)
Dept: LAB | Facility: HOSPITAL | Age: 71
End: 2021-12-07
Attending: FAMILY MEDICINE
Payer: MEDICARE

## 2021-12-07 DIAGNOSIS — N30.00 ACUTE CYSTITIS WITHOUT HEMATURIA: ICD-10-CM

## 2021-12-07 LAB
BACTERIA #/AREA URNS AUTO: ABNORMAL /HPF
BILIRUB UR QL STRIP: NEGATIVE
CLARITY UR REFRACT.AUTO: ABNORMAL
COLOR UR AUTO: YELLOW
GLUCOSE UR QL STRIP: NEGATIVE
HGB UR QL STRIP: ABNORMAL
HYALINE CASTS UR QL AUTO: 0 /LPF
KETONES UR QL STRIP: NEGATIVE
LEUKOCYTE ESTERASE UR QL STRIP: ABNORMAL
MICROSCOPIC COMMENT: ABNORMAL
NITRITE UR QL STRIP: POSITIVE
NON-SQ EPI CELLS #/AREA URNS AUTO: 8 /HPF
PH UR STRIP: 5 [PH] (ref 5–8)
PROT UR QL STRIP: ABNORMAL
RBC #/AREA URNS AUTO: 0 /HPF (ref 0–4)
SP GR UR STRIP: 1.01 (ref 1–1.03)
URN SPEC COLLECT METH UR: ABNORMAL
WBC #/AREA URNS AUTO: >100 /HPF (ref 0–5)
WBC CLUMPS UR QL AUTO: ABNORMAL

## 2021-12-07 PROCEDURE — 87186 SC STD MICRODIL/AGAR DIL: CPT | Performed by: FAMILY MEDICINE

## 2021-12-07 PROCEDURE — 87077 CULTURE AEROBIC IDENTIFY: CPT | Performed by: FAMILY MEDICINE

## 2021-12-07 PROCEDURE — 87086 URINE CULTURE/COLONY COUNT: CPT | Performed by: FAMILY MEDICINE

## 2021-12-07 PROCEDURE — 81001 URINALYSIS AUTO W/SCOPE: CPT | Performed by: FAMILY MEDICINE

## 2021-12-07 PROCEDURE — 87088 URINE BACTERIA CULTURE: CPT | Performed by: FAMILY MEDICINE

## 2021-12-08 ENCOUNTER — PATIENT MESSAGE (OUTPATIENT)
Dept: FAMILY MEDICINE | Facility: CLINIC | Age: 71
End: 2021-12-08
Payer: MEDICARE

## 2021-12-10 LAB — BACTERIA UR CULT: ABNORMAL

## 2021-12-14 ENCOUNTER — PATIENT MESSAGE (OUTPATIENT)
Dept: FAMILY MEDICINE | Facility: CLINIC | Age: 71
End: 2021-12-14
Payer: MEDICARE

## 2021-12-15 RX ORDER — NITROFURANTOIN 25; 75 MG/1; MG/1
100 CAPSULE ORAL 2 TIMES DAILY
Qty: 14 CAPSULE | Refills: 0 | Status: SHIPPED | OUTPATIENT
Start: 2021-12-15 | End: 2021-12-22

## 2021-12-21 ENCOUNTER — PATIENT MESSAGE (OUTPATIENT)
Dept: FAMILY MEDICINE | Facility: CLINIC | Age: 71
End: 2021-12-21
Payer: MEDICARE

## 2021-12-21 ENCOUNTER — TELEPHONE (OUTPATIENT)
Dept: GASTROENTEROLOGY | Facility: CLINIC | Age: 71
End: 2021-12-21
Payer: MEDICARE

## 2021-12-21 ENCOUNTER — PATIENT MESSAGE (OUTPATIENT)
Dept: GASTROENTEROLOGY | Facility: CLINIC | Age: 71
End: 2021-12-21
Payer: MEDICARE

## 2021-12-21 RX ORDER — PANTOPRAZOLE SODIUM 40 MG/1
40 TABLET, DELAYED RELEASE ORAL DAILY
Qty: 90 TABLET | Refills: 3 | Status: SHIPPED | OUTPATIENT
Start: 2021-12-21 | End: 2023-01-09

## 2021-12-22 ENCOUNTER — PATIENT MESSAGE (OUTPATIENT)
Dept: GASTROENTEROLOGY | Facility: CLINIC | Age: 71
End: 2021-12-22
Payer: MEDICARE

## 2021-12-24 ENCOUNTER — PATIENT MESSAGE (OUTPATIENT)
Dept: GASTROENTEROLOGY | Facility: CLINIC | Age: 71
End: 2021-12-24
Payer: MEDICARE

## 2021-12-27 ENCOUNTER — PATIENT MESSAGE (OUTPATIENT)
Dept: GASTROENTEROLOGY | Facility: CLINIC | Age: 71
End: 2021-12-27
Payer: MEDICARE

## 2021-12-27 ENCOUNTER — TELEPHONE (OUTPATIENT)
Dept: GASTROENTEROLOGY | Facility: CLINIC | Age: 71
End: 2021-12-27
Payer: MEDICARE

## 2021-12-28 RX ORDER — DEXTROMETHORPHAN/PSEUDOEPHED 2.5-7.5/.8
40 DROPS ORAL ONCE
Status: CANCELLED | OUTPATIENT
Start: 2021-12-29

## 2021-12-29 ENCOUNTER — HOSPITAL ENCOUNTER (OUTPATIENT)
Facility: HOSPITAL | Age: 71
Discharge: HOME | End: 2021-12-29
Attending: INTERNAL MEDICINE | Admitting: INTERNAL MEDICINE
Payer: MEDICARE

## 2021-12-29 VITALS
RESPIRATION RATE: 16 BRPM | OXYGEN SATURATION: 93 % | HEART RATE: 68 BPM | SYSTOLIC BLOOD PRESSURE: 143 MMHG | TEMPERATURE: 98 F | DIASTOLIC BLOOD PRESSURE: 66 MMHG

## 2021-12-29 DIAGNOSIS — D50.9 IDA (IRON DEFICIENCY ANEMIA): ICD-10-CM

## 2021-12-29 PROCEDURE — 25000003 PHARM REV CODE 250: Performed by: INTERNAL MEDICINE

## 2021-12-29 PROCEDURE — 91110 GI TRC IMG INTRAL ESOPH-ILE: CPT | Performed by: INTERNAL MEDICINE

## 2021-12-29 PROCEDURE — 91110 PR GI TRACT CAPSULE ENDOSCOPY: ICD-10-PCS | Mod: 26,,, | Performed by: INTERNAL MEDICINE

## 2021-12-29 PROCEDURE — 91110 GI TRC IMG INTRAL ESOPH-ILE: CPT | Mod: 26,,, | Performed by: INTERNAL MEDICINE

## 2021-12-29 RX ORDER — DEXTROMETHORPHAN/PSEUDOEPHED 2.5-7.5/.8
40 DROPS ORAL ONCE
Status: COMPLETED | OUTPATIENT
Start: 2021-12-29 | End: 2021-12-29

## 2021-12-29 RX ADMIN — SIMETHICONE 40 MG: 20 SUSPENSION/ DROPS ORAL at 07:12

## 2021-12-29 NOTE — DISCHARGE INSTRUCTIONS
Capsule Discharge Instructions     You have just swallowed a capsule endoscope.  This contains information about what to expect over the next 8 hours.  Please call our office if you have severe or persistent abdominal or chest pain, fever, difficulty swallowing or if you have any questions.  Our phone number is [phone].    Time Capsule ingested:_______________    · You may drink clear liquids (water, apple juice) 4 hours after swallowing the capsule.  · You may eat a light meal 6 hours after swallowing the capsule.  Medications may be resumed at 6 hours after swallowing the capsule.  · Do not exercise, avoid heavy lifting.  You may walk, sit and lay down.  You can drive a car.  You may return to work, if you work allows avoiding unsuitable environments and /or physical movements.  · Avoid going near MRI machines and radio transmitters.  You may use a computer, cell phone, radio or stereo.  · Do not stand directly next to another person undergoing capsule endoscopy.  · Try not to touch the recorder or the sensor array leads.  Do not remove the leads before 8 hours.  · Avoid getting the data recorder or sensor array leads wet.  · You may loosen the belt to allow yourself to go to the bathroom.  Do not take the belt off until the 8 hours have passed.  · Observe the LED light on the data recorder at least every 15 minutes.  If the light stops blinking, document the time and call our office.  · Return the unit to the hospital at the completion of 8 hour time frame.    May have clear liquids at ______________    May have light snack and medications at ______________    May remove the unit at ______________    Return the unit to Registration Desk at the completion of the study.    Post Capsule Instructions     This information is what to expect over the next 2 days.  Please call us or your doctor if you have severe or persistent abdominal or chest pain, fever, difficulty swallowing, or if you have any questions.   Our phone number is [phone].    · Pain:  Pain is uncommon following capsule endoscopy.  Should you feel sharp or persistent pain, please call your doctor's office.  · Nausea:  Nausea is also very uncommon and should it occur, please notify your doctor's office  · Diet:  You may eat and drink with no restrictions 8 hours after ingesting capsule.  · Activities:  Following the exam you may resume normal activities, including exercise.  · Medications:  You may resume your medications 6 hours after ingesting the capsule.  Do NOT make up doses you have missed, just resume your normal dosage.  · Further Testing:  Until the capsule passes, further testing which includes any type of MRI should be avoided.  If you have any type of MRI examination scheduled in the next 3 days, it should be postponed.  · The Capsule:  The capsule passes naturally in a bowel movement typically in about 24 hours.  Most likely, you will be unaware of its passage.  It does not need to be retrieved and can safely be flushed down the toilet.  Occasionally the capsule light will still be flashing when it passes.  Should you be concerned that the capsule didn't pass, in the absence of symptoms; an abdominal x-ray can be obtained after 7 days to confirm its passage.  · The  will make a beeping noise when finished.  It will shut off automatically.

## 2022-01-04 ENCOUNTER — PATIENT MESSAGE (OUTPATIENT)
Dept: GASTROENTEROLOGY | Facility: CLINIC | Age: 72
End: 2022-01-04
Payer: MEDICARE

## 2022-01-04 ENCOUNTER — PATIENT MESSAGE (OUTPATIENT)
Dept: HEMATOLOGY/ONCOLOGY | Facility: CLINIC | Age: 72
End: 2022-01-04
Payer: MEDICARE

## 2022-01-04 ENCOUNTER — PATIENT MESSAGE (OUTPATIENT)
Dept: CARDIOLOGY | Facility: CLINIC | Age: 72
End: 2022-01-04
Payer: MEDICARE

## 2022-01-04 DIAGNOSIS — D50.0 ANEMIA DUE TO CHRONIC BLOOD LOSS: Primary | ICD-10-CM

## 2022-01-04 NOTE — [employer] PATIENT INSTRUCTIONS
Discharge Summary/Instructions after an Endoscopic Procedure  Patient Name: Allyson Henriquez  Patient MRN: [id number]  Patient YOB: 1950 Wednesday, December 29, 2021  Floyd Dixon MD  Dear patient,  As a result of recent federal legislation (The Federal Cures Act), you may   receive lab or pathology results from your procedure in your [facility]   account before your physician is able to contact you. Your physician or   their representative will relay the results to you with their   recommendations at their soonest availability.  Thank you,  RESTRICTIONS:  During your procedure today, you received medications for sedation.  These   medications may affect your judgment, balance and coordination.  Therefore,   for 24 hours, you have the following restrictions:   - DO NOT drive a car, operate machinery, make legal/financial decisions,   sign important papers or drink alcohol.    ACTIVITY:  Today: no heavy lifting, straining or running due to procedural   sedation/anesthesia.  The following day: return to full activity including work.  DIET:  Eat and drink normally unless instructed otherwise.     TREATMENT FOR COMMON SIDE EFFECTS:  - Mild abdominal pain, nausea, belching, bloating or excessive gas:  rest,   eat lightly and use a heating pad.  - Sore Throat: treat with throat lozenges and/or gargle with warm salt   water.  - Because air was used during the procedure, expelling large amounts of air   from your rectum or belching is normal.  - If a bowel prep was taken, you may not have a bowel movement for 1-3 days.    This is normal.  SYMPTOMS TO WATCH FOR AND REPORT TO YOUR PHYSICIAN:  1. Abdominal pain or bloating, other than gas cramps.  2. Chest pain.  3. Back pain.  4. Signs of infection such as: chills or fever occurring within 24 hours   after the procedure.  5. Rectal bleeding, which would show as bright red, maroon, or black stools.   (A tablespoon of blood from the rectum is not serious,  especially if   hemorrhoids are present.)  6. Vomiting.  7. Weakness or dizziness.  GO DIRECTLY TO THE NEAREST EMERGENCY ROOM IF YOU HAVE ANY OF THE FOLLOWING:      Difficulty breathing              Chills and/or fever over 101 F   Persistent vomiting and/or vomiting blood   Severe abdominal pain   Severe chest pain   Black, tarry stools   Bleeding- more than one tablespoon   Any other symptom or condition that you feel may need urgent attention  Your doctor recommends these additional instructions:  If any biopsies were taken, your doctors clinic will contact you in 1 to 2   weeks with any results.  - Discharge patient to home.   - Advance diet as tolerated.   - Continue present medications including iron supplementation daily and   parenteral iron supplementation as needed.  For questions, problems or results please call your physician - Floyd Dixon MD at Work:  [phone].  [facility], EMERGENCY ROOM PHONE NUMBER: [phone]  IF A COMPLICATION OR EMERGENCY SITUATION ARISES AND YOU ARE UNABLE TO REACH   YOUR PHYSICIAN - GO DIRECTLY TO THE EMERGENCY ROOM.  Floyd Dixon MD  1/4/2022 4:29:46 PM  This report has been verified and signed electronically.  Dear patient,  As a result of recent federal legislation (The Federal Cures Act), you may   receive lab or pathology results from your procedure in your [facility]   account before your physician is able to contact you. Your physician or   their representative will relay the results to you with their   recommendations at their soonest availability.  Thank you,  [employer]

## 2022-01-04 NOTE — H&P
History & Physical - Short Stay  Gastroenterology      SUBJECTIVE:     Procedure: VCE    Chief Complaint/Indication for Procedure: anemia, blood in stool    No medications prior to admission.       Review of patient's allergies indicates:   Allergen Reactions    Contrast media Anaphylaxis    Albuterol Other (See Comments)     Used during PFTs and put pt in afib    Dye     Pcn [penicillins]      Pt states did well on cephalexin.  No adverse reaction or side effect.    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 difficile colitis 08/2021    Cancer     lung    CKD (chronic kidney disease), stage III     COPD (chronic obstructive pulmonary disease)     Hepatic steatosis     History of tobacco abuse     Hyperlipidemia     Hypertension     Lung nodules     and adenopathy    Multinodular goiter (nontoxic) 5/8/2017    Obesity     TRISHA (obstructive sleep apnea)     on bipap at bedtime    Proteinuria      Past Surgical History:   Procedure Laterality Date    ADENOIDECTOMY      APPENDECTOMY      CATARACT EXTRACTION      COLONOSCOPY N/A 11/14/2016    Procedure: COLONOSCOPY;  Surgeon: Destin Cardona MD;  Location: [facility];  Service: Endoscopy;  Laterality: N/A;    COLONOSCOPY N/A 5/14/2021    Procedure: COLONOSCOPY;  Surgeon: Destin Cardona MD;  Location: [facility];  Service: Endoscopy;  Laterality: N/A;    ESOPHAGOGASTRODUODENOSCOPY N/A 5/14/2021    Procedure: EGD (ESOPHAGOGASTRODUODENOSCOPY);  Surgeon: Destin Cardona MD;  Location: [facility];  Service: Endoscopy;  Laterality: N/A; unremarkable    ESOPHAGOGASTRODUODENOSCOPY N/A 8/9/2021    Procedure: ESOPHAGOGASTRODUODENOSCOPY (EGD);  Surgeon: Destin Cardona MD;  Location: [facility];  Service: Endoscopy;  Laterality: N/A; Moderately severe radiation  esophagitis with no bleeding    EYE SURGERY      cataract OU    INSERTION OF TUNNELED CENTRAL VENOUS CATHETER (CVC) WITH SUBCUTANEOUS PORT N/A 2021    Procedure: [id number];  Surgeon: Joe Slainas MD;  Location: [facility] OR;  Service: General;  Laterality: N/A;    INTRALUMINAL GASTROINTESTINAL TRACT IMAGING VIA CAPSULE N/A 2021    Procedure: IMAGING PROCEDURE, GI TRACT, INTRALUMINAL, VIA CAPSULE  (called for instruction -may have trouble swallowing);  Surgeon: Floyd Dixon MD;  Location: [facility];  Service: Endoscopy;  Laterality: N/A;    NEUROMA SURGERY Left     acoustic    TONSILLECTOMY       Family History   Problem Relation Age of Onset    Cancer Paternal Uncle         colon cancer    Diabetes Neg Hx     Kidney disease Neg Hx     Stroke Neg Hx     Heart disease Neg Hx     Colon cancer Neg Hx     Crohn's disease Neg Hx     Ulcerative colitis Neg Hx     Stomach cancer Neg Hx     Esophageal cancer Neg Hx      Social History     Tobacco Use    Smoking status: Former Smoker     Packs/day: 1.00     Years: 40.00     Pack years: 40.00     Quit date: 3/6/2016     Years since quittin.8    Smokeless tobacco: Never Used   Substance Use Topics    Alcohol use: Yes     Alcohol/week: 0.0 standard drinks     Comment: occasional- social; not very often    Drug use: No         OBJECTIVE:     Vital Signs (Most Recent)  Temp: 97.8 °F (36.6 °C) (21)  Pulse: 68 (21)  Resp: 16 (21)  BP: (!) 143/66 (21)  SpO2: (!) 93 % (21)    Physical Exam:                                                       GENERAL:  Comfortable, in no acute distress.                                   ASSESSMENT/PLAN:     Assessment: anemia, blood in stool    Plan:VCE

## 2022-01-05 DIAGNOSIS — T18.9XXA FOREIGN BODY IN DIGESTIVE TRACT, INITIAL ENCOUNTER: Primary | ICD-10-CM

## 2022-01-05 NOTE — OR NURSING
Pt  called today  01/05/2022 and spoke with Connie FRAUSTO Regarding wanting an xray to see if pill cam passed because scheduled for a PET SCan and does not want that to interfere with study.  Pt had pill cam 12/29/2021 and several episodes of diarrhea and normal bowel movements since.  Dr Dixon aware and ordered abd xray and pt aware of this and to schedule this is in [place].   also reports spoke to someone medical regarding if necessary to do before PET scan and advised does not need xray first.  Pt then states hard to get pt out of house and may not go for it since not necessary.  Dr Dixon aware of plan.Pt  # [phone]

## 2022-01-07 ENCOUNTER — HOSPITAL ENCOUNTER (OUTPATIENT)
Dept: RADIOLOGY | Facility: HOSPITAL | Age: 72
Discharge: HOME | End: 2022-01-07
Attending: INTERNAL MEDICINE
Payer: MEDICARE

## 2022-01-07 DIAGNOSIS — J44.9 CHRONIC OBSTRUCTIVE PULMONARY DISEASE, UNSPECIFIED COPD TYPE: ICD-10-CM

## 2022-01-07 DIAGNOSIS — C34.91 SMALL CELL CARCINOMA OF LUNG, RIGHT: ICD-10-CM

## 2022-01-07 DIAGNOSIS — E44.0 MODERATE PROTEIN-CALORIE MALNUTRITION: ICD-10-CM

## 2022-01-07 DIAGNOSIS — E61.1 IRON DEFICIENCY: ICD-10-CM

## 2022-01-07 LAB — GLUCOSE SERPL-MCNC: 99 MG/DL (ref 70–110)

## 2022-01-07 PROCEDURE — 78815 PET IMAGE W/CT SKULL-THIGH: CPT | Mod: 26,PI,, | Performed by: RADIOLOGY

## 2022-01-07 PROCEDURE — A9552 F18 FDG: HCPCS | Mod: PN

## 2022-01-07 PROCEDURE — 78815 NM PET CT ROUTINE: ICD-10-PCS | Mod: 26,PI,, | Performed by: RADIOLOGY

## 2022-01-07 NOTE — PROGRESS NOTES
PET Imaging Questionnaire    1. Are you a Diabetic? Recent Blood Sugar level? No    2. Are you anemic? Bone Marrow Stimulation Meds? Yes    3. Have you had a CT Scan, if so when & where was your last one? Yes -     4. Have you had a PET Scan, if so when & where was your last one? Yes -     5. Chemotherapy or currently on Chemotherapy? Yes    6. Radiation therapy? Yes    Surgical History:   Past Surgical History:   Procedure Laterality Date    ADENOIDECTOMY      APPENDECTOMY      CATARACT EXTRACTION      COLONOSCOPY N/A 11/14/2016    Procedure: COLONOSCOPY;  Surgeon: Destin Cardona MD;  Location: [facility];  Service: Endoscopy;  Laterality: N/A;    COLONOSCOPY N/A 5/14/2021    Procedure: COLONOSCOPY;  Surgeon: Destin Cardona MD;  Location: [facility];  Service: Endoscopy;  Laterality: N/A;    ESOPHAGOGASTRODUODENOSCOPY N/A 5/14/2021    Procedure: EGD (ESOPHAGOGASTRODUODENOSCOPY);  Surgeon: Destin Cardona MD;  Location: [facility];  Service: Endoscopy;  Laterality: N/A; unremarkable    ESOPHAGOGASTRODUODENOSCOPY N/A 8/9/2021    Procedure: ESOPHAGOGASTRODUODENOSCOPY (EGD);  Surgeon: Destin Cardona MD;  Location: [facility];  Service: Endoscopy;  Laterality: N/A; Moderately severe radiation esophagitis with no bleeding    EYE SURGERY      cataract OU    INSERTION OF TUNNELED CENTRAL VENOUS CATHETER (CVC) WITH SUBCUTANEOUS PORT N/A 6/7/2021    Procedure: [id number];  Surgeon: Joe Salinas MD;  Location: [facility];  Service: General;  Laterality: N/A;    INTRALUMINAL GASTROINTESTINAL TRACT IMAGING VIA CAPSULE N/A 12/29/2021    Procedure: IMAGING PROCEDURE, GI TRACT, INTRALUMINAL, VIA CAPSULE  (called for instruction 12/20-may have trouble swallowing);  Surgeon: Floyd Dixon MD;  Location: [facility];  Service: Endoscopy;  Laterality: N/A;    NEUROMA SURGERY Left     acoustic    TONSILLECTOMY     7.      8. Have you been fasting for at least 6 hours? Yes    9. Is there any  chance you may be pregnant or breastfeeding? No    Assay: 14.1 MCi@:8.40   Injection Site:lt arm     Residual: 1.638 mCi@: 8.42   Technologist: Karen Vaughn Injected:12.46mCi

## 2022-01-11 ENCOUNTER — OFFICE VISIT (OUTPATIENT)
Dept: HEMATOLOGY/ONCOLOGY | Facility: CLINIC | Age: 72
End: 2022-01-11
Payer: MEDICARE

## 2022-01-11 VITALS
WEIGHT: 187.06 LBS | RESPIRATION RATE: 18 BRPM | HEIGHT: 66 IN | SYSTOLIC BLOOD PRESSURE: 150 MMHG | OXYGEN SATURATION: 98 % | TEMPERATURE: 98 F | HEART RATE: 77 BPM | DIASTOLIC BLOOD PRESSURE: 78 MMHG | BODY MASS INDEX: 30.06 KG/M2

## 2022-01-11 DIAGNOSIS — C34.00 MALIGNANT NEOPLASM OF HILUS OF LUNG, UNSPECIFIED LATERALITY: ICD-10-CM

## 2022-01-11 DIAGNOSIS — R30.0 DYSURIA: ICD-10-CM

## 2022-01-11 DIAGNOSIS — D50.9 IRON DEFICIENCY ANEMIA, UNSPECIFIED IRON DEFICIENCY ANEMIA TYPE: ICD-10-CM

## 2022-01-11 DIAGNOSIS — C78.2 PLEURAL METASTASIS: ICD-10-CM

## 2022-01-11 DIAGNOSIS — C77.1 MALIGNANT NEOPLASM METASTATIC TO INTRATHORACIC LYMPH NODE: ICD-10-CM

## 2022-01-11 DIAGNOSIS — C34.91 SMALL CELL CARCINOMA OF LUNG, RIGHT: Primary | ICD-10-CM

## 2022-01-11 DIAGNOSIS — C79.51 BONE METASTASES: ICD-10-CM

## 2022-01-11 PROCEDURE — 3008F BODY MASS INDEX DOCD: CPT | Mod: CPTII,S$GLB,, | Performed by: INTERNAL MEDICINE

## 2022-01-11 PROCEDURE — 3078F DIAST BP <80 MM HG: CPT | Mod: CPTII,S$GLB,, | Performed by: INTERNAL MEDICINE

## 2022-01-11 PROCEDURE — 1159F PR MEDICATION LIST DOCUMENTED IN MEDICAL RECORD: ICD-10-PCS | Mod: CPTII,S$GLB,, | Performed by: INTERNAL MEDICINE

## 2022-01-11 PROCEDURE — 99497 PR ADVNCD CARE PLAN 30 MIN: ICD-10-PCS | Mod: S$GLB,,, | Performed by: INTERNAL MEDICINE

## 2022-01-11 PROCEDURE — 3078F PR MOST RECENT DIASTOLIC BLOOD PRESSURE < 80 MM HG: ICD-10-PCS | Mod: CPTII,S$GLB,, | Performed by: INTERNAL MEDICINE

## 2022-01-11 PROCEDURE — 3288F PR FALLS RISK ASSESSMENT DOCUMENTED: ICD-10-PCS | Mod: CPTII,S$GLB,, | Performed by: INTERNAL MEDICINE

## 2022-01-11 PROCEDURE — 3288F FALL RISK ASSESSMENT DOCD: CPT | Mod: CPTII,S$GLB,, | Performed by: INTERNAL MEDICINE

## 2022-01-11 PROCEDURE — 99999 PR PBB SHADOW E&M-EST. PATIENT-LVL IV: ICD-10-PCS | Mod: PBBFAC,,, | Performed by: INTERNAL MEDICINE

## 2022-01-11 PROCEDURE — 1160F RVW MEDS BY RX/DR IN RCRD: CPT | Mod: CPTII,S$GLB,, | Performed by: INTERNAL MEDICINE

## 2022-01-11 PROCEDURE — 99215 OFFICE O/P EST HI 40 MIN: CPT | Mod: S$GLB,,, | Performed by: INTERNAL MEDICINE

## 2022-01-11 PROCEDURE — 99497 ADVNCD CARE PLAN 30 MIN: CPT | Mod: S$GLB,,, | Performed by: INTERNAL MEDICINE

## 2022-01-11 PROCEDURE — 3077F SYST BP >= 140 MM HG: CPT | Mod: CPTII,S$GLB,, | Performed by: INTERNAL MEDICINE

## 2022-01-11 PROCEDURE — 1126F PR PAIN SEVERITY QUANTIFIED, NO PAIN PRESENT: ICD-10-PCS | Mod: CPTII,S$GLB,, | Performed by: INTERNAL MEDICINE

## 2022-01-11 PROCEDURE — 3008F PR BODY MASS INDEX (BMI) DOCUMENTED: ICD-10-PCS | Mod: CPTII,S$GLB,, | Performed by: INTERNAL MEDICINE

## 2022-01-11 PROCEDURE — 1160F PR REVIEW ALL MEDS BY PRESCRIBER/CLIN PHARMACIST DOCUMENTED: ICD-10-PCS | Mod: CPTII,S$GLB,, | Performed by: INTERNAL MEDICINE

## 2022-01-11 PROCEDURE — 3077F PR MOST RECENT SYSTOLIC BLOOD PRESSURE >= 140 MM HG: ICD-10-PCS | Mod: CPTII,S$GLB,, | Performed by: INTERNAL MEDICINE

## 2022-01-11 PROCEDURE — 99215 PR OFFICE/OUTPT VISIT, EST, LEVL V, 40-54 MIN: ICD-10-PCS | Mod: S$GLB,,, | Performed by: INTERNAL MEDICINE

## 2022-01-11 PROCEDURE — 1101F PR PT FALLS ASSESS DOC 0-1 FALLS W/OUT INJ PAST YR: ICD-10-PCS | Mod: CPTII,S$GLB,, | Performed by: INTERNAL MEDICINE

## 2022-01-11 PROCEDURE — 99999 PR PBB SHADOW E&M-EST. PATIENT-LVL IV: CPT | Mod: PBBFAC,,, | Performed by: INTERNAL MEDICINE

## 2022-01-11 PROCEDURE — 1101F PT FALLS ASSESS-DOCD LE1/YR: CPT | Mod: CPTII,S$GLB,, | Performed by: INTERNAL MEDICINE

## 2022-01-11 PROCEDURE — 1159F MED LIST DOCD IN RCRD: CPT | Mod: CPTII,S$GLB,, | Performed by: INTERNAL MEDICINE

## 2022-01-11 PROCEDURE — 1126F AMNT PAIN NOTED NONE PRSNT: CPT | Mod: CPTII,S$GLB,, | Performed by: INTERNAL MEDICINE

## 2022-01-11 RX ORDER — ACETAMINOPHEN 500 MG
1000 TABLET ORAL
Status: CANCELLED
Start: 2022-01-18

## 2022-01-11 RX ORDER — DIPHENHYDRAMINE HYDROCHLORIDE 50 MG/ML
50 INJECTION INTRAMUSCULAR; INTRAVENOUS ONCE AS NEEDED
Status: CANCELLED | OUTPATIENT
Start: 2022-01-18

## 2022-01-11 RX ORDER — HEPARIN 100 UNIT/ML
500 SYRINGE INTRAVENOUS
Status: CANCELLED | OUTPATIENT
Start: 2022-01-18

## 2022-01-11 RX ORDER — EPINEPHRINE 0.3 MG/.3ML
0.3 INJECTION SUBCUTANEOUS ONCE AS NEEDED
Status: CANCELLED | OUTPATIENT
Start: 2022-01-18

## 2022-01-11 RX ORDER — SODIUM CHLORIDE 0.9 % (FLUSH) 0.9 %
10 SYRINGE (ML) INJECTION
Status: CANCELLED | OUTPATIENT
Start: 2022-01-18

## 2022-01-11 NOTE — Clinical Note
Review results of urinalysis with culture and sensitivity. Obtain TSH/free T4 as soon as possible. Return to clinic for cycle 1 of pembrolizumab. CB 3 weeks later with interval CBC, CMP, LDH, and magnesium prior to cycle 2.

## 2022-01-11 NOTE — PROGRESS NOTES
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  Her treatment was interrupted after admission to [facility] with radiation esophagitis followed by Clostridium difficile colitis.  During her multi week stay, the patient became profoundly debilitated and predominantly bed-bound.  Despite healing of her esophagus, her nutritional status remained poor.  She was 2 immobile to allow for discharge directly to home and has had an interval rehab stay.  She and her /daughter have requested a virtual visit today to assess progress following discharge from rehab.  Patient's nutrition remains impaired.  She consumes predominantly liquids and soft solids.  Physical and Occupational therapy are being arranged for her in home.  Patient's  reports that she was ambulating with assistance at the rehab facility.    Patient returns to clinic for interval reassessment.  She has been performing better at home of late.  Diarrhea has resolved and been replaced with formed stool.  Patient is generally continent.  Appetite is improving and patient is struggling less to swallow medications.  Patient is up and awake most of the day.  She typically switches between bed and chair.  She remains highly motivated toward resumption of systemic chemotherapy for fear that she will have to repeat earlier treatment.    Patient returns to clinic to review interval lab and imaging.  Appetite has improved.  Diet has become more diversified. She is able to eat more substantial solids such as meat balls.  No worsening pain or dyspnea.  Patient is still wheelchair dependent in terms of ambulation and spends most of her day up to the chair.  Patient has been having difficulty with chronic urinary tract infection.  She recently completed course of Macrodantin.  Urine remains cloudy.    Physical examination:  The  patient is a well-developed, elderly, frail appearing white female who is confined to a wheelchair during the course of today's examination.  Weight is 187 lb.  VITAL SIGNS: Documented in EMR and reviewed   HEENT: Normocephalic, atraumatic. Oral mucosa pink and moist. Lips without lesions. Tongue midline. Oropharynx clear. Nonicteric sclerae.   NECK: Supple, no adenopathy.  HEART: Regular rate and rhythm without murmur, gallop or rub.   LUNGS: Clear to auscultation bilaterally.  ABDOMEN: Soft, nontender, nondistended with positive normoactive bowel sounds, no hepatosplenomegaly.   EXTREMITIES:  Bilateral, trace, pedal edema. Distal pulses are intact.     Laboratory:  White count 7.5, hemoglobin 9.4, hematocrit 30.3, platelets 183, absolute neutrophil count is 5700.  Serum iron 44, TIBC 247, saturated iron 18%, soluble transferrin receptor 6.2.  Sodium 141, potassium 4.1, chloride 106, CO2 24, BUN 13, creatinine 1.2, glucose 97, calcium 10.5, liver function test within normal limits, LDH is 128, GFR is 45.     CT PET:  1. Extensive detrimental change since 10/04/2021 as described above with apparent consolidative recurrence in the right hemithorax with pleural effusion.  Multiple hypermetabolic osseous foci are noted suggestive of metastatic disease.  A hypermetabolic prevascular node on the left is noted.  2. Hypermetabolic activity is noted within sigmoid colon with wall thickening and inflammatory fat changes.  Please correlate for colitis, diverticulitis, or colonic neoplasm.  Clinical correlation and or follow-up for resolution is necessary.  3. Hypermetabolic activity at the left humeral head that could relate to an arthropathy  4. Hypermetabolic activity along the right hand volarly.  Direct visualization and or clinical correlation is suggested.  This likely relates to urine but is nonspecific.  Neoplastic disease is not excluded.  Direct visualization is suggested.     Impression:  1. Limited stage small  cell carcinoma of the lung with disease progression involving lymph node, pleura and bone.  2. Iron deficiency anemia.  3. General debility.  4. Recurrent urinary tract infections.    Plan:  1. Intravenous iron replacement with Feraheme a typical pre medications at earliest convenience.  2. Repeat urinalysis with culture and sensitivity.  3. Obtain TSH and free T4.  4. Begin therapy with single agent pembrolizumab at a dose of 200 mg IV every 3 weeks.  5. 40 minutes of contact time was spent counseling the patient regarding progressive nature disease, rationale for resumption of systemic therapy, comparison of immunotherapy to traditional cytotoxic chemotherapy, antineoplastic/supportive care meds to be employed in therapy, aspects of their administration, potential side effects associated with therapy, interventions for such side effects, etcetera.  The patient and her  voice understanding wish to proceed as above.    Advance Care Planning During this visit, I engaged the patient in the advance care planning process.  The patient and I reviewed the role for advance directives and their purpose in directing future healthcare if the patients unable to speak for him/herself.  At this point in time, the patient does have full decision-making capacity.  We discussed different extreme health states that they could experience, and reviewed what kind of medical care the patient would want in those situations.  The patient communicated that if they were comatose and had little chance of a meaningful recovery, they would not want machines/life-sustaining treatments used.  In addition to the above preference, other important end-of-life issues for the patient include DNR.  The patient has  completed a living will to reflect these preferences.  The patient has already designated a healthcare power of  to make decisions on their behalf.    (30 mins time spent).      This note was created using voice recognition  software and may contain grammatical errors.

## 2022-01-12 ENCOUNTER — LAB VISIT (OUTPATIENT)
Dept: LAB | Facility: HOSPITAL | Age: 72
End: 2022-01-12
Attending: INTERNAL MEDICINE
Payer: MEDICARE

## 2022-01-12 DIAGNOSIS — C34.91 SMALL CELL CARCINOMA OF LUNG, RIGHT: ICD-10-CM

## 2022-01-12 DIAGNOSIS — C34.00 MALIGNANT NEOPLASM OF HILUS OF LUNG, UNSPECIFIED LATERALITY: Primary | ICD-10-CM

## 2022-01-12 LAB
T4 FREE SERPL-MCNC: 0.88 NG/DL (ref 0.71–1.51)
TSH SERPL DL<=0.005 MIU/L-ACNC: 1.85 UIU/ML (ref 0.4–4)

## 2022-01-12 PROCEDURE — 84443 ASSAY THYROID STIM HORMONE: CPT | Performed by: INTERNAL MEDICINE

## 2022-01-12 PROCEDURE — 84439 ASSAY OF FREE THYROXINE: CPT | Performed by: INTERNAL MEDICINE

## 2022-01-12 PROCEDURE — 36415 COLL VENOUS BLD VENIPUNCTURE: CPT | Mod: PN | Performed by: INTERNAL MEDICINE

## 2022-01-14 ENCOUNTER — DOCUMENTATION ONLY (OUTPATIENT)
Dept: PHARMACY | Facility: AMBULARY SURGERY CENTER | Age: 72
End: 2022-01-14
Payer: MEDICARE

## 2022-01-14 ENCOUNTER — TELEPHONE (OUTPATIENT)
Dept: HEMATOLOGY/ONCOLOGY | Facility: CLINIC | Age: 72
End: 2022-01-14
Payer: MEDICARE

## 2022-01-14 NOTE — PROGRESS NOTES
Pharmacy Treatment Plan Review    Patient  Allyson Henriquez, 71 y.o.  1950    Indication: Small Cell Carcinoma of the lung     History:  -s/p chemo/XRT treatment interrupted due to esophagitis and cdiff    Labs:  CBC  Lab Results   Component Value Date    WBC 7.53 01/07/2022    HGB 9.4 (L) 01/07/2022    HCT 30.3 (L) 01/07/2022    MCV 84 01/07/2022     01/07/2022       BMP  Lab Results   Component Value Date     01/07/2022    K 4.1 01/07/2022     01/07/2022    CO2 24 01/07/2022    BUN 13 01/07/2022    CREATININE 1.2 01/07/2022    CALCIUM 10.5 01/07/2022    ANIONGAP 11 01/07/2022    ESTGFRAFRICA 52 (A) 01/07/2022    EGFRNONAA 45 (A) 01/07/2022       LFTs  Lab Results   Component Value Date    ALT 11 01/07/2022    AST 16 01/07/2022    ALKPHOS 65 01/07/2022    BILITOT 0.6 01/07/2022       The patient is scheduled to start the following infusion:   OP PEMBROLIZUMAB 200MG Q3W    21-day cycle for 37 cycles of:    -Pembrolizumab 200 mg in sodium chloride 0.9% 108 mL, infusion, intravneous, on day 1    Premeds  -Acetaminophen 1000 mg PO  -Diphenhydramine 25 mg IVPB     Supportive meds  -Xgeva (denosumab) 120 mg quarterly    The treatment plan is per FATOUN    Gilberto Lang PharmD

## 2022-01-14 NOTE — NURSING
Spoke with patient and  and informed that Xgeva is also in her treatment plan and they will be educated on this and Keytruda chairside by infusion nurse. Reports that she is still having urinary symptoms. I let them know that urine culture is still pending and that I would send a msg to Dr. Cardona's clinic to follow up. They verbalized understanding

## 2022-01-16 DIAGNOSIS — N30.90 CYSTITIS: Primary | ICD-10-CM

## 2022-01-16 RX ORDER — CIPROFLOXACIN 500 MG/1
500 TABLET ORAL 2 TIMES DAILY
Qty: 14 TABLET | Refills: 0 | Status: SHIPPED | OUTPATIENT
Start: 2022-01-16 | End: 2022-01-23

## 2022-01-17 NOTE — PROGRESS NOTES
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  Her treatment was interrupted after admission to [facility] with radiation esophagitis followed by Clostridium difficile colitis.  During her multi week stay, the patient became profoundly debilitated and predominantly bed-bound.  Despite healing of her esophagus, her nutritional status remained poor.  She was 2 immobile to allow for discharge directly to home and has had an interval rehab stay.  She and her /daughter have requested a virtual visit today to assess progress following discharge from rehab.  Patient's nutrition remains impaired.  She consumes predominantly liquids and soft solids.  Physical and Occupational therapy are being arranged for her in home.  Patient's  reports that she was ambulating with assistance at the rehab facility.    Patient returns to clinic for interval reassessment.  She has been performing better at home of late.  Diarrhea has resolved and been replaced with formed stool.  Patient is generally continent.  Appetite is improving and patient is struggling less to swallow medications.  Patient is up and awake most of the day.  She typically switches between bed and chair.  She remains highly motivated toward resumption of systemic chemotherapy for fear that she will have to repeat earlier treatment.    Patient returns to clinic to review interval lab and imaging.  Appetite has improved.  Diet has become more diversified. She is able to eat more substantial solids such as meat balls.  No worsening pain or dyspnea.  Patient is still wheelchair dependent in terms of ambulation and spends most of her day up to the chair.  Patient has been having difficulty with chronic urinary tract infection.  She recently completed course of Macrodantin.  Urine remains cloudy.    Patient returns to clinic  to review results of thyroid function test and to start systemic therapy consisting of pembrolizumab.  Patient had complaints of UTI.  Urine culture and sensitivity was obtained.  The study returned positive for E coli which is pansensitive.  Patient is currently on ciprofloxacin.  Symptoms are improving.    Physical examination:  The patient is a well-developed, elderly, frail appearing white female who is confined to a wheelchair during the course of today's examination.  Weight is 186.5 lb (decreased by 0.5 lb).  VITAL SIGNS: Documented in EMR and reviewed   HEENT: Normocephalic, atraumatic. Oral mucosa pink and moist. Lips without lesions. Tongue midline. Oropharynx clear. Nonicteric sclerae.   NECK: Supple, no adenopathy.  HEART: Regular rate and rhythm without murmur, gallop or rub.   LUNGS: Clear to auscultation bilaterally.  ABDOMEN: Soft, nontender, nondistended with positive normoactive bowel sounds, no hepatosplenomegaly.   EXTREMITIES:  Bilateral, trace, pedal edema. Distal pulses are intact.     Laboratory:  TSH 1.855, free T4 0.88.     Impression:  1. Limited stage small cell carcinoma of the lung with disease progression involving lymph node, pleura and bone.  2. Iron deficiency anemia-scheduled for Feraheme.  3. General debility.  4. Recurrent E coli urinary tract infection.    Plan:  1. Intravenous iron replacement with Feraheme a typical pre medications at earliest convenience.  2. Complete course of ciprofloxacin.  3. Begin therapy with single agent pembrolizumab at a dose of 200 mg IV every 3 weeks.  4. Return to clinic 3 weeks from now with interval CBC, CMP, LDH, magnesium which I will proceed with cycle 2 of therapy.    Advance Care Planning During this visit, I engaged the patient in the advance care planning process.  The patient and I reviewed the role for advance directives and their purpose in directing future healthcare if the patients unable to speak for him/herself.  At this point in  time, the patient does have full decision-making capacity.  We discussed different extreme health states that they could experience, and reviewed what kind of medical care the patient would want in those situations.  The patient communicated that if they were comatose and had little chance of a meaningful recovery, they would not want machines/life-sustaining treatments used.  In addition to the above preference, other important end-of-life issues for the patient include DNR.  The patient has  completed a living will to reflect these preferences.  The patient has already designated a healthcare power of  to make decisions on their behalf.    (30 mins time spent).      This note was created using voice recognition software and may contain grammatical errors.

## 2022-01-18 ENCOUNTER — OFFICE VISIT (OUTPATIENT)
Dept: HEMATOLOGY/ONCOLOGY | Facility: CLINIC | Age: 72
End: 2022-01-18
Payer: MEDICARE

## 2022-01-18 ENCOUNTER — INFUSION (OUTPATIENT)
Dept: INFUSION THERAPY | Facility: HOSPITAL | Age: 72
End: 2022-01-18
Attending: INTERNAL MEDICINE
Payer: MEDICARE

## 2022-01-18 VITALS
HEIGHT: 66 IN | DIASTOLIC BLOOD PRESSURE: 70 MMHG | HEART RATE: 69 BPM | WEIGHT: 186.5 LBS | RESPIRATION RATE: 16 BRPM | SYSTOLIC BLOOD PRESSURE: 149 MMHG | BODY MASS INDEX: 29.97 KG/M2 | TEMPERATURE: 98 F

## 2022-01-18 VITALS
WEIGHT: 186.5 LBS | HEART RATE: 68 BPM | DIASTOLIC BLOOD PRESSURE: 70 MMHG | BODY MASS INDEX: 29.97 KG/M2 | SYSTOLIC BLOOD PRESSURE: 162 MMHG | TEMPERATURE: 98 F | OXYGEN SATURATION: 98 % | HEIGHT: 66 IN

## 2022-01-18 DIAGNOSIS — C77.1 MALIGNANT NEOPLASM METASTATIC TO INTRATHORACIC LYMPH NODE: ICD-10-CM

## 2022-01-18 DIAGNOSIS — D50.0 ANEMIA DUE TO CHRONIC BLOOD LOSS: ICD-10-CM

## 2022-01-18 DIAGNOSIS — N30.90 CYSTITIS: ICD-10-CM

## 2022-01-18 DIAGNOSIS — C79.51 BONE METASTASES: ICD-10-CM

## 2022-01-18 DIAGNOSIS — C34.91 SMALL CELL CARCINOMA OF LUNG, RIGHT: Primary | ICD-10-CM

## 2022-01-18 DIAGNOSIS — C78.2 PLEURAL METASTASIS: ICD-10-CM

## 2022-01-18 PROCEDURE — 1126F PR PAIN SEVERITY QUANTIFIED, NO PAIN PRESENT: ICD-10-PCS | Mod: CPTII,S$GLB,, | Performed by: INTERNAL MEDICINE

## 2022-01-18 PROCEDURE — 1159F MED LIST DOCD IN RCRD: CPT | Mod: CPTII,S$GLB,, | Performed by: INTERNAL MEDICINE

## 2022-01-18 PROCEDURE — 3077F PR MOST RECENT SYSTOLIC BLOOD PRESSURE >= 140 MM HG: ICD-10-PCS | Mod: CPTII,S$GLB,, | Performed by: INTERNAL MEDICINE

## 2022-01-18 PROCEDURE — 3078F PR MOST RECENT DIASTOLIC BLOOD PRESSURE < 80 MM HG: ICD-10-PCS | Mod: CPTII,S$GLB,, | Performed by: INTERNAL MEDICINE

## 2022-01-18 PROCEDURE — 1160F PR REVIEW ALL MEDS BY PRESCRIBER/CLIN PHARMACIST DOCUMENTED: ICD-10-PCS | Mod: CPTII,S$GLB,, | Performed by: INTERNAL MEDICINE

## 2022-01-18 PROCEDURE — 96413 CHEMO IV INFUSION 1 HR: CPT | Mod: PN

## 2022-01-18 PROCEDURE — 96367 TX/PROPH/DG ADDL SEQ IV INF: CPT | Mod: PN

## 2022-01-18 PROCEDURE — 63600175 PHARM REV CODE 636 W HCPCS: Mod: PN | Performed by: INTERNAL MEDICINE

## 2022-01-18 PROCEDURE — 25000003 PHARM REV CODE 250: Mod: PN | Performed by: INTERNAL MEDICINE

## 2022-01-18 PROCEDURE — 3008F PR BODY MASS INDEX (BMI) DOCUMENTED: ICD-10-PCS | Mod: CPTII,S$GLB,, | Performed by: INTERNAL MEDICINE

## 2022-01-18 PROCEDURE — 3008F BODY MASS INDEX DOCD: CPT | Mod: CPTII,S$GLB,, | Performed by: INTERNAL MEDICINE

## 2022-01-18 PROCEDURE — 99214 OFFICE O/P EST MOD 30 MIN: CPT | Mod: S$GLB,,, | Performed by: INTERNAL MEDICINE

## 2022-01-18 PROCEDURE — 99999 PR PBB SHADOW E&M-EST. PATIENT-LVL III: ICD-10-PCS | Mod: PBBFAC,,, | Performed by: INTERNAL MEDICINE

## 2022-01-18 PROCEDURE — 99214 PR OFFICE/OUTPT VISIT, EST, LEVL IV, 30-39 MIN: ICD-10-PCS | Mod: S$GLB,,, | Performed by: INTERNAL MEDICINE

## 2022-01-18 PROCEDURE — 3077F SYST BP >= 140 MM HG: CPT | Mod: CPTII,S$GLB,, | Performed by: INTERNAL MEDICINE

## 2022-01-18 PROCEDURE — A4216 STERILE WATER/SALINE, 10 ML: HCPCS | Mod: PN | Performed by: INTERNAL MEDICINE

## 2022-01-18 PROCEDURE — 3078F DIAST BP <80 MM HG: CPT | Mod: CPTII,S$GLB,, | Performed by: INTERNAL MEDICINE

## 2022-01-18 PROCEDURE — 96372 THER/PROPH/DIAG INJ SC/IM: CPT | Mod: PN,59

## 2022-01-18 PROCEDURE — 1160F RVW MEDS BY RX/DR IN RCRD: CPT | Mod: CPTII,S$GLB,, | Performed by: INTERNAL MEDICINE

## 2022-01-18 PROCEDURE — 99999 PR PBB SHADOW E&M-EST. PATIENT-LVL III: CPT | Mod: PBBFAC,,, | Performed by: INTERNAL MEDICINE

## 2022-01-18 PROCEDURE — 1159F PR MEDICATION LIST DOCUMENTED IN MEDICAL RECORD: ICD-10-PCS | Mod: CPTII,S$GLB,, | Performed by: INTERNAL MEDICINE

## 2022-01-18 PROCEDURE — 1126F AMNT PAIN NOTED NONE PRSNT: CPT | Mod: CPTII,S$GLB,, | Performed by: INTERNAL MEDICINE

## 2022-01-18 RX ORDER — ACETAMINOPHEN 500 MG
TABLET ORAL
Status: DISPENSED
Start: 2022-01-18 | End: 2022-01-19

## 2022-01-18 RX ORDER — DIPHENHYDRAMINE HYDROCHLORIDE 50 MG/ML
50 INJECTION INTRAMUSCULAR; INTRAVENOUS ONCE AS NEEDED
Status: DISCONTINUED | OUTPATIENT
Start: 2022-01-18 | End: 2022-01-18 | Stop reason: HOSPADM

## 2022-01-18 RX ORDER — ACETAMINOPHEN 500 MG
1000 TABLET ORAL
Status: COMPLETED | OUTPATIENT
Start: 2022-01-18 | End: 2022-01-18

## 2022-01-18 RX ORDER — HEPARIN 100 UNIT/ML
500 SYRINGE INTRAVENOUS
Status: DISCONTINUED | OUTPATIENT
Start: 2022-01-18 | End: 2022-01-18 | Stop reason: HOSPADM

## 2022-01-18 RX ORDER — EPINEPHRINE 0.3 MG/.3ML
0.3 INJECTION SUBCUTANEOUS ONCE AS NEEDED
Status: DISCONTINUED | OUTPATIENT
Start: 2022-01-18 | End: 2022-01-18 | Stop reason: HOSPADM

## 2022-01-18 RX ORDER — SODIUM CHLORIDE 0.9 % (FLUSH) 0.9 %
10 SYRINGE (ML) INJECTION
Status: DISCONTINUED | OUTPATIENT
Start: 2022-01-18 | End: 2022-01-18 | Stop reason: HOSPADM

## 2022-01-18 RX ADMIN — DIPHENHYDRAMINE HYDROCHLORIDE 25 MG: 50 INJECTION INTRAMUSCULAR; INTRAVENOUS at 03:01

## 2022-01-18 RX ADMIN — DENOSUMAB 120 MG: 120 INJECTION SUBCUTANEOUS at 03:01

## 2022-01-18 RX ADMIN — Medication 10 ML: at 03:01

## 2022-01-18 RX ADMIN — ACETAMINOPHEN 1000 MG: 500 TABLET, FILM COATED ORAL at 03:01

## 2022-01-18 RX ADMIN — SODIUM CHLORIDE 200 MG: 9 INJECTION, SOLUTION INTRAVENOUS at 04:01

## 2022-01-18 RX ADMIN — SODIUM CHLORIDE: 0.9 INJECTION, SOLUTION INTRAVENOUS at 03:01

## 2022-01-18 NOTE — PLAN OF CARE
Tolerated infusion well today Able to be d/c to home  Discharge instructions given with copy of avs and pt ambulated to private vehicle without difficulty NAD

## 2022-01-18 NOTE — PLAN OF CARE
Problem: Adult Inpatient Plan of Care  Goal: Patient-Specific Goal (Individualized)  Outcome: Ongoing, Progressing     Problem: Anxiety  Goal: Anxiety Reduction or Resolution  Outcome: Ongoing, Progressing
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
## 2022-01-24 ENCOUNTER — OFFICE VISIT (OUTPATIENT)
Dept: UROLOGY | Facility: CLINIC | Age: 72
End: 2022-01-24
Payer: MEDICARE

## 2022-01-24 VITALS — WEIGHT: 186.5 LBS | HEIGHT: 66 IN | BODY MASS INDEX: 29.97 KG/M2

## 2022-01-24 DIAGNOSIS — N39.0 RECURRENT UTI: Primary | ICD-10-CM

## 2022-01-24 PROCEDURE — 3008F PR BODY MASS INDEX (BMI) DOCUMENTED: ICD-10-PCS | Mod: CPTII,S$GLB,, | Performed by: UROLOGY

## 2022-01-24 PROCEDURE — 1126F PR PAIN SEVERITY QUANTIFIED, NO PAIN PRESENT: ICD-10-PCS | Mod: CPTII,S$GLB,, | Performed by: UROLOGY

## 2022-01-24 PROCEDURE — 1160F RVW MEDS BY RX/DR IN RCRD: CPT | Mod: CPTII,S$GLB,, | Performed by: UROLOGY

## 2022-01-24 PROCEDURE — 1101F PR PT FALLS ASSESS DOC 0-1 FALLS W/OUT INJ PAST YR: ICD-10-PCS | Mod: CPTII,S$GLB,, | Performed by: UROLOGY

## 2022-01-24 PROCEDURE — 99203 PR OFFICE/OUTPT VISIT, NEW, LEVL III, 30-44 MIN: ICD-10-PCS | Mod: S$GLB,,, | Performed by: UROLOGY

## 2022-01-24 PROCEDURE — 3288F PR FALLS RISK ASSESSMENT DOCUMENTED: ICD-10-PCS | Mod: CPTII,S$GLB,, | Performed by: UROLOGY

## 2022-01-24 PROCEDURE — 3008F BODY MASS INDEX DOCD: CPT | Mod: CPTII,S$GLB,, | Performed by: UROLOGY

## 2022-01-24 PROCEDURE — 1160F PR REVIEW ALL MEDS BY PRESCRIBER/CLIN PHARMACIST DOCUMENTED: ICD-10-PCS | Mod: CPTII,S$GLB,, | Performed by: UROLOGY

## 2022-01-24 PROCEDURE — 99203 OFFICE O/P NEW LOW 30 MIN: CPT | Mod: S$GLB,,, | Performed by: UROLOGY

## 2022-01-24 PROCEDURE — 99999 PR PBB SHADOW E&M-EST. PATIENT-LVL IV: CPT | Mod: PBBFAC,,, | Performed by: UROLOGY

## 2022-01-24 PROCEDURE — 3288F FALL RISK ASSESSMENT DOCD: CPT | Mod: CPTII,S$GLB,, | Performed by: UROLOGY

## 2022-01-24 PROCEDURE — 1159F MED LIST DOCD IN RCRD: CPT | Mod: CPTII,S$GLB,, | Performed by: UROLOGY

## 2022-01-24 PROCEDURE — 99999 PR PBB SHADOW E&M-EST. PATIENT-LVL IV: ICD-10-PCS | Mod: PBBFAC,,, | Performed by: UROLOGY

## 2022-01-24 PROCEDURE — 1101F PT FALLS ASSESS-DOCD LE1/YR: CPT | Mod: CPTII,S$GLB,, | Performed by: UROLOGY

## 2022-01-24 PROCEDURE — 1126F AMNT PAIN NOTED NONE PRSNT: CPT | Mod: CPTII,S$GLB,, | Performed by: UROLOGY

## 2022-01-24 PROCEDURE — 1159F PR MEDICATION LIST DOCUMENTED IN MEDICAL RECORD: ICD-10-PCS | Mod: CPTII,S$GLB,, | Performed by: UROLOGY

## 2022-01-24 NOTE — PROGRESS NOTES
Subjective:       Patient ID: Allyson Henriquez is a 72 y.o. female.    Chief Complaint: Other (OAB) and Urinary Tract Infection (Has had 3 since September (thinks the 1st 2 antibiotics were incorrect ones) and just finished her last CIPRO pill and is not symptomatic anymore)    HPI     72-year-old with recurrent urinary tract infections.  She is currently being treated for lung cancer with chemotherapy.  She has had UTIs in the last 3 months.  She recently completed a course of Cipro and she is asymptomatic today.  Her symptoms of UTI include bladder pressure and dysuria.  She denies gross hematuria does not have fever.  We had a long discussion about risk factors for UTIs.    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 difficile colitis 08/2021    Cancer     lung    CKD (chronic kidney disease), stage III     COPD (chronic obstructive pulmonary disease)     Hepatic steatosis     History of tobacco abuse     Hyperlipidemia     Hypertension     Lung nodules     and adenopathy    Multinodular goiter (nontoxic) 5/8/2017    Obesity     TRISHA (obstructive sleep apnea)     on bipap at bedtime    Proteinuria      Past Surgical History:   Procedure Laterality Date    ADENOIDECTOMY      APPENDECTOMY      CATARACT EXTRACTION      COLONOSCOPY N/A 11/14/2016    Procedure: COLONOSCOPY;  Surgeon: Destin Cardona MD;  Location: [facility];  Service: Endoscopy;  Laterality: N/A;    COLONOSCOPY N/A 5/14/2021    Procedure: COLONOSCOPY;  Surgeon: Destin Cardona MD;  [facility];  Service: Endoscopy;  Laterality: N/A;    ESOPHAGOGASTRODUODENOSCOPY N/A 5/14/2021    Procedure: EGD (ESOPHAGOGASTRODUODENOSCOPY);  Surgeon: Destin Cardona MD;  [facility];  Service: Endoscopy;  Laterality: N/A; unremarkable    ESOPHAGOGASTRODUODENOSCOPY N/A 8/9/2021     Procedure: ESOPHAGOGASTRODUODENOSCOPY (EGD);  Surgeon: Destin Cardona MD;  Location: [facility] ENDO;  Service: Endoscopy;  Laterality: N/A; Moderately severe radiation esophagitis with no bleeding    EYE SURGERY      cataract OU    INSERTION OF TUNNELED CENTRAL VENOUS CATHETER (CVC) WITH SUBCUTANEOUS PORT N/A 6/7/2021    Procedure: [id number];  Surgeon: Joe Salinas MD;  Location: [facility] OR;  Service: General;  Laterality: N/A;    INTRALUMINAL GASTROINTESTINAL TRACT IMAGING VIA CAPSULE N/A 12/29/2021    Procedure: IMAGING PROCEDURE, GI TRACT, INTRALUMINAL, VIA CAPSULE  (called for instruction 12/20-may have trouble swallowing);  Surgeon: Floyd Dixon MD;  Location: [facility] ENDO;  Service: Endoscopy;  Laterality: N/A;    NEUROMA SURGERY Left     acoustic    TONSILLECTOMY             Current Outpatient Medications:     acetaminophen (TYLENOL) 325 MG tablet, Take 650 mg by mouth every 6 (six) hours as needed for Pain or Temperature greater than (101F)., Disp: , Rfl:     calcium citrate/vitamin D2 (SEEMA-CITRATE ORAL), Take by mouth., Disp: , Rfl:     cetirizine (ZYRTEC) 10 MG tablet, Take 10 mg by mouth once daily., Disp: , Rfl:     ELIQUIS 5 mg Tab, TAKE 1 TABLET(5 MG) BY MOUTH TWICE DAILY, Disp: 180 tablet, Rfl: 0    EScitalopram oxalate (LEXAPRO) 10 MG tablet, TAKE 1 TABLET(10 MG) BY MOUTH DAILY, Disp: 30 tablet, Rfl: 3    FLONASE ALLERGY RELIEF 50 mcg/actuation nasal spray, 1 spray by Each Nostril route daily as needed., Disp: , Rfl:     Lactobac 40-Bifido 3-S.thermop (PROBIOTIC) 100 billion cell Cap, Take by mouth once daily., Disp: , Rfl:     magnesium glycinate 100 mg Tab, Take 200 mg by mouth every evening., Disp: 60 tablet, Rfl: 3    metoprolol succinate (TOPROL-XL) 25 MG 24 hr tablet, TAKE 1 TABLET(25 MG) BY MOUTH TWICE DAILY, Disp: 180 tablet, Rfl: 1    nystatin (MYCOSTATIN) ointment, Apply topically 2 (two) times daily., Disp: 30 g, Rfl: 1    pantoprazole (PROTONIX) 40 MG  tablet, Take 1 tablet (40 mg total) by mouth once daily., Disp: 90 tablet, Rfl: 3    potassium chloride (KLOR-CON) 8 MEQ TbSR, TAKE 1 TABLET(8 MEQ) BY MOUTH EVERY DAY, Disp: 90 tablet, Rfl: 0    simvastatin (ZOCOR) 40 MG tablet, TAKE 1 TABLET(40 MG) BY MOUTH EVERY EVENING, Disp: 90 tablet, Rfl: 1    sotaloL (BETAPACE) 80 MG tablet, TAKE 1 TABLET(80 MG) BY MOUTH TWICE DAILY, Disp: 180 tablet, Rfl: 0    VIACTIV 650 mg-12.5 mcg-40 mcg Chew, Take 1 tablet by mouth once daily., Disp: , Rfl:   No current facility-administered medications for this visit.    Facility-Administered Medications Ordered in Other Visits:     EUFLEXXA 10 mg/mL(mw 2.4 -3.6 million) injection Syrg 20 mg, 20 mg, Intra-articular, , Jose Rafael Barney MD, 20 mg at 06/14/17 1605      Review of Systems   Constitutional: Negative for fever.   Eyes: Negative for visual disturbance.   Respiratory: Negative for shortness of breath.    Cardiovascular: Negative for chest pain.   Gastrointestinal: Negative for nausea.   Genitourinary: Negative for dysuria, flank pain and hematuria.   Skin: Negative for rash.   Neurological: Negative for seizures.   Psychiatric/Behavioral: Negative for confusion.       Objective:      Physical Exam  Vitals reviewed.   Constitutional:       Appearance: She is well-developed and well-nourished.   HENT:      Head: Normocephalic.   Eyes:      Extraocular Movements: EOM normal.      Conjunctiva/sclera: Conjunctivae normal.   Cardiovascular:      Rate and Rhythm: Normal rate.   Pulmonary:      Effort: Pulmonary effort is normal.   Abdominal:      Palpations: Abdomen is soft.   Genitourinary:     Comments: Bladder non-tender and nondistended  No CVA tenderness  Musculoskeletal:         General: No edema.      Cervical back: Normal range of motion.   Skin:     General: Skin is warm and dry.      Findings: No erythema or rash.   Neurological:      Mental Status: She is alert and oriented to person, place, and time.    Psychiatric:         Mood and Affect: Mood and affect normal.         Behavior: Behavior normal.         Assessment:       1. Recurrent UTI        Plan:       Recurrent UTI  -     POCT URINE DIPSTICK WITHOUT MICROSCOPE      increase hydration, I recommend she begin probiotics and cranberry extract.  If her symptoms return follow-up for in and out cath specimen.

## 2022-02-01 ENCOUNTER — PATIENT MESSAGE (OUTPATIENT)
Dept: HEMATOLOGY/ONCOLOGY | Facility: CLINIC | Age: 72
End: 2022-02-01
Payer: MEDICARE

## 2022-02-05 RX ORDER — ESCITALOPRAM OXALATE 10 MG/1
TABLET ORAL
Qty: 90 TABLET | Refills: 3 | Status: SHIPPED | OUTPATIENT
Start: 2022-02-05 | End: 2022-06-27

## 2022-02-08 ENCOUNTER — OFFICE VISIT (OUTPATIENT)
Dept: HEMATOLOGY/ONCOLOGY | Facility: CLINIC | Age: 72
End: 2022-02-08
Payer: MEDICARE

## 2022-02-08 ENCOUNTER — INFUSION (OUTPATIENT)
Dept: INFUSION THERAPY | Facility: HOSPITAL | Age: 72
End: 2022-02-08
Attending: INTERNAL MEDICINE
Payer: MEDICARE

## 2022-02-08 VITALS
WEIGHT: 191.13 LBS | DIASTOLIC BLOOD PRESSURE: 78 MMHG | HEIGHT: 66 IN | TEMPERATURE: 97 F | SYSTOLIC BLOOD PRESSURE: 173 MMHG | BODY MASS INDEX: 30.72 KG/M2 | OXYGEN SATURATION: 98 % | RESPIRATION RATE: 16 BRPM | HEART RATE: 71 BPM

## 2022-02-08 VITALS
WEIGHT: 191.13 LBS | HEIGHT: 66 IN | OXYGEN SATURATION: 98 % | SYSTOLIC BLOOD PRESSURE: 165 MMHG | DIASTOLIC BLOOD PRESSURE: 88 MMHG | TEMPERATURE: 97 F | RESPIRATION RATE: 16 BRPM | HEART RATE: 75 BPM | BODY MASS INDEX: 30.72 KG/M2

## 2022-02-08 DIAGNOSIS — C34.91 SMALL CELL CARCINOMA OF LUNG, RIGHT: Primary | ICD-10-CM

## 2022-02-08 DIAGNOSIS — C79.51 BONE METASTASES: ICD-10-CM

## 2022-02-08 DIAGNOSIS — D50.9 IRON DEFICIENCY ANEMIA, UNSPECIFIED IRON DEFICIENCY ANEMIA TYPE: ICD-10-CM

## 2022-02-08 DIAGNOSIS — C78.2 PLEURAL METASTASIS: ICD-10-CM

## 2022-02-08 DIAGNOSIS — C77.1 MALIGNANT NEOPLASM METASTATIC TO INTRATHORACIC LYMPH NODE: ICD-10-CM

## 2022-02-08 PROCEDURE — 3077F SYST BP >= 140 MM HG: CPT | Mod: CPTII,S$GLB,, | Performed by: INTERNAL MEDICINE

## 2022-02-08 PROCEDURE — 1126F AMNT PAIN NOTED NONE PRSNT: CPT | Mod: CPTII,S$GLB,, | Performed by: INTERNAL MEDICINE

## 2022-02-08 PROCEDURE — 1159F MED LIST DOCD IN RCRD: CPT | Mod: CPTII,S$GLB,, | Performed by: INTERNAL MEDICINE

## 2022-02-08 PROCEDURE — 96367 TX/PROPH/DG ADDL SEQ IV INF: CPT | Mod: PN

## 2022-02-08 PROCEDURE — 96413 CHEMO IV INFUSION 1 HR: CPT | Mod: PN

## 2022-02-08 PROCEDURE — 1160F PR REVIEW ALL MEDS BY PRESCRIBER/CLIN PHARMACIST DOCUMENTED: ICD-10-PCS | Mod: CPTII,S$GLB,, | Performed by: INTERNAL MEDICINE

## 2022-02-08 PROCEDURE — 3077F PR MOST RECENT SYSTOLIC BLOOD PRESSURE >= 140 MM HG: ICD-10-PCS | Mod: CPTII,S$GLB,, | Performed by: INTERNAL MEDICINE

## 2022-02-08 PROCEDURE — 1126F PR PAIN SEVERITY QUANTIFIED, NO PAIN PRESENT: ICD-10-PCS | Mod: CPTII,S$GLB,, | Performed by: INTERNAL MEDICINE

## 2022-02-08 PROCEDURE — 99214 OFFICE O/P EST MOD 30 MIN: CPT | Mod: S$GLB,,, | Performed by: INTERNAL MEDICINE

## 2022-02-08 PROCEDURE — 3078F DIAST BP <80 MM HG: CPT | Mod: CPTII,S$GLB,, | Performed by: INTERNAL MEDICINE

## 2022-02-08 PROCEDURE — 96375 TX/PRO/DX INJ NEW DRUG ADDON: CPT | Mod: PN

## 2022-02-08 PROCEDURE — 99214 PR OFFICE/OUTPT VISIT, EST, LEVL IV, 30-39 MIN: ICD-10-PCS | Mod: S$GLB,,, | Performed by: INTERNAL MEDICINE

## 2022-02-08 PROCEDURE — 3078F PR MOST RECENT DIASTOLIC BLOOD PRESSURE < 80 MM HG: ICD-10-PCS | Mod: CPTII,S$GLB,, | Performed by: INTERNAL MEDICINE

## 2022-02-08 PROCEDURE — 99999 PR PBB SHADOW E&M-EST. PATIENT-LVL III: CPT | Mod: PBBFAC,,, | Performed by: INTERNAL MEDICINE

## 2022-02-08 PROCEDURE — 1101F PT FALLS ASSESS-DOCD LE1/YR: CPT | Mod: CPTII,S$GLB,, | Performed by: INTERNAL MEDICINE

## 2022-02-08 PROCEDURE — 25000003 PHARM REV CODE 250: Mod: PN | Performed by: INTERNAL MEDICINE

## 2022-02-08 PROCEDURE — 3288F PR FALLS RISK ASSESSMENT DOCUMENTED: ICD-10-PCS | Mod: CPTII,S$GLB,, | Performed by: INTERNAL MEDICINE

## 2022-02-08 PROCEDURE — 1159F PR MEDICATION LIST DOCUMENTED IN MEDICAL RECORD: ICD-10-PCS | Mod: CPTII,S$GLB,, | Performed by: INTERNAL MEDICINE

## 2022-02-08 PROCEDURE — 3008F BODY MASS INDEX DOCD: CPT | Mod: CPTII,S$GLB,, | Performed by: INTERNAL MEDICINE

## 2022-02-08 PROCEDURE — 3288F FALL RISK ASSESSMENT DOCD: CPT | Mod: CPTII,S$GLB,, | Performed by: INTERNAL MEDICINE

## 2022-02-08 PROCEDURE — 1101F PR PT FALLS ASSESS DOC 0-1 FALLS W/OUT INJ PAST YR: ICD-10-PCS | Mod: CPTII,S$GLB,, | Performed by: INTERNAL MEDICINE

## 2022-02-08 PROCEDURE — 3008F PR BODY MASS INDEX (BMI) DOCUMENTED: ICD-10-PCS | Mod: CPTII,S$GLB,, | Performed by: INTERNAL MEDICINE

## 2022-02-08 PROCEDURE — A4216 STERILE WATER/SALINE, 10 ML: HCPCS | Mod: PN | Performed by: INTERNAL MEDICINE

## 2022-02-08 PROCEDURE — 63600175 PHARM REV CODE 636 W HCPCS: Mod: PN | Performed by: INTERNAL MEDICINE

## 2022-02-08 PROCEDURE — 99999 PR PBB SHADOW E&M-EST. PATIENT-LVL III: ICD-10-PCS | Mod: PBBFAC,,, | Performed by: INTERNAL MEDICINE

## 2022-02-08 PROCEDURE — 1160F RVW MEDS BY RX/DR IN RCRD: CPT | Mod: CPTII,S$GLB,, | Performed by: INTERNAL MEDICINE

## 2022-02-08 RX ORDER — HEPARIN 100 UNIT/ML
500 SYRINGE INTRAVENOUS
Status: CANCELLED | OUTPATIENT
Start: 2022-02-08

## 2022-02-08 RX ORDER — DEXAMETHASONE SODIUM PHOSPHATE 4 MG/ML
4 INJECTION, SOLUTION INTRA-ARTICULAR; INTRALESIONAL; INTRAMUSCULAR; INTRAVENOUS; SOFT TISSUE
Status: CANCELLED
Start: 2022-02-08

## 2022-02-08 RX ORDER — SODIUM CHLORIDE 0.9 % (FLUSH) 0.9 %
10 SYRINGE (ML) INJECTION
Status: CANCELLED | OUTPATIENT
Start: 2022-02-08

## 2022-02-08 RX ORDER — DIPHENHYDRAMINE HYDROCHLORIDE 50 MG/ML
25 INJECTION INTRAMUSCULAR; INTRAVENOUS ONCE
Status: DISCONTINUED | OUTPATIENT
Start: 2022-02-08 | End: 2022-02-08 | Stop reason: SDUPTHER

## 2022-02-08 RX ORDER — DIPHENHYDRAMINE HYDROCHLORIDE 50 MG/ML
25 INJECTION INTRAMUSCULAR; INTRAVENOUS ONCE
Status: CANCELLED
Start: 2022-02-08

## 2022-02-08 RX ORDER — ACETAMINOPHEN 500 MG
1000 TABLET ORAL
Status: CANCELLED
Start: 2022-02-08

## 2022-02-08 RX ORDER — DIPHENHYDRAMINE HYDROCHLORIDE 50 MG/ML
50 INJECTION INTRAMUSCULAR; INTRAVENOUS ONCE AS NEEDED
Status: CANCELLED | OUTPATIENT
Start: 2022-02-08

## 2022-02-08 RX ORDER — DEXAMETHASONE SODIUM PHOSPHATE 4 MG/ML
4 INJECTION, SOLUTION INTRA-ARTICULAR; INTRALESIONAL; INTRAMUSCULAR; INTRAVENOUS; SOFT TISSUE
Status: DISCONTINUED | OUTPATIENT
Start: 2022-02-08 | End: 2022-02-08 | Stop reason: HOSPADM

## 2022-02-08 RX ORDER — EPINEPHRINE 0.3 MG/.3ML
0.3 INJECTION SUBCUTANEOUS ONCE AS NEEDED
Status: CANCELLED | OUTPATIENT
Start: 2022-02-08

## 2022-02-08 RX ORDER — ACETAMINOPHEN 500 MG
1000 TABLET ORAL
Status: COMPLETED | OUTPATIENT
Start: 2022-02-08 | End: 2022-02-08

## 2022-02-08 RX ORDER — EPINEPHRINE 0.3 MG/.3ML
0.3 INJECTION SUBCUTANEOUS ONCE AS NEEDED
Status: DISCONTINUED | OUTPATIENT
Start: 2022-02-08 | End: 2022-02-08 | Stop reason: HOSPADM

## 2022-02-08 RX ORDER — FAMOTIDINE 10 MG/ML
20 INJECTION INTRAVENOUS DAILY
Status: CANCELLED
Start: 2022-02-08

## 2022-02-08 RX ORDER — SODIUM CHLORIDE 0.9 % (FLUSH) 0.9 %
10 SYRINGE (ML) INJECTION
Status: DISCONTINUED | OUTPATIENT
Start: 2022-02-08 | End: 2022-02-08 | Stop reason: HOSPADM

## 2022-02-08 RX ORDER — ACETAMINOPHEN 500 MG
TABLET ORAL
Status: DISCONTINUED
Start: 2022-02-08 | End: 2022-02-08 | Stop reason: HOSPADM

## 2022-02-08 RX ORDER — DIPHENHYDRAMINE HYDROCHLORIDE 50 MG/ML
50 INJECTION INTRAMUSCULAR; INTRAVENOUS ONCE AS NEEDED
Status: DISCONTINUED | OUTPATIENT
Start: 2022-02-08 | End: 2022-02-08 | Stop reason: HOSPADM

## 2022-02-08 RX ORDER — HEPARIN 100 UNIT/ML
500 SYRINGE INTRAVENOUS
Status: DISCONTINUED | OUTPATIENT
Start: 2022-02-08 | End: 2022-02-08 | Stop reason: HOSPADM

## 2022-02-08 RX ORDER — FAMOTIDINE 10 MG/ML
20 INJECTION INTRAVENOUS
Status: COMPLETED | OUTPATIENT
Start: 2022-02-08 | End: 2022-02-08

## 2022-02-08 RX ADMIN — ACETAMINOPHEN 1000 MG: 500 TABLET, FILM COATED ORAL at 02:02

## 2022-02-08 RX ADMIN — HEPARIN 500 UNITS: 100 SYRINGE at 03:02

## 2022-02-08 RX ADMIN — SODIUM CHLORIDE 200 MG: 9 INJECTION, SOLUTION INTRAVENOUS at 03:02

## 2022-02-08 RX ADMIN — FAMOTIDINE 20 MG: 10 INJECTION INTRAVENOUS at 02:02

## 2022-02-08 RX ADMIN — Medication 10 ML: at 02:02

## 2022-02-08 RX ADMIN — SODIUM CHLORIDE: 0.9 INJECTION, SOLUTION INTRAVENOUS at 02:02

## 2022-02-08 RX ADMIN — DIPHENHYDRAMINE HYDROCHLORIDE 25 MG: 50 INJECTION INTRAMUSCULAR; INTRAVENOUS at 02:02

## 2022-02-08 RX ADMIN — FERUMOXYTOL 510 MG: 510 INJECTION INTRAVENOUS at 02:02

## 2022-02-08 NOTE — PROGRESS NOTES
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  Her treatment was interrupted after admission to [facility] with radiation esophagitis followed by Clostridium difficile colitis.  During her multi week stay, the patient became profoundly debilitated and predominantly bed-bound.  Despite healing of her esophagus, her nutritional status remained poor.  She was 2 immobile to allow for discharge directly to home and has had an interval rehab stay.  She and her /daughter have requested a virtual visit today to assess progress following discharge from rehab.  Patient's nutrition remains impaired.  She consumes predominantly liquids and soft solids.  Physical and Occupational therapy are being arranged for her in home.  Patient's  reports that she was ambulating with assistance at the rehab facility.    Patient returns to clinic for interval reassessment.  She has been performing better at home of late.  Diarrhea has resolved and been replaced with formed stool.  Patient is generally continent.  Appetite is improving and patient is struggling less to swallow medications.  Patient is up and awake most of the day.  She typically switches between bed and chair.  She remains highly motivated toward resumption of systemic chemotherapy for fear that she will have to repeat earlier treatment.    Patient returns to clinic to review interval lab and imaging.  Appetite has improved.  Diet has become more diversified. She is able to eat more substantial solids such as meat balls.  No worsening pain or dyspnea.  Patient is still wheelchair dependent in terms of ambulation and spends most of her day up to the chair.  Patient has been having difficulty with chronic urinary tract infection.  She recently completed course of Macrodantin.  Urine remains cloudy.    Patient returns to clinic  prior to cycle 2 of palliative pembrolizumab.  She has received intravenous iron replacement for correction of iron deficiency anemia and completed a course of ciprofloxacin for treatment of urinary tract infection.  Patient tolerated 1st cycle of pembrolizumab well.  She did not experiencing difficulties with nausea, vomiting, mouth sores, diarrhea, skin rashes, pruritus.  Patient's appetite is improving.  She is gaining weight.  Patient denies worsening dyspnea.  She is not experiencing pain or dependent edema.    Physical examination:  The patient is a well-developed, elderly, frail appearing white female who is confined to a wheelchair during the course of today's examination.  Weight is 191 lb (increased by 4.5 lb).  VITAL SIGNS: Documented in EMR and reviewed   HEENT: Normocephalic, atraumatic. Oral mucosa pink and moist. Lips without lesions. Tongue midline. Oropharynx clear. Nonicteric sclerae.   NECK: Supple, no adenopathy.  HEART: Regular rate and rhythm without murmur, gallop or rub.   LUNGS: Clear to auscultation bilaterally.  ABDOMEN: Soft, nontender, nondistended with positive normoactive bowel sounds, no hepatosplenomegaly.   EXTREMITIES:  Bilateral, trace, pedal edema. Distal pulses are intact.     Laboratory:  White count 6.9, hemoglobin 10.3, hematocrit 32.6, platelets 165, absolute neutrophil count is 4700.  Sodium 140, potassium 4.7, chloride 108, CO2 23, BUN 17, creatinine 1, glucose 83, calcium 8.9, magnesium 1.9, liver function test within normal limits, LDH is 164, GFR is 56.      Impression:  1. Limited stage small cell carcinoma of the lung with disease progression involving lymph node, pleura and bone.  2. Iron deficiency anemia-improved.  3. General debility.    Plan:  1. Proceed with cycle 2 of Keytruda.  2. Return to clinic 3 weeks from now with interval CBC, CMP, LDH and magnesium.    Advance Care Planning During this visit, I engaged the patient in the advance care planning process.   The patient and I reviewed the role for advance directives and their purpose in directing future healthcare if the patients unable to speak for him/herself.  At this point in time, the patient does have full decision-making capacity.  We discussed different extreme health states that they could experience, and reviewed what kind of medical care the patient would want in those situations.  The patient communicated that if they were comatose and had little chance of a meaningful recovery, they would not want machines/life-sustaining treatments used.  In addition to the above preference, other important end-of-life issues for the patient include DNR.  The patient has  completed a living will to reflect these preferences.  The patient has already designated a healthcare power of  to make decisions on their behalf.    (30 mins time spent).      This note was created using voice recognition software and may contain grammatical errors.

## 2022-02-08 NOTE — Clinical Note
Pt has not received iv iron replacement as ordered; please see that this is scheduled. Rtc in 3 weeks with cbc cmp ldh mg.

## 2022-02-11 ENCOUNTER — INFUSION (OUTPATIENT)
Dept: INFUSION THERAPY | Facility: HOSPITAL | Age: 72
End: 2022-02-11
Attending: INTERNAL MEDICINE
Payer: MEDICARE

## 2022-02-11 ENCOUNTER — DOCUMENTATION ONLY (OUTPATIENT)
Dept: INFUSION THERAPY | Facility: HOSPITAL | Age: 72
End: 2022-02-11
Payer: MEDICARE

## 2022-02-11 VITALS
TEMPERATURE: 98 F | WEIGHT: 191.13 LBS | OXYGEN SATURATION: 97 % | DIASTOLIC BLOOD PRESSURE: 86 MMHG | HEART RATE: 71 BPM | SYSTOLIC BLOOD PRESSURE: 190 MMHG | BODY MASS INDEX: 30.72 KG/M2 | HEIGHT: 66 IN

## 2022-02-11 DIAGNOSIS — D50.9 IRON DEFICIENCY ANEMIA, UNSPECIFIED IRON DEFICIENCY ANEMIA TYPE: Primary | ICD-10-CM

## 2022-02-11 PROCEDURE — 96375 TX/PRO/DX INJ NEW DRUG ADDON: CPT | Mod: PN

## 2022-02-11 PROCEDURE — 63600175 PHARM REV CODE 636 W HCPCS: Mod: PN | Performed by: INTERNAL MEDICINE

## 2022-02-11 PROCEDURE — 25000003 PHARM REV CODE 250: Mod: PN | Performed by: INTERNAL MEDICINE

## 2022-02-11 PROCEDURE — 96365 THER/PROPH/DIAG IV INF INIT: CPT | Mod: PN

## 2022-02-11 RX ORDER — FAMOTIDINE 10 MG/ML
20 INJECTION INTRAVENOUS DAILY
Status: CANCELLED
Start: 2022-02-11

## 2022-02-11 RX ORDER — DEXAMETHASONE SODIUM PHOSPHATE 4 MG/ML
4 INJECTION, SOLUTION INTRA-ARTICULAR; INTRALESIONAL; INTRAMUSCULAR; INTRAVENOUS; SOFT TISSUE
Status: CANCELLED
Start: 2022-02-11

## 2022-02-11 RX ORDER — DEXAMETHASONE SODIUM PHOSPHATE 4 MG/ML
4 INJECTION, SOLUTION INTRA-ARTICULAR; INTRALESIONAL; INTRAMUSCULAR; INTRAVENOUS; SOFT TISSUE
Status: COMPLETED | OUTPATIENT
Start: 2022-02-11 | End: 2022-02-11

## 2022-02-11 RX ORDER — DIPHENHYDRAMINE HYDROCHLORIDE 50 MG/ML
25 INJECTION INTRAMUSCULAR; INTRAVENOUS ONCE
Status: CANCELLED
Start: 2022-02-11

## 2022-02-11 RX ORDER — HEPARIN 100 UNIT/ML
500 SYRINGE INTRAVENOUS
Status: CANCELLED | OUTPATIENT
Start: 2022-02-11

## 2022-02-11 RX ORDER — FAMOTIDINE 10 MG/ML
20 INJECTION INTRAVENOUS DAILY
Status: DISCONTINUED | OUTPATIENT
Start: 2022-02-11 | End: 2022-02-11 | Stop reason: HOSPADM

## 2022-02-11 RX ORDER — DIPHENHYDRAMINE HYDROCHLORIDE 50 MG/ML
25 INJECTION INTRAMUSCULAR; INTRAVENOUS ONCE
Status: COMPLETED | OUTPATIENT
Start: 2022-02-11 | End: 2022-02-11

## 2022-02-11 RX ADMIN — FAMOTIDINE 20 MG: 10 INJECTION INTRAVENOUS at 12:02

## 2022-02-11 RX ADMIN — DIPHENHYDRAMINE HYDROCHLORIDE 25 MG: 50 INJECTION INTRAMUSCULAR; INTRAVENOUS at 12:02

## 2022-02-11 RX ADMIN — DEXAMETHASONE SODIUM PHOSPHATE 4 MG: 4 INJECTION, SOLUTION INTRA-ARTICULAR; INTRALESIONAL; INTRAMUSCULAR; INTRAVENOUS; SOFT TISSUE at 12:02

## 2022-02-11 RX ADMIN — FERUMOXYTOL 510 MG: 510 INJECTION INTRAVENOUS at 12:02

## 2022-02-11 NOTE — PLAN OF CARE
Problem: Adult Inpatient Plan of Care  Goal: Plan of Care Review  Outcome: Ongoing, Progressing  Flowsheets (Taken 2/11/2022 1340)  Plan of Care Reviewed With:   patient   spouse  Goal: Patient-Specific Goal (Individualization)  Outcome: Ongoing, Progressing  Flowsheets (Taken 2/11/2022 1340)  Individualized Care Needs: education, recliner, dim lights, spouse at bedside, blankets  Anxieties, Fears or Concerns: anxious about port access, needle removal and concerned about elevated BP  Patient-Specific Goals (Include Timeframe): no signs of reaction during treatment and 30 min post watch     Problem: Fatigue  Goal: Improved Activity Tolerance  Outcome: Ongoing, Progressing  Intervention: Promote Energy Conservation  Flowsheets (Taken 2/11/2022 1340)  Fatigue Management:   paced activity encouraged   frequent rest breaks encouraged   fatigue-related activity identified  Sleep/Rest Enhancement:   relaxation techniques promoted   awakenings minimized   consistent schedule promoted   noise level reduced   room darkened   therapeutic touch utilized   family presence promoted  Activity Management: Ambulated -L4     Problem: Fall Injury Risk  Goal: Absence of Fall and Fall-Related Injury  Outcome: Ongoing, Progressing  Intervention: Promote Injury-Free Environment  Flowsheets (Taken 2/11/2022 1340)  Safety Promotion/Fall Prevention:   chair alarm set   Fall Risk reviewed with patient/family   high risk medications identified   in recliner, wheels locked   medications reviewed   lighting adjusted   supervised activity   instructed to call staff for mobility   room near unit station   /camera at bedside   family to remain at bedside  Pt arrived to clinic for Feraheme infusion and tolerated well with no changes throughout therapy, other than BP elevation at the end of 30 min watch. Pt relaxed other than anxious about port needle removal and upset over elevated BP. Pt denied vision changes, dizziness, headaches  or nausea and aware to go to ED for any concerns. Pt states this happened last time and comfortable with going home and discharged to home in NAD with  in wheelchair. Pt aware of side effects of med and f/u appts.

## 2022-02-11 NOTE — PROGRESS NOTES
Oncology Nutrition       Weight Check   Chart reviewed. Weight check completed on pt.     Wt Readings from Last 10 Encounters:   02/11/22 86.7 kg (191 lb 2.2 oz)   02/08/22 86.7 kg (191 lb 2.2 oz)   02/08/22 86.7 kg (191 lb 2.2 oz)   01/24/22 84.6 kg (186 lb 8.2 oz)   01/18/22 84.6 kg (186 lb 8.2 oz)   01/18/22 84.6 kg (186 lb 8.2 oz)   01/11/22 84.8 kg (187 lb 1 oz)   11/30/21 86.6 kg (191 lb)   11/29/21 87 kg (191 lb 12.8 oz)   11/26/21 87 kg (191 lb 12.8 oz)          RD plan of care: Weight is currently 191#. No significant change at this time. Per nursing nutrition risk report, pt denies any nutritional concerns or challenges at this time. RD to continue to monitor; no nutritional interventions are needed at this time.     Mireya Hansen, MS, RD, LDN  02/11/2022  1:49 PM

## 2022-02-11 NOTE — PROGRESS NOTES
Oncology   Chemotherapy Infusion Visit    Quick Social Service Status Follow Up   Met w/ pt briefly to follow up on social and emotional needs since initiation of treatment.      SW met with pt and  at chairside. Pt reports she has been feeling better on her new treatment. She said she is not having any swallowing difficulties and is now eating food. She is happy about this change.     She said in 3 month she will have test  To see how this new treatment is doing. She has great support from her  and she has regular contact with her children.     Denied any needs from SW at this time.  SW to follow.         Mae Webster, ALINA  02/11/2022  12:58 PM

## 2022-02-15 ENCOUNTER — PATIENT MESSAGE (OUTPATIENT)
Dept: CARDIOLOGY | Facility: CLINIC | Age: 72
End: 2022-02-15
Payer: MEDICARE

## 2022-02-15 ENCOUNTER — PATIENT MESSAGE (OUTPATIENT)
Dept: HEMATOLOGY/ONCOLOGY | Facility: CLINIC | Age: 72
End: 2022-02-15
Payer: MEDICARE

## 2022-02-22 DIAGNOSIS — D84.9 IMMUNOSUPPRESSED STATUS: ICD-10-CM

## 2022-03-02 ENCOUNTER — OFFICE VISIT (OUTPATIENT)
Dept: HEMATOLOGY/ONCOLOGY | Facility: CLINIC | Age: 72
End: 2022-03-02
Payer: MEDICARE

## 2022-03-02 ENCOUNTER — INFUSION (OUTPATIENT)
Dept: INFUSION THERAPY | Facility: HOSPITAL | Age: 72
End: 2022-03-02
Attending: INTERNAL MEDICINE
Payer: MEDICARE

## 2022-03-02 VITALS
WEIGHT: 192 LBS | HEART RATE: 76 BPM | BODY MASS INDEX: 30.86 KG/M2 | TEMPERATURE: 97 F | DIASTOLIC BLOOD PRESSURE: 75 MMHG | HEIGHT: 66 IN | RESPIRATION RATE: 16 BRPM | OXYGEN SATURATION: 96 % | SYSTOLIC BLOOD PRESSURE: 151 MMHG

## 2022-03-02 VITALS
RESPIRATION RATE: 16 BRPM | BODY MASS INDEX: 30.86 KG/M2 | SYSTOLIC BLOOD PRESSURE: 162 MMHG | HEART RATE: 68 BPM | TEMPERATURE: 97 F | DIASTOLIC BLOOD PRESSURE: 80 MMHG | HEIGHT: 66 IN | WEIGHT: 192 LBS

## 2022-03-02 DIAGNOSIS — C78.2 PLEURAL METASTASIS: ICD-10-CM

## 2022-03-02 DIAGNOSIS — C34.91 SMALL CELL CARCINOMA OF LUNG, RIGHT: Primary | ICD-10-CM

## 2022-03-02 DIAGNOSIS — D50.9 IRON DEFICIENCY ANEMIA, UNSPECIFIED IRON DEFICIENCY ANEMIA TYPE: ICD-10-CM

## 2022-03-02 DIAGNOSIS — C77.1 MALIGNANT NEOPLASM METASTATIC TO INTRATHORACIC LYMPH NODE: ICD-10-CM

## 2022-03-02 DIAGNOSIS — C79.51 BONE METASTASES: ICD-10-CM

## 2022-03-02 PROCEDURE — A4216 STERILE WATER/SALINE, 10 ML: HCPCS | Mod: PN | Performed by: INTERNAL MEDICINE

## 2022-03-02 PROCEDURE — 1101F PR PT FALLS ASSESS DOC 0-1 FALLS W/OUT INJ PAST YR: ICD-10-PCS | Mod: CPTII,S$GLB,, | Performed by: INTERNAL MEDICINE

## 2022-03-02 PROCEDURE — 1126F PR PAIN SEVERITY QUANTIFIED, NO PAIN PRESENT: ICD-10-PCS | Mod: CPTII,S$GLB,, | Performed by: INTERNAL MEDICINE

## 2022-03-02 PROCEDURE — 3008F PR BODY MASS INDEX (BMI) DOCUMENTED: ICD-10-PCS | Mod: CPTII,S$GLB,, | Performed by: INTERNAL MEDICINE

## 2022-03-02 PROCEDURE — 96413 CHEMO IV INFUSION 1 HR: CPT | Mod: PN

## 2022-03-02 PROCEDURE — 3078F PR MOST RECENT DIASTOLIC BLOOD PRESSURE < 80 MM HG: ICD-10-PCS | Mod: CPTII,S$GLB,, | Performed by: INTERNAL MEDICINE

## 2022-03-02 PROCEDURE — 1159F MED LIST DOCD IN RCRD: CPT | Mod: CPTII,S$GLB,, | Performed by: INTERNAL MEDICINE

## 2022-03-02 PROCEDURE — 1101F PT FALLS ASSESS-DOCD LE1/YR: CPT | Mod: CPTII,S$GLB,, | Performed by: INTERNAL MEDICINE

## 2022-03-02 PROCEDURE — 3077F PR MOST RECENT SYSTOLIC BLOOD PRESSURE >= 140 MM HG: ICD-10-PCS | Mod: CPTII,S$GLB,, | Performed by: INTERNAL MEDICINE

## 2022-03-02 PROCEDURE — 63600175 PHARM REV CODE 636 W HCPCS: Mod: JG,PN | Performed by: INTERNAL MEDICINE

## 2022-03-02 PROCEDURE — 99214 PR OFFICE/OUTPT VISIT, EST, LEVL IV, 30-39 MIN: ICD-10-PCS | Mod: S$GLB,,, | Performed by: INTERNAL MEDICINE

## 2022-03-02 PROCEDURE — 1160F RVW MEDS BY RX/DR IN RCRD: CPT | Mod: CPTII,S$GLB,, | Performed by: INTERNAL MEDICINE

## 2022-03-02 PROCEDURE — 3078F DIAST BP <80 MM HG: CPT | Mod: CPTII,S$GLB,, | Performed by: INTERNAL MEDICINE

## 2022-03-02 PROCEDURE — 99999 PR PBB SHADOW E&M-EST. PATIENT-LVL IV: CPT | Mod: PBBFAC,,, | Performed by: INTERNAL MEDICINE

## 2022-03-02 PROCEDURE — 3288F FALL RISK ASSESSMENT DOCD: CPT | Mod: CPTII,S$GLB,, | Performed by: INTERNAL MEDICINE

## 2022-03-02 PROCEDURE — 1159F PR MEDICATION LIST DOCUMENTED IN MEDICAL RECORD: ICD-10-PCS | Mod: CPTII,S$GLB,, | Performed by: INTERNAL MEDICINE

## 2022-03-02 PROCEDURE — 99214 OFFICE O/P EST MOD 30 MIN: CPT | Mod: S$GLB,,, | Performed by: INTERNAL MEDICINE

## 2022-03-02 PROCEDURE — 99999 PR PBB SHADOW E&M-EST. PATIENT-LVL IV: ICD-10-PCS | Mod: PBBFAC,,, | Performed by: INTERNAL MEDICINE

## 2022-03-02 PROCEDURE — 1126F AMNT PAIN NOTED NONE PRSNT: CPT | Mod: CPTII,S$GLB,, | Performed by: INTERNAL MEDICINE

## 2022-03-02 PROCEDURE — 3008F BODY MASS INDEX DOCD: CPT | Mod: CPTII,S$GLB,, | Performed by: INTERNAL MEDICINE

## 2022-03-02 PROCEDURE — 96367 TX/PROPH/DG ADDL SEQ IV INF: CPT | Mod: PN

## 2022-03-02 PROCEDURE — 3288F PR FALLS RISK ASSESSMENT DOCUMENTED: ICD-10-PCS | Mod: CPTII,S$GLB,, | Performed by: INTERNAL MEDICINE

## 2022-03-02 PROCEDURE — 25000003 PHARM REV CODE 250: Mod: PN | Performed by: INTERNAL MEDICINE

## 2022-03-02 PROCEDURE — 1160F PR REVIEW ALL MEDS BY PRESCRIBER/CLIN PHARMACIST DOCUMENTED: ICD-10-PCS | Mod: CPTII,S$GLB,, | Performed by: INTERNAL MEDICINE

## 2022-03-02 PROCEDURE — 3077F SYST BP >= 140 MM HG: CPT | Mod: CPTII,S$GLB,, | Performed by: INTERNAL MEDICINE

## 2022-03-02 RX ORDER — SODIUM CHLORIDE 9 MG/ML
INJECTION, SOLUTION INTRAVENOUS ONCE
Status: COMPLETED | OUTPATIENT
Start: 2022-03-02 | End: 2022-03-02

## 2022-03-02 RX ORDER — DIPHENHYDRAMINE HYDROCHLORIDE 50 MG/ML
50 INJECTION INTRAMUSCULAR; INTRAVENOUS ONCE AS NEEDED
Status: CANCELLED | OUTPATIENT
Start: 2022-03-02

## 2022-03-02 RX ORDER — SODIUM CHLORIDE 0.9 % (FLUSH) 0.9 %
10 SYRINGE (ML) INJECTION
Status: DISCONTINUED | OUTPATIENT
Start: 2022-03-02 | End: 2022-03-02 | Stop reason: HOSPADM

## 2022-03-02 RX ORDER — ACETAMINOPHEN 500 MG
1000 TABLET ORAL
Status: CANCELLED
Start: 2022-03-02

## 2022-03-02 RX ORDER — SODIUM CHLORIDE 9 MG/ML
INJECTION, SOLUTION INTRAVENOUS ONCE
Status: CANCELLED
Start: 2022-03-02 | End: 2022-03-02

## 2022-03-02 RX ORDER — EPINEPHRINE 0.3 MG/.3ML
0.3 INJECTION SUBCUTANEOUS ONCE AS NEEDED
Status: DISCONTINUED | OUTPATIENT
Start: 2022-03-02 | End: 2022-03-02 | Stop reason: HOSPADM

## 2022-03-02 RX ORDER — HEPARIN 100 UNIT/ML
500 SYRINGE INTRAVENOUS
Status: CANCELLED | OUTPATIENT
Start: 2022-03-02

## 2022-03-02 RX ORDER — ACETAMINOPHEN 500 MG
1000 TABLET ORAL
Status: COMPLETED | OUTPATIENT
Start: 2022-03-02 | End: 2022-03-02

## 2022-03-02 RX ORDER — SODIUM CHLORIDE 0.9 % (FLUSH) 0.9 %
10 SYRINGE (ML) INJECTION
Status: CANCELLED | OUTPATIENT
Start: 2022-03-02

## 2022-03-02 RX ORDER — EPINEPHRINE 0.3 MG/.3ML
0.3 INJECTION SUBCUTANEOUS ONCE AS NEEDED
Status: CANCELLED | OUTPATIENT
Start: 2022-03-02

## 2022-03-02 RX ORDER — DIPHENHYDRAMINE HYDROCHLORIDE 50 MG/ML
50 INJECTION INTRAMUSCULAR; INTRAVENOUS ONCE AS NEEDED
Status: DISCONTINUED | OUTPATIENT
Start: 2022-03-02 | End: 2022-03-02 | Stop reason: HOSPADM

## 2022-03-02 RX ORDER — TRIAMCINOLONE ACETONIDE 0.25 MG/G
CREAM TOPICAL 2 TIMES DAILY
Qty: 15 G | Refills: 5 | Status: SHIPPED | OUTPATIENT
Start: 2022-03-02 | End: 2022-03-21 | Stop reason: SDUPTHER

## 2022-03-02 RX ADMIN — Medication 10 ML: at 04:03

## 2022-03-02 RX ADMIN — SODIUM CHLORIDE: 0.9 INJECTION, SOLUTION INTRAVENOUS at 03:03

## 2022-03-02 RX ADMIN — SODIUM CHLORIDE 200 MG: 9 INJECTION, SOLUTION INTRAVENOUS at 03:03

## 2022-03-02 RX ADMIN — DIPHENHYDRAMINE HYDROCHLORIDE 25 MG: 50 INJECTION INTRAMUSCULAR; INTRAVENOUS at 03:03

## 2022-03-02 RX ADMIN — ACETAMINOPHEN 1000 MG: 500 TABLET, FILM COATED ORAL at 03:03

## 2022-03-02 NOTE — PLAN OF CARE
Problem: Adult Inpatient Plan of Care  Goal: Plan of Care Review  3/2/2022 1642 by Leda Neal, RN  Outcome: Ongoing, Progressing  Flowsheets (Taken 3/2/2022 1630)  Plan of Care Reviewed With: patient   Pt tolerated her Keytruda infusion well, NAD. No new c/o voiced. Pt given a schedule and reviewed, pt verbalized understanding. Pt wheeled out of the clinic via wheelchair by her .

## 2022-03-02 NOTE — PROGRESS NOTES
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  Her treatment was interrupted after admission to [facility] with radiation esophagitis followed by Clostridium difficile colitis.  During her multi week stay, the patient became profoundly debilitated and predominantly bed-bound.  Despite healing of her esophagus, her nutritional status remained poor.  She was 2 immobile to allow for discharge directly to home and has had an interval rehab stay.  She and her /daughter have requested a virtual visit today to assess progress following discharge from rehab.  Patient's nutrition remains impaired.  She consumes predominantly liquids and soft solids.  Physical and Occupational therapy are being arranged for her in home.  Patient's  reports that she was ambulating with assistance at the rehab facility.    Patient returns to clinic for interval reassessment.  She has been performing better at home of late.  Diarrhea has resolved and been replaced with formed stool.  Patient is generally continent.  Appetite is improving and patient is struggling less to swallow medications.  Patient is up and awake most of the day.  She typically switches between bed and chair.  She remains highly motivated toward resumption of systemic chemotherapy for fear that she will have to repeat earlier treatment.    Patient returns to clinic to review interval lab and imaging.  Appetite has improved.  Diet has become more diversified. She is able to eat more substantial solids such as meat balls.  No worsening pain or dyspnea.  Patient is still wheelchair dependent in terms of ambulation and spends most of her day up to the chair.  Patient has been having difficulty with chronic urinary tract infection.  She recently completed course of Macrodantin.  Urine remains cloudy.    Patient returns to clinic  prior to cycle 3 of palliative pembrolizumab.  Patient is eating better.  She is no longer experiencing diarrhea.  She is ambulating about the house for short distances.  She did get up the stairs with her 's assistance for shower.  She is experiencing pruritic dermatitis involving her back.  She remains compliant with daily Zyrtec P    Physical examination:  The patient is a well-developed, elderly, frail appearing white female who is confined to a wheelchair during the course of today's examination.  Weight is 192 lb (increased by 1 lb).  VITAL SIGNS: Documented in EMR and reviewed   HEENT: Normocephalic, atraumatic. Oral mucosa pink and moist. Lips without lesions. Tongue midline. Oropharynx clear. Nonicteric sclerae.   NECK: Supple, no adenopathy.  HEART: Regular rate and rhythm without murmur, gallop or rub.   LUNGS: Clear to auscultation bilaterally.  ABDOMEN: Soft, nontender, nondistended with positive normoactive bowel sounds, no hepatosplenomegaly.   EXTREMITIES:  Bilateral, trace, pedal edema. Distal pulses are intact.     Laboratory:  White count 8.3, hemoglobin 11.2, hematocrit 33.7, platelets 163, absolute neutrophil count is 5800.  Sodium 141, potassium 4.6, chloride 105, CO2 26, BUN 24, creatinine 1.2, glucose 93, calcium 9.6, magnesium 2, albumin 3.4, liver function test within normal limits, LDH is 146, GFR is 45.      Impression:  1. Limited stage small cell carcinoma of the lung with disease progression involving lymph node, pleura and bone.  2. Iron deficiency anemia-improved.  3. General debility.  4. Pruritic dermatitis.    Plan:  1. Proceed with cycle 3 of Keytruda and will add 500 cc normal saline to this treatment.  2. Return to clinic 3 weeks from now with interval CBC, CMP, LDH and magnesium.  3. Triamcinolone cream twice daily for pruritic dermatitis.    Advance Care Planning During this visit, I engaged the patient in the advance care planning process.  The patient and I reviewed  the role for advance directives and their purpose in directing future healthcare if the patients unable to speak for him/herself.  At this point in time, the patient does have full decision-making capacity.  We discussed different extreme health states that they could experience, and reviewed what kind of medical care the patient would want in those situations.  The patient communicated that if they were comatose and had little chance of a meaningful recovery, they would not want machines/life-sustaining treatments used.  In addition to the above preference, other important end-of-life issues for the patient include DNR.  The patient has  completed a living will to reflect these preferences.  The patient has already designated a healthcare power of  to make decisions on their behalf.    (30 mins time spent).      This note was created using voice recognition software and may contain grammatical errors.

## 2022-03-02 NOTE — PLAN OF CARE
Problem: Fatigue  Goal: Improved Activity Tolerance  Intervention: Promote Energy Conservation  Flowsheets (Taken 3/2/2022 1526)  Fatigue Management:   activity schedule adjusted   activity assistance provided   fatigue-related activity identified   frequent rest breaks encouraged   paced activity encouraged  Sleep/Rest Enhancement:   relaxation techniques promoted   regular sleep/rest pattern promoted   natural light exposure provided   room darkened  Activity Management: Up in chair - L1     Problem: Adult Inpatient Plan of Care  Goal: Patient-Specific Goal (Individualization)  Outcome: Ongoing, Progressing  Flowsheets (Taken 3/2/2022 1526)  Individualized Care Needs: recliner, warm blanket, dimmed lights  Anxieties, Fears or Concerns: fear of needles  Patient-Specific Goals (Include Timeframe): no s/s of reaction during treatment

## 2022-03-03 ENCOUNTER — PATIENT MESSAGE (OUTPATIENT)
Dept: HEMATOLOGY/ONCOLOGY | Facility: CLINIC | Age: 72
End: 2022-03-03
Payer: MEDICARE

## 2022-03-21 ENCOUNTER — OFFICE VISIT (OUTPATIENT)
Dept: HEMATOLOGY/ONCOLOGY | Facility: CLINIC | Age: 72
End: 2022-03-21
Payer: MEDICARE

## 2022-03-21 ENCOUNTER — LAB VISIT (OUTPATIENT)
Dept: LAB | Facility: HOSPITAL | Age: 72
End: 2022-03-21
Attending: INTERNAL MEDICINE
Payer: MEDICARE

## 2022-03-21 VITALS
DIASTOLIC BLOOD PRESSURE: 71 MMHG | BODY MASS INDEX: 30.67 KG/M2 | HEIGHT: 66 IN | RESPIRATION RATE: 16 BRPM | SYSTOLIC BLOOD PRESSURE: 157 MMHG | WEIGHT: 190.81 LBS | OXYGEN SATURATION: 98 % | HEART RATE: 98 BPM | TEMPERATURE: 98 F

## 2022-03-21 DIAGNOSIS — C77.1 MALIGNANT NEOPLASM METASTATIC TO INTRATHORACIC LYMPH NODE: ICD-10-CM

## 2022-03-21 DIAGNOSIS — L30.9 DERMATITIS: ICD-10-CM

## 2022-03-21 DIAGNOSIS — C34.91 SMALL CELL CARCINOMA OF LUNG, RIGHT: ICD-10-CM

## 2022-03-21 DIAGNOSIS — C78.2 PLEURAL METASTASIS: ICD-10-CM

## 2022-03-21 DIAGNOSIS — D50.9 IRON DEFICIENCY ANEMIA, UNSPECIFIED IRON DEFICIENCY ANEMIA TYPE: ICD-10-CM

## 2022-03-21 DIAGNOSIS — C79.51 BONE METASTASES: ICD-10-CM

## 2022-03-21 DIAGNOSIS — C34.91 SMALL CELL CARCINOMA OF LUNG, RIGHT: Primary | ICD-10-CM

## 2022-03-21 LAB
ALBUMIN SERPL BCP-MCNC: 3.6 G/DL (ref 3.5–5.2)
ALP SERPL-CCNC: 70 U/L (ref 55–135)
ALT SERPL W/O P-5'-P-CCNC: 11 U/L (ref 10–44)
ANION GAP SERPL CALC-SCNC: 11 MMOL/L (ref 8–16)
AST SERPL-CCNC: 16 U/L (ref 10–40)
BASOPHILS # BLD AUTO: 0.05 K/UL (ref 0–0.2)
BASOPHILS NFR BLD: 0.6 % (ref 0–1.9)
BILIRUB SERPL-MCNC: 0.7 MG/DL (ref 0.1–1)
BUN SERPL-MCNC: 22 MG/DL (ref 8–23)
CALCIUM SERPL-MCNC: 9.7 MG/DL (ref 8.7–10.5)
CHLORIDE SERPL-SCNC: 105 MMOL/L (ref 95–110)
CO2 SERPL-SCNC: 25 MMOL/L (ref 23–29)
CREAT SERPL-MCNC: 1.3 MG/DL (ref 0.5–1.4)
DIFFERENTIAL METHOD: ABNORMAL
EOSINOPHIL # BLD AUTO: 0.3 K/UL (ref 0–0.5)
EOSINOPHIL NFR BLD: 2.8 % (ref 0–8)
ERYTHROCYTE [DISTWIDTH] IN BLOOD BY AUTOMATED COUNT: 16.6 % (ref 11.5–14.5)
EST. GFR  (AFRICAN AMERICAN): 47 ML/MIN/1.73 M^2
EST. GFR  (NON AFRICAN AMERICAN): 41 ML/MIN/1.73 M^2
GLUCOSE SERPL-MCNC: 109 MG/DL (ref 70–110)
HCT VFR BLD AUTO: 37.1 % (ref 37–48.5)
HGB BLD-MCNC: 11.6 G/DL (ref 12–16)
IMM GRANULOCYTES # BLD AUTO: 0.08 K/UL (ref 0–0.04)
IMM GRANULOCYTES NFR BLD AUTO: 0.9 % (ref 0–0.5)
LDH SERPL L TO P-CCNC: 164 U/L (ref 110–260)
LYMPHOCYTES # BLD AUTO: 1.1 K/UL (ref 1–4.8)
LYMPHOCYTES NFR BLD: 11.7 % (ref 18–48)
MAGNESIUM SERPL-MCNC: 2.1 MG/DL (ref 1.6–2.6)
MCH RBC QN AUTO: 27.2 PG (ref 27–31)
MCHC RBC AUTO-ENTMCNC: 31.3 G/DL (ref 32–36)
MCV RBC AUTO: 87 FL (ref 82–98)
MONOCYTES # BLD AUTO: 0.7 K/UL (ref 0.3–1)
MONOCYTES NFR BLD: 8.2 % (ref 4–15)
NEUTROPHILS # BLD AUTO: 6.8 K/UL (ref 1.8–7.7)
NEUTROPHILS NFR BLD: 75.8 % (ref 38–73)
NRBC BLD-RTO: 0 /100 WBC
PLATELET # BLD AUTO: 182 K/UL (ref 150–450)
PMV BLD AUTO: 10 FL (ref 9.2–12.9)
POTASSIUM SERPL-SCNC: 4.8 MMOL/L (ref 3.5–5.1)
PROT SERPL-MCNC: 6.5 G/DL (ref 6–8.4)
RBC # BLD AUTO: 4.26 M/UL (ref 4–5.4)
SODIUM SERPL-SCNC: 141 MMOL/L (ref 136–145)
WBC # BLD AUTO: 8.94 K/UL (ref 3.9–12.7)

## 2022-03-21 PROCEDURE — 3077F PR MOST RECENT SYSTOLIC BLOOD PRESSURE >= 140 MM HG: ICD-10-PCS | Mod: CPTII,S$GLB,, | Performed by: INTERNAL MEDICINE

## 2022-03-21 PROCEDURE — 83615 LACTATE (LD) (LDH) ENZYME: CPT | Mod: PN | Performed by: INTERNAL MEDICINE

## 2022-03-21 PROCEDURE — 83735 ASSAY OF MAGNESIUM: CPT | Mod: PN | Performed by: INTERNAL MEDICINE

## 2022-03-21 PROCEDURE — 99214 PR OFFICE/OUTPT VISIT, EST, LEVL IV, 30-39 MIN: ICD-10-PCS | Mod: S$GLB,,, | Performed by: INTERNAL MEDICINE

## 2022-03-21 PROCEDURE — 1159F PR MEDICATION LIST DOCUMENTED IN MEDICAL RECORD: ICD-10-PCS | Mod: CPTII,S$GLB,, | Performed by: INTERNAL MEDICINE

## 2022-03-21 PROCEDURE — 3008F BODY MASS INDEX DOCD: CPT | Mod: CPTII,S$GLB,, | Performed by: INTERNAL MEDICINE

## 2022-03-21 PROCEDURE — 1126F AMNT PAIN NOTED NONE PRSNT: CPT | Mod: CPTII,S$GLB,, | Performed by: INTERNAL MEDICINE

## 2022-03-21 PROCEDURE — 1101F PR PT FALLS ASSESS DOC 0-1 FALLS W/OUT INJ PAST YR: ICD-10-PCS | Mod: CPTII,S$GLB,, | Performed by: INTERNAL MEDICINE

## 2022-03-21 PROCEDURE — 85025 COMPLETE CBC W/AUTO DIFF WBC: CPT | Mod: PN | Performed by: INTERNAL MEDICINE

## 2022-03-21 PROCEDURE — 3288F FALL RISK ASSESSMENT DOCD: CPT | Mod: CPTII,S$GLB,, | Performed by: INTERNAL MEDICINE

## 2022-03-21 PROCEDURE — 1126F PR PAIN SEVERITY QUANTIFIED, NO PAIN PRESENT: ICD-10-PCS | Mod: CPTII,S$GLB,, | Performed by: INTERNAL MEDICINE

## 2022-03-21 PROCEDURE — 99999 PR PBB SHADOW E&M-EST. PATIENT-LVL IV: CPT | Mod: PBBFAC,,, | Performed by: INTERNAL MEDICINE

## 2022-03-21 PROCEDURE — 1159F MED LIST DOCD IN RCRD: CPT | Mod: CPTII,S$GLB,, | Performed by: INTERNAL MEDICINE

## 2022-03-21 PROCEDURE — 99999 PR PBB SHADOW E&M-EST. PATIENT-LVL IV: ICD-10-PCS | Mod: PBBFAC,,, | Performed by: INTERNAL MEDICINE

## 2022-03-21 PROCEDURE — 3008F PR BODY MASS INDEX (BMI) DOCUMENTED: ICD-10-PCS | Mod: CPTII,S$GLB,, | Performed by: INTERNAL MEDICINE

## 2022-03-21 PROCEDURE — 3078F DIAST BP <80 MM HG: CPT | Mod: CPTII,S$GLB,, | Performed by: INTERNAL MEDICINE

## 2022-03-21 PROCEDURE — 3288F PR FALLS RISK ASSESSMENT DOCUMENTED: ICD-10-PCS | Mod: CPTII,S$GLB,, | Performed by: INTERNAL MEDICINE

## 2022-03-21 PROCEDURE — 3077F SYST BP >= 140 MM HG: CPT | Mod: CPTII,S$GLB,, | Performed by: INTERNAL MEDICINE

## 2022-03-21 PROCEDURE — 99214 OFFICE O/P EST MOD 30 MIN: CPT | Mod: S$GLB,,, | Performed by: INTERNAL MEDICINE

## 2022-03-21 PROCEDURE — 1101F PT FALLS ASSESS-DOCD LE1/YR: CPT | Mod: CPTII,S$GLB,, | Performed by: INTERNAL MEDICINE

## 2022-03-21 PROCEDURE — 80053 COMPREHEN METABOLIC PANEL: CPT | Mod: PN | Performed by: INTERNAL MEDICINE

## 2022-03-21 PROCEDURE — 36415 COLL VENOUS BLD VENIPUNCTURE: CPT | Mod: PN | Performed by: INTERNAL MEDICINE

## 2022-03-21 PROCEDURE — 3078F PR MOST RECENT DIASTOLIC BLOOD PRESSURE < 80 MM HG: ICD-10-PCS | Mod: CPTII,S$GLB,, | Performed by: INTERNAL MEDICINE

## 2022-03-21 RX ORDER — SODIUM CHLORIDE 0.9 % (FLUSH) 0.9 %
10 SYRINGE (ML) INJECTION
Status: CANCELLED | OUTPATIENT
Start: 2022-03-23

## 2022-03-21 RX ORDER — ACETAMINOPHEN 500 MG
1000 TABLET ORAL
Status: CANCELLED
Start: 2022-03-23

## 2022-03-21 RX ORDER — SODIUM CHLORIDE 9 MG/ML
INJECTION, SOLUTION INTRAVENOUS CONTINUOUS
Status: CANCELLED
Start: 2022-03-23

## 2022-03-21 RX ORDER — TRIAMCINOLONE ACETONIDE 0.25 MG/G
CREAM TOPICAL 2 TIMES DAILY
Qty: 80 G | Refills: 5 | Status: ON HOLD | OUTPATIENT
Start: 2022-03-21 | End: 2022-09-20 | Stop reason: HOSPADM

## 2022-03-21 RX ORDER — EPINEPHRINE 0.3 MG/.3ML
0.3 INJECTION SUBCUTANEOUS ONCE AS NEEDED
Status: CANCELLED | OUTPATIENT
Start: 2022-03-23

## 2022-03-21 RX ORDER — DIPHENHYDRAMINE HYDROCHLORIDE 50 MG/ML
50 INJECTION INTRAMUSCULAR; INTRAVENOUS ONCE AS NEEDED
Status: CANCELLED | OUTPATIENT
Start: 2022-03-23

## 2022-03-21 RX ORDER — HEPARIN 100 UNIT/ML
500 SYRINGE INTRAVENOUS
Status: CANCELLED | OUTPATIENT
Start: 2022-03-23

## 2022-03-21 NOTE — Clinical Note
Return to clinic in 3 weeks with interval CBC, CMP, LDH, magnesium, TSH, free T4, and CT PET scan prior to appointment.

## 2022-03-21 NOTE — PROGRESS NOTES
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  Her treatment was interrupted after admission to [facility] with radiation esophagitis followed by Clostridium difficile colitis.  During her multi week stay, the patient became profoundly debilitated and predominantly bed-bound.  Despite healing of her esophagus, her nutritional status remained poor.  She was 2 immobile to allow for discharge directly to home and has had an interval rehab stay.  She and her /daughter have requested a virtual visit today to assess progress following discharge from rehab.  Patient's nutrition remains impaired.  She consumes predominantly liquids and soft solids.  Physical and Occupational therapy are being arranged for her in home.  Patient's  reports that she was ambulating with assistance at the rehab facility.    Patient returns to clinic for interval reassessment.  She has been performing better at home of late.  Diarrhea has resolved and been replaced with formed stool.  Patient is generally continent.  Appetite is improving and patient is struggling less to swallow medications.  Patient is up and awake most of the day.  She typically switches between bed and chair.  She remains highly motivated toward resumption of systemic chemotherapy for fear that she will have to repeat earlier treatment.    Patient returns to clinic to review interval lab and imaging.  Appetite has improved.  Diet has become more diversified. She is able to eat more substantial solids such as meat balls.  No worsening pain or dyspnea.  Patient is still wheelchair dependent in terms of ambulation and spends most of her day up to the chair.  Patient has been having difficulty with chronic urinary tract infection.  She recently completed course of Macrodantin.  Urine remains cloudy.    Patient returns to clinic  prior to cycle 4 of palliative pembrolizumab.  Patient is eating better.  She is now ambulating short distances without her walker.  She continues to feel better overall since initiation of pembrolizumab.      Physical examination:  The patient is a well-developed, elderly, frail appearing white female who is confined to a wheelchair during the course of today's examination.  Weight is 190.5 lb (decreased by 1.5 lb).  VITAL SIGNS: Documented in EMR and reviewed   HEENT: Normocephalic, atraumatic. Oral mucosa pink and moist. Lips without lesions. Tongue midline. Oropharynx clear. Nonicteric sclerae.   NECK: Supple, no adenopathy.  HEART: Regular rate and rhythm without murmur, gallop or rub.   LUNGS: Clear to auscultation bilaterally.  ABDOMEN: Soft, nontender, nondistended with positive normoactive bowel sounds, no hepatosplenomegaly.   EXTREMITIES:  Bilateral, trace, pedal edema. Distal pulses are intact.     Laboratory:  White count 8.9, hemoglobin 11.6, hematocrit 37.1, platelets 182, absolute neutrophil count is 6800.  Sodium 141, potassium 4.8, chloride 105, CO2 25, BUN 22, creatinine 1.3, glucose 109, calcium 9.7, magnesium 2.1, liver function test within normal limits, GFR is 41.     Impression:  1. Limited stage small cell carcinoma of the lung with disease progression involving lymph node, pleura and bone.  2. Iron deficiency anemia-improved.  3. General debility.  4. Pruritic dermatitis.    Plan:  1. Proceed with cycle 4 of Keytruda and will add 500 cc normal saline to this treatment.  2. Return to clinic 3 weeks from now with interval CBC, CMP, LDH magnesium, TSH, free T4, and CT PET scan for restaging prior to appointment.  3. Triamcinolone cream twice daily for pruritic dermatitis.    Advance Care Planning During a prior visit, I engaged the patient in the advance care planning process.  The patient and I reviewed the role for advance directives and their purpose in directing future healthcare if the  patients unable to speak for him/herself.  At this point in time, the patient does have full decision-making capacity.  We discussed different extreme health states that they could experience, and reviewed what kind of medical care the patient would want in those situations.  The patient communicated that if they were comatose and had little chance of a meaningful recovery, they would not want machines/life-sustaining treatments used.  In addition to the above preference, other important end-of-life issues for the patient include DNR.  The patient has  completed a living will to reflect these preferences.  The patient has already designated a healthcare power of  to make decisions on their behalf.    (30 mins time spent).      This note was created using voice recognition software and may contain grammatical errors.

## 2022-03-23 ENCOUNTER — DOCUMENTATION ONLY (OUTPATIENT)
Dept: INFUSION THERAPY | Facility: HOSPITAL | Age: 72
End: 2022-03-23
Payer: MEDICARE

## 2022-03-23 ENCOUNTER — INFUSION (OUTPATIENT)
Dept: INFUSION THERAPY | Facility: HOSPITAL | Age: 72
End: 2022-03-23
Attending: INTERNAL MEDICINE
Payer: MEDICARE

## 2022-03-23 VITALS
BODY MASS INDEX: 30.67 KG/M2 | HEIGHT: 66 IN | SYSTOLIC BLOOD PRESSURE: 135 MMHG | TEMPERATURE: 99 F | DIASTOLIC BLOOD PRESSURE: 73 MMHG | RESPIRATION RATE: 16 BRPM | HEART RATE: 74 BPM | WEIGHT: 190.81 LBS

## 2022-03-23 DIAGNOSIS — C34.91 SMALL CELL CARCINOMA OF LUNG, RIGHT: Primary | ICD-10-CM

## 2022-03-23 PROCEDURE — 96413 CHEMO IV INFUSION 1 HR: CPT | Mod: PN

## 2022-03-23 PROCEDURE — 63600175 PHARM REV CODE 636 W HCPCS: Mod: PN | Performed by: INTERNAL MEDICINE

## 2022-03-23 PROCEDURE — A4216 STERILE WATER/SALINE, 10 ML: HCPCS | Mod: PN | Performed by: INTERNAL MEDICINE

## 2022-03-23 PROCEDURE — 96367 TX/PROPH/DG ADDL SEQ IV INF: CPT | Mod: PN

## 2022-03-23 PROCEDURE — 25000003 PHARM REV CODE 250: Mod: PN | Performed by: INTERNAL MEDICINE

## 2022-03-23 RX ORDER — SODIUM CHLORIDE 0.9 % (FLUSH) 0.9 %
10 SYRINGE (ML) INJECTION
Status: DISCONTINUED | OUTPATIENT
Start: 2022-03-23 | End: 2022-03-23 | Stop reason: HOSPADM

## 2022-03-23 RX ORDER — ACETAMINOPHEN 500 MG
1000 TABLET ORAL
Status: COMPLETED | OUTPATIENT
Start: 2022-03-23 | End: 2022-03-23

## 2022-03-23 RX ADMIN — ACETAMINOPHEN 1000 MG: 500 TABLET, FILM COATED ORAL at 02:03

## 2022-03-23 RX ADMIN — Medication 10 ML: at 02:03

## 2022-03-23 RX ADMIN — SODIUM CHLORIDE 200 MG: 9 INJECTION, SOLUTION INTRAVENOUS at 03:03

## 2022-03-23 RX ADMIN — SODIUM CHLORIDE: 9 INJECTION, SOLUTION INTRAVENOUS at 02:03

## 2022-03-23 RX ADMIN — DIPHENHYDRAMINE HYDROCHLORIDE 25 MG: 50 INJECTION INTRAMUSCULAR; INTRAVENOUS at 02:03

## 2022-03-23 NOTE — PLAN OF CARE
Pt tolerated keytruda well today. Vitals remain stable. Reviewed follow-up appointments. All questions were answered, ambulated independently at d/c.

## 2022-03-23 NOTE — PROGRESS NOTES
Oncology   Chemotherapy Infusion Visit    Quick Social Service Status Follow Up   Met w/ pt briefly to follow up on social and emotional needs since initiation of treatment.      Pt was here wit her . She was happy to share with SW that she is getting stronger. Pt was able to walk up the stairs at her home and shower for the firts time since last July. She said the only side effect she has experienced on the med is her back itching. The doctor gave her some cream and she said it has helped.     Pt will have a PET scan 4/7 to see how she is responding. Pt and  little anxious and hoping for good results. SW provided support.   SW will follow.          Mae Webster, ALINA  03/23/2022  3:57 PM

## 2022-03-24 ENCOUNTER — PATIENT MESSAGE (OUTPATIENT)
Dept: HEMATOLOGY/ONCOLOGY | Facility: CLINIC | Age: 72
End: 2022-03-24
Payer: MEDICARE

## 2022-03-25 ENCOUNTER — PATIENT MESSAGE (OUTPATIENT)
Dept: GASTROENTEROLOGY | Facility: CLINIC | Age: 72
End: 2022-03-25
Payer: MEDICARE

## 2022-04-05 ENCOUNTER — OFFICE VISIT (OUTPATIENT)
Dept: GASTROENTEROLOGY | Facility: CLINIC | Age: 72
End: 2022-04-05
Payer: MEDICARE

## 2022-04-05 VITALS — BODY MASS INDEX: 31.82 KG/M2 | WEIGHT: 198 LBS | HEIGHT: 66 IN

## 2022-04-05 DIAGNOSIS — K92.1 BLACK STOOL: ICD-10-CM

## 2022-04-05 DIAGNOSIS — Z85.118 HISTORY OF LUNG CANCER: ICD-10-CM

## 2022-04-05 DIAGNOSIS — Z87.19 HISTORY OF HEMORRHOIDS: ICD-10-CM

## 2022-04-05 DIAGNOSIS — Z79.01 ANTICOAGULANT LONG-TERM USE: ICD-10-CM

## 2022-04-05 DIAGNOSIS — K63.9 COLON WALL THICKENING: ICD-10-CM

## 2022-04-05 DIAGNOSIS — K92.1 HEMATOCHEZIA: Primary | ICD-10-CM

## 2022-04-05 DIAGNOSIS — Z86.2 HISTORY OF ANEMIA: ICD-10-CM

## 2022-04-05 DIAGNOSIS — R94.8 ABNORMAL PET SCAN OF COLON: ICD-10-CM

## 2022-04-05 PROCEDURE — 1126F AMNT PAIN NOTED NONE PRSNT: CPT | Mod: CPTII,S$GLB,, | Performed by: NURSE PRACTITIONER

## 2022-04-05 PROCEDURE — 3008F PR BODY MASS INDEX (BMI) DOCUMENTED: ICD-10-PCS | Mod: CPTII,S$GLB,, | Performed by: NURSE PRACTITIONER

## 2022-04-05 PROCEDURE — 3288F PR FALLS RISK ASSESSMENT DOCUMENTED: ICD-10-PCS | Mod: CPTII,S$GLB,, | Performed by: NURSE PRACTITIONER

## 2022-04-05 PROCEDURE — 3288F FALL RISK ASSESSMENT DOCD: CPT | Mod: CPTII,S$GLB,, | Performed by: NURSE PRACTITIONER

## 2022-04-05 PROCEDURE — 1101F PR PT FALLS ASSESS DOC 0-1 FALLS W/OUT INJ PAST YR: ICD-10-PCS | Mod: CPTII,S$GLB,, | Performed by: NURSE PRACTITIONER

## 2022-04-05 PROCEDURE — 99214 OFFICE O/P EST MOD 30 MIN: CPT | Mod: S$GLB,,, | Performed by: NURSE PRACTITIONER

## 2022-04-05 PROCEDURE — 1160F PR REVIEW ALL MEDS BY PRESCRIBER/CLIN PHARMACIST DOCUMENTED: ICD-10-PCS | Mod: CPTII,S$GLB,, | Performed by: NURSE PRACTITIONER

## 2022-04-05 PROCEDURE — 99214 PR OFFICE/OUTPT VISIT, EST, LEVL IV, 30-39 MIN: ICD-10-PCS | Mod: S$GLB,,, | Performed by: NURSE PRACTITIONER

## 2022-04-05 PROCEDURE — 1126F PR PAIN SEVERITY QUANTIFIED, NO PAIN PRESENT: ICD-10-PCS | Mod: CPTII,S$GLB,, | Performed by: NURSE PRACTITIONER

## 2022-04-05 PROCEDURE — 1159F MED LIST DOCD IN RCRD: CPT | Mod: CPTII,S$GLB,, | Performed by: NURSE PRACTITIONER

## 2022-04-05 PROCEDURE — 99999 PR PBB SHADOW E&M-EST. PATIENT-LVL III: CPT | Mod: PBBFAC,,, | Performed by: NURSE PRACTITIONER

## 2022-04-05 PROCEDURE — 1160F RVW MEDS BY RX/DR IN RCRD: CPT | Mod: CPTII,S$GLB,, | Performed by: NURSE PRACTITIONER

## 2022-04-05 PROCEDURE — 1159F PR MEDICATION LIST DOCUMENTED IN MEDICAL RECORD: ICD-10-PCS | Mod: CPTII,S$GLB,, | Performed by: NURSE PRACTITIONER

## 2022-04-05 PROCEDURE — 3008F BODY MASS INDEX DOCD: CPT | Mod: CPTII,S$GLB,, | Performed by: NURSE PRACTITIONER

## 2022-04-05 PROCEDURE — 99999 PR PBB SHADOW E&M-EST. PATIENT-LVL III: ICD-10-PCS | Mod: PBBFAC,,, | Performed by: NURSE PRACTITIONER

## 2022-04-05 PROCEDURE — 1101F PT FALLS ASSESS-DOCD LE1/YR: CPT | Mod: CPTII,S$GLB,, | Performed by: NURSE PRACTITIONER

## 2022-04-05 NOTE — PROGRESS NOTES
Subjective:       Patient ID: Allyson Henriquez is a 72 y.o. female Body mass index is 31.95 kg/m².    Chief Complaint: Other (Rectal Bleeding)    Established patient of Dr. Cardona & myself.     Patient is here with her , whom assisted with history.    Anemia  Presents for follow-up (seeing Dr. Cardona and Dr. Cardona for anemia as well) visit. Symptoms include malaise/fatigue (improving). There has been no abdominal pain, fever or weight loss. (Reports some belching, denies reflux; taking protonix 40 mg once daily) Signs of blood loss that are present include hematochezia (bright red discharge seen with bowel movements on 3/25/2022 & 3/28/2022, had small amount of bright red blood on tissue on 3/27/2022; no more signs of GI bleeding since 3/31/2022) and melena (started with black stools on 3/24/2022; no more signs of GI bleeding since 3/31/2022). Signs of blood loss that are not present include hematemesis and vaginal bleeding. Past treatments include parenteral iron supplements. Past medical history includes cancer (history of lung cancer), chronic renal disease, heart failure and recent illness (was hospitalized in 8/2021). There is no history of chronic liver disease, hypothyroidism or inflammatory bowel disease. Procedure history includes colonoscopy, EGD and FOBT.     Review of Systems   Constitutional: Positive for fatigue (improving) and malaise/fatigue (improving). Negative for appetite change, chills, fever and weight loss.        Reports she had finished radiation treatment and did 3 rounds of chemotherapy and had to stop due to hospitalization (reports was suppose to have a total of 6 rounds of chemotherapy). Following with Dr. Cardona. Doing immunotherapy currently.   HENT: Negative for sore throat and trouble swallowing.    Respiratory: Negative for cough, choking and shortness of breath.    Cardiovascular: Negative for chest pain.   Gastrointestinal: Positive for anal bleeding, blood in  stool (positive stool, but not visible to patient), diarrhea (occasional loose stool along with formed stool; last had loose stools on 3/26/2022; bowel movements are once daily of formed stool currently), hematochezia (bright red discharge seen with bowel movements on 3/25/2022 & 3/28/2022, had small amount of bright red blood on tissue on 3/27/2022; no more signs of GI bleeding since 3/31/2022) and melena (started with black stools on 3/24/2022; no more signs of GI bleeding since 3/31/2022). Negative for abdominal pain, constipation, hematemesis, nausea, rectal pain and vomiting.   Genitourinary: Negative for difficulty urinating, dysuria, flank pain, hematuria and vaginal bleeding.       No LMP recorded. Patient is postmenopausal.  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 difficile colitis 08/2021    Cancer     lung    CKD (chronic kidney disease), stage III     COPD (chronic obstructive pulmonary disease)     Hepatic steatosis     History of tobacco abuse     Hyperlipidemia     Hypertension     Lung nodules     and adenopathy    Multinodular goiter (nontoxic) 5/8/2017    Obesity     TRISHA (obstructive sleep apnea)     on bipap at bedtime    Proteinuria      Past Surgical History:   Procedure Laterality Date    ADENOIDECTOMY      APPENDECTOMY      CATARACT EXTRACTION      COLONOSCOPY N/A 11/14/2016    Procedure: COLONOSCOPY;  Surgeon: Destin Cardona MD;  Location[facility];  Service: Endoscopy;  Laterality: N/A;    COLONOSCOPY N/A 05/14/2021    Procedure: COLONOSCOPY;  Surgeon: Destin Cardona MD;  Location: [facility];  Service: Endoscopy;  Laterality: N/A; hemorrhoids, diverticulosis, Old blood left colon. No active bleeding; Repeat colonoscopy is not recommended for screening purposes.    ESOPHAGOGASTRODUODENOSCOPY N/A 05/14/2021     Procedure: EGD (ESOPHAGOGASTRODUODENOSCOPY);  Surgeon: Destin Cardona MD;  Location: [facility] ENDO;  Service: Endoscopy;  Laterality: N/A; unremarkable    ESOPHAGOGASTRODUODENOSCOPY N/A 2021    Procedure: ESOPHAGOGASTRODUODENOSCOPY (EGD);  Surgeon: Destin Cardona MD;  Location: [facility] ENDO;  Service: Endoscopy;  Laterality: N/A; Moderately severe radiation esophagitis with no bleeding    EYE SURGERY      cataract OU    INSERTION OF TUNNELED CENTRAL VENOUS CATHETER (CVC) WITH SUBCUTANEOUS PORT N/A 2021    Procedure: [id number];  Surgeon: Joe Salinas MD;  Location: [facility] OR;  Service: General;  Laterality: N/A;    INTRALUMINAL GASTROINTESTINAL TRACT IMAGING VIA CAPSULE N/A 2021    Procedure: IMAGING PROCEDURE, GI TRACT, INTRALUMINAL, VIA CAPSULE  (called for instruction -may have trouble swallowing);  Surgeon: Floyd Dixon MD;  Location: [facility] ENDO;  Service: Endoscopy;  Laterality: N/A; Diffuse red spots of unclear significance and erosions found in the small bowel (entire small bowel), suspicious for mild radiation enteritis    NEUROMA SURGERY Left     acoustic    TONSILLECTOMY       Family History   Problem Relation Age of Onset    Cancer Paternal Uncle         colon cancer    Diabetes Neg Hx     Kidney disease Neg Hx     Stroke Neg Hx     Heart disease Neg Hx     Colon cancer Neg Hx     Crohn's disease Neg Hx     Ulcerative colitis Neg Hx     Stomach cancer Neg Hx     Esophageal cancer Neg Hx      Social History     Tobacco Use    Smoking status: Former Smoker     Packs/day: 1.00     Years: 40.00     Pack years: 40.00     Quit date: 3/6/2016     Years since quittin.1    Smokeless tobacco: Never Used   Substance Use Topics    Alcohol use: Yes     Alcohol/week: 0.0 standard drinks     Comment: occasional- social; not very often    Drug use: No     Wt Readings from Last 10 Encounters:   22 89.8 kg (197 lb 15.6 oz)   22 86.5 kg (190 lb  12.9 oz)   03/21/22 86.5 kg (190 lb 12.9 oz)   03/02/22 87.1 kg (192 lb 0.3 oz)   03/02/22 87.1 kg (192 lb 0.3 oz)   02/11/22 86.7 kg (191 lb 2.2 oz)   02/08/22 86.7 kg (191 lb 2.2 oz)   02/08/22 86.7 kg (191 lb 2.2 oz)   01/24/22 84.6 kg (186 lb 8.2 oz)   01/18/22 84.6 kg (186 lb 8.2 oz)     Lab Results   Component Value Date    WBC 8.94 03/21/2022    HGB 11.6 (L) 03/21/2022    HCT 37.1 03/21/2022    MCV 87 03/21/2022     03/21/2022     Lab Results   Component Value Date    IRON 44 01/07/2022    TIBC 247 (L) 01/07/2022    FERRITIN 1,765 (H) 11/16/2021     Lab Results   Component Value Date    OCCULTBLOOD Positive (A) 11/19/2021    OCCULTBLOOD Positive (A) 11/19/2021    OCCULTBLOOD Negative 11/19/2021     CMP  Sodium   Date Value Ref Range Status   03/21/2022 141 136 - 145 mmol/L Final     Potassium   Date Value Ref Range Status   03/21/2022 4.8 3.5 - 5.1 mmol/L Final     Chloride   Date Value Ref Range Status   03/21/2022 105 95 - 110 mmol/L Final     CO2   Date Value Ref Range Status   03/21/2022 25 23 - 29 mmol/L Final     Glucose   Date Value Ref Range Status   03/21/2022 109 70 - 110 mg/dL Final     BUN   Date Value Ref Range Status   03/21/2022 22 8 - 23 mg/dL Final     Creatinine   Date Value Ref Range Status   03/21/2022 1.3 0.5 - 1.4 mg/dL Final     Calcium   Date Value Ref Range Status   03/21/2022 9.7 8.7 - 10.5 mg/dL Final     Total Protein   Date Value Ref Range Status   03/21/2022 6.5 6.0 - 8.4 g/dL Final     Albumin   Date Value Ref Range Status   03/21/2022 3.6 3.5 - 5.2 g/dL Final     Total Bilirubin   Date Value Ref Range Status   03/21/2022 0.7 0.1 - 1.0 mg/dL Final     Comment:     For infants and newborns, interpretation of results should be based  on gestational age, weight and in agreement with clinical  observations.    Premature Infant recommended reference ranges:  Up to 24 hours.............<8.0 mg/dL  Up to 48 hours............<12.0 mg/dL  3-5 days..................<15.0  mg/dL  6-29 days.................<15.0 mg/dL       Alkaline Phosphatase   Date Value Ref Range Status   03/21/2022 70 55 - 135 U/L Final     AST   Date Value Ref Range Status   03/21/2022 16 10 - 40 U/L Final     ALT   Date Value Ref Range Status   03/21/2022 11 10 - 44 U/L Final     Anion Gap   Date Value Ref Range Status   03/21/2022 11 8 - 16 mmol/L Final     eGFR if    Date Value Ref Range Status   03/21/2022 47 (A) >60 mL/min/1.73 m^2 Final     eGFR if non    Date Value Ref Range Status   03/21/2022 41 (A) >60 mL/min/1.73 m^2 Final     Comment:     Calculation used to obtain the estimated glomerular filtration  rate (eGFR) is the CKD-EPI equation.        Lab Results   Component Value Date    TSH 1.855 01/12/2022     Reviewed prior medical records including radiology report of 1/7/2022 PET CT SCAN; 8/5/2021 CT abdomen pelvis; 3/21/2022 visit note with Dr. Cardona; & endoscopy history (see surgical history).    Objective:      Physical Exam  Vitals and nursing note reviewed.   Constitutional:       General: She is not in acute distress.     Appearance: Normal appearance. She is well-developed. She is not diaphoretic.      Comments: Patient is in a walker.   HENT:      Mouth/Throat:      Comments: Patient is wearing a face mask, which covers patient's mouth and nose, due to COVID 19 concerns.  Eyes:      General: No scleral icterus.     Conjunctiva/sclera: Conjunctivae normal.      Pupils: Pupils are equal, round, and reactive to light.   Pulmonary:      Effort: Pulmonary effort is normal. No respiratory distress.      Breath sounds: Normal breath sounds. No wheezing.   Abdominal:      General: Bowel sounds are normal. There is no distension.      Palpations: Abdomen is soft. Abdomen is not rigid. There is no mass.      Tenderness: There is no abdominal tenderness. There is no guarding or rebound.      Comments: Deferred rectal examination.   Skin:     General: Skin is warm and dry.       Coloration: Skin is not pale.      Findings: No erythema or rash.      Comments: Non-jaundiced   Neurological:      Mental Status: She is alert and oriented to person, place, and time.   Psychiatric:         Behavior: Behavior normal.         Thought Content: Thought content normal.         Judgment: Judgment normal.         Assessment:       1. Hematochezia    2. Abnormal PET scan of colon    3. Colon wall thickening    4. History of black stool    5. History of anemia    6. History of lung cancer    7. Anticoagulant long-term use        Plan:       Hematochezia  -     CBC Without Differential; Future; Expected date: 04/05/2022  -     Iron and TIBC; Future; Expected date: 04/05/2022  -     Ferritin; Future; Expected date: 04/05/2022  - discussed about Colonoscopy, including the risks and benefits of colonoscopy, patient verbalized understanding but patient declined colonoscopy at this time.  - discussed about different etiologies that can cause rectal bleeding, such as but not limited to diverticulosis, polyps, colon mass, colon inflammation or infection, anal fissure or hemorrhoids.   - You may resume normal activity as long as you feel well.  - Avoid/minimize NSAIDs such as ibuprofen (Advil, Motrin) and naproxen (Aleve and Naprosyn).  - Avoid alcohol.  -     Ambulatory referral/consult to Colorectal Surgery ([facility]; Future; Expected date: 04/18/2022    Abnormal PET scan of colon: Colon wall thickening  - discussed about Colonoscopy, including the risks and benefits of colonoscopy, patient verbalized understanding but patient declined colonoscopy at this time.  - Recommend follow-up with Dr. Cardona for continued evaluation and management.  - reports she has a repeat PET scan scheduled for 4/7/2022    History of black stool  - schedule EGD, discussed procedure with patient, including risks and benefits, patient verbalized understanding  - avoid/minimize use of NSAIDs- since they can cause GI upset,  bleeding and/or ulcers. If NSAID must be taken, recommend take with food.  - CONTINUE PROTONIX 40 MG ONCE DAILY AS DIRECTED  - avoid/minimize use of NSAIDs- since they can cause GI upset, bleeding and/or ulcers.    History of anemia  -     CBC Without Differential; Future; Expected date: 04/05/2022  - Recommend follow-up with Dr. Cardona for continued evaluation and management.  - schedule EGD, discussed procedure with patient, including risks and benefits, patient verbalized understanding  - discussed about Colonoscopy, including the risks and benefits of colonoscopy, patient verbalized understanding but patient declined colonoscopy at this time.    History of lung cancer  Recommend follow-up with Dr. Cardona for continued evaluation and management.    Anticoagulant long-term use  - informed patient that the anticoagulant(s) will likely need to be held for endoscopy, nurse will confirm with endoscopist, cardiologist, and/or PCP.    History of hemorrhoids  -     Ambulatory referral/consult to Colorectal Surgery ([place]); Future; Expected date: 04/18/2022  - avoid constipation and straining with bowel movements; try using an OTC stool softener as directed and increase fiber in diet (20-30 grams daily)/OTC fiber supplement such as metamucil (take as directed)  - recommend SITZ baths    Follow up in about 1 month (around 5/5/2022), or if symptoms worsen or fail to improve.      If no improvement in symptoms or symptoms worsen, call/follow-up at clinic or go to ER.        42 minutes of total time spent on the encounter, which includes face to face time and non-face to face time preparing to see the patient (eg, review of tests), Obtaining and/or reviewing separately obtained history, Documenting clinical information in the electronic or other health record, Independently interpreting results (not separately reported) and communicating results to the patient/family/caregiver, or Care coordination (not separately  reported).

## 2022-04-07 ENCOUNTER — HOSPITAL ENCOUNTER (OUTPATIENT)
Dept: RADIOLOGY | Facility: HOSPITAL | Age: 72
Discharge: HOME | End: 2022-04-07
Attending: INTERNAL MEDICINE
Payer: MEDICARE

## 2022-04-07 DIAGNOSIS — C34.91 SMALL CELL CARCINOMA OF LUNG, RIGHT: ICD-10-CM

## 2022-04-07 LAB — GLUCOSE SERPL-MCNC: 101 MG/DL (ref 70–110)

## 2022-04-07 PROCEDURE — 78815 NM PET CT ROUTINE: ICD-10-PCS | Mod: 26,PS,, | Performed by: RADIOLOGY

## 2022-04-07 PROCEDURE — 78815 PET IMAGE W/CT SKULL-THIGH: CPT | Mod: 26,PS,, | Performed by: RADIOLOGY

## 2022-04-07 PROCEDURE — A9552 F18 FDG: HCPCS | Mod: PN

## 2022-04-07 NOTE — PROGRESS NOTES
PET Imaging Questionnaire    1. Are you a Diabetic? Recent Blood Sugar level? No    2. Are you anemic? Bone Marrow Stimulation Meds? No    3. Have you had a CT Scan, if so when & where was your last one? Yes -     4. Have you had a PET Scan, if so when & where was your last one? Yes -     5. Chemotherapy or currently on Chemotherapy? Yes    6. Radiation therapy? Yes    Surgical History:   Past Surgical History:   Procedure Laterality Date    ADENOIDECTOMY      APPENDECTOMY      CATARACT EXTRACTION      COLONOSCOPY N/A 11/14/2016    Procedure: COLONOSCOPY;  Surgeon: Destin Cardona MD;  [facility];  Service: Endoscopy;  Laterality: N/A;    COLONOSCOPY N/A 05/14/2021    Procedure: COLONOSCOPY;  Surgeon: Destin Cardona MD;  Location: [facility];  Service: Endoscopy;  Laterality: N/A; hemorrhoids, diverticulosis, Old blood left colon. No active bleeding; Repeat colonoscopy is not recommended for screening purposes.    ESOPHAGOGASTRODUODENOSCOPY N/A 05/14/2021    Procedure: EGD (ESOPHAGOGASTRODUODENOSCOPY);  Surgeon: Destin Cardona MD;  Location: [facility];  Service: Endoscopy;  Laterality: N/A; unremarkable    ESOPHAGOGASTRODUODENOSCOPY N/A 08/09/2021    Procedure: ESOPHAGOGASTRODUODENOSCOPY (EGD);  Surgeon: Destin Cardona MD;  Location: [facility];  Service: Endoscopy;  Laterality: N/A; Moderately severe radiation esophagitis with no bleeding    EYE SURGERY      cataract OU    INSERTION OF TUNNELED CENTRAL VENOUS CATHETER (CVC) WITH SUBCUTANEOUS PORT N/A 06/07/2021    Procedure: [id number];  Surgeon: Joe Salinas MD;  Location: [facility];  Service: General;  Laterality: N/A;    INTRALUMINAL GASTROINTESTINAL TRACT IMAGING VIA CAPSULE N/A 12/29/2021    Procedure: IMAGING PROCEDURE, GI TRACT, INTRALUMINAL, VIA CAPSULE  (called for instruction 12/20-may have trouble swallowing);  Surgeon: Floyd Dixon MD;  Location: [facility];  Service: Endoscopy;  Laterality: N/A;  Diffuse red spots of unclear significance and erosions found in the small bowel (entire small bowel), suspicious for mild radiation enteritis    NEUROMA SURGERY Left     acoustic    TONSILLECTOMY     7.      8. Have you been fasting for at least 6 hours? Yes    9. Is there any chance you may be pregnant or breastfeeding? No    Assay: 12.36 MCi@:9.02   Injection Site:lt arm     Residual: .852 mCi@: 9.04   Technologist: Karen Vaughn Injected:11.5mCi

## 2022-04-08 ENCOUNTER — TELEPHONE (OUTPATIENT)
Dept: GASTROENTEROLOGY | Facility: CLINIC | Age: 72
End: 2022-04-08
Payer: MEDICARE

## 2022-04-08 NOTE — TELEPHONE ENCOUNTER
----- Message from LYNN Johnston sent at 4/8/2022  7:49 AM CDT -----  Please call to inform & review the results with the patient- Lab results showed elevated iron storage level (noted to be elevated on prior lab as well); otherwise iron levels are unremarkable. Continue with previous recommendations & follow-up with hematology for continued evaluation and management of anemia.   Continue with previous recommendations.  Thanks,  Misa BAILEY-BETY

## 2022-04-11 ENCOUNTER — OFFICE VISIT (OUTPATIENT)
Dept: HEMATOLOGY/ONCOLOGY | Facility: CLINIC | Age: 72
End: 2022-04-11
Payer: MEDICARE

## 2022-04-11 VITALS
RESPIRATION RATE: 16 BRPM | BODY MASS INDEX: 31.75 KG/M2 | HEART RATE: 72 BPM | OXYGEN SATURATION: 97 % | HEIGHT: 66 IN | TEMPERATURE: 97 F | SYSTOLIC BLOOD PRESSURE: 156 MMHG | DIASTOLIC BLOOD PRESSURE: 78 MMHG | WEIGHT: 197.56 LBS

## 2022-04-11 DIAGNOSIS — C78.2 PLEURAL METASTASIS: ICD-10-CM

## 2022-04-11 DIAGNOSIS — C34.91 SMALL CELL CARCINOMA OF LUNG, RIGHT: Primary | ICD-10-CM

## 2022-04-11 DIAGNOSIS — C77.1 MALIGNANT NEOPLASM METASTATIC TO INTRATHORACIC LYMPH NODE: ICD-10-CM

## 2022-04-11 DIAGNOSIS — L30.9 DERMATITIS: ICD-10-CM

## 2022-04-11 DIAGNOSIS — D50.9 IRON DEFICIENCY ANEMIA, UNSPECIFIED IRON DEFICIENCY ANEMIA TYPE: ICD-10-CM

## 2022-04-11 DIAGNOSIS — C79.51 BONE METASTASES: ICD-10-CM

## 2022-04-11 PROCEDURE — 1160F RVW MEDS BY RX/DR IN RCRD: CPT | Mod: CPTII,S$GLB,, | Performed by: INTERNAL MEDICINE

## 2022-04-11 PROCEDURE — 1160F PR REVIEW ALL MEDS BY PRESCRIBER/CLIN PHARMACIST DOCUMENTED: ICD-10-PCS | Mod: CPTII,S$GLB,, | Performed by: INTERNAL MEDICINE

## 2022-04-11 PROCEDURE — 3288F PR FALLS RISK ASSESSMENT DOCUMENTED: ICD-10-PCS | Mod: CPTII,S$GLB,, | Performed by: INTERNAL MEDICINE

## 2022-04-11 PROCEDURE — 1159F PR MEDICATION LIST DOCUMENTED IN MEDICAL RECORD: ICD-10-PCS | Mod: CPTII,S$GLB,, | Performed by: INTERNAL MEDICINE

## 2022-04-11 PROCEDURE — 3077F SYST BP >= 140 MM HG: CPT | Mod: CPTII,S$GLB,, | Performed by: INTERNAL MEDICINE

## 2022-04-11 PROCEDURE — 3078F DIAST BP <80 MM HG: CPT | Mod: CPTII,S$GLB,, | Performed by: INTERNAL MEDICINE

## 2022-04-11 PROCEDURE — 1101F PT FALLS ASSESS-DOCD LE1/YR: CPT | Mod: CPTII,S$GLB,, | Performed by: INTERNAL MEDICINE

## 2022-04-11 PROCEDURE — 99214 OFFICE O/P EST MOD 30 MIN: CPT | Mod: S$GLB,,, | Performed by: INTERNAL MEDICINE

## 2022-04-11 PROCEDURE — 99214 PR OFFICE/OUTPT VISIT, EST, LEVL IV, 30-39 MIN: ICD-10-PCS | Mod: S$GLB,,, | Performed by: INTERNAL MEDICINE

## 2022-04-11 PROCEDURE — 1126F AMNT PAIN NOTED NONE PRSNT: CPT | Mod: CPTII,S$GLB,, | Performed by: INTERNAL MEDICINE

## 2022-04-11 PROCEDURE — 3288F FALL RISK ASSESSMENT DOCD: CPT | Mod: CPTII,S$GLB,, | Performed by: INTERNAL MEDICINE

## 2022-04-11 PROCEDURE — 3008F PR BODY MASS INDEX (BMI) DOCUMENTED: ICD-10-PCS | Mod: CPTII,S$GLB,, | Performed by: INTERNAL MEDICINE

## 2022-04-11 PROCEDURE — 1126F PR PAIN SEVERITY QUANTIFIED, NO PAIN PRESENT: ICD-10-PCS | Mod: CPTII,S$GLB,, | Performed by: INTERNAL MEDICINE

## 2022-04-11 PROCEDURE — 3078F PR MOST RECENT DIASTOLIC BLOOD PRESSURE < 80 MM HG: ICD-10-PCS | Mod: CPTII,S$GLB,, | Performed by: INTERNAL MEDICINE

## 2022-04-11 PROCEDURE — 1101F PR PT FALLS ASSESS DOC 0-1 FALLS W/OUT INJ PAST YR: ICD-10-PCS | Mod: CPTII,S$GLB,, | Performed by: INTERNAL MEDICINE

## 2022-04-11 PROCEDURE — 3077F PR MOST RECENT SYSTOLIC BLOOD PRESSURE >= 140 MM HG: ICD-10-PCS | Mod: CPTII,S$GLB,, | Performed by: INTERNAL MEDICINE

## 2022-04-11 PROCEDURE — 99999 PR PBB SHADOW E&M-EST. PATIENT-LVL IV: CPT | Mod: PBBFAC,,, | Performed by: INTERNAL MEDICINE

## 2022-04-11 PROCEDURE — 1159F MED LIST DOCD IN RCRD: CPT | Mod: CPTII,S$GLB,, | Performed by: INTERNAL MEDICINE

## 2022-04-11 PROCEDURE — 3008F BODY MASS INDEX DOCD: CPT | Mod: CPTII,S$GLB,, | Performed by: INTERNAL MEDICINE

## 2022-04-11 PROCEDURE — 99999 PR PBB SHADOW E&M-EST. PATIENT-LVL IV: ICD-10-PCS | Mod: PBBFAC,,, | Performed by: INTERNAL MEDICINE

## 2022-04-11 RX ORDER — SODIUM CHLORIDE 9 MG/ML
INJECTION, SOLUTION INTRAVENOUS CONTINUOUS
Status: CANCELLED
Start: 2022-04-13

## 2022-04-11 RX ORDER — EPINEPHRINE 0.3 MG/.3ML
0.3 INJECTION SUBCUTANEOUS ONCE AS NEEDED
Status: CANCELLED | OUTPATIENT
Start: 2022-04-13

## 2022-04-11 RX ORDER — DIPHENHYDRAMINE HYDROCHLORIDE 50 MG/ML
50 INJECTION INTRAMUSCULAR; INTRAVENOUS ONCE AS NEEDED
Status: CANCELLED | OUTPATIENT
Start: 2022-04-13

## 2022-04-11 RX ORDER — SODIUM CHLORIDE 0.9 % (FLUSH) 0.9 %
10 SYRINGE (ML) INJECTION
Status: CANCELLED | OUTPATIENT
Start: 2022-04-13

## 2022-04-11 RX ORDER — ACETAMINOPHEN 500 MG
1000 TABLET ORAL
Status: CANCELLED
Start: 2022-04-13

## 2022-04-11 RX ORDER — HEPARIN 100 UNIT/ML
500 SYRINGE INTRAVENOUS
Status: CANCELLED | OUTPATIENT
Start: 2022-04-13

## 2022-04-11 NOTE — PROGRESS NOTES
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  Her treatment was interrupted after admission to [facility] with radiation esophagitis followed by Clostridium difficile colitis.  During her multi week stay, the patient became profoundly debilitated and predominantly bed-bound.  Despite healing of her esophagus, her nutritional status remained poor.  She was 2 immobile to allow for discharge directly to home and has had an interval rehab stay.  She and her /daughter have requested a virtual visit today to assess progress following discharge from rehab.  Patient's nutrition remains impaired.  She consumes predominantly liquids and soft solids.  Physical and Occupational therapy are being arranged for her in home.  Patient's  reports that she was ambulating with assistance at the rehab facility.    Patient returns to clinic for interval reassessment.  She has been performing better at home of late.  Diarrhea has resolved and been replaced with formed stool.  Patient is generally continent.  Appetite is improving and patient is struggling less to swallow medications.  Patient is up and awake most of the day.  She typically switches between bed and chair.  She remains highly motivated toward resumption of systemic chemotherapy for fear that she will have to repeat earlier treatment.    Patient returns to clinic to review interval lab and imaging.  Appetite has improved.  Diet has become more diversified. She is able to eat more substantial solids such as meat balls.  No worsening pain or dyspnea.  Patient is still wheelchair dependent in terms of ambulation and spends most of her day up to the chair.  Patient has been having difficulty with chronic urinary tract infection.  She recently completed course of Macrodantin.  Urine remains cloudy.    Patient returns to clinic  prior to cycle 5 of palliative pembrolizumab.  Patient has undergone interval laboratory and imaging in anticipation of today's appointment.  Dermatitis has resolved.  Patient's mobility continues to improve.  Patient is eating better and gaining weight.  Patient did report bright red blood per rectum.  This persisted for a few days and resolved.  This was not associated with abdominal pain, fevers, chills, sweats, nausea, emesis, etcetera.    Physical examination:  The patient is a well-developed, elderly, frail appearing white female who is confined to a wheelchair during the course of today's examination.  Weight is 197.5 lb (increased by 7 lb).  VITAL SIGNS: Documented in EMR and reviewed   HEENT: Normocephalic, atraumatic. Oral mucosa pink and moist. Lips without lesions. Tongue midline. Oropharynx clear. Nonicteric sclerae.   NECK: Supple, no adenopathy.  HEART: Regular rate and rhythm without murmur, gallop or rub.   LUNGS: Clear to auscultation bilaterally.  ABDOMEN: Soft, nontender, nondistended with positive normoactive bowel sounds, no hepatosplenomegaly.   EXTREMITIES:  Bilateral, trace, pedal edema. Distal pulses are intact.     Laboratory:  White count 8, hemoglobin 11.4, hematocrit 35.1, platelets 154, absolute neutrophil count is 5800.  Serum iron 67, TIBC 271, saturated iron 25%, ferritin 2158.  Sodium 143, potassium 4.8, chloride 107, CO2 27, BUN 23, creatinine 1.3, glucose 107, calcium 9.9, magnesium 2.1, liver function test within normal limits, LDH is 137, GFR is 41.  TSH 3.073, free T4 0.81    CT PET scan:  Study performed 04/07/2022.  Head/neck:  No significant abnormal hypermetabolic foci are identified in the head and neck region. No lymphadenopathy is present.  Chest:  Since the prior study, there has been significant improvement in neoplastic disease within the chest.  The markedly hypermetabolic right hilar mass has significantly decreased in size and degree of hypermetabolism.  On image  95 the mass now measures 9.6 x 3.4 cm compared to 12.1 x 6.4 cm previously and now has a max SUV of 3.6 compared to 8.9 previously.  A hypermetabolic left prevascular mediastinal lymph node has disappeared.  A moderate-sized right pleural effusion has increased in size since the prior study and remains non metabolic.  No new abnormal hypermetabolic foci are identified within the chest.  Abdomen/pelvis:  No significant abnormal hypermetabolic foci are identified within the soft tissues of the abdomen and pelvis.  No lymphadenopathy or ascites are present.  The adrenal glands are normal.  Skeleton:  Since the prior study, there has been significant improvement in osseous metastatic disease.  A sclerotic metastasis within the left greater trochanter on image 247 is unchanged in size and measures 1.4 x 1.4 cm.  This sclerotic metastasis has become poorly metabolic with a max SUV of 2.2 compared to 2.8 previously.  A rounded hypermetabolic metastasis within the posteromedial aspect of the right iliac bone measuring 0.9 cm on image 209 has become more sclerotic and now has a max SUV of 3.5 compared to 10.9 previously.  A hypermetabolic left proximal 7th rib metastasis has become sclerotic and non metabolic.  No new abnormal hypermetabolic foci have developed within the skeleton and there is no evidence of progressive osseous metastatic disease.  Hypermetabolism in a periarticular distribution of the left shoulder is again noted consistent with degenerative arthrosis.     Impression:  1. Limited stage small cell carcinoma of the lung with excellent response to second-line therapy with pembrolizumab.   2. Iron deficiency anemia-stable.  3. General debility.  4. Pruritic dermatitis.  5. Self-limited episode of bright red blood per rectum-uncertain etiology.    Plan:  1. Proceed with cycle 5 of Keytruda.  2. Return to clinic 3 weeks from now with interval CBC, CMP, LDH and magnesium prior to appointment.  3. Continue  nonsedating antihistamines and Triamcinolone cream twice daily for pruritic dermatitis.    Advance Care Planning During a prior visit, I engaged the patient in the advance care planning process.  The patient and I reviewed the role for advance directives and their purpose in directing future healthcare if the patients unable to speak for him/herself.  At this point in time, the patient does have full decision-making capacity.  We discussed different extreme health states that they could experience, and reviewed what kind of medical care the patient would want in those situations.  The patient communicated that if they were comatose and had little chance of a meaningful recovery, they would not want machines/life-sustaining treatments used.  In addition to the above preference, other important end-of-life issues for the patient include DNR.  The patient has  completed a living will to reflect these preferences.  The patient has already designated a healthcare power of  to make decisions on their behalf.    (30 mins time spent).      This note was created using voice recognition software and may contain grammatical errors.             Route Chart for Scheduling    Med Onc Chart Routing      Follow up with physician 3 weeks. CBC, CMP, LDH, and magnesium prior to appointment   Follow up with [facility]    Labs    Imaging    Pharmacy appointment    Other referrals          Treatment Plan Information   OP PEMBROLIZUMAB 200MG Q3W   Madhav Cardona MD   Upcoming Treatment Dates - OP PEMBROLIZUMAB 200MG Q3W    4/13/2022       Pre-Medications       acetaminophen tablet 1,000 mg       diphenydramine (BENADRYL) 25 mg in NS 50 mL IVPB       Chemotherapy       pembrolizumab (KEYTRUDA) 200 mg in sodium chloride 0.9% 108 mL infusion       Supportive Care       denosumab (XGEVA) solution 120 mg  5/4/2022       Pre-Medications       acetaminophen tablet 1,000 mg       diphenydramine (BENADRYL) 25 mg in NS 50 mL IVPB        Chemotherapy       pembrolizumab (KEYTRUDA) 200 mg in sodium chloride 0.9% 108 mL infusion  5/25/2022       Pre-Medications       acetaminophen tablet 1,000 mg       diphenydramine (BENADRYL) 25 mg in NS 50 mL IVPB       Chemotherapy       pembrolizumab (KEYTRUDA) 200 mg in sodium chloride 0.9% 108 mL infusion  6/15/2022       Pre-Medications       acetaminophen tablet 1,000 mg       diphenydramine (BENADRYL) 25 mg in NS 50 mL IVPB       Chemotherapy       pembrolizumab (KEYTRUDA) 200 mg in sodium chloride 0.9% 108 mL infusion    Therapy Plan Information  FERAHEME (FERUMOXYTOL) TWO DOSES  Pre-Medications  dexamethasone injection 4 mg  4 mg, Intravenous, Every visit  diphenhydrAMINE injection 25 mg  25 mg, Intravenous, Every visit  famotidine (PF) injection 20 mg  20 mg, Intravenous, Every visit  Medications  ferumoxytoL (FERAHEME) 510 mg in dextrose 5 % 100 mL IVPB  510 mg, Intravenous, Every visit  Flushes  heparin, porcine (PF) 100 unit/mL injection flush 500 Units  500 Units, Intravenous, PRN    INF FLUIDS  IV Fluids  sodium chloride 0.9% bolus 1,000 mL  1,000 mL, Intravenous, Every visit  Flushes  sodium chloride 0.9% flush 10 mL  10 mL, Intravenous, PRN  heparin, porcine (PF) 100 unit/mL injection flush 500 Units  500 Units, Intravenous, PRN  Supportive Care  ondansetron injection 8 mg  8 mg, Intravenous, PRN

## 2022-04-11 NOTE — PATIENT INSTRUCTIONS
Lower GI Bleeding (Stable)  You have signs of blood in your stool. This is called rectal bleeding. The bleeding may have begun in another part of your gastrointestinal (GI) tract. If the blood is bright red, it is likely coming from the lower part of the GI tract. If the blood is black or dark, it might be coming from higher up in the GI tract. Very small amounts of GI bleeding may not be visible and can only be discovered during a test on your stool. Possible causes of lower GI bleeding include:  Hemorrhoids  Anal fissures  Diverticulitis  Inflammatory bowel disease (Crohn's disease or ulcerative colitis)  Polyps (growths) in the intestine  Note: Iron supplements and medicines for diarrhea or upset stomach can cause black stools. Foods such as licorice and red beets can also discolor the stool and be mistaken for bleeding. These are not bleeding and are not a cause for alarm.  Home care  You have not lost a large amount of blood and your condition appears stable at this time. You may resume normal activity as long as you feel well.  Avoid NSAIDs, such as aspirin, ibuprofen, or naproxen. They can irritate the stomach and cause further bleeding. If you are taking these medicines for other medical reasons, talk to your healthcare provider before you stop them.   Follow-up care  Follow up with your healthcare provider as advised. Further tests may be needed to find the cause of your bleeding.  When to seek medical advice  Call your healthcare provider for any of the following:  Large amount of rectal bleeding   Increasing abdominal pain  Weakness, dizziness  Call 911  Get emergency medical care if any of the following occur:  Loss of consciousness  Vomiting blood  Date Last Reviewed: 6/24/2015  © 4015-2300 [employer]. [address] [place] [address], [place] [address]. All rights reserved. This information is not intended as a substitute for professional medical care. Always follow your healthcare professional's  instructions.           Anemia  Anemia is a condition that occurs when your body does not have enough healthy red blood cells (RBCs). RBCs are the parts of your blood that carry oxygen throughout your body. A protein called hemoglobin allows your RBCs to absorb and release oxygen. Without enough RBCs or hemoglobin, your body doesn't get enough oxygen. Symptoms of anemia may then occur.    What are the symptoms of anemia?  Some people with anemia have no symptoms. But most people have symptoms that range from mild to severe. These can include:  Tiredness (fatigue)  Weakness  Pale skin  Shortness of breath  Dizziness or fainting  Rapid heartbeat  Trouble doing normal amounts of activity  Jaundice (yellowing of your eyes, skin, or mouth; dark urine)  What causes anemia?  Anemia can occur when your body:  Loses too much blood  Does not make enough RBCs  Destroys your RBCs at a faster rate than it can replace them  Does not make a normal amount of hemoglobin in your RBCs  These problems can occur for many reasons, including:  A condition that you are born with (congenital or inherited), such as sickle cell disease or thalassemia  Heavy bleeding for any reason, including injury, surgery, childbirth, or even heavy menstrual periods  Being low in certain nutrients, such as iron, folate, or vitamin B12, possibly from a poor diet or a condition like celiac disease or Crohn's disease  Certain chronic conditions like diabetes, arthritis, or kidney disease  Certain chronic infections like tuberculosis or HIV  Exposure to certain medicines, such as those used for chemotherapy  There are different types of anemia. Your healthcare provider can tell you more about the type of anemia you have and what may have caused it.  How is anemia diagnosed?  To diagnose anemia, your healthcare provider orders blood tests. These can include:  Complete blood cell count (CBC). This test measures the amounts of the different types of blood  cells.  Blood smear. This test checks the size and shape of your blood cells. To do the test, a drop of your blood is viewed under a microscope. A stain is used to make the blood cells easier to see.  Iron studies. These tests measure the amount of iron in your blood. Your body needs iron to make hemoglobin in your RBCs.  Vitamin B12 and folate studies. These tests check for some of the components that help give RBCs a normal size and shape.  Reticulocyte count. This test measures the amount of new RBCs that your bone marrow makes.  Hemoglobin electrophoresis. This test checks for problems with your hemoglobin in RBCs.  How is anemia treated?  Treatment for anemia is based on the type of anemia, its cause, and the severity of your symptoms. Treatments may include:  Diet changes. This involves increasing the amount of certain nutrients in your diet, such as iron, vitamin B12, or folate. Your healthcare provider may also prescribe nutrient supplements.  Medicines. Certain medicines treat the cause of your anemia. Others help build new RBCs or relieve symptoms. If a medicine is the cause of your anemia, you may need to stop or change it.  Blood transfusions. Replacing some of your blood can increase the number of healthy RBCs in your body.  Surgery. In some cases, your doctor may do surgery to treat the underlying cause of anemia. If you need surgery, your healthcare provider will explain the procedure and outline the risks and benefits for you.  What are the long-term concerns?  If you have a certain type of anemia, you can expect a full recovery after treatment. If you have other types of anemia (especially a type you're born with), you will need to manage it for life. Your doctor can tell you more.  Date Last Reviewed: 12/1/2016  © 0067-9610 [employer]. [address] [place] [id number]. All rights reserved. This information is not intended as a substitute for professional medical care. Always  follow your healthcare professional's instructions.

## 2022-04-13 ENCOUNTER — INFUSION (OUTPATIENT)
Dept: INFUSION THERAPY | Facility: HOSPITAL | Age: 72
End: 2022-04-13
Attending: INTERNAL MEDICINE
Payer: MEDICARE

## 2022-04-13 ENCOUNTER — DOCUMENTATION ONLY (OUTPATIENT)
Dept: INFUSION THERAPY | Facility: HOSPITAL | Age: 72
End: 2022-04-13
Payer: MEDICARE

## 2022-04-13 VITALS
HEIGHT: 66 IN | DIASTOLIC BLOOD PRESSURE: 75 MMHG | SYSTOLIC BLOOD PRESSURE: 179 MMHG | WEIGHT: 197.56 LBS | HEART RATE: 67 BPM | TEMPERATURE: 98 F | RESPIRATION RATE: 18 BRPM | BODY MASS INDEX: 31.75 KG/M2

## 2022-04-13 DIAGNOSIS — C34.91 SMALL CELL CARCINOMA OF LUNG, RIGHT: Primary | ICD-10-CM

## 2022-04-13 PROCEDURE — 25000003 PHARM REV CODE 250: Mod: PN | Performed by: INTERNAL MEDICINE

## 2022-04-13 PROCEDURE — 96413 CHEMO IV INFUSION 1 HR: CPT | Mod: PN

## 2022-04-13 PROCEDURE — 96372 THER/PROPH/DIAG INJ SC/IM: CPT | Mod: PN

## 2022-04-13 PROCEDURE — 63600175 PHARM REV CODE 636 W HCPCS: Mod: PN | Performed by: INTERNAL MEDICINE

## 2022-04-13 PROCEDURE — 96367 TX/PROPH/DG ADDL SEQ IV INF: CPT | Mod: PN

## 2022-04-13 PROCEDURE — A4216 STERILE WATER/SALINE, 10 ML: HCPCS | Mod: PN | Performed by: INTERNAL MEDICINE

## 2022-04-13 RX ORDER — ACETAMINOPHEN 500 MG
1000 TABLET ORAL
Status: COMPLETED | OUTPATIENT
Start: 2022-04-13 | End: 2022-04-13

## 2022-04-13 RX ORDER — DIPHENHYDRAMINE HYDROCHLORIDE 50 MG/ML
50 INJECTION INTRAMUSCULAR; INTRAVENOUS ONCE AS NEEDED
Status: DISCONTINUED | OUTPATIENT
Start: 2022-04-13 | End: 2022-04-13 | Stop reason: HOSPADM

## 2022-04-13 RX ORDER — SODIUM CHLORIDE 9 MG/ML
INJECTION, SOLUTION INTRAVENOUS CONTINUOUS
Status: DISCONTINUED | OUTPATIENT
Start: 2022-04-13 | End: 2022-04-13 | Stop reason: HOSPADM

## 2022-04-13 RX ORDER — SODIUM CHLORIDE 0.9 % (FLUSH) 0.9 %
10 SYRINGE (ML) INJECTION
Status: DISCONTINUED | OUTPATIENT
Start: 2022-04-13 | End: 2022-04-13 | Stop reason: HOSPADM

## 2022-04-13 RX ORDER — EPINEPHRINE 0.3 MG/.3ML
0.3 INJECTION SUBCUTANEOUS ONCE AS NEEDED
Status: DISCONTINUED | OUTPATIENT
Start: 2022-04-13 | End: 2022-04-13 | Stop reason: HOSPADM

## 2022-04-13 RX ADMIN — SODIUM CHLORIDE 200 MG: 9 INJECTION, SOLUTION INTRAVENOUS at 03:04

## 2022-04-13 RX ADMIN — DIPHENHYDRAMINE HYDROCHLORIDE 25 MG: 50 INJECTION INTRAMUSCULAR; INTRAVENOUS at 02:04

## 2022-04-13 RX ADMIN — DENOSUMAB 120 MG: 120 INJECTION SUBCUTANEOUS at 04:04

## 2022-04-13 RX ADMIN — ACETAMINOPHEN 1000 MG: 500 TABLET, FILM COATED ORAL at 02:04

## 2022-04-13 RX ADMIN — SODIUM CHLORIDE: 0.9 INJECTION, SOLUTION INTRAVENOUS at 02:04

## 2022-04-13 RX ADMIN — Medication 10 ML: at 04:04

## 2022-04-13 NOTE — PLAN OF CARE
Problem: Adult Inpatient Plan of Care  Goal: Patient-Specific Goal (Individualization)  Outcome: Ongoing, Progressing  Flowsheets (Taken 4/13/2022 1625)  Individualized Care Needs: Recliner, blanket  Anxieties, Fears or Concerns: Fear of needles  Patient-Specific Goals (Include Timeframe): Infection prevention during treatment.     Problem: Fatigue  Goal: Improved Activity Tolerance  Intervention: Promote Energy Conservation  Flowsheets (Taken 4/13/2022 1625)  Fatigue Management:   activity schedule adjusted   frequent rest breaks encouraged   paced activity encouraged   fatigue-related activity identified  Sleep/Rest Enhancement:   regular sleep/rest pattern promoted   relaxation techniques promoted  Activity Management:   Ambulated -L4   Ambulated in [place] - L4     Problem: Adult Inpatient Plan of Care  Goal: Plan of Care Review  Outcome: Ongoing, Progressing  Flowsheets (Taken 4/13/2022 1625)  Plan of Care Reviewed With: patient  Tolerated treatment with no known distress.  Ambulated from infusion center with use of walker.

## 2022-04-13 NOTE — PROGRESS NOTES
Subjective:    Patient ID:  Allyson Henriquez is a 72 y.o. 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 difficile colitis 08/2021    Cancer     lung    CKD (chronic kidney disease), stage III     COPD (chronic obstructive pulmonary disease)     Hepatic steatosis     History of tobacco abuse     Hyperlipidemia     Hypertension     Lung nodules     and adenopathy    Multinodular goiter (nontoxic) 5/8/2017    Obesity     TRISHA (obstructive sleep apnea)     on bipap at bedtime    Proteinuria      Past Surgical History:   Procedure Laterality Date    ADENOIDECTOMY      APPENDECTOMY      CATARACT EXTRACTION      COLONOSCOPY N/A 11/14/2016    Procedure: COLONOSCOPY;  Surgeon: Destin Cardona MD;  Location: [facility];  Service: Endoscopy;  Laterality: N/A;    COLONOSCOPY N/A 05/14/2021    Procedure: COLONOSCOPY;  Surgeon: Destin Cardona MD;  Location: [facility];  Service: Endoscopy;  Laterality: N/A; hemorrhoids, diverticulosis, Old blood left colon. No active bleeding; Repeat colonoscopy is not recommended for screening purposes.    ESOPHAGOGASTRODUODENOSCOPY N/A 05/14/2021    Procedure: EGD (ESOPHAGOGASTRODUODENOSCOPY);  Surgeon: Destin Cardona MD;  Location: [facility];  Service: Endoscopy;  Laterality: N/A; unremarkable    ESOPHAGOGASTRODUODENOSCOPY N/A 08/09/2021    Procedure: ESOPHAGOGASTRODUODENOSCOPY (EGD);  Surgeon: Destin Cardona MD;  Location: [facility];  Service: Endoscopy;  Laterality: N/A; Moderately severe radiation esophagitis with no bleeding    EYE SURGERY      cataract OU     INSERTION OF TUNNELED CENTRAL VENOUS CATHETER (CVC) WITH SUBCUTANEOUS PORT N/A 2021    Procedure: [id number];  Surgeon: Joe Salinas MD;  Location: [facility] OR;  Service: General;  Laterality: N/A;    INTRALUMINAL GASTROINTESTINAL TRACT IMAGING VIA CAPSULE N/A 2021    Procedure: IMAGING PROCEDURE, GI TRACT, INTRALUMINAL, VIA CAPSULE  (called for instruction -may have trouble swallowing);  Surgeon: Floyd Dixon MD;  Location: [facility] ENDO;  Service: Endoscopy;  Laterality: N/A; Diffuse red spots of unclear significance and erosions found in the small bowel (entire small bowel), suspicious for mild radiation enteritis    NEUROMA SURGERY Left     acoustic    TONSILLECTOMY       Family History   Problem Relation Age of Onset    Cancer Paternal Uncle         colon cancer    Diabetes Neg Hx     Kidney disease Neg Hx     Stroke Neg Hx     Heart disease Neg Hx     Colon cancer Neg Hx     Crohn's disease Neg Hx     Ulcerative colitis Neg Hx     Stomach cancer Neg Hx     Esophageal cancer Neg Hx      Social History     Socioeconomic History    Marital status:    Tobacco Use    Smoking status: Former Smoker     Packs/day: 1.00     Years: 40.00     Pack years: 40.00     Quit date: 3/6/2016     Years since quittin.1    Smokeless tobacco: Never Used   Substance and Sexual Activity    Alcohol use: Yes     Alcohol/week: 0.0 standard drinks     Comment: occasional- social; not very often    Drug use: No   Social History Narrative    Works in insurance collection.     Social Determinants of Health     Financial Resource Strain: Unknown    Difficulty of Paying Living Expenses: Patient refused   Food Insecurity: Unknown    Worried About Running Out of Food in the Last Year: Patient refused    Ran Out of Food in the Last Year: Patient refused   Transportation Needs: Unknown    Lack of Transportation (Medical): Patient refused    Lack of Transportation (Non-Medical):  Patient refused   Physical Activity: Unknown    Days of Exercise per Week: 0 days   Stress: No Stress Concern Present    Feeling of Stress : Not at all   Social Connections: Unknown    Frequency of Communication with Friends and Family: Patient refused    Frequency of Social Gatherings with Friends and Family: Patient refused    Active Member of Clubs or Organizations: Patient refused    Attends Club or Organization Meetings: Patient refused    Marital Status: Patient refused   Housing Stability: Unknown    Unable to Pay for Housing in the Last Year: Patient refused    Unstable Housing in the Last Year: Patient refused       Review of patient's allergies indicates:   Allergen Reactions    Contrast media Anaphylaxis    Albuterol Other (See Comments)     Used during PFTs and put pt in afib    Dye     Pcn [penicillins]      Pt states did well on cephalexin.  No adverse reaction or side effect.     Doxycycline Palpitations       Current Outpatient Medications:     acetaminophen (TYLENOL) 325 MG tablet, Take 650 mg by mouth every 6 (six) hours as needed for Pain or Temperature greater than (101F)., Disp: , Rfl:     cetirizine (ZYRTEC) 10 MG tablet, Take 10 mg by mouth once daily., Disp: , Rfl:     ELIQUIS 5 mg Tab, TAKE 1 TABLET(5 MG) BY MOUTH TWICE DAILY, Disp: 180 tablet, Rfl: 0    EScitalopram oxalate (LEXAPRO) 10 MG tablet, TAKE 1 TABLET(10 MG) BY MOUTH DAILY, Disp: 90 tablet, Rfl: 3    FLONASE ALLERGY RELIEF 50 mcg/actuation nasal spray, 1 spray by Each Nostril route daily as needed., Disp: , Rfl:     Lactobac 40-Bifido 3-S.thermop (PROBIOTIC) 100 billion cell Cap, Take by mouth once daily., Disp: , Rfl:     magnesium glycinate 100 mg Tab, Take 200 mg by mouth every evening., Disp: 60 tablet, Rfl: 3    nystatin (MYCOSTATIN) ointment, Apply topically 2 (two) times daily., Disp: 30 g, Rfl: 1    pantoprazole (PROTONIX) 40 MG tablet, Take 1 tablet (40 mg total) by mouth once daily., Disp: 90  tablet, Rfl: 3    potassium chloride (KLOR-CON) 8 MEQ TbSR, TAKE 1 TABLET(8 MEQ) BY MOUTH EVERY DAY, Disp: 90 tablet, Rfl: 0    simvastatin (ZOCOR) 40 MG tablet, TAKE 1 TABLET(40 MG) BY MOUTH EVERY EVENING, Disp: 90 tablet, Rfl: 1    sotaloL (BETAPACE) 80 MG tablet, TAKE 1 TABLET(80 MG) BY MOUTH TWICE DAILY, Disp: 180 tablet, Rfl: 0    triamcinolone acetonide 0.025% (KENALOG) 0.025 % cream, Apply topically 2 (two) times daily., Disp: 80 g, Rfl: 5    VIACTIV 650 mg-12.5 mcg-40 mcg Chew, Take 1 tablet by mouth once daily., Disp: , Rfl:     metoprolol succinate (TOPROL-XL) 25 MG 24 hr tablet, Take 1 tablet (25 mg total) by mouth 2 (two) times a day., Disp: 180 tablet, Rfl: 1    olmesartan (BENICAR) 5 MG Tab, Take 1 tablet (5 mg total) by mouth every evening., Disp: 90 tablet, Rfl: 1  No current facility-administered medications for this visit.    Facility-Administered Medications Ordered in Other Visits:     EUFLEXXA 10 mg/mL(mw 2.4 -3.6 million) injection Syrg 20 mg, 20 mg, Intra-articular, , Jose Rafael Barney MD, 20 mg at 06/14/17 1605    Review of Systems   Constitutional: Negative for chills, diaphoresis, fever, malaise/fatigue (LESS) and night sweats.   HENT: Negative.  Negative for congestion and nosebleeds.    Eyes: Negative.  Negative for blurred vision and visual disturbance.   Cardiovascular: Negative for chest pain, claudication, cyanosis, dyspnea on exertion (MILD), irregular heartbeat, leg swelling, near-syncope, orthopnea, palpitations, paroxysmal nocturnal dyspnea and syncope.   Respiratory: Negative for cough, hemoptysis, shortness of breath and wheezing.    Endocrine: Negative.    Hematologic/Lymphatic: Negative for adenopathy. Does not bruise/bleed easily.   Skin: Negative for color change and rash.   Musculoskeletal: Negative for back pain, falls, joint swelling and muscle weakness (LESS).   Gastrointestinal: Negative for abdominal pain, change in bowel habit, diarrhea, dysphagia, melena  "and nausea.   Genitourinary: Negative for dysuria and flank pain.   Neurological: Negative for brief paralysis, focal weakness, light-headedness and weakness.   Psychiatric/Behavioral: Negative for altered mental status and memory loss.        Objective:      Vitals:    04/14/22 1249 04/14/22 1321   BP: (!) 164/80 138/66   Pulse: 71    Weight: 89.3 kg (196 lb 13.9 oz)    Height: 5' 6" (1.676 m)    PainSc: 0-No pain      Body mass index is 31.78 kg/m².    Physical Exam  Constitutional:       Appearance: Normal appearance. She is obese.   HENT:      Head: Normocephalic and atraumatic.   Eyes:      General: No scleral icterus.     Extraocular Movements: Extraocular movements intact.      Pupils: Pupils are equal, round, and reactive to light.   Neck:      Vascular: Normal carotid pulses. No carotid bruit or JVD.   Cardiovascular:      Rate and Rhythm: Normal rate and regular rhythm.      Pulses:           Carotid pulses are 2+ on the right side and 2+ on the left side.       Radial pulses are 2+ on the right side and 2+ on the left side.        Posterior tibial pulses are 2+ on the right side and 2+ on the left side.      Heart sounds: Murmur heard.    Systolic murmur is present with a grade of 1/6 at the lower left sternal border and apex.    No friction rub. No gallop. No S4 sounds.   Pulmonary:      Effort: Pulmonary effort is normal.      Breath sounds: Normal breath sounds.   Abdominal:      Palpations: Abdomen is soft.      Tenderness: There is no abdominal tenderness.   Musculoskeletal:      Cervical back: Neck supple.      Right lower leg: No edema.      Left lower leg: No edema.   Skin:     General: Skin is warm and dry.      Capillary Refill: Capillary refill takes less than 2 seconds.      Coloration: Skin is not pale.   Neurological:      General: No focal deficit present.      Mental Status: She is alert.      Cranial Nerves: No cranial nerve deficit.   Psychiatric:         Speech: Speech normal.         " Behavior: Behavior normal.                 ..    Chemistry        Component Value Date/Time     04/07/2022 0844    K 4.8 04/07/2022 0844     04/07/2022 0844    CO2 27 04/07/2022 0844    BUN 23 04/07/2022 0844    CREATININE 1.3 04/07/2022 0844     04/07/2022 0844        Component Value Date/Time    CALCIUM 9.9 04/07/2022 0844    ALKPHOS 64 04/07/2022 0844    AST 18 04/07/2022 0844    ALT 15 04/07/2022 0844    BILITOT 0.6 04/07/2022 0844    ESTGFRAFRICA 47 (A) 04/07/2022 0844    EGFRNONAA 41 (A) 04/07/2022 0844            ..  Lab Results   Component Value Date    CHOL 141 03/24/2021    CHOL 145 09/11/2020    CHOL 162 03/06/2020     Lab Results   Component Value Date    HDL 41 03/24/2021    HDL 44 09/11/2020    HDL 48 03/06/2020     Lab Results   Component Value Date    LDLCALC 60.0 (L) 03/24/2021    LDLCALC 49.6 (L) 09/11/2020    LDLCALC 77.2 03/06/2020     Lab Results   Component Value Date    TRIG 200 (H) 03/24/2021    TRIG 257 (H) 09/11/2020    TRIG 184 (H) 03/06/2020     Lab Results   Component Value Date    CHOLHDL 29.1 03/24/2021    CHOLHDL 30.3 09/11/2020    CHOLHDL 29.6 03/06/2020     ..  Lab Results   Component Value Date    WBC 7.95 04/07/2022    HGB 11.4 (L) 04/07/2022    HCT 35.1 (L) 04/07/2022    MCV 87 04/07/2022     04/07/2022       Test(s) Reviewed  I have reviewed the following in detail:  [] Stress test   [] Angiography   [] Echocardiogram   [x] Labs   [] Other:       Assessment:         ICD-10-CM ICD-9-CM   1. PAF (paroxysmal atrial fibrillation)  I48.0 427.31   2. Long term current use of antiarrhythmic drug  Z79.899 V58.69   3. Nonrheumatic mitral valve regurgitation  I34.0 424.0   4. Obesity, Class I, BMI 30-34.9  E66.9 278.00   5. Current use of long term anticoagulation  Z79.01 V58.61   6. Essential hypertension  I10 401.9     Problem List Items Addressed This Visit        Cardiac/Vascular    Essential hypertension    Relevant Orders    Ankle Brachial Indices (CHELLY)     Long term current use of antiarrhythmic drug    PAF (paroxysmal atrial fibrillation) - Primary    Nonrheumatic mitral valve regurgitation       Hematology    Current use of long term anticoagulation       Endocrine    Obesity, Class I, BMI 30-34.9           Plan:     ADD BENICAR, FOR BLOOD PRESSURE, CHECK CHELLY,ALL CV CLINICALLY STABLE, NO ANGINA, NO HF, NO TIA, NO CLINICAL ARRHYTHMIA,CONTINUE CURRENT MEDS, EDUCATION, DIET, EXERCISE, WEIGHT LOSS RETURN TO CLINIC IN 6 MO, DISCUSSED PLAN WITH THE PATIENT AND HER       PAF (paroxysmal atrial fibrillation)  Comments:  STABLE    Long term current use of antiarrhythmic drug    Nonrheumatic mitral valve regurgitation  Comments:  STABLE    Obesity, Class I, BMI 30-34.9  Comments:  WEIGHT LOSS    Current use of long term anticoagulation    Essential hypertension  Comments:  CONTROLLED  Orders:  -     Ankle Brachial Indices (CHELLY); Future    Other orders  -     olmesartan (BENICAR) 5 MG Tab; Take 1 tablet (5 mg total) by mouth every evening.  Dispense: 90 tablet; Refill: 1  -     metoprolol succinate (TOPROL-XL) 25 MG 24 hr tablet; Take 1 tablet (25 mg total) by mouth 2 (two) times a day.  Dispense: 180 tablet; Refill: 1    RTC Low level/low impact aerobic exercise 5x's/wk. Heart healthy diet and risk factor modification.    See labs and med orders.    Aerobic exercise 5x's/wk. Heart healthy diet and risk factor modification.    See labs and med orders.

## 2022-04-13 NOTE — PROGRESS NOTES
Oncology Nutrition   Chemotherapy Infusion Visit    Nutrition Follow Up   RD met with patient and her  at chairside during infusion. Pt reports continues to do well nutritionally- eating without difficulty, maintaining weight, and denies nutrition related side effects.     Wt Readings from Last 10 Encounters:   04/13/22 89.6 kg (197 lb 8.5 oz)   04/11/22 89.6 kg (197 lb 8.5 oz)   04/05/22 89.8 kg (197 lb 15.6 oz)   03/23/22 86.5 kg (190 lb 12.9 oz)   03/21/22 86.5 kg (190 lb 12.9 oz)   03/02/22 87.1 kg (192 lb 0.3 oz)   03/02/22 87.1 kg (192 lb 0.3 oz)   02/11/22 86.7 kg (191 lb 2.2 oz)   02/08/22 86.7 kg (191 lb 2.2 oz)   02/08/22 86.7 kg (191 lb 2.2 oz)       All other nutrition questions/concerns addressed as appropriate. Will continue to follow and monitor throughout treatment.     Mireya Hansen, MS, RD, LDN  04/13/2022  2:53 PM

## 2022-04-14 ENCOUNTER — OFFICE VISIT (OUTPATIENT)
Dept: CARDIOLOGY | Facility: CLINIC | Age: 72
End: 2022-04-14
Payer: MEDICARE

## 2022-04-14 VITALS
HEART RATE: 71 BPM | BODY MASS INDEX: 31.64 KG/M2 | WEIGHT: 196.88 LBS | HEIGHT: 66 IN | SYSTOLIC BLOOD PRESSURE: 138 MMHG | DIASTOLIC BLOOD PRESSURE: 66 MMHG

## 2022-04-14 DIAGNOSIS — Z79.899 LONG TERM CURRENT USE OF ANTIARRHYTHMIC DRUG: Chronic | ICD-10-CM

## 2022-04-14 DIAGNOSIS — I48.0 PAF (PAROXYSMAL ATRIAL FIBRILLATION): Primary | Chronic | ICD-10-CM

## 2022-04-14 DIAGNOSIS — Z79.01 CURRENT USE OF LONG TERM ANTICOAGULATION: Chronic | ICD-10-CM

## 2022-04-14 DIAGNOSIS — I34.0 NONRHEUMATIC MITRAL VALVE REGURGITATION: Chronic | ICD-10-CM

## 2022-04-14 DIAGNOSIS — I10 ESSENTIAL HYPERTENSION: Chronic | ICD-10-CM

## 2022-04-14 DIAGNOSIS — C77.1 MALIGNANT NEOPLASM METASTATIC TO INTRATHORACIC LYMPH NODE: Chronic | ICD-10-CM

## 2022-04-14 DIAGNOSIS — E66.9 OBESITY, CLASS I, BMI 30-34.9: Chronic | ICD-10-CM

## 2022-04-14 PROCEDURE — 3078F PR MOST RECENT DIASTOLIC BLOOD PRESSURE < 80 MM HG: ICD-10-PCS | Mod: CPTII,S$GLB,, | Performed by: INTERNAL MEDICINE

## 2022-04-14 PROCEDURE — 3288F FALL RISK ASSESSMENT DOCD: CPT | Mod: CPTII,S$GLB,, | Performed by: INTERNAL MEDICINE

## 2022-04-14 PROCEDURE — 99214 OFFICE O/P EST MOD 30 MIN: CPT | Mod: S$GLB,,, | Performed by: INTERNAL MEDICINE

## 2022-04-14 PROCEDURE — 1159F MED LIST DOCD IN RCRD: CPT | Mod: CPTII,S$GLB,, | Performed by: INTERNAL MEDICINE

## 2022-04-14 PROCEDURE — 3075F SYST BP GE 130 - 139MM HG: CPT | Mod: CPTII,S$GLB,, | Performed by: INTERNAL MEDICINE

## 2022-04-14 PROCEDURE — 99214 PR OFFICE/OUTPT VISIT, EST, LEVL IV, 30-39 MIN: ICD-10-PCS | Mod: S$GLB,,, | Performed by: INTERNAL MEDICINE

## 2022-04-14 PROCEDURE — 1126F AMNT PAIN NOTED NONE PRSNT: CPT | Mod: CPTII,S$GLB,, | Performed by: INTERNAL MEDICINE

## 2022-04-14 PROCEDURE — 99999 PR PBB SHADOW E&M-EST. PATIENT-LVL IV: CPT | Mod: PBBFAC,,, | Performed by: INTERNAL MEDICINE

## 2022-04-14 PROCEDURE — 3075F PR MOST RECENT SYSTOLIC BLOOD PRESS GE 130-139MM HG: ICD-10-PCS | Mod: CPTII,S$GLB,, | Performed by: INTERNAL MEDICINE

## 2022-04-14 PROCEDURE — 1101F PT FALLS ASSESS-DOCD LE1/YR: CPT | Mod: CPTII,S$GLB,, | Performed by: INTERNAL MEDICINE

## 2022-04-14 PROCEDURE — 4010F ACE/ARB THERAPY RXD/TAKEN: CPT | Mod: CPTII,S$GLB,, | Performed by: INTERNAL MEDICINE

## 2022-04-14 PROCEDURE — 3008F BODY MASS INDEX DOCD: CPT | Mod: CPTII,S$GLB,, | Performed by: INTERNAL MEDICINE

## 2022-04-14 PROCEDURE — 4010F PR ACE/ARB THEARPY RXD/TAKEN: ICD-10-PCS | Mod: CPTII,S$GLB,, | Performed by: INTERNAL MEDICINE

## 2022-04-14 PROCEDURE — 99999 PR PBB SHADOW E&M-EST. PATIENT-LVL IV: ICD-10-PCS | Mod: PBBFAC,,, | Performed by: INTERNAL MEDICINE

## 2022-04-14 PROCEDURE — 1159F PR MEDICATION LIST DOCUMENTED IN MEDICAL RECORD: ICD-10-PCS | Mod: CPTII,S$GLB,, | Performed by: INTERNAL MEDICINE

## 2022-04-14 PROCEDURE — 1101F PR PT FALLS ASSESS DOC 0-1 FALLS W/OUT INJ PAST YR: ICD-10-PCS | Mod: CPTII,S$GLB,, | Performed by: INTERNAL MEDICINE

## 2022-04-14 PROCEDURE — 3288F PR FALLS RISK ASSESSMENT DOCUMENTED: ICD-10-PCS | Mod: CPTII,S$GLB,, | Performed by: INTERNAL MEDICINE

## 2022-04-14 PROCEDURE — 1126F PR PAIN SEVERITY QUANTIFIED, NO PAIN PRESENT: ICD-10-PCS | Mod: CPTII,S$GLB,, | Performed by: INTERNAL MEDICINE

## 2022-04-14 PROCEDURE — 3008F PR BODY MASS INDEX (BMI) DOCUMENTED: ICD-10-PCS | Mod: CPTII,S$GLB,, | Performed by: INTERNAL MEDICINE

## 2022-04-14 PROCEDURE — 3078F DIAST BP <80 MM HG: CPT | Mod: CPTII,S$GLB,, | Performed by: INTERNAL MEDICINE

## 2022-04-14 RX ORDER — METOPROLOL SUCCINATE 25 MG/1
25 TABLET, EXTENDED RELEASE ORAL 2 TIMES DAILY
Qty: 180 TABLET | Refills: 1 | Status: SHIPPED | OUTPATIENT
Start: 2022-04-14 | End: 2022-10-11

## 2022-04-14 RX ORDER — OLMESARTAN MEDOXOMIL 5 MG/1
5 TABLET ORAL NIGHTLY
Qty: 90 TABLET | Refills: 1 | Status: ON HOLD | OUTPATIENT
Start: 2022-04-14 | End: 2022-09-20 | Stop reason: HOSPADM

## 2022-04-26 ENCOUNTER — LAB VISIT (OUTPATIENT)
Dept: LAB | Facility: HOSPITAL | Age: 72
End: 2022-04-26
Payer: MEDICARE

## 2022-04-26 ENCOUNTER — PATIENT MESSAGE (OUTPATIENT)
Dept: HEMATOLOGY/ONCOLOGY | Facility: CLINIC | Age: 72
End: 2022-04-26
Payer: MEDICARE

## 2022-04-26 DIAGNOSIS — R30.0 DYSURIA: ICD-10-CM

## 2022-04-26 DIAGNOSIS — R30.0 DYSURIA: Primary | ICD-10-CM

## 2022-04-26 DIAGNOSIS — N39.0 URINARY TRACT INFECTION WITHOUT HEMATURIA, SITE UNSPECIFIED: Primary | ICD-10-CM

## 2022-04-26 LAB
BACTERIA #/AREA URNS HPF: ABNORMAL /HPF
BILIRUB UR QL STRIP: NEGATIVE
CLARITY UR: ABNORMAL
COLOR UR: YELLOW
GLUCOSE UR QL STRIP: NEGATIVE
HGB UR QL STRIP: ABNORMAL
HYALINE CASTS #/AREA URNS LPF: 0 /LPF
KETONES UR QL STRIP: NEGATIVE
LEUKOCYTE ESTERASE UR QL STRIP: ABNORMAL
MICROSCOPIC COMMENT: ABNORMAL
NITRITE UR QL STRIP: NEGATIVE
PH UR STRIP: 6 [PH] (ref 5–8)
PROT UR QL STRIP: ABNORMAL
RBC #/AREA URNS HPF: 10 /HPF (ref 0–4)
SP GR UR STRIP: 1.01 (ref 1–1.03)
SQUAMOUS #/AREA URNS HPF: 2 /HPF
URN SPEC COLLECT METH UR: ABNORMAL
WBC #/AREA URNS HPF: >100 /HPF (ref 0–5)
WBC CLUMPS URNS QL MICRO: ABNORMAL

## 2022-04-26 PROCEDURE — 87088 URINE BACTERIA CULTURE: CPT | Performed by: INTERNAL MEDICINE

## 2022-04-26 PROCEDURE — 87077 CULTURE AEROBIC IDENTIFY: CPT | Performed by: INTERNAL MEDICINE

## 2022-04-26 PROCEDURE — 87186 SC STD MICRODIL/AGAR DIL: CPT | Performed by: INTERNAL MEDICINE

## 2022-04-26 PROCEDURE — 81000 URINALYSIS NONAUTO W/SCOPE: CPT | Mod: PN | Performed by: INTERNAL MEDICINE

## 2022-04-26 PROCEDURE — 87086 URINE CULTURE/COLONY COUNT: CPT | Performed by: INTERNAL MEDICINE

## 2022-04-26 RX ORDER — CIPROFLOXACIN 500 MG/1
500 TABLET ORAL 2 TIMES DAILY
Qty: 14 TABLET | Refills: 0 | Status: SHIPPED | OUTPATIENT
Start: 2022-04-26 | End: 2022-05-03

## 2022-04-28 ENCOUNTER — PATIENT MESSAGE (OUTPATIENT)
Dept: HEMATOLOGY/ONCOLOGY | Facility: CLINIC | Age: 72
End: 2022-04-28
Payer: MEDICARE

## 2022-04-29 ENCOUNTER — PATIENT MESSAGE (OUTPATIENT)
Dept: HEMATOLOGY/ONCOLOGY | Facility: CLINIC | Age: 72
End: 2022-04-29
Payer: MEDICARE

## 2022-04-29 DIAGNOSIS — N30.90 CYSTITIS: Primary | ICD-10-CM

## 2022-04-29 LAB — BACTERIA UR CULT: ABNORMAL

## 2022-04-29 RX ORDER — SULFAMETHOXAZOLE AND TRIMETHOPRIM 400; 80 MG/1; MG/1
1 TABLET ORAL 2 TIMES DAILY
Qty: 14 TABLET | Refills: 0 | Status: SHIPPED | OUTPATIENT
Start: 2022-04-29 | End: 2022-05-06

## 2022-05-02 ENCOUNTER — OFFICE VISIT (OUTPATIENT)
Dept: HEMATOLOGY/ONCOLOGY | Facility: CLINIC | Age: 72
End: 2022-05-02
Payer: MEDICARE

## 2022-05-02 ENCOUNTER — LAB VISIT (OUTPATIENT)
Dept: LAB | Facility: HOSPITAL | Age: 72
End: 2022-05-02
Attending: INTERNAL MEDICINE
Payer: MEDICARE

## 2022-05-02 VITALS
OXYGEN SATURATION: 97 % | HEIGHT: 66 IN | RESPIRATION RATE: 16 BRPM | SYSTOLIC BLOOD PRESSURE: 160 MMHG | WEIGHT: 199.5 LBS | BODY MASS INDEX: 32.06 KG/M2 | HEART RATE: 67 BPM | TEMPERATURE: 98 F | DIASTOLIC BLOOD PRESSURE: 81 MMHG

## 2022-05-02 DIAGNOSIS — C77.1 MALIGNANT NEOPLASM METASTATIC TO INTRATHORACIC LYMPH NODE: ICD-10-CM

## 2022-05-02 DIAGNOSIS — C79.51 BONE METASTASES: ICD-10-CM

## 2022-05-02 DIAGNOSIS — C78.2 PLEURAL METASTASIS: ICD-10-CM

## 2022-05-02 DIAGNOSIS — C34.91 SMALL CELL CARCINOMA OF LUNG, RIGHT: Primary | ICD-10-CM

## 2022-05-02 DIAGNOSIS — C34.91 SMALL CELL CARCINOMA OF LUNG, RIGHT: ICD-10-CM

## 2022-05-02 LAB
ALBUMIN SERPL BCP-MCNC: 3.8 G/DL (ref 3.5–5.2)
ALP SERPL-CCNC: 63 U/L (ref 55–135)
ALT SERPL W/O P-5'-P-CCNC: 12 U/L (ref 10–44)
ANION GAP SERPL CALC-SCNC: 10 MMOL/L (ref 8–16)
AST SERPL-CCNC: 15 U/L (ref 10–40)
BASOPHILS # BLD AUTO: 0.07 K/UL (ref 0–0.2)
BASOPHILS NFR BLD: 0.7 % (ref 0–1.9)
BILIRUB SERPL-MCNC: 0.7 MG/DL (ref 0.1–1)
BUN SERPL-MCNC: 25 MG/DL (ref 8–23)
CALCIUM SERPL-MCNC: 10 MG/DL (ref 8.7–10.5)
CHLORIDE SERPL-SCNC: 106 MMOL/L (ref 95–110)
CO2 SERPL-SCNC: 25 MMOL/L (ref 23–29)
CREAT SERPL-MCNC: 1.3 MG/DL (ref 0.5–1.4)
DIFFERENTIAL METHOD: ABNORMAL
EOSINOPHIL # BLD AUTO: 0.5 K/UL (ref 0–0.5)
EOSINOPHIL NFR BLD: 4.8 % (ref 0–8)
ERYTHROCYTE [DISTWIDTH] IN BLOOD BY AUTOMATED COUNT: 16.6 % (ref 11.5–14.5)
EST. GFR  (AFRICAN AMERICAN): 47 ML/MIN/1.73 M^2
EST. GFR  (NON AFRICAN AMERICAN): 41 ML/MIN/1.73 M^2
GLUCOSE SERPL-MCNC: 91 MG/DL (ref 70–110)
HCT VFR BLD AUTO: 35.3 % (ref 37–48.5)
HGB BLD-MCNC: 11.5 G/DL (ref 12–16)
IMM GRANULOCYTES # BLD AUTO: 0.21 K/UL (ref 0–0.04)
IMM GRANULOCYTES NFR BLD AUTO: 2 % (ref 0–0.5)
LDH SERPL L TO P-CCNC: 181 U/L (ref 110–260)
LYMPHOCYTES # BLD AUTO: 1.6 K/UL (ref 1–4.8)
LYMPHOCYTES NFR BLD: 14.6 % (ref 18–48)
MAGNESIUM SERPL-MCNC: 1.9 MG/DL (ref 1.6–2.6)
MCH RBC QN AUTO: 28.7 PG (ref 27–31)
MCHC RBC AUTO-ENTMCNC: 32.6 G/DL (ref 32–36)
MCV RBC AUTO: 88 FL (ref 82–98)
MONOCYTES # BLD AUTO: 1 K/UL (ref 0.3–1)
MONOCYTES NFR BLD: 8.9 % (ref 4–15)
NEUTROPHILS # BLD AUTO: 7.4 K/UL (ref 1.8–7.7)
NEUTROPHILS NFR BLD: 69 % (ref 38–73)
NRBC BLD-RTO: 0 /100 WBC
PLATELET # BLD AUTO: 147 K/UL (ref 150–450)
PMV BLD AUTO: 9.7 FL (ref 9.2–12.9)
POTASSIUM SERPL-SCNC: 4.9 MMOL/L (ref 3.5–5.1)
PROT SERPL-MCNC: 6.6 G/DL (ref 6–8.4)
RBC # BLD AUTO: 4.01 M/UL (ref 4–5.4)
SODIUM SERPL-SCNC: 141 MMOL/L (ref 136–145)
WBC # BLD AUTO: 10.73 K/UL (ref 3.9–12.7)

## 2022-05-02 PROCEDURE — 1101F PR PT FALLS ASSESS DOC 0-1 FALLS W/OUT INJ PAST YR: ICD-10-PCS | Mod: CPTII,S$GLB,, | Performed by: INTERNAL MEDICINE

## 2022-05-02 PROCEDURE — 4010F PR ACE/ARB THEARPY RXD/TAKEN: ICD-10-PCS | Mod: CPTII,S$GLB,, | Performed by: INTERNAL MEDICINE

## 2022-05-02 PROCEDURE — 1159F PR MEDICATION LIST DOCUMENTED IN MEDICAL RECORD: ICD-10-PCS | Mod: CPTII,S$GLB,, | Performed by: INTERNAL MEDICINE

## 2022-05-02 PROCEDURE — 3008F BODY MASS INDEX DOCD: CPT | Mod: CPTII,S$GLB,, | Performed by: INTERNAL MEDICINE

## 2022-05-02 PROCEDURE — 99999 PR PBB SHADOW E&M-EST. PATIENT-LVL IV: CPT | Mod: PBBFAC,,, | Performed by: INTERNAL MEDICINE

## 2022-05-02 PROCEDURE — 80053 COMPREHEN METABOLIC PANEL: CPT | Mod: PN | Performed by: INTERNAL MEDICINE

## 2022-05-02 PROCEDURE — 3288F FALL RISK ASSESSMENT DOCD: CPT | Mod: CPTII,S$GLB,, | Performed by: INTERNAL MEDICINE

## 2022-05-02 PROCEDURE — 3077F SYST BP >= 140 MM HG: CPT | Mod: CPTII,S$GLB,, | Performed by: INTERNAL MEDICINE

## 2022-05-02 PROCEDURE — 99999 PR PBB SHADOW E&M-EST. PATIENT-LVL IV: ICD-10-PCS | Mod: PBBFAC,,, | Performed by: INTERNAL MEDICINE

## 2022-05-02 PROCEDURE — 3077F PR MOST RECENT SYSTOLIC BLOOD PRESSURE >= 140 MM HG: ICD-10-PCS | Mod: CPTII,S$GLB,, | Performed by: INTERNAL MEDICINE

## 2022-05-02 PROCEDURE — 85025 COMPLETE CBC W/AUTO DIFF WBC: CPT | Mod: PN | Performed by: INTERNAL MEDICINE

## 2022-05-02 PROCEDURE — 1160F PR REVIEW ALL MEDS BY PRESCRIBER/CLIN PHARMACIST DOCUMENTED: ICD-10-PCS | Mod: CPTII,S$GLB,, | Performed by: INTERNAL MEDICINE

## 2022-05-02 PROCEDURE — 83615 LACTATE (LD) (LDH) ENZYME: CPT | Mod: PN | Performed by: INTERNAL MEDICINE

## 2022-05-02 PROCEDURE — 99214 OFFICE O/P EST MOD 30 MIN: CPT | Mod: S$GLB,,, | Performed by: INTERNAL MEDICINE

## 2022-05-02 PROCEDURE — 83735 ASSAY OF MAGNESIUM: CPT | Mod: PN | Performed by: INTERNAL MEDICINE

## 2022-05-02 PROCEDURE — 36415 COLL VENOUS BLD VENIPUNCTURE: CPT | Mod: PN | Performed by: INTERNAL MEDICINE

## 2022-05-02 PROCEDURE — 4010F ACE/ARB THERAPY RXD/TAKEN: CPT | Mod: CPTII,S$GLB,, | Performed by: INTERNAL MEDICINE

## 2022-05-02 PROCEDURE — 1159F MED LIST DOCD IN RCRD: CPT | Mod: CPTII,S$GLB,, | Performed by: INTERNAL MEDICINE

## 2022-05-02 PROCEDURE — 99214 PR OFFICE/OUTPT VISIT, EST, LEVL IV, 30-39 MIN: ICD-10-PCS | Mod: S$GLB,,, | Performed by: INTERNAL MEDICINE

## 2022-05-02 PROCEDURE — 3079F PR MOST RECENT DIASTOLIC BLOOD PRESSURE 80-89 MM HG: ICD-10-PCS | Mod: CPTII,S$GLB,, | Performed by: INTERNAL MEDICINE

## 2022-05-02 PROCEDURE — 1101F PT FALLS ASSESS-DOCD LE1/YR: CPT | Mod: CPTII,S$GLB,, | Performed by: INTERNAL MEDICINE

## 2022-05-02 PROCEDURE — 3079F DIAST BP 80-89 MM HG: CPT | Mod: CPTII,S$GLB,, | Performed by: INTERNAL MEDICINE

## 2022-05-02 PROCEDURE — 1160F RVW MEDS BY RX/DR IN RCRD: CPT | Mod: CPTII,S$GLB,, | Performed by: INTERNAL MEDICINE

## 2022-05-02 PROCEDURE — 3008F PR BODY MASS INDEX (BMI) DOCUMENTED: ICD-10-PCS | Mod: CPTII,S$GLB,, | Performed by: INTERNAL MEDICINE

## 2022-05-02 PROCEDURE — 1126F PR PAIN SEVERITY QUANTIFIED, NO PAIN PRESENT: ICD-10-PCS | Mod: CPTII,S$GLB,, | Performed by: INTERNAL MEDICINE

## 2022-05-02 PROCEDURE — 1126F AMNT PAIN NOTED NONE PRSNT: CPT | Mod: CPTII,S$GLB,, | Performed by: INTERNAL MEDICINE

## 2022-05-02 PROCEDURE — 3288F PR FALLS RISK ASSESSMENT DOCUMENTED: ICD-10-PCS | Mod: CPTII,S$GLB,, | Performed by: INTERNAL MEDICINE

## 2022-05-02 RX ORDER — EPINEPHRINE 0.3 MG/.3ML
0.3 INJECTION SUBCUTANEOUS ONCE AS NEEDED
Status: CANCELLED | OUTPATIENT
Start: 2022-05-04

## 2022-05-02 RX ORDER — HEPARIN 100 UNIT/ML
500 SYRINGE INTRAVENOUS
Status: CANCELLED | OUTPATIENT
Start: 2022-05-04

## 2022-05-02 RX ORDER — SODIUM CHLORIDE 9 MG/ML
INJECTION, SOLUTION INTRAVENOUS CONTINUOUS
Status: CANCELLED
Start: 2022-05-04

## 2022-05-02 RX ORDER — SODIUM CHLORIDE 0.9 % (FLUSH) 0.9 %
10 SYRINGE (ML) INJECTION
Status: CANCELLED | OUTPATIENT
Start: 2022-05-04

## 2022-05-02 RX ORDER — DIPHENHYDRAMINE HYDROCHLORIDE 50 MG/ML
50 INJECTION INTRAMUSCULAR; INTRAVENOUS ONCE AS NEEDED
Status: CANCELLED | OUTPATIENT
Start: 2022-05-04

## 2022-05-02 RX ORDER — ACETAMINOPHEN 500 MG
1000 TABLET ORAL
Status: CANCELLED
Start: 2022-05-04

## 2022-05-02 NOTE — PROGRESS NOTES
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  Her treatment was interrupted after admission to [facility] with radiation esophagitis followed by Clostridium difficile colitis.  During her multi week stay, the patient became profoundly debilitated and predominantly bed-bound.  Despite healing of her esophagus, her nutritional status remained poor.  She was 2 immobile to allow for discharge directly to home and has had an interval rehab stay.  She and her /daughter have requested a virtual visit today to assess progress following discharge from rehab.  Patient's nutrition remains impaired.  She consumes predominantly liquids and soft solids.  Physical and Occupational therapy are being arranged for her in home.  Patient's  reports that she was ambulating with assistance at the rehab facility.    Patient returns to clinic for interval reassessment.  She has been performing better at home of late.  Diarrhea has resolved and been replaced with formed stool.  Patient is generally continent.  Appetite is improving and patient is struggling less to swallow medications.  Patient is up and awake most of the day.  She typically switches between bed and chair.  She remains highly motivated toward resumption of systemic chemotherapy for fear that she will have to repeat earlier treatment.    Patient returns to clinic to review interval lab and imaging.  Appetite has improved.  Diet has become more diversified. She is able to eat more substantial solids such as meat balls.  No worsening pain or dyspnea.  Patient is still wheelchair dependent in terms of ambulation and spends most of her day up to the chair.  Patient has been having difficulty with chronic urinary tract infection.  She recently completed course of Macrodantin.  Urine remains cloudy.    Patient returns to clinic  prior to cycle 6 of palliative pembrolizumab.  Patient has recurrent urinary tract infection due to Proteus mirabilis which is sensitive to ciprofloxacin.  Patient has been provided a prescription and reports prompt resolution of symptoms.  No difficulties with last cycle chemotherapy.  Patient continues to enjoy a good performance status and is living more independently with each cycle of therapy.    Physical examination:  The patient is a well-developed, elderly, frail appearing white female who is confined to a wheelchair during the course of today's examination.  Weight is 199.5 lb (increased by 2 lb).  VITAL SIGNS: Documented in EMR and reviewed   HEENT: Normocephalic, atraumatic. Oral mucosa pink and moist. Lips without lesions. Tongue midline. Oropharynx clear. Nonicteric sclerae.   NECK: Supple, no adenopathy.  HEART: Regular rate and rhythm without murmur, gallop or rub.   LUNGS: Clear to auscultation bilaterally.  ABDOMEN: Soft, nontender, nondistended with positive normoactive bowel sounds, no hepatosplenomegaly.   EXTREMITIES:  Bilateral, trace, pedal edema. Distal pulses are intact.     Laboratory:  White count 10.7, hemoglobin 11.5, hematocrit 35.3, platelets 147, absolute neutrophil count is 7400.  Sodium 141, potassium 4.9, chloride 106, CO2 25, BUN 25, creatinine 1.3, glucose 91, calcium 10, magnesium 1.9, liver function test within normal limits, LDH is 181, GFR is 41.      Impression:  1. Limited stage small cell carcinoma of the lung with excellent response to second-line therapy with pembrolizumab.   2. Anemia-stable.    Plan:  1. Proceed with cycle 6 of Keytruda.  2. Return to clinic 3 weeks from now with interval CBC, CMP, LDH and magnesium prior to appointment.    Advance Care Planning During a prior visit, I engaged the patient in the advance care planning process.  The patient and I reviewed the role for advance directives and their purpose in directing future healthcare if the patients  unable to speak for him/herself.  At this point in time, the patient does have full decision-making capacity.  We discussed different extreme health states that they could experience, and reviewed what kind of medical care the patient would want in those situations.  The patient communicated that if they were comatose and had little chance of a meaningful recovery, they would not want machines/life-sustaining treatments used.  In addition to the above preference, other important end-of-life issues for the patient include DNR.  The patient has  completed a living will to reflect these preferences.  The patient has already designated a healthcare power of  to make decisions on their behalf.    (30 mins time spent).      This note was created using voice recognition software and may contain grammatical errors.             Route Chart for Scheduling    Med Onc Chart Routing      Follow up with physician 3 weeks. To clinic in 3 weeks with interval CBC, CMP, LDH magnesium prior to appointment.   Follow up with [facility]    Labs    Imaging    Pharmacy appointment    Other referrals          Treatment Plan Information   OP PEMBROLIZUMAB 200MG Q3W   Madhav Cardona MD   Upcoming Treatment Dates - OP PEMBROLIZUMAB 200MG Q3W    5/4/2022       Pre-Medications       acetaminophen tablet 1,000 mg       diphenydramine (BENADRYL) 25 mg in NS 50 mL IVPB       Chemotherapy       pembrolizumab (KEYTRUDA) 200 mg in sodium chloride 0.9% 108 mL infusion  5/25/2022       Pre-Medications       acetaminophen tablet 1,000 mg       diphenydramine (BENADRYL) 25 mg in NS 50 mL IVPB       Chemotherapy       pembrolizumab (KEYTRUDA) 200 mg in sodium chloride 0.9% 108 mL infusion  6/15/2022       Pre-Medications       acetaminophen tablet 1,000 mg       diphenydramine (BENADRYL) 25 mg in NS 50 mL IVPB       Chemotherapy       pembrolizumab (KEYTRUDA) 200 mg in sodium chloride 0.9% 108 mL infusion  7/6/2022       Pre-Medications        acetaminophen tablet 1,000 mg       diphenydramine (BENADRYL) 25 mg in NS 50 mL IVPB       Chemotherapy       pembrolizumab (KEYTRUDA) 200 mg in sodium chloride 0.9% 108 mL infusion       Supportive Care       denosumab (XGEVA) solution 120 mg    Therapy Plan Information  FERAHEME (FERUMOXYTOL) TWO DOSES  Pre-Medications  dexamethasone injection 4 mg  4 mg, Intravenous, Every visit  diphenhydrAMINE injection 25 mg  25 mg, Intravenous, Every visit  famotidine (PF) injection 20 mg  20 mg, Intravenous, Every visit  Medications  ferumoxytoL (FERAHEME) 510 mg in dextrose 5 % 100 mL IVPB  510 mg, Intravenous, Every visit  Flushes  heparin, porcine (PF) 100 unit/mL injection flush 500 Units  500 Units, Intravenous, PRN    INF FLUIDS  IV Fluids  sodium chloride 0.9% bolus 1,000 mL  1,000 mL, Intravenous, Every visit  Flushes  sodium chloride 0.9% flush 10 mL  10 mL, Intravenous, PRN  heparin, porcine (PF) 100 unit/mL injection flush 500 Units  500 Units, Intravenous, PRN  Supportive Care  ondansetron injection 8 mg  8 mg, Intravenous, PRN

## 2022-05-04 ENCOUNTER — INFUSION (OUTPATIENT)
Dept: INFUSION THERAPY | Facility: HOSPITAL | Age: 72
End: 2022-05-04
Attending: INTERNAL MEDICINE
Payer: MEDICARE

## 2022-05-04 VITALS
HEIGHT: 66 IN | HEART RATE: 73 BPM | TEMPERATURE: 98 F | BODY MASS INDEX: 31.89 KG/M2 | DIASTOLIC BLOOD PRESSURE: 81 MMHG | RESPIRATION RATE: 16 BRPM | SYSTOLIC BLOOD PRESSURE: 157 MMHG | WEIGHT: 198.44 LBS

## 2022-05-04 DIAGNOSIS — C34.91 SMALL CELL CARCINOMA OF LUNG, RIGHT: Primary | ICD-10-CM

## 2022-05-04 PROCEDURE — 63600175 PHARM REV CODE 636 W HCPCS: Mod: PN | Performed by: INTERNAL MEDICINE

## 2022-05-04 PROCEDURE — 96413 CHEMO IV INFUSION 1 HR: CPT | Mod: PN

## 2022-05-04 PROCEDURE — 25000003 PHARM REV CODE 250: Mod: PN | Performed by: INTERNAL MEDICINE

## 2022-05-04 PROCEDURE — 96367 TX/PROPH/DG ADDL SEQ IV INF: CPT | Mod: PN

## 2022-05-04 PROCEDURE — A4216 STERILE WATER/SALINE, 10 ML: HCPCS | Mod: PN | Performed by: INTERNAL MEDICINE

## 2022-05-04 RX ORDER — ACETAMINOPHEN 500 MG
1000 TABLET ORAL
Status: COMPLETED | OUTPATIENT
Start: 2022-05-04 | End: 2022-05-04

## 2022-05-04 RX ORDER — SODIUM CHLORIDE 9 MG/ML
INJECTION, SOLUTION INTRAVENOUS
Status: DISCONTINUED | OUTPATIENT
Start: 2022-05-04 | End: 2022-05-04 | Stop reason: HOSPADM

## 2022-05-04 RX ORDER — EPINEPHRINE 0.3 MG/.3ML
0.3 INJECTION SUBCUTANEOUS ONCE AS NEEDED
Status: DISCONTINUED | OUTPATIENT
Start: 2022-05-04 | End: 2022-05-04 | Stop reason: HOSPADM

## 2022-05-04 RX ORDER — HEPARIN 100 UNIT/ML
500 SYRINGE INTRAVENOUS
Status: DISCONTINUED | OUTPATIENT
Start: 2022-05-04 | End: 2022-05-04 | Stop reason: HOSPADM

## 2022-05-04 RX ORDER — DIPHENHYDRAMINE HYDROCHLORIDE 50 MG/ML
50 INJECTION INTRAMUSCULAR; INTRAVENOUS ONCE AS NEEDED
Status: DISCONTINUED | OUTPATIENT
Start: 2022-05-04 | End: 2022-05-04 | Stop reason: HOSPADM

## 2022-05-04 RX ORDER — SODIUM CHLORIDE 9 MG/ML
INJECTION, SOLUTION INTRAVENOUS CONTINUOUS
Status: DISCONTINUED | OUTPATIENT
Start: 2022-05-04 | End: 2022-05-04

## 2022-05-04 RX ORDER — SODIUM CHLORIDE 0.9 % (FLUSH) 0.9 %
10 SYRINGE (ML) INJECTION
Status: DISCONTINUED | OUTPATIENT
Start: 2022-05-04 | End: 2022-05-04 | Stop reason: HOSPADM

## 2022-05-04 RX ADMIN — SODIUM CHLORIDE: 0.9 INJECTION, SOLUTION INTRAVENOUS at 02:05

## 2022-05-04 RX ADMIN — Medication 10 ML: at 03:05

## 2022-05-04 RX ADMIN — DIPHENHYDRAMINE HYDROCHLORIDE 25 MG: 50 INJECTION INTRAMUSCULAR; INTRAVENOUS at 02:05

## 2022-05-04 RX ADMIN — SODIUM CHLORIDE 200 MG: 9 INJECTION, SOLUTION INTRAVENOUS at 02:05

## 2022-05-04 RX ADMIN — ACETAMINOPHEN 1000 MG: 500 TABLET, FILM COATED ORAL at 02:05

## 2022-05-04 NOTE — PLAN OF CARE
Problem: Adult Inpatient Plan of Care  Goal: Patient-Specific Goal (Individualization)  Outcome: Ongoing, Progressing  Flowsheets (Taken 5/4/2022 1545)  Individualized Care Needs: Recliner, blanket  Anxieties, Fears or Concerns: Fear of needles  Patient-Specific Goals (Include Timeframe): Infection prevention during treatment.     Problem: Fatigue  Goal: Improved Activity Tolerance  Intervention: Promote Energy Conservation  Flowsheets (Taken 5/4/2022 1545)  Fatigue Management:   activity schedule adjusted   frequent rest breaks encouraged   paced activity encouraged   fatigue-related activity identified  Sleep/Rest Enhancement:   regular sleep/rest pattern promoted   relaxation techniques promoted  Activity Management:   Ambulated -L4   Ambulated in lizarraga - L4     Problem: Adult Inpatient Plan of Care  Goal: Plan of Care Review  Outcome: Ongoing, Progressing  Flowsheets (Taken 5/4/2022 1545)  Plan of Care Reviewed With: patient  Tolerated treatment with no known distress.  Ambulated from infusion center with steady gait.

## 2022-05-12 ENCOUNTER — CLINICAL SUPPORT (OUTPATIENT)
Dept: CARDIOLOGY | Facility: HOSPITAL | Age: 72
End: 2022-05-12
Attending: INTERNAL MEDICINE
Payer: MEDICARE

## 2022-05-12 DIAGNOSIS — I10 ESSENTIAL HYPERTENSION: ICD-10-CM

## 2022-05-12 LAB
LEFT ABI: 1.16
LEFT ARM BP: 178 MMHG
LEFT DORSALIS PEDIS: 192 MMHG
LEFT POSTERIOR TIBIAL: 207 MMHG
RIGHT ABI: 1.13
RIGHT ARM BP: 178 MMHG
RIGHT DORSALIS PEDIS: 185 MMHG
RIGHT POSTERIOR TIBIAL: 202 MMHG

## 2022-05-12 PROCEDURE — 93922 UPR/L XTREMITY ART 2 LEVELS: CPT | Mod: 26,,, | Performed by: INTERNAL MEDICINE

## 2022-05-12 PROCEDURE — 93922 ANKLE BRACHIAL INDICES (ABI): ICD-10-PCS | Mod: 26,,, | Performed by: INTERNAL MEDICINE

## 2022-05-12 PROCEDURE — 93922 UPR/L XTREMITY ART 2 LEVELS: CPT | Mod: PO

## 2022-05-25 ENCOUNTER — OFFICE VISIT (OUTPATIENT)
Dept: HEMATOLOGY/ONCOLOGY | Facility: CLINIC | Age: 72
End: 2022-05-25
Payer: MEDICARE

## 2022-05-25 ENCOUNTER — INFUSION (OUTPATIENT)
Dept: INFUSION THERAPY | Facility: HOSPITAL | Age: 72
End: 2022-05-25
Attending: INTERNAL MEDICINE
Payer: MEDICARE

## 2022-05-25 ENCOUNTER — DOCUMENTATION ONLY (OUTPATIENT)
Dept: INFUSION THERAPY | Facility: HOSPITAL | Age: 72
End: 2022-05-25
Payer: MEDICARE

## 2022-05-25 VITALS
RESPIRATION RATE: 16 BRPM | TEMPERATURE: 98 F | HEART RATE: 70 BPM | WEIGHT: 204.38 LBS | BODY MASS INDEX: 32.85 KG/M2 | DIASTOLIC BLOOD PRESSURE: 81 MMHG | SYSTOLIC BLOOD PRESSURE: 157 MMHG | HEIGHT: 66 IN

## 2022-05-25 VITALS
WEIGHT: 204.38 LBS | SYSTOLIC BLOOD PRESSURE: 162 MMHG | DIASTOLIC BLOOD PRESSURE: 82 MMHG | HEART RATE: 70 BPM | HEIGHT: 66 IN | TEMPERATURE: 98 F | OXYGEN SATURATION: 97 % | BODY MASS INDEX: 32.85 KG/M2 | RESPIRATION RATE: 16 BRPM

## 2022-05-25 DIAGNOSIS — C34.91 SMALL CELL CARCINOMA OF LUNG, RIGHT: Primary | ICD-10-CM

## 2022-05-25 DIAGNOSIS — C77.1 MALIGNANT NEOPLASM METASTATIC TO INTRATHORACIC LYMPH NODE: ICD-10-CM

## 2022-05-25 DIAGNOSIS — C78.2 PLEURAL METASTASIS: ICD-10-CM

## 2022-05-25 DIAGNOSIS — C79.51 BONE METASTASES: ICD-10-CM

## 2022-05-25 PROCEDURE — 99999 PR PBB SHADOW E&M-EST. PATIENT-LVL IV: ICD-10-PCS | Mod: PBBFAC,,, | Performed by: INTERNAL MEDICINE

## 2022-05-25 PROCEDURE — 99214 PR OFFICE/OUTPT VISIT, EST, LEVL IV, 30-39 MIN: ICD-10-PCS | Mod: S$GLB,,, | Performed by: INTERNAL MEDICINE

## 2022-05-25 PROCEDURE — 3288F PR FALLS RISK ASSESSMENT DOCUMENTED: ICD-10-PCS | Mod: CPTII,S$GLB,, | Performed by: INTERNAL MEDICINE

## 2022-05-25 PROCEDURE — 3079F DIAST BP 80-89 MM HG: CPT | Mod: CPTII,S$GLB,, | Performed by: INTERNAL MEDICINE

## 2022-05-25 PROCEDURE — 99999 PR PBB SHADOW E&M-EST. PATIENT-LVL IV: CPT | Mod: PBBFAC,,, | Performed by: INTERNAL MEDICINE

## 2022-05-25 PROCEDURE — 1160F RVW MEDS BY RX/DR IN RCRD: CPT | Mod: CPTII,S$GLB,, | Performed by: INTERNAL MEDICINE

## 2022-05-25 PROCEDURE — 63600175 PHARM REV CODE 636 W HCPCS: Mod: JG,PN | Performed by: INTERNAL MEDICINE

## 2022-05-25 PROCEDURE — 25000003 PHARM REV CODE 250: Mod: PN | Performed by: INTERNAL MEDICINE

## 2022-05-25 PROCEDURE — 1159F MED LIST DOCD IN RCRD: CPT | Mod: CPTII,S$GLB,, | Performed by: INTERNAL MEDICINE

## 2022-05-25 PROCEDURE — 99214 OFFICE O/P EST MOD 30 MIN: CPT | Mod: S$GLB,,, | Performed by: INTERNAL MEDICINE

## 2022-05-25 PROCEDURE — 3008F BODY MASS INDEX DOCD: CPT | Mod: CPTII,S$GLB,, | Performed by: INTERNAL MEDICINE

## 2022-05-25 PROCEDURE — 3288F FALL RISK ASSESSMENT DOCD: CPT | Mod: CPTII,S$GLB,, | Performed by: INTERNAL MEDICINE

## 2022-05-25 PROCEDURE — 1160F PR REVIEW ALL MEDS BY PRESCRIBER/CLIN PHARMACIST DOCUMENTED: ICD-10-PCS | Mod: CPTII,S$GLB,, | Performed by: INTERNAL MEDICINE

## 2022-05-25 PROCEDURE — 3079F PR MOST RECENT DIASTOLIC BLOOD PRESSURE 80-89 MM HG: ICD-10-PCS | Mod: CPTII,S$GLB,, | Performed by: INTERNAL MEDICINE

## 2022-05-25 PROCEDURE — 1126F AMNT PAIN NOTED NONE PRSNT: CPT | Mod: CPTII,S$GLB,, | Performed by: INTERNAL MEDICINE

## 2022-05-25 PROCEDURE — 1126F PR PAIN SEVERITY QUANTIFIED, NO PAIN PRESENT: ICD-10-PCS | Mod: CPTII,S$GLB,, | Performed by: INTERNAL MEDICINE

## 2022-05-25 PROCEDURE — 3077F SYST BP >= 140 MM HG: CPT | Mod: CPTII,S$GLB,, | Performed by: INTERNAL MEDICINE

## 2022-05-25 PROCEDURE — 1101F PT FALLS ASSESS-DOCD LE1/YR: CPT | Mod: CPTII,S$GLB,, | Performed by: INTERNAL MEDICINE

## 2022-05-25 PROCEDURE — 96413 CHEMO IV INFUSION 1 HR: CPT | Mod: PN

## 2022-05-25 PROCEDURE — 1101F PR PT FALLS ASSESS DOC 0-1 FALLS W/OUT INJ PAST YR: ICD-10-PCS | Mod: CPTII,S$GLB,, | Performed by: INTERNAL MEDICINE

## 2022-05-25 PROCEDURE — A4216 STERILE WATER/SALINE, 10 ML: HCPCS | Mod: PN | Performed by: INTERNAL MEDICINE

## 2022-05-25 PROCEDURE — 3077F PR MOST RECENT SYSTOLIC BLOOD PRESSURE >= 140 MM HG: ICD-10-PCS | Mod: CPTII,S$GLB,, | Performed by: INTERNAL MEDICINE

## 2022-05-25 PROCEDURE — 3008F PR BODY MASS INDEX (BMI) DOCUMENTED: ICD-10-PCS | Mod: CPTII,S$GLB,, | Performed by: INTERNAL MEDICINE

## 2022-05-25 PROCEDURE — 4010F PR ACE/ARB THEARPY RXD/TAKEN: ICD-10-PCS | Mod: CPTII,S$GLB,, | Performed by: INTERNAL MEDICINE

## 2022-05-25 PROCEDURE — 4010F ACE/ARB THERAPY RXD/TAKEN: CPT | Mod: CPTII,S$GLB,, | Performed by: INTERNAL MEDICINE

## 2022-05-25 PROCEDURE — 96367 TX/PROPH/DG ADDL SEQ IV INF: CPT | Mod: PN

## 2022-05-25 PROCEDURE — 1159F PR MEDICATION LIST DOCUMENTED IN MEDICAL RECORD: ICD-10-PCS | Mod: CPTII,S$GLB,, | Performed by: INTERNAL MEDICINE

## 2022-05-25 RX ORDER — ACETAMINOPHEN 500 MG
1000 TABLET ORAL
Status: CANCELLED
Start: 2022-05-25

## 2022-05-25 RX ORDER — DIPHENHYDRAMINE HYDROCHLORIDE 50 MG/ML
50 INJECTION INTRAMUSCULAR; INTRAVENOUS ONCE AS NEEDED
Status: DISCONTINUED | OUTPATIENT
Start: 2022-05-25 | End: 2022-05-25 | Stop reason: HOSPADM

## 2022-05-25 RX ORDER — LIDOCAINE AND PRILOCAINE 25; 25 MG/G; MG/G
CREAM TOPICAL
Qty: 30 G | Refills: 6 | Status: SHIPPED | OUTPATIENT
Start: 2022-05-25 | End: 2022-07-12 | Stop reason: CLARIF

## 2022-05-25 RX ORDER — ACETAMINOPHEN 500 MG
1000 TABLET ORAL
Status: COMPLETED | OUTPATIENT
Start: 2022-05-25 | End: 2022-05-25

## 2022-05-25 RX ORDER — SODIUM CHLORIDE 9 MG/ML
INJECTION, SOLUTION INTRAVENOUS CONTINUOUS
Status: CANCELLED
Start: 2022-05-25

## 2022-05-25 RX ORDER — SODIUM CHLORIDE 0.9 % (FLUSH) 0.9 %
10 SYRINGE (ML) INJECTION
Status: CANCELLED | OUTPATIENT
Start: 2022-05-25

## 2022-05-25 RX ORDER — DIPHENHYDRAMINE HYDROCHLORIDE 50 MG/ML
50 INJECTION INTRAMUSCULAR; INTRAVENOUS ONCE AS NEEDED
Status: CANCELLED | OUTPATIENT
Start: 2022-05-25

## 2022-05-25 RX ORDER — LIDOCAINE AND PRILOCAINE 25; 25 MG/G; MG/G
CREAM TOPICAL
Qty: 30 G | Refills: 1 | Status: SHIPPED | OUTPATIENT
Start: 2022-05-25

## 2022-05-25 RX ORDER — HEPARIN 100 UNIT/ML
500 SYRINGE INTRAVENOUS
Status: CANCELLED | OUTPATIENT
Start: 2022-05-25

## 2022-05-25 RX ORDER — EPINEPHRINE 0.3 MG/.3ML
0.3 INJECTION SUBCUTANEOUS ONCE AS NEEDED
Status: CANCELLED | OUTPATIENT
Start: 2022-05-25

## 2022-05-25 RX ORDER — SODIUM CHLORIDE 0.9 % (FLUSH) 0.9 %
10 SYRINGE (ML) INJECTION
Status: DISCONTINUED | OUTPATIENT
Start: 2022-05-25 | End: 2022-05-25 | Stop reason: HOSPADM

## 2022-05-25 RX ORDER — SODIUM CHLORIDE 9 MG/ML
INJECTION, SOLUTION INTRAVENOUS CONTINUOUS
Status: DISCONTINUED | OUTPATIENT
Start: 2022-05-25 | End: 2022-05-25 | Stop reason: HOSPADM

## 2022-05-25 RX ORDER — EPINEPHRINE 0.3 MG/.3ML
0.3 INJECTION SUBCUTANEOUS ONCE AS NEEDED
Status: DISCONTINUED | OUTPATIENT
Start: 2022-05-25 | End: 2022-05-25 | Stop reason: HOSPADM

## 2022-05-25 RX ADMIN — SODIUM CHLORIDE 200 MG: 9 INJECTION, SOLUTION INTRAVENOUS at 03:05

## 2022-05-25 RX ADMIN — Medication 10 ML: at 04:05

## 2022-05-25 RX ADMIN — DIPHENHYDRAMINE HYDROCHLORIDE 25 MG: 50 INJECTION, SOLUTION INTRAMUSCULAR; INTRAVENOUS at 03:05

## 2022-05-25 RX ADMIN — SODIUM CHLORIDE: 0.9 INJECTION, SOLUTION INTRAVENOUS at 03:05

## 2022-05-25 RX ADMIN — ACETAMINOPHEN 1000 MG: 500 TABLET, FILM COATED ORAL at 03:05

## 2022-05-25 NOTE — PROGRESS NOTES
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  Her treatment was interrupted after admission to [facility] with radiation esophagitis followed by Clostridium difficile colitis.  During her multi week stay, the patient became profoundly debilitated and predominantly bed-bound.  Despite healing of her esophagus, her nutritional status remained poor.  She was 2 immobile to allow for discharge directly to home and has had an interval rehab stay.  She and her /daughter have requested a virtual visit today to assess progress following discharge from rehab.  Patient's nutrition remains impaired.  She consumes predominantly liquids and soft solids.  Physical and Occupational therapy are being arranged for her in home.  Patient's  reports that she was ambulating with assistance at the rehab facility.    Patient returns to clinic for interval reassessment.  She has been performing better at home of late.  Diarrhea has resolved and been replaced with formed stool.  Patient is generally continent.  Appetite is improving and patient is struggling less to swallow medications.  Patient is up and awake most of the day.  She typically switches between bed and chair.  She remains highly motivated toward resumption of systemic chemotherapy for fear that she will have to repeat earlier treatment.    Patient returns to clinic to review interval lab and imaging.  Appetite has improved.  Diet has become more diversified. She is able to eat more substantial solids such as meat balls.  No worsening pain or dyspnea.  Patient is still wheelchair dependent in terms of ambulation and spends most of her day up to the chair.  Patient has been having difficulty with chronic urinary tract infection.  She recently completed course of Macrodantin.  Urine remains cloudy.    Patient returns to clinic  prior to cycle 6 of palliative pembrolizumab.  Patient has recurrent urinary tract infection due to Proteus mirabilis which is sensitive to ciprofloxacin.  Patient has been provided a prescription and reports prompt resolution of symptoms.  No difficulties with last cycle chemotherapy.  Patient continues to enjoy a good performance status and is living more independently with each cycle of therapy.    Physical examination:  The patient is a well-developed, elderly, frail appearing white female who is confined to a wheelchair during the course of today's examination.  Weight is 204.5 lb (increased by 5 lb).  VITAL SIGNS: Documented in EMR and reviewed   HEENT: Normocephalic, atraumatic. Oral mucosa pink and moist. Lips without lesions. Tongue midline. Oropharynx clear. Nonicteric sclerae.   NECK: Supple, no adenopathy.  HEART: Regular rate and rhythm without murmur, gallop or rub.   LUNGS: Clear to auscultation bilaterally.  ABDOMEN: Soft, nontender, nondistended with positive normoactive bowel sounds, no hepatosplenomegaly.   EXTREMITIES:  Bilateral, trace, pedal edema. Distal pulses are intact.     Laboratory:  White count 9.4, hemoglobin 11.9, hematocrit 36.6, platelets 163, absolute neutrophil count is 6000.  Sodium 140, potassium 5.2, chloride 107, CO2 26, BUN 28, creatinine 1.2, glucose 86, calcium 9.8, magnesium 2.1, liver function test within normal limits, LDH is 146, GFR is 46.      Impression:  1. Limited stage small cell carcinoma of the lung with excellent response to second-line therapy with pembrolizumab.   2. Anemia-stable.    Plan:  1. Proceed with cycle 6 of Keytruda.  2. Return to clinic 3 weeks from now with interval CBC, CMP, LDH and magnesium prior to appointment.    Advance Care Planning During a prior visit, I engaged the patient in the advance care planning process.  The patient and I reviewed the role for advance directives and their purpose in directing future healthcare if the patients  unable to speak for him/herself.  At this point in time, the patient does have full decision-making capacity.  We discussed different extreme health states that they could experience, and reviewed what kind of medical care the patient would want in those situations.  The patient communicated that if they were comatose and had little chance of a meaningful recovery, they would not want machines/life-sustaining treatments used.  In addition to the above preference, other important end-of-life issues for the patient include DNR.  The patient has  completed a living will to reflect these preferences.  The patient has already designated a healthcare power of  to make decisions on their behalf.    (30 mins time spent).      This note was created using voice recognition software and may contain grammatical errors.             Route Chart for Scheduling    Med Onc Chart Routing      Follow up with physician 3 weeks. CBC, CMP, LDH, magnesium.   Follow up with [facility]    Labs    Imaging    Pharmacy appointment    Other referrals          Treatment Plan Information   OP PEMBROLIZUMAB 200MG Q3W   Madhav Cardona MD   Upcoming Treatment Dates - OP PEMBROLIZUMAB 200MG Q3W    5/25/2022       Pre-Medications       acetaminophen tablet 1,000 mg       diphenydramine (BENADRYL) 25 mg in NS 50 mL IVPB       Chemotherapy       pembrolizumab (KEYTRUDA) 200 mg in sodium chloride 0.9% 108 mL infusion  6/15/2022       Pre-Medications       acetaminophen tablet 1,000 mg       diphenydramine (BENADRYL) 25 mg in NS 50 mL IVPB       Chemotherapy       pembrolizumab (KEYTRUDA) 200 mg in sodium chloride 0.9% 108 mL infusion  7/6/2022       Pre-Medications       acetaminophen tablet 1,000 mg       diphenydramine (BENADRYL) 25 mg in NS 50 mL IVPB       Chemotherapy       pembrolizumab (KEYTRUDA) 200 mg in sodium chloride 0.9% 108 mL infusion       Supportive Care       denosumab (XGEVA) solution 120 mg  7/27/2022       Pre-Medications        acetaminophen tablet 1,000 mg       diphenydramine (BENADRYL) 25 mg in NS 50 mL IVPB       Chemotherapy       pembrolizumab (KEYTRUDA) 200 mg in sodium chloride 0.9% 108 mL infusion    Therapy Plan Information  FERAHEME (FERUMOXYTOL) TWO DOSES  Pre-Medications  dexamethasone injection 4 mg  4 mg, Intravenous, Every visit  diphenhydrAMINE injection 25 mg  25 mg, Intravenous, Every visit  famotidine (PF) injection 20 mg  20 mg, Intravenous, Every visit  Medications  ferumoxytoL (FERAHEME) 510 mg in dextrose 5 % 100 mL IVPB  510 mg, Intravenous, Every visit  Flushes  heparin, porcine (PF) 100 unit/mL injection flush 500 Units  500 Units, Intravenous, PRN    INF FLUIDS  IV Fluids  sodium chloride 0.9% bolus 1,000 mL  1,000 mL, Intravenous, Every visit  Flushes  sodium chloride 0.9% flush 10 mL  10 mL, Intravenous, PRN  heparin, porcine (PF) 100 unit/mL injection flush 500 Units  500 Units, Intravenous, PRN  Supportive Care  ondansetron injection 8 mg  8 mg, Intravenous, PRN

## 2022-05-25 NOTE — PLAN OF CARE
Problem: Adult Inpatient Plan of Care  Goal: Patient-Specific Goal (Individualization)  Outcome: Ongoing, Progressing  Flowsheets (Taken 5/25/2022 1630)  Individualized Care Needs: Recliner, blanket  Anxieties, Fears or Concerns: Fear of needles  Patient-Specific Goals (Include Timeframe): Infection prevention during treatment.     Problem: Fatigue  Goal: Improved Activity Tolerance  Intervention: Promote Energy Conservation  Flowsheets (Taken 5/25/2022 1630)  Fatigue Management:   activity schedule adjusted   frequent rest breaks encouraged   paced activity encouraged   fatigue-related activity identified  Sleep/Rest Enhancement:   regular sleep/rest pattern promoted   relaxation techniques promoted  Activity Management:   Ambulated -L4   Ambulated in lizarraga - L4     Problem: Adult Inpatient Plan of Care  Goal: Plan of Care Review  Outcome: Ongoing, Progressing  Flowsheets (Taken 5/25/2022 1630)  Plan of Care Reviewed With: patient  Tolerated treatment with no known  distress.  Ambulated from infusion center with steady gait.

## 2022-05-25 NOTE — PROGRESS NOTES
Oncology   Chemotherapy Infusion Visit    Quick Social Service Status Follow Up   Met w/ pt briefly to follow up on social and emotional needs since initiation of treatment.      SW met with pt and  at chairside. Pt reports she is feeling much more like herself. She shared she was able to visit with her sister for the first time in 3 years. She was able to get all dressed up for the day. She said it felt good to be bale to get dressed up and go out. She reports it was good for her children to see her this was as well. SW provided support and encouragement.     SW to follow.            Mae Webster, ALINA  05/25/2022  4:15 PM

## 2022-06-02 ENCOUNTER — TELEPHONE (OUTPATIENT)
Dept: HEMATOLOGY/ONCOLOGY | Facility: CLINIC | Age: 72
End: 2022-06-02
Payer: MEDICARE

## 2022-06-02 NOTE — TELEPHONE ENCOUNTER
I spoke with Allyson about getting an IO appt scheduled per the recommendation of her Hem/Onc MD. I explained in detail what we offer and listed some symptoms/side effects that we can help address using our support services. She said she is very blessed so far, she has no side effects and doesn't want to try acupuncture and doesn't need our services.

## 2022-06-15 ENCOUNTER — INFUSION (OUTPATIENT)
Dept: INFUSION THERAPY | Facility: HOSPITAL | Age: 72
End: 2022-06-15
Attending: INTERNAL MEDICINE
Payer: MEDICARE

## 2022-06-15 ENCOUNTER — DOCUMENTATION ONLY (OUTPATIENT)
Dept: INFUSION THERAPY | Facility: HOSPITAL | Age: 72
End: 2022-06-15
Payer: MEDICARE

## 2022-06-15 ENCOUNTER — OFFICE VISIT (OUTPATIENT)
Dept: HEMATOLOGY/ONCOLOGY | Facility: CLINIC | Age: 72
End: 2022-06-15
Payer: MEDICARE

## 2022-06-15 VITALS
DIASTOLIC BLOOD PRESSURE: 80 MMHG | OXYGEN SATURATION: 99 % | SYSTOLIC BLOOD PRESSURE: 132 MMHG | HEART RATE: 73 BPM | RESPIRATION RATE: 19 BRPM | BODY MASS INDEX: 33.24 KG/M2 | TEMPERATURE: 98 F | HEIGHT: 66 IN | WEIGHT: 206.81 LBS

## 2022-06-15 VITALS
SYSTOLIC BLOOD PRESSURE: 170 MMHG | BODY MASS INDEX: 33.24 KG/M2 | TEMPERATURE: 98 F | HEART RATE: 68 BPM | DIASTOLIC BLOOD PRESSURE: 78 MMHG | HEIGHT: 66 IN | RESPIRATION RATE: 19 BRPM | WEIGHT: 206.81 LBS

## 2022-06-15 DIAGNOSIS — C78.2 PLEURAL METASTASIS: ICD-10-CM

## 2022-06-15 DIAGNOSIS — C79.51 BONE METASTASES: ICD-10-CM

## 2022-06-15 DIAGNOSIS — C34.91 SMALL CELL CARCINOMA OF LUNG, RIGHT: Primary | ICD-10-CM

## 2022-06-15 DIAGNOSIS — C77.1 MALIGNANT NEOPLASM METASTATIC TO INTRATHORACIC LYMPH NODE: ICD-10-CM

## 2022-06-15 PROCEDURE — 96367 TX/PROPH/DG ADDL SEQ IV INF: CPT | Mod: PN

## 2022-06-15 PROCEDURE — 3008F PR BODY MASS INDEX (BMI) DOCUMENTED: ICD-10-PCS | Mod: CPTII,S$GLB,, | Performed by: INTERNAL MEDICINE

## 2022-06-15 PROCEDURE — 3079F DIAST BP 80-89 MM HG: CPT | Mod: CPTII,S$GLB,, | Performed by: INTERNAL MEDICINE

## 2022-06-15 PROCEDURE — 3079F PR MOST RECENT DIASTOLIC BLOOD PRESSURE 80-89 MM HG: ICD-10-PCS | Mod: CPTII,S$GLB,, | Performed by: INTERNAL MEDICINE

## 2022-06-15 PROCEDURE — 3288F PR FALLS RISK ASSESSMENT DOCUMENTED: ICD-10-PCS | Mod: CPTII,S$GLB,, | Performed by: INTERNAL MEDICINE

## 2022-06-15 PROCEDURE — 99999 PR PBB SHADOW E&M-EST. PATIENT-LVL III: ICD-10-PCS | Mod: PBBFAC,,, | Performed by: INTERNAL MEDICINE

## 2022-06-15 PROCEDURE — 3008F BODY MASS INDEX DOCD: CPT | Mod: CPTII,S$GLB,, | Performed by: INTERNAL MEDICINE

## 2022-06-15 PROCEDURE — 99215 PR OFFICE/OUTPT VISIT, EST, LEVL V, 40-54 MIN: ICD-10-PCS | Mod: S$GLB,,, | Performed by: INTERNAL MEDICINE

## 2022-06-15 PROCEDURE — A4216 STERILE WATER/SALINE, 10 ML: HCPCS | Mod: PN | Performed by: INTERNAL MEDICINE

## 2022-06-15 PROCEDURE — 3075F PR MOST RECENT SYSTOLIC BLOOD PRESS GE 130-139MM HG: ICD-10-PCS | Mod: CPTII,S$GLB,, | Performed by: INTERNAL MEDICINE

## 2022-06-15 PROCEDURE — 1160F PR REVIEW ALL MEDS BY PRESCRIBER/CLIN PHARMACIST DOCUMENTED: ICD-10-PCS | Mod: CPTII,S$GLB,, | Performed by: INTERNAL MEDICINE

## 2022-06-15 PROCEDURE — 25000003 PHARM REV CODE 250: Mod: PN | Performed by: INTERNAL MEDICINE

## 2022-06-15 PROCEDURE — 4010F PR ACE/ARB THEARPY RXD/TAKEN: ICD-10-PCS | Mod: CPTII,S$GLB,, | Performed by: INTERNAL MEDICINE

## 2022-06-15 PROCEDURE — 1101F PT FALLS ASSESS-DOCD LE1/YR: CPT | Mod: CPTII,S$GLB,, | Performed by: INTERNAL MEDICINE

## 2022-06-15 PROCEDURE — 63600175 PHARM REV CODE 636 W HCPCS: Mod: JG,PN | Performed by: INTERNAL MEDICINE

## 2022-06-15 PROCEDURE — 1101F PR PT FALLS ASSESS DOC 0-1 FALLS W/OUT INJ PAST YR: ICD-10-PCS | Mod: CPTII,S$GLB,, | Performed by: INTERNAL MEDICINE

## 2022-06-15 PROCEDURE — 3075F SYST BP GE 130 - 139MM HG: CPT | Mod: CPTII,S$GLB,, | Performed by: INTERNAL MEDICINE

## 2022-06-15 PROCEDURE — 1160F RVW MEDS BY RX/DR IN RCRD: CPT | Mod: CPTII,S$GLB,, | Performed by: INTERNAL MEDICINE

## 2022-06-15 PROCEDURE — 1159F PR MEDICATION LIST DOCUMENTED IN MEDICAL RECORD: ICD-10-PCS | Mod: CPTII,S$GLB,, | Performed by: INTERNAL MEDICINE

## 2022-06-15 PROCEDURE — 1159F MED LIST DOCD IN RCRD: CPT | Mod: CPTII,S$GLB,, | Performed by: INTERNAL MEDICINE

## 2022-06-15 PROCEDURE — 96413 CHEMO IV INFUSION 1 HR: CPT | Mod: PN

## 2022-06-15 PROCEDURE — 99215 OFFICE O/P EST HI 40 MIN: CPT | Mod: S$GLB,,, | Performed by: INTERNAL MEDICINE

## 2022-06-15 PROCEDURE — 3288F FALL RISK ASSESSMENT DOCD: CPT | Mod: CPTII,S$GLB,, | Performed by: INTERNAL MEDICINE

## 2022-06-15 PROCEDURE — 4010F ACE/ARB THERAPY RXD/TAKEN: CPT | Mod: CPTII,S$GLB,, | Performed by: INTERNAL MEDICINE

## 2022-06-15 PROCEDURE — 99999 PR PBB SHADOW E&M-EST. PATIENT-LVL III: CPT | Mod: PBBFAC,,, | Performed by: INTERNAL MEDICINE

## 2022-06-15 RX ORDER — ACETAMINOPHEN 500 MG
1000 TABLET ORAL
Status: CANCELLED
Start: 2022-06-15

## 2022-06-15 RX ORDER — SODIUM CHLORIDE 0.9 % (FLUSH) 0.9 %
10 SYRINGE (ML) INJECTION
Status: CANCELLED | OUTPATIENT
Start: 2022-06-15

## 2022-06-15 RX ORDER — DIPHENHYDRAMINE HYDROCHLORIDE 50 MG/ML
50 INJECTION INTRAMUSCULAR; INTRAVENOUS ONCE AS NEEDED
Status: DISCONTINUED | OUTPATIENT
Start: 2022-06-15 | End: 2022-06-15 | Stop reason: HOSPADM

## 2022-06-15 RX ORDER — HEPARIN 100 UNIT/ML
500 SYRINGE INTRAVENOUS
Status: CANCELLED | OUTPATIENT
Start: 2022-06-15

## 2022-06-15 RX ORDER — SODIUM CHLORIDE 9 MG/ML
INJECTION, SOLUTION INTRAVENOUS CONTINUOUS
Status: CANCELLED
Start: 2022-06-15

## 2022-06-15 RX ORDER — EPINEPHRINE 0.3 MG/.3ML
0.3 INJECTION SUBCUTANEOUS ONCE AS NEEDED
Status: CANCELLED | OUTPATIENT
Start: 2022-06-15

## 2022-06-15 RX ORDER — EPINEPHRINE 0.3 MG/.3ML
0.3 INJECTION SUBCUTANEOUS ONCE AS NEEDED
Status: DISCONTINUED | OUTPATIENT
Start: 2022-06-15 | End: 2022-06-15 | Stop reason: HOSPADM

## 2022-06-15 RX ORDER — DIPHENHYDRAMINE HYDROCHLORIDE 50 MG/ML
50 INJECTION INTRAMUSCULAR; INTRAVENOUS ONCE AS NEEDED
Status: CANCELLED | OUTPATIENT
Start: 2022-06-15

## 2022-06-15 RX ORDER — ACETAMINOPHEN 500 MG
1000 TABLET ORAL
Status: COMPLETED | OUTPATIENT
Start: 2022-06-15 | End: 2022-06-15

## 2022-06-15 RX ORDER — SODIUM CHLORIDE 0.9 % (FLUSH) 0.9 %
10 SYRINGE (ML) INJECTION
Status: DISCONTINUED | OUTPATIENT
Start: 2022-06-15 | End: 2022-06-15 | Stop reason: HOSPADM

## 2022-06-15 RX ADMIN — Medication 10 ML: at 01:06

## 2022-06-15 RX ADMIN — SODIUM CHLORIDE 200 MG: 9 INJECTION, SOLUTION INTRAVENOUS at 12:06

## 2022-06-15 RX ADMIN — SODIUM CHLORIDE: 0.9 INJECTION, SOLUTION INTRAVENOUS at 12:06

## 2022-06-15 RX ADMIN — DIPHENHYDRAMINE HYDROCHLORIDE 25 MG: 50 INJECTION, SOLUTION INTRAMUSCULAR; INTRAVENOUS at 12:06

## 2022-06-15 RX ADMIN — ACETAMINOPHEN 1000 MG: 500 TABLET, FILM COATED ORAL at 12:06

## 2022-06-15 NOTE — PROGRESS NOTES
Oncology Nutrition       Weight Check   Chart reviewed. Weight check completed on pt.     Wt Readings from Last 10 Encounters:   06/15/22 93.8 kg (206 lb 12.7 oz)   06/15/22 93.8 kg (206 lb 12.7 oz)   05/25/22 92.7 kg (204 lb 5.9 oz)   05/25/22 92.7 kg (204 lb 5.9 oz)   05/04/22 90 kg (198 lb 6.6 oz)   05/02/22 90.5 kg (199 lb 8.3 oz)   04/14/22 89.3 kg (196 lb 13.9 oz)   04/13/22 89.6 kg (197 lb 8.5 oz)   04/11/22 89.6 kg (197 lb 8.5 oz)   04/05/22 89.8 kg (197 lb 15.6 oz)          RD plan of care: Weight is currently 206 lbs. No significant change at this time. Per nursing nutrition risk report, pt denies any nutritional concerns or challenges at this time. RD to continue to monitor; no nutritional interventions are needed at this time.     Angela Waddell, LOLYN, LDN  06/15/2022  1:48 PM

## 2022-06-15 NOTE — PLAN OF CARE
Problem: Fatigue  Goal: Improved Activity Tolerance  Intervention: Promote Energy Conservation  Flowsheets (Taken 6/15/2022 1232)  Fatigue Management:   activity schedule adjusted   frequent rest breaks encouraged   paced activity encouraged   fatigue-related activity identified  Sleep/Rest Enhancement:   regular sleep/rest pattern promoted   natural light exposure provided   relaxation techniques promoted  Activity Management:   Ambulated -L4   Ambulated to bathroom - L4   Ambulated in liz[place]aga - L4   Ambulated in room - L4   Up in chair - L3     Problem: Adult Inpatient Plan of Care  Goal: Patient-Specific Goal (Individualization)  Outcome: Ongoing, Progressing  Flowsheets (Taken 6/15/2022 1232)  Individualized Care Needs: recliner, two blankets, face cloth  Anxieties, Fears or Concerns: PAC access/needles  Patient-Specific Goals (Include Timeframe): no s/s of reaction during treatment

## 2022-06-15 NOTE — PROGRESS NOTES
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  Her treatment was interrupted after admission to [facility] with radiation esophagitis followed by Clostridium difficile colitis.  During her multi week stay, the patient became profoundly debilitated and predominantly bed-bound.  Despite healing of her esophagus, her nutritional status remained poor.  She was 2 immobile to allow for discharge directly to home and has had an interval rehab stay.  She and her /daughter have requested a virtual visit today to assess progress following discharge from rehab.  Patient's nutrition remains impaired.  She consumes predominantly liquids and soft solids.  Physical and Occupational therapy are being arranged for her in home.  Patient's  reports that she was ambulating with assistance at the rehab facility.    Patient returns to clinic for interval reassessment.  She has been performing better at home of late.  Diarrhea has resolved and been replaced with formed stool.  Patient is generally continent.  Appetite is improving and patient is struggling less to swallow medications.  Patient is up and awake most of the day.  She typically switches between bed and chair.  She remains highly motivated toward resumption of systemic chemotherapy for fear that she will have to repeat earlier treatment.    Patient returns to clinic to review interval lab and imaging.  Appetite has improved.  Diet has become more diversified. She is able to eat more substantial solids such as meat balls.  No worsening pain or dyspnea.  Patient is still wheelchair dependent in terms of ambulation and spends most of her day up to the chair.  Patient has been having difficulty with chronic urinary tract infection.  She recently completed course of Macrodantin.  Urine remains cloudy.    Patient returns to clinic  prior to cycle 8 of palliative pembrolizumab.  Since last evaluation, patient has had a few fleeting episodes numbness involving her left hand and left foot.  She was unable to  a spoon during 1 of these episodes.  This was not associated with any slurring of speech, facial droop, visual disturbance, etcetera.  Symptoms resolved spontaneously.    Physical examination:  The patient is a well-developed, well-nourished elderly, white female in no acute distress.  Weight is 206.5 lb (increased by 2 lb).  VITAL SIGNS: Documented in EMR and reviewed  HEENT: Normocephalic, atraumatic. Oral mucosa pink and moist. Lips without lesions. Tongue midline. Oropharynx clear. Nonicteric sclerae.   NECK: Supple, no adenopathy.  HEART: Regular rate and rhythm without murmur, gallop or rub.   LUNGS: Clear to auscultation bilaterally.  ABDOMEN: Soft, nontender, nondistended with positive normoactive bowel sounds, no hepatosplenomegaly.   EXTREMITIES:  No cyanosis clubbing or edema. Distal pulses are intact.     Laboratory:  White count 7.8, hemoglobin 12.1, hematocrit 38, platelets 155, absolute neutrophil count is 5400.    Sodium 140, potassium 5.1, chloride 109, CO2 22, BUN 25, creatinine 1.3, glucose 138, calcium 9.3, magnesium 2.1, liver function test within normal limits, LDH is 142, GFR is 41.    Impression:  1. Small cell carcinoma of the lung metastatic to bone, lymph node, and pleura with excellent response to second-line therapy with pembrolizumab.   2. Anemia-resolved.  3. Intermittent numbness of left hand and foot of uncertain etiology.    Plan:  1. Proceed with cycle 8 of Keytruda.  2. Return to clinic 3 weeks from now with interval CBC, CMP, LDH magnesium TSH, free T4, fasting Cordis all, and PET scan for restaging prior to appointment.  3. Obtain MRI of the brain with without contrast now.  Review results at earliest convenience.    Advance Care Planning During a prior visit, I engaged the patient in the advance  care planning process.  The patient and I reviewed the role for advance directives and their purpose in directing future healthcare if the patients unable to speak for him/herself.  At this point in time, the patient does have full decision-making capacity.  We discussed different extreme health states that they could experience, and reviewed what kind of medical care the patient would want in those situations.  The patient communicated that if they were comatose and had little chance of a meaningful recovery, they would not want machines/life-sustaining treatments used.  In addition to the above preference, other important end-of-life issues for the patient include DNR.  The patient has  completed a living will to reflect these preferences.  The patient has already designated a healthcare power of  to make decisions on their behalf.    (30 mins time spent).      This note was created using voice recognition software and may contain grammatical errors.

## 2022-06-15 NOTE — PLAN OF CARE
Problem: Adult Inpatient Plan of Care  Goal: Plan of Care Review  6/15/2022 1755 by Leda Neal, RN  Outcome: Ongoing, Progressing  Flowsheets (Taken 6/15/2022 1335)  Plan of Care Reviewed With:   patient   spouse   Pt tolerated her Keytruda infusion well, NAD. No new c/o voiced. Pt given a schedule and reviewed, pt verbalized understanding. Pt ambulated out of the clinic without difficulty accompanied by her .

## 2022-06-16 ENCOUNTER — PATIENT MESSAGE (OUTPATIENT)
Dept: HEMATOLOGY/ONCOLOGY | Facility: CLINIC | Age: 72
End: 2022-06-16
Payer: MEDICARE

## 2022-06-17 ENCOUNTER — PATIENT MESSAGE (OUTPATIENT)
Dept: HEMATOLOGY/ONCOLOGY | Facility: CLINIC | Age: 72
End: 2022-06-17

## 2022-06-17 ENCOUNTER — OFFICE VISIT (OUTPATIENT)
Dept: HEMATOLOGY/ONCOLOGY | Facility: CLINIC | Age: 72
End: 2022-06-17
Payer: MEDICARE

## 2022-06-17 ENCOUNTER — HOSPITAL ENCOUNTER (OUTPATIENT)
Dept: RADIOLOGY | Facility: HOSPITAL | Age: 72
Discharge: HOME | End: 2022-06-17
Attending: INTERNAL MEDICINE
Payer: MEDICARE

## 2022-06-17 VITALS
HEART RATE: 88 BPM | WEIGHT: 209 LBS | HEIGHT: 66 IN | TEMPERATURE: 98 F | SYSTOLIC BLOOD PRESSURE: 158 MMHG | RESPIRATION RATE: 18 BRPM | OXYGEN SATURATION: 99 % | BODY MASS INDEX: 33.59 KG/M2 | DIASTOLIC BLOOD PRESSURE: 78 MMHG

## 2022-06-17 DIAGNOSIS — C34.91 SMALL CELL CARCINOMA OF LUNG, RIGHT: Primary | ICD-10-CM

## 2022-06-17 DIAGNOSIS — C79.31 BRAIN METASTASES: ICD-10-CM

## 2022-06-17 DIAGNOSIS — C78.2 PLEURAL METASTASIS: ICD-10-CM

## 2022-06-17 DIAGNOSIS — C34.91 SMALL CELL CARCINOMA OF LUNG, RIGHT: ICD-10-CM

## 2022-06-17 DIAGNOSIS — C79.51 BONE METASTASES: ICD-10-CM

## 2022-06-17 DIAGNOSIS — C77.1 MALIGNANT NEOPLASM METASTATIC TO INTRATHORACIC LYMPH NODE: ICD-10-CM

## 2022-06-17 PROCEDURE — 4010F ACE/ARB THERAPY RXD/TAKEN: CPT | Mod: CPTII,S$GLB,, | Performed by: INTERNAL MEDICINE

## 2022-06-17 PROCEDURE — 3008F BODY MASS INDEX DOCD: CPT | Mod: CPTII,S$GLB,, | Performed by: INTERNAL MEDICINE

## 2022-06-17 PROCEDURE — 1126F AMNT PAIN NOTED NONE PRSNT: CPT | Mod: CPTII,S$GLB,, | Performed by: INTERNAL MEDICINE

## 2022-06-17 PROCEDURE — 1160F RVW MEDS BY RX/DR IN RCRD: CPT | Mod: CPTII,S$GLB,, | Performed by: INTERNAL MEDICINE

## 2022-06-17 PROCEDURE — 1160F PR REVIEW ALL MEDS BY PRESCRIBER/CLIN PHARMACIST DOCUMENTED: ICD-10-PCS | Mod: CPTII,S$GLB,, | Performed by: INTERNAL MEDICINE

## 2022-06-17 PROCEDURE — 99999 PR PBB SHADOW E&M-EST. PATIENT-LVL V: CPT | Mod: PBBFAC,,, | Performed by: INTERNAL MEDICINE

## 2022-06-17 PROCEDURE — 99215 PR OFFICE/OUTPT VISIT, EST, LEVL V, 40-54 MIN: ICD-10-PCS | Mod: S$GLB,,, | Performed by: INTERNAL MEDICINE

## 2022-06-17 PROCEDURE — 70450 CT HEAD WITHOUT CONTRAST: ICD-10-PCS | Mod: 26,,, | Performed by: RADIOLOGY

## 2022-06-17 PROCEDURE — 3078F DIAST BP <80 MM HG: CPT | Mod: CPTII,S$GLB,, | Performed by: INTERNAL MEDICINE

## 2022-06-17 PROCEDURE — 1159F PR MEDICATION LIST DOCUMENTED IN MEDICAL RECORD: ICD-10-PCS | Mod: CPTII,S$GLB,, | Performed by: INTERNAL MEDICINE

## 2022-06-17 PROCEDURE — 70450 CT HEAD/BRAIN W/O DYE: CPT | Mod: TC,PO

## 2022-06-17 PROCEDURE — 3288F PR FALLS RISK ASSESSMENT DOCUMENTED: ICD-10-PCS | Mod: CPTII,S$GLB,, | Performed by: INTERNAL MEDICINE

## 2022-06-17 PROCEDURE — 3077F PR MOST RECENT SYSTOLIC BLOOD PRESSURE >= 140 MM HG: ICD-10-PCS | Mod: CPTII,S$GLB,, | Performed by: INTERNAL MEDICINE

## 2022-06-17 PROCEDURE — 3288F FALL RISK ASSESSMENT DOCD: CPT | Mod: CPTII,S$GLB,, | Performed by: INTERNAL MEDICINE

## 2022-06-17 PROCEDURE — 1101F PR PT FALLS ASSESS DOC 0-1 FALLS W/OUT INJ PAST YR: ICD-10-PCS | Mod: CPTII,S$GLB,, | Performed by: INTERNAL MEDICINE

## 2022-06-17 PROCEDURE — 99215 OFFICE O/P EST HI 40 MIN: CPT | Mod: S$GLB,,, | Performed by: INTERNAL MEDICINE

## 2022-06-17 PROCEDURE — 3077F SYST BP >= 140 MM HG: CPT | Mod: CPTII,S$GLB,, | Performed by: INTERNAL MEDICINE

## 2022-06-17 PROCEDURE — 70450 CT HEAD/BRAIN W/O DYE: CPT | Mod: 26,,, | Performed by: RADIOLOGY

## 2022-06-17 PROCEDURE — 1101F PT FALLS ASSESS-DOCD LE1/YR: CPT | Mod: CPTII,S$GLB,, | Performed by: INTERNAL MEDICINE

## 2022-06-17 PROCEDURE — 1126F PR PAIN SEVERITY QUANTIFIED, NO PAIN PRESENT: ICD-10-PCS | Mod: CPTII,S$GLB,, | Performed by: INTERNAL MEDICINE

## 2022-06-17 PROCEDURE — 4010F PR ACE/ARB THEARPY RXD/TAKEN: ICD-10-PCS | Mod: CPTII,S$GLB,, | Performed by: INTERNAL MEDICINE

## 2022-06-17 PROCEDURE — 99999 PR PBB SHADOW E&M-EST. PATIENT-LVL V: ICD-10-PCS | Mod: PBBFAC,,, | Performed by: INTERNAL MEDICINE

## 2022-06-17 PROCEDURE — 3008F PR BODY MASS INDEX (BMI) DOCUMENTED: ICD-10-PCS | Mod: CPTII,S$GLB,, | Performed by: INTERNAL MEDICINE

## 2022-06-17 PROCEDURE — 1159F MED LIST DOCD IN RCRD: CPT | Mod: CPTII,S$GLB,, | Performed by: INTERNAL MEDICINE

## 2022-06-17 PROCEDURE — 3078F PR MOST RECENT DIASTOLIC BLOOD PRESSURE < 80 MM HG: ICD-10-PCS | Mod: CPTII,S$GLB,, | Performed by: INTERNAL MEDICINE

## 2022-06-17 RX ORDER — DEXAMETHASONE 4 MG/1
4 TABLET ORAL EVERY 6 HOURS
Qty: 120 TABLET | Refills: 0 | Status: SHIPPED | OUTPATIENT
Start: 2022-06-17 | End: 2022-07-05 | Stop reason: DRUGHIGH

## 2022-06-17 NOTE — PROGRESS NOTES
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  Her treatment was interrupted after admission to [facility] with radiation esophagitis followed by Clostridium difficile colitis.  During her multi week stay, the patient became profoundly debilitated and predominantly bed-bound.  Despite healing of her esophagus, her nutritional status remained poor.  She was 2 immobile to allow for discharge directly to home and has had an interval rehab stay.  She and her /daughter have requested a virtual visit today to assess progress following discharge from rehab.  Patient's nutrition remains impaired.  She consumes predominantly liquids and soft solids.  Physical and Occupational therapy are being arranged for her in home.  Patient's  reports that she was ambulating with assistance at the rehab facility.    Patient returns to clinic for interval reassessment.  She has been performing better at home of late.  Diarrhea has resolved and been replaced with formed stool.  Patient is generally continent.  Appetite is improving and patient is struggling less to swallow medications.  Patient is up and awake most of the day.  She typically switches between bed and chair.  She remains highly motivated toward resumption of systemic chemotherapy for fear that she will have to repeat earlier treatment.    Patient returns to clinic to review interval lab and imaging.  Appetite has improved.  Diet has become more diversified. She is able to eat more substantial solids such as meat balls.  No worsening pain or dyspnea.  Patient is still wheelchair dependent in terms of ambulation and spends most of her day up to the chair.  Patient has been having difficulty with chronic urinary tract infection.  She recently completed course of Macrodantin.  Urine remains cloudy.    Patient returns to clinic  prior to cycle 8 of palliative pembrolizumab.  Since last evaluation, patient has had a few fleeting episodes numbness involving her left hand and left foot.  She was unable to  a spoon during 1 of these episodes.  This was not associated with any slurring of speech, facial droop, visual disturbance, etcetera.  Symptoms resolved spontaneously.    Returns to clinic to review results of interval CNS imaging.    Physical examination:  The patient is a well-developed, well-nourished elderly, white female in no acute distress.  Weight is 209 lb (increased by 2.5 lb).  VITAL SIGNS: Documented in EMR and reviewed  HEENT: Normocephalic, atraumatic. Oral mucosa pink and moist. Lips without lesions. Tongue midline. Oropharynx clear. Nonicteric sclerae.   NECK: Supple, no adenopathy.  HEART: Regular rate and rhythm without murmur, gallop or rub.   LUNGS: Clear to auscultation bilaterally.  ABDOMEN: Soft, nontender, nondistended with positive normoactive bowel sounds, no hepatosplenomegaly.   EXTREMITIES:  No cyanosis clubbing or edema. Distal pulses are intact.     Laboratory:  White count 7.8, hemoglobin 12.1, hematocrit 38, platelets 155, absolute neutrophil count is 5400.    Sodium 140, potassium 5.1, chloride 109, CO2 22, BUN 25, creatinine 1.3, glucose 138, calcium 9.3, magnesium 2.1, liver function test within normal limits, LDH is 142, GFR is 41.    MRI of the brain:  1. Small 9 mm rim enhancing lesion in the right cerebellum is seen with mild adjacent parenchymal edema, compatible with metastatic disease.  Small focus of associated gradient susceptibility artifact at the level of this lesion may be related to associated calcification or microhemorrhage within the lesion.  2. There is irregular gradient susceptibility artifact along the falx and tentorium which is new from the previous MRI dated 05/24/2021.  No obvious associated T1 or T2 signal abnormality along the falx or tentorium is seen.  The findings are  nonspecific, possibly related to hemosiderin deposition from prior hemorrhage.  However, out of an abundance of caution, follow-up with CT head without contrast is recommended to exclude any potential acute intracranial hemorrhage.  3. Age-appropriate generalized involutional changes are seen along with findings most compatible with chronic microvascular ischemic changes.  4. Additional findings and details as above.    CT of the head without contrast:  Subcentimeter hypodense focus within the right cerebellar hemisphere, corresponding to the lesion better seen by MRI 06/17/2022.  No additional focal abnormality.    Impression:  1. Small cell carcinoma of the lung metastatic to bone, lymph node, and pleura with excellent response to second-line therapy with pembrolizumab.   2. Anemia-resolved.  3. Intermittent numbness of left hand and foot of uncertain etiology.  4. Small, isolated right cerebellar metastasis.    Plan:  1. Proceed with cycle 8 of Keytruda as previously scheduled.  2. Return to clinic 3 weeks from now with interval CBC, CMP, LDH magnesium TSH, free T4, fasting Cortisol, and PET scan for restaging prior to appointment.  3. Will refer to Dr. Nielson for stereotactic treatment of patient's small isolated cerebellar metastasis.    Advance Care Planning During a prior visit, I engaged the patient in the advance care planning process.  The patient and I reviewed the role for advance directives and their purpose in directing future healthcare if the patients unable to speak for him/herself.  At this point in time, the patient does have full decision-making capacity.  We discussed different extreme health states that they could experience, and reviewed what kind of medical care the patient would want in those situations.  The patient communicated that if they were comatose and had little chance of a meaningful recovery, they would not want machines/life-sustaining treatments used.  In addition to the above  preference, other important end-of-life issues for the patient include DNR.  The patient has  completed a living will to reflect these preferences.  The patient has already designated a healthcare power of  to make decisions on their behalf.    (30 mins time spent).      This note was created using voice recognition software and may contain grammatical errors.

## 2022-06-20 ENCOUNTER — HOSPITAL ENCOUNTER (OUTPATIENT)
Dept: RADIATION THERAPY | Facility: HOSPITAL | Age: 72
Discharge: HOME | End: 2022-06-20
Attending: PATHOLOGY
Payer: MEDICARE

## 2022-06-20 ENCOUNTER — OFFICE VISIT (OUTPATIENT)
Dept: RADIATION ONCOLOGY | Facility: CLINIC | Age: 72
End: 2022-06-20
Attending: ANESTHESIOLOGY
Payer: MEDICARE

## 2022-06-20 VITALS
HEART RATE: 71 BPM | BODY MASS INDEX: 33.59 KG/M2 | HEIGHT: 66 IN | TEMPERATURE: 99 F | RESPIRATION RATE: 20 BRPM | OXYGEN SATURATION: 97 % | SYSTOLIC BLOOD PRESSURE: 193 MMHG | DIASTOLIC BLOOD PRESSURE: 81 MMHG | WEIGHT: 209 LBS

## 2022-06-20 DIAGNOSIS — C79.31 BRAIN METASTASES: ICD-10-CM

## 2022-06-20 DIAGNOSIS — C34.91 SMALL CELL CARCINOMA OF LUNG, RIGHT: ICD-10-CM

## 2022-06-20 DIAGNOSIS — C79.51 BONE METASTASES: Primary | ICD-10-CM

## 2022-06-20 PROCEDURE — 99999 PR PBB SHADOW E&M-EST. PATIENT-LVL IV: ICD-10-PCS | Mod: PBBFAC,,, | Performed by: RADIOLOGY

## 2022-06-20 PROCEDURE — 3008F BODY MASS INDEX DOCD: CPT | Mod: CPTII,S$GLB,, | Performed by: RADIOLOGY

## 2022-06-20 PROCEDURE — 3077F PR MOST RECENT SYSTOLIC BLOOD PRESSURE >= 140 MM HG: ICD-10-PCS | Mod: CPTII,S$GLB,, | Performed by: RADIOLOGY

## 2022-06-20 PROCEDURE — 77263 PR  RADIATION THERAPY PLAN COMPLEX: ICD-10-PCS | Mod: ,,, | Performed by: RADIOLOGY

## 2022-06-20 PROCEDURE — 1126F PR PAIN SEVERITY QUANTIFIED, NO PAIN PRESENT: ICD-10-PCS | Mod: CPTII,S$GLB,, | Performed by: RADIOLOGY

## 2022-06-20 PROCEDURE — 3077F SYST BP >= 140 MM HG: CPT | Mod: CPTII,S$GLB,, | Performed by: RADIOLOGY

## 2022-06-20 PROCEDURE — 99205 PR OFFICE/OUTPT VISIT, NEW, LEVL V, 60-74 MIN: ICD-10-PCS | Mod: 25,S$GLB,, | Performed by: RADIOLOGY

## 2022-06-20 PROCEDURE — 77263 THER RADIOLOGY TX PLNG CPLX: CPT | Mod: ,,, | Performed by: RADIOLOGY

## 2022-06-20 PROCEDURE — 1101F PR PT FALLS ASSESS DOC 0-1 FALLS W/OUT INJ PAST YR: ICD-10-PCS | Mod: CPTII,S$GLB,, | Performed by: RADIOLOGY

## 2022-06-20 PROCEDURE — 1159F MED LIST DOCD IN RCRD: CPT | Mod: CPTII,S$GLB,, | Performed by: RADIOLOGY

## 2022-06-20 PROCEDURE — 99205 OFFICE O/P NEW HI 60 MIN: CPT | Mod: 25,S$GLB,, | Performed by: RADIOLOGY

## 2022-06-20 PROCEDURE — 1126F AMNT PAIN NOTED NONE PRSNT: CPT | Mod: CPTII,S$GLB,, | Performed by: RADIOLOGY

## 2022-06-20 PROCEDURE — 3288F PR FALLS RISK ASSESSMENT DOCUMENTED: ICD-10-PCS | Mod: CPTII,S$GLB,, | Performed by: RADIOLOGY

## 2022-06-20 PROCEDURE — 77014 PR  CT GUIDANCE PLACEMENT RAD THERAPY FIELDS: CPT | Mod: 26,,, | Performed by: RADIOLOGY

## 2022-06-20 PROCEDURE — 77334 PR  RADN TREATMENT AID(S) COMPLX: ICD-10-PCS | Mod: 26,,, | Performed by: RADIOLOGY

## 2022-06-20 PROCEDURE — 4010F ACE/ARB THERAPY RXD/TAKEN: CPT | Mod: CPTII,S$GLB,, | Performed by: RADIOLOGY

## 2022-06-20 PROCEDURE — 77014 PR  CT GUIDANCE PLACEMENT RAD THERAPY FIELDS: ICD-10-PCS | Mod: 26,,, | Performed by: RADIOLOGY

## 2022-06-20 PROCEDURE — 3288F FALL RISK ASSESSMENT DOCD: CPT | Mod: CPTII,S$GLB,, | Performed by: RADIOLOGY

## 2022-06-20 PROCEDURE — 77334 RADIATION TREATMENT AID(S): CPT | Mod: TC,PN | Performed by: RADIOLOGY

## 2022-06-20 PROCEDURE — 3079F DIAST BP 80-89 MM HG: CPT | Mod: CPTII,S$GLB,, | Performed by: RADIOLOGY

## 2022-06-20 PROCEDURE — 4010F PR ACE/ARB THEARPY RXD/TAKEN: ICD-10-PCS | Mod: CPTII,S$GLB,, | Performed by: RADIOLOGY

## 2022-06-20 PROCEDURE — 1101F PT FALLS ASSESS-DOCD LE1/YR: CPT | Mod: CPTII,S$GLB,, | Performed by: RADIOLOGY

## 2022-06-20 PROCEDURE — 99999 PR PBB SHADOW E&M-EST. PATIENT-LVL IV: CPT | Mod: PBBFAC,,, | Performed by: RADIOLOGY

## 2022-06-20 PROCEDURE — 77014 HC CT GUIDANCE RADIATION THERAPY FLDS PLACEMENT: CPT | Mod: TC,PN | Performed by: RADIOLOGY

## 2022-06-20 PROCEDURE — 3079F PR MOST RECENT DIASTOLIC BLOOD PRESSURE 80-89 MM HG: ICD-10-PCS | Mod: CPTII,S$GLB,, | Performed by: RADIOLOGY

## 2022-06-20 PROCEDURE — 3008F PR BODY MASS INDEX (BMI) DOCUMENTED: ICD-10-PCS | Mod: CPTII,S$GLB,, | Performed by: RADIOLOGY

## 2022-06-20 PROCEDURE — 1159F PR MEDICATION LIST DOCUMENTED IN MEDICAL RECORD: ICD-10-PCS | Mod: CPTII,S$GLB,, | Performed by: RADIOLOGY

## 2022-06-20 PROCEDURE — 77334 RADIATION TREATMENT AID(S): CPT | Mod: 26,,, | Performed by: RADIOLOGY

## 2022-06-20 NOTE — PROGRESS NOTES
06/20/2022    [facility]   Radiation Oncology Consultation    ASSESSMENT  This is a 72 y.o. y/o female with extensive stage small cell lung cancer (limited at diagnosis, unable to complete chemotherapy portion of initial therapy 2/2 prolonged hospitalization for esophagitis/colitis, subsequently developed recurrence in the thorax and bones),status post chemotherapy-radiation therapy to the thorax, on maintenance Pembro with good response, now with new brain metastasis. .       PLAN     Treatment options were discussed with the patient including whole brain vs SRS.  Despite the histology of small cell, this patient is a good candidate for SRS as she has very limited intracranial disease burden at this time. She understands that she remains at high risk of intracranial disease recurrence, and that she may require further cranial radiation therapy, including whole brain radiation therapy, in the future, but that this will allow us to delay the side effects of WBRT until further intracranial progression.   We discussed the goals of treatment to be palliative.   The risks, benefits, scheduling, alternatives to and rationale of radiation therapy were explained in detail.     After this discussion, she elected to proceed with SRS brain.     Informed Consent was obtained and all questions were answered to the best of my ability   A CT simulation will be performed on today to begin the planning process for the patient's radiation therapy.      She was given our contact information, and she was told that she could call our clinic at any time if she has any questions or concerns.      Radiation Treatment Details:    We plan to treat RIGHT cerebellar metastasis to a dose of 22 Gy in 1 fractions at 22 Gy per fraction delivered once   We will utilize a(n) SRS technique.     IMRT is medically necessary to treat complex dose painted target volumes in the cerebellum region with concave and convex  isodose lines with steep isodose gradients to spare multiple adjacent organs at risk including the normal brain structures    We will utilize daily CT or orthogonal image guidance due to the need for accurate daily patient alignment to treat the target volumes accurately and avoid radiation overdose to multiple regional organs at risk since we are treating the patient with complex target volumes with multiple steep isodose gradients.         Chief Complaint   Patient presents with    Brain Tumor       Oncology History   Small cell carcinoma of lung, right   5/20/2021 Initial Diagnosis    Small cell carcinoma of lung, right     6/8/2021 - 7/27/2021 Chemotherapy    Treatment Summary   Plan Name: OP CARBOPLATIN (AUC) + ETOPOSIDE Q3W  Treatment Goal: Palliative  Status: Inactive  Start Date: 6/8/2021  End Date: 7/27/2021  Provider: Madhav Cardona MD  Chemotherapy: CARBOplatin (PARAPLATIN) 395 mg in sodium chloride 0.9% 250 mL chemo infusion, 395 mg (100 % of original dose 395 mg), Intravenous, Clinic/HOD 1 time, 3 of 6 cycles  Dose modification:   (original dose 395 mg, Cycle 1, Reason: MD Discretion)  Administration: 395 mg (6/8/2021), 395 mg (6/29/2021), 395 mg (7/20/2021)  etoposide (VEPESID) 100 mg/m2 = 200 mg in sodium chloride 0.9% 500 mL chemo infusion, 100 mg/m2 = 200 mg, Intravenous, Clinic/HOD 1 time, 3 of 6 cycles  Administration: 200 mg (6/8/2021), 200 mg (6/9/2021), 200 mg (6/29/2021), 200 mg (6/10/2021), 200 mg (6/30/2021), 200 mg (7/1/2021), 200 mg (7/20/2021), 200 mg (7/21/2021), 200 mg (7/22/2021)     1/18/2022 -  Chemotherapy    Treatment Summary   Plan Name: OP PEMBROLIZUMAB 200MG Q3W  Treatment Goal: Palliative  Status: Active  Start Date: 1/18/2022  End Date: 2/14/2024 (Planned)  Provider: Madhav Cardona MD  Chemotherapy: pembrolizumab (KEYTRUDA) 200 mg in sodium chloride 0.9% 108 mL infusion, 200 mg, Intravenous, Clinic/HOD 1 time, 8 of 37 cycles  Administration: 200 mg (1/18/2022), 200 mg  (4/13/2022), 200 mg (2/8/2022), 200 mg (3/2/2022), 200 mg (3/23/2022), 200 mg (5/4/2022), 200 mg (5/25/2022), 200 mg (6/15/2022)     Iron deficiency anemia       HPI: This is a 72 year old female with history of extensive-stage small cell lung cancer metastatic to bone and in thorax who presents with her  for discussion of radiation therapy for new single brain metastasis.  She is followed by Dr. Cardona on Pembro with notable systemic disease response, who reported 2 episodes of LEFT hand spasm and 2 episodes of LEFT foot spasm occurring independently and resolving spontaneously 2 weeks ago.  No other neurologic symptoms.  Dr. Cardona ordered MRI.    MRI Brain 6/17/22 new 9mm RIGHT cerebellar metastasis.  No other intracranial metastases.    Patient reports low grade headache since last Weds which she attributes to stress. She is active at home and uses wheelchair intermittently when fatigued.      She had a prolonged hospital stay (10 weeks) 2/2 esophagitis and C. Dif last year after completing 45Gy thoracic radiation therapy (Turissi regimen), but was unable to complete her course of chemotherapy.  She has recovered significantly from deconditioning related to prolonged hospital stay last year, but is still more easily fatigued than normal..    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 difficile colitis 08/2021    Cancer     lung    CKD (chronic kidney disease), stage III      COPD (chronic obstructive pulmonary disease)     Hepatic steatosis     History of tobacco abuse     Hyperlipidemia     Hypertension     Lung nodules     and adenopathy    Multinodular goiter (nontoxic) 5/8/2017    Obesity     TRISHA (obstructive sleep apnea)     on bipap at bedtime    Proteinuria        Past Surgical History:   Procedure Laterality Date    ADENOIDECTOMY      APPENDECTOMY      CATARACT EXTRACTION      COLONOSCOPY N/A 11/14/2016    Procedure: COLONOSCOPY;  Surgeon: Destin Cardona MD;  Location: [facility];  Service: Endoscopy;  Laterality: N/A;    COLONOSCOPY N/A 05/14/2021    Procedure: COLONOSCOPY;  Surgeon: Destin Cardona MD;  Location: [facility];  Service: Endoscopy;  Laterality: N/A; hemorrhoids, diverticulosis, Old blood left colon. No active bleeding; Repeat colonoscopy is not recommended for screening purposes.    ESOPHAGOGASTRODUODENOSCOPY N/A 05/14/2021    Procedure: EGD (ESOPHAGOGASTRODUODENOSCOPY);  Surgeon: Destin Cardona MD;  Location: [facility];  Service: Endoscopy;  Laterality: N/A; unremarkable    ESOPHAGOGASTRODUODENOSCOPY N/A 08/09/2021    Procedure: ESOPHAGOGASTRODUODENOSCOPY (EGD);  Surgeon: Destin Cardona MD;  Location: [facility];  Service: Endoscopy;  Laterality: N/A; Moderately severe radiation esophagitis with no bleeding    EYE SURGERY      cataract OU    INSERTION OF TUNNELED CENTRAL VENOUS CATHETER (CVC) WITH SUBCUTANEOUS PORT N/A 06/07/2021    Procedure: [id number];  Surgeon: Joe Salinas MD;  [facility];  Service: General;  Laterality: N/A;    INTRALUMINAL GASTROINTESTINAL TRACT IMAGING VIA CAPSULE N/A 12/29/2021    Procedure: IMAGING PROCEDURE, GI TRACT, INTRALUMINAL, VIA CAPSULE  (called for instruction 12/20-may have trouble swallowing);  Surgeon: Floyd Dixon MD;  Location: [facility];  Service: Endoscopy;  Laterality: N/A; Diffuse red spots of unclear significance and erosions found in the small bowel  (entire small bowel), suspicious for mild radiation enteritis    NEUROMA SURGERY Left     acoustic    TONSILLECTOMY         Social History     Tobacco Use    Smoking status: Former Smoker     Packs/day: 1.00     Years: 40.00     Pack years: 40.00     Quit date: 3/6/2016     Years since quittin.2    Smokeless tobacco: Never Used   Substance Use Topics    Alcohol use: Yes     Alcohol/week: 0.0 standard drinks     Comment: occasional- social; not very often    Drug use: No       Cancer-related family history includes Cancer in her paternal uncle. There is no history of Esophageal cancer.    Current Outpatient Medications on File Prior to Visit   Medication Sig Dispense Refill    acetaminophen (TYLENOL) 325 MG tablet Take 650 mg by mouth every 6 (six) hours as needed for Pain or Temperature greater than (101F).      cetirizine (ZYRTEC) 10 MG tablet Take 10 mg by mouth once daily.      dexAMETHasone (DECADRON) 4 MG Tab Take 1 tablet (4 mg total) by mouth every 6 (six) hours. 120 tablet 0    ELIQUIS 5 mg Tab TAKE 1 TABLET(5 MG) BY MOUTH TWICE DAILY 180 tablet 0    EScitalopram oxalate (LEXAPRO) 10 MG tablet TAKE 1 TABLET(10 MG) BY MOUTH DAILY 90 tablet 3    FLONASE ALLERGY RELIEF 50 mcg/actuation nasal spray 1 spray by Each Nostril route daily as needed.      Lactobac 40-Bifido 3-S.thermop (PROBIOTIC) 100 billion cell Cap Take by mouth once daily.      LIDOcaine-prilocaine (EMLA) cream Apply topically as needed (as needed for port access). 30 g 1    LIDOcaine-prilocaine (EMLA) cream Apply topically as needed (port access.). 30 g 6    magnesium glycinate 100 mg Tab Take 200 mg by mouth every evening. 60 tablet 3    metoprolol succinate (TOPROL-XL) 25 MG 24 hr tablet Take 1 tablet (25 mg total) by mouth 2 (two) times a day. 180 tablet 1    nystatin (MYCOSTATIN) ointment Apply topically 2 (two) times daily. 30 g 1    olmesartan (BENICAR) 5 MG Tab Take 1 tablet (5 mg total) by mouth every evening. 90  tablet 1    pantoprazole (PROTONIX) 40 MG tablet Take 1 tablet (40 mg total) by mouth once daily. 90 tablet 3    potassium chloride (KLOR-CON) 8 MEQ TbSR TAKE 1 TABLET(8 MEQ) BY MOUTH EVERY DAY 90 tablet 0    simvastatin (ZOCOR) 40 MG tablet TAKE 1 TABLET(40 MG) BY MOUTH EVERY EVENING 90 tablet 1    sotaloL (BETAPACE) 80 MG tablet TAKE 1 TABLET(80 MG) BY MOUTH TWICE DAILY 180 tablet 0    triamcinolone acetonide 0.025% (KENALOG) 0.025 % cream Apply topically 2 (two) times daily. 80 g 5    VIACTIV 650 mg-12.5 mcg-40 mcg Chew Take 1 tablet by mouth once daily.       Current Facility-Administered Medications on File Prior to Visit   Medication Dose Route Frequency Provider Last Rate Last Admin    EUFLEXXA 10 mg/mL(mw 2.4 -3.6 million) injection Syrg 20 mg  20 mg Intra-articular  Jose Rafael Barney MD   20 mg at 06/14/17 1605       Review of patient's allergies indicates:   Allergen Reactions    Contrast media Anaphylaxis    Albuterol Other (See Comments)     Used during PFTs and put pt in afib    Dye     Pcn [penicillins]      Pt states did well on cephalexin.  No adverse reaction or side effect.     Doxycycline Palpitations       Review of Systems   Constitutional: Positive for malaise/fatigue (afternoon drowsiness/fatigue).   HENT: Positive for hearing loss (on left s/p SRS for acoustic neuroma 2003). Negative for ear pain.    Eyes: Negative.    Respiratory: Positive for cough (am clearing). Negative for hemoptysis. Shortness of breath: mild CASTAÑEDA, BiPAP at night.    Cardiovascular: Negative.    Gastrointestinal: Positive for diarrhea. Vomiting: occasional.   Genitourinary: Positive for dysuria (repeated UTIs- resolved).   Musculoskeletal: Positive for back pain and joint pain. Neck pain: shoulders- improved on steroids.   Skin: Negative.    Neurological: Positive for headaches (very mild- believes stress related). Negative for dizziness, sensory change, speech change, focal weakness, seizures and  "weakness.   Psychiatric/Behavioral: Positive for memory loss (age-related). Negative for depression. The patient is not nervous/anxious.         Vital Signs: BP (!) 193/81 (BP Location: Right arm, Patient Position: Sitting)   Pulse 71   Temp 99.3 °F (37.4 °C)   Resp 20   Ht 5' 6" (1.676 m)   Wt 94.8 kg (208 lb 15.9 oz)   SpO2 97%   BMI 33.73 kg/m²     ECOG Performance Status: 2 - Ambulates, capable of self care only    Physical Exam  Vitals reviewed. Exam conducted with a chaperone present.   Constitutional:       General: She is not in acute distress.     Appearance: Normal appearance. She is not ill-appearing or toxic-appearing.   HENT:      Head: Normocephalic and atraumatic.      Nose: Nose normal.   Eyes:      Extraocular Movements: Extraocular movements intact.      Conjunctiva/sclera: Conjunctivae normal.      Pupils: Pupils are equal, round, and reactive to light.   Pulmonary:      Effort: Pulmonary effort is normal.   Musculoskeletal:         General: Normal range of motion.      Cervical back: Normal range of motion.   Skin:     General: Skin is warm.   Neurological:      General: No focal deficit present.      Mental Status: She is alert and oriented to person, place, and time.      GCS: GCS eye subscore is 4. GCS verbal subscore is 5. GCS motor subscore is 6.      Cranial Nerves: Cranial nerves 2-12 are intact. No cranial nerve deficit, dysarthria or facial asymmetry.      Sensory: Sensation is intact.      Motor: Motor function is intact.      Coordination: Heel to Shin Test normal. Rapid alternating movements normal (difficulty with finger tapping LUE -mild).      Gait: Gait normal.   Psychiatric:         Mood and Affect: Mood normal.         Behavior: Behavior normal.         Thought Content: Thought content normal.         Judgment: Judgment normal.            Imaging: I have personally reviewed the patient's available images and reports and summarized pertinent findings above in HPI. "     Pathology: I have personally reviewed the patient's available pathology and summarized pertinent findings above in HPI.     This case was discussed with Dr. Cardona.      - Thank you for allowing me to participate in the care of your patient.    Aleena Nielson MD

## 2022-06-21 ENCOUNTER — PATIENT MESSAGE (OUTPATIENT)
Dept: ADMINISTRATIVE | Facility: HOSPITAL | Age: 72
End: 2022-06-21
Payer: MEDICARE

## 2022-06-21 ENCOUNTER — DOCUMENTATION ONLY (OUTPATIENT)
Dept: INFUSION THERAPY | Facility: HOSPITAL | Age: 72
End: 2022-06-21
Payer: MEDICARE

## 2022-06-21 ENCOUNTER — PATIENT OUTREACH (OUTPATIENT)
Dept: ADMINISTRATIVE | Facility: HOSPITAL | Age: 72
End: 2022-06-21
Payer: MEDICARE

## 2022-06-21 NOTE — PROGRESS NOTES
Oncology   XRTSchool Education  Allyson ALBERT Martinez   1950    Late entry saw pt 6/20/22    Social Service Education   This is a 72 y.o.female with a medical diagnosis of lung cancer with bone mets.  Pt is here today for her O[place]nell. She reports they have found a small spot on her brain and she will have 1 one radiation treatment.Pt shared with SW events that lead to to this point. SW provided emotional support for pt and her . Faustino.     Current Support System: Pt lives with her  Faustino. They have two grown children. Pt has very good support from her  and children.     Met with pt for new pt education. Reviewed role of  during cancer treatment. Educated on role of SW on care team and resources available at the cancer center.     Answered all psychosocial/socioeconomic related questions. Pt denies any needs at this time. She did voice some concerns over being sure XRT is covered by insurance. She has spoken with the doctor about her worry. SW also reminded her of the facial counselor avaiable here if needed. Pt is aware with this service.     Patient provided with social contact number and advised to call with questions or make future appointment if further intervention is needed. SW to follow throughout tx.    Mae Webster, ALINA  06/21/2022  8:47 AM

## 2022-06-21 NOTE — PROGRESS NOTES
2022 Care Everywhere updates requested and reviewed.  Immunizations reconciled. Media reports reviewed.  Duplicate HM overrides and  orders removed.  Overdue HM topic chart audit and/or requested.  Overdue lab testing linked to upcoming lab appointments if applies.  Lab roberto, and VideoLens reviewed    Lab testing     DIS reviewed      Mammogram     Health Maintenance Due   Topic Date Due    Shingles Vaccine (1 of 2) 2014    Mammogram  2020    COVID-19 Vaccine (2 - Booster for Gustavo series) 2021    Lipid Panel  2022

## 2022-06-24 PROCEDURE — 77301 RADIOTHERAPY DOSE PLAN IMRT: CPT | Mod: 26,,, | Performed by: RADIOLOGY

## 2022-06-24 PROCEDURE — 77301 RADIOTHERAPY DOSE PLAN IMRT: CPT | Mod: TC,PN | Performed by: RADIOLOGY

## 2022-06-24 PROCEDURE — 77301 PR  INTEN MOD RADIOTHER PLAN W/DOSE VOL HIST: ICD-10-PCS | Mod: 26,,, | Performed by: RADIOLOGY

## 2022-06-27 ENCOUNTER — TELEPHONE (OUTPATIENT)
Dept: CARDIOLOGY | Facility: CLINIC | Age: 72
End: 2022-06-27
Payer: MEDICARE

## 2022-06-27 DIAGNOSIS — I48.0 PAF (PAROXYSMAL ATRIAL FIBRILLATION): ICD-10-CM

## 2022-06-27 PROCEDURE — 77470 PR  SPECIAL RADIATION TREATMENT: ICD-10-PCS | Mod: 26,59,, | Performed by: RADIOLOGY

## 2022-06-27 PROCEDURE — 77300 RADIATION THERAPY DOSE PLAN: CPT | Mod: TC,PN | Performed by: RADIOLOGY

## 2022-06-27 PROCEDURE — 77338 DESIGN MLC DEVICE FOR IMRT: CPT | Mod: TC,PN | Performed by: RADIOLOGY

## 2022-06-27 PROCEDURE — 77338 PR  MLC IMRT DESIGN & CONSTRUCTION PER IMRT PLAN: ICD-10-PCS | Mod: 26,,, | Performed by: RADIOLOGY

## 2022-06-27 PROCEDURE — 77370 RADIATION PHYSICS CONSULT: CPT | Mod: PN | Performed by: RADIOLOGY

## 2022-06-27 PROCEDURE — 77300 RADIATION THERAPY DOSE PLAN: CPT | Mod: 26,,, | Performed by: RADIOLOGY

## 2022-06-27 PROCEDURE — 77338 DESIGN MLC DEVICE FOR IMRT: CPT | Mod: 26,,, | Performed by: RADIOLOGY

## 2022-06-27 PROCEDURE — 77470 SPECIAL RADIATION TREATMENT: CPT | Mod: 26,59,, | Performed by: RADIOLOGY

## 2022-06-27 PROCEDURE — 77470 SPECIAL RADIATION TREATMENT: CPT | Mod: 59,TC,PN | Performed by: RADIOLOGY

## 2022-06-27 PROCEDURE — 77300 PR RADIATION THERAPY,DOSIMETRY PLAN: ICD-10-PCS | Mod: 26,,, | Performed by: RADIOLOGY

## 2022-06-27 RX ORDER — POTASSIUM CHLORIDE 600 MG/1
TABLET, FILM COATED, EXTENDED RELEASE ORAL
Qty: 90 TABLET | Refills: 0 | Status: SHIPPED | OUTPATIENT
Start: 2022-06-27 | End: 2022-07-06

## 2022-06-27 RX ORDER — SOTALOL HYDROCHLORIDE 80 MG/1
TABLET ORAL
Qty: 180 TABLET | Refills: 0 | Status: SHIPPED | OUTPATIENT
Start: 2022-06-27 | End: 2022-10-11

## 2022-06-27 NOTE — TELEPHONE ENCOUNTER
----- Message from Simone Humphries MD sent at 6/27/2022 12:33 PM CDT -----  DC Lexapro while she is on sotalol because of drug drug interaction

## 2022-06-27 NOTE — TELEPHONE ENCOUNTER
Spoke wit pt's  and advised of interaction.  states pt was just put on dexamethasone and wondering if there is any interaction with that medication.

## 2022-06-28 PROBLEM — C79.31 BRAIN METASTASIS: Status: ACTIVE | Noted: 2022-06-28

## 2022-06-28 PROCEDURE — 77372 SRS LINEAR BASED: CPT | Mod: PN | Performed by: RADIOLOGY

## 2022-06-28 PROCEDURE — 77014 PR  CT GUIDANCE PLACEMENT RAD THERAPY FIELDS: ICD-10-PCS | Mod: 26,,, | Performed by: RADIOLOGY

## 2022-06-28 PROCEDURE — 77014 PR  CT GUIDANCE PLACEMENT RAD THERAPY FIELDS: CPT | Mod: 26,,, | Performed by: RADIOLOGY

## 2022-06-28 PROCEDURE — 77014 HC CT GUIDANCE RADIATION THERAPY FLDS PLACEMENT: CPT | Mod: TC,PN | Performed by: RADIOLOGY

## 2022-06-29 PROCEDURE — 77336 RADIATION PHYSICS CONSULT: CPT | Mod: PN | Performed by: RADIOLOGY

## 2022-06-30 ENCOUNTER — PATIENT MESSAGE (OUTPATIENT)
Dept: CARDIOLOGY | Facility: CLINIC | Age: 72
End: 2022-06-30
Payer: MEDICARE

## 2022-07-01 ENCOUNTER — HOSPITAL ENCOUNTER (OUTPATIENT)
Dept: RADIATION THERAPY | Facility: HOSPITAL | Age: 72
Discharge: HOME | End: 2022-07-01
Attending: RADIOLOGY
Payer: MEDICARE

## 2022-07-05 ENCOUNTER — OFFICE VISIT (OUTPATIENT)
Dept: FAMILY MEDICINE | Facility: CLINIC | Age: 72
End: 2022-07-05
Payer: MEDICARE

## 2022-07-05 VITALS
HEIGHT: 66 IN | BODY MASS INDEX: 32.92 KG/M2 | HEART RATE: 82 BPM | OXYGEN SATURATION: 98 % | RESPIRATION RATE: 16 BRPM | TEMPERATURE: 98 F | WEIGHT: 204.81 LBS | SYSTOLIC BLOOD PRESSURE: 136 MMHG | DIASTOLIC BLOOD PRESSURE: 72 MMHG

## 2022-07-05 DIAGNOSIS — Z00.00 ROUTINE GENERAL MEDICAL EXAMINATION AT A HEALTH CARE FACILITY: Primary | ICD-10-CM

## 2022-07-05 DIAGNOSIS — E11.22 TYPE 2 DIABETES MELLITUS WITH CHRONIC KIDNEY DISEASE, WITHOUT LONG-TERM CURRENT USE OF INSULIN, UNSPECIFIED CKD STAGE: ICD-10-CM

## 2022-07-05 DIAGNOSIS — E78.00 HYPERCHOLESTEROLEMIA: ICD-10-CM

## 2022-07-05 DIAGNOSIS — Z12.31 SCREENING MAMMOGRAM FOR HIGH-RISK PATIENT: ICD-10-CM

## 2022-07-05 DIAGNOSIS — E43 SEVERE MALNUTRITION: ICD-10-CM

## 2022-07-05 DIAGNOSIS — D61.811 DRUG-INDUCED PANCYTOPENIA: ICD-10-CM

## 2022-07-05 DIAGNOSIS — I13.0 HYPERTENSIVE HEART AND CHRONIC KIDNEY DISEASE WITH HEART FAILURE AND STAGE 1 THROUGH STAGE 4 CHRONIC KIDNEY DISEASE, OR UNSPECIFIED CHRONIC KIDNEY DISEASE: ICD-10-CM

## 2022-07-05 DIAGNOSIS — E66.01 MORBID (SEVERE) OBESITY DUE TO EXCESS CALORIES: ICD-10-CM

## 2022-07-05 DIAGNOSIS — N18.31 STAGE 3A CHRONIC KIDNEY DISEASE: ICD-10-CM

## 2022-07-05 PROCEDURE — 99397 PER PM REEVAL EST PAT 65+ YR: CPT | Mod: GZ,S$GLB,, | Performed by: FAMILY MEDICINE

## 2022-07-05 PROCEDURE — 1101F PT FALLS ASSESS-DOCD LE1/YR: CPT | Mod: CPTII,S$GLB,, | Performed by: FAMILY MEDICINE

## 2022-07-05 PROCEDURE — 3075F SYST BP GE 130 - 139MM HG: CPT | Mod: CPTII,S$GLB,, | Performed by: FAMILY MEDICINE

## 2022-07-05 PROCEDURE — 99999 PR PBB SHADOW E&M-EST. PATIENT-LVL IV: CPT | Mod: PBBFAC,,, | Performed by: FAMILY MEDICINE

## 2022-07-05 PROCEDURE — 1101F PR PT FALLS ASSESS DOC 0-1 FALLS W/OUT INJ PAST YR: ICD-10-PCS | Mod: CPTII,S$GLB,, | Performed by: FAMILY MEDICINE

## 2022-07-05 PROCEDURE — 3288F PR FALLS RISK ASSESSMENT DOCUMENTED: ICD-10-PCS | Mod: CPTII,S$GLB,, | Performed by: FAMILY MEDICINE

## 2022-07-05 PROCEDURE — 99397 PR PREVENTIVE VISIT,EST,65 & OVER: ICD-10-PCS | Mod: GZ,S$GLB,, | Performed by: FAMILY MEDICINE

## 2022-07-05 PROCEDURE — 99999 PR PBB SHADOW E&M-EST. PATIENT-LVL IV: ICD-10-PCS | Mod: PBBFAC,,, | Performed by: FAMILY MEDICINE

## 2022-07-05 PROCEDURE — 1126F AMNT PAIN NOTED NONE PRSNT: CPT | Mod: CPTII,S$GLB,, | Performed by: FAMILY MEDICINE

## 2022-07-05 PROCEDURE — 3288F FALL RISK ASSESSMENT DOCD: CPT | Mod: CPTII,S$GLB,, | Performed by: FAMILY MEDICINE

## 2022-07-05 PROCEDURE — 3075F PR MOST RECENT SYSTOLIC BLOOD PRESS GE 130-139MM HG: ICD-10-PCS | Mod: CPTII,S$GLB,, | Performed by: FAMILY MEDICINE

## 2022-07-05 PROCEDURE — 1126F PR PAIN SEVERITY QUANTIFIED, NO PAIN PRESENT: ICD-10-PCS | Mod: CPTII,S$GLB,, | Performed by: FAMILY MEDICINE

## 2022-07-05 PROCEDURE — 3008F BODY MASS INDEX DOCD: CPT | Mod: CPTII,S$GLB,, | Performed by: FAMILY MEDICINE

## 2022-07-05 PROCEDURE — 4010F PR ACE/ARB THEARPY RXD/TAKEN: ICD-10-PCS | Mod: CPTII,S$GLB,, | Performed by: FAMILY MEDICINE

## 2022-07-05 PROCEDURE — 3008F PR BODY MASS INDEX (BMI) DOCUMENTED: ICD-10-PCS | Mod: CPTII,S$GLB,, | Performed by: FAMILY MEDICINE

## 2022-07-05 PROCEDURE — 4010F ACE/ARB THERAPY RXD/TAKEN: CPT | Mod: CPTII,S$GLB,, | Performed by: FAMILY MEDICINE

## 2022-07-05 PROCEDURE — 3078F PR MOST RECENT DIASTOLIC BLOOD PRESSURE < 80 MM HG: ICD-10-PCS | Mod: CPTII,S$GLB,, | Performed by: FAMILY MEDICINE

## 2022-07-05 PROCEDURE — 3078F DIAST BP <80 MM HG: CPT | Mod: CPTII,S$GLB,, | Performed by: FAMILY MEDICINE

## 2022-07-05 RX ORDER — DEXAMETHASONE 2 MG/1
2 TABLET ORAL 2 TIMES DAILY WITH MEALS
COMMUNITY
End: 2022-07-12

## 2022-07-05 NOTE — PROGRESS NOTES
Subjective:       Patient ID: Allyson Henriquez is a 72 y.o. female.    Chief Complaint: Annual Exam      Allyson Henriquez is in the office for annual exam.    HPI  Medical hx reviewed.  Small cell lung cancer, could not due to hospitalization with significant esophagitis/colitis. She had recurrence thoracic and bone. Now s/p chemo/rad to the thorax, maintenance regimen. Now with brain mets, seeing rad/onc.  lexapro discontinued per cards due to concern for interaction with sotalol.   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 difficile colitis 08/2021    Cancer     lung    CKD (chronic kidney disease), stage III     COPD (chronic obstructive pulmonary disease)     Hepatic steatosis     History of tobacco abuse     Hyperlipidemia     Hypertension     Lung nodules     and adenopathy    Multinodular goiter (nontoxic) 5/8/2017    Obesity     TRISHA (obstructive sleep apnea)     on bipap at bedtime    Proteinuria      Some issues related to her posture (cerebellar lesion/mets).    would like to either restart lexapro or alternative for mood - more sensitive since dc'ing the lexapro.    Current Outpatient Medications:     acetaminophen (TYLENOL) 325 MG tablet, Take 650 mg by mouth every 6 (six) hours as needed for Pain or Temperature greater than (101F)., Disp: , Rfl:     cetirizine (ZYRTEC) 10 MG tablet, Take 10 mg by mouth once daily., Disp: , Rfl:     dexAMETHasone (DECADRON) 2 MG tablet, Take 2 mg by mouth 2 (two) times daily with meals., Disp: , Rfl:     ELIQUIS 5 mg Tab, TAKE 1 TABLET(5 MG) BY MOUTH TWICE DAILY, Disp: 180 tablet, Rfl: 0    Lactobac 40-Bifido 3-S.thermop (PROBIOTIC) 100 billion cell Cap, Take by mouth once daily., Disp: , Rfl:     LIDOcaine-prilocaine (EMLA) cream, Apply topically as needed (as needed for port access)., Disp: 30  g, Rfl: 1    LIDOcaine-prilocaine (EMLA) cream, Apply topically as needed (port access.)., Disp: 30 g, Rfl: 6    metoprolol succinate (TOPROL-XL) 25 MG 24 hr tablet, Take 1 tablet (25 mg total) by mouth 2 (two) times a day., Disp: 180 tablet, Rfl: 1    nystatin (MYCOSTATIN) ointment, Apply topically 2 (two) times daily., Disp: 30 g, Rfl: 1    olmesartan (BENICAR) 5 MG Tab, Take 1 tablet (5 mg total) by mouth every evening., Disp: 90 tablet, Rfl: 1    pantoprazole (PROTONIX) 40 MG tablet, Take 1 tablet (40 mg total) by mouth once daily., Disp: 90 tablet, Rfl: 3    potassium chloride (KLOR-CON) 8 MEQ TbSR, TAKE 1 TABLET(8 MEQ) BY MOUTH EVERY DAY, Disp: 90 tablet, Rfl: 0    simvastatin (ZOCOR) 40 MG tablet, TAKE 1 TABLET(40 MG) BY MOUTH EVERY EVENING, Disp: 90 tablet, Rfl: 1    sotaloL (BETAPACE) 80 MG tablet, TAKE 1 TABLET(80 MG) BY MOUTH TWICE DAILY, Disp: 180 tablet, Rfl: 0    triamcinolone acetonide 0.025% (KENALOG) 0.025 % cream, Apply topically 2 (two) times daily., Disp: 80 g, Rfl: 5  No current facility-administered medications for this visit.    Facility-Administered Medications Ordered in Other Visits:     EUFLEXXA 10 mg/mL(mw 2.4 -3.6 million) injection Syrg 20 mg, 20 mg, Intra-articular, , Jose Rafael Barney MD, 20 mg at 06/14/17 1605    The 10-year ASCVD risk score (Cameron GEORGIA Jr., et al., 2013) is: 18.5%    Values used to calculate the score:      Age: 72 years      Sex: Female      Is Non- : No      Diabetic: No      Tobacco smoker: No      Systolic Blood Pressure: 136 mmHg      Is BP treated: Yes      HDL Cholesterol: 41 mg/dL      Total Cholesterol: 141 mg/dL     Lab Results   Component Value Date    HGBA1C 6.8 (H) 03/24/2021    HGBA1C 6.3 (H) 09/11/2020    HGBA1C 6.1 (H) 06/02/2020     Lab Results   Component Value Date    LDLCALC 60.0 (L) 03/24/2021    CREATININE 1.3 06/15/2022   labs 2022 rev.    Review of Systems   Constitutional: Negative for activity change,  fatigue and unexpected weight change.   HENT: Negative for congestion, sore throat and trouble swallowing.    Eyes: Negative for discharge.   Respiratory: Negative for apnea (restarted cpap), cough and shortness of breath.    Cardiovascular: Negative for chest pain, palpitations and leg swelling.   Gastrointestinal: Negative for constipation and diarrhea (normalized).   Genitourinary: Negative for difficulty urinating and hematuria.   Musculoskeletal: Negative for arthralgias and gait problem (balance is ok).   Skin: Negative for rash and wound.   Neurological: Negative for speech difficulty and headaches.        Occ feels a little foggy, usually when tired   Psychiatric/Behavioral: Negative for dysphoric mood (doing ok, but a little more emotional since stopping the lexapro) and sleep disturbance.       Objective:      Physical Exam  Vitals and nursing note reviewed.   Constitutional:       General: She is not in acute distress.     Appearance: Normal appearance. She is well-developed. She is obese.   HENT:      Head: Normocephalic and atraumatic.      Right Ear: Tympanic membrane and external ear normal.      Left Ear: Tympanic membrane and external ear normal.      Nose: Nose normal.      Mouth/Throat:      Pharynx: No oropharyngeal exudate.   Eyes:      Conjunctiva/sclera: Conjunctivae normal.      Pupils: Pupils are equal, round, and reactive to light.   Neck:      Thyroid: No thyromegaly.   Cardiovascular:      Rate and Rhythm: Normal rate and regular rhythm.   Pulmonary:      Effort: Pulmonary effort is normal. No respiratory distress.      Breath sounds: Normal breath sounds. No wheezing.   Abdominal:      General: Bowel sounds are normal. There is no distension.      Palpations: Abdomen is soft. There is no mass.      Tenderness: There is no abdominal tenderness. There is no guarding or rebound.   Musculoskeletal:         General: Normal range of motion.      Cervical back: Normal range of motion and neck  supple.      Right lower leg: No edema.      Left lower leg: No edema.      Comments: Trace pitting edema, ble   Lymphadenopathy:      Cervical: No cervical adenopathy.   Skin:     General: Skin is warm and dry.   Neurological:      General: No focal deficit present.      Mental Status: She is alert and oriented to person, place, and time.      Cranial Nerves: No cranial nerve deficit.   Psychiatric:         Mood and Affect: Mood normal.         Behavior: Behavior normal.             Screening recommendations appropriate to age and health status were reviewed.    Assessment & Plan:    Routine general medical examination at a health care facility  Comments:  anticipatory guidance reviewed    Hypercholesterolemia  Comments:  lipid with upcoming lab draw  Orders:  -     Lipid Panel; Future; Expected date: 07/05/2022    Screening mammogram for high-risk patient  -     Mammo Digital Screening Bilat; Future; Expected date: 07/05/2022    Severe malnutrition  Comments:  improved, needs update for iron/b12/folate  Orders:  -     Vitamin B12; Future; Expected date: 07/05/2022  -     Folate; Future; Expected date: 07/05/2022  -     Iron and TIBC; Future; Expected date: 07/05/2022    Drug-induced pancytopenia  Comments:  cont lab monitoring with cbc per oncology  pt aware of recs for alarm sx that would indicate need for emergent f/u    Hypertensive heart and chronic kidney disease with heart failure and stage 1 through stage 4 chronic kidney disease, or unspecified chronic kidney disease  Comments:  ambulatory - feeling well  cont f/u per cards and discussed BP monitoring  some edema related to steroid taper, reviewed compressions, salt avoidance    Stage 3a chronic kidney disease  Comments:  cont monitoring, water intake    Type 2 diabetes mellitus with chronic kidney disease, without long-term current use of insulin, unspecified CKD stage  Comments:  controlled  update with next lab draw  anticipate elevation with recent  steriod pulse    Morbid (severe) obesity due to excess calories  Comments:  weight adj following esophagitis - improving appetite and activity levels        Check with dionte

## 2022-07-06 ENCOUNTER — TELEPHONE (OUTPATIENT)
Dept: HEMATOLOGY/ONCOLOGY | Facility: CLINIC | Age: 72
End: 2022-07-06
Payer: MEDICARE

## 2022-07-06 ENCOUNTER — OFFICE VISIT (OUTPATIENT)
Dept: HEMATOLOGY/ONCOLOGY | Facility: CLINIC | Age: 72
End: 2022-07-06
Payer: MEDICARE

## 2022-07-06 ENCOUNTER — INFUSION (OUTPATIENT)
Dept: INFUSION THERAPY | Facility: HOSPITAL | Age: 72
End: 2022-07-06
Attending: INTERNAL MEDICINE
Payer: MEDICARE

## 2022-07-06 VITALS
TEMPERATURE: 97 F | DIASTOLIC BLOOD PRESSURE: 83 MMHG | RESPIRATION RATE: 16 BRPM | HEIGHT: 66 IN | SYSTOLIC BLOOD PRESSURE: 181 MMHG | BODY MASS INDEX: 32.92 KG/M2 | HEART RATE: 66 BPM | OXYGEN SATURATION: 98 % | WEIGHT: 204.81 LBS

## 2022-07-06 VITALS
DIASTOLIC BLOOD PRESSURE: 79 MMHG | HEIGHT: 66 IN | BODY MASS INDEX: 32.92 KG/M2 | WEIGHT: 204.81 LBS | SYSTOLIC BLOOD PRESSURE: 145 MMHG | RESPIRATION RATE: 18 BRPM | HEART RATE: 68 BPM | TEMPERATURE: 97 F

## 2022-07-06 DIAGNOSIS — E87.5 HYPERKALEMIA: ICD-10-CM

## 2022-07-06 DIAGNOSIS — D69.6 THROMBOCYTOPENIA: ICD-10-CM

## 2022-07-06 DIAGNOSIS — C79.51 BONE METASTASES: ICD-10-CM

## 2022-07-06 DIAGNOSIS — C78.2 PLEURAL METASTASIS: ICD-10-CM

## 2022-07-06 DIAGNOSIS — C77.1 MALIGNANT NEOPLASM METASTATIC TO INTRATHORACIC LYMPH NODE: ICD-10-CM

## 2022-07-06 DIAGNOSIS — I10 ESSENTIAL HYPERTENSION: Primary | ICD-10-CM

## 2022-07-06 DIAGNOSIS — C34.91 SMALL CELL CARCINOMA OF LUNG, RIGHT: Primary | ICD-10-CM

## 2022-07-06 DIAGNOSIS — C79.31 BRAIN METASTASES: ICD-10-CM

## 2022-07-06 DIAGNOSIS — C34.91 SMALL CELL CARCINOMA OF LUNG, RIGHT: ICD-10-CM

## 2022-07-06 PROCEDURE — 1101F PT FALLS ASSESS-DOCD LE1/YR: CPT | Mod: CPTII,S$GLB,, | Performed by: INTERNAL MEDICINE

## 2022-07-06 PROCEDURE — 1160F PR REVIEW ALL MEDS BY PRESCRIBER/CLIN PHARMACIST DOCUMENTED: ICD-10-PCS | Mod: CPTII,S$GLB,, | Performed by: INTERNAL MEDICINE

## 2022-07-06 PROCEDURE — 1159F PR MEDICATION LIST DOCUMENTED IN MEDICAL RECORD: ICD-10-PCS | Mod: CPTII,S$GLB,, | Performed by: INTERNAL MEDICINE

## 2022-07-06 PROCEDURE — 3079F PR MOST RECENT DIASTOLIC BLOOD PRESSURE 80-89 MM HG: ICD-10-PCS | Mod: CPTII,S$GLB,, | Performed by: INTERNAL MEDICINE

## 2022-07-06 PROCEDURE — 1159F MED LIST DOCD IN RCRD: CPT | Mod: CPTII,S$GLB,, | Performed by: INTERNAL MEDICINE

## 2022-07-06 PROCEDURE — 99214 OFFICE O/P EST MOD 30 MIN: CPT | Mod: S$GLB,,, | Performed by: INTERNAL MEDICINE

## 2022-07-06 PROCEDURE — 3077F PR MOST RECENT SYSTOLIC BLOOD PRESSURE >= 140 MM HG: ICD-10-PCS | Mod: CPTII,S$GLB,, | Performed by: INTERNAL MEDICINE

## 2022-07-06 PROCEDURE — 99999 PR PBB SHADOW E&M-EST. PATIENT-LVL IV: ICD-10-PCS | Mod: PBBFAC,,, | Performed by: INTERNAL MEDICINE

## 2022-07-06 PROCEDURE — 96375 TX/PRO/DX INJ NEW DRUG ADDON: CPT | Mod: PN

## 2022-07-06 PROCEDURE — A4216 STERILE WATER/SALINE, 10 ML: HCPCS | Mod: PN | Performed by: INTERNAL MEDICINE

## 2022-07-06 PROCEDURE — 96367 TX/PROPH/DG ADDL SEQ IV INF: CPT | Mod: PN

## 2022-07-06 PROCEDURE — 4010F ACE/ARB THERAPY RXD/TAKEN: CPT | Mod: CPTII,S$GLB,, | Performed by: INTERNAL MEDICINE

## 2022-07-06 PROCEDURE — 1126F AMNT PAIN NOTED NONE PRSNT: CPT | Mod: CPTII,S$GLB,, | Performed by: INTERNAL MEDICINE

## 2022-07-06 PROCEDURE — 96413 CHEMO IV INFUSION 1 HR: CPT | Mod: PN

## 2022-07-06 PROCEDURE — 3288F FALL RISK ASSESSMENT DOCD: CPT | Mod: CPTII,S$GLB,, | Performed by: INTERNAL MEDICINE

## 2022-07-06 PROCEDURE — 63600175 PHARM REV CODE 636 W HCPCS: Mod: PN | Performed by: INTERNAL MEDICINE

## 2022-07-06 PROCEDURE — 3079F DIAST BP 80-89 MM HG: CPT | Mod: CPTII,S$GLB,, | Performed by: INTERNAL MEDICINE

## 2022-07-06 PROCEDURE — 3008F BODY MASS INDEX DOCD: CPT | Mod: CPTII,S$GLB,, | Performed by: INTERNAL MEDICINE

## 2022-07-06 PROCEDURE — 99214 PR OFFICE/OUTPT VISIT, EST, LEVL IV, 30-39 MIN: ICD-10-PCS | Mod: S$GLB,,, | Performed by: INTERNAL MEDICINE

## 2022-07-06 PROCEDURE — 4010F PR ACE/ARB THEARPY RXD/TAKEN: ICD-10-PCS | Mod: CPTII,S$GLB,, | Performed by: INTERNAL MEDICINE

## 2022-07-06 PROCEDURE — 1101F PR PT FALLS ASSESS DOC 0-1 FALLS W/OUT INJ PAST YR: ICD-10-PCS | Mod: CPTII,S$GLB,, | Performed by: INTERNAL MEDICINE

## 2022-07-06 PROCEDURE — 1160F RVW MEDS BY RX/DR IN RCRD: CPT | Mod: CPTII,S$GLB,, | Performed by: INTERNAL MEDICINE

## 2022-07-06 PROCEDURE — 96401 CHEMO ANTI-NEOPL SQ/IM: CPT | Mod: PN

## 2022-07-06 PROCEDURE — 1126F PR PAIN SEVERITY QUANTIFIED, NO PAIN PRESENT: ICD-10-PCS | Mod: CPTII,S$GLB,, | Performed by: INTERNAL MEDICINE

## 2022-07-06 PROCEDURE — 99999 PR PBB SHADOW E&M-EST. PATIENT-LVL IV: CPT | Mod: PBBFAC,,, | Performed by: INTERNAL MEDICINE

## 2022-07-06 PROCEDURE — 25000003 PHARM REV CODE 250: Mod: PN | Performed by: INTERNAL MEDICINE

## 2022-07-06 PROCEDURE — 3288F PR FALLS RISK ASSESSMENT DOCUMENTED: ICD-10-PCS | Mod: CPTII,S$GLB,, | Performed by: INTERNAL MEDICINE

## 2022-07-06 PROCEDURE — 3077F SYST BP >= 140 MM HG: CPT | Mod: CPTII,S$GLB,, | Performed by: INTERNAL MEDICINE

## 2022-07-06 PROCEDURE — 3008F PR BODY MASS INDEX (BMI) DOCUMENTED: ICD-10-PCS | Mod: CPTII,S$GLB,, | Performed by: INTERNAL MEDICINE

## 2022-07-06 RX ORDER — ACETAMINOPHEN 500 MG
1000 TABLET ORAL
Status: COMPLETED | OUTPATIENT
Start: 2022-07-06 | End: 2022-07-06

## 2022-07-06 RX ORDER — ACETAMINOPHEN 500 MG
1000 TABLET ORAL
Status: CANCELLED
Start: 2022-07-06

## 2022-07-06 RX ORDER — DIPHENHYDRAMINE HYDROCHLORIDE 50 MG/ML
50 INJECTION INTRAMUSCULAR; INTRAVENOUS ONCE AS NEEDED
Status: CANCELLED | OUTPATIENT
Start: 2022-07-06

## 2022-07-06 RX ORDER — EPINEPHRINE 0.3 MG/.3ML
0.3 INJECTION SUBCUTANEOUS ONCE AS NEEDED
Status: CANCELLED | OUTPATIENT
Start: 2022-07-06

## 2022-07-06 RX ORDER — HEPARIN 100 UNIT/ML
500 SYRINGE INTRAVENOUS
Status: CANCELLED | OUTPATIENT
Start: 2022-07-06

## 2022-07-06 RX ORDER — SODIUM CHLORIDE 9 MG/ML
INJECTION, SOLUTION INTRAVENOUS CONTINUOUS
Status: DISCONTINUED | OUTPATIENT
Start: 2022-07-06 | End: 2022-07-06 | Stop reason: HOSPADM

## 2022-07-06 RX ORDER — SODIUM POLYSTYRENE SULFONATE 4.1 MEQ/G
15 POWDER, FOR SUSPENSION ORAL; RECTAL ONCE
Qty: 15 G | Refills: 0 | Status: SHIPPED | OUTPATIENT
Start: 2022-07-06 | End: 2022-07-07

## 2022-07-06 RX ORDER — SODIUM CHLORIDE 9 MG/ML
INJECTION, SOLUTION INTRAVENOUS CONTINUOUS
Status: CANCELLED
Start: 2022-07-06

## 2022-07-06 RX ORDER — HYDRALAZINE HYDROCHLORIDE 20 MG/ML
10 INJECTION INTRAMUSCULAR; INTRAVENOUS ONCE
Status: COMPLETED | OUTPATIENT
Start: 2022-07-06 | End: 2022-07-06

## 2022-07-06 RX ORDER — DIPHENHYDRAMINE HYDROCHLORIDE 50 MG/ML
50 INJECTION INTRAMUSCULAR; INTRAVENOUS ONCE AS NEEDED
Status: DISCONTINUED | OUTPATIENT
Start: 2022-07-06 | End: 2022-07-06 | Stop reason: HOSPADM

## 2022-07-06 RX ORDER — SODIUM CHLORIDE 0.9 % (FLUSH) 0.9 %
10 SYRINGE (ML) INJECTION
Status: CANCELLED | OUTPATIENT
Start: 2022-07-06

## 2022-07-06 RX ORDER — SODIUM CHLORIDE 0.9 % (FLUSH) 0.9 %
10 SYRINGE (ML) INJECTION
Status: DISCONTINUED | OUTPATIENT
Start: 2022-07-06 | End: 2022-07-06 | Stop reason: HOSPADM

## 2022-07-06 RX ORDER — EPINEPHRINE 0.3 MG/.3ML
0.3 INJECTION SUBCUTANEOUS ONCE AS NEEDED
Status: DISCONTINUED | OUTPATIENT
Start: 2022-07-06 | End: 2022-07-06 | Stop reason: HOSPADM

## 2022-07-06 RX ADMIN — HYDRALAZINE HYDROCHLORIDE 10 MG: 20 INJECTION, SOLUTION INTRAMUSCULAR; INTRAVENOUS at 01:07

## 2022-07-06 RX ADMIN — ACETAMINOPHEN 1000 MG: 500 TABLET, FILM COATED ORAL at 02:07

## 2022-07-06 RX ADMIN — SODIUM CHLORIDE 200 MG: 9 INJECTION, SOLUTION INTRAVENOUS at 02:07

## 2022-07-06 RX ADMIN — SODIUM CHLORIDE: 0.9 INJECTION, SOLUTION INTRAVENOUS at 01:07

## 2022-07-06 RX ADMIN — DIPHENHYDRAMINE HYDROCHLORIDE 25 MG: 50 INJECTION, SOLUTION INTRAMUSCULAR; INTRAVENOUS at 02:07

## 2022-07-06 RX ADMIN — Medication 10 ML: at 03:07

## 2022-07-06 RX ADMIN — DENOSUMAB 120 MG: 120 INJECTION SUBCUTANEOUS at 03:07

## 2022-07-06 NOTE — PROGRESS NOTES
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  Her treatment was interrupted after admission to [facility] with radiation esophagitis followed by Clostridium difficile colitis.  During her multi week stay, the patient became profoundly debilitated and predominantly bed-bound.  Despite healing of her esophagus, her nutritional status remained poor.  She was 2 immobile to allow for discharge directly to home and has had an interval rehab stay.  She and her /daughter have requested a virtual visit today to assess progress following discharge from rehab.  Patient's nutrition remains impaired.  She consumes predominantly liquids and soft solids.  Physical and Occupational therapy are being arranged for her in home.  Patient's  reports that she was ambulating with assistance at the rehab facility.    Patient returns to clinic for interval reassessment.  She has been performing better at home of late.  Diarrhea has resolved and been replaced with formed stool.  Patient is generally continent.  Appetite is improving and patient is struggling less to swallow medications.  Patient is up and awake most of the day.  She typically switches between bed and chair.  She remains highly motivated toward resumption of systemic chemotherapy for fear that she will have to repeat earlier treatment.    Patient returns to clinic to review interval lab and imaging.  Appetite has improved.  Diet has become more diversified. She is able to eat more substantial solids such as meat balls.  No worsening pain or dyspnea.  Patient is still wheelchair dependent in terms of ambulation and spends most of her day up to the chair.  Patient has been having difficulty with chronic urinary tract infection.  She recently completed course of Macrodantin.  Urine remains cloudy.    Patient returns to clinic  prior to cycle 9 of palliative pembrolizumab.  Patient has had a few fleeting episodes numbness involving her left hand and left foot.  She was unable to  a spoon during 1 of these episodes.  This was not associated with any slurring of speech, facial droop, visual disturbance, etcetera.  Symptoms resolved spontaneously.  CNS imaging was remarkable for an isolated metastasis involving the right cerebellar hemisphere.  This has been treated with single fraction radiation therapy.  Patient returns to review interval laboratory prior to next cycle of pembrolizumab.  Restaging scans have been delayed due to an insurance difficulty.  No recurrent seizure activity.  Patient continues to feel well and is actively engaged with sewing.    Physical examination:  The patient is a well-developed, well-nourished elderly, white female in no acute distress.  Weight is 204. lb (decreased by 4.5 lb).  VITAL SIGNS: Documented in EMR and reviewed  HEENT: Normocephalic, atraumatic. Oral mucosa pink and moist. Lips without lesions. Tongue midline. Oropharynx clear. Nonicteric sclerae.   NECK: Supple, no adenopathy.  HEART: Regular rate and rhythm without murmur, gallop or rub.   LUNGS: Clear to auscultation bilaterally.  ABDOMEN: Soft, nontender, nondistended with positive normoactive bowel sounds, no hepatosplenomegaly.   EXTREMITIES:  No cyanosis clubbing or edema. Distal pulses are intact.     Laboratory:  White count 12.9, hemoglobin 13.2, hematocrit 40.1, platelets 87, absolute neutrophil count is 11,800.  Sodium 135, potassium 5.6, chloride 105, CO2 20, BUN 32, creatinine 1.2, glucose 235, calcium 8.4, magnesium 1.8, liver function test within normal limits, , GFR is 45.     Impression:  1. Small cell carcinoma of the lung metastatic to bone, lymph node, and pleura with excellent response to second-line therapy with pembrolizumab.   2. Small, isolated right cerebellar metastasis-status post XRT.  3. Hyperkalemia.  4.  Moderate thrombocytopenia not in need of intervention.    Plan:  1. Proceed with cycle 9 of Keytruda as previously scheduled.  2. Return to clinic 3 weeks from now with interval CBC, CMP, LDH and magnesium prior to appointment.  3. Will review results of interval PET scan as soon as they are available.  4. Single dose Kayexalate for treatment of hyperkalemia.    Advance Care Planning During a prior visit, I engaged the patient in the advance care planning process.  The patient and I reviewed the role for advance directives and their purpose in directing future healthcare if the patients unable to speak for him/herself.  At this point in time, the patient does have full decision-making capacity.  We discussed different extreme health states that they could experience, and reviewed what kind of medical care the patient would want in those situations.  The patient communicated that if they were comatose and had little chance of a meaningful recovery, they would not want machines/life-sustaining treatments used.  In addition to the above preference, other important end-of-life issues for the patient include DNR.  The patient has  completed a living will to reflect these preferences.  The patient has already designated a healthcare power of  to make decisions on their behalf.    (30 mins time spent).      This note was created using voice recognition software and may contain grammatical errors.           Route Chart for Scheduling    Med Onc Chart Routing      Follow up with physician 3 weeks.   Follow up with [facility] 6 weeks. Same labs prior   Infusion scheduling note    Injection scheduling note    Labs CBC, CMP, LDH and magnesium   Lab interval:     Imaging    Pharmacy appointment    Other referrals          Treatment Plan Information   OP PEMBROLIZUMAB 200MG Q3W   Madhav Cardona MD   Upcoming Treatment Dates - OP PEMBROLIZUMAB 200MG Q3W    7/6/2022       Pre-Medications       acetaminophen tablet 1,000 mg        diphenydramine (BENADRYL) 25 mg in NS 50 mL IVPB       Chemotherapy       pembrolizumab (KEYTRUDA) 200 mg in sodium chloride 0.9% 108 mL infusion       Supportive Care       denosumab (XGEVA) solution 120 mg  7/27/2022       Pre-Medications       acetaminophen tablet 1,000 mg       diphenydramine (BENADRYL) 25 mg in NS 50 mL IVPB       Chemotherapy       pembrolizumab (KEYTRUDA) 200 mg in sodium chloride 0.9% 108 mL infusion  8/17/2022       Pre-Medications       acetaminophen tablet 1,000 mg       diphenydramine (BENADRYL) 25 mg in NS 50 mL IVPB       Chemotherapy       pembrolizumab (KEYTRUDA) 200 mg in sodium chloride 0.9% 108 mL infusion  9/7/2022       Pre-Medications       acetaminophen tablet 1,000 mg       diphenydramine (BENADRYL) 25 mg in NS 50 mL IVPB       Chemotherapy       pembrolizumab (KEYTRUDA) 200 mg in sodium chloride 0.9% 108 mL infusion    Therapy Plan Information  FERAHEME (FERUMOXYTOL) TWO DOSES  Pre-Medications  dexamethasone injection 4 mg  4 mg, Intravenous, Every visit  diphenhydrAMINE injection 25 mg  25 mg, Intravenous, Every visit  famotidine (PF) injection 20 mg  20 mg, Intravenous, Every visit  Medications  ferumoxytoL (FERAHEME) 510 mg in dextrose 5 % 100 mL IVPB  510 mg, Intravenous, Every visit  Flushes  heparin, porcine (PF) 100 unit/mL injection flush 500 Units  500 Units, Intravenous, PRN    INF FLUIDS  IV Fluids  sodium chloride 0.9% bolus 1,000 mL  1,000 mL, Intravenous, Every visit  Flushes  sodium chloride 0.9% flush 10 mL  10 mL, Intravenous, PRN  heparin, porcine (PF) 100 unit/mL injection flush 500 Units  500 Units, Intravenous, PRN  Supportive Care  ondansetron injection 8 mg  8 mg, Intravenous, PRN

## 2022-07-06 NOTE — PLAN OF CARE
Problem: Fatigue  Goal: Improved Activity Tolerance  Intervention: Promote Energy Conservation  Flowsheets (Taken 7/6/2022 1145)  Fatigue Management:   activity schedule adjusted   fatigue-related activity identified   frequent rest breaks encouraged   paced activity encouraged  Sleep/Rest Enhancement:   relaxation techniques promoted   regular sleep/rest pattern promoted   room darkened  Activity Management:   Ambulated -L4   Ambulated to bathroom - L4   Ambulated in l[place]aga - L4   Ambulated in room - L4   Up in stretcher chair - L1     Problem: Adult Inpatient Plan of Care  Goal: Patient-Specific Goal (Individualization)  Outcome: Ongoing, Progressing  Flowsheets (Taken 7/6/2022 1145)  Individualized Care Needs: recliner, two blankets, face cloth  Anxieties, Fears or Concerns: PAC access/needles  Patient-Specific Goals (Include Timeframe): no s/s of reaction during treatment     Problem: Adult Inpatient Plan of Care  Goal: Plan of Care Review  Outcome: Ongoing, Progressing  Flowsheets (Taken 7/6/2022 1555)  Plan of Care Reviewed With:   patient   spouse  Upon arrival to the clinic pt had an elevated BP, see vital sign flow-sheet. Charge nurse notified MD's office. Hydralazine 10 mg IV administered. Pt's BP lowered enough to initiate her chemo. Pt tolerated her Keytruda infusion and Xgeva injection well, NAD. No new c/o voiced. Pt given a schedule and reviewed, pt verbalized understanding. Pt ambulated out of the clinic without difficulty accompanied by her .

## 2022-07-07 ENCOUNTER — TELEPHONE (OUTPATIENT)
Dept: HEMATOLOGY/ONCOLOGY | Facility: CLINIC | Age: 72
End: 2022-07-07
Payer: MEDICARE

## 2022-07-07 ENCOUNTER — TELEPHONE (OUTPATIENT)
Dept: FAMILY MEDICINE | Facility: CLINIC | Age: 72
End: 2022-07-07
Payer: MEDICARE

## 2022-07-07 DIAGNOSIS — E03.9 ACQUIRED HYPOTHYROIDISM: ICD-10-CM

## 2022-07-07 DIAGNOSIS — E27.40 ADRENAL INSUFFICIENCY: Primary | ICD-10-CM

## 2022-07-07 RX ORDER — LEVOTHYROXINE SODIUM 100 UG/1
100 TABLET ORAL DAILY
Qty: 90 TABLET | Refills: 3 | Status: SHIPPED | OUTPATIENT
Start: 2022-07-07 | End: 2023-08-31

## 2022-07-07 RX ORDER — HYDROCORTISONE 5 MG/1
15 TABLET ORAL 2 TIMES DAILY
Qty: 540 TABLET | Refills: 3 | Status: ON HOLD | OUTPATIENT
Start: 2022-07-07 | End: 2022-09-20 | Stop reason: SDUPTHER

## 2022-07-07 NOTE — TELEPHONE ENCOUNTER
Patient and  advised of below and verbalized understanding of this.  asking if any contraindications with other medications, but I advised him to speak with pharmacist regarding this.   ----- Message from Madhav Cardona MD sent at 7/7/2022 12:01 PM CDT -----  Adrenal insufficient and hypothyroid.  I put in for ACTH / Endocrine referral.  Sent scripts for hydrocortisone and synthroid to her pharmacy.  This likely due to immunotherapy.  Make sure she picks up meds and starts.  Thx.

## 2022-07-08 PROBLEM — I73.9 CLAUDICATION: Status: RESOLVED | Noted: 2021-04-01 | Resolved: 2022-07-08

## 2022-07-11 ENCOUNTER — PATIENT MESSAGE (OUTPATIENT)
Dept: GASTROENTEROLOGY | Facility: CLINIC | Age: 72
End: 2022-07-11
Payer: MEDICARE

## 2022-07-11 ENCOUNTER — PATIENT MESSAGE (OUTPATIENT)
Dept: FAMILY MEDICINE | Facility: CLINIC | Age: 72
End: 2022-07-11
Payer: MEDICARE

## 2022-07-12 ENCOUNTER — TELEPHONE (OUTPATIENT)
Dept: HEMATOLOGY/ONCOLOGY | Facility: CLINIC | Age: 72
End: 2022-07-12
Payer: MEDICARE

## 2022-07-12 ENCOUNTER — HOSPITAL ENCOUNTER (OUTPATIENT)
Dept: RADIOLOGY | Facility: HOSPITAL | Age: 72
Discharge: HOME | End: 2022-07-12
Attending: INTERNAL MEDICINE
Payer: MEDICARE

## 2022-07-12 DIAGNOSIS — C34.91 SMALL CELL CARCINOMA OF LUNG, RIGHT: ICD-10-CM

## 2022-07-12 PROBLEM — K57.20 COLONIC DIVERTICULAR ABSCESS: Status: ACTIVE | Noted: 2022-07-12

## 2022-07-12 PROBLEM — Z71.89 ACP (ADVANCE CARE PLANNING): Status: ACTIVE | Noted: 2022-07-12

## 2022-07-12 LAB — GLUCOSE SERPL-MCNC: 154 MG/DL (ref 70–110)

## 2022-07-12 PROCEDURE — 78815 NM PET CT ROUTINE: ICD-10-PCS | Mod: 26,PS,, | Performed by: RADIOLOGY

## 2022-07-12 PROCEDURE — 78815 PET IMAGE W/CT SKULL-THIGH: CPT | Mod: 26,PS,, | Performed by: RADIOLOGY

## 2022-07-12 PROCEDURE — A9552 F18 FDG: HCPCS | Mod: PN

## 2022-07-12 NOTE — TELEPHONE ENCOUNTER
Per Dr. Cardona, called patient and asked her to proceed to ST ED d/t results from PET scan. Patient and  stated that they were on their way.

## 2022-07-12 NOTE — PROGRESS NOTES
PET Imaging Questionnaire    1. Are you a Diabetic? Recent Blood Sugar level? No    2. Are you anemic? Bone Marrow Stimulation Meds? No    3. Have you had a CT Scan, if so when & where was your last one? No    4. Have you had a PET Scan, if so when & where was your last one? Yes -     5. Chemotherapy or currently on Chemotherapy? Yes    6. Radiation therapy? Yes    Surgical History:   Past Surgical History:   Procedure Laterality Date    ADENOIDECTOMY      APPENDECTOMY      CATARACT EXTRACTION      COLONOSCOPY N/A 11/14/2016    Procedure: COLONOSCOPY;  Surgeon: Destin Cardona MD;  Location: [facility];  Service: Endoscopy;  Laterality: N/A;    COLONOSCOPY N/A 05/14/2021    Procedure: COLONOSCOPY;  Surgeon: Destin Cardona MD;  Location: [facility];  Service: Endoscopy;  Laterality: N/A; hemorrhoids, diverticulosis, Old blood left colon. No active bleeding; Repeat colonoscopy is not recommended for screening purposes.    ESOPHAGOGASTRODUODENOSCOPY N/A 05/14/2021    Procedure: EGD (ESOPHAGOGASTRODUODENOSCOPY);  Surgeon: Destin Cardona MD;  Location: [facility];  Service: Endoscopy;  Laterality: N/A; unremarkable    ESOPHAGOGASTRODUODENOSCOPY N/A 08/09/2021    Procedure: ESOPHAGOGASTRODUODENOSCOPY (EGD);  Surgeon: Destin Cardona MD;  Location: [facility];  Service: Endoscopy;  Laterality: N/A; Moderately severe radiation esophagitis with no bleeding    EYE SURGERY      cataract OU    INSERTION OF TUNNELED CENTRAL VENOUS CATHETER (CVC) WITH SUBCUTANEOUS PORT N/A 06/07/2021    Procedure: [id number];  Surgeon: Joe Salinas MD;  [facility];  Service: General;  Laterality: N/A;    INTRALUMINAL GASTROINTESTINAL TRACT IMAGING VIA CAPSULE N/A 12/29/2021    Procedure: IMAGING PROCEDURE, GI TRACT, INTRALUMINAL, VIA CAPSULE  (called for instruction 12/20-may have trouble swallowing);  Surgeon: Floyd Dixon MD;  Location: [facility];  Service: Endoscopy;  Laterality: N/A; Diffuse  red spots of unclear significance and erosions found in the small bowel (entire small bowel), suspicious for mild radiation enteritis    NEUROMA SURGERY Left     acoustic    TONSILLECTOMY     7.      8. Have you been fasting for at least 6 hours? Yes    9. Is there any chance you may be pregnant or breastfeeding? No    Assay: 12.45 MCi@:7.30   Injection Site:lt ac     Residual: .687 mCi@: 7.32   Technologist: Jorje Vaughn Injected:11.76mCi

## 2022-07-27 ENCOUNTER — OFFICE VISIT (OUTPATIENT)
Dept: HEMATOLOGY/ONCOLOGY | Facility: CLINIC | Age: 72
End: 2022-07-27
Payer: MEDICARE

## 2022-07-27 ENCOUNTER — LAB VISIT (OUTPATIENT)
Dept: LAB | Facility: HOSPITAL | Age: 72
End: 2022-07-27
Attending: INTERNAL MEDICINE
Payer: MEDICARE

## 2022-07-27 VITALS
TEMPERATURE: 97 F | WEIGHT: 196.19 LBS | HEART RATE: 72 BPM | HEIGHT: 66 IN | OXYGEN SATURATION: 98 % | BODY MASS INDEX: 31.53 KG/M2 | SYSTOLIC BLOOD PRESSURE: 85 MMHG | RESPIRATION RATE: 16 BRPM | DIASTOLIC BLOOD PRESSURE: 57 MMHG

## 2022-07-27 DIAGNOSIS — M79.3 PANNICULITIS: ICD-10-CM

## 2022-07-27 DIAGNOSIS — K63.0 INTESTINAL DIVERTICULAR ABSCESS: ICD-10-CM

## 2022-07-27 DIAGNOSIS — C34.91 SMALL CELL CARCINOMA OF LUNG, RIGHT: Primary | ICD-10-CM

## 2022-07-27 DIAGNOSIS — C34.91 SMALL CELL CARCINOMA OF LUNG, RIGHT: ICD-10-CM

## 2022-07-27 DIAGNOSIS — C79.51 BONE METASTASES: ICD-10-CM

## 2022-07-27 DIAGNOSIS — E27.40 ADRENAL INSUFFICIENCY: ICD-10-CM

## 2022-07-27 DIAGNOSIS — E03.9 ACQUIRED HYPOTHYROIDISM: ICD-10-CM

## 2022-07-27 DIAGNOSIS — C78.2 PLEURAL METASTASIS: ICD-10-CM

## 2022-07-27 DIAGNOSIS — C79.31 BRAIN METASTASES: ICD-10-CM

## 2022-07-27 LAB
ALBUMIN SERPL BCP-MCNC: 2.3 G/DL (ref 3.5–5.2)
ALP SERPL-CCNC: 44 U/L (ref 55–135)
ALT SERPL W/O P-5'-P-CCNC: 8 U/L (ref 10–44)
ANION GAP SERPL CALC-SCNC: 8 MMOL/L (ref 8–16)
AST SERPL-CCNC: 10 U/L (ref 10–40)
BASOPHILS # BLD AUTO: 0.03 K/UL (ref 0–0.2)
BASOPHILS NFR BLD: 0.2 % (ref 0–1.9)
BILIRUB SERPL-MCNC: 0.9 MG/DL (ref 0.1–1)
BUN SERPL-MCNC: 22 MG/DL (ref 8–23)
CALCIUM SERPL-MCNC: 8.5 MG/DL (ref 8.7–10.5)
CHLORIDE SERPL-SCNC: 97 MMOL/L (ref 95–110)
CO2 SERPL-SCNC: 26 MMOL/L (ref 23–29)
CREAT SERPL-MCNC: 1.4 MG/DL (ref 0.5–1.4)
DIFFERENTIAL METHOD: ABNORMAL
EOSINOPHIL # BLD AUTO: 0 K/UL (ref 0–0.5)
EOSINOPHIL NFR BLD: 0.1 % (ref 0–8)
ERYTHROCYTE [DISTWIDTH] IN BLOOD BY AUTOMATED COUNT: 14.6 % (ref 11.5–14.5)
EST. GFR  (AFRICAN AMERICAN): 43 ML/MIN/1.73 M^2
EST. GFR  (NON AFRICAN AMERICAN): 37 ML/MIN/1.73 M^2
GLUCOSE SERPL-MCNC: 191 MG/DL (ref 70–110)
HCT VFR BLD AUTO: 30.7 % (ref 37–48.5)
HGB BLD-MCNC: 10.1 G/DL (ref 12–16)
IMM GRANULOCYTES # BLD AUTO: 0.63 K/UL (ref 0–0.04)
IMM GRANULOCYTES NFR BLD AUTO: 4.9 % (ref 0–0.5)
LDH SERPL L TO P-CCNC: 181 U/L (ref 110–260)
LYMPHOCYTES # BLD AUTO: 1 K/UL (ref 1–4.8)
LYMPHOCYTES NFR BLD: 7.5 % (ref 18–48)
MAGNESIUM SERPL-MCNC: 2 MG/DL (ref 1.6–2.6)
MCH RBC QN AUTO: 28.3 PG (ref 27–31)
MCHC RBC AUTO-ENTMCNC: 32.9 G/DL (ref 32–36)
MCV RBC AUTO: 86 FL (ref 82–98)
MONOCYTES # BLD AUTO: 1 K/UL (ref 0.3–1)
MONOCYTES NFR BLD: 7.8 % (ref 4–15)
NEUTROPHILS # BLD AUTO: 10.1 K/UL (ref 1.8–7.7)
NEUTROPHILS NFR BLD: 79.5 % (ref 38–73)
NRBC BLD-RTO: 0 /100 WBC
PLATELET # BLD AUTO: 255 K/UL (ref 150–450)
PMV BLD AUTO: 9.5 FL (ref 9.2–12.9)
POTASSIUM SERPL-SCNC: 4.2 MMOL/L (ref 3.5–5.1)
PROT SERPL-MCNC: 5.7 G/DL (ref 6–8.4)
RBC # BLD AUTO: 3.57 M/UL (ref 4–5.4)
SODIUM SERPL-SCNC: 131 MMOL/L (ref 136–145)
WBC # BLD AUTO: 12.75 K/UL (ref 3.9–12.7)

## 2022-07-27 PROCEDURE — 3051F HG A1C>EQUAL 7.0%<8.0%: CPT | Mod: CPTII,S$GLB,, | Performed by: INTERNAL MEDICINE

## 2022-07-27 PROCEDURE — 83615 LACTATE (LD) (LDH) ENZYME: CPT | Mod: PN | Performed by: INTERNAL MEDICINE

## 2022-07-27 PROCEDURE — 1159F MED LIST DOCD IN RCRD: CPT | Mod: CPTII,S$GLB,, | Performed by: INTERNAL MEDICINE

## 2022-07-27 PROCEDURE — 4010F PR ACE/ARB THEARPY RXD/TAKEN: ICD-10-PCS | Mod: CPTII,S$GLB,, | Performed by: INTERNAL MEDICINE

## 2022-07-27 PROCEDURE — 3008F PR BODY MASS INDEX (BMI) DOCUMENTED: ICD-10-PCS | Mod: CPTII,S$GLB,, | Performed by: INTERNAL MEDICINE

## 2022-07-27 PROCEDURE — 85025 COMPLETE CBC W/AUTO DIFF WBC: CPT | Mod: PN | Performed by: INTERNAL MEDICINE

## 2022-07-27 PROCEDURE — 3074F SYST BP LT 130 MM HG: CPT | Mod: CPTII,S$GLB,, | Performed by: INTERNAL MEDICINE

## 2022-07-27 PROCEDURE — 80053 COMPREHEN METABOLIC PANEL: CPT | Mod: PN | Performed by: INTERNAL MEDICINE

## 2022-07-27 PROCEDURE — 99215 OFFICE O/P EST HI 40 MIN: CPT | Mod: S$GLB,,, | Performed by: INTERNAL MEDICINE

## 2022-07-27 PROCEDURE — 99999 PR PBB SHADOW E&M-EST. PATIENT-LVL IV: ICD-10-PCS | Mod: PBBFAC,,, | Performed by: INTERNAL MEDICINE

## 2022-07-27 PROCEDURE — 1111F DSCHRG MED/CURRENT MED MERGE: CPT | Mod: CPTII,S$GLB,, | Performed by: INTERNAL MEDICINE

## 2022-07-27 PROCEDURE — 1160F RVW MEDS BY RX/DR IN RCRD: CPT | Mod: CPTII,S$GLB,, | Performed by: INTERNAL MEDICINE

## 2022-07-27 PROCEDURE — 3078F DIAST BP <80 MM HG: CPT | Mod: CPTII,S$GLB,, | Performed by: INTERNAL MEDICINE

## 2022-07-27 PROCEDURE — 1101F PR PT FALLS ASSESS DOC 0-1 FALLS W/OUT INJ PAST YR: ICD-10-PCS | Mod: CPTII,S$GLB,, | Performed by: INTERNAL MEDICINE

## 2022-07-27 PROCEDURE — 1125F PR PAIN SEVERITY QUANTIFIED, PAIN PRESENT: ICD-10-PCS | Mod: CPTII,S$GLB,, | Performed by: INTERNAL MEDICINE

## 2022-07-27 PROCEDURE — 3288F FALL RISK ASSESSMENT DOCD: CPT | Mod: CPTII,S$GLB,, | Performed by: INTERNAL MEDICINE

## 2022-07-27 PROCEDURE — 1101F PT FALLS ASSESS-DOCD LE1/YR: CPT | Mod: CPTII,S$GLB,, | Performed by: INTERNAL MEDICINE

## 2022-07-27 PROCEDURE — 1160F PR REVIEW ALL MEDS BY PRESCRIBER/CLIN PHARMACIST DOCUMENTED: ICD-10-PCS | Mod: CPTII,S$GLB,, | Performed by: INTERNAL MEDICINE

## 2022-07-27 PROCEDURE — 1125F AMNT PAIN NOTED PAIN PRSNT: CPT | Mod: CPTII,S$GLB,, | Performed by: INTERNAL MEDICINE

## 2022-07-27 PROCEDURE — 3051F PR MOST RECENT HEMOGLOBIN A1C LEVEL 7.0 - < 8.0%: ICD-10-PCS | Mod: CPTII,S$GLB,, | Performed by: INTERNAL MEDICINE

## 2022-07-27 PROCEDURE — 3078F PR MOST RECENT DIASTOLIC BLOOD PRESSURE < 80 MM HG: ICD-10-PCS | Mod: CPTII,S$GLB,, | Performed by: INTERNAL MEDICINE

## 2022-07-27 PROCEDURE — 4010F ACE/ARB THERAPY RXD/TAKEN: CPT | Mod: CPTII,S$GLB,, | Performed by: INTERNAL MEDICINE

## 2022-07-27 PROCEDURE — 36415 COLL VENOUS BLD VENIPUNCTURE: CPT | Mod: PN | Performed by: INTERNAL MEDICINE

## 2022-07-27 PROCEDURE — 3008F BODY MASS INDEX DOCD: CPT | Mod: CPTII,S$GLB,, | Performed by: INTERNAL MEDICINE

## 2022-07-27 PROCEDURE — 1111F PR DISCHARGE MEDS RECONCILED W/ CURRENT OUTPATIENT MED LIST: ICD-10-PCS | Mod: CPTII,S$GLB,, | Performed by: INTERNAL MEDICINE

## 2022-07-27 PROCEDURE — 1159F PR MEDICATION LIST DOCUMENTED IN MEDICAL RECORD: ICD-10-PCS | Mod: CPTII,S$GLB,, | Performed by: INTERNAL MEDICINE

## 2022-07-27 PROCEDURE — 3288F PR FALLS RISK ASSESSMENT DOCUMENTED: ICD-10-PCS | Mod: CPTII,S$GLB,, | Performed by: INTERNAL MEDICINE

## 2022-07-27 PROCEDURE — 99215 PR OFFICE/OUTPT VISIT, EST, LEVL V, 40-54 MIN: ICD-10-PCS | Mod: S$GLB,,, | Performed by: INTERNAL MEDICINE

## 2022-07-27 PROCEDURE — 3074F PR MOST RECENT SYSTOLIC BLOOD PRESSURE < 130 MM HG: ICD-10-PCS | Mod: CPTII,S$GLB,, | Performed by: INTERNAL MEDICINE

## 2022-07-27 PROCEDURE — 83735 ASSAY OF MAGNESIUM: CPT | Mod: PN | Performed by: INTERNAL MEDICINE

## 2022-07-27 PROCEDURE — 99999 PR PBB SHADOW E&M-EST. PATIENT-LVL IV: CPT | Mod: PBBFAC,,, | Performed by: INTERNAL MEDICINE

## 2022-07-27 NOTE — PROGRESS NOTES
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  Her treatment was interrupted after admission to [facility] with radiation esophagitis followed by Clostridium difficile colitis.  During her multi week stay, the patient became profoundly debilitated and predominantly bed-bound.  Despite healing of her esophagus, her nutritional status remained poor.  She was 2 immobile to allow for discharge directly to home and has had an interval rehab stay.  She and her /daughter have requested a virtual visit today to assess progress following discharge from rehab.  Patient's nutrition remains impaired.  She consumes predominantly liquids and soft solids.  Physical and Occupational therapy are being arranged for her in home.  Patient's  reports that she was ambulating with assistance at the rehab facility.    Patient's performance status gradually improved.  However she developed disease progression which was treated with immunotherapy.  Patient has responded favorably.  Patient returns to clinic prior to cycle 10 of palliative pembrolizumab.  Patient has had a few fleeting episodes numbness involving her left hand and left foot.  She was unable to  a spoon during 1 of these episodes.  This was not associated with any slurring of speech, facial droop, visual disturbance, etcetera.  Symptoms resolved spontaneously.  CNS imaging was remarkable for an isolated metastasis involving the right cerebellar hemisphere.  This has been treated with single fraction radiation therapy.  Since last office evaluation, patient has had head admission to [facility] for acute diverticulitis with abscess formation.  This was drained with pigtail catheter.  Catheter remains in place.  Purulent drainage persists.  Patient has had interval CT of the abdomen in anticipation of of  follow-up appointment in general surgery clinic this afternoon.  Patient reports pain swelling erythema and warmth to the skin on her abdominal wall around the site of her pigtail drain.  Patient is listless and staying in the bed most of the time.  She has required increasing levels of assistance from her .  Her appetite is diminished.  Patient's blood pressure has been running low.    Physical examination:  The patient is a well-developed, well-nourished elderly, white female in no acute distress.  Weight is 196.5. lb (decreased by 7.5 lb).  VITAL SIGNS: Documented in EMR and reviewed  HEENT: Normocephalic, atraumatic. Oral mucosa pink and moist. Lips without lesions. Tongue midline. Oropharynx clear. Nonicteric sclerae.   NECK: Supple, no adenopathy.  HEART: Regular rate and rhythm without murmur, gallop or rub.   LUNGS: Clear to auscultation bilaterally.  ABDOMEN: Soft, nontender, nondistended with positive normoactive bowel sounds, no hepatosplenomegaly.   EXTREMITIES:  No cyanosis clubbing or edema. Distal pulses are intact.     Laboratory:  White count 12.8, hemoglobin 10.1, hematocrit 30.7, platelets 255, absolute neutrophil count is 10,100.  Sodium 131, potassium 4.2, chloride 97, CO2 26, BUN 22, creatinine 1.4, glucose 191, calcium 8.5, magnesium 2, alkaline phosphatase 44, total protein 5.7, albumin 2.3, total bilirubin 0.9, AST 10, ALT 8, , GFR is 37.     CT PET scan:  Study performed 06/15/2022.  Treatment-related changes in the right lung with no evidence of residual disease in the thorax.  A new punctate focus of uptake in the left humeral neck is of uncertain significance, and attention to this finding on follow-up studies is recommended.  Activity associated with the remainder of the patient's bone metastases has resolved.  Inflammatory process in the sigmoid colon, likely diverticulitis, with Diamante diverticular abscesses, colovesical fistula.      CT abdomen/pelvis:  Study performed  07/25/2022.  There is slightly decreased fluid with the history of diverticulitis, abscess with the drain now present.  There does appear to be some subtle stranding as well as thickening about the insertion site, peritoneal margin with subcutaneous stranding and emphysema.  Some minimal insertional hematoma or early superimposed subcutaneous inflammation could also present in this fashion with no organized subcutaneous fluid collection currently appreciated. Consider if there is associated drainage at the skin insertion site.    Impression:  1. Small cell carcinoma of the lung metastatic to bone, lymph node, and pleura with excellent response to second-line therapy with pembrolizumab.   2. Small, isolated right cerebellar metastasis-status post XRT.  3. Diverticulitis/abscess formation/status post percutaneous drainage but not completely resolved.  4. Panniculitis about the site of patient's pigtail drain.      Plan:  1. Hold additional cycles of immunotherapy at this time.  2. I have conferred with Dr. Tinoco after she had a chance to visit the patient in clinic.  They have agreed to proceed with surgery, diverting ostomy, and wound debridement.  3. I have asked that hospital consult be placed so that I may follow along with her during her inpatient stay.      Advance Care Planning During a prior visit, I engaged the patient in the advance care planning process.  The patient and I reviewed the role for advance directives and their purpose in directing future healthcare if the patients unable to speak for him/herself.  At this point in time, the patient does have full decision-making capacity.  We discussed different extreme health states that they could experience, and reviewed what kind of medical care the patient would want in those situations.  The patient communicated that if they were comatose and had little chance of a meaningful recovery, they would not want machines/life-sustaining treatments used.  In  addition to the above preference, other important end-of-life issues for the patient include DNR.  The patient has  completed a living will to reflect these preferences.  The patient has already designated a healthcare power of  to make decisions on their behalf.    (30 mins time spent).      This note was created using voice recognition software and may contain grammatical errors.

## 2022-07-28 PROBLEM — K57.92 DIVERTICULITIS: Status: ACTIVE | Noted: 2022-07-28

## 2022-08-02 PROBLEM — R21 RASH AND NONSPECIFIC SKIN ERUPTION: Status: ACTIVE | Noted: 2022-08-02

## 2022-08-12 PROBLEM — D62 ACUTE BLOOD LOSS ANEMIA: Status: ACTIVE | Noted: 2022-08-12

## 2022-08-16 PROBLEM — Z02.9 DISCHARGE PLANNING ISSUES: Status: ACTIVE | Noted: 2022-08-16

## 2022-08-16 PROBLEM — Z75.8 DISCHARGE PLANNING ISSUES: Status: ACTIVE | Noted: 2022-08-16

## 2022-08-24 ENCOUNTER — PATIENT MESSAGE (OUTPATIENT)
Dept: ADMINISTRATIVE | Facility: HOSPITAL | Age: 72
End: 2022-08-24
Payer: MEDICARE

## 2022-08-26 ENCOUNTER — PATIENT MESSAGE (OUTPATIENT)
Dept: HEMATOLOGY/ONCOLOGY | Facility: CLINIC | Age: 72
End: 2022-08-26
Payer: MEDICARE

## 2022-08-29 ENCOUNTER — PATIENT MESSAGE (OUTPATIENT)
Dept: RADIATION ONCOLOGY | Facility: CLINIC | Age: 72
End: 2022-08-29
Payer: MEDICARE

## 2022-09-01 ENCOUNTER — PATIENT MESSAGE (OUTPATIENT)
Dept: HEMATOLOGY/ONCOLOGY | Facility: CLINIC | Age: 72
End: 2022-09-01
Payer: MEDICARE

## 2022-09-06 ENCOUNTER — PATIENT MESSAGE (OUTPATIENT)
Dept: HEMATOLOGY/ONCOLOGY | Facility: CLINIC | Age: 72
End: 2022-09-06
Payer: MEDICARE

## 2022-09-08 ENCOUNTER — PES CALL (OUTPATIENT)
Dept: ADMINISTRATIVE | Facility: CLINIC | Age: 72
End: 2022-09-08
Payer: MEDICARE

## 2022-09-12 ENCOUNTER — PATIENT MESSAGE (OUTPATIENT)
Dept: RADIATION ONCOLOGY | Facility: CLINIC | Age: 72
End: 2022-09-12
Payer: MEDICARE

## 2022-09-16 PROBLEM — K57.20 COLONIC DIVERTICULAR ABSCESS: Status: RESOLVED | Noted: 2022-07-12 | Resolved: 2022-09-16

## 2022-09-16 PROBLEM — T81.30XA WOUND DEHISCENCE: Status: ACTIVE | Noted: 2022-09-16

## 2022-09-16 PROBLEM — N39.0 UTI (URINARY TRACT INFECTION): Status: ACTIVE | Noted: 2022-09-16

## 2022-09-16 PROBLEM — D64.9 ANEMIA: Status: ACTIVE | Noted: 2022-09-16

## 2022-09-16 PROBLEM — C34.90 SMALL CELL LUNG CANCER: Status: ACTIVE | Noted: 2021-05-20

## 2022-09-19 PROBLEM — R53.81 DEBILITY: Status: ACTIVE | Noted: 2022-09-19

## 2022-09-20 NOTE — PROGRESS NOTES
[facility] Department of Radiation Oncology  Follow Up Visit Note    The patient location is: hospital  The chief complaint leading to consultation is: brain metastasis follow up     Visit type: Audio-visual     36 minutes of total time spent on the encounter, which includes face to face time and non-face to face time preparing to see the patient (eg, review of tests), Obtaining and/or reviewing separately obtained history, Documenting clinical information in the electronic or other health record, Independently interpreting results (not separately reported) and communicating results to the patient/family/caregiver, or Care coordination (not separately reported).      Each patient to whom he or she provides medical services by telemedicine is:  (1) informed of the relationship between the physician and patient and the respective role of any other health care provider with respect to management of the patient; and (2) notified that he or she may decline to receive medical services by telemedicine and may withdraw from such care at any time.     Diagnosis:  Allyson Henriquez is a 72 y.o. 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, status post SRS       Oncologic History:  Oncology History   Small cell lung cancer   5/20/2021 Initial Diagnosis    Small cell carcinoma of lung, right     6/8/2021 - 7/27/2021 Chemotherapy    Treatment Summary   Plan Name: OP CARBOPLATIN (AUC) + ETOPOSIDE Q3W  Treatment Goal: Palliative  Status: Inactive  Start Date: 6/8/2021  End Date: 7/27/2021  Provider: Madhav Cardona MD  Chemotherapy: CARBOplatin (PARAPLATIN) 395 mg in sodium chloride 0.9% 250 mL chemo infusion, 395 mg (100 % of original dose 395 mg), Intravenous, Clinic/HOD 1 time, 3 of 6 cycles  Dose modification:   (original dose 395 mg, Cycle 1, Reason: MD Discretion)  Administration: 395 mg (6/8/2021), 395 mg (6/29/2021), 395 mg (7/20/2021)  etoposide  (VEPESID) 100 mg/m2 = 200 mg in sodium chloride 0.9% 500 mL chemo infusion, 100 mg/m2 = 200 mg, Intravenous, Clinic/HOD 1 time, 3 of 6 cycles  Administration: 200 mg (6/8/2021), 200 mg (6/9/2021), 200 mg (6/29/2021), 200 mg (6/10/2021), 200 mg (6/30/2021), 200 mg (7/1/2021), 200 mg (7/20/2021), 200 mg (7/21/2021), 200 mg (7/22/2021)       1/18/2022 -  Chemotherapy    Treatment Summary   Plan Name: OP PEMBROLIZUMAB 200MG Q3W  Treatment Goal: Palliative  Status: Active  Start Date: 1/18/2022  End Date: 3/27/2024 (Planned)  Provider: Madhav Cardona MD  Chemotherapy: pembrolizumab (KEYTRUDA) 200 mg in sodium chloride 0.9% 108 mL infusion, 200 mg, Intravenous, Clinic/HOD 1 time, 9 of 37 cycles  Administration: 200 mg (1/18/2022), 200 mg (4/13/2022), 200 mg (2/8/2022), 200 mg (3/2/2022), 200 mg (3/23/2022), 200 mg (5/4/2022), 200 mg (7/6/2022), 200 mg (5/25/2022), 200 mg (6/15/2022)       6/28/2022 - 6/28/2022 Radiation Therapy    Treating physician: Aleena Nielson  Total Dose: 22 Gy  Fractions: 1    Single fraction radiosurgery to right cerebellar metastasis.     Iron deficiency anemia   Brain metastasis   6/28/2022 Initial Diagnosis    Brain metastasis     6/28/2022 - 6/28/2022 Radiation Therapy    Treating physician: Aleena Nielson  Total Dose: 22 Gy  Fractions: 1    Single fraction radiosurgery to right cerebellar metastasis.          Interval History  The patient presents today for a regularly scheduled follow up visit.  She was last seen in our clinic on 6/28/22.   Since that time, she has had repeated hospitalizations for diverticular abscess requiring sigmoid colectomy/ostomy and debridement 7/28/22 and multiple surgeries for washout and several IV and oral antibiotics (she was hospitalized last year with severe esophagitis after chemotherapy-radiation therapy to thorax, complicated by C. Diff colitis).  She was discharged 8/19/22 to LT[facility], but readmitted 9/15/22 with wound dehiscence. She is seen today in  virtual visit from her hospital room.    22 MRI brain: decrease in size of treated 9mm RIGHT cerebellar mass (now 5 x 3mm)    Has been off all antibiotics but still has wound vac in place.  Plan for discharge this week.  Very concerned about state of her malignancy and delay in treatment caused by this infection.     Review of Systems   Review of Systems   Constitutional:  Negative for chills and fever.   Eyes:  Negative for blurred vision and double vision.   Respiratory:  Negative for cough.    Gastrointestinal:  Positive for abdominal pain (wound vac in place).   Neurological:  Negative for dizziness, sensory change, speech change, seizures and headaches.   Psychiatric/Behavioral:  The patient is nervous/anxious.      Social History:  Social History     Tobacco Use    Smoking status: Former     Packs/day: 1.00     Years: 40.00     Pack years: 40.00     Types: Cigarettes     Quit date: 3/6/2016     Years since quittin.5    Smokeless tobacco: Never   Substance Use Topics    Alcohol use: Yes     Alcohol/week: 0.0 standard drinks     Comment: occasional- social; not very often    Drug use: No       Family History:  Cancer-related family history includes Cancer in her paternal uncle. There is no history of Esophageal cancer.    Exam:  There were no vitals filed for this visit.      Imaging:      Data Review:    Independent Interpretation of Test(s): MRI brain from 22 was personally reviewed and shows treatment response with decreased size of right cerebellar lesion, no new worrisome findings.       Assessment:  Good response to SRS.  Unrelated hospitalizations for diverticular abscess causing delay in cancer therapy  ECOG: (4) Completely disabled, unable to carry out self-care and confined to bed or chair    Plan:  MRI brain in 3 months  Follow up after MRI (will continue to follow with me until care well established with new Med Onc)  Understands that recurrent colitis (although infectious in nature)  may preclude further immunotherapy  Follow up next week with Dr. Cardona.  She was given our contact information, and she was told that she could call our clinic at anytime if she has any questions or concerns.  Follow up with other providers as directed

## 2022-09-21 ENCOUNTER — OFFICE VISIT (OUTPATIENT)
Dept: RADIATION ONCOLOGY | Facility: CLINIC | Age: 72
End: 2022-09-21
Payer: MEDICARE

## 2022-09-21 ENCOUNTER — PATIENT MESSAGE (OUTPATIENT)
Dept: HEMATOLOGY/ONCOLOGY | Facility: CLINIC | Age: 72
End: 2022-09-21
Payer: MEDICARE

## 2022-09-21 DIAGNOSIS — C34.90 SMALL CELL LUNG CANCER: Primary | ICD-10-CM

## 2022-09-21 DIAGNOSIS — C79.31 BRAIN METASTASIS: ICD-10-CM

## 2022-09-21 PROCEDURE — 4010F ACE/ARB THERAPY RXD/TAKEN: CPT | Mod: CPTII,95,, | Performed by: RADIOLOGY

## 2022-09-21 PROCEDURE — 99213 PR OFFICE/OUTPT VISIT, EST, LEVL III, 20-29 MIN: ICD-10-PCS | Mod: 95,,, | Performed by: RADIOLOGY

## 2022-09-21 PROCEDURE — 3051F PR MOST RECENT HEMOGLOBIN A1C LEVEL 7.0 - < 8.0%: ICD-10-PCS | Mod: CPTII,95,, | Performed by: RADIOLOGY

## 2022-09-21 PROCEDURE — 4010F PR ACE/ARB THEARPY RXD/TAKEN: ICD-10-PCS | Mod: CPTII,95,, | Performed by: RADIOLOGY

## 2022-09-21 PROCEDURE — 99213 OFFICE O/P EST LOW 20 MIN: CPT | Mod: 95,,, | Performed by: RADIOLOGY

## 2022-09-21 PROCEDURE — 3051F HG A1C>EQUAL 7.0%<8.0%: CPT | Mod: CPTII,95,, | Performed by: RADIOLOGY

## 2022-09-22 ENCOUNTER — PATIENT MESSAGE (OUTPATIENT)
Dept: RADIATION ONCOLOGY | Facility: CLINIC | Age: 72
End: 2022-09-22
Payer: MEDICARE

## 2022-09-22 ENCOUNTER — TELEPHONE (OUTPATIENT)
Dept: FAMILY MEDICINE | Facility: CLINIC | Age: 72
End: 2022-09-22
Payer: MEDICARE

## 2022-09-22 NOTE — TELEPHONE ENCOUNTER
----- Message from Moody Packer sent at 9/22/2022  2:00 PM CDT -----  Regarding: Hospital follow up  [facility]  Patient need to schedule hospital follow up in one week, please call back at [phone] (home) [phone] (work)

## 2022-09-23 ENCOUNTER — PATIENT MESSAGE (OUTPATIENT)
Dept: CARDIOLOGY | Facility: CLINIC | Age: 72
End: 2022-09-23
Payer: MEDICARE

## 2022-09-23 RX ORDER — AMLODIPINE BESYLATE 2.5 MG/1
2.5 TABLET ORAL DAILY
Qty: 30 TABLET | Refills: 1 | Status: SHIPPED | OUTPATIENT
Start: 2022-09-23 | End: 2022-09-24

## 2022-09-27 ENCOUNTER — PATIENT MESSAGE (OUTPATIENT)
Dept: CARDIOLOGY | Facility: CLINIC | Age: 72
End: 2022-09-27
Payer: MEDICARE

## 2022-09-28 ENCOUNTER — OFFICE VISIT (OUTPATIENT)
Dept: HEMATOLOGY/ONCOLOGY | Facility: CLINIC | Age: 72
End: 2022-09-28
Payer: MEDICARE

## 2022-09-28 VITALS
HEIGHT: 66 IN | SYSTOLIC BLOOD PRESSURE: 130 MMHG | RESPIRATION RATE: 18 BRPM | DIASTOLIC BLOOD PRESSURE: 60 MMHG | TEMPERATURE: 97 F | OXYGEN SATURATION: 98 % | WEIGHT: 192 LBS | BODY MASS INDEX: 30.86 KG/M2 | HEART RATE: 70 BPM

## 2022-09-28 DIAGNOSIS — C79.51 BONE METASTASES: ICD-10-CM

## 2022-09-28 DIAGNOSIS — C34.91 SMALL CELL CARCINOMA OF LUNG, RIGHT: Primary | ICD-10-CM

## 2022-09-28 DIAGNOSIS — T81.31XS DEHISCENCE OF OPERATIVE WOUND, SEQUELA: ICD-10-CM

## 2022-09-28 DIAGNOSIS — C79.31 BRAIN METASTASES: ICD-10-CM

## 2022-09-28 DIAGNOSIS — E03.9 ACQUIRED HYPOTHYROIDISM: ICD-10-CM

## 2022-09-28 DIAGNOSIS — Z93.3 COLOSTOMY STATUS: ICD-10-CM

## 2022-09-28 PROCEDURE — 1111F PR DISCHARGE MEDS RECONCILED W/ CURRENT OUTPATIENT MED LIST: ICD-10-PCS | Mod: CPTII,S$GLB,, | Performed by: INTERNAL MEDICINE

## 2022-09-28 PROCEDURE — 3008F PR BODY MASS INDEX (BMI) DOCUMENTED: ICD-10-PCS | Mod: CPTII,S$GLB,, | Performed by: INTERNAL MEDICINE

## 2022-09-28 PROCEDURE — 3051F HG A1C>EQUAL 7.0%<8.0%: CPT | Mod: CPTII,S$GLB,, | Performed by: INTERNAL MEDICINE

## 2022-09-28 PROCEDURE — 1160F PR REVIEW ALL MEDS BY PRESCRIBER/CLIN PHARMACIST DOCUMENTED: ICD-10-PCS | Mod: CPTII,S$GLB,, | Performed by: INTERNAL MEDICINE

## 2022-09-28 PROCEDURE — 1160F RVW MEDS BY RX/DR IN RCRD: CPT | Mod: CPTII,S$GLB,, | Performed by: INTERNAL MEDICINE

## 2022-09-28 PROCEDURE — 3078F DIAST BP <80 MM HG: CPT | Mod: CPTII,S$GLB,, | Performed by: INTERNAL MEDICINE

## 2022-09-28 PROCEDURE — 3288F PR FALLS RISK ASSESSMENT DOCUMENTED: ICD-10-PCS | Mod: CPTII,S$GLB,, | Performed by: INTERNAL MEDICINE

## 2022-09-28 PROCEDURE — 3075F SYST BP GE 130 - 139MM HG: CPT | Mod: CPTII,S$GLB,, | Performed by: INTERNAL MEDICINE

## 2022-09-28 PROCEDURE — 4010F PR ACE/ARB THEARPY RXD/TAKEN: ICD-10-PCS | Mod: CPTII,S$GLB,, | Performed by: INTERNAL MEDICINE

## 2022-09-28 PROCEDURE — 3051F PR MOST RECENT HEMOGLOBIN A1C LEVEL 7.0 - < 8.0%: ICD-10-PCS | Mod: CPTII,S$GLB,, | Performed by: INTERNAL MEDICINE

## 2022-09-28 PROCEDURE — 3078F PR MOST RECENT DIASTOLIC BLOOD PRESSURE < 80 MM HG: ICD-10-PCS | Mod: CPTII,S$GLB,, | Performed by: INTERNAL MEDICINE

## 2022-09-28 PROCEDURE — 1126F AMNT PAIN NOTED NONE PRSNT: CPT | Mod: CPTII,S$GLB,, | Performed by: INTERNAL MEDICINE

## 2022-09-28 PROCEDURE — 1111F DSCHRG MED/CURRENT MED MERGE: CPT | Mod: CPTII,S$GLB,, | Performed by: INTERNAL MEDICINE

## 2022-09-28 PROCEDURE — 3075F PR MOST RECENT SYSTOLIC BLOOD PRESS GE 130-139MM HG: ICD-10-PCS | Mod: CPTII,S$GLB,, | Performed by: INTERNAL MEDICINE

## 2022-09-28 PROCEDURE — 99215 PR OFFICE/OUTPT VISIT, EST, LEVL V, 40-54 MIN: ICD-10-PCS | Mod: S$GLB,,, | Performed by: INTERNAL MEDICINE

## 2022-09-28 PROCEDURE — 1101F PT FALLS ASSESS-DOCD LE1/YR: CPT | Mod: CPTII,S$GLB,, | Performed by: INTERNAL MEDICINE

## 2022-09-28 PROCEDURE — 99215 OFFICE O/P EST HI 40 MIN: CPT | Mod: S$GLB,,, | Performed by: INTERNAL MEDICINE

## 2022-09-28 PROCEDURE — 4010F ACE/ARB THERAPY RXD/TAKEN: CPT | Mod: CPTII,S$GLB,, | Performed by: INTERNAL MEDICINE

## 2022-09-28 PROCEDURE — 3008F BODY MASS INDEX DOCD: CPT | Mod: CPTII,S$GLB,, | Performed by: INTERNAL MEDICINE

## 2022-09-28 PROCEDURE — 1159F PR MEDICATION LIST DOCUMENTED IN MEDICAL RECORD: ICD-10-PCS | Mod: CPTII,S$GLB,, | Performed by: INTERNAL MEDICINE

## 2022-09-28 PROCEDURE — 1126F PR PAIN SEVERITY QUANTIFIED, NO PAIN PRESENT: ICD-10-PCS | Mod: CPTII,S$GLB,, | Performed by: INTERNAL MEDICINE

## 2022-09-28 PROCEDURE — 3288F FALL RISK ASSESSMENT DOCD: CPT | Mod: CPTII,S$GLB,, | Performed by: INTERNAL MEDICINE

## 2022-09-28 PROCEDURE — 1159F MED LIST DOCD IN RCRD: CPT | Mod: CPTII,S$GLB,, | Performed by: INTERNAL MEDICINE

## 2022-09-28 PROCEDURE — 99999 PR PBB SHADOW E&M-EST. PATIENT-LVL V: CPT | Mod: PBBFAC,,, | Performed by: INTERNAL MEDICINE

## 2022-09-28 PROCEDURE — 99999 PR PBB SHADOW E&M-EST. PATIENT-LVL V: ICD-10-PCS | Mod: PBBFAC,,, | Performed by: INTERNAL MEDICINE

## 2022-09-28 PROCEDURE — 1101F PR PT FALLS ASSESS DOC 0-1 FALLS W/OUT INJ PAST YR: ICD-10-PCS | Mod: CPTII,S$GLB,, | Performed by: INTERNAL MEDICINE

## 2022-09-28 RX ORDER — OLMESARTAN MEDOXOMIL 20 MG/1
5 TABLET ORAL DAILY
COMMUNITY
End: 2022-11-30 | Stop reason: SDUPTHER

## 2022-09-28 NOTE — PROGRESS NOTES
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  Her treatment was interrupted after admission to [facility] with radiation esophagitis followed by Clostridium difficile colitis.  During her multiweek stay, the patient became profoundly debilitated and predominantly bed-bound.  Despite healing of her esophagus, her nutritional status remained poor.  She was too immobile to allow for discharge directly to home and has had an interval rehab stay.  She did not complete her planned course of induction chemotherapy and was observed as she had a very slow recovery.      Patient's performance status gradually improved.  However she developed disease progression which was treated with immunotherapy.  Patient has responded favorably.  Patient had developed isolated CNS metastasis which was treated with stereotactic radiosurgery.  Patient responded well.  Patient returned to clinic prior to cycle 10 of palliative pembrolizumab.  Unfortunately, the patient was suffering from a diverticular abscess.  This was drained percutaneously.  However, drainage was incomplete and the patient developed infection of her abdominal wall which prompted hospitalization and the eventual creation of a diverting colostomy.  Patient's postoperative course was complicated by recurrent infections and a 2 month rehab stay.  Patient has been home for little more than a week and returns to clinic for interval re-evaluation.  She is eating and drinking without difficulty.  She is actively participating in physical and occupational therapy at home but requires assistance to get up from bed.  She is ambulating with the assistance of a wheelchair.  Patient is not having any difficulty with pain.  Ostomy is functioning appropriately.  Patient has multiple, healing surgical wounds and a wound VAC in place.    Physical  examination:  The patient is a well-developed, well-nourished elderly, white female in no acute distress who has a weight of 192 lb and is ambulating with the assistance of a wheelchair.    VITAL SIGNS: Documented  and reviewed this visit.  HEENT: Normocephalic, atraumatic. Oral mucosa pink and moist. Lips without lesions. Tongue midline. Oropharynx clear. Nonicteric sclerae.   NECK: Supple, no adenopathy. No carotid bruits, thyromegaly or thyroid nodule.   HEART: Regular rate and rhythm without murmur, gallop or rub.   LUNGS: Clear to auscultation bilaterally. Normal respiratory effort.   ABDOMEN: Soft, nontender, nondistended with positive normoactive bowel sounds, no hepatosplenomegaly.  Colostomy is intact and functioning.  Patient has large central abdominal wound defect which is approximated with a wound VAC.  EXTREMITIES: No cyanosis, clubbing or edema. Distal pulses are intact.   AXILLAE AND GROIN: No palpable pathologic lymphadenopathy is appreciated.   SKIN: Intact/turgor normal   NEUROLOGIC:  No focal deficits appreciated.  Patient is generally weak and ambulating with the assistance of a wheelchair.      MRI of the brain:  Study performed 09/16/2022.    1. There is a small focus of enhancement in the posterior peripheral right cerebellum measuring approximately 5 x 3 mm which likely corresponds with the 9 mm rim enhancing lesion seen on prior MRI from 06/17/2022.  This remains concerning for metastatic disease.  2. Age-appropriate generalized involutional changes are seen along with findings most compatible with chronic microvascular ischemic changes.  3. Focal area of enhancement in the region of the left IAC is again seen with similar finding noted on prior MRI from 04/18/2005.  This could reflect possible schwannoma given location.  This could be further evaluated with nonemergent, outpatient dedicated MRI of the IAC's without and with contrast.    Impression:  1. Small cell carcinoma of the lung  metastatic to bone, lymph node, and pleura with excellent response to second-line therapy with pembrolizumab.   2. Small, isolated right cerebellar metastasis-status post XRT with good response.  3. Colostomy status.    4. Wound dehiscence healing by secondary intention/wound VAC.  5. General debility.    Plan:  1. Hold additional cycles of immunotherapy at this time.  2. Patient will be re-evaluated by Dr. Morales 4 weeks from now with interval CBC, CMP, LDH, magnesium, TSH, free T4 and CT PET scan for restaging.  3.  Decision regarding resumption of immune therapy will be rendered after review of interval studies and reassessment of patient's performance status.    Advance Care Planning During a prior visit, I engaged the patient in the advance care planning process.  The patient and I reviewed the role for advance directives and their purpose in directing future healthcare if the patients unable to speak for him/herself.  At this point in time, the patient does have full decision-making capacity.  We discussed different extreme health states that they could experience, and reviewed what kind of medical care the patient would want in those situations.  The patient communicated that if they were comatose and had little chance of a meaningful recovery, they would not want machines/life-sustaining treatments used.  In addition to the above preference, other important end-of-life issues for the patient include DNR.  The patient has  completed a living will to reflect these preferences.  The patient has already designated a healthcare power of  to make decisions on their behalf.    (30 mins time spent).      This note was created using voice recognition software and may contain grammatical errors.

## 2022-10-05 PROBLEM — Z93.3 COLOSTOMY IN PLACE: Status: ACTIVE | Noted: 2022-10-05

## 2022-10-09 ENCOUNTER — PATIENT MESSAGE (OUTPATIENT)
Dept: HEMATOLOGY/ONCOLOGY | Facility: CLINIC | Age: 72
End: 2022-10-09
Payer: MEDICARE

## 2022-10-11 ENCOUNTER — OFFICE VISIT (OUTPATIENT)
Dept: FAMILY MEDICINE | Facility: CLINIC | Age: 72
End: 2022-10-11
Payer: MEDICARE

## 2022-10-11 VITALS
OXYGEN SATURATION: 98 % | SYSTOLIC BLOOD PRESSURE: 128 MMHG | BODY MASS INDEX: 30.17 KG/M2 | DIASTOLIC BLOOD PRESSURE: 62 MMHG | HEIGHT: 66 IN | WEIGHT: 187.75 LBS | HEART RATE: 68 BPM

## 2022-10-11 DIAGNOSIS — N39.0 RECURRENT UTI: ICD-10-CM

## 2022-10-11 DIAGNOSIS — E83.51 HYPOCALCEMIA: ICD-10-CM

## 2022-10-11 DIAGNOSIS — D61.811 DRUG-INDUCED PANCYTOPENIA: ICD-10-CM

## 2022-10-11 DIAGNOSIS — E11.22 TYPE 2 DIABETES MELLITUS WITH CHRONIC KIDNEY DISEASE, WITHOUT LONG-TERM CURRENT USE OF INSULIN, UNSPECIFIED CKD STAGE: ICD-10-CM

## 2022-10-11 DIAGNOSIS — C34.90 SMALL CELL LUNG CANCER: ICD-10-CM

## 2022-10-11 DIAGNOSIS — K94.19 ALTERED BOWEL ELIMINATION DUE TO INTESTINAL OSTOMY: ICD-10-CM

## 2022-10-11 DIAGNOSIS — I10 ESSENTIAL HYPERTENSION: Primary | ICD-10-CM

## 2022-10-11 DIAGNOSIS — Z46.89 ENCOUNTER FOR MANAGEMENT OF WOUND VAC: ICD-10-CM

## 2022-10-11 DIAGNOSIS — Z71.89 ADVANCED CARE PLANNING/COUNSELING DISCUSSION: ICD-10-CM

## 2022-10-11 PROCEDURE — 3288F FALL RISK ASSESSMENT DOCD: CPT | Mod: CPTII,S$GLB,, | Performed by: FAMILY MEDICINE

## 2022-10-11 PROCEDURE — 99215 OFFICE O/P EST HI 40 MIN: CPT | Mod: S$GLB,,, | Performed by: FAMILY MEDICINE

## 2022-10-11 PROCEDURE — 1160F PR REVIEW ALL MEDS BY PRESCRIBER/CLIN PHARMACIST DOCUMENTED: ICD-10-PCS | Mod: CPTII,S$GLB,, | Performed by: FAMILY MEDICINE

## 2022-10-11 PROCEDURE — 1111F PR DISCHARGE MEDS RECONCILED W/ CURRENT OUTPATIENT MED LIST: ICD-10-PCS | Mod: CPTII,S$GLB,, | Performed by: FAMILY MEDICINE

## 2022-10-11 PROCEDURE — 99999 PR PBB SHADOW E&M-EST. PATIENT-LVL IV: CPT | Mod: PBBFAC,,, | Performed by: FAMILY MEDICINE

## 2022-10-11 PROCEDURE — 3051F PR MOST RECENT HEMOGLOBIN A1C LEVEL 7.0 - < 8.0%: ICD-10-PCS | Mod: CPTII,S$GLB,, | Performed by: FAMILY MEDICINE

## 2022-10-11 PROCEDURE — 3074F PR MOST RECENT SYSTOLIC BLOOD PRESSURE < 130 MM HG: ICD-10-PCS | Mod: CPTII,S$GLB,, | Performed by: FAMILY MEDICINE

## 2022-10-11 PROCEDURE — 1111F DSCHRG MED/CURRENT MED MERGE: CPT | Mod: CPTII,S$GLB,, | Performed by: FAMILY MEDICINE

## 2022-10-11 PROCEDURE — 4010F ACE/ARB THERAPY RXD/TAKEN: CPT | Mod: CPTII,S$GLB,, | Performed by: FAMILY MEDICINE

## 2022-10-11 PROCEDURE — 4010F PR ACE/ARB THEARPY RXD/TAKEN: ICD-10-PCS | Mod: CPTII,S$GLB,, | Performed by: FAMILY MEDICINE

## 2022-10-11 PROCEDURE — 3074F SYST BP LT 130 MM HG: CPT | Mod: CPTII,S$GLB,, | Performed by: FAMILY MEDICINE

## 2022-10-11 PROCEDURE — 1101F PR PT FALLS ASSESS DOC 0-1 FALLS W/OUT INJ PAST YR: ICD-10-PCS | Mod: CPTII,S$GLB,, | Performed by: FAMILY MEDICINE

## 2022-10-11 PROCEDURE — 1101F PT FALLS ASSESS-DOCD LE1/YR: CPT | Mod: CPTII,S$GLB,, | Performed by: FAMILY MEDICINE

## 2022-10-11 PROCEDURE — 3288F PR FALLS RISK ASSESSMENT DOCUMENTED: ICD-10-PCS | Mod: CPTII,S$GLB,, | Performed by: FAMILY MEDICINE

## 2022-10-11 PROCEDURE — 1126F PR PAIN SEVERITY QUANTIFIED, NO PAIN PRESENT: ICD-10-PCS | Mod: CPTII,S$GLB,, | Performed by: FAMILY MEDICINE

## 2022-10-11 PROCEDURE — 1126F AMNT PAIN NOTED NONE PRSNT: CPT | Mod: CPTII,S$GLB,, | Performed by: FAMILY MEDICINE

## 2022-10-11 PROCEDURE — 99999 PR PBB SHADOW E&M-EST. PATIENT-LVL IV: ICD-10-PCS | Mod: PBBFAC,,, | Performed by: FAMILY MEDICINE

## 2022-10-11 PROCEDURE — 3078F PR MOST RECENT DIASTOLIC BLOOD PRESSURE < 80 MM HG: ICD-10-PCS | Mod: CPTII,S$GLB,, | Performed by: FAMILY MEDICINE

## 2022-10-11 PROCEDURE — 1159F MED LIST DOCD IN RCRD: CPT | Mod: CPTII,S$GLB,, | Performed by: FAMILY MEDICINE

## 2022-10-11 PROCEDURE — 3051F HG A1C>EQUAL 7.0%<8.0%: CPT | Mod: CPTII,S$GLB,, | Performed by: FAMILY MEDICINE

## 2022-10-11 PROCEDURE — 99215 PR OFFICE/OUTPT VISIT, EST, LEVL V, 40-54 MIN: ICD-10-PCS | Mod: S$GLB,,, | Performed by: FAMILY MEDICINE

## 2022-10-11 PROCEDURE — 3078F DIAST BP <80 MM HG: CPT | Mod: CPTII,S$GLB,, | Performed by: FAMILY MEDICINE

## 2022-10-11 PROCEDURE — 1160F RVW MEDS BY RX/DR IN RCRD: CPT | Mod: CPTII,S$GLB,, | Performed by: FAMILY MEDICINE

## 2022-10-11 PROCEDURE — 1159F PR MEDICATION LIST DOCUMENTED IN MEDICAL RECORD: ICD-10-PCS | Mod: CPTII,S$GLB,, | Performed by: FAMILY MEDICINE

## 2022-10-11 NOTE — PROGRESS NOTES
Subjective:       Patient ID: Allyson Henriquez is a 72 y.o. female.    Chief Complaint: Follow-up (Wound care)    HPI  Patient in clinic for f/u.  History complicated since lov.   Oncology tx for metastatic brain and bone SCLC complicated by esophageal candidiasis and radiation esophagitis. Required multiple transfusions.   July inpt admission for acute diverticulitis with abscess, declined ostomy/colon rescrtion at the time. Had CT guided drainage with placement of pigtail cath. Subsequent admission for 22 days, required debridement, colectomy, colostomy. Ex lap 2 weeks later for wound wash out due to same with subsequent transfusions.  Rx daptomycin and flagyl, transferred to [facility] with tpn/oral intake. Wound began draining pus and serosanguinous fluid, erythema around wound vac.  Currently, aggressive wound care with HH.   Pt/ot are coming out and she's trying to walk more tired and cold.     Her goal is to be able to walk her house without assistance and maybe be able to get back to her room upstairs for her sewing activities that she'd normally enjoy.   She is considering a transfer into hospice pending the results from her upcoming mri brain. She notes that she did not want an ostomy initially, and is miserable not being able to take care of herself. She says her daughter understands pt's consideration of hospice, but  is less so.     Having issues getting her ostomy supplies from insurance.   Family decided to purchase a pur-wick for urinary health due to her lack of mobility, and to help prevent UTIs given multiple infections in the lower abdomen.     Review of Systems:  Review of Systems   Constitutional:  Positive for activity change, appetite change (improving) and fatigue. Negative for unexpected weight change.   HENT:  Negative for congestion and sore throat.    Eyes:  Negative for discharge.   Respiratory:  Negative for apnea (restarted cpap), cough and shortness of breath.    Cardiovascular:   "Negative for chest pain, palpitations and leg swelling.   Gastrointestinal:  Positive for diarrhea. Negative for blood in stool and constipation.   Genitourinary:  Negative for difficulty urinating and hematuria.   Musculoskeletal:  Positive for gait problem. Negative for arthralgias.   Skin:  Positive for wound. Negative for rash.   Neurological:  Negative for speech difficulty and headaches.        Occ feels a little foggy, usually when tired   Psychiatric/Behavioral:  Positive for dysphoric mood and sleep disturbance.      Objective:     Vitals:    10/11/22 0923   BP: 128/62   Pulse: 68   SpO2: 98%   Weight: 85.2 kg (187 lb 11.6 oz)   Height: 5' 6" (1.676 m)          Physical Exam  Vitals and nursing note reviewed.   Constitutional:       General: She is not in acute distress.     Appearance: She is well-developed. She is obese.   HENT:      Head: Normocephalic and atraumatic.      Right Ear: External ear normal.      Left Ear: External ear normal.      Nose: Nose normal.      Mouth/Throat:      Pharynx: No oropharyngeal exudate.   Eyes:      Conjunctiva/sclera: Conjunctivae normal.      Pupils: Pupils are equal, round, and reactive to light.   Neck:      Thyroid: No thyromegaly.   Cardiovascular:      Rate and Rhythm: Normal rate and regular rhythm.   Pulmonary:      Effort: Pulmonary effort is normal. No respiratory distress.      Breath sounds: Normal breath sounds. No wheezing.   Abdominal:      General: There is no distension.      Palpations: Abdomen is soft. There is no mass.      Comments: Ostomy present, llq   Musculoskeletal:      Cervical back: Neck supple.      Right lower leg: No edema.      Left lower leg: No edema.      Comments: Trace pitting edema, ble   Lymphadenopathy:      Cervical: No cervical adenopathy.   Skin:     General: Skin is warm and dry.      Comments: Wound vac, lower abd   Neurological:      General: No focal deficit present.      Mental Status: She is alert and oriented to " person, place, and time.      Cranial Nerves: No cranial nerve deficit.   Psychiatric:         Mood and Affect: Mood is depressed.         Behavior: Behavior normal.         Assessment & Plan:  Essential hypertension    Type 2 diabetes mellitus with chronic kidney disease, without long-term current use of insulin, unspecified CKD stage  Comments:  allow slightly higher BS readings while recovering, discussed importance of dietary protein in healing    Small cell lung cancer  Comments:  therapy per oncology, she's currently waiting for results of recent mri brain to make decisions on how they want to move forward    Drug-induced pancytopenia  Comments:  continued monitoring, transfusions as needed    Altered bowel elimination due to intestinal ostomy  Comments:  HH assisting with ostomy, will check with them on her supplies status    Encounter for management of wound VAC  Comments:  per HH    Hypocalcemia  Comments:  normalized with tums TID, cont otc regimen for gi prophylaxis      Needs letter of medical necessity for the court. Has helped significantly with bladder issues.   Order for ostomy supplies to [facility]? Check with HH re coverage.     55 minutes of total time spent on the encounter, which includes face to face time and non-face to face time preparing to see the patient (eg. review of tests), obtaining and/or reviewing separately obtained history, documenting clinical information in the electronic health record, independently interpreting results (not separately reported), and communicating results to the patient/family/caregiver, or care coordination (not separately reported).

## 2022-10-11 NOTE — LETTER
October 29, 2022    Allyson Henriquez  [address] [place] [address] [address] [place] [address]             [facility]  [address]  [place] [address]  Phone: [phone]  Fax: [phone] To whom it may concern:    Ms. Henriquez is an established patient of our practice. 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 She has had multiple hospitalizations recently due to severe radiation-induced esophagitis, acute diverticulitis, abdominal abscess, dominique-colectomy with ostomy formation. Other complications include pancytopenia due to above, severe malnutrition, COPD, and atrial fibrillation. She currently requires a wound vac due to dehiscence of her abdominal incision near the ostomy. She requires multiple, protracted antibiotic courses as a result.   She is, as expected, severely debilitated at this time, and requires assistance with all ADLs including transfers, ambulation, and toileting/hygiene.     Her family opted to purchase a Purwick system for urinary needs rather than continuing with catheters. The system is user friendly given home care by family, and more importantly decreases the risk of secondary infection due to UTIs related to catheter use, as is common with urinary catheters even under the best of circumstances.     With her debilitated state, pancytopenia, current abdominal infection requiring wound vac, antibiotics, and close monitoring, a secondary infection related to a UTI would be devastating. As such, I recommend that insurance allow for reimbursement for the Purwick system and also cover needed supplies for its continued use.     If you have any questions or concerns, please don't hesitate to call.    Sincerely,        Gilma Pimentel MD

## 2022-10-12 ENCOUNTER — PATIENT MESSAGE (OUTPATIENT)
Dept: CARDIOLOGY | Facility: CLINIC | Age: 72
End: 2022-10-12
Payer: MEDICARE

## 2022-10-12 ENCOUNTER — PATIENT MESSAGE (OUTPATIENT)
Dept: FAMILY MEDICINE | Facility: CLINIC | Age: 72
End: 2022-10-12
Payer: MEDICARE

## 2022-10-12 DIAGNOSIS — E27.40 ADRENAL INSUFFICIENCY: ICD-10-CM

## 2022-10-12 RX ORDER — CALCIUM CARBONATE 200(500)MG
1000 TABLET,CHEWABLE ORAL
Qty: 84 TABLET | Refills: 0 | Status: SHIPPED | OUTPATIENT
Start: 2022-10-12 | End: 2022-10-26

## 2022-10-12 RX ORDER — HYDROCORTISONE 5 MG/1
15 TABLET ORAL 2 TIMES DAILY
Qty: 180 TABLET | Refills: 0 | Status: SHIPPED | OUTPATIENT
Start: 2022-10-12 | End: 2022-10-17 | Stop reason: SDUPTHER

## 2022-10-13 NOTE — TELEPHONE ENCOUNTER
She has 11/7 appt with you and labs scheduled for 10/24 for another provider that include a cmp.cbc and tsh. Did you need any other labs?

## 2022-10-17 ENCOUNTER — PATIENT MESSAGE (OUTPATIENT)
Dept: HEMATOLOGY/ONCOLOGY | Facility: CLINIC | Age: 72
End: 2022-10-17
Payer: MEDICARE

## 2022-10-17 DIAGNOSIS — E27.40 ADRENAL INSUFFICIENCY: ICD-10-CM

## 2022-10-17 RX ORDER — HYDROCORTISONE 5 MG/1
15 TABLET ORAL 2 TIMES DAILY
Qty: 180 TABLET | Refills: 0 | Status: SHIPPED | OUTPATIENT
Start: 2022-10-17 | End: 2022-11-16

## 2022-10-24 ENCOUNTER — PATIENT MESSAGE (OUTPATIENT)
Dept: RADIATION ONCOLOGY | Facility: CLINIC | Age: 72
End: 2022-10-24
Payer: MEDICARE

## 2022-10-24 ENCOUNTER — HOSPITAL ENCOUNTER (OUTPATIENT)
Dept: RADIOLOGY | Facility: HOSPITAL | Age: 72
Discharge: HOME | End: 2022-10-24
Attending: INTERNAL MEDICINE
Payer: MEDICARE

## 2022-10-24 DIAGNOSIS — C34.91 SMALL CELL CARCINOMA OF LUNG, RIGHT: ICD-10-CM

## 2022-10-24 LAB — GLUCOSE SERPL-MCNC: 124 MG/DL (ref 70–110)

## 2022-10-24 PROCEDURE — 78815 PET IMAGE W/CT SKULL-THIGH: CPT | Mod: 26,PS,, | Performed by: RADIOLOGY

## 2022-10-24 PROCEDURE — 78815 PET IMAGE W/CT SKULL-THIGH: CPT | Mod: TC,PN

## 2022-10-24 PROCEDURE — 78815 NM PET CT ROUTINE: ICD-10-PCS | Mod: 26,PS,, | Performed by: RADIOLOGY

## 2022-10-24 NOTE — PROGRESS NOTES
PET Imaging Questionnaire    Are you a Diabetic? Recent Blood Sugar level? No    Are you anemic? Bone Marrow Stimulation Meds? No    Have you had a CT Scan, if so when & where was your last one? Yes -     Have you had a PET Scan, if so when & where was your last one? Yes -     Chemotherapy or currently on Chemotherapy? Yes    Radiation therapy? Yes    Surgical History:   Past Surgical History:   Procedure Laterality Date    ADENOIDECTOMY      APPENDECTOMY      CATARACT EXTRACTION      COLONOSCOPY N/A 11/14/2016    Procedure: COLONOSCOPY;  Surgeon: Destin Cardona MD;  Location: [facility];  Service: Endoscopy;  Laterality: N/A;    COLONOSCOPY N/A 05/14/2021    Procedure: COLONOSCOPY;  Surgeon: Destin Cardona MD;  Location: [facility];  Service: Endoscopy;  Laterality: N/A; hemorrhoids, diverticulosis, Old blood left colon. No active bleeding; Repeat colonoscopy is not recommended for screening purposes.    COLOSTOMY N/A 7/28/2022    Procedure: CREATION, COLOSTOMY;  Surgeon: Nubia Tinoco MD;  Location: [facility];  Service: General;  Laterality: N/A;    DEBRIDEMENT OF WOUND OF ABDOMEN  7/28/2022    Procedure: DEBRIDEMENT, WOUND, ABDOMEN;  Surgeon: Nubia Tinoco MD;  Location: [facility];  Service: General;;    ESOPHAGOGASTRODUODENOSCOPY N/A 05/14/2021    Procedure: EGD (ESOPHAGOGASTRODUODENOSCOPY);  Surgeon: Destin Cardona MD;  Location: [facility];  Service: Endoscopy;  Laterality: N/A; unremarkable    ESOPHAGOGASTRODUODENOSCOPY N/A 08/09/2021    Procedure: ESOPHAGOGASTRODUODENOSCOPY (EGD);  Surgeon: Destin Cardona MD;  Location: [facility];  Service: Endoscopy;  Laterality: N/A; Moderately severe radiation esophagitis with no bleeding    EYE SURGERY      cataract OU    INSERTION OF TUNNELED CENTRAL VENOUS CATHETER (CVC) WITH SUBCUTANEOUS PORT N/A 06/07/2021    Procedure: [id number];  Surgeon: Joe Salinas MD;  Location: [facility];  Service: General;  Laterality: N/A;     INTRALUMINAL GASTROINTESTINAL TRACT IMAGING VIA CAPSULE N/A 12/29/2021    Procedure: IMAGING PROCEDURE, GI TRACT, INTRALUMINAL, VIA CAPSULE  (called for instruction 12/20-may have trouble swallowing);  Surgeon: Floyd Dixon MD;  Location: [facility];  Service: Endoscopy;  Laterality: N/A; Diffuse red spots of unclear significance and erosions found in the small bowel (entire small bowel), suspicious for mild radiation enteritis    NEUROMA SURGERY Left     acoustic    TONSILLECTOMY      WOUND EXPLORATION N/A 8/13/2022    Procedure: EXPLORATION, WOUND;  Surgeon: Arnel Olsen MD;  Location: [facility];  Service: General;  Laterality: N/A;        Have you been fasting for at least 6 hours? Yes    Is there any chance you may be pregnant or breastfeeding? No    Assay: 12.8 MCi@:8..10   Injection Site:rt ac     Residual: .719 mCi@: 8.12   Technologist: Karen Vaughn Injected:12.08mCi

## 2022-10-25 ENCOUNTER — DOCUMENTATION ONLY (OUTPATIENT)
Dept: INFUSION THERAPY | Facility: HOSPITAL | Age: 72
End: 2022-10-25
Payer: MEDICARE

## 2022-10-25 NOTE — PROGRESS NOTES
Late entry for 10/24/22    SW met with pt and  Faustino after pt's PET scan. Pt was emotional when she saw SW today. She was tearful when telling SW of the difficulties she has been having since July. She has been in several hospitals and now has a colostomy. She also said she does not like that Dr. Cardona is leaving. She wanted to follow him but said she cannot drive all the way to [place]. SW listened to pt's concerns and provided emotional support. SW encouraged pt to make choices that best meets her needs. SW provided support though out the visit.     Mae Webster, JOSEW

## 2022-10-25 NOTE — PROGRESS NOTES
PATIENT: Allyson Henriquez  MRN: [id number]  DATE: 10/26/2022      Diagnosis:   1. Small cell carcinoma of lung, right    2. Other specified disorders involving the immune mechanism, not elsewhere classified    3. Bone metastases    4. Brain metastases    5. Adrenal insufficiency    6. Abdominal wound dehiscence, subsequent encounter    7. Anorexia    8. Hypothyroidism, unspecified type    9. Hypertension, unspecified type    10. Hyperlipidemia, unspecified hyperlipidemia type    11. Atrial fibrillation, unspecified type    12. Normocytic anemia        Chief Complaint: Lung Cancer (1 month follow up; former Dr. Cardona pt )    Subjective:    Initial History: Ms. Henriquez is a 72 y.o. female with a-fib, Bell's palsy, CKDIII, COPD, HLD, HTN, multinodular goiter, obesity, TRISHA who presents for follow up for metastatic SCLC.  The patient was initially diagnosed with limited stage disease disease and started on chemo/XRT with Carboplatin and etoposide.  She completed 3 cycles of chemo 6/08/21, 6/29/21, 7/20/21 Carboplatin and etoposide; however, she was eventually hospitalized for esophagitis and C diff colitis which postponed additional treatments.  She was eventually started on Pembrolizumab 1/18/22 after PET/CT 1/07/22 showed metastatic disease.  MRI Brain 6/17/22 showed a small 9 mm rim enhancing lesion in the right cerebellum which was treated with SRS under the care of Yaw Nielson.  She remained on treatment with pembrolizumab until 7/06/22.  She was admitted to the hospital from 7/12/22-7/15/22 for treatment of diverticulitis with associated abscess.  The patient eventually underwent surgery for sigmoid colectomy, and colostomy, debridement of abdominal wall on 07/28/2022 followed multiple washouts and debridement.  The patient was readmitted from 9/15/22-9/22/22 for wound dehiscence.  MRI Brain on 9/16/22 showed shrinkage of the right cerebellar lesion.  The patient underwent restaging PET/CT on 10/24/22 showing a  stable hypermetabolic right perihilar consolidation with no discrete mass; left lower quadrant colostomy; hypermetabolic open wound extending from the anterior cortex of the left iliac crest to the skin measuring 5.6 x 3.2 cm; healed non metabolic sclerotic metastases in the right iliac bone, right manubrium, and left 7th rib; and a hypermetabolic focus in the left humeral neck unchanged.    Currently the patient endorses improving strength.  The patient endorses decreased sensation in her hands similar to what she felt when initially diagnosed with brain metastases.  MRI brain done today without read.  PT continues to receive wound care for her abdominal wounds.  The patient denies CP, cough, SOB, abdominal pain, N/V, constipation, diarrhea.  The patient denies fever, chills, night sweats, weight loss, new lumps or bumps, easy bruising or bleeding.    Past Medical History:   Past Medical History:   Diagnosis Date    Acoustic neuroma 2003    left ear    Acquired deafness of left ear     Atrial fibrillation     Bell's palsy 2003    with fatigue and stress    Brain lesion     left side base of skull    C. difficile colitis 08/2021    Cancer     lung    CKD (chronic kidney disease), stage III     Colonic diverticular abscess 7/12/2022    COPD (chronic obstructive pulmonary disease)     Encounter for blood transfusion     Hepatic steatosis     History of numbness     transient numbness left hand and foot    History of tobacco abuse     Hyperlipidemia     Hypertension     Lung nodules     and adenopathy    Multinodular goiter (nontoxic) 05/08/2017    Obesity     TRISHA (obstructive sleep apnea)     on bipap at bedtime    Proteinuria        Past Surgical HIstory:   Past Surgical History:   Procedure Laterality Date    ADENOIDECTOMY      APPENDECTOMY      CATARACT EXTRACTION      COLONOSCOPY N/A 11/14/2016    Procedure: COLONOSCOPY;  Surgeon: Destin Cardona MD;  Location: [facility];  Service: Endoscopy;  Laterality: N/A;     COLONOSCOPY N/A 05/14/2021    Procedure: COLONOSCOPY;  Surgeon: Destin Cardona MD;  Location: [facility];  Service: Endoscopy;  Laterality: N/A; hemorrhoids, diverticulosis, Old blood left colon. No active bleeding; Repeat colonoscopy is not recommended for screening purposes.    COLOSTOMY N/A 7/28/2022    Procedure: CREATION, COLOSTOMY;  Surgeon: Nubia Tinoco MD;  Location: [facility];  Service: General;  Laterality: N/A;    DEBRIDEMENT OF WOUND OF ABDOMEN  7/28/2022    Procedure: DEBRIDEMENT, WOUND, ABDOMEN;  Surgeon: Nubia Tinoco MD;  Location: [facility];  Service: General;;    ESOPHAGOGASTRODUODENOSCOPY N/A 05/14/2021    Procedure: EGD (ESOPHAGOGASTRODUODENOSCOPY);  Surgeon: Destin Cardona MD;  Location: [facility];  Service: Endoscopy;  Laterality: N/A; unremarkable    ESOPHAGOGASTRODUODENOSCOPY N/A 08/09/2021    Procedure: ESOPHAGOGASTRODUODENOSCOPY (EGD);  Surgeon: Destin Cardona MD;  Location: [facility];  Service: Endoscopy;  Laterality: N/A; Moderately severe radiation esophagitis with no bleeding    EYE SURGERY      cataract OU    INSERTION OF TUNNELED CENTRAL VENOUS CATHETER (CVC) WITH SUBCUTANEOUS PORT N/A 06/07/2021    Procedure: [id number];  Surgeon: Joe Salinas MD;  Location: [facility];  Service: General;  Laterality: N/A;    INTRALUMINAL GASTROINTESTINAL TRACT IMAGING VIA CAPSULE N/A 12/29/2021    Procedure: IMAGING PROCEDURE, GI TRACT, INTRALUMINAL, VIA CAPSULE  (called for instruction 12/20-may have trouble swallowing);  Surgeon: Floyd Dixon MD;  Location: [facility];  Service: Endoscopy;  Laterality: N/A; Diffuse red spots of unclear significance and erosions found in the small bowel (entire small bowel), suspicious for mild radiation enteritis    NEUROMA SURGERY Left     acoustic    TONSILLECTOMY      WOUND EXPLORATION N/A 8/13/2022    Procedure: EXPLORATION, WOUND;  Surgeon: Arnel Olsen MD;  Location: [facility];  Service: General;  Laterality: N/A;        Family History:   Family History   Problem Relation Age of Onset    Heart disease Mother         dissection    GI problems Father         perf colon    Cancer Paternal Uncle         colon cancer    Diabetes Neg Hx     Kidney disease Neg Hx     Stroke Neg Hx     Colon cancer Neg Hx     Crohn's disease Neg Hx     Ulcerative colitis Neg Hx     Stomach cancer Neg Hx     Esophageal cancer Neg Hx        Social History:  reports that she quit smoking about 6 years ago. Her smoking use included cigarettes. She has a 40.00 pack-year smoking history. She has never used smokeless tobacco. She reports that she does not currently use alcohol. She reports that she does not use drugs.    Allergies:  Review of patient's allergies indicates:   Allergen Reactions    Contrast media Anaphylaxis    Albuterol Other (See Comments)     Used during PFTs and put pt in afib    Dye     Pcn [penicillins]      Pt states did well on cephalexin.  No adverse reaction or side effect.     Doxycycline Palpitations     Tolerated IV Doxy 8/2022       Medications:  Current Outpatient Medications   Medication Sig Dispense Refill    amLODIPine (NORVASC) 2.5 MG tablet TAKE 1 TABLET(2.5 MG) BY MOUTH EVERY DAY 90 tablet 0    cetirizine (ZYRTEC) 10 MG tablet Take 10 mg by mouth every morning.      ciprofloxacin-hydrocortisone 0.2-1% (CIPRO HC OTIC) otic suspension Place 3 drops into the left ear daily as needed (ear infections).      dronabinoL (MARINOL) 2.5 MG capsule Take 1 capsule (2.5 mg total) by mouth 2 (two) times daily.      ELIQUIS 5 mg Tab TAKE 1 TABLET(5 MG) BY MOUTH TWICE DAILY (Patient taking differently: Take 5 mg by mouth 2 (two) times daily.) 180 tablet 0    HYDROcodone-acetaminophen (NORCO)  mg per tablet Take 1 tablet by mouth every 6 (six) hours as needed for Pain. 20 tablet 0    hydrocortisone (CORTEF) 5 MG Tab Take 3 tablets (15 mg total) by mouth 2 (two) times daily. 180 tablet 0    Lactobacillus acidophilus (PROBIOTIC ORAL)  Take 1 capsule by mouth every morning. Indications: probiotic      levothyroxine (SYNTHROID) 100 MCG tablet Take 1 tablet (100 mcg total) by mouth once daily. 90 tablet 3    LIDOcaine HCL 4% (XYLOCAINE) 4 % (40 mg/mL) external solution Apply 4 mLs topically as needed (apply to wound gauze 30 minutes prior to dressing change).  0    LIDOcaine-prilocaine (EMLA) cream Apply topically as needed (as needed for port access). 30 g 1    metoprolol succinate (TOPROL-XL) 25 MG 24 hr tablet TAKE 1 TABLET(25 MG) BY MOUTH TWICE DAILY 180 tablet 1    olmesartan (BENICAR) 20 MG tablet Take 5 mg by mouth once daily.      pantoprazole (PROTONIX) 40 MG tablet Take 1 tablet (40 mg total) by mouth once daily. 90 tablet 3    sertraline (ZOLOFT) 25 MG tablet TAKE 1 TABLET(25 MG) BY MOUTH EVERY DAY 30 tablet 0    simvastatin (ZOCOR) 40 MG tablet TAKE 1 TABLET(40 MG) BY MOUTH EVERY EVENING 90 tablet 1    sotaloL (BETAPACE) 80 MG tablet TAKE 1 TABLET(80 MG) BY MOUTH TWICE DAILY 180 tablet 0     No current facility-administered medications for this visit.     Facility-Administered Medications Ordered in Other Visits   Medication Dose Route Frequency Provider Last Rate Last Admin    EUFLEXXA 10 mg/mL(mw 2.4 -3.6 million) injection Syrg 20 mg  20 mg Intra-articular  Jose Rafael Barney MD   20 mg at 06/14/17 1605       Review of Systems   Constitutional:  Negative for chills, fatigue, fever and unexpected weight change.   HENT:          Hair loss   Respiratory:  Negative for cough and shortness of breath.    Cardiovascular:  Negative for chest pain and palpitations.   Gastrointestinal:  Negative for abdominal pain, constipation, diarrhea, nausea and vomiting.   Musculoskeletal:         Multiple wounds over abdomen   Skin:  Negative for rash.   Neurological:  Positive for weakness and numbness (hands). Negative for headaches.   Hematological:  Negative for adenopathy. Does not bruise/bleed easily.     ECOG Performance Status: 3   Objective:  "     Vitals:   Vitals:    10/26/22 1324   BP: 124/70   BP Location: Right arm   Patient Position: Sitting   BP Method: Medium (Manual)   Pulse: 81   Temp: 96.9 °F (36.1 °C)   TempSrc: Temporal   SpO2: 95%   Weight: 85.7 kg (188 lb 15 oz)   Height: 5' 6" (1.676 m)     Physical Exam  Constitutional:       General: She is not in acute distress.     Appearance: She is well-developed. She is not diaphoretic.   HENT:      Head: Normocephalic and atraumatic.   Cardiovascular:      Rate and Rhythm: Normal rate and regular rhythm.      Heart sounds: Normal heart sounds. No murmur heard.    No friction rub. No gallop.   Pulmonary:      Effort: Pulmonary effort is normal. No respiratory distress.      Breath sounds: Normal breath sounds. No wheezing or rales.   Chest:      Chest wall: No tenderness.   Abdominal:      General: Bowel sounds are normal. There is no distension.      Palpations: Abdomen is soft. There is no mass.      Tenderness: There is no abdominal tenderness. There is no guarding or rebound.      Comments: PT with multiple abdominal wounds with large central vertical lesion with packing in place.  Ostomy in LUQ.   Lymphadenopathy:      Cervical: No cervical adenopathy.      Upper Body:      Right upper body: No supraclavicular or axillary adenopathy.      Left upper body: No supraclavicular or axillary adenopathy.   Skin:     Findings: No erythema or rash.   Neurological:      Mental Status: She is alert and oriented to person, place, and time.   Psychiatric:         Behavior: Behavior normal.       Laboratory Data:  Hospital Outpatient Visit on 10/24/2022   Component Date Value Ref Range Status    POC Glucose 10/24/2022 124 (A)  70 - 110 MG/DL Final   Lab Visit on 10/24/2022   Component Date Value Ref Range Status    WBC 10/24/2022 11.78  3.90 - 12.70 K/uL Final    RBC 10/24/2022 3.74 (L)  4.00 - 5.40 M/uL Final    Hemoglobin 10/24/2022 10.5 (L)  12.0 - 16.0 g/dL Final    Hematocrit 10/24/2022 32.4 (L)  37.0 - " 48.5 % Final    MCV 10/24/2022 87  82 - 98 fL Final    MCH 10/24/2022 28.1  27.0 - 31.0 pg Final    MCHC 10/24/2022 32.4  32.0 - 36.0 g/dL Final    RDW 10/24/2022 16.8 (H)  11.5 - 14.5 % Final    Platelets 10/24/2022 228  150 - 450 K/uL Final    MPV 10/24/2022 9.5  9.2 - 12.9 fL Final    Immature Granulocytes 10/24/2022 1.4 (H)  0.0 - 0.5 % Final    Gran # (ANC) 10/24/2022 9.0 (H)  1.8 - 7.7 K/uL Final    Immature Grans (Abs) 10/24/2022 0.17 (H)  0.00 - 0.04 K/uL Final    Comment: Mild elevation in immature granulocytes is non specific and   can be seen in a variety of conditions including stress response,   acute inflammation, trauma and pregnancy. Correlation with other   laboratory and clinical findings is essential.      Lymph # 10/24/2022 1.6  1.0 - 4.8 K/uL Final    Mono # 10/24/2022 0.9  0.3 - 1.0 K/uL Final    Eos # 10/24/2022 0.1  0.0 - 0.5 K/uL Final    Baso # 10/24/2022 0.04  0.00 - 0.20 K/uL Final    nRBC 10/24/2022 0  0 /100 WBC Final    Gran % 10/24/2022 76.8 (H)  38.0 - 73.0 % Final    Lymph % 10/24/2022 13.2 (L)  18.0 - 48.0 % Final    Mono % 10/24/2022 7.4  4.0 - 15.0 % Final    Eosinophil % 10/24/2022 0.9  0.0 - 8.0 % Final    Basophil % 10/24/2022 0.3  0.0 - 1.9 % Final    Differential Method 10/24/2022 Automated   Final    Sodium 10/24/2022 137  136 - 145 mmol/L Final    Potassium 10/24/2022 5.0  3.5 - 5.1 mmol/L Final    Chloride 10/24/2022 108  95 - 110 mmol/L Final    CO2 10/24/2022 18 (L)  23 - 29 mmol/L Final    Glucose 10/24/2022 138 (H)  70 - 110 mg/dL Final    BUN 10/24/2022 22  8 - 23 mg/dL Final    Creatinine 10/24/2022 1.1  0.5 - 1.4 mg/dL Final    Calcium 10/24/2022 10.2  8.7 - 10.5 mg/dL Final    Total Protein 10/24/2022 6.5  6.0 - 8.4 g/dL Final    Albumin 10/24/2022 3.1 (L)  3.5 - 5.2 g/dL Final    Total Bilirubin 10/24/2022 0.7  0.1 - 1.0 mg/dL Final    Comment: For infants and newborns, interpretation of results should be based  on gestational age, weight and in agreement with  clinical  observations.    Premature Infant recommended reference ranges:  Up to 24 hours.............<8.0 mg/dL  Up to 48 hours............<12.0 mg/dL  3-5 days..................<15.0 mg/dL  6-29 days.................<15.0 mg/dL      Alkaline Phosphatase 10/24/2022 67  55 - 135 U/L Final    AST 10/24/2022 12  10 - 40 U/L Final    ALT 10/24/2022 12  10 - 44 U/L Final    Anion Gap 10/24/2022 11  8 - 16 mmol/L Final    eGFR 10/24/2022 53.1 (A)  >60 mL/min/1.73 m^2 Final    LD 10/24/2022 161  110 - 260 U/L Final    Results are increased in hemolyzed samples.    Magnesium 10/24/2022 1.9  1.6 - 2.6 mg/dL Final    TSH 10/24/2022 1.323  0.400 - 4.000 uIU/mL Final    Free T4 10/24/2022 1.02  0.71 - 1.51 ng/dL Final         Imaging: PET/CT 10/24/22    Head/neck:     A benign-appearing cystic non metabolic nodule with a calcified wall is again noted within the right thyroid lobe measuring 2.5 cm which is unchanged.  No significant abnormal hypermetabolic foci are identified in the head and neck region and no lymphadenopathy is present.     Chest:     Hypermetabolic right perihilar consolidation is unchanged in overall extent and has a max SUV of 4.0 compared to 3.1 previously.  A discrete mass is not measurable.  There is an adjacent non metabolic small right pleural effusion.  No hypermetabolic lymphadenopathy is present.     Abdomen/pelvis:     A left lower quadrant colostomy is present.  Just lateral to the colostomy, there is a hypermetabolic open wound extending from the anterior cortex of the left iliac crest to the skin measuring 5.6 x 3.2 cm on image 202 with a max SUV of 13.8.  There is also a right lower quadrant ostomy.  No abscess is identified.  No lymphadenopathy is present.  The adrenal glands are normal.  Right renal cysts are present.     Skeleton:     Healed non metabolic sclerotic metastases remain unchanged in the posterior right iliac bone, right manubrium, and left 7th rib.  A hypermetabolic focus in the  left humeral neck remains essentially unchanged with a max SUV of 4.0 compared to 3.2 previously.  No new abnormal hypermetabolic foci have developed within the skeleton.   Assessment:       1. Small cell carcinoma of lung, right    2. Other specified disorders involving the immune mechanism, not elsewhere classified    3. Bone metastases    4. Brain metastases    5. Adrenal insufficiency    6. Abdominal wound dehiscence, subsequent encounter    7. Anorexia    8. Hypothyroidism, unspecified type    9. Hypertension, unspecified type    10. Hyperlipidemia, unspecified hyperlipidemia type    11. Atrial fibrillation, unspecified type    12. Normocytic anemia           Plan:     Metastatic SCLC - the patient has metastatic small cell lung cancer with metastases to the brain and bone  -Patient was previously on carboplatin and etoposide along with radiation therapy June and July of 2021  -She progressed and was started on Pembrolizumab 1/2022  -Brain metastases were found on MRI Brain 6/17/22 with a right cerebellar lesion  -The patient underwent SRS and continued pembrolizumab with last dose given 7/06/22  -Pt has been off of treatment since that time due to complications from abdominal abscess complicated by the need for sigmoid colectomy and colostomy, debridement of abdominal wall on 07/28/2022 followed multiple washouts and debridement.  -PET/CT 10/24/22 shows stable disease  -MRI brain from 10/26/22 with read pending  -Will consider continued treatment with pembrolizumab pending MRI brain results    Brain Metastasis - PT underwent SRS to right cerebellar lesion  -MRI brain from 10/26/22 pending  -Will follow up    Adrenal Insufficiency - pt with cortisol level of 2ug/dL on 7/06/22 and started on Hydrocortisone  -PT currently on 15mg of hydrocortisone BID  -PT to see Endocrinology urgently for further diagnosis and management    Abdominal Wounds - Pt receiving wound care for multiple abdominal wounds which developed  after surgery for colonic abscess  -Will monitor    Anorexia - pt on marinol  -Will monitor    Hypothyroidism - PT on synthroid 100mcg daily  -PT to see endocrinology    HTN - amlodipine, metoprolol, olmesartan and sotalol  -BP controlled  -Will monitor    HLD - pt on statin  -Management per PCP    A fib- pt on eliquis and metoprolol  -Will monitor    Normocytic Anemia - likely due to anemia of chronic disease  -Hemoglobin 10.5g/dL on 10/24/22  -Will monitor    Route Chart for Scheduling    Med Onc Chart Routing      Follow up with physician 1 week. The efrain needs an urgent appt with endocrinology for adrenal insufficiency.  She eneds an appt with me next week with CBC, CMP, TSH with treatment with pembrolizumab that day.  OK to open up new slots.   Follow up with BERNA    Infusion scheduling note    Injection scheduling note    Labs    Imaging    Pharmacy appointment    Other referrals        Treatment Plan Information   OP PEMBROLIZUMAB 200MG Q3W   Madhav Cardona MD   Upcoming Treatment Dates - OP PEMBROLIZUMAB 200MG Q3W    9/7/2022       Pre-Medications       acetaminophen tablet 1,000 mg       diphenydramine (BENADRYL) 25 mg in NS 50 mL IVPB       Chemotherapy       pembrolizumab (KEYTRUDA) 200 mg in sodium chloride 0.9% 108 mL infusion  9/28/2022       Pre-Medications       acetaminophen tablet 1,000 mg       diphenydramine (BENADRYL) 25 mg in NS 50 mL IVPB       Chemotherapy       pembrolizumab (KEYTRUDA) 200 mg in sodium chloride 0.9% 108 mL infusion  10/19/2022       Pre-Medications       acetaminophen tablet 1,000 mg       diphenydramine (BENADRYL) 25 mg in NS 50 mL IVPB       Chemotherapy       pembrolizumab (KEYTRUDA) 200 mg in sodium chloride 0.9% 108 mL infusion  11/9/2022       Pre-Medications       acetaminophen tablet 1,000 mg       diphenydramine (BENADRYL) 25 mg in NS 50 mL IVPB       Chemotherapy       pembrolizumab (KEYTRUDA) 200 mg in sodium chloride 0.9% 108 mL infusion       Supportive  Care       denosumab (XGEVA) solution 120 mg    Therapy Plan Information  FERAHEME (FERUMOXYTOL) TWO DOSES  Pre-Medications  dexamethasone injection 4 mg  4 mg, Intravenous, Every visit  diphenhydrAMINE injection 25 mg  25 mg, Intravenous, Every visit  famotidine (PF) injection 20 mg  20 mg, Intravenous, Every visit  Medications  ferumoxytoL (FERAHEME) 510 mg in dextrose 5 % 100 mL IVPB  510 mg, Intravenous, Every visit  Flushes  heparin, porcine (PF) 100 unit/mL injection flush 500 Units  500 Units, Intravenous, PRN    INF FLUIDS  IV Fluids  sodium chloride 0.9% bolus 1,000 mL  1,000 mL, Intravenous, Every visit  Flushes  sodium chloride 0.9% flush 10 mL  10 mL, Intravenous, PRN  heparin, porcine (PF) 100 unit/mL injection flush 500 Units  500 Units, Intravenous, PRN  Supportive Care  ondansetron injection 8 mg  8 mg, Intravenous, PRN      Jona Morales MD  [facility]  Hematology and Oncology  [facility] [address] [place] [address]   O: [phone]  F: [phone]

## 2022-10-26 ENCOUNTER — OFFICE VISIT (OUTPATIENT)
Dept: HEMATOLOGY/ONCOLOGY | Facility: CLINIC | Age: 72
End: 2022-10-26
Payer: MEDICARE

## 2022-10-26 ENCOUNTER — TELEPHONE (OUTPATIENT)
Dept: ENDOCRINOLOGY | Facility: CLINIC | Age: 72
End: 2022-10-26
Payer: MEDICARE

## 2022-10-26 VITALS
SYSTOLIC BLOOD PRESSURE: 124 MMHG | OXYGEN SATURATION: 95 % | WEIGHT: 188.94 LBS | HEART RATE: 81 BPM | DIASTOLIC BLOOD PRESSURE: 70 MMHG | HEIGHT: 66 IN | TEMPERATURE: 97 F | BODY MASS INDEX: 30.36 KG/M2

## 2022-10-26 DIAGNOSIS — D89.89 OTHER SPECIFIED DISORDERS INVOLVING THE IMMUNE MECHANISM, NOT ELSEWHERE CLASSIFIED: ICD-10-CM

## 2022-10-26 DIAGNOSIS — D64.9 NORMOCYTIC ANEMIA: ICD-10-CM

## 2022-10-26 DIAGNOSIS — I48.91 ATRIAL FIBRILLATION, UNSPECIFIED TYPE: ICD-10-CM

## 2022-10-26 DIAGNOSIS — I10 HYPERTENSION, UNSPECIFIED TYPE: ICD-10-CM

## 2022-10-26 DIAGNOSIS — R63.0 ANOREXIA: ICD-10-CM

## 2022-10-26 DIAGNOSIS — T81.30XD ABDOMINAL WOUND DEHISCENCE, SUBSEQUENT ENCOUNTER: ICD-10-CM

## 2022-10-26 DIAGNOSIS — C34.91 SMALL CELL CARCINOMA OF LUNG, RIGHT: Primary | ICD-10-CM

## 2022-10-26 DIAGNOSIS — C79.31 BRAIN METASTASES: ICD-10-CM

## 2022-10-26 DIAGNOSIS — E78.5 HYPERLIPIDEMIA, UNSPECIFIED HYPERLIPIDEMIA TYPE: ICD-10-CM

## 2022-10-26 DIAGNOSIS — C79.51 BONE METASTASES: ICD-10-CM

## 2022-10-26 DIAGNOSIS — E03.9 HYPOTHYROIDISM, UNSPECIFIED TYPE: ICD-10-CM

## 2022-10-26 DIAGNOSIS — E27.40 ADRENAL INSUFFICIENCY: ICD-10-CM

## 2022-10-26 PROCEDURE — 3074F SYST BP LT 130 MM HG: CPT | Mod: CPTII,S$GLB,, | Performed by: INTERNAL MEDICINE

## 2022-10-26 PROCEDURE — 4010F ACE/ARB THERAPY RXD/TAKEN: CPT | Mod: CPTII,S$GLB,, | Performed by: INTERNAL MEDICINE

## 2022-10-26 PROCEDURE — 3078F DIAST BP <80 MM HG: CPT | Mod: CPTII,S$GLB,, | Performed by: INTERNAL MEDICINE

## 2022-10-26 PROCEDURE — 1101F PT FALLS ASSESS-DOCD LE1/YR: CPT | Mod: CPTII,S$GLB,, | Performed by: INTERNAL MEDICINE

## 2022-10-26 PROCEDURE — 1101F PR PT FALLS ASSESS DOC 0-1 FALLS W/OUT INJ PAST YR: ICD-10-PCS | Mod: CPTII,S$GLB,, | Performed by: INTERNAL MEDICINE

## 2022-10-26 PROCEDURE — 1159F MED LIST DOCD IN RCRD: CPT | Mod: CPTII,S$GLB,, | Performed by: INTERNAL MEDICINE

## 2022-10-26 PROCEDURE — 3051F PR MOST RECENT HEMOGLOBIN A1C LEVEL 7.0 - < 8.0%: ICD-10-PCS | Mod: CPTII,S$GLB,, | Performed by: INTERNAL MEDICINE

## 2022-10-26 PROCEDURE — 4010F PR ACE/ARB THEARPY RXD/TAKEN: ICD-10-PCS | Mod: CPTII,S$GLB,, | Performed by: INTERNAL MEDICINE

## 2022-10-26 PROCEDURE — 3051F HG A1C>EQUAL 7.0%<8.0%: CPT | Mod: CPTII,S$GLB,, | Performed by: INTERNAL MEDICINE

## 2022-10-26 PROCEDURE — 99999 PR PBB SHADOW E&M-EST. PATIENT-LVL IV: CPT | Mod: PBBFAC,,, | Performed by: INTERNAL MEDICINE

## 2022-10-26 PROCEDURE — 1126F PR PAIN SEVERITY QUANTIFIED, NO PAIN PRESENT: ICD-10-PCS | Mod: CPTII,S$GLB,, | Performed by: INTERNAL MEDICINE

## 2022-10-26 PROCEDURE — 3288F FALL RISK ASSESSMENT DOCD: CPT | Mod: CPTII,S$GLB,, | Performed by: INTERNAL MEDICINE

## 2022-10-26 PROCEDURE — 99215 OFFICE O/P EST HI 40 MIN: CPT | Mod: S$GLB,,, | Performed by: INTERNAL MEDICINE

## 2022-10-26 PROCEDURE — 3078F PR MOST RECENT DIASTOLIC BLOOD PRESSURE < 80 MM HG: ICD-10-PCS | Mod: CPTII,S$GLB,, | Performed by: INTERNAL MEDICINE

## 2022-10-26 PROCEDURE — 3288F PR FALLS RISK ASSESSMENT DOCUMENTED: ICD-10-PCS | Mod: CPTII,S$GLB,, | Performed by: INTERNAL MEDICINE

## 2022-10-26 PROCEDURE — 3074F PR MOST RECENT SYSTOLIC BLOOD PRESSURE < 130 MM HG: ICD-10-PCS | Mod: CPTII,S$GLB,, | Performed by: INTERNAL MEDICINE

## 2022-10-26 PROCEDURE — 99999 PR PBB SHADOW E&M-EST. PATIENT-LVL IV: ICD-10-PCS | Mod: PBBFAC,,, | Performed by: INTERNAL MEDICINE

## 2022-10-26 PROCEDURE — 1126F AMNT PAIN NOTED NONE PRSNT: CPT | Mod: CPTII,S$GLB,, | Performed by: INTERNAL MEDICINE

## 2022-10-26 PROCEDURE — 99215 PR OFFICE/OUTPT VISIT, EST, LEVL V, 40-54 MIN: ICD-10-PCS | Mod: S$GLB,,, | Performed by: INTERNAL MEDICINE

## 2022-10-26 PROCEDURE — 1159F PR MEDICATION LIST DOCUMENTED IN MEDICAL RECORD: ICD-10-PCS | Mod: CPTII,S$GLB,, | Performed by: INTERNAL MEDICINE

## 2022-10-26 NOTE — TELEPHONE ENCOUNTER
----- Message from Christy Keane sent at 10/26/2022  2:29 PM CDT -----  Contact: pt  Type: Needs Medical Advice         Who Called: pt  Best Call Back Number: [phone]  Additional Information: Requesting a call back regarding  pt is needing a appt for cortisol . Pt has a referral on chart.   Please Advise- Thank you

## 2022-10-26 NOTE — TELEPHONE ENCOUNTER
Pts  called stating Dr. Morales w/ hem/onc wants this pt to have an urgent follow up for Adrenal Insufficiency. Pt was seen by Dr. Morales today. Can you please review note and see if we need to work pt in or not?

## 2022-10-27 ENCOUNTER — PATIENT MESSAGE (OUTPATIENT)
Dept: FAMILY MEDICINE | Facility: CLINIC | Age: 72
End: 2022-10-27
Payer: MEDICARE

## 2022-10-27 RX ORDER — CALCIUM CARBONATE 200(500)MG
1 TABLET,CHEWABLE ORAL 3 TIMES DAILY
Qty: 270 TABLET | Refills: 3 | Status: SHIPPED | OUTPATIENT
Start: 2022-10-27 | End: 2023-07-31

## 2022-10-28 ENCOUNTER — PATIENT MESSAGE (OUTPATIENT)
Dept: HEMATOLOGY/ONCOLOGY | Facility: CLINIC | Age: 72
End: 2022-10-28
Payer: MEDICARE

## 2022-10-31 ENCOUNTER — PATIENT MESSAGE (OUTPATIENT)
Dept: CARDIOLOGY | Facility: CLINIC | Age: 72
End: 2022-10-31
Payer: MEDICARE

## 2022-11-08 ENCOUNTER — OFFICE VISIT (OUTPATIENT)
Dept: CARDIOLOGY | Facility: CLINIC | Age: 72
End: 2022-11-08
Payer: MEDICARE

## 2022-11-08 VITALS
DIASTOLIC BLOOD PRESSURE: 64 MMHG | HEART RATE: 73 BPM | WEIGHT: 188.5 LBS | SYSTOLIC BLOOD PRESSURE: 115 MMHG | BODY MASS INDEX: 30.29 KG/M2 | HEIGHT: 66 IN

## 2022-11-08 DIAGNOSIS — I48.0 PAF (PAROXYSMAL ATRIAL FIBRILLATION): Primary | ICD-10-CM

## 2022-11-08 DIAGNOSIS — Z79.899 LONG TERM CURRENT USE OF ANTIARRHYTHMIC DRUG: Chronic | ICD-10-CM

## 2022-11-08 DIAGNOSIS — I34.0 NONRHEUMATIC MITRAL VALVE REGURGITATION: ICD-10-CM

## 2022-11-08 DIAGNOSIS — E66.9 OBESITY, CLASS I, BMI 30-34.9: Chronic | ICD-10-CM

## 2022-11-08 DIAGNOSIS — E78.00 HYPERCHOLESTEROLEMIA: ICD-10-CM

## 2022-11-08 DIAGNOSIS — Z79.01 CURRENT USE OF LONG TERM ANTICOAGULATION: Chronic | ICD-10-CM

## 2022-11-08 DIAGNOSIS — C77.1 MALIGNANT NEOPLASM METASTATIC TO INTRATHORACIC LYMPH NODE: Chronic | ICD-10-CM

## 2022-11-08 DIAGNOSIS — N18.31 STAGE 3A CHRONIC KIDNEY DISEASE: Chronic | ICD-10-CM

## 2022-11-08 DIAGNOSIS — I10 ESSENTIAL HYPERTENSION: Chronic | ICD-10-CM

## 2022-11-08 PROCEDURE — 1101F PT FALLS ASSESS-DOCD LE1/YR: CPT | Mod: CPTII,S$GLB,, | Performed by: INTERNAL MEDICINE

## 2022-11-08 PROCEDURE — 4010F PR ACE/ARB THEARPY RXD/TAKEN: ICD-10-PCS | Mod: CPTII,S$GLB,, | Performed by: INTERNAL MEDICINE

## 2022-11-08 PROCEDURE — 3008F BODY MASS INDEX DOCD: CPT | Mod: CPTII,S$GLB,, | Performed by: INTERNAL MEDICINE

## 2022-11-08 PROCEDURE — 3008F PR BODY MASS INDEX (BMI) DOCUMENTED: ICD-10-PCS | Mod: CPTII,S$GLB,, | Performed by: INTERNAL MEDICINE

## 2022-11-08 PROCEDURE — 3078F PR MOST RECENT DIASTOLIC BLOOD PRESSURE < 80 MM HG: ICD-10-PCS | Mod: CPTII,S$GLB,, | Performed by: INTERNAL MEDICINE

## 2022-11-08 PROCEDURE — 3078F DIAST BP <80 MM HG: CPT | Mod: CPTII,S$GLB,, | Performed by: INTERNAL MEDICINE

## 2022-11-08 PROCEDURE — 1126F PR PAIN SEVERITY QUANTIFIED, NO PAIN PRESENT: ICD-10-PCS | Mod: CPTII,S$GLB,, | Performed by: INTERNAL MEDICINE

## 2022-11-08 PROCEDURE — 1101F PR PT FALLS ASSESS DOC 0-1 FALLS W/OUT INJ PAST YR: ICD-10-PCS | Mod: CPTII,S$GLB,, | Performed by: INTERNAL MEDICINE

## 2022-11-08 PROCEDURE — 1126F AMNT PAIN NOTED NONE PRSNT: CPT | Mod: CPTII,S$GLB,, | Performed by: INTERNAL MEDICINE

## 2022-11-08 PROCEDURE — 3074F SYST BP LT 130 MM HG: CPT | Mod: CPTII,S$GLB,, | Performed by: INTERNAL MEDICINE

## 2022-11-08 PROCEDURE — 3288F FALL RISK ASSESSMENT DOCD: CPT | Mod: CPTII,S$GLB,, | Performed by: INTERNAL MEDICINE

## 2022-11-08 PROCEDURE — 4010F ACE/ARB THERAPY RXD/TAKEN: CPT | Mod: CPTII,S$GLB,, | Performed by: INTERNAL MEDICINE

## 2022-11-08 PROCEDURE — 99214 OFFICE O/P EST MOD 30 MIN: CPT | Mod: S$GLB,,, | Performed by: INTERNAL MEDICINE

## 2022-11-08 PROCEDURE — 99999 PR PBB SHADOW E&M-EST. PATIENT-LVL III: ICD-10-PCS | Mod: PBBFAC,,, | Performed by: INTERNAL MEDICINE

## 2022-11-08 PROCEDURE — 99999 PR PBB SHADOW E&M-EST. PATIENT-LVL III: CPT | Mod: PBBFAC,,, | Performed by: INTERNAL MEDICINE

## 2022-11-08 PROCEDURE — 3288F PR FALLS RISK ASSESSMENT DOCUMENTED: ICD-10-PCS | Mod: CPTII,S$GLB,, | Performed by: INTERNAL MEDICINE

## 2022-11-08 PROCEDURE — 3074F PR MOST RECENT SYSTOLIC BLOOD PRESSURE < 130 MM HG: ICD-10-PCS | Mod: CPTII,S$GLB,, | Performed by: INTERNAL MEDICINE

## 2022-11-08 PROCEDURE — 3051F PR MOST RECENT HEMOGLOBIN A1C LEVEL 7.0 - < 8.0%: ICD-10-PCS | Mod: CPTII,S$GLB,, | Performed by: INTERNAL MEDICINE

## 2022-11-08 PROCEDURE — 99214 PR OFFICE/OUTPT VISIT, EST, LEVL IV, 30-39 MIN: ICD-10-PCS | Mod: S$GLB,,, | Performed by: INTERNAL MEDICINE

## 2022-11-08 PROCEDURE — 3051F HG A1C>EQUAL 7.0%<8.0%: CPT | Mod: CPTII,S$GLB,, | Performed by: INTERNAL MEDICINE

## 2022-11-08 RX ORDER — ASCORBIC ACID 500 MG
500 TABLET ORAL DAILY
COMMUNITY
Start: 2022-11-08

## 2022-11-08 RX ORDER — ZINC GLUCONATE 50 MG
50 TABLET ORAL DAILY
COMMUNITY
End: 2023-01-30

## 2022-11-08 RX ORDER — AMLODIPINE BESYLATE 2.5 MG/1
2.5 TABLET ORAL DAILY
Qty: 90 TABLET | Refills: 1 | Status: SHIPPED | OUTPATIENT
Start: 2022-11-08 | End: 2023-08-02

## 2022-11-08 NOTE — PROGRESS NOTES
Subjective:    Patient ID:  Allyson Henriquez is a 72 y.o. 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 difficile colitis 08/2021    Cancer     lung    CKD (chronic kidney disease), stage III     Colonic diverticular abscess 7/12/2022    COPD (chronic obstructive pulmonary disease)     Encounter for blood transfusion     Hepatic steatosis     History of numbness     transient numbness left hand and foot    History of tobacco abuse     Hyperlipidemia     Hypertension     Lung nodules     and adenopathy    Multinodular goiter (nontoxic) 05/08/2017    Obesity     TRISHA (obstructive sleep apnea)     on bipap at bedtime    Proteinuria      Past Surgical History:   Procedure Laterality Date    ADENOIDECTOMY      APPENDECTOMY      CATARACT EXTRACTION      COLONOSCOPY N/A 11/14/2016    Procedure: COLONOSCOPY;  Surgeon: Destin Cardona MD;  Location: [facility] ENDO;  Service: Endoscopy;  Laterality: N/A;    COLONOSCOPY N/A 05/14/2021    Procedure: COLONOSCOPY;  Surgeon: Destin Cardona MD;  Location: [facility] ENDO;  Service: Endoscopy;  Laterality: N/A; hemorrhoids, diverticulosis, Old blood left colon. No active bleeding; Repeat colonoscopy is not recommended for screening purposes.    COLOSTOMY N/A 7/28/2022    Procedure: CREATION, COLOSTOMY;  Surgeon: Nubia Tinoco MD;  Location: [facility] OR;  Service: General;  Laterality:  N/A;    DEBRIDEMENT OF WOUND OF ABDOMEN  7/28/2022    Procedure: DEBRIDEMENT, WOUND, ABDOMEN;  Surgeon: Nubia Tinoco MD;  Location: [facility] OR;  Service: General;;    ESOPHAGOGASTRODUODENOSCOPY N/A 05/14/2021    Procedure: EGD (ESOPHAGOGASTRODUODENOSCOPY);  Surgeon: Destin Cardona MD;  Location: [facility] ENDO;  Service: Endoscopy;  Laterality: N/A; unremarkable    ESOPHAGOGASTRODUODENOSCOPY N/A 08/09/2021    Procedure: ESOPHAGOGASTRODUODENOSCOPY (EGD);  Surgeon: Destin Cardona MD;  Location: [facility] ENDO;  Service: Endoscopy;  Laterality: N/A; Moderately severe radiation esophagitis with no bleeding    EYE SURGERY      cataract OU    INSERTION OF TUNNELED CENTRAL VENOUS CATHETER (CVC) WITH SUBCUTANEOUS PORT N/A 06/07/2021    Procedure: [id number];  Surgeon: Jeo Salinas MD;  Location: [facility] OR;  Service: General;  Laterality: N/A;    INTRALUMINAL GASTROINTESTINAL TRACT IMAGING VIA CAPSULE N/A 12/29/2021    Procedure: IMAGING PROCEDURE, GI TRACT, INTRALUMINAL, VIA CAPSULE  (called for instruction 12/20-may have trouble swallowing);  Surgeon: Floyd Dixon MD;  Location: [facility] ENDO;  Service: Endoscopy;  Laterality: N/A; Diffuse red spots of unclear significance and erosions found in the small bowel (entire small bowel), suspicious for mild radiation enteritis    NEUROMA SURGERY Left     acoustic    TONSILLECTOMY      WOUND EXPLORATION N/A 8/13/2022    Procedure: EXPLORATION, WOUND;  Surgeon: Arnel Olsen MD;  Location: [facility] OR;  Service: General;  Laterality: N/A;     Family History   Problem Relation Age of Onset    Heart disease Mother         dissection    GI problems Father         perf colon    Cancer Paternal Uncle         colon cancer    Diabetes Neg Hx     Kidney disease Neg Hx     Stroke Neg Hx     Colon cancer Neg Hx     Crohn's disease Neg Hx     Ulcerative colitis Neg Hx     Stomach cancer Neg Hx     Esophageal cancer Neg Hx      Social History     Socioeconomic History     Marital status:    Tobacco Use    Smoking status: Former     Packs/day: 1.00     Years: 40.00     Pack years: 40.00     Types: Cigarettes     Quit date: 3/6/2016     Years since quittin.6    Smokeless tobacco: Never   Substance and Sexual Activity    Alcohol use: Not Currently    Drug use: No   Social History Narrative    Works in insurance collection.       Review of patient's allergies indicates:   Allergen Reactions    Contrast media Anaphylaxis    Albuterol Other (See Comments)     Used during PFTs and put pt in afib    Dye     Pcn [penicillins]      Pt states did well on cephalexin.  No adverse reaction or side effect.     Doxycycline Palpitations     Tolerated IV Doxy 2022       Current Outpatient Medications:     ascorbic acid, vitamin C, (VITAMIN C) 1000 MG tablet, Take 1,000 mg by mouth once daily., Disp: , Rfl:     calcium carbonate (TUMS) 200 mg calcium (500 mg) chewable tablet, Take 1 tablet (500 mg total) by mouth 3 (three) times daily., Disp: 270 tablet, Rfl: 3    cetirizine (ZYRTEC) 10 MG tablet, Take 10 mg by mouth every morning., Disp: , Rfl:     ciprofloxacin-hydrocortisone 0.2-1% (CIPRO HC OTIC) otic suspension, Place 3 drops into the left ear daily as needed (ear infections)., Disp: , Rfl:     dronabinoL (MARINOL) 2.5 MG capsule, Take 1 capsule (2.5 mg total) by mouth 2 (two) times daily., Disp: , Rfl:     ELIQUIS 5 mg Tab, TAKE 1 TABLET(5 MG) BY MOUTH TWICE DAILY (Patient taking differently: Take 5 mg by mouth 2 (two) times daily.), Disp: 180 tablet, Rfl: 0    HYDROcodone-acetaminophen (NORCO)  mg per tablet, Take 1 tablet by mouth every 6 (six) hours as needed for Pain., Disp: 20 tablet, Rfl: 0    hydrocortisone (CORTEF) 5 MG Tab, Take 3 tablets (15 mg total) by mouth 2 (two) times daily., Disp: 180 tablet, Rfl: 0    Lactobacillus acidophilus (PROBIOTIC ORAL), Take 1 capsule by mouth every morning. Indications: probiotic, Disp: , Rfl:     levothyroxine (SYNTHROID) 100 MCG  tablet, Take 1 tablet (100 mcg total) by mouth once daily., Disp: 90 tablet, Rfl: 3    LIDOcaine HCL 4% (XYLOCAINE) 4 % (40 mg/mL) external solution, Apply 4 mLs topically as needed (apply to wound gauze 30 minutes prior to dressing change)., Disp: , Rfl: 0    LIDOcaine-prilocaine (EMLA) cream, Apply topically as needed (as needed for port access)., Disp: 30 g, Rfl: 1    metoprolol succinate (TOPROL-XL) 25 MG 24 hr tablet, TAKE 1 TABLET(25 MG) BY MOUTH TWICE DAILY, Disp: 180 tablet, Rfl: 1    olmesartan (BENICAR) 20 MG tablet, Take 5 mg by mouth once daily., Disp: , Rfl:     pantoprazole (PROTONIX) 40 MG tablet, Take 1 tablet (40 mg total) by mouth once daily., Disp: 90 tablet, Rfl: 3    sertraline (ZOLOFT) 25 MG tablet, TAKE 1 TABLET(25 MG) BY MOUTH EVERY DAY, Disp: 30 tablet, Rfl: 0    simvastatin (ZOCOR) 40 MG tablet, TAKE 1 TABLET(40 MG) BY MOUTH EVERY EVENING, Disp: 90 tablet, Rfl: 1    sotaloL (BETAPACE) 80 MG tablet, TAKE 1 TABLET(80 MG) BY MOUTH TWICE DAILY, Disp: 180 tablet, Rfl: 0    zinc gluconate 50 mg tablet, Take 50 mg by mouth once daily., Disp: , Rfl:     amLODIPine (NORVASC) 2.5 MG tablet, Take 1 tablet (2.5 mg total) by mouth once daily., Disp: 90 tablet, Rfl: 1    multivitamin capsule, Take 1 capsule by mouth once daily., Disp: , Rfl:   No current facility-administered medications for this visit.    Facility-Administered Medications Ordered in Other Visits:     EUFLEXXA 10 mg/mL(mw 2.4 -3.6 million) injection Syrg 20 mg, 20 mg, Intra-articular, , Jose Rafael Barney MD, 20 mg at 06/14/17 1605    Review of Systems   Constitutional: Positive for malaise/fatigue. Negative for chills, diaphoresis, fever and night sweats.   HENT: Negative.  Negative for congestion and nosebleeds.    Eyes:  Negative for blurred vision and visual disturbance.   Cardiovascular:  Positive for dyspnea on exertion (MILD). Negative for chest pain, claudication, cyanosis, irregular heartbeat, leg swelling, near-syncope,  "orthopnea, palpitations, paroxysmal nocturnal dyspnea and syncope.   Respiratory:  Negative for cough, hemoptysis, shortness of breath and wheezing.    Endocrine: Negative.    Hematologic/Lymphatic: Negative for adenopathy. Does not bruise/bleed easily.   Skin:  Positive for poor wound healing (ABD). Negative for color change.   Musculoskeletal:  Negative for back pain and falls.   Gastrointestinal:  Negative for abdominal pain, change in bowel habit, diarrhea, dysphagia, melena and nausea.   Genitourinary:  Negative for dysuria and flank pain.   Neurological:  Positive for weakness. Negative for brief paralysis, focal weakness and light-headedness.   Psychiatric/Behavioral:  Negative for altered mental status and memory loss.       Objective:      Vitals:    11/08/22 1107   BP: 115/64   Pulse: 73   Weight: 85.5 kg (188 lb 7.9 oz)   Height: 5' 6" (1.676 m)   PainSc: 0-No pain     Body mass index is 30.42 kg/m².    Physical Exam  Constitutional:       Appearance: Normal appearance. She is overweight.      Comments: CHRONICALLY ILL   HENT:      Head: Normocephalic and atraumatic.   Eyes:      General: No scleral icterus.     Extraocular Movements: Extraocular movements intact.      Pupils: Pupils are equal, round, and reactive to light.   Neck:      Vascular: Normal carotid pulses. No carotid bruit or JVD.   Cardiovascular:      Rate and Rhythm: Normal rate and regular rhythm.      Pulses:           Carotid pulses are 2+ on the right side and 2+ on the left side.       Radial pulses are 2+ on the right side and 2+ on the left side.        Posterior tibial pulses are 2+ on the right side and 2+ on the left side.      Heart sounds: Murmur heard.   Systolic murmur is present with a grade of 1/6 at the lower left sternal border and apex.     No friction rub. No gallop. No S4 sounds.   Pulmonary:      Effort: Pulmonary effort is normal.      Breath sounds: Normal breath sounds.   Abdominal:      Palpations: Abdomen is " soft. There is no hepatomegaly.          Comments: COLOSTOMY   Musculoskeletal:      Cervical back: Neck supple.      Right lower leg: No edema.      Left lower leg: No edema.      Comments: IN WC   Skin:     General: Skin is warm and dry.      Capillary Refill: Capillary refill takes less than 2 seconds.   Neurological:      General: No focal deficit present.      Mental Status: She is alert.      Cranial Nerves: Cranial nerves 2-12 are intact. No cranial nerve deficit.   Psychiatric:         Mood and Affect: Mood normal.         Speech: Speech normal.         Behavior: Behavior normal.               ..    Chemistry        Component Value Date/Time     10/24/2022 0744    K 5.0 10/24/2022 0744     10/24/2022 0744    CO2 18 (L) 10/24/2022 0744    BUN 22 10/24/2022 0744    CREATININE 1.1 10/24/2022 0744     (H) 10/24/2022 0744        Component Value Date/Time    CALCIUM 10.2 10/24/2022 0744    ALKPHOS 67 10/24/2022 0744    AST 12 10/24/2022 0744    ALT 12 10/24/2022 0744    BILITOT 0.7 10/24/2022 0744    ESTGFRAFRICA >60 07/31/2022 0430    EGFRNONAA 55 (A) 07/31/2022 0430            ..  Lab Results   Component Value Date    CHOL 158 08/19/2022    CHOL 167 07/06/2022    CHOL 141 03/24/2021     Lab Results   Component Value Date    HDL 22 (L) 08/19/2022    HDL 46 07/06/2022    HDL 41 03/24/2021     Lab Results   Component Value Date    LDLCALC 103.2 08/19/2022    LDLCALC 81.6 07/06/2022    LDLCALC 60.0 (L) 03/24/2021     Lab Results   Component Value Date    TRIG 164 (H) 08/19/2022    TRIG 197 (H) 07/06/2022    TRIG 200 (H) 03/24/2021     Lab Results   Component Value Date    CHOLHDL 13.9 (L) 08/19/2022    CHOLHDL 27.5 07/06/2022    CHOLHDL 29.1 03/24/2021     ..  Lab Results   Component Value Date    WBC 11.78 10/24/2022    HGB 10.5 (L) 10/24/2022    HCT 32.4 (L) 10/24/2022    MCV 87 10/24/2022     10/24/2022       Test(s) Reviewed  I have reviewed the following in detail:  [] Stress test   []  Angiography   [] Echocardiogram   [x] Labs   [x] Other:       Assessment:         ICD-10-CM ICD-9-CM   1. PAF (paroxysmal atrial fibrillation)  I48.0 427.31   2. Nonrheumatic mitral valve regurgitation  I34.0 424.0   3. Long term current use of antiarrhythmic drug  Z79.899 V58.69   4. Essential hypertension  I10 401.9   5. Hypercholesterolemia  E78.00 272.0   6. Current use of long term anticoagulation  Z79.01 V58.61   7. Stage 3a chronic kidney disease  N18.31 585.3   8. 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       Endocrine    Obesity, Class I, BMI 30-34.9        Plan:     ECHO, REASSESS EF VALVULAR DISEASE SPECIALLY AFTER CHEMO AND OTHER TREATMENT,ALL CV CLINICALLY RELATIVELY STABLE, NO ANGINA, NO HF, NO TIA, STABLE CLINICAL ARRHYTHMIA,CONTINUE CURRENT MEDS, EDUCATION, DIET, EXERCISE AS MUCH AS POSSIBLE, PROGNOSIS REMAINS RELATED TO METASTATIC LUNG CANCER, RETURN TO CLINIC IN 6 MO, DISCUSSED PLAN WITH THE PATIENT AND HER       PAF (paroxysmal atrial fibrillation)  -     Echo    Nonrheumatic mitral valve regurgitation  -     Echo    Long term current use of antiarrhythmic drug    Essential hypertension  Comments:  BETTER    Hypercholesterolemia    Current use of long term anticoagulation    Stage 3a chronic kidney disease    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  Dispense: 90 tablet; Refill: 1  RTC Low level/low impact aerobic exercise 5x's/wk. Heart healthy diet and risk factor modification.    See labs and med orders.    Aerobic exercise 5x's/wk. Heart healthy diet and risk factor modification.    See labs and med orders.

## 2022-11-09 PROBLEM — K94.00 COLOSTOMY COMPLICATION: Status: ACTIVE | Noted: 2022-11-09

## 2022-11-09 PROBLEM — Z78.9 UNDER CARE OF OSTOMY NURSE: Status: ACTIVE | Noted: 2022-11-09

## 2022-11-14 DIAGNOSIS — L30.9 DERMATITIS: Primary | ICD-10-CM

## 2022-11-14 RX ORDER — TRIAMCINOLONE ACETONIDE 0.25 MG/G
CREAM TOPICAL 2 TIMES DAILY
Qty: 80 G | Refills: 0 | Status: SHIPPED | OUTPATIENT
Start: 2022-11-14 | End: 2023-03-14

## 2022-11-16 ENCOUNTER — PATIENT MESSAGE (OUTPATIENT)
Dept: FAMILY MEDICINE | Facility: CLINIC | Age: 72
End: 2022-11-16
Payer: MEDICARE

## 2022-11-16 ENCOUNTER — PATIENT MESSAGE (OUTPATIENT)
Dept: HEMATOLOGY/ONCOLOGY | Facility: CLINIC | Age: 72
End: 2022-11-16
Payer: MEDICARE

## 2022-11-16 ENCOUNTER — PATIENT MESSAGE (OUTPATIENT)
Dept: CARDIOLOGY | Facility: CLINIC | Age: 72
End: 2022-11-16
Payer: MEDICARE

## 2022-11-16 ENCOUNTER — PATIENT MESSAGE (OUTPATIENT)
Dept: RADIATION ONCOLOGY | Facility: CLINIC | Age: 72
End: 2022-11-16
Payer: MEDICARE

## 2022-11-17 ENCOUNTER — PATIENT MESSAGE (OUTPATIENT)
Dept: FAMILY MEDICINE | Facility: CLINIC | Age: 72
End: 2022-11-17
Payer: MEDICARE

## 2022-11-17 ENCOUNTER — PATIENT MESSAGE (OUTPATIENT)
Dept: CARDIOLOGY | Facility: CLINIC | Age: 72
End: 2022-11-17
Payer: MEDICARE

## 2022-11-18 RX ORDER — DOCUSATE SODIUM 100 MG/1
100 CAPSULE, LIQUID FILLED ORAL 2 TIMES DAILY PRN
Qty: 60 CAPSULE | Refills: 11 | Status: SHIPPED | OUTPATIENT
Start: 2022-11-18 | End: 2022-12-02

## 2022-11-18 RX ORDER — ASPIRIN 81 MG
100 TABLET, DELAYED RELEASE (ENTERIC COATED) ORAL DAILY
Qty: 90 TABLET | Refills: 1 | Status: SHIPPED | OUTPATIENT
Start: 2022-11-18 | End: 2023-10-10

## 2022-11-18 RX ORDER — SERTRALINE HYDROCHLORIDE 25 MG/1
25 TABLET, FILM COATED ORAL DAILY
Qty: 90 TABLET | Refills: 3 | Status: SHIPPED | OUTPATIENT
Start: 2022-11-18 | End: 2023-11-08

## 2022-11-18 NOTE — TELEPHONE ENCOUNTER
It should not have been denied. It looks like pharmacy was trying to deny a duplicate request. I updated the rxs.

## 2022-11-28 ENCOUNTER — PATIENT OUTREACH (OUTPATIENT)
Dept: ADMINISTRATIVE | Facility: HOSPITAL | Age: 72
End: 2022-11-28
Payer: MEDICARE

## 2022-11-28 NOTE — PROGRESS NOTES
Non-compliant report chart audits. Chart review completed for  test overdue (Mammogram, Colon Cancer Screening, Pap smear, DM labs, BP Check and/or Eye Exam)      Care Everywhere and media, updates requested and reviewed.        Contacted pt about scheduling eye exam, pt sees Dr. Simeon. Fax request sent.    Updated  w/ Eye Exam report, not up to date. Pt declined scheduling eye exam appt.

## 2022-11-28 NOTE — LETTER
AUTHORIZATION FOR RELEASE OF   CONFIDENTIAL INFORMATION    Dear Dr. Simeon,    We are seeing Allyson Henriquez, date of birth 1950, in the clinic at [facility]. Gilma Pimentel MD is the patient's PCP. Allyson Henriquez has an outstanding lab/procedure at the time we reviewed her chart. In order to help keep her health information updated, she has authorized us to request the following medical record(s):        (  )  MAMMOGRAM                                      (  )  COLONOSCOPY      (  )  PAP SMEAR                                          (  )  OUTSIDE LAB RESULTS     (  )  DEXA SCAN                                          (X)  EYE EXAM            (  )  FOOT EXAM                                          (  )  ENTIRE RECORD     (  )  OUTSIDE IMMUNIZATIONS                 (  )  _______________         Please fax records to [facility], Lauren S Elder, MD, [phone]     If you have any questions, please contact Norma Alvarez, Care Coordinator   at [phone].           Patient Name: Allyson Henriquez  : 1950  Patient Phone #: [phone]

## 2022-11-29 ENCOUNTER — PATIENT MESSAGE (OUTPATIENT)
Dept: CARDIOLOGY | Facility: CLINIC | Age: 72
End: 2022-11-29
Payer: MEDICARE

## 2022-11-30 ENCOUNTER — PATIENT MESSAGE (OUTPATIENT)
Dept: CARDIOLOGY | Facility: CLINIC | Age: 72
End: 2022-11-30
Payer: MEDICARE

## 2022-11-30 DIAGNOSIS — I10 ESSENTIAL HYPERTENSION: Primary | ICD-10-CM

## 2022-11-30 RX ORDER — OLMESARTAN MEDOXOMIL 5 MG/1
5 TABLET ORAL DAILY
Qty: 90 TABLET | Refills: 3 | Status: SHIPPED | OUTPATIENT
Start: 2022-11-30 | End: 2023-08-02

## 2022-12-19 PROBLEM — N39.0 UTI (URINARY TRACT INFECTION): Status: RESOLVED | Noted: 2022-09-16 | Resolved: 2022-12-19

## 2023-01-09 RX ORDER — PANTOPRAZOLE SODIUM 40 MG/1
TABLET, DELAYED RELEASE ORAL
Qty: 90 TABLET | Refills: 3 | Status: SHIPPED | OUTPATIENT
Start: 2023-01-09 | End: 2023-11-28

## 2023-01-09 NOTE — TELEPHONE ENCOUNTER
Refill Decision Note   Allyson Martinez  is requesting a refill authorization.  Brief Assessment and Rationale for Refill:  Approve     Medication Therapy Plan:  ACCEPTABLE OVERRIDE PER ORC PROTOCOL    Medication Reconciliation Completed: No   Comments:     No Care Gaps recommended.     Note composed:12:14 PM 01/09/2023           (1) Other Medications/None

## 2023-01-09 NOTE — TELEPHONE ENCOUNTER
No new care gaps identified.  [facility] Embedded Care Gaps. Reference number: [id number]. 1/09/2023   9:43:02 AM CST

## 2023-01-10 ENCOUNTER — PATIENT MESSAGE (OUTPATIENT)
Dept: FAMILY MEDICINE | Facility: CLINIC | Age: 73
End: 2023-01-10
Payer: MEDICARE

## 2023-01-10 ENCOUNTER — PATIENT MESSAGE (OUTPATIENT)
Dept: RADIATION ONCOLOGY | Facility: CLINIC | Age: 73
End: 2023-01-10
Payer: MEDICARE

## 2023-01-12 ENCOUNTER — TELEPHONE (OUTPATIENT)
Dept: CARDIOLOGY | Facility: CLINIC | Age: 73
End: 2023-01-12
Payer: MEDICARE

## 2023-01-23 ENCOUNTER — HOSPITAL ENCOUNTER (OUTPATIENT)
Dept: RADIOLOGY | Facility: HOSPITAL | Age: 73
Discharge: HOME | End: 2023-01-23
Attending: RADIOLOGY
Payer: MEDICARE

## 2023-01-23 ENCOUNTER — PATIENT MESSAGE (OUTPATIENT)
Dept: RADIATION ONCOLOGY | Facility: CLINIC | Age: 73
End: 2023-01-23
Payer: MEDICARE

## 2023-01-23 DIAGNOSIS — C79.31 BRAIN METASTASES: ICD-10-CM

## 2023-01-23 PROCEDURE — 70553 MRI BRAIN STEM W/O & W/DYE: CPT | Mod: 26,,, | Performed by: RADIOLOGY

## 2023-01-23 PROCEDURE — 70553 MRI BRAIN W WO CONTRAST: ICD-10-PCS | Mod: 26,,, | Performed by: RADIOLOGY

## 2023-01-23 PROCEDURE — 25500020 PHARM REV CODE 255: Mod: PO | Performed by: RADIOLOGY

## 2023-01-23 PROCEDURE — 70553 MRI BRAIN STEM W/O & W/DYE: CPT | Mod: TC,PO

## 2023-01-23 PROCEDURE — A9585 GADOBUTROL INJECTION: HCPCS | Mod: PO | Performed by: RADIOLOGY

## 2023-01-23 RX ORDER — GADOBUTROL 604.72 MG/ML
10 INJECTION INTRAVENOUS
Status: COMPLETED | OUTPATIENT
Start: 2023-01-23 | End: 2023-01-23

## 2023-01-23 RX ADMIN — GADOBUTROL 8 ML: 604.72 INJECTION INTRAVENOUS at 10:01

## 2023-01-26 NOTE — PROGRESS NOTES
[facility] Department of Radiation Oncology  Follow Up Visit Note    Diagnosis:  Allyson Henriquez is a 73 y.o. 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, status post SRS       Oncologic History:  Oncology History   Small cell lung cancer   5/20/2021 Initial Diagnosis    Small cell carcinoma of lung, right     6/8/2021 - 7/27/2021 Chemotherapy    Treatment Summary   Plan Name: OP CARBOPLATIN (AUC) + ETOPOSIDE Q3W  Treatment Goal: Palliative  Status: Inactive  Start Date: 6/8/2021  End Date: 7/27/2021  Provider: Madhav Cardona MD  Chemotherapy: CARBOplatin (PARAPLATIN) 395 mg in sodium chloride 0.9% 250 mL chemo infusion, 395 mg (100 % of original dose 395 mg), Intravenous, Clinic/HOD 1 time, 3 of 6 cycles  Dose modification:   (original dose 395 mg, Cycle 1, Reason: MD Discretion)  Administration: 395 mg (6/8/2021), 395 mg (6/29/2021), 395 mg (7/20/2021)  etoposide (VEPESID) 100 mg/m2 = 200 mg in sodium chloride 0.9% 500 mL chemo infusion, 100 mg/m2 = 200 mg, Intravenous, Clinic/HOD 1 time, 3 of 6 cycles  Administration: 200 mg (6/8/2021), 200 mg (6/9/2021), 200 mg (6/29/2021), 200 mg (6/10/2021), 200 mg (6/30/2021), 200 mg (7/1/2021), 200 mg (7/20/2021), 200 mg (7/21/2021), 200 mg (7/22/2021)       1/18/2022 - 7/6/2022 Chemotherapy    Treatment Summary   Plan Name: OP PEMBROLIZUMAB 200MG Q3W  Treatment Goal: Palliative  Status: Inactive  Start Date: 1/18/2022  End Date: 7/6/2022  Provider: Madhav Cardona MD  Chemotherapy: [No matching medication found in this treatment plan]       6/28/2022 - 6/28/2022 Radiation Therapy    Treating physician: Aleena Nielson  Total Dose: 22 Gy  Fractions: 1    Single fraction radiosurgery to right cerebellar metastasis.     Iron deficiency anemia   Brain metastasis   6/28/2022 Initial Diagnosis    Brain metastasis     6/28/2022 - 6/28/2022 Radiation Therapy    Treating physician: Aleena SAUL  Nielson  Total Dose: 22 Gy  Fractions: 1    Single fraction radiosurgery to right cerebellar metastasis.          Interval History  The patient presents today for a regularly scheduled follow up visit.  She was last seen in our follow up on 22 (virtual visit, a patient was admitted to hospital at that time for complications of diverticular abscess management.  Interval PET 10/23/22 with stable hypermetabolic right perihilar consolidation consistent with post-treatment change. In October she noted left hand symptoms similar to those at time of brain metastasis diagnosis- repeat MRI brain 10/26/22 showed no evidence of disease progression.  In 2022, she transferred her care to Dr. Gonzalez who has restarted Keytruda. 22 interval imaging (performed outside, not available for review) noted no POD per report, but persistent right pleural effusion. Ongoing regular follow up with wound care clinic for 2 slow-healing abdominal surgical wounds.  Seen yesterday by surgeon.    23 MRI brain: stable appearance of treated right cerebellar metastasis, now punctate in size; no new lesions; stable left IAC vestibular schwannoma      Review of Systems   Review of Systems   Constitutional:  Negative for chills and fever.   Eyes:  Negative for blurred vision and double vision.   Respiratory:  Negative for cough.    Gastrointestinal:  Positive for abdominal pain (ongoing healing abdominal wounds; followed in wound clinic).   Neurological:  Negative for dizziness, sensory change, speech change, seizures and headaches.   Psychiatric/Behavioral:  The patient is nervous/anxious.      Social History:  Social History     Tobacco Use    Smoking status: Former     Packs/day: 1.00     Years: 40.00     Pack years: 40.00     Types: Cigarettes     Quit date: 3/6/2016     Years since quittin.8    Smokeless tobacco: Never   Substance Use Topics    Alcohol use: Not Currently    Drug use: No       Family History:  Cancer-related  "family history includes Cancer in her paternal uncle. There is no history of Esophageal cancer.    Exam:  Vitals:    01/27/23 1105   BP: 130/60   BP Location: Left arm   Patient Position: Sitting   Pulse: 86   Resp: (!) 22   Temp: 98.2 °F (36.8 °C)   SpO2: 98%   Weight: 92.4 kg (203 lb 11.3 oz)   Height: 5' 6" (1.676 m)     Physical Exam  Vitals reviewed. Exam conducted with a chaperone present.   Constitutional:       General: She is not in acute distress.     Appearance: Normal appearance. She is obese. She is not ill-appearing or toxic-appearing.   HENT:      Head: Normocephalic and atraumatic.   Eyes:      Extraocular Movements: Extraocular movements intact.      Pupils: Pupils are equal, round, and reactive to light.   Musculoskeletal:         General: No swelling.      Cervical back: No rigidity.   Skin:     General: Skin is warm.      Findings: Rash (scalp and anterior upper face, psoriaform rash) present.   Neurological:      Mental Status: She is alert.      Cranial Nerves: Cranial nerves 2-12 are intact.      Gait: Gait abnormal (amb w/walker 2/2 easy fatiguability).        Imaging:  MRI brain 1/23/23 reviewed as detailed above      Assessment:  Good response to SRS.  Per report outside imaging shows no systemic POD.  MRI brain with no POD  ECOG: (4) Completely disabled, unable to carry out self-care and confined to bed or chair    Plan:  MRI brain in 3 months  Follow up after MRI (will continue to follow with me until care well established with new Med Onc)  Request CT report from [facility] 12/2022  Immunotherapy restarted under the care of Dr. Gonzalez, tolerating well  Slowly decreasing steroids (on 2/2 adrenal insufficiency, will see Endocrinologist) under care of Dr. Gonzalez in hopes of speeding abd wound healing  Follow up next week with Dr. Gonzalez as directed.  She was given our contact information, and she was told that she could call our clinic at anytime if she has any questions or concerns.  Follow up " with other providers as directed

## 2023-01-27 ENCOUNTER — OFFICE VISIT (OUTPATIENT)
Dept: RADIATION ONCOLOGY | Facility: CLINIC | Age: 73
End: 2023-01-27
Payer: MEDICARE

## 2023-01-27 VITALS
WEIGHT: 203.69 LBS | HEART RATE: 86 BPM | SYSTOLIC BLOOD PRESSURE: 130 MMHG | TEMPERATURE: 98 F | RESPIRATION RATE: 22 BRPM | BODY MASS INDEX: 32.73 KG/M2 | DIASTOLIC BLOOD PRESSURE: 60 MMHG | HEIGHT: 66 IN | OXYGEN SATURATION: 98 %

## 2023-01-27 DIAGNOSIS — C79.31 BRAIN METASTASIS: ICD-10-CM

## 2023-01-27 DIAGNOSIS — C34.90 SMALL CELL LUNG CANCER: Primary | ICD-10-CM

## 2023-01-27 PROCEDURE — 1159F PR MEDICATION LIST DOCUMENTED IN MEDICAL RECORD: ICD-10-PCS | Mod: CPTII,S$GLB,, | Performed by: RADIOLOGY

## 2023-01-27 PROCEDURE — 99999 PR PBB SHADOW E&M-EST. PATIENT-LVL III: CPT | Mod: PBBFAC,,, | Performed by: RADIOLOGY

## 2023-01-27 PROCEDURE — 3078F DIAST BP <80 MM HG: CPT | Mod: CPTII,S$GLB,, | Performed by: RADIOLOGY

## 2023-01-27 PROCEDURE — 1159F MED LIST DOCD IN RCRD: CPT | Mod: CPTII,S$GLB,, | Performed by: RADIOLOGY

## 2023-01-27 PROCEDURE — 99999 PR PBB SHADOW E&M-EST. PATIENT-LVL III: ICD-10-PCS | Mod: PBBFAC,,, | Performed by: RADIOLOGY

## 2023-01-27 PROCEDURE — 3008F BODY MASS INDEX DOCD: CPT | Mod: CPTII,S$GLB,, | Performed by: RADIOLOGY

## 2023-01-27 PROCEDURE — 1101F PT FALLS ASSESS-DOCD LE1/YR: CPT | Mod: CPTII,S$GLB,, | Performed by: RADIOLOGY

## 2023-01-27 PROCEDURE — 3288F FALL RISK ASSESSMENT DOCD: CPT | Mod: CPTII,S$GLB,, | Performed by: RADIOLOGY

## 2023-01-27 PROCEDURE — 99213 PR OFFICE/OUTPT VISIT, EST, LEVL III, 20-29 MIN: ICD-10-PCS | Mod: S$GLB,,, | Performed by: RADIOLOGY

## 2023-01-27 PROCEDURE — 3075F SYST BP GE 130 - 139MM HG: CPT | Mod: CPTII,S$GLB,, | Performed by: RADIOLOGY

## 2023-01-27 PROCEDURE — 1101F PR PT FALLS ASSESS DOC 0-1 FALLS W/OUT INJ PAST YR: ICD-10-PCS | Mod: CPTII,S$GLB,, | Performed by: RADIOLOGY

## 2023-01-27 PROCEDURE — 1126F AMNT PAIN NOTED NONE PRSNT: CPT | Mod: CPTII,S$GLB,, | Performed by: RADIOLOGY

## 2023-01-27 PROCEDURE — 3075F PR MOST RECENT SYSTOLIC BLOOD PRESS GE 130-139MM HG: ICD-10-PCS | Mod: CPTII,S$GLB,, | Performed by: RADIOLOGY

## 2023-01-27 PROCEDURE — 99213 OFFICE O/P EST LOW 20 MIN: CPT | Mod: S$GLB,,, | Performed by: RADIOLOGY

## 2023-01-27 PROCEDURE — 3288F PR FALLS RISK ASSESSMENT DOCUMENTED: ICD-10-PCS | Mod: CPTII,S$GLB,, | Performed by: RADIOLOGY

## 2023-01-27 PROCEDURE — 3078F PR MOST RECENT DIASTOLIC BLOOD PRESSURE < 80 MM HG: ICD-10-PCS | Mod: CPTII,S$GLB,, | Performed by: RADIOLOGY

## 2023-01-27 PROCEDURE — 1126F PR PAIN SEVERITY QUANTIFIED, NO PAIN PRESENT: ICD-10-PCS | Mod: CPTII,S$GLB,, | Performed by: RADIOLOGY

## 2023-01-27 PROCEDURE — 3008F PR BODY MASS INDEX (BMI) DOCUMENTED: ICD-10-PCS | Mod: CPTII,S$GLB,, | Performed by: RADIOLOGY

## 2023-01-27 NOTE — Clinical Note
MRI brain and follow up in 3 months Please request CT report from 12/2022 from [facility] (Dr. Gonzalez)

## 2023-02-09 PROBLEM — K63.89: Status: ACTIVE | Noted: 2023-02-09

## 2023-02-23 PROBLEM — L30.9 DERMATITIS: Status: ACTIVE | Noted: 2023-02-23

## 2023-03-07 ENCOUNTER — OFFICE VISIT (OUTPATIENT)
Dept: FAMILY MEDICINE | Facility: CLINIC | Age: 73
End: 2023-03-07
Payer: MEDICARE

## 2023-03-07 VITALS
HEIGHT: 66 IN | RESPIRATION RATE: 14 BRPM | HEART RATE: 75 BPM | DIASTOLIC BLOOD PRESSURE: 60 MMHG | SYSTOLIC BLOOD PRESSURE: 110 MMHG | BODY MASS INDEX: 33.78 KG/M2 | WEIGHT: 210.19 LBS

## 2023-03-07 DIAGNOSIS — E66.01 MORBID (SEVERE) OBESITY DUE TO EXCESS CALORIES: ICD-10-CM

## 2023-03-07 DIAGNOSIS — I13.0 BENIGN HYPERTENSIVE HEART AND KIDNEY DISEASE WITH CHF, NYHA CLASS 1 AND CKD STAGE 3: ICD-10-CM

## 2023-03-07 DIAGNOSIS — I48.0 PAF (PAROXYSMAL ATRIAL FIBRILLATION): ICD-10-CM

## 2023-03-07 DIAGNOSIS — N18.31 STAGE 3A CHRONIC KIDNEY DISEASE: ICD-10-CM

## 2023-03-07 DIAGNOSIS — E11.22 TYPE 2 DIABETES MELLITUS WITH CHRONIC KIDNEY DISEASE, WITHOUT LONG-TERM CURRENT USE OF INSULIN, UNSPECIFIED CKD STAGE: Primary | ICD-10-CM

## 2023-03-07 DIAGNOSIS — D89.89 OTHER SPECIFIED DISORDERS INVOLVING THE IMMUNE MECHANISM, NOT ELSEWHERE CLASSIFIED: ICD-10-CM

## 2023-03-07 DIAGNOSIS — D69.6 THROMBOCYTOPENIA, UNSPECIFIED: ICD-10-CM

## 2023-03-07 DIAGNOSIS — E43 SEVERE PROTEIN-CALORIE MALNUTRITION: ICD-10-CM

## 2023-03-07 DIAGNOSIS — J44.9 CHRONIC OBSTRUCTIVE PULMONARY DISEASE, UNSPECIFIED COPD TYPE: ICD-10-CM

## 2023-03-07 DIAGNOSIS — K94.19 ALTERED BOWEL ELIMINATION DUE TO INTESTINAL OSTOMY: ICD-10-CM

## 2023-03-07 DIAGNOSIS — N18.30 BENIGN HYPERTENSIVE HEART AND KIDNEY DISEASE WITH CHF, NYHA CLASS 1 AND CKD STAGE 3: ICD-10-CM

## 2023-03-07 DIAGNOSIS — E27.40 ADRENAL INSUFFICIENCY: ICD-10-CM

## 2023-03-07 PROBLEM — Z02.9 DISCHARGE PLANNING ISSUES: Status: RESOLVED | Noted: 2022-08-16 | Resolved: 2023-03-07

## 2023-03-07 PROBLEM — I20.9 ANGINA PECTORIS: Status: RESOLVED | Noted: 2023-03-07 | Resolved: 2023-03-07

## 2023-03-07 PROBLEM — Z75.8 DISCHARGE PLANNING ISSUES: Status: RESOLVED | Noted: 2022-08-16 | Resolved: 2023-03-07

## 2023-03-07 PROBLEM — I20.9 ANGINA PECTORIS: Status: ACTIVE | Noted: 2023-03-07

## 2023-03-07 PROCEDURE — 1101F PT FALLS ASSESS-DOCD LE1/YR: CPT | Mod: CPTII,S$GLB,, | Performed by: FAMILY MEDICINE

## 2023-03-07 PROCEDURE — 99215 PR OFFICE/OUTPT VISIT, EST, LEVL V, 40-54 MIN: ICD-10-PCS | Mod: S$GLB,,, | Performed by: FAMILY MEDICINE

## 2023-03-07 PROCEDURE — 99215 OFFICE O/P EST HI 40 MIN: CPT | Mod: S$GLB,,, | Performed by: FAMILY MEDICINE

## 2023-03-07 PROCEDURE — 3074F PR MOST RECENT SYSTOLIC BLOOD PRESSURE < 130 MM HG: ICD-10-PCS | Mod: CPTII,S$GLB,, | Performed by: FAMILY MEDICINE

## 2023-03-07 PROCEDURE — 3044F HG A1C LEVEL LT 7.0%: CPT | Mod: CPTII,S$GLB,, | Performed by: FAMILY MEDICINE

## 2023-03-07 PROCEDURE — 3008F PR BODY MASS INDEX (BMI) DOCUMENTED: ICD-10-PCS | Mod: CPTII,S$GLB,, | Performed by: FAMILY MEDICINE

## 2023-03-07 PROCEDURE — 3074F SYST BP LT 130 MM HG: CPT | Mod: CPTII,S$GLB,, | Performed by: FAMILY MEDICINE

## 2023-03-07 PROCEDURE — 1126F PR PAIN SEVERITY QUANTIFIED, NO PAIN PRESENT: ICD-10-PCS | Mod: CPTII,S$GLB,, | Performed by: FAMILY MEDICINE

## 2023-03-07 PROCEDURE — 99999 PR PBB SHADOW E&M-EST. PATIENT-LVL IV: ICD-10-PCS | Mod: PBBFAC,,, | Performed by: FAMILY MEDICINE

## 2023-03-07 PROCEDURE — 3062F PR POS MACROALBUMINURIA RESULT DOCUMENTED/REVIEW: ICD-10-PCS | Mod: CPTII,S$GLB,, | Performed by: FAMILY MEDICINE

## 2023-03-07 PROCEDURE — 3044F PR MOST RECENT HEMOGLOBIN A1C LEVEL <7.0%: ICD-10-PCS | Mod: CPTII,S$GLB,, | Performed by: FAMILY MEDICINE

## 2023-03-07 PROCEDURE — 3062F POS MACROALBUMINURIA REV: CPT | Mod: CPTII,S$GLB,, | Performed by: FAMILY MEDICINE

## 2023-03-07 PROCEDURE — 1126F AMNT PAIN NOTED NONE PRSNT: CPT | Mod: CPTII,S$GLB,, | Performed by: FAMILY MEDICINE

## 2023-03-07 PROCEDURE — 99999 PR PBB SHADOW E&M-EST. PATIENT-LVL IV: CPT | Mod: PBBFAC,,, | Performed by: FAMILY MEDICINE

## 2023-03-07 PROCEDURE — 1101F PR PT FALLS ASSESS DOC 0-1 FALLS W/OUT INJ PAST YR: ICD-10-PCS | Mod: CPTII,S$GLB,, | Performed by: FAMILY MEDICINE

## 2023-03-07 PROCEDURE — 3078F DIAST BP <80 MM HG: CPT | Mod: CPTII,S$GLB,, | Performed by: FAMILY MEDICINE

## 2023-03-07 PROCEDURE — 4010F ACE/ARB THERAPY RXD/TAKEN: CPT | Mod: CPTII,S$GLB,, | Performed by: FAMILY MEDICINE

## 2023-03-07 PROCEDURE — 3008F BODY MASS INDEX DOCD: CPT | Mod: CPTII,S$GLB,, | Performed by: FAMILY MEDICINE

## 2023-03-07 PROCEDURE — 4010F PR ACE/ARB THEARPY RXD/TAKEN: ICD-10-PCS | Mod: CPTII,S$GLB,, | Performed by: FAMILY MEDICINE

## 2023-03-07 PROCEDURE — 3288F FALL RISK ASSESSMENT DOCD: CPT | Mod: CPTII,S$GLB,, | Performed by: FAMILY MEDICINE

## 2023-03-07 PROCEDURE — 3066F NEPHROPATHY DOC TX: CPT | Mod: CPTII,S$GLB,, | Performed by: FAMILY MEDICINE

## 2023-03-07 PROCEDURE — 3066F PR DOCUMENTATION OF TREATMENT FOR NEPHROPATHY: ICD-10-PCS | Mod: CPTII,S$GLB,, | Performed by: FAMILY MEDICINE

## 2023-03-07 PROCEDURE — 3288F PR FALLS RISK ASSESSMENT DOCUMENTED: ICD-10-PCS | Mod: CPTII,S$GLB,, | Performed by: FAMILY MEDICINE

## 2023-03-07 PROCEDURE — 3078F PR MOST RECENT DIASTOLIC BLOOD PRESSURE < 80 MM HG: ICD-10-PCS | Mod: CPTII,S$GLB,, | Performed by: FAMILY MEDICINE

## 2023-03-07 NOTE — PROGRESS NOTES
Subjective:       Patient ID: Allyson Henriquez is a 73 y.o. female.    Chief Complaint: Annual Exam      Allyson Henriquez is in the office for annual exam.    HPI  Medical hx reviewed.  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 difficile colitis 08/2021    Cancer     lung    CKD (chronic kidney disease), stage III     Colonic diverticular abscess 07/12/2022    COPD (chronic obstructive pulmonary disease)     Encounter for blood transfusion     Hepatic steatosis     History of numbness     transient numbness left hand and foot    History of tobacco abuse     Hyperlipidemia     Hypertension     Lung nodules     and adenopathy    Multinodular goiter (nontoxic) 05/08/2017    Obesity     TRISHA (obstructive sleep apnea)     on bipap at bedtime    Proteinuria      Neuro: no HA or balance concerns. No weakness or paresthesias. No neuropathy c/o.   ENT: some sinus drainage, but managing with otcs. Per Mr. Harmon her morning congestion is improved.   Pulm: Occ dry cough, usually mornings. Fatigues with some activities, getting sob; improving.   Cards: no cp, palp. +swelling when she's on her feet a lot, end of day, sitting too long.   GI: no gerd c/o. BMs thru ostomy. Issue related to granuloma around her stoma that then bleeds and causes a release to the seal.   : normal urinary frequency for her. Using pur-wick at night which helps but it's expensive. No skin irritation in the perineum.   Renal: normal urine production.   Musc: no new joint complaints. Trying to keep up with the PT recs re home exercise.   Onc: on keytruda per hem/maronge.   Endo: on cortef BID since mid-2022 for adrenal insufficiency. Upcoming appt in Aug to discuss continued lowering of dose as it appears to be  affecting healing.   Skin: dry, no issues other than wound healing around ostomy.      Current Outpatient Medications:     amLODIPine (NORVASC) 2.5 MG tablet, Take 1 tablet (2.5 mg total) by mouth once daily., Disp: 90 tablet, Rfl: 1    ascorbic acid, vitamin C, (VITAMIN C) 1000 MG tablet, Take 500 mg by mouth 2 (two) times daily., Disp: , Rfl:     calcium carbonate (TUMS) 200 mg calcium (500 mg) chewable tablet, Take 1 tablet (500 mg total) by mouth 3 (three) times daily. (Patient taking differently: Take 1 tablet by mouth once daily.), Disp: 270 tablet, Rfl: 3    cetirizine (ZYRTEC) 10 MG tablet, Take 10 mg by mouth every morning., Disp: , Rfl:     collagenase (SANTYL) ointment, Apply topically once daily., Disp: 60 g, Rfl: 0    docusate sodium 100 mg capsule, Take 100 mg by mouth once daily. (Patient taking differently: Take 100 mg by mouth daily as needed for Constipation.), Disp: 90 tablet, Rfl: 1    ELIQUIS 5 mg Tab, TAKE 1 TABLET(5 MG) BY MOUTH TWICE DAILY, Disp: 180 tablet, Rfl: 0    ergocalciferol (ERGOCALCIFEROL) 50,000 unit Cap, Take 50,000 Units by mouth twice a week., Disp: , Rfl:     HYDROcodone-acetaminophen (NORCO) 5-325 mg per tablet, Take 1 tablet by mouth every 6 (six) hours as needed for Pain., Disp: 10 tablet, Rfl: 0    hydrocortisone (CORTEF) 5 MG Tab, TAKE 3 TABLETS(15 MG) BY MOUTH TWICE DAILY, Disp: 180 tablet, Rfl: 1    Lactobacillus acidophilus (PROBIOTIC ORAL), Take 1 capsule by mouth every morning. Indications: probiotic, Disp: , Rfl:     levothyroxine (SYNTHROID) 100 MCG tablet, Take 1 tablet (100 mcg total) by mouth once daily., Disp: 90 tablet, Rfl: 3    LIDOcaine-prilocaine (EMLA) cream, Apply topically as needed (as needed for port access)., Disp: 30 g, Rfl: 1    metoprolol succinate (TOPROL-XL) 25 MG 24 hr tablet, TAKE 1 TABLET(25 MG) BY MOUTH TWICE DAILY, Disp: 180 tablet, Rfl: 1    multivit with minerals/lutein (MULTIVITAMIN 50 PLUS ORAL), Take 1 tablet by mouth Daily.  Indications: supplement, Disp: , Rfl:     olmesartan (BENICAR) 5 MG Tab, Take 1 tablet (5 mg total) by mouth once daily. (Patient taking differently: Take 5 mg by mouth every evening.), Disp: 90 tablet, Rfl: 3    pantoprazole (PROTONIX) 40 MG tablet, TAKE 1 TABLET(40 MG) BY MOUTH EVERY DAY, Disp: 90 tablet, Rfl: 3    sertraline (ZOLOFT) 25 MG tablet, Take 1 tablet (25 mg total) by mouth once daily., Disp: 90 tablet, Rfl: 3    simvastatin (ZOCOR) 40 MG tablet, TAKE 1 TABLET(40 MG) BY MOUTH EVERY EVENING, Disp: 90 tablet, Rfl: 1    sotaloL (BETAPACE) 80 MG tablet, TAKE 1 TABLET(80 MG) BY MOUTH TWICE DAILY, Disp: 180 tablet, Rfl: 0    triamcinolone acetonide 0.1% (KENALOG) 0.1 % ointment, Apply topically 2 (two) times daily. for 10 days, Disp: 30 g, Rfl: 1  No current facility-administered medications for this visit.    Facility-Administered Medications Ordered in Other Visits:     EUFLEXXA 10 mg/mL(mw 2.4 -3.6 million) injection Syrg 20 mg, 20 mg, Intra-articular, , Jose Rafael Barney MD, 20 mg at 06/14/17 1605    The 10-year ASCVD risk score (Ron ORDOÑEZ, et al., 2019) is: 41.5%    Values used to calculate the score:      Age: 73 years      Sex: Female      Is Non- : No      Diabetic: Yes      Tobacco smoker: No      Systolic Blood Pressure: 147 mmHg      Is BP treated: Yes      HDL Cholesterol: 22 mg/dL      Total Cholesterol: 158 mg/dL     Lab Results   Component Value Date    HGBA1C 5.4 03/13/2023    HGBA1C 7.8 (H) 07/12/2022    HGBA1C 6.8 (H) 03/24/2021     Lab Results   Component Value Date    LDLCALC 103.2 08/19/2022    CREATININE 1.12 04/03/2023   Labs 2023 rev.    Review of Systems   Constitutional:  Positive for activity change, appetite change (improving) and fatigue. Negative for unexpected weight change.   HENT:  Negative for congestion and sore throat.    Eyes:  Negative for discharge.   Respiratory:  Negative for apnea (restarted cpap), cough (occ) and shortness of breath.     Cardiovascular:  Positive for leg swelling. Negative for chest pain and palpitations.   Gastrointestinal:  Positive for diarrhea. Negative for blood in stool and constipation.   Genitourinary:  Negative for difficulty urinating and hematuria.   Musculoskeletal:  Positive for gait problem. Negative for arthralgias.   Skin:  Positive for wound (granuloma assd with her stoma). Negative for rash.   Neurological:  Negative for speech difficulty and headaches.        Occ feels a little foggy, usually when tired   Psychiatric/Behavioral:  Positive for dysphoric mood and sleep disturbance.          Objective:      Physical Exam  Vitals and nursing note reviewed.   Constitutional:       General: She is not in acute distress.     Appearance: She is well-developed. She is obese.   HENT:      Head: Normocephalic and atraumatic.      Right Ear: External ear normal.      Left Ear: External ear normal.      Nose: Nose normal.      Mouth/Throat:      Pharynx: No oropharyngeal exudate.   Eyes:      Conjunctiva/sclera: Conjunctivae normal.      Pupils: Pupils are equal, round, and reactive to light.   Neck:      Thyroid: No thyromegaly.   Cardiovascular:      Rate and Rhythm: Normal rate and regular rhythm.   Pulmonary:      Effort: Pulmonary effort is normal. No respiratory distress.      Breath sounds: Normal breath sounds. No wheezing.   Abdominal:      General: There is no distension.      Palpations: Abdomen is soft. There is no mass.      Comments: Ostomy present, llq   Musculoskeletal:      Cervical back: Neck supple.      Right lower leg: No edema.      Left lower leg: No edema.      Comments: Trace pitting edema, ble   Lymphadenopathy:      Cervical: No cervical adenopathy.   Skin:     General: Skin is warm and dry.   Neurological:      General: No focal deficit present.      Mental Status: She is alert and oriented to person, place, and time.      Cranial Nerves: No cranial nerve deficit.   Psychiatric:         Mood and  Affect: Mood is depressed.         Behavior: Behavior normal.           Screening recommendations appropriate to age and health status were reviewed.    Assessment & Plan:    Type 2 diabetes mellitus with chronic kidney disease, without long-term current use of insulin, unspecified CKD stage  -     Microalbumin/Creatinine Ratio, Urine; Future  -     Hemoglobin A1C; Future; Expected date: 03/07/2023    Altered bowel elimination due to intestinal ostomy    Adrenal insufficiency  Comments:  upcoming appt with endo    Severe protein-calorie malnutrition  Comments:  encouraged sufficient protein: calorie intake    Morbid (severe) obesity due to excess calories    Other specified disorders involving the immune mechanism, not elsewhere classified    Thrombocytopenia, unspecified  Comments:  stable, cont monitoring    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 review of tests), obtaining and/or reviewing separately obtained history, documenting clinical information in the electronic health record, independently interpreting results (not separately reported), and communicating results to the patient/family/caregiver, or care coordination (not separately reported).      Talk to wade about either an earlier appt or recs re cortef in the interim.

## 2023-03-10 ENCOUNTER — PES CALL (OUTPATIENT)
Dept: ADMINISTRATIVE | Facility: CLINIC | Age: 73
End: 2023-03-10
Payer: MEDICARE

## 2023-03-13 ENCOUNTER — PATIENT MESSAGE (OUTPATIENT)
Dept: FAMILY MEDICINE | Facility: CLINIC | Age: 73
End: 2023-03-13
Payer: MEDICARE

## 2023-03-30 ENCOUNTER — PATIENT MESSAGE (OUTPATIENT)
Dept: FAMILY MEDICINE | Facility: CLINIC | Age: 73
End: 2023-03-30
Payer: MEDICARE

## 2023-03-30 DIAGNOSIS — E11.22 TYPE 2 DIABETES MELLITUS WITH CHRONIC KIDNEY DISEASE, WITHOUT LONG-TERM CURRENT USE OF INSULIN, UNSPECIFIED CKD STAGE: Primary | ICD-10-CM

## 2023-04-03 ENCOUNTER — E-CONSULT (OUTPATIENT)
Dept: ENDOCRINOLOGY | Facility: CLINIC | Age: 73
End: 2023-04-03
Payer: MEDICARE

## 2023-04-03 DIAGNOSIS — E27.40 ADRENAL INSUFFICIENCY: Primary | ICD-10-CM

## 2023-04-03 PROCEDURE — 99451 PR INTERPROF, PHONE/INTERNET/EHR, CONSULT, >= 5MINS: ICD-10-PCS | Mod: S$GLB,,, | Performed by: INTERNAL MEDICINE

## 2023-04-03 PROCEDURE — 99451 NTRPROF PH1/NTRNET/EHR 5/>: CPT | Mod: S$GLB,,, | Performed by: INTERNAL MEDICINE

## 2023-04-03 NOTE — CONSULTS
[facility]  Response for E-Consult     Patient Name: Allyson Henriquez  MRN: [id number]  Primary Care Provider: Gilma Pimentel MD   Requesting Provider: Gilma Pimentel MD  Consults    Findings:  73-year-old on longstanding glucocorticoids, apparently glucocorticoids were started based on adrenal insufficiency that was diagnosed by Oncology.  She has a diagnosis of small-cell lung cancer with bone Mets and brain Mets and was on pembrolizumab.    Her current dose of hydrocortisone is 15 mg in the morning and 15 in the evening.  Please note this dose is super physiologic as most people require about 10 mg with breakfast and 5 mg with dinner.  Unfortunately I can not give exact parameters to decrease dose as it is clinical symptoms that determine the dose needed.    If she is currently not ill and at her baseline it would be reasonable to reduce dose of hydrocortisone to 15 mg in the morning and 10 with dinner and reassess how she feels.  If any nausea vomiting weakness would go back up on the dose.      If she tolerates that 5 mg decrease.... would consider going down to 15 mg in the morning and 5 with dinner in 1 month.    Again if she tolerates this change would go down to 10 in the morning and 5 with dinner and follow-up with Endocrine to see if we need to reassess her HPA axis    I did not speak to the requesting provider verbally about this.     Total time of Consultation: 5 minute    Percentage of time spent on verbal/written discussion: 100%     *This eConsult is based on the clinical data available to me and is furnished without benefit of a physical examination. The eConsult will need to be interpreted in light of any clinical issues or changes in patient status not available to me at the time of filing this eConsults. Significant changes in patient condition or level of acuity should result in immediate formal consultation and reevaluation. Please alert me if you have further  questions.    Thank you for your consult.     MD [facility]

## 2023-04-04 ENCOUNTER — PATIENT MESSAGE (OUTPATIENT)
Dept: FAMILY MEDICINE | Facility: CLINIC | Age: 73
End: 2023-04-04
Payer: MEDICARE

## 2023-04-04 DIAGNOSIS — E11.22 TYPE 2 DIABETES MELLITUS WITH CHRONIC KIDNEY DISEASE, WITHOUT LONG-TERM CURRENT USE OF INSULIN, UNSPECIFIED CKD STAGE: Primary | ICD-10-CM

## 2023-04-04 DIAGNOSIS — R26.89 DECREASED FUNCTIONAL MOBILITY: ICD-10-CM

## 2023-04-04 DIAGNOSIS — M62.81 MUSCLE WEAKNESS: ICD-10-CM

## 2023-04-19 ENCOUNTER — PATIENT MESSAGE (OUTPATIENT)
Dept: FAMILY MEDICINE | Facility: CLINIC | Age: 73
End: 2023-04-19
Payer: MEDICARE

## 2023-04-19 DIAGNOSIS — D69.6 THROMBOCYTOPENIA, UNSPECIFIED: Primary | ICD-10-CM

## 2023-04-20 RX ORDER — TRIAMCINOLONE ACETONIDE 1 MG/G
CREAM TOPICAL 2 TIMES DAILY
Qty: 45 G | Refills: 1 | Status: SHIPPED | OUTPATIENT
Start: 2023-04-20

## 2023-04-27 ENCOUNTER — HOSPITAL ENCOUNTER (OUTPATIENT)
Dept: RADIOLOGY | Facility: HOSPITAL | Age: 73
Discharge: HOME | End: 2023-04-27
Attending: RADIOLOGY
Payer: MEDICARE

## 2023-04-27 DIAGNOSIS — C34.90 SMALL CELL LUNG CANCER: ICD-10-CM

## 2023-04-27 DIAGNOSIS — C79.31 MALIGNANT NEOPLASM METASTATIC TO BRAIN: ICD-10-CM

## 2023-04-27 PROCEDURE — 70553 MRI BRAIN STEM W/O & W/DYE: CPT | Mod: 26,,, | Performed by: RADIOLOGY

## 2023-04-27 PROCEDURE — 70553 MRI BRAIN STEM W/O & W/DYE: CPT | Mod: TC,PO

## 2023-04-27 PROCEDURE — A9585 GADOBUTROL INJECTION: HCPCS | Mod: PO | Performed by: RADIOLOGY

## 2023-04-27 PROCEDURE — 70553 MRI BRAIN W WO CONTRAST: ICD-10-PCS | Mod: 26,,, | Performed by: RADIOLOGY

## 2023-04-27 PROCEDURE — 25500020 PHARM REV CODE 255: Mod: PO | Performed by: RADIOLOGY

## 2023-04-27 RX ORDER — GADOBUTROL 604.72 MG/ML
9 INJECTION INTRAVENOUS
Status: COMPLETED | OUTPATIENT
Start: 2023-04-27 | End: 2023-04-27

## 2023-04-27 RX ADMIN — GADOBUTROL 9 ML: 604.72 INJECTION INTRAVENOUS at 11:04

## 2023-04-28 ENCOUNTER — OFFICE VISIT (OUTPATIENT)
Dept: ENDOCRINOLOGY | Facility: CLINIC | Age: 73
End: 2023-04-28
Payer: MEDICARE

## 2023-04-28 VITALS
DIASTOLIC BLOOD PRESSURE: 60 MMHG | HEART RATE: 88 BPM | WEIGHT: 207 LBS | BODY MASS INDEX: 33.27 KG/M2 | HEIGHT: 66 IN | SYSTOLIC BLOOD PRESSURE: 120 MMHG

## 2023-04-28 DIAGNOSIS — E27.40 ADRENAL INSUFFICIENCY: ICD-10-CM

## 2023-04-28 DIAGNOSIS — R73.9 HYPERGLYCEMIA: Primary | ICD-10-CM

## 2023-04-28 DIAGNOSIS — T38.0X5A ADVERSE EFFECT OF CORTICOSTEROIDS, INITIAL ENCOUNTER: ICD-10-CM

## 2023-04-28 DIAGNOSIS — C34.90 SMALL CELL LUNG CANCER: ICD-10-CM

## 2023-04-28 LAB — GLUCOSE SERPL-MCNC: 347 MG/DL (ref 70–110)

## 2023-04-28 PROCEDURE — 3066F NEPHROPATHY DOC TX: CPT | Mod: CPTII,S$GLB,, | Performed by: NURSE PRACTITIONER

## 2023-04-28 PROCEDURE — 4010F PR ACE/ARB THEARPY RXD/TAKEN: ICD-10-PCS | Mod: CPTII,S$GLB,, | Performed by: NURSE PRACTITIONER

## 2023-04-28 PROCEDURE — 1159F PR MEDICATION LIST DOCUMENTED IN MEDICAL RECORD: ICD-10-PCS | Mod: CPTII,S$GLB,, | Performed by: NURSE PRACTITIONER

## 2023-04-28 PROCEDURE — 1126F AMNT PAIN NOTED NONE PRSNT: CPT | Mod: CPTII,S$GLB,, | Performed by: NURSE PRACTITIONER

## 2023-04-28 PROCEDURE — 3062F PR POS MACROALBUMINURIA RESULT DOCUMENTED/REVIEW: ICD-10-PCS | Mod: CPTII,S$GLB,, | Performed by: NURSE PRACTITIONER

## 2023-04-28 PROCEDURE — 82962 POCT GLUCOSE, HAND-HELD DEVICE: ICD-10-PCS | Mod: S$GLB,,, | Performed by: NURSE PRACTITIONER

## 2023-04-28 PROCEDURE — 3044F HG A1C LEVEL LT 7.0%: CPT | Mod: CPTII,S$GLB,, | Performed by: NURSE PRACTITIONER

## 2023-04-28 PROCEDURE — 3074F SYST BP LT 130 MM HG: CPT | Mod: CPTII,S$GLB,, | Performed by: NURSE PRACTITIONER

## 2023-04-28 PROCEDURE — 3288F FALL RISK ASSESSMENT DOCD: CPT | Mod: CPTII,S$GLB,, | Performed by: NURSE PRACTITIONER

## 2023-04-28 PROCEDURE — 3078F DIAST BP <80 MM HG: CPT | Mod: CPTII,S$GLB,, | Performed by: NURSE PRACTITIONER

## 2023-04-28 PROCEDURE — 3074F PR MOST RECENT SYSTOLIC BLOOD PRESSURE < 130 MM HG: ICD-10-PCS | Mod: CPTII,S$GLB,, | Performed by: NURSE PRACTITIONER

## 2023-04-28 PROCEDURE — 3008F PR BODY MASS INDEX (BMI) DOCUMENTED: ICD-10-PCS | Mod: CPTII,S$GLB,, | Performed by: NURSE PRACTITIONER

## 2023-04-28 PROCEDURE — 1159F MED LIST DOCD IN RCRD: CPT | Mod: CPTII,S$GLB,, | Performed by: NURSE PRACTITIONER

## 2023-04-28 PROCEDURE — 3044F PR MOST RECENT HEMOGLOBIN A1C LEVEL <7.0%: ICD-10-PCS | Mod: CPTII,S$GLB,, | Performed by: NURSE PRACTITIONER

## 2023-04-28 PROCEDURE — 3062F POS MACROALBUMINURIA REV: CPT | Mod: CPTII,S$GLB,, | Performed by: NURSE PRACTITIONER

## 2023-04-28 PROCEDURE — 3288F PR FALLS RISK ASSESSMENT DOCUMENTED: ICD-10-PCS | Mod: CPTII,S$GLB,, | Performed by: NURSE PRACTITIONER

## 2023-04-28 PROCEDURE — 99999 PR PBB SHADOW E&M-EST. PATIENT-LVL IV: CPT | Mod: PBBFAC,,, | Performed by: NURSE PRACTITIONER

## 2023-04-28 PROCEDURE — 1126F PR PAIN SEVERITY QUANTIFIED, NO PAIN PRESENT: ICD-10-PCS | Mod: CPTII,S$GLB,, | Performed by: NURSE PRACTITIONER

## 2023-04-28 PROCEDURE — 1101F PR PT FALLS ASSESS DOC 0-1 FALLS W/OUT INJ PAST YR: ICD-10-PCS | Mod: CPTII,S$GLB,, | Performed by: NURSE PRACTITIONER

## 2023-04-28 PROCEDURE — 3078F PR MOST RECENT DIASTOLIC BLOOD PRESSURE < 80 MM HG: ICD-10-PCS | Mod: CPTII,S$GLB,, | Performed by: NURSE PRACTITIONER

## 2023-04-28 PROCEDURE — 4010F ACE/ARB THERAPY RXD/TAKEN: CPT | Mod: CPTII,S$GLB,, | Performed by: NURSE PRACTITIONER

## 2023-04-28 PROCEDURE — 99999 PR PBB SHADOW E&M-EST. PATIENT-LVL IV: ICD-10-PCS | Mod: PBBFAC,,, | Performed by: NURSE PRACTITIONER

## 2023-04-28 PROCEDURE — 82962 GLUCOSE BLOOD TEST: CPT | Mod: S$GLB,,, | Performed by: NURSE PRACTITIONER

## 2023-04-28 PROCEDURE — 3008F BODY MASS INDEX DOCD: CPT | Mod: CPTII,S$GLB,, | Performed by: NURSE PRACTITIONER

## 2023-04-28 PROCEDURE — 3066F PR DOCUMENTATION OF TREATMENT FOR NEPHROPATHY: ICD-10-PCS | Mod: CPTII,S$GLB,, | Performed by: NURSE PRACTITIONER

## 2023-04-28 PROCEDURE — 99205 OFFICE O/P NEW HI 60 MIN: CPT | Mod: S$GLB,,, | Performed by: NURSE PRACTITIONER

## 2023-04-28 PROCEDURE — 99205 PR OFFICE/OUTPT VISIT, NEW, LEVL V, 60-74 MIN: ICD-10-PCS | Mod: S$GLB,,, | Performed by: NURSE PRACTITIONER

## 2023-04-28 PROCEDURE — 1101F PT FALLS ASSESS-DOCD LE1/YR: CPT | Mod: CPTII,S$GLB,, | Performed by: NURSE PRACTITIONER

## 2023-04-28 RX ORDER — PREDNISONE 50 MG/1
TABLET ORAL
COMMUNITY
Start: 2023-04-26 | End: 2023-05-19 | Stop reason: ALTCHOICE

## 2023-04-28 NOTE — PATIENT INSTRUCTIONS
Novolog/or Humalog/or Apridra /Fiasp  USE ONLY    Dose is based on level of glucose before meals only (meaning no food or drink for 4 hours). This dose may be added to a standard meal dose if ordered    150-200=+2  201-250=+4  251-300=+6  301-350=+8  351-400=+10

## 2023-04-28 NOTE — PROGRESS NOTES
"  Subjective     Patient ID: Allyson Henriquez is a 73 y.o. female.    Chief Complaint: Diabetes    HPI  Pt is a 73 y.o. 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  Pt denies having diabetes and she will not check glucoses or take insulin.  She will go home and die first.She was initially consulted to endocrine regarding concerns of adrenal insufficiency. She also has slow healing abdominal wounds with colostomy as result of infected colon couple of years ago secondary to structural abnormality. GLucoses have been trending up slightly last couple of years, but just really became elevated in last 3 weeks.      She was started on hydrocortisone in response to cortisol of 2 in July of 2022 per Dr. Meyer.    Since then hydrocortisone dose has fluctuated and more recently decreased  From 15/10 bid to current 10/5 bid.  She does complain of marked fatigue.  No N/V.  Repeat cortisol and ACTH was ordered by Dr. Meyer but not done.    Wound care is wanting to wean steroids r/t slow healing.   Gastro  (DR. Annia Ashton) is concerned about her nutrition status.      Pt advised neither wounds nor nutritional status will improve sufficiently without glucose control.    present. He is good support.      Gluose today 344---she did have 3 beneits and coffee preceding.    .    IReview of Systems   Constitutional:  Positive for fatigue. Negative for activity change.        Blood pressure 120/60, pulse 88, height 5' 6" (1.676 m), weight 93.9 kg (207 lb 0.2 oz).   HENT:  Positive for hearing loss. Negative for trouble swallowing.    Eyes:  Negative for photophobia and visual disturbance.        Last Eye Exam:    Respiratory:  Negative for cough and shortness of breath.    Cardiovascular:  Negative for chest pain and palpitations.   Gastrointestinal:  Negative for constipation and diarrhea.        Colostomy    Genitourinary:  Negative for frequency and urgency.   Musculoskeletal:  Negative for " arthralgias and myalgias.   Integumentary:  Negative for rash and wound.   Neurological:  Negative for weakness and numbness.   Psychiatric/Behavioral:  Negative for sleep disturbance. The patient is nervous/anxious.         Objective     Physical Exam  Constitutional:       Appearance: Normal appearance. She is obese.   HENT:      Head: Normocephalic and atraumatic.      Nose: Nose normal.   Skin:     General: Skin is warm and dry.   Neurological:      General: No focal deficit present.      Mental Status: She is alert and oriented to person, place, and time.   Psychiatric:         Mood and Affect: Mood normal.         Behavior: Behavior normal.         Thought Content: Thought content normal.         Judgment: Judgment normal.      Comments: Easily overwhelmed.           Assessment and Plan     1. Hyperglycemia  POCT Glucose, Hand-Held Device      2. Small cell lung cancer        3. Adrenal insufficiency        4. Adverse effect of corticosteroids, initial encounter            Started Freestyle jessie 3--however pt does not have own phone and will need a product with reader.      Fiasp 8 units for glcusoe 344  Pt/ counseled on insulin use, storage.          RX for Continuous Glucose Monitor   -Recommend Dexcom G7  Continuous Glucose monitor                         Pt tests glucoses a minimum of 4 x a day.                          Pt takes a minimum of 1 injections of insulin a day.                          Pt would benefit from therapeutic continuous glucose monitor to be able                         to make frequent insulin adjustments, based on current glucoses.     1 fiasp pen sampled.             ORDERS 04/28/2023                 F/u me may 9 at 11:30--jessie sharing.     60 minutes--counseling on glcuoses, stress, steroids, wound healing, use of sliding scale insulin and sensor

## 2023-05-01 ENCOUNTER — TELEPHONE (OUTPATIENT)
Dept: ENDOCRINOLOGY | Facility: CLINIC | Age: 73
End: 2023-05-01
Payer: MEDICARE

## 2023-05-01 NOTE — PROGRESS NOTES
[facility] Department of Radiation Oncology  Follow Up Visit Note    Diagnosis:  Allyson Henriquez is a 73 y.o. 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, status post SRS       Oncologic History:  Oncology History   Small cell lung cancer   5/20/2021 Initial Diagnosis    Small cell carcinoma of lung, right     6/8/2021 - 7/27/2021 Chemotherapy    Treatment Summary   Plan Name: OP CARBOPLATIN (AUC) + ETOPOSIDE Q3W  Treatment Goal: Palliative  Status: Inactive  Start Date: 6/8/2021  End Date: 7/27/2021  Provider: Madhav Cardona MD  Chemotherapy: CARBOplatin (PARAPLATIN) 395 mg in sodium chloride 0.9% 250 mL chemo infusion, 395 mg (100 % of original dose 395 mg), Intravenous, Clinic/HOD 1 time, 3 of 6 cycles  Dose modification:   (original dose 395 mg, Cycle 1, Reason: MD Discretion)  Administration: 395 mg (6/8/2021), 395 mg (6/29/2021), 395 mg (7/20/2021)  etoposide (VEPESID) 100 mg/m2 = 200 mg in sodium chloride 0.9% 500 mL chemo infusion, 100 mg/m2 = 200 mg, Intravenous, Clinic/HOD 1 time, 3 of 6 cycles  Administration: 200 mg (6/8/2021), 200 mg (6/9/2021), 200 mg (6/29/2021), 200 mg (6/10/2021), 200 mg (6/30/2021), 200 mg (7/1/2021), 200 mg (7/20/2021), 200 mg (7/21/2021), 200 mg (7/22/2021)     1/18/2022 - 7/6/2022 Chemotherapy    Treatment Summary   Plan Name: OP PEMBROLIZUMAB 200MG Q3W  Treatment Goal: Palliative  Status: Inactive  Start Date: 1/18/2022  End Date: 7/6/2022  Provider: Madhav Cardona MD  Chemotherapy: [No matching medication found in this treatment plan]     6/28/2022 - 6/28/2022 Radiation Therapy    Treating physician: Aleena Nielson  Total Dose: 22 Gy  Fractions: 1    Single fraction radiosurgery to right cerebellar metastasis.     Iron deficiency anemia   Brain metastasis   6/28/2022 Initial Diagnosis    Brain metastasis     6/28/2022 - 6/28/2022 Radiation Therapy    Treating physician: Aleena Nielson  Total  Dose: 22 Gy  Fractions: 1    Single fraction radiosurgery to right cerebellar metastasis.          Interval History  The patient presents today for a regularly scheduled follow up visit.  She was last seen in our follow up on 23.  Since that time she has been feeling well.  Some ongoing issues with wound healing related to her abdominal surgeries.  Is consulting with a new surgeon for second opinion. Also undergoing endocrine work up for ongoing blood sugar instability (on hydrocortisone for adrenal insufficiency). Currently on continuous glucose monitoring.     23 MRI brain: right cerebellar small enhancing lesion with interval small focus of increased enhancement medial/inferior, and minimal new edema compared to prior study.  Stable left vestibular schwannoma.     Review of Systems   Review of Systems   Constitutional:  Negative for chills and fever.   Eyes:  Negative for blurred vision and double vision.   Respiratory:  Negative for cough.    Gastrointestinal:  Negative for abdominal pain (resolved).   Neurological:  Negative for dizziness, sensory change, speech change, seizures and headaches.     Social History:  Social History     Tobacco Use    Smoking status: Former     Packs/day: 1.00     Years: 40.00     Pack years: 40.00     Types: Cigarettes     Quit date: 3/6/2016     Years since quittin.1    Smokeless tobacco: Never   Substance Use Topics    Alcohol use: Not Currently    Drug use: No       Family History:  Cancer-related family history includes Cancer in her paternal uncle. There is no history of Esophageal cancer.    Exam:  There were no vitals filed for this visit.    Physical Exam  Vitals reviewed. Exam conducted with a chaperone present.   Constitutional:       General: She is not in acute distress.     Appearance: Normal appearance. She is obese. She is not ill-appearing or toxic-appearing.   HENT:      Head: Normocephalic and atraumatic.   Eyes:      Extraocular Movements:  Extraocular movements intact.      Pupils: Pupils are equal, round, and reactive to light.   Musculoskeletal:         General: No swelling.      Cervical back: No rigidity.   Skin:     General: Skin is warm.   Neurological:      Mental Status: She is alert.      Cranial Nerves: Cranial nerves 2-12 are intact.      Gait: Gait normal.        Imaging:  MRI brain 1/23/23 reviewed as detailed above      Assessment:  Good response to SRS; slight increase in conspicuity of enhancement and 5mm focus of faint enhancement inferior medially.    ECOG: (4) Completely disabled, unable to carry out self-care and confined to bed or chair    Plan:  MRI brain in 2 months (to monitor small region of increased enhancement)  Follow up after MRI   Immunotherapy started under the care of Dr. Gonzalez, tolerating well  Continues on 2/2 adrenal insufficiency, followed Endocrinologist  Follow up with Dr. Gonzalez as directed for ongoing Keytruda  Ongoing care for abdominal healing (plan for surgery in the coming weeks, pending results of upcoming CT scans)- will request reports from next week's scans  She was given our contact information, and she was told that she could call our clinic at anytime if she has any questions or concerns.  Follow up with other providers as directed

## 2023-05-01 NOTE — TELEPHONE ENCOUNTER
S/w  and notified him of Mechelle's prev msg and verb understanding    Cheryle Mr Fan wants you to call him he said he has some other questions. Ddi not tell which questions

## 2023-05-01 NOTE — TELEPHONE ENCOUNTER
----- Message from Fabby Mcgovern sent at 5/1/2023  9:27 AM CDT -----  Contact:  Faustino  Type:  Needs Medical Advice    Who Called: Faustino   Would the patient rather a call back or a response via [facility]? call  Best Call Back Number:  [phone]    Additional Information: pts  would like nurse to call him concerning instructions for insulin. Please advise and thank you.

## 2023-05-01 NOTE — TELEPHONE ENCOUNTER
Please advise on behalf of Cheryle    S/w . States pt ate a toast @ around 8:50,and forgot to check BG before doing so,BG went up to 322 and then 345 (10:51).  states pt does not have a set time eating scheduled.She eats when hungry.  wants to know if pt can take  inulin now after she already ate or wait until next meal. Pt doing SS    Please advise

## 2023-05-02 ENCOUNTER — OFFICE VISIT (OUTPATIENT)
Dept: RADIATION ONCOLOGY | Facility: CLINIC | Age: 73
End: 2023-05-02
Payer: MEDICARE

## 2023-05-02 ENCOUNTER — PATIENT MESSAGE (OUTPATIENT)
Dept: ENDOCRINOLOGY | Facility: CLINIC | Age: 73
End: 2023-05-02
Payer: MEDICARE

## 2023-05-02 VITALS
BODY MASS INDEX: 33.06 KG/M2 | HEART RATE: 83 BPM | HEIGHT: 66 IN | OXYGEN SATURATION: 96 % | TEMPERATURE: 98 F | DIASTOLIC BLOOD PRESSURE: 66 MMHG | RESPIRATION RATE: 20 BRPM | WEIGHT: 205.69 LBS | SYSTOLIC BLOOD PRESSURE: 148 MMHG

## 2023-05-02 DIAGNOSIS — C34.90 SMALL CELL LUNG CANCER: Primary | ICD-10-CM

## 2023-05-02 DIAGNOSIS — C79.31 MALIGNANT NEOPLASM METASTATIC TO BRAIN: ICD-10-CM

## 2023-05-02 DIAGNOSIS — R73.9 HYPERGLYCEMIA: Primary | ICD-10-CM

## 2023-05-02 PROCEDURE — 1126F PR PAIN SEVERITY QUANTIFIED, NO PAIN PRESENT: ICD-10-PCS | Mod: CPTII,S$GLB,, | Performed by: RADIOLOGY

## 2023-05-02 PROCEDURE — 3008F BODY MASS INDEX DOCD: CPT | Mod: CPTII,S$GLB,, | Performed by: RADIOLOGY

## 2023-05-02 PROCEDURE — 3008F PR BODY MASS INDEX (BMI) DOCUMENTED: ICD-10-PCS | Mod: CPTII,S$GLB,, | Performed by: RADIOLOGY

## 2023-05-02 PROCEDURE — 3062F POS MACROALBUMINURIA REV: CPT | Mod: CPTII,S$GLB,, | Performed by: RADIOLOGY

## 2023-05-02 PROCEDURE — 3077F SYST BP >= 140 MM HG: CPT | Mod: CPTII,S$GLB,, | Performed by: RADIOLOGY

## 2023-05-02 PROCEDURE — 99213 PR OFFICE/OUTPT VISIT, EST, LEVL III, 20-29 MIN: ICD-10-PCS | Mod: S$GLB,,, | Performed by: RADIOLOGY

## 2023-05-02 PROCEDURE — 3078F DIAST BP <80 MM HG: CPT | Mod: CPTII,S$GLB,, | Performed by: RADIOLOGY

## 2023-05-02 PROCEDURE — 3078F PR MOST RECENT DIASTOLIC BLOOD PRESSURE < 80 MM HG: ICD-10-PCS | Mod: CPTII,S$GLB,, | Performed by: RADIOLOGY

## 2023-05-02 PROCEDURE — 3066F PR DOCUMENTATION OF TREATMENT FOR NEPHROPATHY: ICD-10-PCS | Mod: CPTII,S$GLB,, | Performed by: RADIOLOGY

## 2023-05-02 PROCEDURE — 99999 PR PBB SHADOW E&M-EST. PATIENT-LVL V: CPT | Mod: PBBFAC,,, | Performed by: RADIOLOGY

## 2023-05-02 PROCEDURE — 4010F ACE/ARB THERAPY RXD/TAKEN: CPT | Mod: CPTII,S$GLB,, | Performed by: RADIOLOGY

## 2023-05-02 PROCEDURE — 3066F NEPHROPATHY DOC TX: CPT | Mod: CPTII,S$GLB,, | Performed by: RADIOLOGY

## 2023-05-02 PROCEDURE — 4010F PR ACE/ARB THEARPY RXD/TAKEN: ICD-10-PCS | Mod: CPTII,S$GLB,, | Performed by: RADIOLOGY

## 2023-05-02 PROCEDURE — 3051F PR MOST RECENT HEMOGLOBIN A1C LEVEL 7.0 - < 8.0%: ICD-10-PCS | Mod: CPTII,S$GLB,, | Performed by: RADIOLOGY

## 2023-05-02 PROCEDURE — 1159F MED LIST DOCD IN RCRD: CPT | Mod: CPTII,S$GLB,, | Performed by: RADIOLOGY

## 2023-05-02 PROCEDURE — 1159F PR MEDICATION LIST DOCUMENTED IN MEDICAL RECORD: ICD-10-PCS | Mod: CPTII,S$GLB,, | Performed by: RADIOLOGY

## 2023-05-02 PROCEDURE — 1126F AMNT PAIN NOTED NONE PRSNT: CPT | Mod: CPTII,S$GLB,, | Performed by: RADIOLOGY

## 2023-05-02 PROCEDURE — 3077F PR MOST RECENT SYSTOLIC BLOOD PRESSURE >= 140 MM HG: ICD-10-PCS | Mod: CPTII,S$GLB,, | Performed by: RADIOLOGY

## 2023-05-02 PROCEDURE — 99213 OFFICE O/P EST LOW 20 MIN: CPT | Mod: S$GLB,,, | Performed by: RADIOLOGY

## 2023-05-02 PROCEDURE — 99999 PR PBB SHADOW E&M-EST. PATIENT-LVL V: ICD-10-PCS | Mod: PBBFAC,,, | Performed by: RADIOLOGY

## 2023-05-02 PROCEDURE — 3062F PR POS MACROALBUMINURIA RESULT DOCUMENTED/REVIEW: ICD-10-PCS | Mod: CPTII,S$GLB,, | Performed by: RADIOLOGY

## 2023-05-02 PROCEDURE — 3051F HG A1C>EQUAL 7.0%<8.0%: CPT | Mod: CPTII,S$GLB,, | Performed by: RADIOLOGY

## 2023-05-02 RX ORDER — PEN NEEDLE, DIABETIC 29 G X1/2"
NEEDLE, DISPOSABLE MISCELLANEOUS
Qty: 150 EACH | Refills: 12 | Status: SHIPPED | OUTPATIENT
Start: 2023-05-02 | End: 2024-01-15 | Stop reason: CLARIF

## 2023-05-02 NOTE — TELEPHONE ENCOUNTER
Pt states needs maxi needles ASAP. Has one left    Also confused on when to take Fiasp. He wants to know if pt drinks Fair life shake if that is considered a meal and if he needs to restart the clock again. Same thing w/snacks. Does pt needs to follow the 4 hour rule?    Pt uses portal

## 2023-05-02 NOTE — TELEPHONE ENCOUNTER
----- Message from Americo Reyes sent at 5/2/2023  1:45 PM CDT -----  Type: Needs Medical Advice  Who Called:  pt's   Best Call Back Number: [phone]  Additional Information: Faustino is calling the office to speak with the nurse in regards to his wife's last visit. Please call back to advise Thanks!

## 2023-05-03 RX ORDER — PEN NEEDLE, DIABETIC 32 GX 1/6"
NEEDLE, DISPOSABLE MISCELLANEOUS
Qty: 150 EACH | Refills: 12 | Status: SHIPPED | OUTPATIENT
Start: 2023-05-03 | End: 2023-08-04

## 2023-05-08 ENCOUNTER — CLINICAL SUPPORT (OUTPATIENT)
Dept: CARDIOLOGY | Facility: HOSPITAL | Age: 73
End: 2023-05-08
Attending: INTERNAL MEDICINE
Payer: MEDICARE

## 2023-05-08 VITALS
WEIGHT: 205 LBS | DIASTOLIC BLOOD PRESSURE: 75 MMHG | SYSTOLIC BLOOD PRESSURE: 140 MMHG | BODY MASS INDEX: 32.95 KG/M2 | HEIGHT: 66 IN

## 2023-05-08 LAB
AV INDEX (PROSTH): 0.69
AV MEAN GRADIENT: 4 MMHG
AV PEAK GRADIENT: 8 MMHG
AV VALVE AREA: 1.99 CM2
AV VELOCITY RATIO: 0.76
BSA FOR ECHO PROCEDURE: 2.08 M2
CV ECHO LV RWT: 0.57 CM
DOP CALC AO PEAK VEL: 1.43 M/S
DOP CALC AO VTI: 30.7 CM
DOP CALC LVOT AREA: 2.9 CM2
DOP CALC LVOT DIAMETER: 1.91 CM
DOP CALC LVOT PEAK VEL: 1.08 M/S
DOP CALC LVOT STROKE VOLUME: 61 CM3
DOP CALCLVOT PEAK VEL VTI: 21.3 CM
E WAVE DECELERATION TIME: 294.87 MSEC
E/A RATIO: 0.69
E/E' RATIO: 10.71 M/S
ECHO LV POSTERIOR WALL: 0.96 CM (ref 0.6–1.1)
EJECTION FRACTION: 60 %
FRACTIONAL SHORTENING: 29 % (ref 28–44)
INTERVENTRICULAR SEPTUM: 0.98 CM (ref 0.6–1.1)
IVRT: 117.98 MSEC
LA MAJOR: 4.97 CM
LA MINOR: 5.19 CM
LA WIDTH: 3.4 CM
LEFT ATRIUM SIZE: 4.09 CM
LEFT ATRIUM VOLUME INDEX: 29.7 ML/M2
LEFT ATRIUM VOLUME: 60.02 CM3
LEFT INTERNAL DIMENSION IN SYSTOLE: 2.41 CM (ref 2.1–4)
LEFT VENTRICLE DIASTOLIC VOLUME INDEX: 23.08 ML/M2
LEFT VENTRICLE DIASTOLIC VOLUME: 46.63 ML
LEFT VENTRICLE MASS INDEX: 46 G/M2
LEFT VENTRICLE SYSTOLIC VOLUME INDEX: 10.1 ML/M2
LEFT VENTRICLE SYSTOLIC VOLUME: 20.42 ML
LEFT VENTRICULAR INTERNAL DIMENSION IN DIASTOLE: 3.38 CM (ref 3.5–6)
LEFT VENTRICULAR MASS: 93.75 G
LV LATERAL E/E' RATIO: 9.38 M/S
LV SEPTAL E/E' RATIO: 12.5 M/S
LVOT MG: 2.45 MMHG
LVOT MV: 0.74 CM/S
MV PEAK A VEL: 1.09 M/S
MV PEAK E VEL: 0.75 M/S
MV STENOSIS PRESSURE HALF TIME: 85.51 MS
MV VALVE AREA P 1/2 METHOD: 2.57 CM2
PISA TR MAX VEL: 2.06 M/S
PULM VEIN S/D RATIO: 1.45
PV PEAK D VEL: 0.29 M/S
PV PEAK S VEL: 0.42 M/S
RA MAJOR: 3.98 CM
RA PRESSURE: 3 MMHG
RA WIDTH: 3.2 CM
RIGHT VENTRICULAR END-DIASTOLIC DIMENSION: 3.54 CM
RIGHT VENTRICULAR LENGTH IN DIASTOLE (APICAL 4-CHAMBER VIEW): 6.91 CM
RV MID DIAMA: 2.8 CM
RV TISSUE DOPPLER FREE WALL SYSTOLIC VELOCITY 1 (APICAL 4 CHAMBER VIEW): 0.01 CM/S
SINUS: 2.63 CM
STJ: 2.37 CM
TDI LATERAL: 0.08 M/S
TDI SEPTAL: 0.06 M/S
TDI: 0.07 M/S
TR MAX PG: 17 MMHG
TRICUSPID ANNULAR PLANE SYSTOLIC EXCURSION: 2.42 CM
TV REST PULMONARY ARTERY PRESSURE: 20 MMHG

## 2023-05-08 PROCEDURE — 93306 TTE W/DOPPLER COMPLETE: CPT | Mod: PO

## 2023-05-09 ENCOUNTER — OFFICE VISIT (OUTPATIENT)
Dept: ENDOCRINOLOGY | Facility: CLINIC | Age: 73
End: 2023-05-09
Payer: MEDICARE

## 2023-05-09 ENCOUNTER — LAB VISIT (OUTPATIENT)
Dept: LAB | Facility: HOSPITAL | Age: 73
End: 2023-05-09
Attending: NURSE PRACTITIONER
Payer: MEDICARE

## 2023-05-09 VITALS
SYSTOLIC BLOOD PRESSURE: 147 MMHG | HEART RATE: 86 BPM | WEIGHT: 210.19 LBS | DIASTOLIC BLOOD PRESSURE: 84 MMHG | BODY MASS INDEX: 33.78 KG/M2 | HEIGHT: 66 IN

## 2023-05-09 DIAGNOSIS — T38.0X5A ADVERSE EFFECT OF CORTICOSTEROIDS, INITIAL ENCOUNTER: ICD-10-CM

## 2023-05-09 DIAGNOSIS — C34.90 SMALL CELL LUNG CANCER: ICD-10-CM

## 2023-05-09 DIAGNOSIS — R73.9 HYPERGLYCEMIA: ICD-10-CM

## 2023-05-09 DIAGNOSIS — Z79.4 TYPE 2 DIABETES MELLITUS WITHOUT COMPLICATION, WITH LONG-TERM CURRENT USE OF INSULIN: Primary | ICD-10-CM

## 2023-05-09 DIAGNOSIS — E27.40 ADRENAL INSUFFICIENCY: ICD-10-CM

## 2023-05-09 DIAGNOSIS — E11.9 TYPE 2 DIABETES MELLITUS WITHOUT COMPLICATION, WITH LONG-TERM CURRENT USE OF INSULIN: Primary | ICD-10-CM

## 2023-05-09 LAB
BACTERIA #/AREA URNS HPF: ABNORMAL /HPF
BILIRUB UR QL STRIP: NEGATIVE
CLARITY UR: CLEAR
COLOR UR: YELLOW
GLUCOSE UR QL STRIP: NEGATIVE
HGB UR QL STRIP: ABNORMAL
HYALINE CASTS #/AREA URNS LPF: 0 /LPF
KETONES UR QL STRIP: NEGATIVE
LEUKOCYTE ESTERASE UR QL STRIP: ABNORMAL
MICROSCOPIC COMMENT: ABNORMAL
NITRITE UR QL STRIP: NEGATIVE
PH UR STRIP: 6 [PH] (ref 5–8)
PROT UR QL STRIP: ABNORMAL
RBC #/AREA URNS HPF: 2 /HPF (ref 0–4)
SP GR UR STRIP: 1.01 (ref 1–1.03)
SQUAMOUS #/AREA URNS HPF: 2 /HPF
URN SPEC COLLECT METH UR: ABNORMAL
WBC #/AREA URNS HPF: 80 /HPF (ref 0–5)
WBC CLUMPS URNS QL MICRO: ABNORMAL

## 2023-05-09 PROCEDURE — 87086 URINE CULTURE/COLONY COUNT: CPT | Performed by: NURSE PRACTITIONER

## 2023-05-09 PROCEDURE — 3051F PR MOST RECENT HEMOGLOBIN A1C LEVEL 7.0 - < 8.0%: ICD-10-PCS | Mod: CPTII,S$GLB,, | Performed by: NURSE PRACTITIONER

## 2023-05-09 PROCEDURE — 3066F PR DOCUMENTATION OF TREATMENT FOR NEPHROPATHY: ICD-10-PCS | Mod: CPTII,S$GLB,, | Performed by: NURSE PRACTITIONER

## 2023-05-09 PROCEDURE — 3066F NEPHROPATHY DOC TX: CPT | Mod: CPTII,S$GLB,, | Performed by: NURSE PRACTITIONER

## 2023-05-09 PROCEDURE — 3077F PR MOST RECENT SYSTOLIC BLOOD PRESSURE >= 140 MM HG: ICD-10-PCS | Mod: CPTII,S$GLB,, | Performed by: NURSE PRACTITIONER

## 2023-05-09 PROCEDURE — 3079F PR MOST RECENT DIASTOLIC BLOOD PRESSURE 80-89 MM HG: ICD-10-PCS | Mod: CPTII,S$GLB,, | Performed by: NURSE PRACTITIONER

## 2023-05-09 PROCEDURE — 1101F PR PT FALLS ASSESS DOC 0-1 FALLS W/OUT INJ PAST YR: ICD-10-PCS | Mod: CPTII,S$GLB,, | Performed by: NURSE PRACTITIONER

## 2023-05-09 PROCEDURE — 3008F PR BODY MASS INDEX (BMI) DOCUMENTED: ICD-10-PCS | Mod: CPTII,S$GLB,, | Performed by: NURSE PRACTITIONER

## 2023-05-09 PROCEDURE — 3051F HG A1C>EQUAL 7.0%<8.0%: CPT | Mod: CPTII,S$GLB,, | Performed by: NURSE PRACTITIONER

## 2023-05-09 PROCEDURE — 99215 OFFICE O/P EST HI 40 MIN: CPT | Mod: 25,S$GLB,, | Performed by: NURSE PRACTITIONER

## 2023-05-09 PROCEDURE — 3079F DIAST BP 80-89 MM HG: CPT | Mod: CPTII,S$GLB,, | Performed by: NURSE PRACTITIONER

## 2023-05-09 PROCEDURE — 4010F ACE/ARB THERAPY RXD/TAKEN: CPT | Mod: CPTII,S$GLB,, | Performed by: NURSE PRACTITIONER

## 2023-05-09 PROCEDURE — 3288F PR FALLS RISK ASSESSMENT DOCUMENTED: ICD-10-PCS | Mod: CPTII,S$GLB,, | Performed by: NURSE PRACTITIONER

## 2023-05-09 PROCEDURE — 99215 PR OFFICE/OUTPT VISIT, EST, LEVL V, 40-54 MIN: ICD-10-PCS | Mod: 25,S$GLB,, | Performed by: NURSE PRACTITIONER

## 2023-05-09 PROCEDURE — 1101F PT FALLS ASSESS-DOCD LE1/YR: CPT | Mod: CPTII,S$GLB,, | Performed by: NURSE PRACTITIONER

## 2023-05-09 PROCEDURE — 1126F PR PAIN SEVERITY QUANTIFIED, NO PAIN PRESENT: ICD-10-PCS | Mod: CPTII,S$GLB,, | Performed by: NURSE PRACTITIONER

## 2023-05-09 PROCEDURE — 1126F AMNT PAIN NOTED NONE PRSNT: CPT | Mod: CPTII,S$GLB,, | Performed by: NURSE PRACTITIONER

## 2023-05-09 PROCEDURE — 99999 PR PBB SHADOW E&M-EST. PATIENT-LVL V: ICD-10-PCS | Mod: PBBFAC,,, | Performed by: NURSE PRACTITIONER

## 2023-05-09 PROCEDURE — 3008F BODY MASS INDEX DOCD: CPT | Mod: CPTII,S$GLB,, | Performed by: NURSE PRACTITIONER

## 2023-05-09 PROCEDURE — 81000 URINALYSIS NONAUTO W/SCOPE: CPT | Mod: PO | Performed by: NURSE PRACTITIONER

## 2023-05-09 PROCEDURE — 1159F PR MEDICATION LIST DOCUMENTED IN MEDICAL RECORD: ICD-10-PCS | Mod: CPTII,S$GLB,, | Performed by: NURSE PRACTITIONER

## 2023-05-09 PROCEDURE — 3062F PR POS MACROALBUMINURIA RESULT DOCUMENTED/REVIEW: ICD-10-PCS | Mod: CPTII,S$GLB,, | Performed by: NURSE PRACTITIONER

## 2023-05-09 PROCEDURE — 4010F PR ACE/ARB THEARPY RXD/TAKEN: ICD-10-PCS | Mod: CPTII,S$GLB,, | Performed by: NURSE PRACTITIONER

## 2023-05-09 PROCEDURE — 95251 CONT GLUC MNTR ANALYSIS I&R: CPT | Mod: S$GLB,,, | Performed by: NURSE PRACTITIONER

## 2023-05-09 PROCEDURE — 99999 PR PBB SHADOW E&M-EST. PATIENT-LVL V: CPT | Mod: PBBFAC,,, | Performed by: NURSE PRACTITIONER

## 2023-05-09 PROCEDURE — 3077F SYST BP >= 140 MM HG: CPT | Mod: CPTII,S$GLB,, | Performed by: NURSE PRACTITIONER

## 2023-05-09 PROCEDURE — 1159F MED LIST DOCD IN RCRD: CPT | Mod: CPTII,S$GLB,, | Performed by: NURSE PRACTITIONER

## 2023-05-09 PROCEDURE — 95251 PR GLUCOSE MONITOR, 72 HOUR, PHYS INTERP: ICD-10-PCS | Mod: S$GLB,,, | Performed by: NURSE PRACTITIONER

## 2023-05-09 PROCEDURE — 3062F POS MACROALBUMINURIA REV: CPT | Mod: CPTII,S$GLB,, | Performed by: NURSE PRACTITIONER

## 2023-05-09 PROCEDURE — 3288F FALL RISK ASSESSMENT DOCD: CPT | Mod: CPTII,S$GLB,, | Performed by: NURSE PRACTITIONER

## 2023-05-09 RX ORDER — INSULIN ASPART 100 [IU]/ML
INJECTION, SOLUTION INTRAVENOUS; SUBCUTANEOUS
Qty: 15 ML | Status: SHIPPED | OUTPATIENT
Start: 2023-05-09 | End: 2023-08-04

## 2023-05-09 RX ORDER — INSULIN DEGLUDEC 100 U/ML
15 INJECTION, SOLUTION SUBCUTANEOUS DAILY
Qty: 1 PEN | Refills: 12 | COMMUNITY
Start: 2023-05-09 | End: 2023-08-04

## 2023-05-09 RX ORDER — INSULIN DEGLUDEC 100 U/ML
15 INJECTION, SOLUTION SUBCUTANEOUS DAILY
Qty: 2 PEN | Refills: 3 | Status: SHIPPED | OUTPATIENT
Start: 2023-05-09 | End: 2023-08-04

## 2023-05-09 NOTE — PROGRESS NOTES
Subjective     Patient ID: Allyson Henriquez is a 73 y.o. female.    Chief Complaint: \ Diabetes     HPI  Pt is a 73 y.o. 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  Pt denies having diabetes and she will not check glucoses or take insulin.  She will go home and die first.She was initially consulted to endocrine regarding concerns of adrenal insufficiency. She also has slow healing abdominal wounds with colostomy as result of infected colon couple of years ago secondary to structural abnormality. GLucoses have been trending up slightly last couple of years, but just really became elevated in last 3 weeks.      Interim Events: Pt back for 2 week sensor download and review. Pt was started on that and a sliding scale FIasp--in the setting of she is not poking fingers or taking insulin.  with multitude of questions. Concerned about fluctuations--but hydrocortisone is also taken twice a day at different times--so that will make things more erractic.  Sensor downloaded.   Do note in last couple of days glucoses staying in 400-400 range, where prior she was hovering in 180 at night with some spikes curing the day.     CGMS Interpretation:       Sensor/Pump Interpretation or Prominent Theme:  Generally hovering near 180 at night with some note prandial glucose excursions. Last 48 hrs glucoses considerably higher.     April 28, 2023:  She was started on hydrocortisone in response to cortisol of 2 in July of 2022 per Dr. Meyer.    Since then hydrocortisone dose has fluctuated and more recently decreased  From 15/10 bid to current 10/5 bid.  She does complain of marked fatigue.  No N/V.  Repeat cortisol and ACTH was ordered by Dr. Meyer but not done.    Wound care is wanting to wean steroids r/t slow healing.   Gastro  (DR. Annia Ashton) is concerned about her nutrition status.      Pt advised neither wounds nor nutritional status will improve sufficiently without glucose control.  "   present. He is good support.      Gluose today 344---she did have 3 beneits and coffee preceding.    .    [place] of Systems   Constitutional:  Negative for activity change and fatigue.        Blood pressure 120/60, pulse 88, height 5' 6" (1.676 m), weight 93.9 kg (207 lb 0.2 oz).   HENT:  Positive for hearing loss. Negative for trouble swallowing.    Eyes:  Negative for photophobia and visual disturbance.        Last Eye Exam:    Respiratory:  Negative for cough and shortness of breath.    Cardiovascular:  Negative for chest pain and palpitations.   Gastrointestinal:  Negative for constipation and diarrhea.        Colostomy    Genitourinary:  Negative for frequency and urgency.   Musculoskeletal:  Negative for arthralgias and myalgias.   Integumentary:  Negative for rash and wound.   Neurological:  Negative for weakness and numbness.   Psychiatric/Behavioral:  Negative for sleep disturbance.         Objective     Physical Exam  Constitutional:       Appearance: Normal appearance. She is obese.   HENT:      Head: Normocephalic and atraumatic.      Nose: Nose normal.   Skin:     General: Skin is warm and dry.   Neurological:      General: No focal deficit present.      Mental Status: She is alert and oriented to person, place, and time.   Psychiatric:         Mood and Affect: Mood normal.         Behavior: Behavior normal.         Thought Content: Thought content normal.         Judgment: Judgment normal.      Comments: Easily overwhelmed.           Assessment and Plan   1. Type 2 diabetes mellitus without complication, with long-term current use of insulin  Ambulatory referral/consult to Diabetes Education      2. Hyperglycemia  Urinalysis    Urine culture    Ambulatory referral/consult to Diabetes Education      3. Small cell lung cancer        4. Adverse effect of corticosteroids, initial encounter        5. Adrenal insufficiency                 RX for Continuous Glucose Monitor   -Recommend Dexcom G7  " Continuous Glucose monitor                         Pt tests glucoses a minimum of 4 x a day.                          Pt takes a minimum of 1 injections of insulin a day.                          Pt would benefit from therapeutic continuous glucose monitor to be able                         to make frequent insulin adjustments, based on current glucoses.     Start 15 untis Tresiba qhs, --sampled.   Cont Novolog/Fiasp Sliding scale.              ORDERS 05/09/2023       Cond DIabetes Ed---need Dexcom G7 with  training.  Also needs appropriate insulin instruction and diet     UA CS today,   F/u me in 1 mo     45min  counseling on insulin, diet, sensors.

## 2023-05-10 ENCOUNTER — PATIENT MESSAGE (OUTPATIENT)
Dept: ENDOCRINOLOGY | Facility: CLINIC | Age: 73
End: 2023-05-10
Payer: MEDICARE

## 2023-05-10 LAB
BACTERIA UR CULT: NORMAL
BACTERIA UR CULT: NORMAL

## 2023-05-10 RX ORDER — BLOOD-GLUCOSE SENSOR
EACH MISCELLANEOUS
Qty: 2 EACH | Refills: 11 | Status: SHIPPED | OUTPATIENT
Start: 2023-05-10 | End: 2023-08-18

## 2023-05-12 ENCOUNTER — PATIENT MESSAGE (OUTPATIENT)
Dept: ENDOCRINOLOGY | Facility: CLINIC | Age: 73
End: 2023-05-12
Payer: MEDICARE

## 2023-05-12 ENCOUNTER — PATIENT MESSAGE (OUTPATIENT)
Dept: FAMILY MEDICINE | Facility: CLINIC | Age: 73
End: 2023-05-12
Payer: MEDICARE

## 2023-05-12 ENCOUNTER — TELEPHONE (OUTPATIENT)
Dept: ENDOCRINOLOGY | Facility: CLINIC | Age: 73
End: 2023-05-12
Payer: MEDICARE

## 2023-05-12 NOTE — TELEPHONE ENCOUNTER
S/w pts . Advised to delete garry and re install and try to re share. If that does not work need to contact [employer] tech support or may have faulty sensor. Verbalized understanding.

## 2023-05-12 NOTE — TELEPHONE ENCOUNTER
----- Message from Minerva Bruno sent at 5/12/2023 12:45 PM CDT -----  Contact:   Type: Needs Medical Advice  Who Called:  nawaf Harmon  Best Call Back Number: [phone]  Additional Information: requesting a call back regarding patients glucose monitor

## 2023-05-16 ENCOUNTER — OFFICE VISIT (OUTPATIENT)
Dept: CARDIOLOGY | Facility: CLINIC | Age: 73
End: 2023-05-16
Payer: MEDICARE

## 2023-05-16 ENCOUNTER — CLINICAL SUPPORT (OUTPATIENT)
Dept: DIABETES | Facility: CLINIC | Age: 73
End: 2023-05-16
Payer: MEDICARE

## 2023-05-16 VITALS
HEART RATE: 87 BPM | BODY MASS INDEX: 33.52 KG/M2 | SYSTOLIC BLOOD PRESSURE: 132 MMHG | DIASTOLIC BLOOD PRESSURE: 72 MMHG | WEIGHT: 208.56 LBS | HEIGHT: 66 IN

## 2023-05-16 DIAGNOSIS — N18.30 BENIGN HYPERTENSIVE HEART AND KIDNEY DISEASE WITH CHF, NYHA CLASS 1 AND CKD STAGE 3: Chronic | ICD-10-CM

## 2023-05-16 DIAGNOSIS — E11.9 TYPE 2 DIABETES MELLITUS WITHOUT COMPLICATION, WITH LONG-TERM CURRENT USE OF INSULIN: ICD-10-CM

## 2023-05-16 DIAGNOSIS — R73.9 HYPERGLYCEMIA: ICD-10-CM

## 2023-05-16 DIAGNOSIS — Z79.4 TYPE 2 DIABETES MELLITUS WITHOUT COMPLICATION, WITH LONG-TERM CURRENT USE OF INSULIN: ICD-10-CM

## 2023-05-16 DIAGNOSIS — E66.9 OBESITY, CLASS I, BMI 30-34.9: Chronic | ICD-10-CM

## 2023-05-16 DIAGNOSIS — Z79.01 CURRENT USE OF LONG TERM ANTICOAGULATION: Chronic | ICD-10-CM

## 2023-05-16 DIAGNOSIS — I13.0 BENIGN HYPERTENSIVE HEART AND KIDNEY DISEASE WITH CHF, NYHA CLASS 1 AND CKD STAGE 3: Chronic | ICD-10-CM

## 2023-05-16 DIAGNOSIS — I48.0 PAF (PAROXYSMAL ATRIAL FIBRILLATION): Primary | Chronic | ICD-10-CM

## 2023-05-16 DIAGNOSIS — Z79.899 LONG TERM CURRENT USE OF ANTIARRHYTHMIC DRUG: Chronic | ICD-10-CM

## 2023-05-16 PROCEDURE — 3288F PR FALLS RISK ASSESSMENT DOCUMENTED: ICD-10-PCS | Mod: CPTII,,, | Performed by: INTERNAL MEDICINE

## 2023-05-16 PROCEDURE — 3051F PR MOST RECENT HEMOGLOBIN A1C LEVEL 7.0 - < 8.0%: ICD-10-PCS | Mod: CPTII,,, | Performed by: INTERNAL MEDICINE

## 2023-05-16 PROCEDURE — 3066F PR DOCUMENTATION OF TREATMENT FOR NEPHROPATHY: ICD-10-PCS | Mod: CPTII,,, | Performed by: INTERNAL MEDICINE

## 2023-05-16 PROCEDURE — G0108 DIAB MANAGE TRN  PER INDIV: HCPCS | Mod: ,,, | Performed by: INTERNAL MEDICINE

## 2023-05-16 PROCEDURE — 3008F PR BODY MASS INDEX (BMI) DOCUMENTED: ICD-10-PCS | Mod: CPTII,,, | Performed by: INTERNAL MEDICINE

## 2023-05-16 PROCEDURE — 3066F NEPHROPATHY DOC TX: CPT | Mod: CPTII,,, | Performed by: INTERNAL MEDICINE

## 2023-05-16 PROCEDURE — 3078F DIAST BP <80 MM HG: CPT | Mod: CPTII,,, | Performed by: INTERNAL MEDICINE

## 2023-05-16 PROCEDURE — 3288F FALL RISK ASSESSMENT DOCD: CPT | Mod: CPTII,,, | Performed by: INTERNAL MEDICINE

## 2023-05-16 PROCEDURE — 99999 PR PBB SHADOW E&M-EST. PATIENT-LVL V: CPT | Mod: PBBFAC,,, | Performed by: INTERNAL MEDICINE

## 2023-05-16 PROCEDURE — 1126F PR PAIN SEVERITY QUANTIFIED, NO PAIN PRESENT: ICD-10-PCS | Mod: CPTII,,, | Performed by: INTERNAL MEDICINE

## 2023-05-16 PROCEDURE — 3078F PR MOST RECENT DIASTOLIC BLOOD PRESSURE < 80 MM HG: ICD-10-PCS | Mod: CPTII,,, | Performed by: INTERNAL MEDICINE

## 2023-05-16 PROCEDURE — 3075F SYST BP GE 130 - 139MM HG: CPT | Mod: CPTII,,, | Performed by: INTERNAL MEDICINE

## 2023-05-16 PROCEDURE — G0108 PR DIAB MANAGE TRN  PER INDIV: ICD-10-PCS | Mod: ,,, | Performed by: INTERNAL MEDICINE

## 2023-05-16 PROCEDURE — 4010F PR ACE/ARB THEARPY RXD/TAKEN: ICD-10-PCS | Mod: CPTII,,, | Performed by: INTERNAL MEDICINE

## 2023-05-16 PROCEDURE — 3062F POS MACROALBUMINURIA REV: CPT | Mod: CPTII,,, | Performed by: INTERNAL MEDICINE

## 2023-05-16 PROCEDURE — 3008F BODY MASS INDEX DOCD: CPT | Mod: CPTII,,, | Performed by: INTERNAL MEDICINE

## 2023-05-16 PROCEDURE — 99214 PR OFFICE/OUTPT VISIT, EST, LEVL IV, 30-39 MIN: ICD-10-PCS | Mod: ,,, | Performed by: INTERNAL MEDICINE

## 2023-05-16 PROCEDURE — 3051F HG A1C>EQUAL 7.0%<8.0%: CPT | Mod: CPTII,,, | Performed by: INTERNAL MEDICINE

## 2023-05-16 PROCEDURE — 1159F PR MEDICATION LIST DOCUMENTED IN MEDICAL RECORD: ICD-10-PCS | Mod: CPTII,,, | Performed by: INTERNAL MEDICINE

## 2023-05-16 PROCEDURE — 99214 OFFICE O/P EST MOD 30 MIN: CPT | Mod: ,,, | Performed by: INTERNAL MEDICINE

## 2023-05-16 PROCEDURE — 99215 OFFICE O/P EST HI 40 MIN: CPT | Mod: PO | Performed by: INTERNAL MEDICINE

## 2023-05-16 PROCEDURE — 3075F PR MOST RECENT SYSTOLIC BLOOD PRESS GE 130-139MM HG: ICD-10-PCS | Mod: CPTII,,, | Performed by: INTERNAL MEDICINE

## 2023-05-16 PROCEDURE — 3062F PR POS MACROALBUMINURIA RESULT DOCUMENTED/REVIEW: ICD-10-PCS | Mod: CPTII,,, | Performed by: INTERNAL MEDICINE

## 2023-05-16 PROCEDURE — 99999 PR PBB SHADOW E&M-EST. PATIENT-LVL II: CPT | Mod: PBBFAC,,,

## 2023-05-16 PROCEDURE — 99999 PR PBB SHADOW E&M-EST. PATIENT-LVL V: ICD-10-PCS | Mod: PBBFAC,,, | Performed by: INTERNAL MEDICINE

## 2023-05-16 PROCEDURE — 1101F PR PT FALLS ASSESS DOC 0-1 FALLS W/OUT INJ PAST YR: ICD-10-PCS | Mod: CPTII,,, | Performed by: INTERNAL MEDICINE

## 2023-05-16 PROCEDURE — 1101F PT FALLS ASSESS-DOCD LE1/YR: CPT | Mod: CPTII,,, | Performed by: INTERNAL MEDICINE

## 2023-05-16 PROCEDURE — 99999 PR PBB SHADOW E&M-EST. PATIENT-LVL II: ICD-10-PCS | Mod: PBBFAC,,,

## 2023-05-16 PROCEDURE — 4010F ACE/ARB THERAPY RXD/TAKEN: CPT | Mod: CPTII,,, | Performed by: INTERNAL MEDICINE

## 2023-05-16 PROCEDURE — 1159F MED LIST DOCD IN RCRD: CPT | Mod: CPTII,,, | Performed by: INTERNAL MEDICINE

## 2023-05-16 PROCEDURE — 1126F AMNT PAIN NOTED NONE PRSNT: CPT | Mod: CPTII,,, | Performed by: INTERNAL MEDICINE

## 2023-05-16 RX ORDER — SOTALOL HYDROCHLORIDE 80 MG/1
TABLET ORAL
Qty: 180 TABLET | Refills: 1 | Status: SHIPPED | OUTPATIENT
Start: 2023-05-16 | End: 2023-07-31

## 2023-05-16 NOTE — PROGRESS NOTES
Subjective:    Patient ID:  Allyson Henriquez is a 73 y.o. 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 difficile colitis 08/2021    Cancer     lung    CKD (chronic kidney disease), stage III     Colonic diverticular abscess 07/12/2022    COPD (chronic obstructive pulmonary disease)     Encounter for blood transfusion     Hepatic steatosis     History of numbness     transient numbness left hand and foot    History of tobacco abuse     Hyperlipidemia     Hypertension     Lung nodules     and adenopathy    Multinodular goiter (nontoxic) 05/08/2017    Obesity     TRISHA (obstructive sleep apnea)     on bipap at bedtime    Proteinuria      Past Surgical History:   Procedure Laterality Date    ADENOIDECTOMY      APPENDECTOMY      CATARACT EXTRACTION      COLONOSCOPY N/A 11/14/2016    Procedure: COLONOSCOPY;  Surgeon: Destin Cardona MD;  Location: [facility] ENDO;  Service: Endoscopy;  Laterality: N/A;    COLONOSCOPY N/A 05/14/2021    Procedure: COLONOSCOPY;  Surgeon: Destin Cardona MD;  Location: [facility] ENDO;  Service: Endoscopy;  Laterality: N/A; hemorrhoids, diverticulosis, Old blood left colon. No active bleeding; Repeat colonoscopy is not recommended for screening purposes.    COLOSTOMY N/A 7/28/2022    Procedure: CREATION, COLOSTOMY;  Surgeon: Nubia Tinoco MD;  Location: [facility] OR;  Service: General;  Laterality: N/A;    DEBRIDEMENT OF WOUND  OF ABDOMEN  7/28/2022    Procedure: DEBRIDEMENT, WOUND, ABDOMEN;  Surgeon: Nubia Tinoco MD;  Location: [facility] OR;  Service: General;;    ESOPHAGOGASTRODUODENOSCOPY N/A 05/14/2021    Procedure: EGD (ESOPHAGOGASTRODUODENOSCOPY);  Surgeon: Destin Cardona MD;  Location: [facility] ENDO;  Service: Endoscopy;  Laterality: N/A; unremarkable    ESOPHAGOGASTRODUODENOSCOPY N/A 08/09/2021    Procedure: ESOPHAGOGASTRODUODENOSCOPY (EGD);  Surgeon: Destin Cardona MD;  Location: [facility] ENDO;  Service: Endoscopy;  Laterality: N/A; Moderately severe radiation esophagitis with no bleeding    EXCISIONAL BIOPSY N/A 2/9/2023    Procedure: EXCISIONAL BIOPSY;  Surgeon: Nubia Tinoco MD;  Location: [facility] OR;  Service: General;  Laterality: N/A;    EYE SURGERY      cataract OU    INSERTION OF TUNNELED CENTRAL VENOUS CATHETER (CVC) WITH SUBCUTANEOUS PORT N/A 06/07/2021    Procedure: [id number]-[id number];  Surgeon: Joe Salinas MD;  Location: [facility] OR;  Service: General;  Laterality: N/A;    INTRALUMINAL GASTROINTESTINAL TRACT IMAGING VIA CAPSULE N/A 12/29/2021    Procedure: IMAGING PROCEDURE, GI TRACT, INTRALUMINAL, VIA CAPSULE  (called for instruction 12/20-may have trouble swallowing);  Surgeon: Floyd Dixon MD;  Location: [facility] ENDO;  Service: Endoscopy;  Laterality: N/A; Diffuse red spots of unclear significance and erosions found in the small bowel (entire small bowel), suspicious for mild radiation enteritis    NEUROMA SURGERY Left     acoustic    TONSILLECTOMY      WOUND EXPLORATION N/A 8/13/2022    Procedure: EXPLORATION, WOUND;  Surgeon: Arnel Olsen MD;  Location: [facility] OR;  Service: General;  Laterality: N/A;    WOUND EXPLORATION Left 2/9/2023    Procedure: EXPLORATION, WOUND - of Ostomy Granuloma - Wound - Abdomen;  Surgeon: Nubia Tinoco MD;  Location: [facility] OR;  Service: General;  Laterality: Left;     Family History   Problem Relation Age of Onset    Heart disease Mother         dissection    GI  problems Father         perf colon    Cancer Paternal Uncle         colon cancer    Diabetes Neg Hx     Kidney disease Neg Hx     Stroke Neg Hx     Colon cancer Neg Hx     Crohn's disease Neg Hx     Ulcerative colitis Neg Hx     Stomach cancer Neg Hx     Esophageal cancer Neg Hx      Social History     Socioeconomic History    Marital status:    Tobacco Use    Smoking status: Former     Packs/day: 1.00     Years: 40.00     Pack years: 40.00     Types: Cigarettes     Quit date: 3/6/2016     Years since quittin.2    Smokeless tobacco: Never   Substance and Sexual Activity    Alcohol use: Not Currently    Drug use: No   Social History Narrative    Works in insurance collection.       Review of patient's allergies indicates:   Allergen Reactions    Contrast media Anaphylaxis    Iodinated contrast media Anaphylaxis and Rash    Albuterol Other (See Comments)     Used during PFTs and put pt in afib    Dye     Pcn [penicillins]      Pt states did well on cephalexin.  No adverse reaction or side effect.     Doxycycline Palpitations     Tolerated IV Doxy 2022       Current Outpatient Medications:     amLODIPine (NORVASC) 2.5 MG tablet, Take 1 tablet (2.5 mg total) by mouth once daily., Disp: 90 tablet, Rfl: 1    ascorbic acid, vitamin C, (VITAMIN C) 1000 MG tablet, Take 500 mg by mouth 2 (two) times daily., Disp: , Rfl:     blood-glucose sensor (FREESTYLE JL 3 SENSOR) Gisele, Change every 14 days., Disp: 2 each, Rfl: 11    cetirizine (ZYRTEC) 10 MG tablet, Take 10 mg by mouth every morning., Disp: , Rfl:     collagenase (SANTYL) ointment, Apply topically once daily., Disp: 60 g, Rfl: 0    docusate sodium 100 mg capsule, Take 100 mg by mouth once daily. (Patient taking differently: Take 100 mg by mouth daily as needed for Constipation.), Disp: 90 tablet, Rfl: 1    ELIQUIS 5 mg Tab, TAKE 1 TABLET(5 MG) BY MOUTH TWICE DAILY (Patient taking differently: 2 (two) times daily.), Disp: 180 tablet, Rfl: 0     ergocalciferol (ERGOCALCIFEROL) 50,000 unit Cap, Take 50,000 Units by mouth once daily. 5,000 daily FOR 7 DAYS, Disp: , Rfl:     HYDROcodone-acetaminophen (NORCO) 5-325 mg per tablet, Take 1 tablet by mouth every 6 (six) hours as needed for Pain., Disp: 10 tablet, Rfl: 0    hydrocortisone (CORTEF) 5 MG Tab, TAKE 3 TABLETS(15 MG) BY MOUTH TWICE DAILY (Patient taking differently: 10 in AM and  5 PM), Disp: 180 tablet, Rfl: 1    insulin aspart U-100 (NOVOLOG FLEXPEN U-100 INSULIN) 100 unit/mL (3 mL) InPn pen, Use on premeal readings; 150-200=+2, 201-250=+4; 251-300=+6; 301-350=+8, over 350, + 10 units==50 units a day max., Disp: 15 mL, Rfl: PRN    insulin aspart, niacinamide, (FIASP FLEXTOUCH U-100 INSULIN SUBQ), Inject 1 mL into the skin 4 (four) times daily before meals and nightly. sliding scale -200   2 units    >350 10 units 201-250  4 units 251-300  6 units 301-350  8 units  Indications: hyperglycemia, Disp: , Rfl:     insulin aspart, niacinamide, (FIASP FLEXTOUCH U-100 INSULIN) 100 unit/mL (3 mL) InPn, Inject 2 Units into the skin 4 (four) times daily with meals and nightly. per sliding scale: 150-200 2u 201-250 4u 251-300 6u 301-350 8u  Indications: type 2 diabetes mellitus, Disp: , Rfl:     insulin degludec (TRESIBA FLEXTOUCH U-100 SUBQ), Inject 15 Units into the skin nightly. Indications: hyperglycemia, Disp: , Rfl:     insulin degludec (TRESIBA FLEXTOUCH U-100) 100 unit/mL (3 mL) insulin pen, Inject 15 Units into the skin once daily., Disp: 2 pen, Rfl: 3    insulin degludec (TRESIBA FLEXTOUCH U-100) 100 unit/mL (3 mL) insulin pen, Inject 15 Units into the skin once daily., Disp: 1 pen, Rfl: 12    levothyroxine (SYNTHROID) 100 MCG tablet, Take 1 tablet (100 mcg total) by mouth once daily., Disp: 90 tablet, Rfl: 3    LIDOcaine-prilocaine (EMLA) cream, Apply topically as needed (as needed for port access)., Disp: 30 g, Rfl: 1    metoprolol succinate (TOPROL-XL) 25 MG 24 hr tablet, TAKE 1 TABLET(25 MG) BY  "MOUTH TWICE DAILY, Disp: 180 tablet, Rfl: 1    multivit with minerals/lutein (MULTIVITAMIN 50 PLUS ORAL), Take 1 tablet by mouth Daily. Indications: supplement, Disp: , Rfl:     olmesartan (BENICAR) 5 MG Tab, Take 1 tablet (5 mg total) by mouth once daily. (Patient taking differently: Take 5 mg by mouth every evening.), Disp: 90 tablet, Rfl: 3    pantoprazole (PROTONIX) 40 MG tablet, TAKE 1 TABLET(40 MG) BY MOUTH EVERY DAY, Disp: 90 tablet, Rfl: 3    sertraline (ZOLOFT) 25 MG tablet, Take 1 tablet (25 mg total) by mouth once daily., Disp: 90 tablet, Rfl: 3    simvastatin (ZOCOR) 40 MG tablet, TAKE 1 TABLET(40 MG) BY MOUTH EVERY EVENING, Disp: 90 tablet, Rfl: 0    triamcinolone acetonide 0.1% (KENALOG) 0.1 % cream, Apply topically 2 (two) times daily., Disp: 45 g, Rfl: 1    calcium carbonate (TUMS) 200 mg calcium (500 mg) chewable tablet, Take 1 tablet (500 mg total) by mouth 3 (three) times daily. (Patient not taking: Reported on 5/9/2023), Disp: 270 tablet, Rfl: 3    pen needle, diabetic (NOVOFINE PLUS) 32 gauge x 1/6" Ndle, Test 4 times daily (Patient not taking: Reported on 5/16/2023), Disp: 150 each, Rfl: 12    pen needle, diabetic 31 gauge x 1/4" Ndle, To use with insulin pens up to 4x day. (Patient not taking: Reported on 5/16/2023), Disp: 150 each, Rfl: 12    predniSONE (DELTASONE) 50 MG Tab, Take by mouth., Disp: , Rfl:     sotaloL (BETAPACE) 80 MG tablet, TAKE 1 TABLET(80 MG) BY MOUTH TWICE DAILY, Disp: 180 tablet, Rfl: 1    UNABLE TO FIND, Apply 1 application topically Daily. apply to bilateral lower extremities, Disp: , Rfl:   No current facility-administered medications for this visit.    Facility-Administered Medications Ordered in Other Visits:     EUFLEXXA 10 mg/mL(mw 2.4 -3.6 million) injection Syrg 20 mg, 20 mg, Intra-articular, , Jose Rafael Barney MD, 20 mg at 06/14/17 1607    Review of Systems   Constitutional: Positive for malaise/fatigue. Negative for chills, diaphoresis, fever and night " "sweats.   HENT: Negative.  Negative for congestion and nosebleeds.    Eyes:  Negative for blurred vision and visual disturbance.   Cardiovascular:  Positive for dyspnea on exertion (MILD, STAIRS). Negative for chest pain, claudication, cyanosis, irregular heartbeat, leg swelling, near-syncope, orthopnea, palpitations, paroxysmal nocturnal dyspnea and syncope.   Respiratory:  Negative for cough, hemoptysis, shortness of breath and wheezing.    Endocrine: Negative.    Hematologic/Lymphatic: Negative for adenopathy. Does not bruise/bleed easily.   Skin:  Negative for color change and poor wound healing.   Musculoskeletal:  Negative for back pain and falls.   Gastrointestinal:  Negative for abdominal pain, change in bowel habit, diarrhea, dysphagia, jaundice, melena and nausea.   Genitourinary:  Negative for dysuria and flank pain.   Neurological:  Negative for brief paralysis, focal weakness, light-headedness and weakness (LESS).   Psychiatric/Behavioral:  Negative for altered mental status and memory loss.    Allergic/Immunologic: Negative for hives and persistent infections.      Objective:      Vitals:    05/16/23 1317   BP: 132/72   Pulse: 87   Weight: 94.6 kg (208 lb 8.9 oz)   Height: 5' 6" (1.676 m)   PainSc: 0-No pain     Body mass index is 33.66 kg/m².    Physical Exam  Constitutional:       Appearance: Normal appearance. She is overweight.   HENT:      Head: Normocephalic and atraumatic.   Eyes:      General: No scleral icterus.     Extraocular Movements: Extraocular movements intact.      Pupils: Pupils are equal, round, and reactive to light.   Neck:      Vascular: Normal carotid pulses. No carotid bruit or JVD.   Cardiovascular:      Rate and Rhythm: Normal rate and regular rhythm. No extrasystoles are present.     Pulses:           Carotid pulses are 2+ on the right side and 2+ on the left side.       Radial pulses are 2+ on the right side and 2+ on the left side.        Posterior tibial pulses are 2+ on " the right side and 2+ on the left side.      Heart sounds: Murmur heard.   Systolic murmur is present with a grade of 1/6.     No friction rub. No gallop. No S4 sounds.   Pulmonary:      Effort: Pulmonary effort is normal.      Breath sounds: Normal breath sounds and air entry.   Abdominal:      Palpations: Abdomen is soft. There is no hepatomegaly.       Musculoskeletal:      Cervical back: Neck supple.      Right lower leg: No edema.      Left lower leg: No edema.      Comments: IN WC   Skin:     General: Skin is warm and dry.      Capillary Refill: Capillary refill takes less than 2 seconds.   Neurological:      General: No focal deficit present.      Mental Status: She is alert.      Cranial Nerves: Cranial nerves 2-12 are intact. No cranial nerve deficit.   Psychiatric:         Mood and Affect: Mood normal.         Speech: Speech normal.         Behavior: Behavior normal.               ..    Chemistry        Component Value Date/Time     (L) 05/15/2023 1156    K 4.4 05/15/2023 1156    CL 99 05/15/2023 1156    CO2 22 05/15/2023 1156    BUN 42 (H) 05/15/2023 1156    CREATININE 1.04 05/15/2023 1156     (H) 05/15/2023 1156        Component Value Date/Time    CALCIUM 10.0 05/15/2023 1156    ALKPHOS 74 05/15/2023 1156    AST 29 05/15/2023 1156    ALT 21 05/15/2023 1156    BILITOT 0.4 05/15/2023 1156    ESTGFRAFRICA >60 07/31/2022 0430    EGFRNONAA 55 (A) 07/31/2022 0430            ..  Lab Results   Component Value Date    CHOL 158 08/19/2022    CHOL 167 07/06/2022    CHOL 141 03/24/2021     Lab Results   Component Value Date    HDL 22 (L) 08/19/2022    HDL 46 07/06/2022    HDL 41 03/24/2021     Lab Results   Component Value Date    LDLCALC 103.2 08/19/2022    LDLCALC 81.6 07/06/2022    LDLCALC 60.0 (L) 03/24/2021     Lab Results   Component Value Date    TRIG 164 (H) 08/19/2022    TRIG 197 (H) 07/06/2022    TRIG 200 (H) 03/24/2021     Lab Results   Component Value Date    CHOLHDL 13.9 (L) 08/19/2022     CHOLHDL 27.5 07/06/2022    CHOLHDL 29.1 03/24/2021     ..  Lab Results   Component Value Date    WBC 12.92 (H) 05/15/2023    HGB 10.6 (L) 05/15/2023    HCT 33.2 (L) 05/15/2023    MCV 87 05/15/2023     05/15/2023       Test(s) Reviewed  I have reviewed the following in detail:  [] Stress test   [] Angiography   [x] Echocardiogram   [x] Labs   [] Other:       Assessment:         ICD-10-CM ICD-9-CM   1. PAF (paroxysmal atrial fibrillation)  I48.0 427.31   2. 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 Long term current use of antiarrhythmic drug  Z79.899 V58.69   4. Current use of long term anticoagulation  Z79.01 V58.61   5. Obesity, Class I, BMI 30-34.9  E66.9 278.00     Problem List Items Addressed This Visit          Cardiac/Vascular    Benign hypertensive heart and kidney disease with CHF, NYHA class 1 and CKD stage 3    Overview     Echo, 4/23/18.  suboptimal study, mild concentric LVH, EF 65-70%, impaired diastolic relaxation, trace mitral regurg, RV systolic pressure is normal.           Long term current use of antiarrhythmic drug    Relevant Orders    Holter monitor - 72 hour    PAF (paroxysmal atrial fibrillation) - Primary    Relevant Medications    sotaloL (BETAPACE) 80 MG tablet    Other Relevant Orders    Holter monitor - 72 hour       Hematology    Current use of long term anticoagulation       Endocrine    Obesity, Class I, BMI 30-34.9        Plan:     HOLTER TO ASSESS AFIB BURDEN HEART RATE VARIABILITY.ALL CV CLINICALLY STABLE, NO ANGINA, NO HF, NO TIA, NO CLINICAL ARRHYTHMIA,CONTINUE CURRENT MEDS, EDUCATION, DIET, EXERCISE AS MUCH AS POSSIBLE INCREASE ACTIVITY WEIGHT LOSS MIGHT NEED COLOSTOMY REVISION WILL BE ACCEPTABLE RISK, RETURN TO CLINIC IN, 6 MO , DISCUSSED PLAN WITH THE PATIENT AND HER  INCREASED CV RISK WITH CKD      PAF (paroxysmal atrial fibrillation)  -     Holter monitor - 72 hour; Future  -      sotaloL (BETAPACE) 80 MG tablet; TAKE 1 TABLET(80 MG) BY MOUTH TWICE DAILY  Dispense: 180 tablet; Refill: 1    Benign hypertensive heart and kidney disease with CHF, NYHA class 1 and CKD stage 3    Long term current use of antiarrhythmic drug  -     Holter monitor - 72 hour; Future    Current use of long term anticoagulation    Obesity, Class I, BMI 30-34.9    RTC Low level/low impact aerobic exercise 5x's/wk. Heart healthy diet and risk factor modification.    See labs and med orders.    Aerobic exercise 5x's/wk. Heart healthy diet and risk factor modification.    See labs and med orders.

## 2023-05-17 VITALS — HEIGHT: 66 IN | BODY MASS INDEX: 33.27 KG/M2 | WEIGHT: 207 LBS

## 2023-05-17 NOTE — PROGRESS NOTES
Diabetes Care Specialist Progress Note  Author: Veronica Brown RD, CDE  Date: 5/17/2023    Program Intake  Reason for Diabetes Program Visit:: Initial Diabetes Assessment-Patient referred for dm education and review of new insulin regimen.  Patient and  frustrated with process of getting cgm and confustion with getting Kennedi sensors from the pharmacy vs Dexcom sensors from DME company.  Denies any current diagnosis or past history of dm.    Current diabetes risk level:: moderate    Lab Results   Component Value Date    HGBA1C 5.4 03/13/2023     Clinical    Problem Review  Reviewed Problem List with Patient: yes  Active comorbidities affecting diabetes self-care.: no  Reviewed health maintenance: yes    Clinical Assessment  Current Diabetes Treatment: Insulin-Tresiba- 15 units, Fiasp- using sliding scale only  Have you ever experienced hypoglycemia?: no  Have you ever experienced hyperglycemia?: no    Medication Information  How do you obtain your medications?: Family picks up  How many days a week do you miss your medications?: 1-Missed Tresiba last night  Medication adherence impacting ability to self-manage diabetes?: No    Labs  Do you have regular lab work to monitor your medications?: Yes  Type of Regular Lab Work: A1c, Cholesterol, BMP  Where do you get your labs drawn?: [facility]  Lab Compliance Barriers: No    Nutritional Status  Diet: Regular-Briefly discussed intake and nutrition today.  They are trying to substitute more whole grains in her diet and  is reading and comparing carb values on labels.  Eating 3 measl daily  Recent Changes in Weight: No Recent Weight Change  Current nutritional status an area of need that is impacting patient's ability to self-manage diabetes?: No    Additional Social History    Support  Does anyone support you with your diabetes care?: yes  Who supports you?: spouse (present at visit today- good support)  Who takes you to your medical appointments?: spouse  Does  the current support meet the patient's needs?: Yes  Is Support an area impacting ability to self-manage diabetes?: No    Access to Mass Media & Technology  Does the patient have access to any of the following devices or technologies?: Smart phone  Media or technology needs impacting ability to self-manage diabetes?: No    Cognitive/Behavioral Health  Alert and Oriented: Yes  Difficulty Thinking: No  Requires Prompting: No  Requires assistance for routine expression?: No  Cognitive or behavioral barriers impacting ability to self-manage diabetes?: No    Culture/Hoahaoism  Culture or Quaker beliefs that may impact ability to access healthcare: No    Communication  Language preference: English  Vision Problems: No  Communication needs impacting ability to self-manage diabetes?: No    Health Literacy  Preferred Learning Method: Face to Face  How often do you need to have someone help you read instructions, pamphlets, or written material from your doctor or pharmacy?: Never  Health literacy needs impacting ability to self-manage diabetes?: No    Diabetes Self-Management Skills Assessment    Diabetes Disease Process/Treatment Options  Patient/caregiver able to state what happens when someone has diabetes.: no  Patient/caregiver knows what type of diabetes they have.: no (Patient adamant that she does not have diabetes or history of diabetes.)  Patient/caregiver able to identify at least three signs and symptoms of diabetes.: no  Diabetes Disease Process/Treatment Options: Skills Assessment Completed: Yes  Assessment indicates:: Knowledge deficit  Area of need?: Yes    Nutrition/Healthy Eating  Challenges to healthy eating:: portion control  Method of carbohydrate measurement:: carb counting/reading labels  Patient can identify foods that impact blood sugar.: yes  Patient-identified foods:: starches (bread, pasta, rice, cereal), sweets, fruit/fruit juice  Nutrition/Healthy Eating Skills Assessment Completed::  Yes  Assessment indicates:: Instruction Needed  Area of need?: Yes    Physical Activity/Exercise  Patient's daily activity level:: sedentary  Patient formally exercises outside of work.: no  Reasons for not exercising:: physically unable to exercise currently  Physical Activity/Exercise Skills Assessment Completed: : Yes  Assessment indicates:: Adequate understanding  Area of need?: No    Medications  Patient is able to describe current diabetes management routine.: yes  Diabetes management routine:: insulin  Patient is able to identify current diabetes medications, dosages, and appropriate timing of medications.: yes  Patient understands the purpose of the medications taken for diabetes.: yes  Patient reports problems or concerns with current medication regimen.: no  Medication Skills Assessment Completed:: Yes  Assessment indicates:: Instruction Needed  Area of need?: Yes    Home Blood Glucose Monitoring  Patient states that blood sugar is checked at home daily.: yes  Monitoring Method:: personal continuous glucose monitor- not willing to manually test with glucometer   Personal CGM type::  (Kennedi 3- awaiting Dexcom G7 supplies)  Patient is able to use personal CGM appropriately.: yes  CGM Report reviewed?: yes  Home Blood Glucose Monitoring Skills Assessment Completed: : Yes  Assessment indicates:: Adequate understanding  Area of need?: No    Acute Complications  Acute Complications Skills Assessment Completed: : No  Deffered due to:: Time  Area of need?: No    Chronic Complications  Chronic Complications Skills Assessment Completed: : No  Deferred due to:: Time  Area of need?: No    Psychosocial/Coping  Psychosocial/Coping Skills Assessment Completed: : No  Deffered due to:: Time  Area of need?: No    Assessment Summary and Plan    Based on today's diabetes care assessment, the following areas of need were identified:      Social 5/16/2023   Support No   Access to Mass Media/Tech No   Cognitive/Behavioral Health  No   Culture/Caodaism No   Communication No   Health Literacy No      Clinical 5/16/2023   Medication Adherence No   Lab Compliance No   Nutritional Status No      Diabetes Self-Management Skills 5/16/2023   Diabetes Disease Process/Treatment Options Yes- goal blood sugars and A1c goal reviewed/ importance of good blood sugar goal as it relates to wound healing reviewed   Nutrition/Healthy Eating Yes- briefly reviewed carb sources and appropriate amounts per meal and snack- will need further f/u for this as applicable   Physical Activity/Exercise No   Medication Yes- will continue with same regimen and will review Kennedi report in 1-2 weeks for any new patterns   Home Blood Glucose Monitoring No- awaiting Dexcom G7 supplies- will need training once they receive them    Acute Complications No   Chronic Complications No   Psychosocial/Coping No      Today's interventions were provided through individual discussion, instruction, and written materials were provided.      Patient verbalized understanding of instruction and written materials.  Pt was able to return back demonstration of instructions today. Patient understood key points, needs reinforcement and further instruction.     Diabetes Self-Management Care Plan:    Today's Diabetes Self-Management Care Plan was developed with Allyson's input. Allyson has agreed to work toward the following goal(s) to improve his/her overall diabetes control.      Care Plan: Diabetes Management   Updates made since 4/17/2023 12:00 AM        Problem: Medications         Long-Range Goal: Patient Agrees to take insulin as prescribed.    Start Date: 5/17/2023   Priority: High   Barriers: No Barriers Identified   Note:    Reviewed timing of fiasp injections.  Currently only on sliding scale dose.  Freestyle Kennedi reports reviewed by Cheryle munguia and no changes were made to insulin regimen.         Task: Reviewed with patient all current diabetes medications and provided basic  review of the purpose, dosage, frequency, side effects, and storage of both oral and injectable diabetes medications. Completed 5/17/2023        Task: Discussed guidelines for preventing, detecting and treating hypoglycemia and hyperglycemia and reviewed the importance of meal and medication timing with diabetes mediations for prevention of hypoglycemia and maximum drug benefit. Completed 5/17/2023        Follow Up Plan     Follow up in about 4 weeks (around 6/13/2023).  Will have Cheryle review Kennedi reports in 2 weeks or sooner as needed.  Patients  will let us know when they receive G7 supplies so they can come in for training.  Encouraged him to call or send message in interim with any questions/concerns.      Today's care plan and follow up schedule was discussed with patient.  Allyson verbalized understanding of the care plan, goals, and agrees to follow up plan.        The patient was encouraged to communicate with his/her health care provider/physician and care team regarding his/her condition(s) and treatment.  I provided the patient with my contact information today and encouraged to contact me via phone or [facility] Patient Portal as needed.     Length of Visit   Total Time: 75 Minutes

## 2023-05-23 ENCOUNTER — PATIENT MESSAGE (OUTPATIENT)
Dept: ENDOCRINOLOGY | Facility: CLINIC | Age: 73
End: 2023-05-23
Payer: MEDICARE

## 2023-05-23 ENCOUNTER — PATIENT MESSAGE (OUTPATIENT)
Dept: DIABETES | Facility: CLINIC | Age: 73
End: 2023-05-23
Payer: MEDICARE

## 2023-05-24 ENCOUNTER — PATIENT MESSAGE (OUTPATIENT)
Dept: ENDOCRINOLOGY | Facility: CLINIC | Age: 73
End: 2023-05-24
Payer: MEDICARE

## 2023-05-24 ENCOUNTER — TELEPHONE (OUTPATIENT)
Dept: FAMILY MEDICINE | Facility: CLINIC | Age: 73
End: 2023-05-24
Payer: MEDICARE

## 2023-05-24 NOTE — TELEPHONE ENCOUNTER
Pt reports [employer] said her home health coverage ended 5/20 . Pt says  her home health nurse was there this week and did not mention that it was being stopped.  Left message w/ Janet at [facility] to call us back .--lp

## 2023-05-24 NOTE — TELEPHONE ENCOUNTER
"----- Message from Laly Caba sent at 5/24/2023 11:00 AM CDT -----  Name of Who is Calling: BLANCA SINGLETON [[id number]]       What is the request in detail: pt would like an "urgent" call due to [facility] expiring, stated that she needs to speak with someone in the clinic asap due to this matter        Can the clinic reply by [facility]: no       [facility]: [phone]        "

## 2023-05-25 ENCOUNTER — TELEPHONE (OUTPATIENT)
Dept: FAMILY MEDICINE | Facility: CLINIC | Age: 73
End: 2023-05-25
Payer: MEDICARE

## 2023-05-25 NOTE — TELEPHONE ENCOUNTER
----- Message from Lorena Bermudez sent at 5/24/2023 11:38 AM CDT -----  Type:  Patient Returning Call    Who Called:  Janet from [facility]  Who Left Message for Patient:  Marian  Does the patient know what this is regarding?:  yes  Best Call Back Number: [phone]    Additional Information:  please call and advise--thank you

## 2023-06-01 ENCOUNTER — PATIENT MESSAGE (OUTPATIENT)
Dept: FAMILY MEDICINE | Facility: CLINIC | Age: 73
End: 2023-06-01
Payer: MEDICARE

## 2023-06-01 DIAGNOSIS — R30.0 DYSURIA: Primary | ICD-10-CM

## 2023-06-02 ENCOUNTER — PATIENT MESSAGE (OUTPATIENT)
Dept: FAMILY MEDICINE | Facility: CLINIC | Age: 73
End: 2023-06-02
Payer: MEDICARE

## 2023-06-02 ENCOUNTER — PATIENT MESSAGE (OUTPATIENT)
Dept: RADIATION ONCOLOGY | Facility: CLINIC | Age: 73
End: 2023-06-02
Payer: MEDICARE

## 2023-06-07 ENCOUNTER — PATIENT MESSAGE (OUTPATIENT)
Dept: DIABETES | Facility: CLINIC | Age: 73
End: 2023-06-07
Payer: MEDICARE

## 2023-06-08 ENCOUNTER — TELEPHONE (OUTPATIENT)
Dept: FAMILY MEDICINE | Facility: CLINIC | Age: 73
End: 2023-06-08
Payer: MEDICARE

## 2023-06-08 NOTE — TELEPHONE ENCOUNTER
----- Message from Cole Mckinney sent at 6/8/2023  2:18 PM CDT -----  Contact: pt's  Faustino at [phone]  Type: Needs Medical Advice  Who Called:  pt's  Faustino  Best Call Back Number: [phone]  Additional Information: pt's  Faustino is calling the office requesting a all back from the nurse.    PER THE PT'S  FAUSTINO HE NEEDS TO TALK TO SOMEONE TODAY

## 2023-06-08 NOTE — TELEPHONE ENCOUNTER
Spoke w/ pt's . States that Onc rx'd Bactrim for the UTI and is wanting to know if this is sensitive and the right antibiotic. Advised him that the UTI is sensitive to Bactrim and to continue the entire course. He agreed. He has questions about several Vitamins. Advised that he reach out to pt's oncologist to see what he recommends that would be good for her and any treatment she is receiving. If need to discuss w/ Dr Pimentel, advised him to sched appt. He agreed.

## 2023-06-09 ENCOUNTER — CLINICAL SUPPORT (OUTPATIENT)
Dept: DIABETES | Facility: CLINIC | Age: 73
End: 2023-06-09
Payer: MEDICARE

## 2023-06-09 ENCOUNTER — PATIENT MESSAGE (OUTPATIENT)
Dept: DIABETES | Facility: CLINIC | Age: 73
End: 2023-06-09

## 2023-06-09 DIAGNOSIS — Z79.4 TYPE 2 DIABETES MELLITUS WITHOUT COMPLICATION, WITH LONG-TERM CURRENT USE OF INSULIN: Primary | ICD-10-CM

## 2023-06-09 DIAGNOSIS — E11.9 TYPE 2 DIABETES MELLITUS WITHOUT COMPLICATION, WITH LONG-TERM CURRENT USE OF INSULIN: Primary | ICD-10-CM

## 2023-06-09 PROCEDURE — G0108 DIAB MANAGE TRN  PER INDIV: HCPCS | Mod: S$GLB,,, | Performed by: DIETITIAN, REGISTERED

## 2023-06-09 PROCEDURE — G0108 PR DIAB MANAGE TRN  PER INDIV: ICD-10-PCS | Mod: S$GLB,,, | Performed by: DIETITIAN, REGISTERED

## 2023-06-09 NOTE — PROGRESS NOTES
"Diabetes Care Specialist Progress Note  Author: Katerina Lomeli RD  Date: 6/9/2023    Program Intake  Reason for Diabetes Program Visit:: Intervention  Type of Intervention:: Individual  Individual: Device Training  Device Training: Personal CGM  Current diabetes risk level:: moderate  In the last 12 months, have you:: been admitted to a hospital  Was the ER or hospital admission related to diabetes?: No  Permission to speak with others about care:: yes ()    Lab Results   Component Value Date    HGBA1C 5.4 03/13/2023       Clinical  Clinical Assessment  Current Diabetes Treatment: Insulin  Have you ever experienced hyperglycemia (high blood sugar)?: yes  In the last month, how often have you experienced high blood sugar?: once a day    Medication Information  How do you obtain your medications?: Family picks up    Nutritional Status  Diet: Regular  Meal Plan 24 Hour Recall: Breakfast, Lunch, Dinner  Meal Plan 24 Hour Recall - Breakfast: 1 slice whole wheat toast with coffee, raisin bran , cheerios sometimes with a banana  Meal Plan 24 Hour Recall - Lunch: apple with peanut butter and tea or 1/2 of a restaurant meal  Meal Plan 24 Hour Recall - Dinner: Dannon light and fit with fruit or 1/2 of a restaurant meal  Change in appetite?: Yes (Patient reports "sometimes I don't feel like eating.")  Current nutritional status an area of need that is impacting patient's ability to self-manage diabetes?: No    Additional Social History    Support  Does anyone support you with your diabetes care?: yes  Who supports you?: spouse  Who takes you to your medical appointments?: spouse  Does the current support meet the patient's needs?: Yes  Is Support an area impacting ability to self-manage diabetes?: No    Access to Mass Media & Technology  Does the patient have access to any of the following devices or technologies?: Smart phone, Internet Access  Media or technology needs impacting ability to self-manage diabetes?: " No    Cognitive/Behavioral Health  Alert and Oriented: Yes  Difficulty Thinking: No  Requires Prompting: No  Requires assistance for routine expression?: No  Cognitive or behavioral barriers impacting ability to self-manage diabetes?: No    Culture/Worship  Culture or Cheondoism beliefs that may impact ability to access healthcare: No    Communication  Language preference: English  Hearing Problems: No  Vision Problems: No  Communication needs impacting ability to self-manage diabetes?: No    Health Literacy  Preferred Learning Method: Face to Face, Reading Materials  How often do you need to have someone help you read instructions, pamphlets, or written material from your doctor or pharmacy?: Never  Health literacy needs impacting ability to self-manage diabetes?: No      Diabetes Self-Management Skills Assessment    Nutrition/Healthy Eating  Challenges to healthy eating:: eating out, going to parties  Method of carbohydrate measurement:: carb counting/reading labels  Patient can identify foods that impact blood sugar.: yes  Patient-identified foods:: starches (bread, pasta, rice, cereal), sweets, fruit/fruit juice  Nutrition/Healthy Eating Skills Assessment Completed:: Yes  Assessment indicates:: Instruction Needed  Area of need?: Yes    Medications  Patient is able to describe current diabetes management routine.: yes  Diabetes management routine:: insulin  Patient is able to identify current diabetes medications, dosages, and appropriate timing of medications.: yes  Patient understands the purpose of the medications taken for diabetes.: yes  Patient reports problems or concerns with current medication regimen.: yes  Medication regimen problems/concerns:: does not feel like regimen is working  Medication Skills Assessment Completed:: Yes  Assessment indicates:: Instruction Needed  Area of need?: Yes    Home Blood Glucose Monitoring  Patient states that blood sugar is checked at home daily.: yes  Monitoring  Method:: personal continuous glucose monitor  Personal CGM type:: Kennedi 3  Patient is able to use personal CGM appropriately.: yes  CGM Report reviewed?: yes  Home Blood Glucose Monitoring Skills Assessment Completed: : Yes  Assessment indicates:: Instruction Needed  Area of need?: Yes (Dexcom G7 start today)    Acute Complications  Acute Complications Skills Assessment Completed: : No  Deffered due to:: Time    Chronic Complications  Deferred due to:: Other (comment) ( denies patient has diabetes.)  Area of need?: No    Psychosocial/Coping  Psychosocial/Coping Skills Assessment Completed: : No  Deffered due to:: Time    Assessment Summary and Plan    Based on today's diabetes care assessment, the following areas of need were identified:      Social 6/9/2023   Support No   Access to Mass Media/Tech No   Cognitive/Behavioral Health No   Culture/[employer] No   Communication No   Health Literacy No        Clinical 6/9/2023   Medication Adherence -   Lab Compliance -   Nutritional Status No        Diabetes Self-Management Skills 6/9/2023   Nutrition/Healthy Eating Yes, see care plan   Medication Yes, see care plan   Home Blood Glucose Monitoring Yes, see care plan          Today's interventions were provided through individual discussion, instruction, and written materials were provided.      Patient verbalized understanding of instruction and written materials.  Pt was able to return back demonstration of instructions today. Patient understood key points, needs reinforcement and further instruction.     Diabetes Self-Management Care Plan:    Today's Diabetes Self-Management Care Plan was developed with Allyson's input. Allyson has agreed to work toward the following goal(s) to improve his/her overall diabetes control.      Care Plan: Diabetes Management   Updates made since 5/10/2023 12:00 AM        Problem: Medications         Long-Range Goal: Patient Agrees to take insulin as prescribed.    Start Date:  "5/17/2023   Priority: High   Barriers: No Barriers Identified   Note:    6/9/23: Reviewed timing of basal insulin as 9:30-10:30 pm.  Patient  states he gives correction dose according to sliding scale every 4 hours without regarding food.  When asked what could they do to bring BG down, recommended taking before the breakfast (9-10), lunch (12-1), and dinner (5-6) meals.     5/17/23: Reviewed timing of fiasp injections.  Currently only on sliding scale dose.  Freestyle Kennedi reports reviewed by Cheryle Gallegos today and no changes were made to insulin regimen.         Task: Reviewed with patient all current diabetes medications and provided basic review of the purpose, dosage, frequency, side effects, and storage of both oral and injectable diabetes medications. Completed 5/17/2023        Task: Discussed guidelines for preventing, detecting and treating hypoglycemia and hyperglycemia and reviewed the importance of meal and medication timing with diabetes mediations for prevention of hypoglycemia and maximum drug benefit. Completed 5/17/2023        Problem: Healthy Eating         Long-Range Goal: Eat 3 meals daily following the Plate Method. Pair breakfast carbohydrates with protein.    Start Date: 6/9/2023   Priority: Low   Barriers: Other (comments)   Note:    Patient specifically requested "a list of foods I can eat."  Provided the trifold and marked which food would increase BG and which foods did not.  Taught Plate Method.  Patient explained  giving her very small portions of starches.  Stated she can have a fist size of side dish at each meal.   picks up take out from a local restaurant for each meal where they divide the meals in half.  Wife wanted short discussion on diet and then focus on Dexcom placement, but  wanted extended coversation on HE.  This should not be the main focus. Having patient being bale to eat three meals a day should be main focus.         Task: Reviewed the " sources and role of Carbohydrate, Protein, and Fat and how each nutrient impacts blood sugar. Completed 6/9/2023        Task: Provided visual examples using dry measuring cups, food models, and other familiar objects such as computer mouse, deck or cards, tennis ball etc. to help with visualization of portions. Completed 6/9/2023        Task: Explained how to count carbohydrates using the food label and the use of dry measuring cups for accurate carb counting.         Task: Discussed strategies for choosing healthier menu options when dining out.         Task: Recommended replacing beverages containing high sugar content with noncaloric/sugar free options and/or water.         Task: Review the importance of balancing carbohydrates with each meal using portion control techniques to count servings of carbohydrate and label reading to identify serving size and amount of total carbs per serving.         Task: Provided Sample plate method and reviewed the use of the plate to estimate amounts of carbohydrate per meal. Completed 6/9/2023        Problem: Blood Glucose Self-Monitoring         Goal: Patient agrees to use Dexcom G7  to monitor glucose levels.    Start Date: 6/9/2023   Priority: Medium   Barriers: Knowledge deficit   Note:    PLACEMENT OF DEXCOM G7 PERSONAL CONTINOUS GLUCOSE MONITORING SYSTEM (CGMS)     REFERRING PROVIDER: Cheryle Gallegos NP     Patient is here in clinic today for placement of personal continuous glucose monitoring system (CGMS).   Patient has Dexcom G7  and Transmitter. Patient will use  to obtain continuous glucose readings.  PATIENT'S 'S PHONE NEEDS TO BE REPLACED SOON SO WILL START BERNA ON PHONE ONCE HE GETS NEW PHONE. Pt verbalizes understanding to change sensor every 10 days.  is also aware that each time a new sensor is placed there is a 30 minute warm up period. No calibration is necessary.     Discussed Dexcom G7 supplies with  "patient--company/pharmacy to reorder items and who to call (Dexcom technical support) if a sensor/transmitter fails.      When Dexcom G7 mobile garry will be started,  will be educated to leave garry running in the background (do not swipe off completely) to prevent gaps in CGMS tracings.       A detailed explanation of Dexcom G7 Continuous Glucose Monitoring System was provided. Reviewed the Dexcom "Getting Started Guide" with patient and she and  has a written copy for home use.    Patient was allowed to practice and time allowed to ask questions. Patient will notify Endocrinology if glucose levels are above 250 mg/dL consistently or if episode of glucose less than 70 mg/dL presents.  Patient verbalizes understanding.         High alert set at 300 mg/dL  Low alert set at 70 mg/dL  High rising alert: off  Low dropping alert: off    Instructed patient on how to enter insulin doses using Dexcom G7 to better understand timing and amount of insulin being administered and missed doses of insulin.       An appropriate site was selected and prepared. Patient inserted sensor into right upper arm. Sensor will be changed every 10 days.     When  gets new phone, he will contact me to teach him how to use Dexcom G7 garry on his phone. Pt has set up personal Dexcom account, and linked data sharing to [facility].  All questions answered.  Pt encouraged to contact Endocrinology department with any further questions or concerns.        Task: Patient and  taught how to use the Dexcom G7 system today to monitor glucose levels. Completed 6/9/2023          Follow Up Plan     Follow up if symptoms worsen or fail to improve.  Today's appointment had the sole goal of Dexcom G7 placement.  Patient's  had lots of questions about insulin timing and eating to manage blood glucose so the majority of the time went to this focus.  Patient stated she desired a list of food that does effect BG and does " "not effect BG.  Provided her with the Plate Method perry to help couple understand that she is not on a strict diet to manage BG.  Recommended pairing protein with breakfast carbohydrates in the morning.          states his phone needs to be replaced very soon so we did not place agrry on phone because patient was visibly tired after conversation about diet and medication.  We started Dexcom G7 on  today and  will contact me as soon as he gets a new phone to place Dexcom G7 garry on phone.  If patient was not visibly needing to leave, I would have added the garry to his phone and created an account for him today.  Explained that patient is tired and needed the appointment to end.  Patient stated this was so. Patient's  asked if I would be "mad" if he set up Dexcom account and garry.  Explained to him I will require him to be able to provide the user name and password to be able to properly link with clinic.         Today's care plan and follow up schedule was discussed with patient.  Allyson verbalized understanding of the care plan, goals, and agrees to follow up plan.        The patient was encouraged to communicate with his/her health care provider/physician and care team regarding his/her condition(s) and treatment.  I provided the patient with my contact information today and encouraged to contact me via phone or [facility] Portal as needed.     Length of Visit   Total Time: 90 Minutes     "

## 2023-06-12 ENCOUNTER — PATIENT MESSAGE (OUTPATIENT)
Dept: ENDOCRINOLOGY | Facility: CLINIC | Age: 73
End: 2023-06-12
Payer: MEDICARE

## 2023-06-14 ENCOUNTER — PATIENT MESSAGE (OUTPATIENT)
Dept: DIABETES | Facility: CLINIC | Age: 73
End: 2023-06-14
Payer: MEDICARE

## 2023-06-16 ENCOUNTER — CLINICAL SUPPORT (OUTPATIENT)
Dept: CARDIOLOGY | Facility: HOSPITAL | Age: 73
End: 2023-06-16
Attending: INTERNAL MEDICINE
Payer: MEDICARE

## 2023-06-16 DIAGNOSIS — Z79.899 LONG TERM CURRENT USE OF ANTIARRHYTHMIC DRUG: ICD-10-CM

## 2023-06-16 DIAGNOSIS — I48.0 PAF (PAROXYSMAL ATRIAL FIBRILLATION): ICD-10-CM

## 2023-06-16 PROCEDURE — 93244 EXT ECG>48HR<7D REV&INTERPJ: CPT | Mod: ,,, | Performed by: INTERNAL MEDICINE

## 2023-06-16 PROCEDURE — 93242 EXT ECG>48HR<7D RECORDING: CPT | Mod: PO

## 2023-06-16 PROCEDURE — 93244 HOLTER MONITOR - 72 HOUR: ICD-10-PCS | Mod: ,,, | Performed by: INTERNAL MEDICINE

## 2023-06-19 ENCOUNTER — PATIENT MESSAGE (OUTPATIENT)
Dept: ENDOCRINOLOGY | Facility: CLINIC | Age: 73
End: 2023-06-19
Payer: MEDICARE

## 2023-06-20 ENCOUNTER — EXTERNAL HOME HEALTH (OUTPATIENT)
Dept: HOME HEALTH SERVICES | Facility: HOSPITAL | Age: 73
End: 2023-06-20
Payer: MEDICARE

## 2023-06-22 LAB
OHS CV EVENT MONITOR DAY: 0
OHS CV HOLTER LENGTH DECIMAL HOURS: 72
OHS CV HOLTER LENGTH HOURS: 72
OHS CV HOLTER LENGTH MINUTES: 0
OHS CV HOLTER SINUS AVERAGE HR: 71
OHS CV HOLTER SINUS MAX HR: 100
OHS CV HOLTER SINUS MIN HR: 62

## 2023-06-27 ENCOUNTER — PATIENT MESSAGE (OUTPATIENT)
Dept: FAMILY MEDICINE | Facility: CLINIC | Age: 73
End: 2023-06-27
Payer: MEDICARE

## 2023-06-27 ENCOUNTER — PATIENT MESSAGE (OUTPATIENT)
Dept: CARDIOLOGY | Facility: CLINIC | Age: 73
End: 2023-06-27
Payer: MEDICARE

## 2023-06-28 ENCOUNTER — TELEPHONE (OUTPATIENT)
Dept: CARDIOLOGY | Facility: CLINIC | Age: 73
End: 2023-06-28
Payer: MEDICARE

## 2023-06-28 NOTE — TELEPHONE ENCOUNTER
BREN:  Mohsen w/ Comfort from Dr. Whittington's office, states they have your clearance form last office note.    Thank you

## 2023-06-29 NOTE — TELEPHONE ENCOUNTER
Pt will have surgery on 7/6 would we be able to use a virtual slot for this appt.     Please advise

## 2023-07-03 NOTE — TELEPHONE ENCOUNTER
"Spoke with pt. Pt reported that she was just updating Dr. Pimentel that she is having surgery and she was cleared by her cardiology already not that she is needing a to be cleared. Pt stated that she will update Dr. Pimentel of surgery at her next in clinic appointment. Offered to schedule pt for VV appt after surgery to discuss if needed. Pt declined at this time and stated "will call to schedule if needed." Dr. Pimentel notified of above.  " Problem: Patient Care Overview  Goal: Plan of Care Review  Outcome: Ongoing (interventions implemented as appropriate)   12/15/18 4772   Plan of Care Review   Progress no change   No new orders, care ongoing

## 2023-07-05 ENCOUNTER — PATIENT MESSAGE (OUTPATIENT)
Dept: ENDOCRINOLOGY | Facility: CLINIC | Age: 73
End: 2023-07-05
Payer: MEDICARE

## 2023-07-05 DIAGNOSIS — Z79.4 TYPE 2 DIABETES MELLITUS WITHOUT COMPLICATION, WITH LONG-TERM CURRENT USE OF INSULIN: Primary | ICD-10-CM

## 2023-07-05 DIAGNOSIS — E11.9 TYPE 2 DIABETES MELLITUS WITHOUT COMPLICATION, WITH LONG-TERM CURRENT USE OF INSULIN: Primary | ICD-10-CM

## 2023-07-06 ENCOUNTER — PATIENT MESSAGE (OUTPATIENT)
Dept: ENDOCRINOLOGY | Facility: CLINIC | Age: 73
End: 2023-07-06
Payer: MEDICARE

## 2023-07-06 RX ORDER — LANCETS
1 EACH MISCELLANEOUS DAILY
Qty: 100 EACH | Refills: 1 | Status: SHIPPED | OUTPATIENT
Start: 2023-07-06

## 2023-07-06 RX ORDER — INSULIN PUMP SYRINGE, 3 ML
EACH MISCELLANEOUS
Qty: 1 EACH | Refills: 0 | Status: SHIPPED | OUTPATIENT
Start: 2023-07-06

## 2023-08-02 ENCOUNTER — TELEPHONE (OUTPATIENT)
Dept: CARDIOLOGY | Facility: CLINIC | Age: 73
End: 2023-08-02
Payer: MEDICARE

## 2023-08-02 RX ORDER — APIXABAN 5 MG/1
TABLET, FILM COATED ORAL
Qty: 180 TABLET | Refills: 0 | Status: SHIPPED | OUTPATIENT
Start: 2023-08-02 | End: 2023-10-27

## 2023-08-02 NOTE — TELEPHONE ENCOUNTER
No care due was identified.  [facility] Embedded Care Due Messages. Reference number: [id number].   8/01/2023 7:52:35 PM CDT

## 2023-08-02 NOTE — TELEPHONE ENCOUNTER
----- Message from Janina Duff sent at 8/2/2023  1:18 PM CDT -----  Type: Needs Medical Advice  Who Called:  pt      Best Call Back Number:[phone]      Additional Information: pt  says hosp changed a few of her medications while hospitalized recently and wants to go over with dr echavarria advise

## 2023-08-02 NOTE — TELEPHONE ENCOUNTER
Pt recently hospitalized at  and the following medication changes were made:  Stopped amlodipine, olmesartan and sotalol  Increased metoprolol to 50mg BID  Eliquis stayed the same     wanted to run these by you

## 2023-08-03 ENCOUNTER — PATIENT MESSAGE (OUTPATIENT)
Dept: FAMILY MEDICINE | Facility: CLINIC | Age: 73
End: 2023-08-03
Payer: MEDICARE

## 2023-08-04 ENCOUNTER — PATIENT MESSAGE (OUTPATIENT)
Dept: CARDIOLOGY | Facility: CLINIC | Age: 73
End: 2023-08-04
Payer: MEDICARE

## 2023-08-04 ENCOUNTER — PATIENT MESSAGE (OUTPATIENT)
Dept: ENDOCRINOLOGY | Facility: CLINIC | Age: 73
End: 2023-08-04
Payer: MEDICARE

## 2023-08-04 DIAGNOSIS — Z79.4 TYPE 2 DIABETES MELLITUS WITHOUT COMPLICATION, WITH LONG-TERM CURRENT USE OF INSULIN: Primary | ICD-10-CM

## 2023-08-04 DIAGNOSIS — E11.9 TYPE 2 DIABETES MELLITUS WITHOUT COMPLICATION, WITH LONG-TERM CURRENT USE OF INSULIN: Primary | ICD-10-CM

## 2023-08-04 DIAGNOSIS — R73.9 HYPERGLYCEMIA: ICD-10-CM

## 2023-08-04 RX ORDER — PEN NEEDLE, DIABETIC 30 GX3/16"
NEEDLE, DISPOSABLE MISCELLANEOUS
Qty: 150 EACH | Status: SHIPPED | OUTPATIENT
Start: 2023-08-04 | End: 2024-01-15 | Stop reason: CLARIF

## 2023-08-04 RX ORDER — INSULIN LISPRO 100 [IU]/ML
INJECTION, SOLUTION INTRAVENOUS; SUBCUTANEOUS
Qty: 15 ML | Status: SHIPPED | OUTPATIENT
Start: 2023-08-04 | End: 2023-08-18

## 2023-08-04 RX ORDER — HYDROCORTISONE 5 MG/1
TABLET ORAL
Qty: 180 TABLET | Refills: 1 | Status: SHIPPED | OUTPATIENT
Start: 2023-08-04 | End: 2023-08-31

## 2023-08-04 RX ORDER — INSULIN GLARGINE 100 [IU]/ML
15 INJECTION, SOLUTION SUBCUTANEOUS NIGHTLY
Qty: 15 ML | Refills: 0 | Status: SHIPPED | OUTPATIENT
Start: 2023-08-04 | End: 2023-08-31

## 2023-08-04 RX ORDER — HYDROCORTISONE 5 MG/1
TABLET ORAL
Qty: 180 TABLET | Refills: 1 | Status: CANCELLED | OUTPATIENT
Start: 2023-08-04

## 2023-08-07 RX ORDER — HYDROCORTISONE 10 MG/1
10 TABLET ORAL DAILY
Qty: 30 TABLET | Refills: 3 | Status: SHIPPED | OUTPATIENT
Start: 2023-08-07 | End: 2023-08-31

## 2023-08-11 RX ORDER — PEN NEEDLE, DIABETIC 32GX 5/32"
NEEDLE, DISPOSABLE MISCELLANEOUS
Qty: 400 EACH | Refills: 0 | Status: CANCELLED | OUTPATIENT
Start: 2023-08-11

## 2023-08-15 ENCOUNTER — OFFICE VISIT (OUTPATIENT)
Dept: FAMILY MEDICINE | Facility: CLINIC | Age: 73
End: 2023-08-15
Payer: MEDICARE

## 2023-08-15 VITALS
HEART RATE: 98 BPM | WEIGHT: 194 LBS | DIASTOLIC BLOOD PRESSURE: 72 MMHG | HEIGHT: 66 IN | SYSTOLIC BLOOD PRESSURE: 126 MMHG | BODY MASS INDEX: 31.18 KG/M2 | OXYGEN SATURATION: 96 % | RESPIRATION RATE: 18 BRPM

## 2023-08-15 DIAGNOSIS — E27.40 ADRENAL INSUFFICIENCY: ICD-10-CM

## 2023-08-15 DIAGNOSIS — N30.00 ACUTE CYSTITIS WITHOUT HEMATURIA: ICD-10-CM

## 2023-08-15 DIAGNOSIS — R73.9 HYPERGLYCEMIA: ICD-10-CM

## 2023-08-15 DIAGNOSIS — Z79.4 INSULIN DEPENDENT TYPE 2 DIABETES MELLITUS: Primary | ICD-10-CM

## 2023-08-15 DIAGNOSIS — J44.9 CHRONIC OBSTRUCTIVE PULMONARY DISEASE, UNSPECIFIED COPD TYPE: ICD-10-CM

## 2023-08-15 DIAGNOSIS — K94.19 ALTERED BOWEL ELIMINATION DUE TO INTESTINAL OSTOMY: ICD-10-CM

## 2023-08-15 DIAGNOSIS — E11.9 INSULIN DEPENDENT TYPE 2 DIABETES MELLITUS: Primary | ICD-10-CM

## 2023-08-15 DIAGNOSIS — Z12.31 SCREENING MAMMOGRAM FOR HIGH-RISK PATIENT: ICD-10-CM

## 2023-08-15 PROCEDURE — 4010F PR ACE/ARB THEARPY RXD/TAKEN: ICD-10-PCS | Mod: CPTII,S$GLB,, | Performed by: FAMILY MEDICINE

## 2023-08-15 PROCEDURE — 1125F AMNT PAIN NOTED PAIN PRSNT: CPT | Mod: CPTII,S$GLB,, | Performed by: FAMILY MEDICINE

## 2023-08-15 PROCEDURE — 3066F NEPHROPATHY DOC TX: CPT | Mod: CPTII,S$GLB,, | Performed by: FAMILY MEDICINE

## 2023-08-15 PROCEDURE — 99214 OFFICE O/P EST MOD 30 MIN: CPT | Mod: S$GLB,,, | Performed by: FAMILY MEDICINE

## 2023-08-15 PROCEDURE — 1101F PT FALLS ASSESS-DOCD LE1/YR: CPT | Mod: CPTII,S$GLB,, | Performed by: FAMILY MEDICINE

## 2023-08-15 PROCEDURE — 3008F PR BODY MASS INDEX (BMI) DOCUMENTED: ICD-10-PCS | Mod: CPTII,S$GLB,, | Performed by: FAMILY MEDICINE

## 2023-08-15 PROCEDURE — 1125F PR PAIN SEVERITY QUANTIFIED, PAIN PRESENT: ICD-10-PCS | Mod: CPTII,S$GLB,, | Performed by: FAMILY MEDICINE

## 2023-08-15 PROCEDURE — 3288F FALL RISK ASSESSMENT DOCD: CPT | Mod: CPTII,S$GLB,, | Performed by: FAMILY MEDICINE

## 2023-08-15 PROCEDURE — 99999 PR PBB SHADOW E&M-EST. PATIENT-LVL V: CPT | Mod: PBBFAC,,, | Performed by: FAMILY MEDICINE

## 2023-08-15 PROCEDURE — 1101F PR PT FALLS ASSESS DOC 0-1 FALLS W/OUT INJ PAST YR: ICD-10-PCS | Mod: CPTII,S$GLB,, | Performed by: FAMILY MEDICINE

## 2023-08-15 PROCEDURE — 3078F DIAST BP <80 MM HG: CPT | Mod: CPTII,S$GLB,, | Performed by: FAMILY MEDICINE

## 2023-08-15 PROCEDURE — 3051F HG A1C>EQUAL 7.0%<8.0%: CPT | Mod: CPTII,S$GLB,, | Performed by: FAMILY MEDICINE

## 2023-08-15 PROCEDURE — 3062F POS MACROALBUMINURIA REV: CPT | Mod: CPTII,S$GLB,, | Performed by: FAMILY MEDICINE

## 2023-08-15 PROCEDURE — 3074F SYST BP LT 130 MM HG: CPT | Mod: CPTII,S$GLB,, | Performed by: FAMILY MEDICINE

## 2023-08-15 PROCEDURE — 99214 PR OFFICE/OUTPT VISIT, EST, LEVL IV, 30-39 MIN: ICD-10-PCS | Mod: S$GLB,,, | Performed by: FAMILY MEDICINE

## 2023-08-15 PROCEDURE — 3051F PR MOST RECENT HEMOGLOBIN A1C LEVEL 7.0 - < 8.0%: ICD-10-PCS | Mod: CPTII,S$GLB,, | Performed by: FAMILY MEDICINE

## 2023-08-15 PROCEDURE — 99999 PR PBB SHADOW E&M-EST. PATIENT-LVL V: ICD-10-PCS | Mod: PBBFAC,,, | Performed by: FAMILY MEDICINE

## 2023-08-15 PROCEDURE — 4010F ACE/ARB THERAPY RXD/TAKEN: CPT | Mod: CPTII,S$GLB,, | Performed by: FAMILY MEDICINE

## 2023-08-15 PROCEDURE — 3078F PR MOST RECENT DIASTOLIC BLOOD PRESSURE < 80 MM HG: ICD-10-PCS | Mod: CPTII,S$GLB,, | Performed by: FAMILY MEDICINE

## 2023-08-15 PROCEDURE — 3074F PR MOST RECENT SYSTOLIC BLOOD PRESSURE < 130 MM HG: ICD-10-PCS | Mod: CPTII,S$GLB,, | Performed by: FAMILY MEDICINE

## 2023-08-15 PROCEDURE — 3008F BODY MASS INDEX DOCD: CPT | Mod: CPTII,S$GLB,, | Performed by: FAMILY MEDICINE

## 2023-08-15 PROCEDURE — 3066F PR DOCUMENTATION OF TREATMENT FOR NEPHROPATHY: ICD-10-PCS | Mod: CPTII,S$GLB,, | Performed by: FAMILY MEDICINE

## 2023-08-15 PROCEDURE — 3288F PR FALLS RISK ASSESSMENT DOCUMENTED: ICD-10-PCS | Mod: CPTII,S$GLB,, | Performed by: FAMILY MEDICINE

## 2023-08-15 PROCEDURE — 3062F PR POS MACROALBUMINURIA RESULT DOCUMENTED/REVIEW: ICD-10-PCS | Mod: CPTII,S$GLB,, | Performed by: FAMILY MEDICINE

## 2023-08-15 RX ORDER — SIMVASTATIN 40 MG/1
TABLET, FILM COATED ORAL
Qty: 90 TABLET | Refills: 0 | Status: SHIPPED | OUTPATIENT
Start: 2023-08-15 | End: 2023-11-10

## 2023-08-15 RX ORDER — SULFAMETHOXAZOLE AND TRIMETHOPRIM 800; 160 MG/1; MG/1
1 TABLET ORAL 2 TIMES DAILY
Qty: 14 TABLET | Refills: 1 | Status: SHIPPED | OUTPATIENT
Start: 2023-08-15 | End: 2023-12-04 | Stop reason: SDUPTHER

## 2023-08-15 RX ORDER — BLOOD SUGAR DIAGNOSTIC
STRIP MISCELLANEOUS
Qty: 150 EACH | Refills: 12 | Status: SHIPPED | OUTPATIENT
Start: 2023-08-15

## 2023-08-15 NOTE — PROGRESS NOTES
"Subjective:       Patient ID: Allyson Henriquez is a 73 y.o. female.    Chief Complaint: Back Pain (Pt stated she has been dealing with pain for 10 DAYS )    Back Pain  Pertinent negatives include no dysuria or fever.     Pt well known to me with c/o back pain x 10 days. Pain noted with moving in bed, first getting up in the mornings.   She's taking tylenol along with topicals for some relief.     Blood sugars have been high the last 48hrs. Cgm indicates running 250+ the last few days. Taking 35u daily and then sliding scale with meals. Notes that she's not feeling well this morning.     Since lov, saw colorectal/sekou at  for correction of previous ostomy wound. Currently, has wound vac in place and maintaining f/u with wound/kessels.      She does have HH as well as HH-PT. They are having issues with leaking around her ostomy.     Review of Systems:  Review of Systems   Constitutional:  Negative for chills and fever.   HENT:  Negative for congestion.    Respiratory:  Negative for cough and shortness of breath.    Genitourinary:  Negative for difficulty urinating, dysuria, frequency and hematuria.   Musculoskeletal:  Positive for back pain.       Objective:     Vitals:    08/15/23 1120   BP: 126/72   BP Location: Left arm   Patient Position: Sitting   Pulse: 98   Resp: 18   SpO2: 96%   Weight: 88 kg (194 lb 0.1 oz)   Height: 5' 6" (1.676 m)          Physical Exam  Vitals and nursing note reviewed.   Constitutional:       General: She is not in acute distress.     Appearance: She is well-developed. She is obese.   HENT:      Head: Normocephalic and atraumatic.      Right Ear: External ear normal.      Left Ear: External ear normal.      Mouth/Throat:      Pharynx: No oropharyngeal exudate.   Neck:      Thyroid: No thyromegaly.   Cardiovascular:      Rate and Rhythm: Normal rate and regular rhythm.   Pulmonary:      Effort: Pulmonary effort is normal. No respiratory distress.      Breath sounds: Normal breath " sounds. No wheezing.   Abdominal:      General: There is no distension.      Palpations: Abdomen is soft. There is no mass.      Comments: Ostomy present, llq   Musculoskeletal:      Right lower leg: No edema.      Left lower leg: No edema.   Skin:     General: Skin is warm and dry.   Neurological:      General: No focal deficit present.      Mental Status: She is alert and oriented to person, place, and time.      Cranial Nerves: No cranial nerve deficit.   Psychiatric:         Mood and Affect: Mood is depressed.         Behavior: Behavior normal.           Assessment & Plan:  Insulin dependent type 2 diabetes mellitus  Comments:  continue cgm and insulin recs per endo    Hyperglycemia  -     Urinalysis, Reflex to Urine Culture Urine, Clean Catch; Future    Screening mammogram for high-risk patient  -     Mammo Digital Screening Bilat; Future; Expected date: 08/15/2023    Altered bowel elimination due to intestinal ostomy    Adrenal insufficiency  Comments:  on cortef following recent bowel surg    Chronic obstructive pulmonary disease, unspecified COPD type  Comments:  stable, cont regimen    Acute cystitis without hematuria  -     sulfamethoxazole-trimethoprim 800-160mg (BACTRIM DS) 800-160 mg Tab; Take 1 tablet by mouth 2 (two) times daily.  Dispense: 14 tablet; Refill: 1

## 2023-08-17 PROBLEM — K94.09 OTHER COMPLICATIONS OF COLOSTOMY: Status: ACTIVE | Noted: 2022-11-09

## 2023-08-18 ENCOUNTER — TELEPHONE (OUTPATIENT)
Dept: ENDOCRINOLOGY | Facility: CLINIC | Age: 73
End: 2023-08-18

## 2023-08-18 ENCOUNTER — OFFICE VISIT (OUTPATIENT)
Dept: ENDOCRINOLOGY | Facility: CLINIC | Age: 73
End: 2023-08-18
Payer: MEDICARE

## 2023-08-18 ENCOUNTER — LAB VISIT (OUTPATIENT)
Dept: LAB | Facility: HOSPITAL | Age: 73
End: 2023-08-18
Attending: INTERNAL MEDICINE
Payer: MEDICARE

## 2023-08-18 VITALS
SYSTOLIC BLOOD PRESSURE: 100 MMHG | HEART RATE: 81 BPM | DIASTOLIC BLOOD PRESSURE: 62 MMHG | HEIGHT: 66 IN | OXYGEN SATURATION: 95 % | BODY MASS INDEX: 31.22 KG/M2 | WEIGHT: 194.25 LBS

## 2023-08-18 DIAGNOSIS — E11.9 TYPE 2 DIABETES MELLITUS WITHOUT COMPLICATION, WITH LONG-TERM CURRENT USE OF INSULIN: ICD-10-CM

## 2023-08-18 DIAGNOSIS — R68.89 ABNORMAL ENDOCRINE LABORATORY TEST FINDING: ICD-10-CM

## 2023-08-18 DIAGNOSIS — E03.9 HYPOTHYROIDISM, UNSPECIFIED TYPE: Primary | ICD-10-CM

## 2023-08-18 DIAGNOSIS — E04.2 MULTINODULAR GOITER: ICD-10-CM

## 2023-08-18 DIAGNOSIS — Z79.4 TYPE 2 DIABETES MELLITUS WITHOUT COMPLICATION, WITH LONG-TERM CURRENT USE OF INSULIN: ICD-10-CM

## 2023-08-18 LAB
ESTIMATED AVG GLUCOSE: 154 MG/DL (ref 68–131)
HBA1C MFR BLD: 7 % (ref 4–5.6)

## 2023-08-18 PROCEDURE — 3051F PR MOST RECENT HEMOGLOBIN A1C LEVEL 7.0 - < 8.0%: ICD-10-PCS | Mod: CPTII,S$GLB,, | Performed by: INTERNAL MEDICINE

## 2023-08-18 PROCEDURE — 3051F HG A1C>EQUAL 7.0%<8.0%: CPT | Mod: CPTII,S$GLB,, | Performed by: INTERNAL MEDICINE

## 2023-08-18 PROCEDURE — 1126F PR PAIN SEVERITY QUANTIFIED, NO PAIN PRESENT: ICD-10-PCS | Mod: CPTII,S$GLB,, | Performed by: INTERNAL MEDICINE

## 2023-08-18 PROCEDURE — 99214 PR OFFICE/OUTPT VISIT, EST, LEVL IV, 30-39 MIN: ICD-10-PCS | Mod: S$GLB,,, | Performed by: INTERNAL MEDICINE

## 2023-08-18 PROCEDURE — 3074F PR MOST RECENT SYSTOLIC BLOOD PRESSURE < 130 MM HG: ICD-10-PCS | Mod: CPTII,S$GLB,, | Performed by: INTERNAL MEDICINE

## 2023-08-18 PROCEDURE — 1159F MED LIST DOCD IN RCRD: CPT | Mod: CPTII,S$GLB,, | Performed by: INTERNAL MEDICINE

## 2023-08-18 PROCEDURE — 3008F PR BODY MASS INDEX (BMI) DOCUMENTED: ICD-10-PCS | Mod: CPTII,S$GLB,, | Performed by: INTERNAL MEDICINE

## 2023-08-18 PROCEDURE — 1126F AMNT PAIN NOTED NONE PRSNT: CPT | Mod: CPTII,S$GLB,, | Performed by: INTERNAL MEDICINE

## 2023-08-18 PROCEDURE — 3062F POS MACROALBUMINURIA REV: CPT | Mod: CPTII,S$GLB,, | Performed by: INTERNAL MEDICINE

## 2023-08-18 PROCEDURE — 1159F PR MEDICATION LIST DOCUMENTED IN MEDICAL RECORD: ICD-10-PCS | Mod: CPTII,S$GLB,, | Performed by: INTERNAL MEDICINE

## 2023-08-18 PROCEDURE — 83036 HEMOGLOBIN GLYCOSYLATED A1C: CPT | Performed by: INTERNAL MEDICINE

## 2023-08-18 PROCEDURE — 36415 COLL VENOUS BLD VENIPUNCTURE: CPT | Mod: PN | Performed by: INTERNAL MEDICINE

## 2023-08-18 PROCEDURE — 99214 OFFICE O/P EST MOD 30 MIN: CPT | Mod: S$GLB,,, | Performed by: INTERNAL MEDICINE

## 2023-08-18 PROCEDURE — 1101F PR PT FALLS ASSESS DOC 0-1 FALLS W/OUT INJ PAST YR: ICD-10-PCS | Mod: CPTII,S$GLB,, | Performed by: INTERNAL MEDICINE

## 2023-08-18 PROCEDURE — 3078F DIAST BP <80 MM HG: CPT | Mod: CPTII,S$GLB,, | Performed by: INTERNAL MEDICINE

## 2023-08-18 PROCEDURE — 1160F RVW MEDS BY RX/DR IN RCRD: CPT | Mod: CPTII,S$GLB,, | Performed by: INTERNAL MEDICINE

## 2023-08-18 PROCEDURE — 4010F ACE/ARB THERAPY RXD/TAKEN: CPT | Mod: CPTII,S$GLB,, | Performed by: INTERNAL MEDICINE

## 2023-08-18 PROCEDURE — 99999 PR PBB SHADOW E&M-EST. PATIENT-LVL V: ICD-10-PCS | Mod: PBBFAC,,, | Performed by: INTERNAL MEDICINE

## 2023-08-18 PROCEDURE — 99999 PR PBB SHADOW E&M-EST. PATIENT-LVL V: CPT | Mod: PBBFAC,,, | Performed by: INTERNAL MEDICINE

## 2023-08-18 PROCEDURE — 3288F FALL RISK ASSESSMENT DOCD: CPT | Mod: CPTII,S$GLB,, | Performed by: INTERNAL MEDICINE

## 2023-08-18 PROCEDURE — 3074F SYST BP LT 130 MM HG: CPT | Mod: CPTII,S$GLB,, | Performed by: INTERNAL MEDICINE

## 2023-08-18 PROCEDURE — 1160F PR REVIEW ALL MEDS BY PRESCRIBER/CLIN PHARMACIST DOCUMENTED: ICD-10-PCS | Mod: CPTII,S$GLB,, | Performed by: INTERNAL MEDICINE

## 2023-08-18 PROCEDURE — 4010F PR ACE/ARB THEARPY RXD/TAKEN: ICD-10-PCS | Mod: CPTII,S$GLB,, | Performed by: INTERNAL MEDICINE

## 2023-08-18 PROCEDURE — 3008F BODY MASS INDEX DOCD: CPT | Mod: CPTII,S$GLB,, | Performed by: INTERNAL MEDICINE

## 2023-08-18 PROCEDURE — 3066F PR DOCUMENTATION OF TREATMENT FOR NEPHROPATHY: ICD-10-PCS | Mod: CPTII,S$GLB,, | Performed by: INTERNAL MEDICINE

## 2023-08-18 PROCEDURE — 1101F PT FALLS ASSESS-DOCD LE1/YR: CPT | Mod: CPTII,S$GLB,, | Performed by: INTERNAL MEDICINE

## 2023-08-18 PROCEDURE — 3288F PR FALLS RISK ASSESSMENT DOCUMENTED: ICD-10-PCS | Mod: CPTII,S$GLB,, | Performed by: INTERNAL MEDICINE

## 2023-08-18 PROCEDURE — 3078F PR MOST RECENT DIASTOLIC BLOOD PRESSURE < 80 MM HG: ICD-10-PCS | Mod: CPTII,S$GLB,, | Performed by: INTERNAL MEDICINE

## 2023-08-18 PROCEDURE — 3062F PR POS MACROALBUMINURIA RESULT DOCUMENTED/REVIEW: ICD-10-PCS | Mod: CPTII,S$GLB,, | Performed by: INTERNAL MEDICINE

## 2023-08-18 PROCEDURE — 3066F NEPHROPATHY DOC TX: CPT | Mod: CPTII,S$GLB,, | Performed by: INTERNAL MEDICINE

## 2023-08-18 RX ORDER — COSYNTROPIN 0.25 MG/ML
0.25 INJECTION, POWDER, FOR SOLUTION INTRAMUSCULAR; INTRAVENOUS
Status: CANCELLED | OUTPATIENT
Start: 2023-08-18

## 2023-08-18 RX ORDER — INSULIN LISPRO 100 [IU]/ML
INJECTION, SOLUTION INTRAVENOUS; SUBCUTANEOUS
Qty: 15 ML | Status: SHIPPED | OUTPATIENT
Start: 2023-08-18 | End: 2023-08-31

## 2023-08-18 RX ORDER — SODIUM CHLORIDE 0.9 % (FLUSH) 0.9 %
3 SYRINGE (ML) INJECTION
Status: CANCELLED | OUTPATIENT
Start: 2023-08-18

## 2023-08-18 NOTE — PROGRESS NOTES
CHIEF COMPLAINT: MNG  73 y.o.  being seen as a f/u. Had been seeing Dr. Trinidad and last seen by her 3/28/18. Pt opted for observation at that time. No XRT to head/neck. No difficulty swallowing. No change in voice. No tremors    On lantus 35 and humalog correction scale. Was at  for colostomy and hernia repair. Appears placed on Hydrocortisone due to a low cortisol. I do not see a cosyntropin stim test. Has been on it atleast a year. Currently on Hydrocortisone 10/5. Eating 3 meals a day. No sugar sweetened beverages. Doing a protein drink. On synthriod 100 mcg daily. Taking with PPI. No Palpitations. No tremors. She is weak.       PAST MEDICAL HISTORY/PAST SURGICAL HISTORY:  Reviewed in [facility]    SOCIAL HISTORY: Smoker and quit in 60's    FAMILY HISTORY:  Sister with thyroid cancer.     MEDICATIONS/ALLERGIES: The patient's MedCard has been updated and reviewed.    \        PE:  GENERAL: Well developed, well nourished.  NECK: Supple, trachea midline, left nodule palpable.   CHEST: Resp even and unlabored, CTA bilateral.  CARDIAC: Irreg Irreg no murmurs, rubs, S3, or S4     Latest Reference Range & Units 07/07/23 05:48   TSH 0.35 - 4.00 uIU/mL 0.54 (E)   (E): External lab result        ASSESSMENT/PLAN:  1. Multiple thyroid Nodules- no XRT.  No obstructive symptoms.  Can check thyroid ultrasound    2. Type 2 diabetes-currently on basal insulin and a sliding scale alone.  Will send Diabetes Education as her diet is changed after her colon surgery.  Discussed eat any 3 meals.  Change prandial insulin to 5 units plus the correction scale.  Continue current dose of basal insulin.  Will try to get her in with 1 of the nurse practitioners soon.  Reviewed how to do a sliding scale and that it is based on pre meal blood sugars.   had several questions.  Therefore I think will benefit from seeing Diabetes Education.    3. Low cortisol-appears steroids were started based on low cortisol.  Unclear what exactly she was on  at the time.  I do not see a cosyntropin stimulation test in the system.  Will schedule cosyntropin stimulation test.  Advise holding hydrocortisone the day prior to testing and restart after the test is complete.  We will need to monitor blood sugars when off steroids and monitor for hypoglycemia closely.  Discussed this with  and patient.  Discussed if she does have adrenal insufficiency- sick day precautions and medical alert bracelet.     4.  Hypothyroidism-check TSH.    FOLLOWUP  Dm education.   Check A1c  Cosyntropin stim test  THyroid Ultrasound.   Check TSH in 6 weeks

## 2023-08-19 ENCOUNTER — PATIENT MESSAGE (OUTPATIENT)
Dept: ENDOCRINOLOGY | Facility: CLINIC | Age: 73
End: 2023-08-19
Payer: MEDICARE

## 2023-08-21 ENCOUNTER — TELEPHONE (OUTPATIENT)
Dept: ENDOCRINOLOGY | Facility: CLINIC | Age: 73
End: 2023-08-21
Payer: MEDICARE

## 2023-08-21 ENCOUNTER — HOSPITAL ENCOUNTER (OUTPATIENT)
Dept: RADIOLOGY | Facility: HOSPITAL | Age: 73
Discharge: HOME | End: 2023-08-21
Attending: RADIOLOGY
Payer: MEDICARE

## 2023-08-21 ENCOUNTER — HOSPITAL ENCOUNTER (OUTPATIENT)
Dept: RADIOLOGY | Facility: HOSPITAL | Age: 73
Discharge: HOME | End: 2023-08-21
Attending: INTERNAL MEDICINE
Payer: MEDICARE

## 2023-08-21 DIAGNOSIS — C34.90 SMALL CELL LUNG CANCER: ICD-10-CM

## 2023-08-21 DIAGNOSIS — C79.31 MALIGNANT NEOPLASM METASTATIC TO BRAIN: ICD-10-CM

## 2023-08-21 DIAGNOSIS — E04.2 MULTINODULAR GOITER: ICD-10-CM

## 2023-08-21 DIAGNOSIS — E03.9 HYPOTHYROIDISM, UNSPECIFIED TYPE: ICD-10-CM

## 2023-08-21 PROCEDURE — 70553 MRI BRAIN STEM W/O & W/DYE: CPT | Mod: 26,,, | Performed by: RADIOLOGY

## 2023-08-21 PROCEDURE — 25500020 PHARM REV CODE 255: Mod: PO | Performed by: RADIOLOGY

## 2023-08-21 PROCEDURE — 70553 MRI BRAIN W WO CONTRAST: ICD-10-PCS | Mod: 26,,, | Performed by: RADIOLOGY

## 2023-08-21 PROCEDURE — A9585 GADOBUTROL INJECTION: HCPCS | Mod: PO | Performed by: RADIOLOGY

## 2023-08-21 PROCEDURE — 76536 US EXAM OF HEAD AND NECK: CPT | Mod: TC,PO

## 2023-08-21 PROCEDURE — 70553 MRI BRAIN STEM W/O & W/DYE: CPT | Mod: TC,PO

## 2023-08-21 PROCEDURE — 76536 US SOFT TISSUE HEAD NECK THYROID: ICD-10-PCS | Mod: 26,,, | Performed by: RADIOLOGY

## 2023-08-21 PROCEDURE — 76536 US EXAM OF HEAD AND NECK: CPT | Mod: 26,,, | Performed by: RADIOLOGY

## 2023-08-21 RX ORDER — GADOBUTROL 604.72 MG/ML
8 INJECTION INTRAVENOUS
Status: COMPLETED | OUTPATIENT
Start: 2023-08-21 | End: 2023-08-21

## 2023-08-21 RX ADMIN — GADOBUTROL 8 ML: 604.72 INJECTION INTRAVENOUS at 02:08

## 2023-08-21 NOTE — TELEPHONE ENCOUNTER
----- Message from Acacia Jones sent at 8/21/2023  9:14 AM CDT -----  Patient  would like someone to give him a call regarding the ACTH-CORTISOL STIMULATION TEST.    Thanks

## 2023-08-21 NOTE — TELEPHONE ENCOUNTER
Spoke to pt's  and he wants to know if pt is to take all of her meds and insulin on the morning of CSTIM. Please advise.

## 2023-08-21 NOTE — TELEPHONE ENCOUNTER
I think we discussed this at the visit as well a few times    Hold Hydrocortisone the day prior and restart after the test is complete until we can get the results back.

## 2023-08-25 ENCOUNTER — OFFICE VISIT (OUTPATIENT)
Dept: RADIATION ONCOLOGY | Facility: CLINIC | Age: 73
End: 2023-08-25
Payer: MEDICARE

## 2023-08-25 VITALS
OXYGEN SATURATION: 98 % | RESPIRATION RATE: 22 BRPM | DIASTOLIC BLOOD PRESSURE: 63 MMHG | WEIGHT: 193.13 LBS | SYSTOLIC BLOOD PRESSURE: 141 MMHG | BODY MASS INDEX: 31.04 KG/M2 | HEART RATE: 78 BPM | HEIGHT: 66 IN | TEMPERATURE: 98 F

## 2023-08-25 DIAGNOSIS — C79.31 MALIGNANT NEOPLASM METASTATIC TO BRAIN: Primary | ICD-10-CM

## 2023-08-25 PROCEDURE — 99999 PR PBB SHADOW E&M-EST. PATIENT-LVL V: CPT | Mod: PBBFAC,,, | Performed by: RADIOLOGY

## 2023-08-25 PROCEDURE — 3077F SYST BP >= 140 MM HG: CPT | Mod: CPTII,S$GLB,, | Performed by: RADIOLOGY

## 2023-08-25 PROCEDURE — 3078F PR MOST RECENT DIASTOLIC BLOOD PRESSURE < 80 MM HG: ICD-10-PCS | Mod: CPTII,S$GLB,, | Performed by: RADIOLOGY

## 2023-08-25 PROCEDURE — 3066F NEPHROPATHY DOC TX: CPT | Mod: CPTII,S$GLB,, | Performed by: RADIOLOGY

## 2023-08-25 PROCEDURE — 99999 PR PBB SHADOW E&M-EST. PATIENT-LVL V: ICD-10-PCS | Mod: PBBFAC,,, | Performed by: RADIOLOGY

## 2023-08-25 PROCEDURE — 1159F PR MEDICATION LIST DOCUMENTED IN MEDICAL RECORD: ICD-10-PCS | Mod: CPTII,S$GLB,, | Performed by: RADIOLOGY

## 2023-08-25 PROCEDURE — 99214 OFFICE O/P EST MOD 30 MIN: CPT | Mod: S$GLB,,, | Performed by: RADIOLOGY

## 2023-08-25 PROCEDURE — 1159F MED LIST DOCD IN RCRD: CPT | Mod: CPTII,S$GLB,, | Performed by: RADIOLOGY

## 2023-08-25 PROCEDURE — 3008F PR BODY MASS INDEX (BMI) DOCUMENTED: ICD-10-PCS | Mod: CPTII,S$GLB,, | Performed by: RADIOLOGY

## 2023-08-25 PROCEDURE — 3062F POS MACROALBUMINURIA REV: CPT | Mod: CPTII,S$GLB,, | Performed by: RADIOLOGY

## 2023-08-25 PROCEDURE — 4010F PR ACE/ARB THEARPY RXD/TAKEN: ICD-10-PCS | Mod: CPTII,S$GLB,, | Performed by: RADIOLOGY

## 2023-08-25 PROCEDURE — 3077F PR MOST RECENT SYSTOLIC BLOOD PRESSURE >= 140 MM HG: ICD-10-PCS | Mod: CPTII,S$GLB,, | Performed by: RADIOLOGY

## 2023-08-25 PROCEDURE — 3051F PR MOST RECENT HEMOGLOBIN A1C LEVEL 7.0 - < 8.0%: ICD-10-PCS | Mod: CPTII,S$GLB,, | Performed by: RADIOLOGY

## 2023-08-25 PROCEDURE — 3051F HG A1C>EQUAL 7.0%<8.0%: CPT | Mod: CPTII,S$GLB,, | Performed by: RADIOLOGY

## 2023-08-25 PROCEDURE — 1101F PR PT FALLS ASSESS DOC 0-1 FALLS W/OUT INJ PAST YR: ICD-10-PCS | Mod: CPTII,S$GLB,, | Performed by: RADIOLOGY

## 2023-08-25 PROCEDURE — 1126F PR PAIN SEVERITY QUANTIFIED, NO PAIN PRESENT: ICD-10-PCS | Mod: CPTII,S$GLB,, | Performed by: RADIOLOGY

## 2023-08-25 PROCEDURE — 3008F BODY MASS INDEX DOCD: CPT | Mod: CPTII,S$GLB,, | Performed by: RADIOLOGY

## 2023-08-25 PROCEDURE — 3288F FALL RISK ASSESSMENT DOCD: CPT | Mod: CPTII,S$GLB,, | Performed by: RADIOLOGY

## 2023-08-25 PROCEDURE — 3288F PR FALLS RISK ASSESSMENT DOCUMENTED: ICD-10-PCS | Mod: CPTII,S$GLB,, | Performed by: RADIOLOGY

## 2023-08-25 PROCEDURE — 3062F PR POS MACROALBUMINURIA RESULT DOCUMENTED/REVIEW: ICD-10-PCS | Mod: CPTII,S$GLB,, | Performed by: RADIOLOGY

## 2023-08-25 PROCEDURE — 4010F ACE/ARB THERAPY RXD/TAKEN: CPT | Mod: CPTII,S$GLB,, | Performed by: RADIOLOGY

## 2023-08-25 PROCEDURE — 1101F PT FALLS ASSESS-DOCD LE1/YR: CPT | Mod: CPTII,S$GLB,, | Performed by: RADIOLOGY

## 2023-08-25 PROCEDURE — 1126F AMNT PAIN NOTED NONE PRSNT: CPT | Mod: CPTII,S$GLB,, | Performed by: RADIOLOGY

## 2023-08-25 PROCEDURE — 99214 PR OFFICE/OUTPT VISIT, EST, LEVL IV, 30-39 MIN: ICD-10-PCS | Mod: S$GLB,,, | Performed by: RADIOLOGY

## 2023-08-25 PROCEDURE — 3078F DIAST BP <80 MM HG: CPT | Mod: CPTII,S$GLB,, | Performed by: RADIOLOGY

## 2023-08-25 PROCEDURE — 3066F PR DOCUMENTATION OF TREATMENT FOR NEPHROPATHY: ICD-10-PCS | Mod: CPTII,S$GLB,, | Performed by: RADIOLOGY

## 2023-08-25 NOTE — PROGRESS NOTES
[facility] Department of Radiation Oncology  Follow Up Visit Note    Diagnosis:  Allyson Henriquez is a 73 y.o. 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, status post SRS       Oncologic History:  Oncology History   Small cell lung cancer   5/20/2021 Initial Diagnosis    Small cell carcinoma of lung, right     6/8/2021 - 7/27/2021 Chemotherapy    Treatment Summary   Plan Name: OP CARBOPLATIN (AUC) + ETOPOSIDE Q3W  Treatment Goal: Palliative  Status: Inactive  Start Date: 6/8/2021  End Date: 7/27/2021  Provider: Madhav Cardona MD  Chemotherapy: CARBOplatin (PARAPLATIN) 395 mg in sodium chloride 0.9% 250 mL chemo infusion, 395 mg (100 % of original dose 395 mg), Intravenous, Clinic/HOD 1 time, 3 of 6 cycles  Dose modification:   (original dose 395 mg, Cycle 1, Reason: MD Discretion)  Administration: 395 mg (6/8/2021), 395 mg (6/29/2021), 395 mg (7/20/2021)  etoposide (VEPESID) 100 mg/m2 = 200 mg in sodium chloride 0.9% 500 mL chemo infusion, 100 mg/m2 = 200 mg, Intravenous, Clinic/HOD 1 time, 3 of 6 cycles  Administration: 200 mg (6/8/2021), 200 mg (6/9/2021), 200 mg (6/29/2021), 200 mg (6/10/2021), 200 mg (6/30/2021), 200 mg (7/1/2021), 200 mg (7/20/2021), 200 mg (7/21/2021), 200 mg (7/22/2021)     1/18/2022 - 7/6/2022 Chemotherapy    Treatment Summary   Plan Name: OP PEMBROLIZUMAB 200MG Q3W  Treatment Goal: Palliative  Status: Inactive  Start Date: 1/18/2022  End Date: 7/6/2022  Provider: Madhav Cardona MD  Chemotherapy: [No matching medication found in this treatment plan]     6/28/2022 - 6/28/2022 Radiation Therapy    Treating physician: Aleena Nielson  Total Dose: 22 Gy  Fractions: 1    Single fraction radiosurgery to right cerebellar metastasis.     Iron deficiency anemia   Brain metastasis   6/28/2022 Initial Diagnosis    Brain metastasis     6/28/2022 - 6/28/2022 Radiation Therapy    Treating physician: Aleena Nielson  Total  Dose: 22 Gy  Fractions: 1    Single fraction radiosurgery to right cerebellar metastasis.          Interval History  The patient presents today for a regularly scheduled follow up visit.  She was last seen in our follow up on 23.  Since that time she has been feeling well.  Some ongoing issues with wound healing related to her abdominal surgeries- had ostomy revision 2023, hernia repair, prolonged hospitalization.  Wound vac in place. Ongoing endocrine work up for blood sugar instability/hypoinsulinemia-on hydrocortisone for adrenal insufficiency. Currently on diet modifications and close monitoring.     23 MRI brain: right cerebellar small enhancing lesion with serial increased size, interval small focus of increased enhancement medial/inferior, concerning for recurrence vs rediation necrossis/treatment effect.  Stable left vestibular schwannoma.     Review of Systems   Review of Systems   Constitutional:  Negative for chills and fever.   Eyes:  Negative for blurred vision and double vision.   Respiratory:  Negative for cough.    Gastrointestinal:  Negative for abdominal pain (resolved).   Neurological:  Negative for dizziness, tremors (occasional bilat hand numbness on waking), sensory change, speech change, seizures, weakness and headaches.       Social History:  Social History     Tobacco Use    Smoking status: Former     Current packs/day: 0.00     Average packs/day: 1 pack/day for 40.0 years (40.0 ttl pk-yrs)     Types: Cigarettes     Start date: 3/6/1976     Quit date: 3/6/2016     Years since quittin.4    Smokeless tobacco: Never   Substance Use Topics    Alcohol use: Not Currently    Drug use: No       Family History:  Cancer-related family history includes Cancer in her paternal uncle. There is no history of Esophageal cancer.    Exam:  Vitals:    23 1055   BP: (!) 141/63   BP Location: Left arm   Patient Position: Sitting   Pulse: 78   Resp: (!) 22   Temp: 98.1 °F (36.7 °C)   SpO2:  "98%   Weight: 87.6 kg (193 lb 2 oz)   Height: 5' 6" (1.676 m)       Physical Exam  Vitals reviewed. Exam conducted with a chaperone present.   Constitutional:       General: She is not in acute distress.     Appearance: Normal appearance. She is obese. She is not ill-appearing or toxic-appearing.   HENT:      Head: Normocephalic and atraumatic.   Eyes:      Extraocular Movements: Extraocular movements intact.      Pupils: Pupils are equal, round, and reactive to light.   Abdominal:       Musculoskeletal:         General: No swelling.      Cervical back: No rigidity.   Skin:     General: Skin is warm.   Neurological:      Mental Status: She is alert.      Cranial Nerves: Cranial nerves 2-12 are intact.      Motor: Pronator drift (minimal right) present.      Coordination: Coordination normal. Finger-Nose-Finger Test abnormal (very slight). Rapid alternating movements normal.      Gait: Gait normal.      Comments: Minimal targeting difficulty R hand          Imaging:  MRI brain 8/21/23 reviewed as detailed above      Assessment:  Increased size of R cerebellar enhancement w/indistinct margins.    ECOG: (4) Completely disabled, unable to carry out self-care and confined to bed or chair    Plan:  MRI brain in 1 months (to monitor small region of increased enhancement)- MR SPECT in 1-2 months to better characterize cerebellar lesion as true vs pseudoprogression  Follow up after MR-Spect  Immunotherapy/systemic therapy under the care of Dr. Gonzalez, tolerating well  Continues on hydrocortisone 2/2 adrenal insufficiency, followed Endocrinologist  Follow up with Dr. Gonzalez as directed for ongoing Keytruda  Ongoing care for abdominal healing- wound vac in place, follows w/ new Colorectal Dr. Annia Ashton at   She was given our contact information, and she was told that she could call our clinic at anytime if she has any questions or concerns.  Follow up with other providers as directed        37 minutes spent in " chart/case review, face-to-face interaction, discussion with other providers, and treatment planning/coordination of care.          abd pain since this morning, denies n/v/d

## 2023-08-28 ENCOUNTER — TELEPHONE (OUTPATIENT)
Dept: ENDOCRINOLOGY | Facility: CLINIC | Age: 73
End: 2023-08-28
Payer: MEDICARE

## 2023-08-28 NOTE — TELEPHONE ENCOUNTER
----- Message from Jasmin Underwood sent at 8/28/2023  4:19 PM CDT -----  Type:  Needs Medical Advice    Who Called:  Pt's  Faustino    Would the patient rather a call back or a response via [facility]?  Call back    Best Call Back Number:  [phone]    Additional Information:  Trying to see when pt needs to fast and for how long for appt 8/30.   Please call back to advise. Thanks!

## 2023-08-29 ENCOUNTER — TELEPHONE (OUTPATIENT)
Dept: FAMILY MEDICINE | Facility: CLINIC | Age: 73
End: 2023-08-29
Payer: MEDICARE

## 2023-08-29 NOTE — TELEPHONE ENCOUNTER
Does not need to fast for the test  Just needs to hold steroids the day prior and the morning of the procedure. Can restart steroids once the procedure is complete until we get the results back

## 2023-08-29 NOTE — TELEPHONE ENCOUNTER
----- Message from Rosmery Ying, Patient Care Assistant sent at 8/28/2023  1:40 PM CDT -----  Regarding: advice  Contact: pt  Type: Needs Medical Advice  Who Called:  pt     Symptoms (please be specific):  UTI   How long has patient had these symptoms:  2 days     Pharmacy name and phone #:    [id number] - [place] [address] & [address] [address]  Phone: [phone] Fax: [phone]    Best Call Back Number: [phone] (home) [phone] (work)    Additional Information: please call pt to advise. Thanks!

## 2023-08-30 ENCOUNTER — INFUSION (OUTPATIENT)
Dept: INFUSION THERAPY | Facility: HOSPITAL | Age: 73
End: 2023-08-30
Attending: INTERNAL MEDICINE
Payer: MEDICARE

## 2023-08-30 VITALS
WEIGHT: 193.13 LBS | HEIGHT: 66 IN | SYSTOLIC BLOOD PRESSURE: 140 MMHG | OXYGEN SATURATION: 97 % | DIASTOLIC BLOOD PRESSURE: 68 MMHG | HEART RATE: 73 BPM | RESPIRATION RATE: 20 BRPM | TEMPERATURE: 97 F | BODY MASS INDEX: 31.04 KG/M2

## 2023-08-30 DIAGNOSIS — R68.89 ABNORMAL ENDOCRINE LABORATORY TEST FINDING: Primary | ICD-10-CM

## 2023-08-30 LAB
CORTIS SERPL-MCNC: 10.5 UG/DL
CORTIS SERPL-MCNC: 21 UG/DL
CORTIS SERPL-MCNC: 23.2 UG/DL

## 2023-08-30 PROCEDURE — 96374 THER/PROPH/DIAG INJ IV PUSH: CPT | Mod: PN

## 2023-08-30 PROCEDURE — 82024 ASSAY OF ACTH: CPT | Performed by: INTERNAL MEDICINE

## 2023-08-30 PROCEDURE — 82533 TOTAL CORTISOL: CPT | Mod: 91 | Performed by: INTERNAL MEDICINE

## 2023-08-30 PROCEDURE — 25000003 PHARM REV CODE 250: Mod: PN | Performed by: INTERNAL MEDICINE

## 2023-08-30 PROCEDURE — A4216 STERILE WATER/SALINE, 10 ML: HCPCS | Mod: PN | Performed by: INTERNAL MEDICINE

## 2023-08-30 PROCEDURE — 63600175 PHARM REV CODE 636 W HCPCS: Mod: PN | Performed by: INTERNAL MEDICINE

## 2023-08-30 RX ORDER — COSYNTROPIN 0.25 MG/ML
0.25 INJECTION, POWDER, FOR SOLUTION INTRAMUSCULAR; INTRAVENOUS
Status: COMPLETED | OUTPATIENT
Start: 2023-08-30 | End: 2023-08-30

## 2023-08-30 RX ORDER — COSYNTROPIN 0.25 MG/ML
0.25 INJECTION, POWDER, FOR SOLUTION INTRAMUSCULAR; INTRAVENOUS
Status: CANCELLED | OUTPATIENT
Start: 2023-08-30

## 2023-08-30 RX ORDER — SODIUM CHLORIDE 0.9 % (FLUSH) 0.9 %
3 SYRINGE (ML) INJECTION
Status: DISCONTINUED | OUTPATIENT
Start: 2023-08-30 | End: 2023-08-30 | Stop reason: HOSPADM

## 2023-08-30 RX ORDER — SODIUM CHLORIDE 0.9 % (FLUSH) 0.9 %
3 SYRINGE (ML) INJECTION
Status: CANCELLED | OUTPATIENT
Start: 2023-08-30

## 2023-08-30 RX ADMIN — COSYNTROPIN 0.25 MG: 0.25 INJECTION, POWDER, LYOPHILIZED, FOR SOLUTION INTRAMUSCULAR; INTRAVENOUS at 10:08

## 2023-08-30 RX ADMIN — Medication 3 ML: at 10:08

## 2023-08-30 NOTE — PLAN OF CARE
.Pt tolerated Cortisol IV push  i well.  No adverse reaction noted.   Port flushed with NS and de-accessed per protocol  Patient left clinic in no acute distress.

## 2023-08-31 ENCOUNTER — TELEPHONE (OUTPATIENT)
Dept: ENDOCRINOLOGY | Facility: CLINIC | Age: 73
End: 2023-08-31
Payer: MEDICARE

## 2023-08-31 ENCOUNTER — OFFICE VISIT (OUTPATIENT)
Dept: ENDOCRINOLOGY | Facility: CLINIC | Age: 73
End: 2023-08-31
Payer: MEDICARE

## 2023-08-31 ENCOUNTER — CLINICAL SUPPORT (OUTPATIENT)
Dept: DIABETES | Facility: CLINIC | Age: 73
End: 2023-08-31
Payer: MEDICARE

## 2023-08-31 VITALS
SYSTOLIC BLOOD PRESSURE: 124 MMHG | WEIGHT: 195.31 LBS | HEART RATE: 88 BPM | BODY MASS INDEX: 31.39 KG/M2 | WEIGHT: 195.31 LBS | BODY MASS INDEX: 31.39 KG/M2 | DIASTOLIC BLOOD PRESSURE: 66 MMHG | HEIGHT: 66 IN | HEIGHT: 66 IN

## 2023-08-31 DIAGNOSIS — R68.89 ABNORMAL ENDOCRINE LABORATORY TEST FINDING: Primary | ICD-10-CM

## 2023-08-31 DIAGNOSIS — T38.0X5A ADVERSE EFFECT OF CORTICOSTEROIDS, INITIAL ENCOUNTER: ICD-10-CM

## 2023-08-31 DIAGNOSIS — Z79.4 TYPE 2 DIABETES MELLITUS WITHOUT COMPLICATION, WITH LONG-TERM CURRENT USE OF INSULIN: Primary | ICD-10-CM

## 2023-08-31 DIAGNOSIS — E11.9 TYPE 2 DIABETES MELLITUS WITHOUT COMPLICATION, WITH LONG-TERM CURRENT USE OF INSULIN: ICD-10-CM

## 2023-08-31 DIAGNOSIS — E03.9 HYPOTHYROIDISM, UNSPECIFIED TYPE: ICD-10-CM

## 2023-08-31 DIAGNOSIS — R73.9 HYPERGLYCEMIA: ICD-10-CM

## 2023-08-31 DIAGNOSIS — Z79.4 TYPE 2 DIABETES MELLITUS WITHOUT COMPLICATION, WITH LONG-TERM CURRENT USE OF INSULIN: ICD-10-CM

## 2023-08-31 DIAGNOSIS — E11.9 TYPE 2 DIABETES MELLITUS WITHOUT COMPLICATION, WITH LONG-TERM CURRENT USE OF INSULIN: Primary | ICD-10-CM

## 2023-08-31 PROCEDURE — 3051F PR MOST RECENT HEMOGLOBIN A1C LEVEL 7.0 - < 8.0%: ICD-10-PCS | Mod: CPTII,S$GLB,, | Performed by: NURSE PRACTITIONER

## 2023-08-31 PROCEDURE — 99999 PR PBB SHADOW E&M-EST. PATIENT-LVL II: CPT | Mod: PBBFAC,,, | Performed by: DIETITIAN, REGISTERED

## 2023-08-31 PROCEDURE — 1126F AMNT PAIN NOTED NONE PRSNT: CPT | Mod: CPTII,S$GLB,, | Performed by: NURSE PRACTITIONER

## 2023-08-31 PROCEDURE — 3062F POS MACROALBUMINURIA REV: CPT | Mod: CPTII,S$GLB,, | Performed by: NURSE PRACTITIONER

## 2023-08-31 PROCEDURE — 4010F PR ACE/ARB THEARPY RXD/TAKEN: ICD-10-PCS | Mod: CPTII,S$GLB,, | Performed by: NURSE PRACTITIONER

## 2023-08-31 PROCEDURE — 3288F PR FALLS RISK ASSESSMENT DOCUMENTED: ICD-10-PCS | Mod: CPTII,S$GLB,, | Performed by: NURSE PRACTITIONER

## 2023-08-31 PROCEDURE — 1126F PR PAIN SEVERITY QUANTIFIED, NO PAIN PRESENT: ICD-10-PCS | Mod: CPTII,S$GLB,, | Performed by: NURSE PRACTITIONER

## 2023-08-31 PROCEDURE — 3066F NEPHROPATHY DOC TX: CPT | Mod: CPTII,S$GLB,, | Performed by: NURSE PRACTITIONER

## 2023-08-31 PROCEDURE — 95251 CONT GLUC MNTR ANALYSIS I&R: CPT | Mod: S$GLB,,, | Performed by: NURSE PRACTITIONER

## 2023-08-31 PROCEDURE — 1159F MED LIST DOCD IN RCRD: CPT | Mod: CPTII,S$GLB,, | Performed by: NURSE PRACTITIONER

## 2023-08-31 PROCEDURE — 99999 PR PBB SHADOW E&M-EST. PATIENT-LVL IV: ICD-10-PCS | Mod: PBBFAC,,, | Performed by: NURSE PRACTITIONER

## 2023-08-31 PROCEDURE — 4010F ACE/ARB THERAPY RXD/TAKEN: CPT | Mod: CPTII,S$GLB,, | Performed by: NURSE PRACTITIONER

## 2023-08-31 PROCEDURE — 3074F PR MOST RECENT SYSTOLIC BLOOD PRESSURE < 130 MM HG: ICD-10-PCS | Mod: CPTII,S$GLB,, | Performed by: NURSE PRACTITIONER

## 2023-08-31 PROCEDURE — 99999 PR PBB SHADOW E&M-EST. PATIENT-LVL II: ICD-10-PCS | Mod: PBBFAC,,, | Performed by: DIETITIAN, REGISTERED

## 2023-08-31 PROCEDURE — 99999 PR PBB SHADOW E&M-EST. PATIENT-LVL IV: CPT | Mod: PBBFAC,,, | Performed by: NURSE PRACTITIONER

## 2023-08-31 PROCEDURE — 3074F SYST BP LT 130 MM HG: CPT | Mod: CPTII,S$GLB,, | Performed by: NURSE PRACTITIONER

## 2023-08-31 PROCEDURE — 1101F PR PT FALLS ASSESS DOC 0-1 FALLS W/OUT INJ PAST YR: ICD-10-PCS | Mod: CPTII,S$GLB,, | Performed by: NURSE PRACTITIONER

## 2023-08-31 PROCEDURE — 95251 PR GLUCOSE MONITOR, 72 HOUR, PHYS INTERP: ICD-10-PCS | Mod: S$GLB,,, | Performed by: NURSE PRACTITIONER

## 2023-08-31 PROCEDURE — G0108 PR DIAB MANAGE TRN  PER INDIV: ICD-10-PCS | Mod: S$GLB,,, | Performed by: DIETITIAN, REGISTERED

## 2023-08-31 PROCEDURE — 3051F HG A1C>EQUAL 7.0%<8.0%: CPT | Mod: CPTII,S$GLB,, | Performed by: NURSE PRACTITIONER

## 2023-08-31 PROCEDURE — 1101F PT FALLS ASSESS-DOCD LE1/YR: CPT | Mod: CPTII,S$GLB,, | Performed by: NURSE PRACTITIONER

## 2023-08-31 PROCEDURE — 3078F DIAST BP <80 MM HG: CPT | Mod: CPTII,S$GLB,, | Performed by: NURSE PRACTITIONER

## 2023-08-31 PROCEDURE — 3066F PR DOCUMENTATION OF TREATMENT FOR NEPHROPATHY: ICD-10-PCS | Mod: CPTII,S$GLB,, | Performed by: NURSE PRACTITIONER

## 2023-08-31 PROCEDURE — 99215 PR OFFICE/OUTPT VISIT, EST, LEVL V, 40-54 MIN: ICD-10-PCS | Mod: 25,S$GLB,, | Performed by: NURSE PRACTITIONER

## 2023-08-31 PROCEDURE — 99215 OFFICE O/P EST HI 40 MIN: CPT | Mod: 25,S$GLB,, | Performed by: NURSE PRACTITIONER

## 2023-08-31 PROCEDURE — 3078F PR MOST RECENT DIASTOLIC BLOOD PRESSURE < 80 MM HG: ICD-10-PCS | Mod: CPTII,S$GLB,, | Performed by: NURSE PRACTITIONER

## 2023-08-31 PROCEDURE — G0108 DIAB MANAGE TRN  PER INDIV: HCPCS | Mod: S$GLB,,, | Performed by: DIETITIAN, REGISTERED

## 2023-08-31 PROCEDURE — 3008F PR BODY MASS INDEX (BMI) DOCUMENTED: ICD-10-PCS | Mod: CPTII,S$GLB,, | Performed by: NURSE PRACTITIONER

## 2023-08-31 PROCEDURE — 3008F BODY MASS INDEX DOCD: CPT | Mod: CPTII,S$GLB,, | Performed by: NURSE PRACTITIONER

## 2023-08-31 PROCEDURE — 3062F PR POS MACROALBUMINURIA RESULT DOCUMENTED/REVIEW: ICD-10-PCS | Mod: CPTII,S$GLB,, | Performed by: NURSE PRACTITIONER

## 2023-08-31 PROCEDURE — 1159F PR MEDICATION LIST DOCUMENTED IN MEDICAL RECORD: ICD-10-PCS | Mod: CPTII,S$GLB,, | Performed by: NURSE PRACTITIONER

## 2023-08-31 PROCEDURE — 3288F FALL RISK ASSESSMENT DOCD: CPT | Mod: CPTII,S$GLB,, | Performed by: NURSE PRACTITIONER

## 2023-08-31 RX ORDER — INSULIN LISPRO 100 [IU]/ML
INJECTION, SOLUTION INTRAVENOUS; SUBCUTANEOUS
Qty: 15 ML | Refills: 12 | Status: SHIPPED | OUTPATIENT
Start: 2023-08-31 | End: 2024-02-28

## 2023-08-31 RX ORDER — INSULIN GLARGINE 100 [IU]/ML
INJECTION, SOLUTION SUBCUTANEOUS
Qty: 15 ML | Refills: 12 | Status: SHIPPED | OUTPATIENT
Start: 2023-08-31 | End: 2024-02-02

## 2023-08-31 NOTE — TELEPHONE ENCOUNTER
Let them know that her cortisol levels are normal. No evidence of Adrenal Insufficiency at this time.   Decrease Hydrocortisone to 10 mg in AM only x 1 week  Then 5 mg IN AM x 1 week  Then stop Hydrocortisone   1 week after stopping Hydrocortisone, check an 8 AM ACTH and cortisol.   If has to have a surgical procedure or extremely ill,  between now and retesting will still need stress dose steroids.

## 2023-08-31 NOTE — PROGRESS NOTES
Subjective     Patient ID: Allyson Henriquez is a 73 y.o. female.    Chief Complaint: \ Diabetes     HPI  Pt is a 73 y.o. 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  Pt denies having diabetes and she will not check glucoses or take insulin.  She will go home and die first.She was initially consulted to endocrine regarding concerns of adrenal insufficiency. She also has slow healing abdominal wounds with colostomy as result of infected colon couple of years ago secondary to structural abnormality. GLucoses have been trending up slightly last couple of years, but just really became elevated in last 3 weeks.      Interim Events: Aug 31, 2023:  Pt now s/p hernia and colostomy repair at  with Dr. Ashton. Saw Dr. Daly 2 weeks ago regarding concerns of adrenal insufficiency.  Seeing me for f/u with diabetes control.  States Dr. Coreas (and or associates) checked pancreas and said pt not making insulin.  Sensor downloaded.  No hypoglycemia.  Saw DE today.  Reinforced proper insulin dosing.  Pt was gettting meal insulin and then driving to restaurants. Also some concerns of her not wanting to eat and needing to take insulin.  Concerns over conflicting information on what size pen needle to use.     Current DM meds:   Lantus 35 qhs,  Humalog with meals 5 plus ss.        Failed DM meds:  none        Back up plan for insulin pump hiatus:   Statin: simvastatin 40 mg        Not tolerated statin : na   ACE/ARB:none        Not tolerated ACE/ARB: dominga   Known Diabetic complications: none         Passwords for Tech Accounts: na     May 9, 2023:  Pt back for 2 week sensor download and review. Pt was started on that and a sliding scale FIasp--in the setting of she is not poking fingers or taking insulin.  with multitude of questions. Concerned about fluctuations--but hydrocortisone is also taken twice a day at different times--so that will make things more erractic.  Sensor downloaded.   Do note in  last couple of days glucoses staying in 400-400 range, where prior she was hovering in 180 at night with some spikes curing the day.     CGMS Interpretation:       Sensor/Pump Interpretation or Prominent Theme:  Generally hovering near 180 at night,  No note a slight rise overnight even though pt usually in bed by 9 pm. Also with some  expected prandial glucose excursions.       April 28, 2023:  She was started on hydrocortisone in response to cortisol of 2 in July of 2022 per Dr. Meyer.    Since then hydrocortisone dose has fluctuated and more recently decreased  From 15/10 bid to current 10/5 bid.  She does complain of marked fatigue.  No N/V.  Repeat cortisol and ACTH was ordered by Dr. Meyer but not done.    Wound care is wanting to wean steroids r/t slow healing.   Gastro  (DR. Annia Ashton) is concerned about her nutrition status.      Pt advised neither wounds nor nutritional status will improve sufficiently without glucose control.    present. He is good support.      Gluose today 344---she did have 3 beneits and coffee preceding.    .    IReview of Systems   Constitutional:  Negative for activity change and fatigue.   HENT:  Positive for hearing loss. Negative for trouble swallowing.    Eyes:  Negative for photophobia and visual disturbance.        Last Eye Exam:    Respiratory:  Negative for cough and shortness of breath.    Cardiovascular:  Negative for chest pain and palpitations.   Gastrointestinal:  Negative for constipation and diarrhea.        Colostomy    Genitourinary:  Negative for frequency and urgency.   Musculoskeletal:  Negative for arthralgias and myalgias.   Integumentary:  Negative for rash and wound.   Neurological:  Negative for weakness and numbness.   Psychiatric/Behavioral:  Negative for sleep disturbance.           Objective     Physical Exam  Constitutional:       Appearance: Normal appearance. She is obese.   HENT:      Head: Normocephalic and atraumatic.       "Nose: Nose normal.   Skin:     General: Skin is warm and dry.   Neurological:      General: No focal deficit present.      Mental Status: She is alert and oriented to person, place, and time.   Psychiatric:         Mood and Affect: Mood normal.         Behavior: Behavior normal.         Thought Content: Thought content normal.         Judgment: Judgment normal.      Comments: Easily overwhelmed.        /66 (BP Location: Left arm, Patient Position: Sitting, BP Method: Large (Manual))   Pulse 88   Ht 5' 6" (1.676 m)   Wt 88.6 kg (195 lb 5.2 oz)   BMI 31.53 kg/m²     Hemoglobin A1C   Date Value Ref Range Status   08/18/2023 7.0 (H) 4.0 - 5.6 % Final     Comment:     ADA Screening Guidelines:  5.7-6.4%  Consistent with prediabetes  >or=6.5%  Consistent with diabetes    High levels of fetal hemoglobin interfere with the HbA1C  assay. Heterozygous hemoglobin variants (HbS, HgC, etc)do  not significantly interfere with this assay.   However, presence of multiple variants may affect accuracy.     03/13/2023 5.4 0.0 - 5.6 % Final     Comment:     Reference Interval:  5.0 - 5.6 Normal   5.7 - 6.4 High Risk   > 6.5 Diabetic      Hgb A1c results are standardized based on the (NGSP) National   Glycohemoglobin Standardization Program.      Hemoglobin A1C levels are related to mean serum/plasma glucose   during the preceding 2-3 months.        07/12/2022 7.8 (H) 0.0 - 5.6 % Final     Comment:     Reference Interval:  5.0 - 5.6 Normal   5.7 - 6.4 High Risk   > 6.5 Diabetic      Hgb A1c results are standardized based on the (NGSP) National   Glycohemoglobin Standardization Program.      Hemoglobin A1C levels are related to mean serum/plasma glucose   during the preceding 2-3 months.            Chemistry        Component Value Date/Time     (L) 08/21/2023 1024    K 4.4 08/21/2023 1024     08/21/2023 1024    CO2 23 08/21/2023 1024    BUN 25 (H) 08/21/2023 1024    CREATININE 1.56 (H) 08/21/2023 1024     (H) " 08/21/2023 1024        Component Value Date/Time    CALCIUM 10.3 (H) 08/21/2023 1024    ALKPHOS 99 08/21/2023 1024    AST 21 08/21/2023 1024    ALT 17 08/21/2023 1024    BILITOT 0.3 08/21/2023 1024    ESTGFRAFRICA >60 07/31/2022 0430    EGFRNONAA 55 (A) 07/31/2022 0430          Lab Results   Component Value Date    CHOL 158 08/19/2022     Lab Results   Component Value Date    HDL 22 (L) 08/19/2022     Lab Results   Component Value Date    LDLCALC 103.2 08/19/2022     Lab Results   Component Value Date    TRIG 164 (H) 08/19/2022     Lab Results   Component Value Date    CHOLHDL 13.9 (L) 08/19/2022     Lab Results   Component Value Date    MICALBCREAT 427.5 (H) 03/14/2023     Lab Results   Component Value Date    TSH 0.147 (L) 08/21/2023     Vit D, 25-Hydroxy   Date Value Ref Range Status   04/03/2023 61 30 - 96 ng/mL Final     Comment:     Vitamin D deficiency.........<10 ng/mL                              Vitamin D insufficiency......10-29 ng/mL       Vitamin D sufficiency........> or equal to 30 ng/mL  Vitamin D toxicity............>100 ng/mL              Assessment and Plan   1. Type 2 diabetes mellitus without complication, with long-term current use of insulin  insulin (LANTUS SOLOSTAR U-100 INSULIN) glargine 100 units/mL SubQ pen    insulin lispro (HUMALOG KWIKPEN INSULIN) 100 unit/mL pen      2. Adverse effect of corticosteroids, initial encounter        3. Hyperglycemia        4. Hypothyroidism, unspecified type              Pt  advised:  Increase lantus from 35 to 40 units.  May increase by 2 units every 5 days until FBS are < 150.     Increase Humalog from 5 to 7 units with meals plus ss  (1:25).   If not eating can take ss dose only if no humalog given in last 4 hrs.        Please resubmit--questions whether [facility] or [employer] has orders and Chart notes since rivas.          RX for Continuous Glucose Monitor   -Recommend Dexcom G7  Continuous Glucose monitor                         Pt tests glucoses  a minimum of 4 x a day.                          Pt takes a minimum of 1 injections of insulin a day.                          Pt would benefit from therapeutic continuous glucose monitor to be able                         to make frequent insulin adjustments, based on current glucoses.               ORDERS 08/31/2023       Around OCtober 1, --fasting cmp, tsh, c-peptide,     F/u 3 months - with me--no labs.      40 min reviewing issues with pt regarding dr. Kinza harris.

## 2023-09-01 LAB — ACTH PLAS-MCNC: 14 PG/ML (ref 0–46)

## 2023-09-01 NOTE — PATIENT INSTRUCTIONS
Pt /pt  advised:  Increase lantus from 35 to 40 units.  May increase by 2 units every 5 days until FBS are < 150.     Increase Humalog from 5 to 7 units with meals plus ss  (1:25).   If not eating can take ss dose only if no humalog given in last 4 hrs.

## 2023-09-01 NOTE — PROGRESS NOTES
Diabetes Care Specialist Progress Note  Author: Bella Quiñonez RD, CDE  Date: 9/1/2023    Program Intake  Reason for Diabetes Program Visit:: Intervention  Type of Intervention:: Individual  Individual: Education  Education: Nutrition and Meal Planning, Pattern Management  Current diabetes risk level:: moderate  In the last 12 months, have you:: been admitted to a hospital  Was the ER or hospital admission related to diabetes?: No  Permission to speak with others about care:: yes ( Faustino is with patient and he is primary caregiver for patient)    Lab Results   Component Value Date    HGBA1C 7.0 (H) 08/18/2023     Clinical    Problem Review  Reviewed Problem List with Patient: yes  Active comorbidities affecting diabetes self-care.: yes  Comorbidities: Chronic Kidney Disease, Cancer, Hypertension  Reviewed health maintenance: yes    Clinical Assessment  Current Diabetes Treatment: Insulin (Lantus 35 units QHS (9-9:30pm), Hunalog 5 units + correction scale (dosing 4-8 units before meals per )-- given injections and rotates sites in abdomen.)  Have you ever experienced hyperglycemia (high blood sugar)?: yes  In the last month, how often have you experienced high blood sugar?: more than once a day  Are you able to tell when your blood sugar is high?: Yes  What are your symptoms?: fatigue, other (see comments) (high glucose alert on Dexcom set for 250 mg/dL)    Medication Information  How do you obtain your medications?: Family picks up, Mail order  How many days a week do you miss your medications?: Never  Do you sometimes have difficulty refilling your medications?: Yes (see comment) (some confusion with which [employer] company fills Dexcom supplies--Endocrine provider/staff handling this issue today)  Medication adherence impacting ability to self-manage diabetes?: Yes (timing Humalog incorrectly--at times taking 30+ minutes before meals. One episode of accidentally dosing Humalog 35 units last night  instead of Lantus-- realized after he had given and monitored patient all night.)    Labs  Do you have regular lab work to monitor your medications?: Yes  Type of Regular Lab Work: A1c, BMP, CBC  Lab Compliance Barriers: No    Nutritional Status  Diet: Regular, High fiber  Meal Plan 24 Hour Recall: Breakfast, Lunch, Dinner, Snack (Eats 3 meals and occasional snacks in between)  Meal Plan 24 Hour Recall - Breakfast: 1/2 cup cheerios, 1/2 banana, 1/2 cup vanilla almond milk, coffee (no sugar sweet cream)  Meal Plan 24 Hour Recall - Lunch: cheese sandwich with 2 slices multi-grain bread, water, fairlife protein shake, diet cran-grape drink  Meal Plan 24 Hour Recall - Dinner: [employer] steak and eggs, 1/2 biscuit, 1 tbsp grits OR [employer] grilled catfish with sweet potato fries  Meal Plan 24 Hour Recall - Snack: apples, cheese, peanut butter, peaches, bananas  Change in appetite?: Yes (decreased recently--discussed importance of not giving meal dose of Humalog if not eating)  Current nutritional status an area of need that is impacting patient's ability to self-manage diabetes?: No    Additional Social History    Support  Does anyone support you with your diabetes care?: yes  Who supports you?: spouse, self  Who takes you to your medical appointments?: spouse  Does the current support meet the patient's needs?: Yes  Is Support an area impacting ability to self-manage diabetes?: No    Access to Mass Media & Technology  Does the patient have access to any of the following devices or technologies?: Smart phone  Media or technology needs impacting ability to self-manage diabetes?: No    Cognitive/Behavioral Health  Alert and Oriented: Yes  Difficulty Thinking: No  Requires Prompting: No  Requires assistance for routine expression?: No  Cognitive or behavioral barriers impacting ability to self-manage diabetes?: No    Culture/Jehovah's witness  Culture or Jehovah's witness beliefs that may impact ability to access healthcare:  No    Communication  Language preference: English  Hearing Problems: No  Vision Problems: No  Communication needs impacting ability to self-manage diabetes?: No    Health Literacy  Preferred Learning Method: Face to Face, Reading Materials  How often do you need to have someone help you read instructions, pamphlets, or written material from your doctor or pharmacy?: Never  Health literacy needs impacting ability to self-manage diabetes?: No    Diabetes Self-Management Skills Assessment    Medications  Patient is able to describe current diabetes management routine.: yes  Diabetes management routine:: insulin  Patient is able to identify current diabetes medications, dosages, and appropriate timing of medications.: no (One accidental mix up between basal and bolus insulin ( aware and confident this won't happen again), dosing Humalog 30+ minutes before meals when dining out--discussed importance of dosing 5-15 minutes before meals to avoid hypoglycemia.)  Patient understands the purpose of the medications taken for diabetes.: yes  Patient reports problems or concerns with current medication regimen.: yes  Medication regimen problems/concerns:: does not feel like regimen is working (see Endocrine provider after diabetes education visit and DM medications will be adjusted)  Medication Skills Assessment Completed:: Yes  Assessment indicates:: Instruction Needed  Area of need?: Yes    Home Blood Glucose Monitoring  Patient states that blood sugar is checked at home daily.: yes  Monitoring Method:: personal continuous glucose monitor  Personal CGM type:: Dexcom G7  Patient is able to use personal CGM appropriately.: yes  CGM Report reviewed?: yes    Dexcom G7 download (8/18/2023 to 8/31/2023): See media file for details. Only 13% of glucose values are in target range. Seeing Endocrine today and insulin doses adjusted.  One episodes of mild hypoglycemia noted in the download due to accidental dosing of rapid acting  instead of long acting insulin.  Able to treat hypoglycemia appropriately with orange juice-- did treat prior to patient dropping < 80 mg/dL.    Sensor usage: 100%  Average glucose: 257 mg/dL    52% CGM readings greater than 250 mg/dL  34% CGM readings between 181-250 mg/dL  13% CGM readings in target glucose range of  mg/dL   <1% CGM readings between 54-79%  0% CGM readings less than 54 mg/dL     Home Blood Glucose Monitoring Skills Assessment Completed: : Yes  Assessment indicates:: Instruction Needed  Area of need?: Yes    Assessment Summary and Plan    Based on today's diabetes care assessment, the following areas of need were identified:          8/31/2023    12:02 AM   Social   Support No   Access to Mass Media/Tech No   Cognitive/Behavioral Health No   Culture/Taoist No   Communication No   Health Literacy No          8/31/2023    12:02 AM   Clinical   Medication Adherence Yes--not timing Humalog appropriately   Lab Compliance No   Nutritional Status No          8/31/2023    12:02 AM   Diabetes Self-Management Skills   Medication Yes--see care plan   Home Blood Glucose Monitoring Yes--see care plan      Today's interventions were provided through individual discussion, instruction, and written materials were provided.      Patient verbalized understanding of instruction and written materials.  Pt was able to return back demonstration of instructions today. Patient understood key points, needs reinforcement and further instruction.     Diabetes Self-Management Care Plan:    Today's Diabetes Self-Management Care Plan was developed with Allyson's input. Allyson has agreed to work toward the following goal(s) to improve his/her overall diabetes control.      Care Plan: Diabetes Management   Updates made since 8/2/2023 12:00 AM     Problem: Medications         Long-Range Goal: Per Endocrine provider (Cheryle Gallegos NP) today--patient to increase Lantus to 40 units QHS and titrate up by 2 units q 5  days until fasting glucose < 150 mg/dL.  Increase Humalog to 7 units AC + correction (150/25)    Start Date: 5/17/2023   Priority: High   Barriers: No Barriers Identified   Note:    8/31/23:  Again reviewed timing of both Lantus and Humalog as dosing Humalog before leaving house to go to restaurant to eat.   did confuse Humalog and Lantus last night and gave 35 units Humalog instead of Lantus and he realized right after Humalog was given so he watched patient all night and treated with orange juice when glucose levels dropped. Reviewed not to stack Humalog (do not take too close together--should be approx 4 hours) and educated to skip meal dose of Humalog if not eating a meal (but OK to take correction if desired).  Re-educated on where insulin can be delivered (abdomen, back of arm, or upper thigh in fat).  Also discussed insulin pen needles as  was told by pharmacist that they needed to use longer insulin pen needles and research does not indicate this so OK to return to use of shorter pen needles for comfort.      6/9/23: Reviewed timing of basal insulin as 9:30-10:30 pm.  Patient  states he gives correction dose according to sliding scale every 4 hours without regarding food.  When asked what could they do to bring BG down, recommended taking before the breakfast (9-10), lunch (12-1), and dinner (5-6) meals.     5/17/23: Reviewed timing of fiasp injections.  Currently only on sliding scale dose.  [facility] reports reviewed by Cheryle Gallegos today and no changes were made to insulin regimen.         Problem: Healthy Eating         Long-Range Goal: Eat 3 meals daily following the Plate Method. Pair breakfast carbohydrates with protein.    Start Date: 6/9/2023   Priority: Low   Barriers: Other (comments)   Note:    8/31/23:  Long discussion with patient and  about balanced healthy eating.   with many questions about specific foods--seems to have some confusion on sugar  content vs carb content and asking many questions about fiber content of foods.   states he has received mixed messages about how patient should be eating.  Attempt made to educate patient and spouse on categories of food (carb vs protein vs fat vs non-starchy vegetables) and importance of eating balanced at all meals.  Discussed importance of not eliminating carbs from meals as patient is dosing Humalog prior to each meal.  Also encouraged low/no carb snacks as patient currently likes to snack on fresh fruit and does see spikes in glucose levels when she does this.  Patient with questions about favorite restaurant meals and how to modify to make them balanced--answered all questions.         Task: Discussed strategies for choosing healthier menu options when dining out. Completed 9/1/2023        Task: Review the importance of balancing carbohydrates with each meal using portion control techniques to count servings of carbohydrate and label reading to identify serving size and amount of total carbs per serving. Completed 9/1/2023        Problem: Blood Glucose Self-Monitoring         Goal: Patient agrees to use Dexcom G7  to monitor glucose levels. Completed 9/1/2023   Start Date: 6/9/2023   Expected End Date: 8/31/2023   This Visit's Progress: Met   Priority: Medium   Barriers: Knowledge deficit     Problem: Blood Glucose Self-Monitoring         Goal: Patient and  spouse educated on how to enter insulin doses (Lantus and Humalog) into Dexcom G7 mobile garry to better determine use of daily insulin. Due to frequent alarming, turned off sensor issue alarm.    Start Date: 8/31/2023   Expected End Date: 11/30/2023   Priority: Medium   Barriers: Knowledge deficit        Task: Instructed on how to enter long-acting and rapid acting insulin amounts in to Dexcom G7 mobile abb to better determine daily insulin use. Completed 9/1/2023          Follow Up Plan    How to   Follow up in about 3 months (around  11/30/2023) for continued DSMES--much of visit spent meal planning.  Also educated  how to enter insulin doses in Dexcom G7 mobile garry to better determine insulin timing in correlation with glucose levels.  Endocrine provider increased both Lantus and Humalog doses at visit today and new doses written out for patient.    Today's care plan and follow up schedule was discussed with patient.  Allyson verbalized understanding of the care plan, goals, and agrees to follow up plan.        The patient was encouraged to communicate with his/her health care provider/physician and care team regarding his/her condition(s) and treatment.  I provided the patient with my contact information today and encouraged to contact me via phone or [facility] Patient Portal as needed.     Length of Visit   Total Time: 60 Minutes

## 2023-09-05 ENCOUNTER — PATIENT MESSAGE (OUTPATIENT)
Dept: ENDOCRINOLOGY | Facility: CLINIC | Age: 73
End: 2023-09-05
Payer: MEDICARE

## 2023-09-05 ENCOUNTER — PATIENT MESSAGE (OUTPATIENT)
Dept: CARDIOLOGY | Facility: CLINIC | Age: 73
End: 2023-09-05
Payer: MEDICARE

## 2023-09-05 ENCOUNTER — PATIENT MESSAGE (OUTPATIENT)
Dept: DIABETES | Facility: CLINIC | Age: 73
End: 2023-09-05
Payer: MEDICARE

## 2023-09-05 DIAGNOSIS — E03.9 HYPOTHYROIDISM, UNSPECIFIED TYPE: Primary | ICD-10-CM

## 2023-09-05 DIAGNOSIS — I48.0 PAF (PAROXYSMAL ATRIAL FIBRILLATION): ICD-10-CM

## 2023-09-05 DIAGNOSIS — I10 ESSENTIAL HYPERTENSION: Primary | ICD-10-CM

## 2023-09-06 RX ORDER — METOPROLOL SUCCINATE 50 MG/1
50 TABLET, EXTENDED RELEASE ORAL 2 TIMES DAILY
Qty: 180 TABLET | Refills: 0 | Status: SHIPPED | OUTPATIENT
Start: 2023-09-06 | End: 2023-12-04

## 2023-09-06 RX ORDER — METOPROLOL SUCCINATE 50 MG/1
50 TABLET, EXTENDED RELEASE ORAL 2 TIMES DAILY
Qty: 180 TABLET | Refills: 1 | OUTPATIENT
Start: 2023-09-06

## 2023-09-06 NOTE — TELEPHONE ENCOUNTER
----- Message from Davida Brown sent at 9/6/2023 10:34 AM CDT -----  Regarding: New RX  Contact: Faustino spouse  Type:  RX Refill Request    Who Called:  Faustino spouse  Refill or New Rx:  new RX  RX Name and Strength:  metoprolol succinate (TOPROL-XL) 25 MG 24 hr tablet  How is the patient currently taking it? (ex. 1XDay):  50 mg 2 x daily  Is this a 30 day or 90 day RX:  90  Preferred Pharmacy with phone number:    [id number] [place] [address] & [address] [address]  Phone: [phone] Fax: [phone]      Local or Mail Order:  local  Ordering Provider:  Dr. Kristel Lieberman Call Back [phone]    Additional Information:  Please call spouse to advise.  Thanks!

## 2023-09-07 ENCOUNTER — PATIENT MESSAGE (OUTPATIENT)
Dept: DIABETES | Facility: CLINIC | Age: 73
End: 2023-09-07
Payer: MEDICARE

## 2023-09-07 ENCOUNTER — PATIENT MESSAGE (OUTPATIENT)
Dept: ENDOCRINOLOGY | Facility: CLINIC | Age: 73
End: 2023-09-07
Payer: MEDICARE

## 2023-09-08 ENCOUNTER — PATIENT MESSAGE (OUTPATIENT)
Dept: DIABETES | Facility: CLINIC | Age: 73
End: 2023-09-08
Payer: MEDICARE

## 2023-09-11 ENCOUNTER — PATIENT MESSAGE (OUTPATIENT)
Dept: DIABETES | Facility: CLINIC | Age: 73
End: 2023-09-11
Payer: MEDICARE

## 2023-09-11 NOTE — TELEPHONE ENCOUNTER
See message 8/31/23.   Her cortisol was normal.   There should be repeat labs after she stops Hydrocortisone per message

## 2023-09-14 RX ORDER — LEVOTHYROXINE SODIUM 88 UG/1
88 TABLET ORAL
Qty: 30 TABLET | Refills: 11 | Status: SHIPPED | OUTPATIENT
Start: 2023-09-14 | End: 2023-10-26

## 2023-09-19 ENCOUNTER — TELEPHONE (OUTPATIENT)
Dept: FAMILY MEDICINE | Facility: CLINIC | Age: 73
End: 2023-09-19
Payer: MEDICARE

## 2023-09-19 ENCOUNTER — TELEPHONE (OUTPATIENT)
Dept: ENDOCRINOLOGY | Facility: CLINIC | Age: 73
End: 2023-09-19
Payer: MEDICARE

## 2023-09-19 ENCOUNTER — PATIENT MESSAGE (OUTPATIENT)
Dept: ENDOCRINOLOGY | Facility: CLINIC | Age: 73
End: 2023-09-19
Payer: MEDICARE

## 2023-09-19 DIAGNOSIS — C79.31 MALIGNANT NEOPLASM METASTATIC TO BRAIN: Primary | ICD-10-CM

## 2023-09-19 NOTE — TELEPHONE ENCOUNTER
----- Message from Bella Quiñonez RD, CDE sent at 9/19/2023  3:54 PM CDT -----  Patient with lengthy MyChart message and a couple of the questions asking about Dr. Daly's labs, can you address this for the patient?      8)Remember, Dr. Daly wants an ACTH and Cortisol lab this Friday at 8am.  She is currently OFF Hydrocortisone, so could the glucose reactions be affected?  Haven't had below 80 in months but the reduced, now deleted steroid, may be the missing link of lower numbers.  9)Speaking of the labs, should we resume Hydrocortisone after the labs?  If so,  at what dosage(s) and how often?  Or just wait for results and further instructions (while remaining OFF Hydrocortisone)?    Alysia

## 2023-09-19 NOTE — TELEPHONE ENCOUNTER
----- Message from Trang Carter sent at 9/19/2023  2:32 PM CDT -----  Regarding: rt call  Type:  Patient Returning Call    Who Called:Jesus with [facility]    Who Left Message for Patient:unknown    Does the patient know what this is regarding?:yes    Best Call Back Number:[phone] ext [phone]   Fax:[phone]    Additional Information: trying to get an order for a walker fax to her.

## 2023-09-20 PROBLEM — L92.9 HYPERGRANULATION: Status: ACTIVE | Noted: 2023-09-20

## 2023-09-21 ENCOUNTER — PATIENT MESSAGE (OUTPATIENT)
Dept: RADIATION ONCOLOGY | Facility: CLINIC | Age: 73
End: 2023-09-21
Payer: MEDICARE

## 2023-09-24 ENCOUNTER — TELEPHONE (OUTPATIENT)
Dept: ENDOCRINOLOGY | Facility: CLINIC | Age: 73
End: 2023-09-24
Payer: MEDICARE

## 2023-09-24 DIAGNOSIS — E04.2 MULTINODULAR GOITER: ICD-10-CM

## 2023-09-24 DIAGNOSIS — E03.9 HYPOTHYROIDISM, UNSPECIFIED TYPE: Primary | ICD-10-CM

## 2023-09-24 NOTE — TELEPHONE ENCOUNTER
Her cortisol level looks normal. No evidence of Adrenal Insufficiency   Can ou see if off Hydrocortisone now or still on it

## 2023-09-27 ENCOUNTER — TELEPHONE (OUTPATIENT)
Dept: RADIATION ONCOLOGY | Facility: CLINIC | Age: 73
End: 2023-09-27
Payer: MEDICARE

## 2023-09-27 ENCOUNTER — HOSPITAL ENCOUNTER (OUTPATIENT)
Dept: RADIOLOGY | Facility: HOSPITAL | Age: 73
Discharge: HOME | End: 2023-09-27
Attending: RADIOLOGY
Payer: MEDICARE

## 2023-09-27 DIAGNOSIS — C79.31 MALIGNANT NEOPLASM METASTATIC TO BRAIN: ICD-10-CM

## 2023-09-27 DIAGNOSIS — E03.9 HYPOTHYROIDISM, UNSPECIFIED TYPE: Primary | ICD-10-CM

## 2023-09-27 DIAGNOSIS — C34.90 SMALL CELL LUNG CANCER: Primary | ICD-10-CM

## 2023-09-27 PROCEDURE — A9585 GADOBUTROL INJECTION: HCPCS | Mod: PO | Performed by: RADIOLOGY

## 2023-09-27 PROCEDURE — 25500020 PHARM REV CODE 255: Mod: PO | Performed by: RADIOLOGY

## 2023-09-27 PROCEDURE — 76390 MR SPECTROSCOPY: CPT | Mod: 26,,, | Performed by: RADIOLOGY

## 2023-09-27 PROCEDURE — 76390 MRI SPECTROSCOPY: ICD-10-PCS | Mod: 26,,, | Performed by: RADIOLOGY

## 2023-09-27 PROCEDURE — 76390 MR SPECTROSCOPY: CPT | Mod: TC,PO

## 2023-09-27 RX ORDER — GADOBUTROL 604.72 MG/ML
8 INJECTION INTRAVENOUS
Status: COMPLETED | OUTPATIENT
Start: 2023-09-27 | End: 2023-09-27

## 2023-09-27 RX ADMIN — GADOBUTROL 8 ML: 604.72 INJECTION INTRAVENOUS at 02:09

## 2023-09-27 NOTE — TELEPHONE ENCOUNTER
Pt and  advised of Dr. Daly's msg and verb understanding.  Asking when she should f/u with you for thyroid?  Would like to continue seeing you for that.

## 2023-10-02 ENCOUNTER — PATIENT MESSAGE (OUTPATIENT)
Dept: ENDOCRINOLOGY | Facility: CLINIC | Age: 73
End: 2023-10-02
Payer: MEDICARE

## 2023-10-03 ENCOUNTER — TELEPHONE (OUTPATIENT)
Dept: ENDOCRINOLOGY | Facility: CLINIC | Age: 73
End: 2023-10-03
Payer: MEDICARE

## 2023-10-03 NOTE — TELEPHONE ENCOUNTER
Pt and  would feel better to be seen in 6 mo vs 1 yr.  Appt scheduled.  Do you want US in 6 mo or wait and do that at 1 yr?

## 2023-10-03 NOTE — TELEPHONE ENCOUNTER
Sounds they may have been upset at 1 year f/u  We can change to 6 month f/u  Last 2 TSH levels are normal  Also can let us know if they feel their thyroid dosage is off and we can check a TSH. Dont necessarily need to come in for that

## 2023-10-06 NOTE — TELEPHONE ENCOUNTER
Refill Routing Note   Medication(s) are not appropriate for processing by [facility] for the following reason(s):      Medication outside of protocol  Responsible provider unclear    [facility] action(s):  Route Care Due:  None identified              Appointments  past 12m or future 3m with PCP    Date Provider   Last Visit   8/15/2023 Gilma Pimentel MD   Next Visit   Visit date not found Gilma Pimentel MD   ED visits in past 90 days: 0        Note composed:8:11 AM 10/06/2023

## 2023-10-10 RX ORDER — DOCUSATE SODIUM 100 MG/1
100 CAPSULE, LIQUID FILLED ORAL DAILY PRN
Qty: 90 CAPSULE | Refills: 3 | Status: SHIPPED | OUTPATIENT
Start: 2023-10-10

## 2023-10-17 ENCOUNTER — PATIENT MESSAGE (OUTPATIENT)
Dept: DIABETES | Facility: CLINIC | Age: 73
End: 2023-10-17
Payer: MEDICARE

## 2023-10-23 ENCOUNTER — TELEPHONE (OUTPATIENT)
Dept: CARDIOLOGY | Facility: CLINIC | Age: 73
End: 2023-10-23
Payer: MEDICARE

## 2023-10-23 NOTE — TELEPHONE ENCOUNTER
----- Message from Laly Caba sent at 10/23/2023  4:34 PM CDT -----  Name of Who is Calling: Faustino         What is the request in detail: Would like to verify some info with nurse that he spoke with       Can the clinic reply by [facility]:       What Number to Call Back if not in [facility]: [phone] (work)

## 2023-10-24 ENCOUNTER — TELEPHONE (OUTPATIENT)
Dept: CARDIOLOGY | Facility: CLINIC | Age: 73
End: 2023-10-24
Payer: MEDICARE

## 2023-10-26 ENCOUNTER — PATIENT MESSAGE (OUTPATIENT)
Dept: ENDOCRINOLOGY | Facility: CLINIC | Age: 73
End: 2023-10-26
Payer: MEDICARE

## 2023-10-26 DIAGNOSIS — E03.9 HYPOTHYROIDISM, UNSPECIFIED TYPE: Primary | ICD-10-CM

## 2023-10-26 RX ORDER — LEVOTHYROXINE SODIUM 75 UG/1
75 TABLET ORAL
Qty: 30 TABLET | Refills: 11 | Status: SHIPPED | OUTPATIENT
Start: 2023-10-26 | End: 2023-12-12 | Stop reason: SDUPTHER

## 2023-10-26 NOTE — TELEPHONE ENCOUNTER
TSH suppressed  Decrease synthroid to 75 mcg daily    We did not order this TSH. The patient should let the ordering provider know that we are adjusting synthroid    Check TSH 6 weeks

## 2023-10-27 RX ORDER — APIXABAN 5 MG/1
TABLET, FILM COATED ORAL
Qty: 180 TABLET | Refills: 0 | Status: SHIPPED | OUTPATIENT
Start: 2023-10-27

## 2023-10-30 ENCOUNTER — PATIENT MESSAGE (OUTPATIENT)
Dept: DIABETES | Facility: CLINIC | Age: 73
End: 2023-10-30
Payer: MEDICARE

## 2023-11-01 ENCOUNTER — PATIENT MESSAGE (OUTPATIENT)
Dept: ENDOCRINOLOGY | Facility: CLINIC | Age: 73
End: 2023-11-01
Payer: MEDICARE

## 2023-11-08 ENCOUNTER — PATIENT MESSAGE (OUTPATIENT)
Dept: ENDOCRINOLOGY | Facility: CLINIC | Age: 73
End: 2023-11-08
Payer: MEDICARE

## 2023-11-08 RX ORDER — SERTRALINE HYDROCHLORIDE 25 MG/1
25 TABLET, FILM COATED ORAL
Qty: 90 TABLET | Refills: 2 | Status: SHIPPED | OUTPATIENT
Start: 2023-11-08

## 2023-11-08 NOTE — TELEPHONE ENCOUNTER
No care due was identified.  Health [facility] Embedded Care Due Messages. Reference number: [id number].   11/08/2023 9:36:21 AM CST

## 2023-11-08 NOTE — TELEPHONE ENCOUNTER
Refill Decision Note   Allyson Henriquez  is requesting a refill authorization.  Brief Assessment and Rationale for Refill:  Approve     Medication Therapy Plan:         Comments:     Note composed:2:51 PM 11/08/2023

## 2023-11-10 RX ORDER — SIMVASTATIN 40 MG/1
TABLET, FILM COATED ORAL
Qty: 90 TABLET | Refills: 0 | Status: SHIPPED | OUTPATIENT
Start: 2023-11-10 | End: 2024-02-14

## 2023-11-21 ENCOUNTER — PATIENT MESSAGE (OUTPATIENT)
Dept: ENDOCRINOLOGY | Facility: CLINIC | Age: 73
End: 2023-11-21
Payer: MEDICARE

## 2023-11-28 ENCOUNTER — HOSPITAL ENCOUNTER (OUTPATIENT)
Dept: RADIOLOGY | Facility: HOSPITAL | Age: 73
Discharge: HOME | End: 2023-11-28
Attending: RADIOLOGY
Payer: MEDICARE

## 2023-11-28 DIAGNOSIS — C79.31 MALIGNANT NEOPLASM METASTATIC TO BRAIN: ICD-10-CM

## 2023-11-28 DIAGNOSIS — C34.90 SMALL CELL LUNG CANCER: ICD-10-CM

## 2023-11-28 PROCEDURE — 76390 MR SPECTROSCOPY: CPT | Mod: TC,PO

## 2023-11-28 PROCEDURE — 76390 MRI SPECTROSCOPY: ICD-10-PCS | Mod: 26,,, | Performed by: RADIOLOGY

## 2023-11-28 PROCEDURE — 76390 MR SPECTROSCOPY: CPT | Mod: 26,,, | Performed by: RADIOLOGY

## 2023-11-28 PROCEDURE — A9585 GADOBUTROL INJECTION: HCPCS | Mod: PO | Performed by: RADIOLOGY

## 2023-11-28 PROCEDURE — 25500020 PHARM REV CODE 255: Mod: PO | Performed by: RADIOLOGY

## 2023-11-28 RX ORDER — GADOBUTROL 604.72 MG/ML
8 INJECTION INTRAVENOUS
Status: COMPLETED | OUTPATIENT
Start: 2023-11-28 | End: 2023-11-28

## 2023-11-28 RX ORDER — PANTOPRAZOLE SODIUM 40 MG/1
40 TABLET, DELAYED RELEASE ORAL DAILY
Qty: 90 TABLET | Refills: 2 | Status: SHIPPED | OUTPATIENT
Start: 2023-11-28

## 2023-11-28 RX ADMIN — GADOBUTROL 8 ML: 604.72 INJECTION INTRAVENOUS at 09:11

## 2023-11-28 NOTE — TELEPHONE ENCOUNTER
No care due was identified.  Health [facility] Embedded Care Due Messages. Reference number: [id number].   11/28/2023 1:13:12 PM CST

## 2023-11-28 NOTE — TELEPHONE ENCOUNTER
Refill Decision Note   Allyson Henriquez  is requesting a refill authorization.  Brief Assessment and Rationale for Refill:  Approve     Medication Therapy Plan:         Comments:     Note composed:5:38 PM 11/28/2023

## 2023-11-30 NOTE — PROGRESS NOTES
[facility] Department of Radiation Oncology  Follow Up Visit Note    Diagnosis:  Allyson Henriquez is a 73 y.o. 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, status post SRS       Oncologic History:  Oncology History   Small cell lung cancer   5/20/2021 Initial Diagnosis    Small cell carcinoma of lung, right     6/8/2021 - 7/27/2021 Chemotherapy    Treatment Summary   Plan Name: OP CARBOPLATIN (AUC) + ETOPOSIDE Q3W  Treatment Goal: Palliative  Status: Inactive  Start Date: 6/8/2021  End Date: 7/27/2021  Provider: Madhav Cardona MD  Chemotherapy: CARBOplatin (PARAPLATIN) 395 mg in sodium chloride 0.9% 250 mL chemo infusion, 395 mg (100 % of original dose 395 mg), Intravenous, Clinic/HOD 1 time, 3 of 6 cycles  Dose modification:   (original dose 395 mg, Cycle 1, Reason: MD Discretion)  Administration: 395 mg (6/8/2021), 395 mg (6/29/2021), 395 mg (7/20/2021)  etoposide (VEPESID) 100 mg/m2 = 200 mg in sodium chloride 0.9% 500 mL chemo infusion, 100 mg/m2 = 200 mg, Intravenous, Clinic/HOD 1 time, 3 of 6 cycles  Administration: 200 mg (6/8/2021), 200 mg (6/9/2021), 200 mg (6/29/2021), 200 mg (6/10/2021), 200 mg (6/30/2021), 200 mg (7/1/2021), 200 mg (7/20/2021), 200 mg (7/21/2021), 200 mg (7/22/2021)     1/18/2022 - 7/6/2022 Chemotherapy    Treatment Summary   Plan Name: OP PEMBROLIZUMAB 200MG Q3W  Treatment Goal: Palliative  Status: Inactive  Start Date: 1/18/2022  End Date: 7/6/2022  Provider: Madhav Cardona MD  Chemotherapy: [No matching medication found in this treatment plan]     6/28/2022 - 6/28/2022 Radiation Therapy    Treating physician: Aleena Nielson  Total Dose: 22 Gy  Fractions: 1    Single fraction radiosurgery to right cerebellar metastasis.     Iron deficiency anemia   Brain metastasis   6/28/2022 Initial Diagnosis    Brain metastasis     6/28/2022 - 6/28/2022 Radiation Therapy    Treating physician: Aleena Nielson  Total  Dose: 22 Gy  Fractions: 1    Single fraction radiosurgery to right cerebellar metastasis.          Interval History  The patient presents today for a regularly scheduled follow up visit.  She was last seen in our follow up on 8/25/23.  Since that time she has been feeling well. Ongoing issues with abdominal wounds (3); vac remains in place. Off hydrocortisone.     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  Stable left acoustic schwannoma      HPI: This is a 72 year old female with history of extensive-stage small cell lung cancer metastatic to bone and in thorax who presents with her  for discussion of radiation therapy for new single brain metastasis.  She is followed by Dr. Cardona on Pembro with notable systemic disease response, who reported 2 episodes of LEFT hand spasm and 2 episodes of LEFT foot spasm occurring independently and resolving spontaneously 2 weeks ago.  No other neurologic symptoms.  Dr. Cardona ordered MRI.     MRI Brain 6/17/22 new 9mm RIGHT cerebellar metastasis.  No other intracranial metastases.     Patient reports low grade headache since last Weds which she attributes to stress. She is active at home and uses wheelchair intermittently when fatigued.       She had a prolonged hospital stay (10 weeks) 2/2 esophagitis and C. Dif last year after completing 45Gy thoracic radiation therapy (Turissi regimen), but was unable to complete her course of chemotherapy.  She has recovered significantly from deconditioning related to prolonged hospital stay last year, but is still more easily fatigued than normal..     6/28/22 Single fraction SRS 22Gy to right cerebellar metastasis    she had repeated hospitalizations for diverticular abscess requiring sigmoid colectomy/ostomy and debridement  7/28/22 and multiple surgeries for washout and several IV and oral antibiotics (she was hospitalized last year with severe esophagitis after chemotherapy-radiation therapy to thorax, complicated by C. Diff colitis).  She was discharged 8/19/22 to [facility], but readmitted 9/15/22 with wound dehiscence.   9/16/22 MRI brain: decrease in size of treated 9mm RIGHT cerebellar mass (now 5 x 3mm)     10/14/22 developed transient Left hand symptoms similar to those at time of presentation    10/23/22 PET with stable hypermetabolic right perihilar consolidation consistent with post-treatment change.     10/26/22 MRI brain stable vs improved R cerebellar lesion    11/2022, she transferred her care to Dr. Gonzalez who has restarted Keytruda     1/23/23 MRI brain: stable appearance of treated right cerebellar metastasis, now punctate in size; no new lesions; stable left IAC vestibular schwannoma    Some ongoing issues with wound healing related to her abdominal surgeries.  Is consulting with a new surgeon for second opinion. Also undergoing endocrine work up for ongoing blood sugar instability (on hydrocortisone for adrenal insufficiency)     4/27/23 MRI brain: right cerebellar small enhancing lesion with interval small focus of increased enhancement medial/inferior, and minimal new edema compared to prior study.  Stable left vestibular schwannoma.     7/2023 ostomy revision, hernia repair, followed by prolonged hospitalization    8/21/23 MRI brain: right cerebellar small enhancing lesion with serial increased size, interval small focus of increased enhancement medial/inferior, concerning for recurrence vs rediation necrossis/treatment effect.  Stable left vestibular schwannoma.     Possibility of pregnancy: No  History of prior irradiation: Yes - LEFT gamma knife, acoustic neuroma at [facility], 10/2003; Chest radiation therapy 45Gy BID (Turissi regimen), 2021 with MBP   History of prior systemic anti-cancer therapy: Yes - most recently  "Sowmya (last 6/15/22)  History of collagen vascular disease: No  Implanted electronic device (pacer/defib/nerve stimulator): No    Review of Systems   Review of Systems   Constitutional:  Negative for chills and fever.   Eyes:  Negative for blurred vision and double vision.   Respiratory:  Negative for cough.    Gastrointestinal:  Negative for abdominal pain (resolved).   Neurological:  Negative for dizziness, tremors (occasional bilat hand numbness on waking), sensory change, speech change, seizures, weakness and headaches.       Social History:  Social History     Tobacco Use    Smoking status: Former     Current packs/day: 0.00     Average packs/day: 1 pack/day for 40.0 years (40.0 ttl pk-yrs)     Types: Cigarettes     Start date: 3/6/1976     Quit date: 3/6/2016     Years since quittin.7    Smokeless tobacco: Never   Substance Use Topics    Alcohol use: Not Currently    Drug use: No       Family History:  Cancer-related family history includes Cancer in her paternal uncle. There is no history of Esophageal cancer.    Exam:  Vitals:    23 0926   BP: (!) 128/59   BP Location: Right arm   Patient Position: Sitting   Pulse: 88   Resp: (!) 22   Temp: 98.2 °F (36.8 °C)   SpO2: 95%   Weight: 83.2 kg (183 lb 6.8 oz)   Height: 5' 6" (1.676 m)         Physical Exam  Vitals reviewed. Exam conducted with a chaperone present.   Constitutional:       General: She is not in acute distress.     Appearance: Normal appearance. She is obese. She is not ill-appearing or toxic-appearing.   HENT:      Head: Normocephalic and atraumatic.   Eyes:      Extraocular Movements: Extraocular movements intact.      Pupils: Pupils are equal, round, and reactive to light.   Abdominal:       Musculoskeletal:         General: No swelling.      Cervical back: No rigidity.   Skin:     General: Skin is warm.   Neurological:      Mental Status: She is alert.      Cranial Nerves: Cranial nerves 2-12 are intact.      Motor: Pronator drift " (minimal right) present.      Coordination: Coordination normal. Finger-Nose-Finger Test abnormal (very slight). Rapid alternating movements normal.      Gait: Gait abnormal (ambulates with walker).      Comments: Minimal targeting difficulty R hand          Imaging:  MRI brain 11/28/23 reviewed as detailed above      Assessment:  Continued increased size of R cerebellar enhancement w/indistinct margins, most suspicious for radiation necrosis.    ECOG: (4) Completely disabled, unable to carry out self-care and confined to bed or chair    Plan:  MRI brain in 2-3  Pt will contact our office if any new neuro symptoms  Immunotherapy/systemic therapy under the care of Dr. Gonzalez, tolerating well  Follow up re: adrenal insufficiency, followed Endocrinologist  Ongoing care for abdominal healing- wound vac in place, follows w/ Colorectal [facility]  She was given our contact information, and she was told that she could call our clinic at anytime if she has any questions or concerns.  Follow up with other providers as directed        25 minutes spent in chart/case review, face-to-face interaction, discussion with other providers, and treatment planning/coordination of care.

## 2023-12-01 ENCOUNTER — OFFICE VISIT (OUTPATIENT)
Dept: RADIATION ONCOLOGY | Facility: CLINIC | Age: 73
End: 2023-12-01
Payer: MEDICARE

## 2023-12-01 VITALS
HEIGHT: 66 IN | HEART RATE: 88 BPM | WEIGHT: 183.44 LBS | RESPIRATION RATE: 22 BRPM | OXYGEN SATURATION: 95 % | TEMPERATURE: 98 F | SYSTOLIC BLOOD PRESSURE: 128 MMHG | DIASTOLIC BLOOD PRESSURE: 59 MMHG | BODY MASS INDEX: 29.48 KG/M2

## 2023-12-01 DIAGNOSIS — C79.31 MALIGNANT NEOPLASM METASTATIC TO BRAIN: ICD-10-CM

## 2023-12-01 DIAGNOSIS — C34.90 SMALL CELL LUNG CANCER: Primary | ICD-10-CM

## 2023-12-01 PROCEDURE — 3288F FALL RISK ASSESSMENT DOCD: CPT | Mod: CPTII,S$GLB,, | Performed by: RADIOLOGY

## 2023-12-01 PROCEDURE — 1126F PR PAIN SEVERITY QUANTIFIED, NO PAIN PRESENT: ICD-10-PCS | Mod: CPTII,S$GLB,, | Performed by: RADIOLOGY

## 2023-12-01 PROCEDURE — 3051F PR MOST RECENT HEMOGLOBIN A1C LEVEL 7.0 - < 8.0%: ICD-10-PCS | Mod: CPTII,S$GLB,, | Performed by: RADIOLOGY

## 2023-12-01 PROCEDURE — 1126F AMNT PAIN NOTED NONE PRSNT: CPT | Mod: CPTII,S$GLB,, | Performed by: RADIOLOGY

## 2023-12-01 PROCEDURE — 99999 PR PBB SHADOW E&M-EST. PATIENT-LVL V: CPT | Mod: PBBFAC,,, | Performed by: RADIOLOGY

## 2023-12-01 PROCEDURE — 3062F POS MACROALBUMINURIA REV: CPT | Mod: CPTII,S$GLB,, | Performed by: RADIOLOGY

## 2023-12-01 PROCEDURE — 1101F PR PT FALLS ASSESS DOC 0-1 FALLS W/OUT INJ PAST YR: ICD-10-PCS | Mod: CPTII,S$GLB,, | Performed by: RADIOLOGY

## 2023-12-01 PROCEDURE — 3288F PR FALLS RISK ASSESSMENT DOCUMENTED: ICD-10-PCS | Mod: CPTII,S$GLB,, | Performed by: RADIOLOGY

## 2023-12-01 PROCEDURE — 3078F DIAST BP <80 MM HG: CPT | Mod: CPTII,S$GLB,, | Performed by: RADIOLOGY

## 2023-12-01 PROCEDURE — 99999 PR PBB SHADOW E&M-EST. PATIENT-LVL V: ICD-10-PCS | Mod: PBBFAC,,, | Performed by: RADIOLOGY

## 2023-12-01 PROCEDURE — 3062F PR POS MACROALBUMINURIA RESULT DOCUMENTED/REVIEW: ICD-10-PCS | Mod: CPTII,S$GLB,, | Performed by: RADIOLOGY

## 2023-12-01 PROCEDURE — 4010F ACE/ARB THERAPY RXD/TAKEN: CPT | Mod: CPTII,S$GLB,, | Performed by: RADIOLOGY

## 2023-12-01 PROCEDURE — 99214 OFFICE O/P EST MOD 30 MIN: CPT | Mod: S$GLB,,, | Performed by: RADIOLOGY

## 2023-12-01 PROCEDURE — 1159F MED LIST DOCD IN RCRD: CPT | Mod: CPTII,S$GLB,, | Performed by: RADIOLOGY

## 2023-12-01 PROCEDURE — 1159F PR MEDICATION LIST DOCUMENTED IN MEDICAL RECORD: ICD-10-PCS | Mod: CPTII,S$GLB,, | Performed by: RADIOLOGY

## 2023-12-01 PROCEDURE — 4010F PR ACE/ARB THEARPY RXD/TAKEN: ICD-10-PCS | Mod: CPTII,S$GLB,, | Performed by: RADIOLOGY

## 2023-12-01 PROCEDURE — 3066F NEPHROPATHY DOC TX: CPT | Mod: CPTII,S$GLB,, | Performed by: RADIOLOGY

## 2023-12-01 PROCEDURE — 1101F PT FALLS ASSESS-DOCD LE1/YR: CPT | Mod: CPTII,S$GLB,, | Performed by: RADIOLOGY

## 2023-12-01 PROCEDURE — 3008F BODY MASS INDEX DOCD: CPT | Mod: CPTII,S$GLB,, | Performed by: RADIOLOGY

## 2023-12-01 PROCEDURE — 3066F PR DOCUMENTATION OF TREATMENT FOR NEPHROPATHY: ICD-10-PCS | Mod: CPTII,S$GLB,, | Performed by: RADIOLOGY

## 2023-12-01 PROCEDURE — 3074F PR MOST RECENT SYSTOLIC BLOOD PRESSURE < 130 MM HG: ICD-10-PCS | Mod: CPTII,S$GLB,, | Performed by: RADIOLOGY

## 2023-12-01 PROCEDURE — 99214 PR OFFICE/OUTPT VISIT, EST, LEVL IV, 30-39 MIN: ICD-10-PCS | Mod: S$GLB,,, | Performed by: RADIOLOGY

## 2023-12-01 PROCEDURE — 3008F PR BODY MASS INDEX (BMI) DOCUMENTED: ICD-10-PCS | Mod: CPTII,S$GLB,, | Performed by: RADIOLOGY

## 2023-12-01 PROCEDURE — 3078F PR MOST RECENT DIASTOLIC BLOOD PRESSURE < 80 MM HG: ICD-10-PCS | Mod: CPTII,S$GLB,, | Performed by: RADIOLOGY

## 2023-12-01 PROCEDURE — 3051F HG A1C>EQUAL 7.0%<8.0%: CPT | Mod: CPTII,S$GLB,, | Performed by: RADIOLOGY

## 2023-12-01 PROCEDURE — 3074F SYST BP LT 130 MM HG: CPT | Mod: CPTII,S$GLB,, | Performed by: RADIOLOGY

## 2023-12-03 ENCOUNTER — PATIENT MESSAGE (OUTPATIENT)
Dept: ENDOCRINOLOGY | Facility: CLINIC | Age: 73
End: 2023-12-03
Payer: MEDICARE

## 2023-12-04 ENCOUNTER — OFFICE VISIT (OUTPATIENT)
Dept: FAMILY MEDICINE | Facility: CLINIC | Age: 73
End: 2023-12-04
Payer: MEDICARE

## 2023-12-04 VITALS
OXYGEN SATURATION: 99 % | WEIGHT: 172.06 LBS | HEIGHT: 66 IN | SYSTOLIC BLOOD PRESSURE: 120 MMHG | DIASTOLIC BLOOD PRESSURE: 62 MMHG | HEART RATE: 86 BPM | BODY MASS INDEX: 27.65 KG/M2

## 2023-12-04 DIAGNOSIS — N30.00 ACUTE CYSTITIS WITHOUT HEMATURIA: ICD-10-CM

## 2023-12-04 DIAGNOSIS — R30.0 DYSURIA: Primary | ICD-10-CM

## 2023-12-04 DIAGNOSIS — Z79.4 INSULIN DEPENDENT TYPE 2 DIABETES MELLITUS: ICD-10-CM

## 2023-12-04 DIAGNOSIS — N39.0 RECURRENT UTI: ICD-10-CM

## 2023-12-04 DIAGNOSIS — E11.9 INSULIN DEPENDENT TYPE 2 DIABETES MELLITUS: ICD-10-CM

## 2023-12-04 DIAGNOSIS — I48.0 PAF (PAROXYSMAL ATRIAL FIBRILLATION): ICD-10-CM

## 2023-12-04 DIAGNOSIS — R26.89 DECREASED FUNCTIONAL MOBILITY: ICD-10-CM

## 2023-12-04 DIAGNOSIS — I10 ESSENTIAL HYPERTENSION: ICD-10-CM

## 2023-12-04 DIAGNOSIS — R05.3 CHRONIC COUGH: ICD-10-CM

## 2023-12-04 DIAGNOSIS — M62.81 MUSCLE WEAKNESS: ICD-10-CM

## 2023-12-04 PROCEDURE — 3074F SYST BP LT 130 MM HG: CPT | Mod: CPTII,S$GLB,, | Performed by: FAMILY MEDICINE

## 2023-12-04 PROCEDURE — 3008F PR BODY MASS INDEX (BMI) DOCUMENTED: ICD-10-PCS | Mod: CPTII,S$GLB,, | Performed by: FAMILY MEDICINE

## 2023-12-04 PROCEDURE — 3062F PR POS MACROALBUMINURIA RESULT DOCUMENTED/REVIEW: ICD-10-PCS | Mod: CPTII,S$GLB,, | Performed by: FAMILY MEDICINE

## 2023-12-04 PROCEDURE — 1126F AMNT PAIN NOTED NONE PRSNT: CPT | Mod: CPTII,S$GLB,, | Performed by: FAMILY MEDICINE

## 2023-12-04 PROCEDURE — 3008F BODY MASS INDEX DOCD: CPT | Mod: CPTII,S$GLB,, | Performed by: FAMILY MEDICINE

## 2023-12-04 PROCEDURE — 1101F PT FALLS ASSESS-DOCD LE1/YR: CPT | Mod: CPTII,S$GLB,, | Performed by: FAMILY MEDICINE

## 2023-12-04 PROCEDURE — 99999 PR PBB SHADOW E&M-EST. PATIENT-LVL V: ICD-10-PCS | Mod: PBBFAC,,, | Performed by: FAMILY MEDICINE

## 2023-12-04 PROCEDURE — 3288F FALL RISK ASSESSMENT DOCD: CPT | Mod: CPTII,S$GLB,, | Performed by: FAMILY MEDICINE

## 2023-12-04 PROCEDURE — 4010F ACE/ARB THERAPY RXD/TAKEN: CPT | Mod: CPTII,S$GLB,, | Performed by: FAMILY MEDICINE

## 2023-12-04 PROCEDURE — 3288F PR FALLS RISK ASSESSMENT DOCUMENTED: ICD-10-PCS | Mod: CPTII,S$GLB,, | Performed by: FAMILY MEDICINE

## 2023-12-04 PROCEDURE — 3066F NEPHROPATHY DOC TX: CPT | Mod: CPTII,S$GLB,, | Performed by: FAMILY MEDICINE

## 2023-12-04 PROCEDURE — 1101F PR PT FALLS ASSESS DOC 0-1 FALLS W/OUT INJ PAST YR: ICD-10-PCS | Mod: CPTII,S$GLB,, | Performed by: FAMILY MEDICINE

## 2023-12-04 PROCEDURE — 3066F PR DOCUMENTATION OF TREATMENT FOR NEPHROPATHY: ICD-10-PCS | Mod: CPTII,S$GLB,, | Performed by: FAMILY MEDICINE

## 2023-12-04 PROCEDURE — 3062F POS MACROALBUMINURIA REV: CPT | Mod: CPTII,S$GLB,, | Performed by: FAMILY MEDICINE

## 2023-12-04 PROCEDURE — 3078F PR MOST RECENT DIASTOLIC BLOOD PRESSURE < 80 MM HG: ICD-10-PCS | Mod: CPTII,S$GLB,, | Performed by: FAMILY MEDICINE

## 2023-12-04 PROCEDURE — 3074F PR MOST RECENT SYSTOLIC BLOOD PRESSURE < 130 MM HG: ICD-10-PCS | Mod: CPTII,S$GLB,, | Performed by: FAMILY MEDICINE

## 2023-12-04 PROCEDURE — 99214 PR OFFICE/OUTPT VISIT, EST, LEVL IV, 30-39 MIN: ICD-10-PCS | Mod: S$GLB,,, | Performed by: FAMILY MEDICINE

## 2023-12-04 PROCEDURE — 3051F HG A1C>EQUAL 7.0%<8.0%: CPT | Mod: CPTII,S$GLB,, | Performed by: FAMILY MEDICINE

## 2023-12-04 PROCEDURE — 1126F PR PAIN SEVERITY QUANTIFIED, NO PAIN PRESENT: ICD-10-PCS | Mod: CPTII,S$GLB,, | Performed by: FAMILY MEDICINE

## 2023-12-04 PROCEDURE — 99214 OFFICE O/P EST MOD 30 MIN: CPT | Mod: S$GLB,,, | Performed by: FAMILY MEDICINE

## 2023-12-04 PROCEDURE — 3051F PR MOST RECENT HEMOGLOBIN A1C LEVEL 7.0 - < 8.0%: ICD-10-PCS | Mod: CPTII,S$GLB,, | Performed by: FAMILY MEDICINE

## 2023-12-04 PROCEDURE — 99999 PR PBB SHADOW E&M-EST. PATIENT-LVL V: CPT | Mod: PBBFAC,,, | Performed by: FAMILY MEDICINE

## 2023-12-04 PROCEDURE — 4010F PR ACE/ARB THEARPY RXD/TAKEN: ICD-10-PCS | Mod: CPTII,S$GLB,, | Performed by: FAMILY MEDICINE

## 2023-12-04 PROCEDURE — 3078F DIAST BP <80 MM HG: CPT | Mod: CPTII,S$GLB,, | Performed by: FAMILY MEDICINE

## 2023-12-04 RX ORDER — NYSTATIN 100000 U/G
CREAM TOPICAL 3 TIMES DAILY PRN
COMMUNITY
Start: 2023-09-26

## 2023-12-04 RX ORDER — SULFAMETHOXAZOLE AND TRIMETHOPRIM 800; 160 MG/1; MG/1
1 TABLET ORAL 2 TIMES DAILY
Qty: 14 TABLET | Refills: 1 | Status: SHIPPED | OUTPATIENT
Start: 2023-12-04 | End: 2024-01-12

## 2023-12-04 RX ORDER — METOPROLOL SUCCINATE 50 MG/1
50 TABLET, EXTENDED RELEASE ORAL 2 TIMES DAILY
Qty: 180 TABLET | Refills: 0 | Status: ON HOLD | OUTPATIENT
Start: 2023-12-04 | End: 2024-01-24 | Stop reason: HOSPADM

## 2023-12-04 NOTE — PROGRESS NOTES
Subjective:       Patient ID: Allyson Henriquez is a 73 y.o. female.    Chief Complaint: Follow-up (Possible uti )      Allyson Henriquez is in the office for f/u.    Follow-up  Associated symptoms include congestion and coughing. Pertinent negatives include no arthralgias, chest pain, fatigue, fever, headaches, rash or sore throat.     Medical hx reviewed.  Her abdominal wound has slowly improved. Still with HH 3x/week.  Slight burning and pain with urination x 3 days. Will do urine sample at home.    Cough am/pm x 3-4mos, near daily. Sometimes productive (clear), not hacking. No shortness of breath.   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 difficile colitis 08/2021    Cancer     lung    CKD (chronic kidney disease), stage III     Colonic diverticular abscess 07/12/2022    COPD (chronic obstructive pulmonary disease)     Encounter for blood transfusion     Hepatic steatosis     History of numbness     transient numbness left hand and foot    History of tobacco abuse     Hyperlipidemia     Hypertension     Lung nodules     and adenopathy    Multinodular goiter (nontoxic) 05/08/2017    Obesity     TRISHA (obstructive sleep apnea)     on bipap at bedtime    Proteinuria          Current Outpatient Medications:     ascorbic acid, vitamin C, (VITAMIN C) 1000 MG tablet, Take 500 mg by mouth once daily., Disp: , Rfl:     blood sugar diagnostic Strp, 1 strip by Misc.(Non-Drug; Combo Route) route Daily., Disp: 100 each, Rfl: 1    blood-glucose meter kit, Use as instructed, Disp: 1 each, Rfl: 0    cetirizine (ZYRTEC) 10 MG tablet, Take 10 mg by mouth every morning., Disp: , Rfl:     docusate sodium (COLACE) 100 MG capsule, Take 1 capsule (100 mg total) by mouth daily as needed for  "Constipation., Disp: 90 capsule, Rfl: 3    ELIQUIS 5 mg Tab, TAKE 1 TABLET(5 MG) BY MOUTH TWICE DAILY, Disp: 180 tablet, Rfl: 0    ergocalciferol (ERGOCALCIFEROL) 50,000 unit Cap, Take 5,000 Units by mouth once daily. 5,000 daily , Disp: , Rfl:     gabapentin (NEURONTIN) 100 MG capsule, Take 100 mg by mouth 3 (three) times daily. Indications: neuropathic pain, Disp: , Rfl:     insulin (LANTUS SOLOSTAR U-100 INSULIN) glargine 100 units/mL SubQ pen, Start with lantus 40 units.  May increase by 2 units every 5 days until fasting glucoses are less than 150.  50 units a day max. (Patient taking differently: Start with lantus 34 units.  May increase by 2 units every 5 days until fasting glucoses are less than 150.  50 units a day max. (Currently giving 34U at bedtime)), Disp: 15 mL, Rfl: 12    insulin lispro (HUMALOG KWIKPEN INSULIN) 100 unit/mL pen, 7  Units with meals with correction scale. Use on premeal readings; 150-200=+2, 201-250=+4; 251-300=+6; 301-350=+8, over 350, + 10 units 50 units a day max, Disp: 15 mL, Rfl: 12    lancets Misc, 1 each by Misc.(Non-Drug; Combo Route) route once daily., Disp: 100 each, Rfl: 1    LIDOcaine-prilocaine (EMLA) cream, Apply topically as needed (as needed for port access)., Disp: 30 g, Rfl: 1    nystatin (MYCOSTATIN) cream, APPLY TOPICALLY TO THE AFFECTED AREA THREE TIMES DAILY FOR DERMATITIS, Disp: , Rfl:     pantoprazole (PROTONIX) 40 MG tablet, Take 1 tablet (40 mg total) by mouth once daily., Disp: 90 tablet, Rfl: 2    pen needle, diabetic 31 gauge x 1/4" Ndle, To use with insulin pens up to 4x day., Disp: 150 each, Rfl: 12    pen needle, diabetic 32 gauge x 1/4" Ndle, To use with insulin pens up to 4x day, Disp: 150 each, Rfl: 12    pen needle, diabetic 32 gauge x 5/32" Ndle, Pt uses with insulin pens up to QID, Disp: 150 each, Rfl: PRN    sertraline (ZOLOFT) 25 MG tablet, TAKE 1 TABLET(25 MG) BY MOUTH EVERY DAY, Disp: 90 tablet, Rfl: 2    simvastatin (ZOCOR) 40 MG tablet, TAKE " 1 TABLET(40 MG) BY MOUTH EVERY EVENING, Disp: 90 tablet, Rfl: 0    triamcinolone acetonide 0.1% (KENALOG) 0.1 % cream, Apply topically 2 (two) times daily., Disp: 45 g, Rfl: 1    amoxicillin-clavulanate 875-125mg (AUGMENTIN) 875-125 mg per tablet, Take 1 tablet by mouth 2 (two) times daily., Disp: 14 tablet, Rfl: 0    HYDROcodone-acetaminophen (NORCO)  mg per tablet, Take 1 tablet by mouth every 6 (six) hours as needed for Pain. Indications: pain during wound care, Disp: , Rfl:     Lactobacillus rhamnosus GG (CULTURELLE) 10 billion cell capsule, Take 1 capsule by mouth once daily., Disp: , Rfl:     levothyroxine (SYNTHROID) 75 MCG tablet, 1 tablet 6 days a week and 1/2 tablet on day 7, Disp: 30 tablet, Rfl: 11    LIDOcaine HCL 2% (XYLOCAINE) 2 % jelly, Apply topically as needed (Apply 3-4 mL to wound bed with dressing changes Monday, Wednesday, Friday prn pain)., Disp: 30 mL, Rfl: 1    metoprolol succinate (TOPROL-XL) 50 MG 24 hr tablet, TAKE 1 TABLET(50 MG) BY MOUTH TWICE DAILY, Disp: 180 tablet, Rfl: 0    multivitamin (THERAGRAN) per tablet, Take 1 tablet by mouth once daily., Disp: , Rfl:     sulfamethoxazole-trimethoprim 800-160mg (BACTRIM DS) 800-160 mg Tab, Take 1 tablet by mouth 2 (two) times daily. (Patient not taking: Reported on 12/14/2023), Disp: 14 tablet, Rfl: 1    vitamin A 46281 UNIT capsule, Take 10,000 Units by mouth once daily., Disp: , Rfl:   No current facility-administered medications for this visit.    Facility-Administered Medications Ordered in Other Visits:     EUFLEXXA 10 mg/mL(mw 2.4 -3.6 million) injection Syrg 20 mg, 20 mg, Intra-articular, , Jose Rafael Barney MD, 20 mg at 06/14/17 1605    The 10-year ASCVD risk score (Ron ORDOÑEZ, et al., 2019) is: 37%    Values used to calculate the score:      Age: 73 years      Sex: Female      Is Non- : No      Diabetic: Yes      Tobacco smoker: No      Systolic Blood Pressure: 139 mmHg      Is BP treated: Yes       HDL Cholesterol: 38 mg/dL      Total Cholesterol: 134 mg/dL     Lab Results   Component Value Date    HGBA1C 7.0 (H) 08/18/2023    HGBA1C 5.4 03/13/2023    HGBA1C 7.8 (H) 07/12/2022     Lab Results   Component Value Date    LDLCALC 63.2 09/11/2023    CREATININE 1.00 12/04/2023   Labs 2023 rev.     Review of Systems   Constitutional:  Negative for fatigue, fever and unexpected weight change.   HENT:  Positive for congestion and postnasal drip. Negative for sore throat.    Respiratory:  Positive for cough. Negative for apnea (restarted cpap) and shortness of breath (can occur with over-exertion).    Cardiovascular:  Positive for leg swelling. Negative for chest pain.   Gastrointestinal:  Negative for blood in stool, constipation and diarrhea.   Genitourinary:  Positive for dysuria. Negative for difficulty urinating and frequency.   Musculoskeletal:  Positive for gait problem (ambulatory with walker). Negative for arthralgias.   Skin:  Positive for wound. Negative for rash.   Neurological:  Negative for speech difficulty and headaches.        Occ feels a little foggy, usually when tired   Psychiatric/Behavioral:  Positive for dysphoric mood. Negative for sleep disturbance.        Objective:      Physical Exam  Vitals and nursing note reviewed.   Constitutional:       General: She is not in acute distress.     Appearance: She is well-developed. She is obese.   HENT:      Head: Normocephalic and atraumatic.      Right Ear: External ear normal.      Left Ear: External ear normal.      Mouth/Throat:      Pharynx: No oropharyngeal exudate.   Neck:      Thyroid: No thyromegaly.   Cardiovascular:      Rate and Rhythm: Normal rate and regular rhythm.   Pulmonary:      Effort: Pulmonary effort is normal. No respiratory distress.      Breath sounds: Normal breath sounds. No wheezing.   Abdominal:      General: There is no distension.      Palpations: Abdomen is soft. There is no mass.      Comments: Ostomy present, llq    Musculoskeletal:      Right lower leg: No edema.      Left lower leg: No edema.   Skin:     General: Skin is warm and dry.   Neurological:      General: No focal deficit present.      Mental Status: She is alert and oriented to person, place, and time.      Cranial Nerves: No cranial nerve deficit.   Psychiatric:         Mood and Affect: Mood is depressed.         Behavior: Behavior normal.             Screening recommendations appropriate to age and health status were reviewed.    Assessment & Plan:    Dysuria  -     Urinalysis, Reflex to Urine Culture Urine, Clean Catch; Future    Decreased functional mobility  -     WALKER FOR HOME USE    Recurrent UTI    Muscle weakness  -     WALKER FOR HOME USE    Acute cystitis without hematuria  -     sulfamethoxazole-trimethoprim 800-160mg (BACTRIM DS) 800-160 mg Tab; Take 1 tablet by mouth 2 (two) times daily. (Patient not taking: Reported on 12/14/2023)  Dispense: 14 tablet; Refill: 1    Chronic cough  -     CT Chest Without Contrast; Future; Expected date: 12/04/2023    Insulin dependent type 2 diabetes mellitus

## 2023-12-05 ENCOUNTER — LAB VISIT (OUTPATIENT)
Dept: LAB | Facility: HOSPITAL | Age: 73
End: 2023-12-05
Attending: FAMILY MEDICINE
Payer: MEDICARE

## 2023-12-05 DIAGNOSIS — R30.0 DYSURIA: ICD-10-CM

## 2023-12-05 LAB
BACTERIA #/AREA URNS AUTO: ABNORMAL /HPF
BILIRUB UR QL STRIP: NEGATIVE
CLARITY UR REFRACT.AUTO: ABNORMAL
COLOR UR AUTO: ABNORMAL
GLUCOSE UR QL STRIP: NEGATIVE
HGB UR QL STRIP: ABNORMAL
HYALINE CASTS UR QL AUTO: 0 /LPF
KETONES UR QL STRIP: NEGATIVE
LEUKOCYTE ESTERASE UR QL STRIP: ABNORMAL
MICROSCOPIC COMMENT: ABNORMAL
NITRITE UR QL STRIP: NEGATIVE
PH UR STRIP: 6 [PH] (ref 5–8)
PROT UR QL STRIP: ABNORMAL
RBC #/AREA URNS AUTO: 43 /HPF (ref 0–4)
SP GR UR STRIP: 1.01 (ref 1–1.03)
URN SPEC COLLECT METH UR: ABNORMAL
WBC #/AREA URNS AUTO: >100 /HPF (ref 0–5)
WBC CLUMPS UR QL AUTO: ABNORMAL

## 2023-12-05 PROCEDURE — 81001 URINALYSIS AUTO W/SCOPE: CPT | Performed by: FAMILY MEDICINE

## 2023-12-05 PROCEDURE — 87147 CULTURE TYPE IMMUNOLOGIC: CPT | Performed by: FAMILY MEDICINE

## 2023-12-05 PROCEDURE — 87088 URINE BACTERIA CULTURE: CPT | Performed by: FAMILY MEDICINE

## 2023-12-05 PROCEDURE — 87086 URINE CULTURE/COLONY COUNT: CPT | Performed by: FAMILY MEDICINE

## 2023-12-06 ENCOUNTER — PATIENT MESSAGE (OUTPATIENT)
Dept: FAMILY MEDICINE | Facility: CLINIC | Age: 73
End: 2023-12-06
Payer: MEDICARE

## 2023-12-06 DIAGNOSIS — N30.00 ACUTE CYSTITIS WITHOUT HEMATURIA: Primary | ICD-10-CM

## 2023-12-06 LAB — BACTERIA UR CULT: ABNORMAL

## 2023-12-12 ENCOUNTER — TELEPHONE (OUTPATIENT)
Dept: ENDOCRINOLOGY | Facility: CLINIC | Age: 73
End: 2023-12-12
Payer: MEDICARE

## 2023-12-12 DIAGNOSIS — E03.9 HYPOTHYROIDISM, UNSPECIFIED TYPE: Primary | ICD-10-CM

## 2023-12-12 RX ORDER — AMOXICILLIN AND CLAVULANATE POTASSIUM 875; 125 MG/1; MG/1
1 TABLET, FILM COATED ORAL 2 TIMES DAILY
Qty: 14 TABLET | Refills: 0 | Status: SHIPPED | OUTPATIENT
Start: 2023-12-12 | End: 2023-12-28

## 2023-12-12 RX ORDER — LEVOTHYROXINE SODIUM 75 UG/1
TABLET ORAL
Qty: 30 TABLET | Refills: 11 | Status: SHIPPED | OUTPATIENT
Start: 2023-12-12

## 2023-12-12 NOTE — TELEPHONE ENCOUNTER
Let her know on too much synthroid   Decrease to 75 mcg 6 days a week and 1/2 tablet on day 7    Check TSH 6 weeks

## 2023-12-13 NOTE — TELEPHONE ENCOUNTER
Pt's  advised of Dr. Daly's recommendation and verb understanding.  They will do 6 week lab at [facility] around 1/24/24.

## 2023-12-14 ENCOUNTER — OFFICE VISIT (OUTPATIENT)
Dept: CARDIOLOGY | Facility: CLINIC | Age: 73
End: 2023-12-14
Payer: MEDICARE

## 2023-12-14 VITALS
HEIGHT: 66 IN | SYSTOLIC BLOOD PRESSURE: 139 MMHG | WEIGHT: 183 LBS | HEART RATE: 80 BPM | BODY MASS INDEX: 29.41 KG/M2 | DIASTOLIC BLOOD PRESSURE: 69 MMHG

## 2023-12-14 DIAGNOSIS — E66.3 OVERWEIGHT (BMI 25.0-29.9): Chronic | ICD-10-CM

## 2023-12-14 DIAGNOSIS — E78.00 HYPERCHOLESTEROLEMIA: Chronic | ICD-10-CM

## 2023-12-14 DIAGNOSIS — N18.30 BENIGN HYPERTENSIVE HEART AND KIDNEY DISEASE WITH CHF, NYHA CLASS 1 AND CKD STAGE 3: Chronic | ICD-10-CM

## 2023-12-14 DIAGNOSIS — Z79.01 CURRENT USE OF LONG TERM ANTICOAGULATION: Chronic | ICD-10-CM

## 2023-12-14 DIAGNOSIS — I48.0 PAF (PAROXYSMAL ATRIAL FIBRILLATION): Primary | Chronic | ICD-10-CM

## 2023-12-14 DIAGNOSIS — I13.0 BENIGN HYPERTENSIVE HEART AND KIDNEY DISEASE WITH CHF, NYHA CLASS 1 AND CKD STAGE 3: Chronic | ICD-10-CM

## 2023-12-14 DIAGNOSIS — I34.0 NONRHEUMATIC MITRAL VALVE REGURGITATION: Chronic | ICD-10-CM

## 2023-12-14 PROCEDURE — 3062F POS MACROALBUMINURIA REV: CPT | Mod: CPTII,S$GLB,, | Performed by: INTERNAL MEDICINE

## 2023-12-14 PROCEDURE — 3008F BODY MASS INDEX DOCD: CPT | Mod: CPTII,S$GLB,, | Performed by: INTERNAL MEDICINE

## 2023-12-14 PROCEDURE — 3075F PR MOST RECENT SYSTOLIC BLOOD PRESS GE 130-139MM HG: ICD-10-PCS | Mod: CPTII,S$GLB,, | Performed by: INTERNAL MEDICINE

## 2023-12-14 PROCEDURE — 1101F PT FALLS ASSESS-DOCD LE1/YR: CPT | Mod: CPTII,S$GLB,, | Performed by: INTERNAL MEDICINE

## 2023-12-14 PROCEDURE — 1101F PR PT FALLS ASSESS DOC 0-1 FALLS W/OUT INJ PAST YR: ICD-10-PCS | Mod: CPTII,S$GLB,, | Performed by: INTERNAL MEDICINE

## 2023-12-14 PROCEDURE — 99999 PR PBB SHADOW E&M-EST. PATIENT-LVL III: CPT | Mod: PBBFAC,,, | Performed by: INTERNAL MEDICINE

## 2023-12-14 PROCEDURE — 3051F PR MOST RECENT HEMOGLOBIN A1C LEVEL 7.0 - < 8.0%: ICD-10-PCS | Mod: CPTII,S$GLB,, | Performed by: INTERNAL MEDICINE

## 2023-12-14 PROCEDURE — 3008F PR BODY MASS INDEX (BMI) DOCUMENTED: ICD-10-PCS | Mod: CPTII,S$GLB,, | Performed by: INTERNAL MEDICINE

## 2023-12-14 PROCEDURE — 3288F FALL RISK ASSESSMENT DOCD: CPT | Mod: CPTII,S$GLB,, | Performed by: INTERNAL MEDICINE

## 2023-12-14 PROCEDURE — 3062F PR POS MACROALBUMINURIA RESULT DOCUMENTED/REVIEW: ICD-10-PCS | Mod: CPTII,S$GLB,, | Performed by: INTERNAL MEDICINE

## 2023-12-14 PROCEDURE — 3051F HG A1C>EQUAL 7.0%<8.0%: CPT | Mod: CPTII,S$GLB,, | Performed by: INTERNAL MEDICINE

## 2023-12-14 PROCEDURE — 1159F PR MEDICATION LIST DOCUMENTED IN MEDICAL RECORD: ICD-10-PCS | Mod: CPTII,S$GLB,, | Performed by: INTERNAL MEDICINE

## 2023-12-14 PROCEDURE — 99999 PR PBB SHADOW E&M-EST. PATIENT-LVL III: ICD-10-PCS | Mod: PBBFAC,,, | Performed by: INTERNAL MEDICINE

## 2023-12-14 PROCEDURE — 3075F SYST BP GE 130 - 139MM HG: CPT | Mod: CPTII,S$GLB,, | Performed by: INTERNAL MEDICINE

## 2023-12-14 PROCEDURE — 99214 OFFICE O/P EST MOD 30 MIN: CPT | Mod: S$GLB,,, | Performed by: INTERNAL MEDICINE

## 2023-12-14 PROCEDURE — 3078F DIAST BP <80 MM HG: CPT | Mod: CPTII,S$GLB,, | Performed by: INTERNAL MEDICINE

## 2023-12-14 PROCEDURE — 4010F ACE/ARB THERAPY RXD/TAKEN: CPT | Mod: CPTII,S$GLB,, | Performed by: INTERNAL MEDICINE

## 2023-12-14 PROCEDURE — 4010F PR ACE/ARB THEARPY RXD/TAKEN: ICD-10-PCS | Mod: CPTII,S$GLB,, | Performed by: INTERNAL MEDICINE

## 2023-12-14 PROCEDURE — 1159F MED LIST DOCD IN RCRD: CPT | Mod: CPTII,S$GLB,, | Performed by: INTERNAL MEDICINE

## 2023-12-14 PROCEDURE — 99214 PR OFFICE/OUTPT VISIT, EST, LEVL IV, 30-39 MIN: ICD-10-PCS | Mod: S$GLB,,, | Performed by: INTERNAL MEDICINE

## 2023-12-14 PROCEDURE — 3078F PR MOST RECENT DIASTOLIC BLOOD PRESSURE < 80 MM HG: ICD-10-PCS | Mod: CPTII,S$GLB,, | Performed by: INTERNAL MEDICINE

## 2023-12-14 PROCEDURE — 3288F PR FALLS RISK ASSESSMENT DOCUMENTED: ICD-10-PCS | Mod: CPTII,S$GLB,, | Performed by: INTERNAL MEDICINE

## 2023-12-14 PROCEDURE — 3066F NEPHROPATHY DOC TX: CPT | Mod: CPTII,S$GLB,, | Performed by: INTERNAL MEDICINE

## 2023-12-14 PROCEDURE — 3066F PR DOCUMENTATION OF TREATMENT FOR NEPHROPATHY: ICD-10-PCS | Mod: CPTII,S$GLB,, | Performed by: INTERNAL MEDICINE

## 2023-12-14 RX ORDER — MULTIVITAMIN
1 TABLET ORAL DAILY
COMMUNITY
End: 2024-01-15 | Stop reason: CLARIF

## 2023-12-14 RX ORDER — VITAMIN A 3000 MCG
10000 CAPSULE ORAL 2 TIMES DAILY
COMMUNITY
Start: 2023-12-14

## 2023-12-14 NOTE — PROGRESS NOTES
Subjective:    Patient ID:  Allyson Henriquez is a 73 y.o. 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 difficile colitis 08/2021    Cancer     lung    CKD (chronic kidney disease), stage III     Colonic diverticular abscess 07/12/2022    COPD (chronic obstructive pulmonary disease)     Encounter for blood transfusion     Hepatic steatosis     History of numbness     transient numbness left hand and foot    History of tobacco abuse     Hyperlipidemia     Hypertension     Lung nodules     and adenopathy    Multinodular goiter (nontoxic) 05/08/2017    Obesity     TRISHA (obstructive sleep apnea)     on bipap at bedtime    Proteinuria      Past Surgical History:   Procedure Laterality Date    ADENOIDECTOMY      APPENDECTOMY      CATARACT EXTRACTION      COLONOSCOPY N/A 11/14/2016    Procedure: COLONOSCOPY;  Surgeon: Destin Cardona MD;  Location: [facility] ENDO;  Service: Endoscopy;  Laterality: N/A;    COLONOSCOPY N/A 05/14/2021    Procedure: COLONOSCOPY;  Surgeon: Destin Cardona MD;  Location: [facility] ENDO;  Service: Endoscopy;  Laterality: N/A; hemorrhoids, diverticulosis, Old blood left colon. No active bleeding; Repeat colonoscopy is not recommended for screening purposes.    COLOSTOMY N/A 07/28/2022    Procedure: CREATION, COLOSTOMY;  Surgeon: Nubia Tinoco MD;  Location: [facility] OR;  Service: General;  Laterality: N/A;    DEBRIDEMENT OF WOUND OF ABDOMEN  07/28/2022     Procedure: DEBRIDEMENT, WOUND, ABDOMEN;  Surgeon: Nubia Tinoco MD;  Location: [facility] OR;  Service: General;;    ESOPHAGOGASTRODUODENOSCOPY N/A 05/14/2021    Procedure: EGD (ESOPHAGOGASTRODUODENOSCOPY);  Surgeon: Destin Cardona MD;  Location: [facility];  Service: Endoscopy;  Laterality: N/A; unremarkable    ESOPHAGOGASTRODUODENOSCOPY N/A 08/09/2021    Procedure: ESOPHAGOGASTRODUODENOSCOPY (EGD);  Surgeon: Destin Cardona MD;  Location: [facility] ENDO;  Service: Endoscopy;  Laterality: N/A; Moderately severe radiation esophagitis with no bleeding    EXCISIONAL BIOPSY N/A 02/09/2023    Procedure: EXCISIONAL BIOPSY;  Surgeon: Nubia Tinoco MD;  Location: [facility] OR;  Service: General;  Laterality: N/A;    EYE SURGERY      cataract OU    HERNIA REPAIR      INSERTION OF TUNNELED CENTRAL VENOUS CATHETER (CVC) WITH SUBCUTANEOUS PORT N/A 06/07/2021    Procedure: [id number];  Surgeon: Joe Salinas MD;  Location: [facility] OR;  Service: General;  Laterality: N/A;    INTRALUMINAL GASTROINTESTINAL TRACT IMAGING VIA CAPSULE N/A 12/29/2021    Procedure: IMAGING PROCEDURE, GI TRACT, INTRALUMINAL, VIA CAPSULE  (called for instruction 12/20-may have trouble swallowing);  Surgeon: Floyd Dixon MD;  Location: [facility];  Service: Endoscopy;  Laterality: N/A; Diffuse red spots of unclear significance and erosions found in the small bowel (entire small bowel), suspicious for mild radiation enteritis    NEUROMA SURGERY Left     acoustic    TONSILLECTOMY      WOUND EXPLORATION N/A 08/13/2022    Procedure: EXPLORATION, WOUND;  Surgeon: Arnel Olsen MD;  Location: [facility] OR;  Service: General;  Laterality: N/A;    WOUND EXPLORATION Left 02/09/2023    Procedure: EXPLORATION, WOUND - of Ostomy Granuloma - Wound - Abdomen;  Surgeon: Nubia Tinoco MD;  Location: [facility] OR;  Service: General;  Laterality: Left;     Family History   Problem Relation Age of Onset    Heart disease Mother         dissection    GI  problems Father         perf colon    Cancer Paternal Uncle         colon cancer    Diabetes Neg Hx     Kidney disease Neg Hx     Stroke Neg Hx     Colon cancer Neg Hx     Crohn's disease Neg Hx     Ulcerative colitis Neg Hx     Stomach cancer Neg Hx     Esophageal cancer Neg Hx      Social History     Socioeconomic History    Marital status:    Tobacco Use    Smoking status: Former     Current packs/day: 0.00     Average packs/day: 1 pack/day for 40.0 years (40.0 ttl pk-yrs)     Types: Cigarettes     Start date: 3/6/1976     Quit date: 3/6/2016     Years since quittin.7    Smokeless tobacco: Never   Substance and Sexual Activity    Alcohol use: Not Currently    Drug use: No   Social History Narrative    Works in insurance collection.     Social Determinants of Health     Financial Resource Strain: Unknown (10/13/2021)    Overall Financial Resource Strain (CARDIA)     Difficulty of Paying Living Expenses: Patient declined   Food Insecurity: Unknown (10/13/2021)    Hunger Vital Sign     Worried About Running Out of Food in the Last Year: Patient declined     Ran Out of Food in the Last Year: Patient declined   Transportation Needs: No Transportation Needs (2023)    OASIS : Transportation     Lack of Transportation (Medical): No     Lack of Transportation (Non-Medical): No     Patient Unable or Declines to Respond: No   Physical Activity: Unknown (10/13/2021)    Exercise Vital Sign     Days of Exercise per Week: 0 days   Stress: No Stress Concern Present (10/13/2021)    [employer] - Occupational Stress Questionnaire     Feeling of Stress : Not at all   Social Connections: Feeling Socially Integrated (2023)    OASIS : Social Isolation     Frequency of experiencing loneliness or isolation: Never   Housing Stability: Unknown (2021)    Housing Stability Vital Sign     Unable to Pay for Housing in the Last Year: Patient refused     Unstable Housing in the  Last Year: Patient refused       Review of patient's allergies indicates:   Allergen Reactions    Contrast media Anaphylaxis    Iodinated contrast media Anaphylaxis and Rash    Albuterol Other (See Comments)     Used during PFTs and put pt in afib    Dye     Pcn [penicillins]      Pt states did well on cephalexin.  No adverse reaction or side effect.     Doxycycline Palpitations     Tolerated IV Doxy 8/2022       Current Outpatient Medications:     amoxicillin-clavulanate 875-125mg (AUGMENTIN) 875-125 mg per tablet, Take 1 tablet by mouth 2 (two) times daily., Disp: 14 tablet, Rfl: 0    ascorbic acid, vitamin C, (VITAMIN C) 1000 MG tablet, Take 500 mg by mouth once daily., Disp: , Rfl:     blood sugar diagnostic Strp, 1 strip by Misc.(Non-Drug; Combo Route) route Daily., Disp: 100 each, Rfl: 1    blood-glucose meter kit, Use as instructed, Disp: 1 each, Rfl: 0    cetirizine (ZYRTEC) 10 MG tablet, Take 10 mg by mouth every morning., Disp: , Rfl:     docusate sodium (COLACE) 100 MG capsule, Take 1 capsule (100 mg total) by mouth daily as needed for Constipation., Disp: 90 capsule, Rfl: 3    ELIQUIS 5 mg Tab, TAKE 1 TABLET(5 MG) BY MOUTH TWICE DAILY, Disp: 180 tablet, Rfl: 0    ergocalciferol (ERGOCALCIFEROL) 50,000 unit Cap, Take 5,000 Units by mouth once daily. 5,000 daily , Disp: , Rfl:     gabapentin (NEURONTIN) 100 MG capsule, Take 100 mg by mouth 3 (three) times daily. Indications: neuropathic pain, Disp: , Rfl:     HYDROcodone-acetaminophen (NORCO)  mg per tablet, Take 1 tablet by mouth every 6 (six) hours as needed for Pain. Indications: pain during wound care, Disp: , Rfl:     insulin (LANTUS SOLOSTAR U-100 INSULIN) glargine 100 units/mL SubQ pen, Start with lantus 40 units.  May increase by 2 units every 5 days until fasting glucoses are less than 150.  50 units a day max. (Patient taking differently: Start with lantus 34 units.  May increase by 2 units every 5 days until fasting glucoses are less than  "150.  50 units a day max. (Currently giving 34U at bedtime)), Disp: 15 mL, Rfl: 12    insulin lispro (HUMALOG KWIKPEN INSULIN) 100 unit/mL pen, 7  Units with meals with correction scale. Use on premeal readings; 150-200=+2, 201-250=+4; 251-300=+6; 301-350=+8, over 350, + 10 units 50 units a day max, Disp: 15 mL, Rfl: 12    Lactobacillus rhamnosus GG (CULTURELLE) 10 billion cell capsule, Take 1 capsule by mouth once daily., Disp: , Rfl:     lancets Misc, 1 each by Misc.(Non-Drug; Combo Route) route once daily., Disp: 100 each, Rfl: 1    levothyroxine (SYNTHROID) 75 MCG tablet, 1 tablet 6 days a week and 1/2 tablet on day 7, Disp: 30 tablet, Rfl: 11    LIDOcaine-prilocaine (EMLA) cream, Apply topically as needed (as needed for port access)., Disp: 30 g, Rfl: 1    metoprolol succinate (TOPROL-XL) 50 MG 24 hr tablet, TAKE 1 TABLET(50 MG) BY MOUTH TWICE DAILY, Disp: 180 tablet, Rfl: 0    multivitamin (THERAGRAN) per tablet, Take 1 tablet by mouth once daily., Disp: , Rfl:     nystatin (MYCOSTATIN) cream, APPLY TOPICALLY TO THE AFFECTED AREA THREE TIMES DAILY FOR DERMATITIS, Disp: , Rfl:     pantoprazole (PROTONIX) 40 MG tablet, Take 1 tablet (40 mg total) by mouth once daily., Disp: 90 tablet, Rfl: 2    pen needle, diabetic 31 gauge x 1/4" Ndle, To use with insulin pens up to 4x day., Disp: 150 each, Rfl: 12    pen needle, diabetic 32 gauge x 1/4" Ndle, To use with insulin pens up to 4x day, Disp: 150 each, Rfl: 12    pen needle, diabetic 32 gauge x 5/32" Ndle, Pt uses with insulin pens up to QID, Disp: 150 each, Rfl: PRN    sertraline (ZOLOFT) 25 MG tablet, TAKE 1 TABLET(25 MG) BY MOUTH EVERY DAY, Disp: 90 tablet, Rfl: 2    simvastatin (ZOCOR) 40 MG tablet, TAKE 1 TABLET(40 MG) BY MOUTH EVERY EVENING, Disp: 90 tablet, Rfl: 0    triamcinolone acetonide 0.1% (KENALOG) 0.1 % cream, Apply topically 2 (two) times daily., Disp: 45 g, Rfl: 1    vitamin A 23875 UNIT capsule, Take 10,000 Units by mouth once daily., Disp: , Rfl: "     LIDOcaine HCL 2% (XYLOCAINE) 2 % jelly, Apply topically as needed (Apply 3-4 mL to wound bed with dressing changes Monday, Wednesday, Friday prn pain)., Disp: 30 mL, Rfl: 1    sulfamethoxazole-trimethoprim 800-160mg (BACTRIM DS) 800-160 mg Tab, Take 1 tablet by mouth 2 (two) times daily. (Patient not taking: Reported on 12/14/2023), Disp: 14 tablet, Rfl: 1  No current facility-administered medications for this visit.    Facility-Administered Medications Ordered in Other Visits:     EUFLEXXA 10 mg/mL(mw 2.4 -3.6 million) injection Syrg 20 mg, 20 mg, Intra-articular, , Jose Rafael Barney MD, 20 mg at 06/14/17 1605    Review of Systems   Constitutional: Negative for chills, diaphoresis, fever, malaise/fatigue (LESS) and night sweats.   HENT: Negative.  Negative for congestion and nosebleeds.    Eyes:  Negative for blurred vision and visual disturbance.   Cardiovascular:  Positive for dyspnea on exertion (MILD, STAIRS). Negative for chest pain, claudication, cyanosis, irregular heartbeat, leg swelling, near-syncope, orthopnea, palpitations, paroxysmal nocturnal dyspnea and syncope.   Respiratory:  Negative for cough, hemoptysis, shortness of breath and wheezing.    Endocrine: Negative.    Hematologic/Lymphatic: Negative for adenopathy. Does not bruise/bleed easily.   Skin:  Positive for poor wound healing. Negative for color change.   Musculoskeletal:  Negative for back pain and falls.   Gastrointestinal:  Negative for abdominal pain, change in bowel habit, diarrhea, dysphagia, jaundice, melena and nausea.   Genitourinary:  Positive for dysuria (UTI). Negative for flank pain.   Neurological:  Negative for brief paralysis, focal weakness, light-headedness, loss of balance (LESS), paresthesias and weakness.   Psychiatric/Behavioral:  Negative for altered mental status and memory loss.    Allergic/Immunologic: Negative for hives and persistent infections.        Objective:      Vitals:    12/14/23 1203   BP:  "139/69   Pulse: 80   Weight: 83 kg (182 lb 15.7 oz)   Height: 5' 6" (1.676 m)     Body mass index is 29.53 kg/m².    Physical Exam  Constitutional:       Appearance: Normal appearance.   HENT:      Head: Normocephalic and atraumatic.   Eyes:      General: No scleral icterus.     Extraocular Movements: Extraocular movements intact.      Pupils: Pupils are equal, round, and reactive to light.   Neck:      Vascular: Normal carotid pulses. No carotid bruit or JVD.   Cardiovascular:      Rate and Rhythm: Normal rate and regular rhythm. No extrasystoles are present.     Pulses:           Carotid pulses are 2+ on the right side and 2+ on the left side.       Radial pulses are 2+ on the right side and 2+ on the left side.        Posterior tibial pulses are 2+ on the right side and 2+ on the left side.      Heart sounds: Murmur heard.      Systolic murmur is present with a grade of 1/6.      No friction rub. No gallop. No S4 sounds.   Pulmonary:      Effort: Pulmonary effort is normal. No respiratory distress.      Breath sounds: Normal breath sounds.   Abdominal:      Palpations: Abdomen is soft.          Comments: WOUND VAC   Musculoskeletal:      Cervical back: Neck supple.      Right lower leg: No edema.      Left lower leg: No edema.      Comments: IN WC   Skin:     General: Skin is warm and dry.      Capillary Refill: Capillary refill takes less than 2 seconds.   Neurological:      General: No focal deficit present.      Mental Status: She is alert.      Cranial Nerves: Cranial nerves 2-12 are intact. No cranial nerve deficit.   Psychiatric:         Mood and Affect: Mood normal.         Speech: Speech normal.         Behavior: Behavior normal.                 ..    Chemistry        Component Value Date/Time     12/04/2023 1050    K 3.7 12/04/2023 1050     12/04/2023 1050    CO2 25 12/04/2023 1050    BUN 21 (H) 12/04/2023 1050    CREATININE 1.00 12/04/2023 1050    GLU 85 12/04/2023 1050        Component " Value Date/Time    CALCIUM 9.8 12/04/2023 1050    ALKPHOS 77 12/04/2023 1050    AST 20 12/04/2023 1050    ALT 11 12/04/2023 1050    BILITOT 0.5 12/04/2023 1050    ESTGFRAFRICA >60 07/31/2022 0430    EGFRNONAA 55 (A) 07/31/2022 0430            ..  Lab Results   Component Value Date    CHOL 134 09/11/2023    CHOL 158 08/19/2022    CHOL 167 07/06/2022     Lab Results   Component Value Date    HDL 38 (L) 09/11/2023    HDL 22 (L) 08/19/2022    HDL 46 07/06/2022     Lab Results   Component Value Date    LDLCALC 63.2 09/11/2023    LDLCALC 103.2 08/19/2022    LDLCALC 81.6 07/06/2022     Lab Results   Component Value Date    TRIG 164 (H) 09/11/2023    TRIG 164 (H) 08/19/2022    TRIG 197 (H) 07/06/2022     Lab Results   Component Value Date    CHOLHDL 28.4 09/11/2023    CHOLHDL 13.9 (L) 08/19/2022    CHOLHDL 27.5 07/06/2022     ..  Lab Results   Component Value Date    WBC 10.71 12/04/2023    HGB 8.4 (L) 12/04/2023    HCT 27.9 (L) 12/04/2023    MCV 80 (L) 12/04/2023     12/04/2023       Test(s) Reviewed  I have reviewed the following in detail:  [] Stress test   [] Angiography   [] Echocardiogram   [x] Labs   [] Other:       Assessment:         ICD-10-CM ICD-9-CM   1. PAF (paroxysmal atrial fibrillation)  I48.0 427.31   2. 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 Nonrheumatic mitral valve regurgitation  I34.0 424.0   4. Hypercholesterolemia  E78.00 272.0   5. Current use of long term anticoagulation  Z79.01 V58.61   6. 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  suboptimal study, mild concentric LVH, EF 65-70%, impaired diastolic relaxation, trace mitral regurg, RV systolic pressure is normal.         Relevant Orders    Comprehensive Metabolic Panel     Hypercholesterolemia    PAF (paroxysmal atrial fibrillation) - Primary    Nonrheumatic mitral valve regurgitation       Hematology    Current use of long term anticoagulation    Relevant Orders    Comprehensive Metabolic Panel    Hemoglobin       Endocrine    Overweight (BMI 25.0-29.9)        Plan:     ALL CV CLINICALLY STABLE, NO ANGINA, NO HF, NO TIA, NO CLINICAL ARRHYTHMIA,CONTINUE CURRENT MEDS, EDUCATION, DIET, EXERCISE  CONTINUE STAY ACTIVE, MONITOR CLINICALLY MULTIPLE CO-MORBIDITIES, RETURN TO CLINIC IN 6 MONTHS WITH LABS, DISCUSSED PLAN WITH THE PATIENT AND HER       PAF (paroxysmal atrial fibrillation)    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 Heart healthy diet and risk factor modification.    See labs and med orders.    Aerobic exercise 5x's/wk. Heart healthy diet and risk factor modification.    See labs and med orders.

## 2023-12-18 ENCOUNTER — TELEPHONE (OUTPATIENT)
Dept: FAMILY MEDICINE | Facility: CLINIC | Age: 73
End: 2023-12-18

## 2023-12-18 ENCOUNTER — PATIENT MESSAGE (OUTPATIENT)
Dept: FAMILY MEDICINE | Facility: CLINIC | Age: 73
End: 2023-12-18
Payer: MEDICARE

## 2023-12-18 DIAGNOSIS — Z46.89 ENCOUNTER FOR MANAGEMENT OF WOUND VAC: Primary | ICD-10-CM

## 2023-12-18 RX ORDER — HYDROCODONE BITARTRATE AND ACETAMINOPHEN 10; 325 MG/1; MG/1
1 TABLET ORAL EVERY 12 HOURS PRN
Qty: 40 TABLET | Refills: 0 | Status: SHIPPED | OUTPATIENT
Start: 2023-12-18

## 2023-12-18 NOTE — TELEPHONE ENCOUNTER
----- Message from Anita Linda RN sent at 2023  9:37 AM CST -----  Regarding: pain  Dr Pimentel,  Ms Valadez has hydrocodone  mg every 6 hours in home; however,  since 10/19/23. It was ordered per NP with Surgery Brenda Theil of whom pt does not see anymore. Could you possibly order this for her? She has so much pain when we change her wound vac. She only takes it when we change wound vac. Please advise.  REspectfully,  Anita BRITO,RN

## 2023-12-20 ENCOUNTER — OFFICE VISIT (OUTPATIENT)
Dept: ENDOCRINOLOGY | Facility: CLINIC | Age: 73
End: 2023-12-20
Payer: MEDICARE

## 2023-12-20 VITALS
BODY MASS INDEX: 29.11 KG/M2 | SYSTOLIC BLOOD PRESSURE: 110 MMHG | HEIGHT: 66 IN | HEART RATE: 89 BPM | WEIGHT: 181.13 LBS | DIASTOLIC BLOOD PRESSURE: 60 MMHG

## 2023-12-20 DIAGNOSIS — E10.21 TYPE 1 DIABETES MELLITUS WITH NEPHROPATHY: Primary | ICD-10-CM

## 2023-12-20 DIAGNOSIS — E03.9 HYPOTHYROIDISM, UNSPECIFIED TYPE: ICD-10-CM

## 2023-12-20 DIAGNOSIS — E10.649 HYPOGLYCEMIA UNAWARENESS ASSOCIATED WITH TYPE 1 DIABETES MELLITUS: ICD-10-CM

## 2023-12-20 DIAGNOSIS — C34.90 SMALL CELL LUNG CANCER: ICD-10-CM

## 2023-12-20 PROCEDURE — 95251 PR GLUCOSE MONITOR, 72 HOUR, PHYS INTERP: ICD-10-PCS | Mod: S$GLB,,, | Performed by: NURSE PRACTITIONER

## 2023-12-20 PROCEDURE — 3074F PR MOST RECENT SYSTOLIC BLOOD PRESSURE < 130 MM HG: ICD-10-PCS | Mod: CPTII,S$GLB,, | Performed by: NURSE PRACTITIONER

## 2023-12-20 PROCEDURE — 3066F PR DOCUMENTATION OF TREATMENT FOR NEPHROPATHY: ICD-10-PCS | Mod: CPTII,S$GLB,, | Performed by: NURSE PRACTITIONER

## 2023-12-20 PROCEDURE — 1159F MED LIST DOCD IN RCRD: CPT | Mod: CPTII,S$GLB,, | Performed by: NURSE PRACTITIONER

## 2023-12-20 PROCEDURE — 4010F PR ACE/ARB THEARPY RXD/TAKEN: ICD-10-PCS | Mod: CPTII,S$GLB,, | Performed by: NURSE PRACTITIONER

## 2023-12-20 PROCEDURE — 4010F ACE/ARB THERAPY RXD/TAKEN: CPT | Mod: CPTII,S$GLB,, | Performed by: NURSE PRACTITIONER

## 2023-12-20 PROCEDURE — 99999 PR PBB SHADOW E&M-EST. PATIENT-LVL V: ICD-10-PCS | Mod: PBBFAC,,, | Performed by: NURSE PRACTITIONER

## 2023-12-20 PROCEDURE — 99215 PR OFFICE/OUTPT VISIT, EST, LEVL V, 40-54 MIN: ICD-10-PCS | Mod: S$GLB,,, | Performed by: NURSE PRACTITIONER

## 2023-12-20 PROCEDURE — 3051F HG A1C>EQUAL 7.0%<8.0%: CPT | Mod: CPTII,S$GLB,, | Performed by: NURSE PRACTITIONER

## 2023-12-20 PROCEDURE — 3288F PR FALLS RISK ASSESSMENT DOCUMENTED: ICD-10-PCS | Mod: CPTII,S$GLB,, | Performed by: NURSE PRACTITIONER

## 2023-12-20 PROCEDURE — 3078F DIAST BP <80 MM HG: CPT | Mod: CPTII,S$GLB,, | Performed by: NURSE PRACTITIONER

## 2023-12-20 PROCEDURE — 3008F PR BODY MASS INDEX (BMI) DOCUMENTED: ICD-10-PCS | Mod: CPTII,S$GLB,, | Performed by: NURSE PRACTITIONER

## 2023-12-20 PROCEDURE — 1159F PR MEDICATION LIST DOCUMENTED IN MEDICAL RECORD: ICD-10-PCS | Mod: CPTII,S$GLB,, | Performed by: NURSE PRACTITIONER

## 2023-12-20 PROCEDURE — 3066F NEPHROPATHY DOC TX: CPT | Mod: CPTII,S$GLB,, | Performed by: NURSE PRACTITIONER

## 2023-12-20 PROCEDURE — 3062F POS MACROALBUMINURIA REV: CPT | Mod: CPTII,S$GLB,, | Performed by: NURSE PRACTITIONER

## 2023-12-20 PROCEDURE — 3051F PR MOST RECENT HEMOGLOBIN A1C LEVEL 7.0 - < 8.0%: ICD-10-PCS | Mod: CPTII,S$GLB,, | Performed by: NURSE PRACTITIONER

## 2023-12-20 PROCEDURE — 1126F PR PAIN SEVERITY QUANTIFIED, NO PAIN PRESENT: ICD-10-PCS | Mod: CPTII,S$GLB,, | Performed by: NURSE PRACTITIONER

## 2023-12-20 PROCEDURE — 3062F PR POS MACROALBUMINURIA RESULT DOCUMENTED/REVIEW: ICD-10-PCS | Mod: CPTII,S$GLB,, | Performed by: NURSE PRACTITIONER

## 2023-12-20 PROCEDURE — 1101F PT FALLS ASSESS-DOCD LE1/YR: CPT | Mod: CPTII,S$GLB,, | Performed by: NURSE PRACTITIONER

## 2023-12-20 PROCEDURE — 3288F FALL RISK ASSESSMENT DOCD: CPT | Mod: CPTII,S$GLB,, | Performed by: NURSE PRACTITIONER

## 2023-12-20 PROCEDURE — 3008F BODY MASS INDEX DOCD: CPT | Mod: CPTII,S$GLB,, | Performed by: NURSE PRACTITIONER

## 2023-12-20 PROCEDURE — 1160F RVW MEDS BY RX/DR IN RCRD: CPT | Mod: CPTII,S$GLB,, | Performed by: NURSE PRACTITIONER

## 2023-12-20 PROCEDURE — 1101F PR PT FALLS ASSESS DOC 0-1 FALLS W/OUT INJ PAST YR: ICD-10-PCS | Mod: CPTII,S$GLB,, | Performed by: NURSE PRACTITIONER

## 2023-12-20 PROCEDURE — 1126F AMNT PAIN NOTED NONE PRSNT: CPT | Mod: CPTII,S$GLB,, | Performed by: NURSE PRACTITIONER

## 2023-12-20 PROCEDURE — 1160F PR REVIEW ALL MEDS BY PRESCRIBER/CLIN PHARMACIST DOCUMENTED: ICD-10-PCS | Mod: CPTII,S$GLB,, | Performed by: NURSE PRACTITIONER

## 2023-12-20 PROCEDURE — 95251 CONT GLUC MNTR ANALYSIS I&R: CPT | Mod: S$GLB,,, | Performed by: NURSE PRACTITIONER

## 2023-12-20 PROCEDURE — 3074F SYST BP LT 130 MM HG: CPT | Mod: CPTII,S$GLB,, | Performed by: NURSE PRACTITIONER

## 2023-12-20 PROCEDURE — 99215 OFFICE O/P EST HI 40 MIN: CPT | Mod: S$GLB,,, | Performed by: NURSE PRACTITIONER

## 2023-12-20 PROCEDURE — 99999 PR PBB SHADOW E&M-EST. PATIENT-LVL V: CPT | Mod: PBBFAC,,, | Performed by: NURSE PRACTITIONER

## 2023-12-20 PROCEDURE — 3078F PR MOST RECENT DIASTOLIC BLOOD PRESSURE < 80 MM HG: ICD-10-PCS | Mod: CPTII,S$GLB,, | Performed by: NURSE PRACTITIONER

## 2023-12-20 NOTE — PROGRESS NOTES
"CC: Ms. Allyson Henriquez arrives today for management of Type 2 DM and review of chronic medical conditions, as listed in the Visit Diagnosis section of this encounter.       HPI: Ms. Allyson Henriquez was diagnosed with Type 2 DM in 4/2023. She was diagnosed based on lab work. Initial treatment consisted of insulin. Denies FH of DM. Denies hospitalizations due to DM.   Low C-peptide noted (<0.08) in 9/2023. Was using Keytruda for ~ 6 months prior to this finding. Now diagnosed at Type 1 DM.     Last seen in endocrine by JEWEL Gallegos NP in August.     She is new to me today.     Patient verbalizes frustration with diabetes diagnosis. She feels she has been misdiagnosed and feels that she may not require insulin.     She takes Keytruda (started in 1/2023) for small cell lung cancer. Follows with Dr. Gonzalez.     She is following with Dr. Moise for wound care -- abd wounds. Had colon surgery in the summer for rerouting of ostomy stoma, which had herniated. Follows with Dr. Ashton.     Her insurance will be covering Novolog in 2024. They have had much back and forth with the insurance and what is on formulary. Insurance also told them that with federal changes, whichever brand insulin will be covered with an exemption.      has been giving meal Humalog dose + correction scale in AM even if she skips breakfast.     BG monitoring per Dexcom G7. Gets through [facility].    Hypoglycemia: Occasional  Hypoglycemic Symptoms: none  Hypoglycemia Treatment: juice     Missing Insulin/PO medication doses: No  Timing prandial insulin 5-15 minutes before meals: sometimes gives after     Dietary Habits:  Eats 2x/day. Reports she eats "very little." Does not snack. Avoids sugary beverages.    Last DM education appointment: 8/2023      CURRENT DIABETIC MEDS:  Lantus 34 units QHS, Humalog 7 units + correction scale, target 150, ISF 25   Vial or pen: pen  Glucometer type:     Previous DM treatments:    Last Eye Exam: > 1 " "year  Last Podiatry Exam:     REVIEW OF SYSTEMS  Constitutional: no c/o fatigue, weakness. + weight loss that she attributes to eating less.   Cardiac: no palpitations or chest pain.  Respiratory: no dyspnea. + cough that comes and goes, alternates between dry and productive. Oncology aware.   GI: no c/o abdominal pain or nausea. Denies h/o pancreatitis.   Skin: no lesions or rashes. + abd wounds x 3 since. Following with wound care.    Neuro: + numbness, tingling in hands  Endocrine: denies polyphagia, polydipsia, polyuria      Personally reviewed Past Medical, Surgical, Social History.    Vital Signs  /60   Pulse 89   Ht 5' 6" (1.676 m)   Wt 82.2 kg (181 lb 1.7 oz)   BMI 29.23 kg/m²     Personally reviewed the below labs:    Hemoglobin A1C   Date Value Ref Range Status   08/18/2023 7.0 (H) 4.0 - 5.6 % Final     Comment:     ADA Screening Guidelines:  5.7-6.4%  Consistent with prediabetes  >or=6.5%  Consistent with diabetes    High levels of fetal hemoglobin interfere with the HbA1C  assay. Heterozygous hemoglobin variants (HbS, HgC, etc)do  not significantly interfere with this assay.   However, presence of multiple variants may affect accuracy.     03/13/2023 5.4 0.0 - 5.6 % Final     Comment:     Reference Interval:  5.0 - 5.6 Normal   5.7 - 6.4 High Risk   > 6.5 Diabetic      Hgb A1c results are standardized based on the (NGSP) National   Glycohemoglobin Standardization Program.      Hemoglobin A1C levels are related to mean serum/plasma glucose   during the preceding 2-3 months.        07/12/2022 7.8 (H) 0.0 - 5.6 % Final     Comment:     Reference Interval:  5.0 - 5.6 Normal   5.7 - 6.4 High Risk   > 6.5 Diabetic      Hgb A1c results are standardized based on the (NGSP) National   Glycohemoglobin Standardization Program.      Hemoglobin A1C levels are related to mean serum/plasma glucose   during the preceding 2-3 months.            Chemistry        Component Value Date/Time     12/04/2023 " 1050    K 3.7 12/04/2023 1050     12/04/2023 1050    CO2 25 12/04/2023 1050    BUN 21 (H) 12/04/2023 1050    CREATININE 1.00 12/04/2023 1050    GLU 85 12/04/2023 1050        Component Value Date/Time    CALCIUM 9.8 12/04/2023 1050    ALKPHOS 77 12/04/2023 1050    AST 20 12/04/2023 1050    ALT 11 12/04/2023 1050    BILITOT 0.5 12/04/2023 1050    ESTGFRAFRICA >60 07/31/2022 0430    EGFRNONAA 55 (A) 07/31/2022 0430          Lab Results   Component Value Date    CHOL 134 09/11/2023    CHOL 158 08/19/2022    CHOL 167 07/06/2022     Lab Results   Component Value Date    HDL 38 (L) 09/11/2023    HDL 22 (L) 08/19/2022    HDL 46 07/06/2022     Lab Results   Component Value Date    LDLCALC 63.2 09/11/2023    LDLCALC 103.2 08/19/2022    LDLCALC 81.6 07/06/2022     Lab Results   Component Value Date    TRIG 164 (H) 09/11/2023    TRIG 164 (H) 08/19/2022    TRIG 197 (H) 07/06/2022     Lab Results   Component Value Date    CHOLHDL 28.4 09/11/2023    CHOLHDL 13.9 (L) 08/19/2022    CHOLHDL 27.5 07/06/2022       Lab Results   Component Value Date    MICALBCREAT 427.5 (H) 03/14/2023     Lab Results   Component Value Date    TSH 0.377 (L) 12/04/2023    TSH 0.380 (L) 12/04/2023       CrCl cannot be calculated (Patient's most recent lab result is older than the maximum 7 days allowed.).    Vit D, 25-Hydroxy   Date Value Ref Range Status   04/03/2023 61 30 - 96 ng/mL Final     Comment:     Vitamin D deficiency.........<10 ng/mL                              Vitamin D insufficiency......10-29 ng/mL       Vitamin D sufficiency........> or equal to 30 ng/mL  Vitamin D toxicity............>100 ng/mL        Latest Reference Range & Units 09/11/23 10:24   C-Peptide 0.78 - 5.19 ng/mL <0.08 (L)   (L): Data is abnormally low    PHYSICAL EXAMINATION  Constitutional: Appears well, no distress  Respiratory: CTA, even and unlabored.  Cardiovascular: RRR, no murmurs, no carotid bruits.  GI: bowel sounds active, no hernia noted  Skin: warm and  dry  Neuro: oriented to person, place, time  Feet: appropriate footwear.    DEXCOM G7 DOWNLOAD: See media file for details. Fasting glucoses vary - many within goal, some elevated. Occasional prandial excursions. Some borderline hypoglycemia during the day (mid morning - afternoon).   Average glucose: 173 mg/dL  Above 250 mg/dL: 10 %  181-250 mg/dL: 27 %   mg/dL: 62 %  54-69 mg/dL: <1 %  Below 54 mg/dL: <1 %       Goals        HEMOGLOBIN A1C < 7               Assessment/Plan  1. Type 1 diabetes mellitus with nephropathy  -- Complex. I suspect that this is autoimmune DM related to use of immune checkpoint inhibitor (Keytruda). I explained the fact that since her pancreas is not producing insulin, as confirmed by C-peptide, she will require basal/prandial insulin going forward.   -- continue Lantus 34 units QHS  -- continue Humalog 7 units with meals + correction scale. Explained proper timing of Humalog and explained that is she is skipping meal, she only to receive the correction scale dose.   -- intensive BG monitoring per Dexcom G7  -- could consider Dexcom G7 integrated insulin pump in the future. Would first have pt meet with education for advanced CHO counting.   -- Schedule eye exam     -- Discussed diagnosis of DM, A1c goals, progression of disease, long term complications and tx options.  -- Reviewed hypoglycemia management: treat with 4 oz of juice, 4 oz regular soda, or 4 glucose tablets. Monitor and repeat treatment every 15 minutes until BG is >70 Then have a snack, which includes 15 grams of complex carbohydrates and protein.   Advised patient to check BG before activities, such as driving or exercise.   2. Hypothyroidism, unspecified type  -- follows with Dr. Daly   3. Hypoglycemia unawareness associated with type 1 diabetes mellitus  -- continue Dexcom G7   4. Small cell lung cancer  -- on Keytruda  -- follows with Dr. Gonzalez     Total Time and Counselin minutes, >50% time spent  counseling as noted above in #1 A/P.         FOLLOW UP  Follow up in about 3 months (around 3/20/2024).   Patient instructed to bring BG logs to each follow up   Patient encouraged to call for any BG/medication issues, concerns, or questions.    Orders Placed This Encounter   Procedures    Hemoglobin A1C    Microalbumin/Creatinine Ratio, Urine    Comprehensive Metabolic Panel

## 2023-12-27 ENCOUNTER — PATIENT MESSAGE (OUTPATIENT)
Dept: ENDOCRINOLOGY | Facility: CLINIC | Age: 73
End: 2023-12-27
Payer: MEDICARE

## 2024-01-01 NOTE — Clinical Note
Dr. Morales in 4 weeks with interval CBC, CMP, LDH, magnesium, TSH, free T4, and CT PET scan for restaging. 02:22

## 2024-01-10 ENCOUNTER — PATIENT MESSAGE (OUTPATIENT)
Dept: FAMILY MEDICINE | Facility: CLINIC | Age: 74
End: 2024-01-10
Payer: MEDICARE

## 2024-01-11 ENCOUNTER — TELEPHONE (OUTPATIENT)
Dept: FAMILY MEDICINE | Facility: CLINIC | Age: 74
End: 2024-01-11
Payer: MEDICARE

## 2024-01-11 DIAGNOSIS — R05.9 COUGH, UNSPECIFIED TYPE: ICD-10-CM

## 2024-01-11 DIAGNOSIS — N30.00 ACUTE CYSTITIS WITHOUT HEMATURIA: Primary | ICD-10-CM

## 2024-01-11 NOTE — TELEPHONE ENCOUNTER
Rec'd secure chat from CECILIA Martin RN:    The  nurse saw her yesterday and had some concerns. She has a cough and bilateral course breath sounds. She has not been eating because she is not feeling well. While the nurse was there, her glucose was 60 and she was able to get her to eat. I think it would be beneficial if she could be seen, but if not we could we get an order for a CXR? The nurse is schedule to see her again tomorrow.    Pt's son is also asking for a UA:  Burning, hurts when she urinates, typical UTI.  Started this past Sunday...      If OK, please order cxr and ua.

## 2024-01-12 ENCOUNTER — HOSPITAL ENCOUNTER (OUTPATIENT)
Dept: RADIOLOGY | Facility: HOSPITAL | Age: 74
Discharge: HOME | End: 2024-01-12
Attending: FAMILY MEDICINE
Payer: MEDICARE

## 2024-01-12 ENCOUNTER — OFFICE VISIT (OUTPATIENT)
Dept: FAMILY MEDICINE | Facility: CLINIC | Age: 74
End: 2024-01-12
Payer: MEDICARE

## 2024-01-12 VITALS
SYSTOLIC BLOOD PRESSURE: 100 MMHG | BODY MASS INDEX: 28.22 KG/M2 | HEART RATE: 85 BPM | OXYGEN SATURATION: 95 % | HEIGHT: 66 IN | WEIGHT: 175.63 LBS | DIASTOLIC BLOOD PRESSURE: 60 MMHG

## 2024-01-12 DIAGNOSIS — N30.00 ACUTE CYSTITIS WITHOUT HEMATURIA: ICD-10-CM

## 2024-01-12 DIAGNOSIS — R05.9 COUGH, UNSPECIFIED TYPE: Primary | ICD-10-CM

## 2024-01-12 DIAGNOSIS — R05.9 COUGH, UNSPECIFIED TYPE: ICD-10-CM

## 2024-01-12 DIAGNOSIS — J18.9 COMMUNITY ACQUIRED PNEUMONIA, UNSPECIFIED LATERALITY: ICD-10-CM

## 2024-01-12 PROCEDURE — 99215 OFFICE O/P EST HI 40 MIN: CPT | Mod: 25,S$GLB,, | Performed by: FAMILY MEDICINE

## 2024-01-12 PROCEDURE — 3008F BODY MASS INDEX DOCD: CPT | Mod: CPTII,S$GLB,, | Performed by: FAMILY MEDICINE

## 2024-01-12 PROCEDURE — 3044F HG A1C LEVEL LT 7.0%: CPT | Mod: CPTII,S$GLB,, | Performed by: FAMILY MEDICINE

## 2024-01-12 PROCEDURE — 3288F FALL RISK ASSESSMENT DOCD: CPT | Mod: CPTII,S$GLB,, | Performed by: FAMILY MEDICINE

## 2024-01-12 PROCEDURE — 96372 THER/PROPH/DIAG INJ SC/IM: CPT | Mod: S$GLB,,, | Performed by: FAMILY MEDICINE

## 2024-01-12 PROCEDURE — 3078F DIAST BP <80 MM HG: CPT | Mod: CPTII,S$GLB,, | Performed by: FAMILY MEDICINE

## 2024-01-12 PROCEDURE — 71046 X-RAY EXAM CHEST 2 VIEWS: CPT | Mod: 26,,, | Performed by: RADIOLOGY

## 2024-01-12 PROCEDURE — 99999 PR PBB SHADOW E&M-EST. PATIENT-LVL V: CPT | Mod: PBBFAC,,, | Performed by: FAMILY MEDICINE

## 2024-01-12 PROCEDURE — 71046 X-RAY EXAM CHEST 2 VIEWS: CPT | Mod: TC,PN

## 2024-01-12 PROCEDURE — 1125F AMNT PAIN NOTED PAIN PRSNT: CPT | Mod: CPTII,S$GLB,, | Performed by: FAMILY MEDICINE

## 2024-01-12 PROCEDURE — 1101F PT FALLS ASSESS-DOCD LE1/YR: CPT | Mod: CPTII,S$GLB,, | Performed by: FAMILY MEDICINE

## 2024-01-12 PROCEDURE — 3074F SYST BP LT 130 MM HG: CPT | Mod: CPTII,S$GLB,, | Performed by: FAMILY MEDICINE

## 2024-01-12 RX ORDER — CEFTRIAXONE 1 G/1
1 INJECTION, POWDER, FOR SOLUTION INTRAMUSCULAR; INTRAVENOUS
Status: COMPLETED | OUTPATIENT
Start: 2024-01-12 | End: 2024-01-12

## 2024-01-12 RX ORDER — SULFAMETHOXAZOLE AND TRIMETHOPRIM 800; 160 MG/1; MG/1
1 TABLET ORAL 2 TIMES DAILY
Qty: 14 TABLET | Refills: 1 | Status: ON HOLD | OUTPATIENT
Start: 2024-01-12 | End: 2024-01-24 | Stop reason: HOSPADM

## 2024-01-12 RX ORDER — HYDROCODONE POLISTIREX AND CHLORPHENIRAMINE POLISTIREX 10; 8 MG/5ML; MG/5ML
5 SUSPENSION, EXTENDED RELEASE ORAL EVERY 12 HOURS PRN
Qty: 120 ML | Refills: 0 | Status: SHIPPED | OUTPATIENT
Start: 2024-01-12 | End: 2024-03-22

## 2024-01-12 RX ORDER — AZITHROMYCIN 250 MG/1
TABLET, FILM COATED ORAL
Qty: 6 TABLET | Refills: 0 | Status: SHIPPED | OUTPATIENT
Start: 2024-01-12 | End: 2024-01-12

## 2024-01-12 RX ADMIN — CEFTRIAXONE 1 G: 1 INJECTION, POWDER, FOR SOLUTION INTRAMUSCULAR; INTRAVENOUS at 02:01

## 2024-01-12 NOTE — TELEPHONE ENCOUNTER
----- Message from Annia Lyn RN sent at 1/10/2024  4:37 PM CST -----  Regarding: Home Health  Patient was seen by today by the  nurse. The patient has not been feeling well with a cough and weakness. She has not been eating and her glucose dropped during the SNV into the 60's. The nurse did get her to eat something and instructed her on diet. She also has bilateral course breath sounds. Her BP was 95/50, HR 67, temp 98.5. Could the patient be scheduled for an evaluation by the doctor or an NP? Could she get a chest xray ordered? She is not scheduled for the CT of the lung until next week. She has been taking Robitussin and Dayquil. She started with diarrhea yesterday into her ostomy. Please advise.

## 2024-01-12 NOTE — PROGRESS NOTES
"Subjective:       Patient ID: Allyson Henriquez is a 74 y.o. female.    Chief Complaint: Cough and Urinary Tract Infection (Sunday bothering her Saturday cough ear dry)    Cough  Associated symptoms include shortness of breath. Pertinent negatives include no postnasal drip or sore throat.   Urinary Tract Infection   Associated symptoms include frequency. Pertinent negatives include no nausea.     Patient in clinic accompanied by her , with c/o cough x 6 days, occurs throughout the day, not nec worse when supine. Hemoptysis started yesterday, slightly better today - bright red in the mucous streaks now dark brown, less so. Not hacking. More short of breath than usual.   +no appetite, few bites, doesn't feel like eating.   Bowels a little more loose than usual. +pain with urination, some straining. Urine color is darker, cloudy. No urine odor.   +small bed sore. Treated/dressed today HH.   Scheduled to see surgery on Monday, CT chest also scheduled for Monday.     Review of Systems:  Review of Systems   Constitutional:  Positive for appetite change and fatigue.   HENT:  Negative for congestion, postnasal drip and sore throat.    Respiratory:  Positive for cough and shortness of breath.    Gastrointestinal:  Positive for diarrhea (loose). Negative for nausea.   Genitourinary:  Positive for dysuria and frequency. Negative for difficulty urinating.   Skin:  Positive for wound (pressure ulcer, sacrum stage 1).       Objective:     Vitals:    01/12/24 1306   BP: 100/60   Pulse: 85   SpO2: 95%   Weight: 79.6 kg (175 lb 9.5 oz)   Height: 5' 6" (1.676 m)          Physical Exam  Vitals and nursing note reviewed.   Constitutional:       General: She is not in acute distress.     Appearance: Normal appearance. She is well-developed.   HENT:      Head: Normocephalic and atraumatic.   Eyes:      General: No scleral icterus.        Right eye: No discharge.         Left eye: No discharge.      Conjunctiva/sclera: Conjunctivae " normal.   Cardiovascular:      Rate and Rhythm: Normal rate and regular rhythm.   Pulmonary:      Effort: Pulmonary effort is normal. No respiratory distress.      Breath sounds: Wheezing present.   Abdominal:      Palpations: Abdomen is soft.      Tenderness: There is no guarding.   Musculoskeletal:         General: No deformity or signs of injury.      Cervical back: Neck supple.   Skin:     General: Skin is warm and dry.      Findings: No rash.   Neurological:      General: No focal deficit present.      Mental Status: She is alert and oriented to person, place, and time.   Psychiatric:         Mood and Affect: Mood normal.         Behavior: Behavior normal.           Assessment & Plan:  Cough, unspecified type  -     hydrocodone-chlorpheniramine (TUSSIONEX) 10-8 mg/5 mL suspension; Take 5 mLs by mouth every 12 (twelve) hours as needed for Cough.  Dispense: 120 mL; Refill: 0    Acute cystitis without hematuria  -     Discontinue: sulfamethoxazole-trimethoprim 800-160mg (BACTRIM DS) 800-160 mg Tab; Take 1 tablet by mouth 2 (two) times daily. (Patient taking differently: Take 1 tablet by mouth 2 (two) times daily. For 7 days)  Dispense: 14 tablet; Refill: 1  -     cefTRIAXone injection 1 g    Community acquired pneumonia, unspecified laterality  -     cefTRIAXone injection 1 g    Other orders  -     Discontinue: azithromycin (Z-JAKOB) 250 MG tablet; Take 2 tablets by mouth on day 1; Take 1 tablet by mouth on days 2-5  Dispense: 6 tablet; Refill: 0    Side effects and precautions of medication use reviewed with patient, expressed understanding. No questions or concerns.

## 2024-01-15 PROBLEM — E87.1 HYPONATREMIA: Status: ACTIVE | Noted: 2024-01-15

## 2024-01-17 ENCOUNTER — PES CALL (OUTPATIENT)
Dept: HOME HEALTH SERVICES | Facility: CLINIC | Age: 74
End: 2024-01-17
Payer: MEDICARE

## 2024-01-17 PROBLEM — R54 AGE-RELATED PHYSICAL DEBILITY: Status: ACTIVE | Noted: 2022-09-19

## 2024-01-19 ENCOUNTER — PATIENT MESSAGE (OUTPATIENT)
Dept: ENDOCRINOLOGY | Facility: CLINIC | Age: 74
End: 2024-01-19
Payer: MEDICARE

## 2024-01-20 PROBLEM — J90 PLEURAL EFFUSION: Status: ACTIVE | Noted: 2024-01-20

## 2024-01-22 PROBLEM — E87.1 HYPONATREMIA: Status: RESOLVED | Noted: 2024-01-15 | Resolved: 2024-01-22

## 2024-01-22 PROBLEM — N17.9 AKI (ACUTE KIDNEY INJURY): Status: RESOLVED | Noted: 2021-07-29 | Resolved: 2024-01-22

## 2024-01-25 DIAGNOSIS — I48.0 PAF (PAROXYSMAL ATRIAL FIBRILLATION): ICD-10-CM

## 2024-01-25 DIAGNOSIS — I10 ESSENTIAL HYPERTENSION: Primary | ICD-10-CM

## 2024-01-25 RX ORDER — METOPROLOL SUCCINATE 25 MG/1
12.5 TABLET, EXTENDED RELEASE ORAL 2 TIMES DAILY
Qty: 180 TABLET | Refills: 1 | Status: SHIPPED | OUTPATIENT
Start: 2024-02-08 | End: 2025-02-07

## 2024-01-25 NOTE — TELEPHONE ENCOUNTER
----- Message from Veronica Menard sent at 1/25/2024 10:30 AM CST -----  Contact: Faustino (spouse)  Type:  Needs Medical Advice    Who Called: Faustino    Symptoms (please be specific): medication change     How long has patient had these symptoms:  after discharge for hosp    Would the patient rather a call back or a response via [facility]? Call back    Best Call Back Number: [phone]    Additional Information: wants to make sure the dosage change is okay    Please call to advise  Thanks

## 2024-01-25 NOTE — TELEPHONE ENCOUNTER
Please advise: pt was sent home from hospital on metoprolol succinate 12.5mg BID; she was taking 50mg BID before and her  wants to make sure that dose change is okay with you.

## 2024-01-26 ENCOUNTER — PATIENT MESSAGE (OUTPATIENT)
Dept: CARDIOLOGY | Facility: CLINIC | Age: 74
End: 2024-01-26
Payer: MEDICARE

## 2024-01-31 ENCOUNTER — OFFICE VISIT (OUTPATIENT)
Dept: FAMILY MEDICINE | Facility: CLINIC | Age: 74
End: 2024-01-31
Payer: MEDICARE

## 2024-01-31 VITALS
RESPIRATION RATE: 18 BRPM | BODY MASS INDEX: 29.18 KG/M2 | OXYGEN SATURATION: 96 % | SYSTOLIC BLOOD PRESSURE: 110 MMHG | DIASTOLIC BLOOD PRESSURE: 62 MMHG | WEIGHT: 180.75 LBS | HEART RATE: 95 BPM

## 2024-01-31 DIAGNOSIS — I48.0 PAF (PAROXYSMAL ATRIAL FIBRILLATION): ICD-10-CM

## 2024-01-31 DIAGNOSIS — N18.30 BENIGN HYPERTENSIVE HEART AND KIDNEY DISEASE WITH CHF, NYHA CLASS 1 AND CKD STAGE 3: ICD-10-CM

## 2024-01-31 DIAGNOSIS — Z09 HOSPITAL DISCHARGE FOLLOW-UP: ICD-10-CM

## 2024-01-31 DIAGNOSIS — J90 PLEURAL EFFUSION: Primary | ICD-10-CM

## 2024-01-31 DIAGNOSIS — J44.9 CHRONIC OBSTRUCTIVE PULMONARY DISEASE, UNSPECIFIED COPD TYPE: ICD-10-CM

## 2024-01-31 DIAGNOSIS — E10.21 TYPE 1 DIABETES MELLITUS WITH NEPHROPATHY: ICD-10-CM

## 2024-01-31 DIAGNOSIS — I13.0 BENIGN HYPERTENSIVE HEART AND KIDNEY DISEASE WITH CHF, NYHA CLASS 1 AND CKD STAGE 3: ICD-10-CM

## 2024-01-31 DIAGNOSIS — C34.90 SMALL CELL LUNG CANCER: ICD-10-CM

## 2024-01-31 DIAGNOSIS — I10 ESSENTIAL HYPERTENSION: ICD-10-CM

## 2024-01-31 PROBLEM — N39.0 UTI (URINARY TRACT INFECTION): Status: RESOLVED | Noted: 2022-09-16 | Resolved: 2024-01-31

## 2024-01-31 PROCEDURE — 1111F DSCHRG MED/CURRENT MED MERGE: CPT | Mod: CPTII,S$GLB,, | Performed by: PHYSICIAN ASSISTANT

## 2024-01-31 PROCEDURE — 3078F DIAST BP <80 MM HG: CPT | Mod: CPTII,S$GLB,, | Performed by: PHYSICIAN ASSISTANT

## 2024-01-31 PROCEDURE — 3044F HG A1C LEVEL LT 7.0%: CPT | Mod: CPTII,S$GLB,, | Performed by: PHYSICIAN ASSISTANT

## 2024-01-31 PROCEDURE — 1101F PT FALLS ASSESS-DOCD LE1/YR: CPT | Mod: CPTII,S$GLB,, | Performed by: PHYSICIAN ASSISTANT

## 2024-01-31 PROCEDURE — 3074F SYST BP LT 130 MM HG: CPT | Mod: CPTII,S$GLB,, | Performed by: PHYSICIAN ASSISTANT

## 2024-01-31 PROCEDURE — 1160F RVW MEDS BY RX/DR IN RCRD: CPT | Mod: CPTII,S$GLB,, | Performed by: PHYSICIAN ASSISTANT

## 2024-01-31 PROCEDURE — 99214 OFFICE O/P EST MOD 30 MIN: CPT | Mod: S$GLB,,, | Performed by: PHYSICIAN ASSISTANT

## 2024-01-31 PROCEDURE — 1159F MED LIST DOCD IN RCRD: CPT | Mod: CPTII,S$GLB,, | Performed by: PHYSICIAN ASSISTANT

## 2024-01-31 PROCEDURE — 3288F FALL RISK ASSESSMENT DOCD: CPT | Mod: CPTII,S$GLB,, | Performed by: PHYSICIAN ASSISTANT

## 2024-01-31 PROCEDURE — 99999 PR PBB SHADOW E&M-EST. PATIENT-LVL V: CPT | Mod: PBBFAC,,, | Performed by: PHYSICIAN ASSISTANT

## 2024-01-31 PROCEDURE — 1126F AMNT PAIN NOTED NONE PRSNT: CPT | Mod: CPTII,S$GLB,, | Performed by: PHYSICIAN ASSISTANT

## 2024-01-31 NOTE — PROGRESS NOTES
"Subjective     Patient ID: Allyson Henriquez is a 74 y.o. female.    Chief Complaint: hospital follow up    HPI      Pt is new to me, PCP Dr. Pimentel.     Pt is a 74 year old female with anxiety, COPD, A-fib, HTN, HLD, CKD, current small cell lung ca with brain and bone mets, diverticulosis, TRISHA. She presents today for hospital follow up. Admitted to [facility] from 1/15-1/24. Course as follows:    "73-year-old lady with metastatic small-cell lung cancer bone, brain-currently on immunotherapy Keytruda-COPD not on O2, diabetes insulin required with nephropathy, has colostomy for diverticulitis, had a complicated hospitalization in September 2022 with abdominal wall infection needing debridement and wound VAC,-complicated with C diff colitis,-has been doing fairly well since her discharge-lives at home with her , -has had cough with chest congestion for the last several days, and history of frequent UTI started with dysuria was seen by PMD started on Bactrim, and for seborrheic keratosis/and ear infection  was given azithromycin-had no documented fevers night sweats chills, however the last several days patient's oral intake has declined -patient has puriwick  secondary to decubitus ulcer-has decreased urinary output[dark color]-with no documented nausea vomiting diarrhea, over the last day has been more lethargic with profound weakness that prompted them to bring to the emergency room on arrival here patient was saturating low 80s was placed on O2, blood pressure 90//40, afebrile -chest x-ray with mild pulmonary congestion otherwise unremarkable, [place] [address], sodium is 122 bicarb 21, BUN 54 with creatinine of 2.61 baseline being 0.9.-patient has received bolus fluid with good response common after obtaining blood cultures was given Maxipime IV-nephrology consulted per JEAN with hyponatremia, and Hospital Medicine as to evaluate and admit patient.  History obtained from talking to patient with her family by " "bedside and reviewing records  No recent change in medications, no history of thyroid problems/hyponatremia in the past.     * No surgery found *           Hospital Course:   Patient admitted to medical floor, being continued on antibiotics empirically. IVF provided for hyponatremia related IVVD. She was tx with IVF for JEAN, renal function improved with supportive care. Nephrology consulted. She was placed on free water restriction, Na improved.  Urine culture grew out ESBL Proteus mirabilis UTI.  Antibiotics were adjusted.  Patient became more short of breath but not more hypoxic 1/18/24.  Nebs and steroids ordered for bilateral rhonchi and wheezing.  Chest x-ray showed increased right pleural effusion.  IV fluids were discontinued.  Pulmonary consulted, who performed thoracentesis (1100 ml removed), fluid sent for cultures/cytology.  She had clinical improvement, has remained on room air.  She was noted with hyperglycemia, basal insulin adjusted, steroids were discontinued.   Reported with a fib with RVR, cards recs to resume BB and eliquis after thoracentesis.  BB adjusted to 12.5 mg BID, due to BP- final cardiac recs to keep log of BP at home and outpt f/u with cards in one week.  She is been followed by hematology oncology, pt's mets breast cancer is under control with Keytruda. She worked with PT/OT- recs for low intensity tx.  Patient will be discharge with home health.  She will f/u with heme onc, pulmonary and cardiology as an outpt.  Pt's nebulization tx was reviewed (not albuterol).  She also will f/u with Dr Daly for further recs regarding thyroid and DM management. "      BP has been well controlled at home. HR WNL. No leg swelling. Saw oncology this am. Labs last night showed return of JEAN. Saw Oncology this am. Got her keytruda infusion, as well as potassium and fluids. Seeing surgeon next week for bowel repair follow up. Saw wound care yesterday, seeing again in 1 week (for delayed surgical wound " healing and for sacral ulcer). Planning to follow soon with endo. Currently on 34 long acting insulin, as 40 was leading to some hypoglycemia. Using lispro sliding scale prn. Reports some lability in BG since hospital, as she was on steroids. No SOB, chest pain.         Review of Systems   All other systems reviewed and are negative.         Objective     Physical Exam  Constitutional:       General: She is not in acute distress.     Appearance: Normal appearance. She is obese. She is not ill-appearing, toxic-appearing or diaphoretic.   HENT:      Head: Normocephalic and atraumatic.   Cardiovascular:      Heart sounds: Normal heart sounds.   Pulmonary:      Effort: No respiratory distress.      Breath sounds: Normal breath sounds.   Neurological:      General: No focal deficit present.      Mental Status: She is alert and oriented to person, place, and time.   Psychiatric:         Mood and Affect: Mood normal.         Behavior: Behavior normal.         Thought Content: Thought content normal.         Judgment: Judgment normal.       1. Hospital discharge follow-up    2. Pleural effusion  -followed by pulkathi appt next week, no SOB    3. Chronic obstructive pulmonary disease, unspecified COPD type  -continue chronic meds    4. PAF (paroxysmal atrial fibrillation)  -currently NSR, rate controlled, anticoagulated    5. Essential hypertension  Controlled, no highs or lows, continue current medications    6. 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 Small cell lung cancer  -followed by oncology    8. Type 1 diabetes mellitus with nephropathy  -f/u with endo, having some trouble with evening insulin being below 90, hesitant to take basal insulin because of this. Recommend discussing with endo, consider changing basal schedule or adjusting dinnertime short acting insulin.     RTC/ER precautions given. F/U with PCP in 1-3  months.    Minerva Lopez PA-C

## 2024-01-31 NOTE — PATIENT INSTRUCTIONS
A few reminders from today:    Follow up with specialists as scheduled  Chest x ray as scheduled    Follow up with me if needed.   Please go to ER/urgent care if after hours or symptoms persist/worsen.     Do not hesitate to get in touch with me should you have any further questions.     Thank you for trusting me with your care!  I wish you health and happiness.    -Minerva Lopez PA-C

## 2024-02-02 ENCOUNTER — PATIENT MESSAGE (OUTPATIENT)
Dept: ENDOCRINOLOGY | Facility: CLINIC | Age: 74
End: 2024-02-02
Payer: MEDICARE

## 2024-02-02 DIAGNOSIS — E11.9 TYPE 2 DIABETES MELLITUS WITHOUT COMPLICATION, WITH LONG-TERM CURRENT USE OF INSULIN: ICD-10-CM

## 2024-02-02 DIAGNOSIS — Z79.4 TYPE 2 DIABETES MELLITUS WITHOUT COMPLICATION, WITH LONG-TERM CURRENT USE OF INSULIN: ICD-10-CM

## 2024-02-02 RX ORDER — INSULIN GLARGINE 100 [IU]/ML
32 INJECTION, SOLUTION SUBCUTANEOUS NIGHTLY
Start: 2024-02-02 | End: 2024-02-16

## 2024-02-14 RX ORDER — SIMVASTATIN 40 MG/1
TABLET, FILM COATED ORAL
Qty: 90 TABLET | Refills: 1 | Status: SHIPPED | OUTPATIENT
Start: 2024-02-14

## 2024-02-16 RX ORDER — INSULIN GLARGINE 100 [IU]/ML
28 INJECTION, SOLUTION SUBCUTANEOUS NIGHTLY
Start: 2024-02-16 | End: 2024-03-22 | Stop reason: SDUPTHER

## 2024-02-28 ENCOUNTER — PATIENT MESSAGE (OUTPATIENT)
Dept: CARDIOLOGY | Facility: CLINIC | Age: 74
End: 2024-02-28
Payer: MEDICARE

## 2024-02-28 RX ORDER — INSULIN ASPART 100 [IU]/ML
INJECTION, SOLUTION INTRAVENOUS; SUBCUTANEOUS
Qty: 15 ML | Refills: 5 | Status: SHIPPED | OUTPATIENT
Start: 2024-02-28

## 2024-03-01 ENCOUNTER — HOSPITAL ENCOUNTER (OUTPATIENT)
Dept: RADIOLOGY | Facility: HOSPITAL | Age: 74
Discharge: HOME | End: 2024-03-01
Attending: RADIOLOGY
Payer: MEDICARE

## 2024-03-01 DIAGNOSIS — C79.31 MALIGNANT NEOPLASM METASTATIC TO BRAIN: ICD-10-CM

## 2024-03-01 DIAGNOSIS — C34.90 SMALL CELL LUNG CANCER: ICD-10-CM

## 2024-03-01 PROCEDURE — 25500020 PHARM REV CODE 255: Mod: PO | Performed by: RADIOLOGY

## 2024-03-01 PROCEDURE — 76390 MR SPECTROSCOPY: CPT | Mod: 26,,, | Performed by: RADIOLOGY

## 2024-03-01 PROCEDURE — 76390 MR SPECTROSCOPY: CPT | Mod: TC,PO

## 2024-03-01 PROCEDURE — A9585 GADOBUTROL INJECTION: HCPCS | Mod: PO | Performed by: RADIOLOGY

## 2024-03-01 RX ORDER — GADOBUTROL 604.72 MG/ML
8 INJECTION INTRAVENOUS
Status: COMPLETED | OUTPATIENT
Start: 2024-03-01 | End: 2024-03-01

## 2024-03-01 RX ADMIN — GADOBUTROL 8 ML: 604.72 INJECTION INTRAVENOUS at 09:03

## 2024-03-04 NOTE — PROGRESS NOTES
[facility] Department of Radiation Oncology  Follow Up Visit Note    Diagnosis:  Allyson Henriquez is a 74 y.o. 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, status post SRS       Oncologic History:  Oncology History   Small cell lung cancer   5/20/2021 Initial Diagnosis    Small cell carcinoma of lung, right     6/8/2021 - 7/27/2021 Chemotherapy    Treatment Summary   Plan Name: OP CARBOPLATIN (AUC) + ETOPOSIDE Q3W  Treatment Goal: Palliative  Status: Inactive  Start Date: 6/8/2021  End Date: 7/27/2021  Provider: Madhav Cardona MD  Chemotherapy: CARBOplatin (PARAPLATIN) 395 mg in sodium chloride 0.9% 250 mL chemo infusion, 395 mg (100 % of original dose 395 mg), Intravenous, Clinic/HOD 1 time, 3 of 6 cycles  Dose modification:   (original dose 395 mg, Cycle 1, Reason: MD Discretion)  Administration: 395 mg (6/8/2021), 395 mg (6/29/2021), 395 mg (7/20/2021)  etoposide (VEPESID) 100 mg/m2 = 200 mg in sodium chloride 0.9% 500 mL chemo infusion, 100 mg/m2 = 200 mg, Intravenous, Clinic/HOD 1 time, 3 of 6 cycles  Administration: 200 mg (6/8/2021), 200 mg (6/9/2021), 200 mg (6/29/2021), 200 mg (6/10/2021), 200 mg (6/30/2021), 200 mg (7/1/2021), 200 mg (7/20/2021), 200 mg (7/21/2021), 200 mg (7/22/2021)     1/18/2022 - 7/6/2022 Chemotherapy    Treatment Summary   Plan Name: OP PEMBROLIZUMAB 200MG Q3W  Treatment Goal: Palliative  Status: Inactive  Start Date: 1/18/2022  End Date: 7/6/2022  Provider: Madhav Cardona MD  Chemotherapy: [No matching medication found in this treatment plan]     6/28/2022 - 6/28/2022 Radiation Therapy    Treating physician: Aleena Nielson  Total Dose: 22 Gy  Fractions: 1    Single fraction radiosurgery to right cerebellar metastasis.     Iron deficiency anemia   Brain metastasis   6/28/2022 Initial Diagnosis    Brain metastasis     6/28/2022 - 6/28/2022 Radiation Therapy    Treating physician: Aleena Nielson  Total  Dose: 22 Gy  Fractions: 1    Single fraction radiosurgery to right cerebellar metastasis.          Interval History  The patient presents today for a regularly scheduled follow up visit.  She was last seen in our follow up on 12/1/23.  Since that time she has been feeling well. Ongoing issues with abdominal wounds (had 3- 2 have healed, one improving); no longer using wound vac remains. Off hydrocortisone.     Admitted 1/2024 for UTI, hyponatremia, pleural effusion. Has completed antibiotics.    3/1/24 MR Spect: stable enhancing lesion in right cerebellum- spect findings most consistent with treatment change/radiation necrosis      HPI: This is a 72 year old female with history of extensive-stage small cell lung cancer metastatic to bone and in thorax who presents with her  for discussion of radiation therapy for new single brain metastasis.  She is followed by Dr. Cardona on Pembro with notable systemic disease response, who reported 2 episodes of LEFT hand spasm and 2 episodes of LEFT foot spasm occurring independently and resolving spontaneously 2 weeks ago.  No other neurologic symptoms.  Dr. Cardona ordered MRI.     MRI Brain 6/17/22 new 9mm RIGHT cerebellar metastasis.  No other intracranial metastases.     Patient reports low grade headache since last Weds which she attributes to stress. She is active at home and uses wheelchair intermittently when fatigued.       She had a prolonged hospital stay (10 weeks) 2/2 esophagitis and C. Dif last year after completing 45Gy thoracic radiation therapy (Turissi regimen), but was unable to complete her course of chemotherapy.  She has recovered significantly from deconditioning related to prolonged hospital stay last year, but is still more easily fatigued than normal..     6/28/22 Single fraction SRS 22Gy to right cerebellar metastasis    she had repeated hospitalizations for diverticular abscess requiring sigmoid colectomy/ostomy and debridement 7/28/22 and  multiple surgeries for washout and several IV and oral antibiotics (she was hospitalized last year with severe esophagitis after chemotherapy-radiation therapy to thorax, complicated by C. Diff colitis).  She was discharged 8/19/22 to [facility], but readmitted 9/15/22 with wound dehiscence.   9/16/22 MRI brain: decrease in size of treated 9mm RIGHT cerebellar mass (now 5 x 3mm)     10/14/22 developed transient Left hand symptoms similar to those at time of presentation    10/23/22 PET with stable hypermetabolic right perihilar consolidation consistent with post-treatment change.     10/26/22 MRI brain stable vs improved R cerebellar lesion    11/2022, she transferred her care to Dr. Gonzalez who has restarted Keytruda     1/23/23 MRI brain: stable appearance of treated right cerebellar metastasis, now punctate in size; no new lesions; stable left IAC vestibular schwannoma    Some ongoing issues with wound healing related to her abdominal surgeries.  Is consulting with a new surgeon for second opinion. Also undergoing endocrine work up for ongoing blood sugar instability (on hydrocortisone for adrenal insufficiency)     4/27/23 MRI brain: right cerebellar small enhancing lesion with interval small focus of increased enhancement medial/inferior, and minimal new edema compared to prior study.  Stable left vestibular schwannoma.     7/2023 ostomy revision, hernia repair, followed by prolonged hospitalization    8/21/23 MRI brain: right cerebellar small enhancing lesion with serial increased size, interval small focus of increased enhancement medial/inferior, concerning for recurrence vs rediation necrossis/treatment effect.  Stable left vestibular schwannoma.     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  Stable left acoustic  "schwannoma    Possibility of pregnancy: No  History of prior irradiation: Yes - LEFT gamma knife, acoustic neuroma at [facility], 10/2003; Chest radiation therapy 45Gy BID (Turissi regimen),  with MBP   History of prior systemic anti-cancer therapy: Yes - most recently Pembro (last 6/15/22)  History of collagen vascular disease: No  Implanted electronic device (pacer/defib/nerve stimulator): No    Review of Systems   Review of Systems   Constitutional:  Negative for chills and fever.   Eyes:  Negative for blurred vision and double vision.   Respiratory:  Negative for cough.    Gastrointestinal:  Negative for abdominal pain (resolved).   Musculoskeletal:  Positive for back pain (tenderness over one area, inferior to left scapula).   Neurological:  Negative for dizziness, tremors (occasional bilat hand numbness on waking), sensory change, speech change, seizures, weakness and headaches.       Social History:  Social History     Tobacco Use    Smoking status: Former     Current packs/day: 0.00     Average packs/day: 1 pack/day for 40.0 years (40.0 ttl pk-yrs)     Types: Cigarettes     Start date: 3/6/1976     Quit date: 3/6/2016     Years since quittin.0    Smokeless tobacco: Never   Substance Use Topics    Alcohol use: Not Currently    Drug use: No       Family History:  Cancer-related family history includes Cancer in her paternal uncle. There is no history of Esophageal cancer.    Exam:  Vitals:    24 0929   BP: 133/60   BP Location: Left arm   Patient Position: Sitting   Pulse: 96   Resp: 20   Temp: 98 °F (36.7 °C)   SpO2: 96%   Weight: 78.7 kg (173 lb 8 oz)   Height: 5' 6" (1.676 m)           Physical Exam  Vitals reviewed. Exam conducted with a chaperone present.   Constitutional:       General: She is not in acute distress.     Appearance: Normal appearance. She is obese. She is not ill-appearing or toxic-appearing.   HENT:      Head: Normocephalic and atraumatic.   Eyes:      Extraocular Movements: " Extraocular movements intact.      Pupils: Pupils are equal, round, and reactive to light.   Abdominal:       Musculoskeletal:         General: No swelling.      Cervical back: No rigidity.   Skin:     General: Skin is warm.   Neurological:      Mental Status: She is alert.      Cranial Nerves: Cranial nerves 2-12 are intact.      Motor: Pronator drift (minimal right) present.      Coordination: Coordination normal. Finger-Nose-Finger Test abnormal (very slight). Rapid alternating movements normal.      Gait: Gait abnormal (ambulates with walker).      Comments: Minimal targeting difficulty R hand          Imaging:  MRI brain 11/28/23 reviewed as detailed above      Assessment:  Continued increased size of R cerebellar enhancement w/indistinct margins, most suspicious for radiation necrosis.    ECOG: (4) Completely disabled, unable to carry out self-care and confined to bed or chair    Plan:  MRI SPECT brain in 3 months  Pt will contact our office if any new neuro symptoms  Immunotherapy/systemic therapy under the care of Dr. Gonzalez, tolerating well  Follow up re: adrenal insufficiency, followed Endocrinologist  Ongoing care for abdominal healing-  follows w/ Colorectal Dr. [facility]  Will follow up with Dr. Gonzalez re: interval PET  She was given our contact information, and she was told that she could call our clinic at anytime if she has any questions or concerns.  Follow up with other providers as directed        20 minutes spent in chart/case review, face-to-face interaction, discussion with other providers, and treatment planning/coordination of care.

## 2024-03-05 ENCOUNTER — OFFICE VISIT (OUTPATIENT)
Dept: RADIATION ONCOLOGY | Facility: CLINIC | Age: 74
End: 2024-03-05
Payer: MEDICARE

## 2024-03-05 VITALS
OXYGEN SATURATION: 96 % | HEART RATE: 96 BPM | DIASTOLIC BLOOD PRESSURE: 60 MMHG | TEMPERATURE: 98 F | SYSTOLIC BLOOD PRESSURE: 133 MMHG | WEIGHT: 173.5 LBS | HEIGHT: 66 IN | RESPIRATION RATE: 20 BRPM | BODY MASS INDEX: 27.88 KG/M2

## 2024-03-05 DIAGNOSIS — C79.31 MALIGNANT NEOPLASM METASTATIC TO BRAIN: ICD-10-CM

## 2024-03-05 DIAGNOSIS — C34.90 SMALL CELL LUNG CANCER: Primary | ICD-10-CM

## 2024-03-05 PROCEDURE — 99213 OFFICE O/P EST LOW 20 MIN: CPT | Mod: S$GLB,,, | Performed by: RADIOLOGY

## 2024-03-05 PROCEDURE — 3288F FALL RISK ASSESSMENT DOCD: CPT | Mod: CPTII,S$GLB,, | Performed by: RADIOLOGY

## 2024-03-05 PROCEDURE — 1126F AMNT PAIN NOTED NONE PRSNT: CPT | Mod: CPTII,S$GLB,, | Performed by: RADIOLOGY

## 2024-03-05 PROCEDURE — 3044F HG A1C LEVEL LT 7.0%: CPT | Mod: CPTII,S$GLB,, | Performed by: RADIOLOGY

## 2024-03-05 PROCEDURE — 1101F PT FALLS ASSESS-DOCD LE1/YR: CPT | Mod: CPTII,S$GLB,, | Performed by: RADIOLOGY

## 2024-03-05 PROCEDURE — 1159F MED LIST DOCD IN RCRD: CPT | Mod: CPTII,S$GLB,, | Performed by: RADIOLOGY

## 2024-03-05 PROCEDURE — 99999 PR PBB SHADOW E&M-EST. PATIENT-LVL V: CPT | Mod: PBBFAC,,, | Performed by: RADIOLOGY

## 2024-03-05 PROCEDURE — 3075F SYST BP GE 130 - 139MM HG: CPT | Mod: CPTII,S$GLB,, | Performed by: RADIOLOGY

## 2024-03-05 PROCEDURE — 3078F DIAST BP <80 MM HG: CPT | Mod: CPTII,S$GLB,, | Performed by: RADIOLOGY

## 2024-03-05 RX ORDER — LOSARTAN POTASSIUM 25 MG/1
12.5 TABLET ORAL DAILY
Qty: 45 TABLET | Refills: 1 | Status: SHIPPED | OUTPATIENT
Start: 2024-03-05 | End: 2024-04-01 | Stop reason: DRUGHIGH

## 2024-03-22 RX ORDER — INSULIN GLARGINE 100 [IU]/ML
26 INJECTION, SOLUTION SUBCUTANEOUS NIGHTLY
Start: 2024-03-22

## 2024-03-29 ENCOUNTER — PATIENT MESSAGE (OUTPATIENT)
Dept: ENDOCRINOLOGY | Facility: CLINIC | Age: 74
End: 2024-03-29
Payer: MEDICARE

## 2024-03-29 ENCOUNTER — PATIENT MESSAGE (OUTPATIENT)
Dept: CARDIOLOGY | Facility: CLINIC | Age: 74
End: 2024-03-29
Payer: MEDICARE

## 2024-04-01 RX ORDER — LOSARTAN POTASSIUM 25 MG/1
25 TABLET ORAL DAILY
Qty: 90 TABLET | Refills: 1 | Status: SHIPPED | OUTPATIENT
Start: 2024-04-01 | End: 2024-04-19

## 2024-04-03 DIAGNOSIS — I48.0 PAF (PAROXYSMAL ATRIAL FIBRILLATION): Primary | ICD-10-CM

## 2024-04-04 RX ORDER — APIXABAN 5 MG/1
TABLET, FILM COATED ORAL
Qty: 180 TABLET | Refills: 0 | Status: SHIPPED | OUTPATIENT
Start: 2024-04-04

## 2024-04-11 ENCOUNTER — HOSPITAL ENCOUNTER (OUTPATIENT)
Dept: RADIOLOGY | Facility: HOSPITAL | Age: 74
Discharge: HOME | End: 2024-04-11
Attending: INTERNAL MEDICINE
Payer: MEDICARE

## 2024-04-11 DIAGNOSIS — E04.2 MULTINODULAR GOITER: ICD-10-CM

## 2024-04-11 DIAGNOSIS — E03.9 HYPOTHYROIDISM, UNSPECIFIED TYPE: ICD-10-CM

## 2024-04-11 PROCEDURE — 76536 US EXAM OF HEAD AND NECK: CPT | Mod: 26,,, | Performed by: RADIOLOGY

## 2024-04-11 PROCEDURE — 76536 US EXAM OF HEAD AND NECK: CPT | Mod: TC,PO

## 2024-04-17 ENCOUNTER — OFFICE VISIT (OUTPATIENT)
Dept: ENDOCRINOLOGY | Facility: CLINIC | Age: 74
End: 2024-04-17
Payer: MEDICARE

## 2024-04-17 VITALS
HEIGHT: 66 IN | SYSTOLIC BLOOD PRESSURE: 128 MMHG | WEIGHT: 181.88 LBS | DIASTOLIC BLOOD PRESSURE: 80 MMHG | HEART RATE: 86 BPM | OXYGEN SATURATION: 96 % | BODY MASS INDEX: 29.23 KG/M2

## 2024-04-17 DIAGNOSIS — E04.2 MULTINODULAR GOITER: ICD-10-CM

## 2024-04-17 DIAGNOSIS — E03.9 HYPOTHYROIDISM, UNSPECIFIED TYPE: Primary | ICD-10-CM

## 2024-04-17 PROCEDURE — 1101F PT FALLS ASSESS-DOCD LE1/YR: CPT | Mod: CPTII,S$GLB,, | Performed by: INTERNAL MEDICINE

## 2024-04-17 PROCEDURE — 3288F FALL RISK ASSESSMENT DOCD: CPT | Mod: CPTII,S$GLB,, | Performed by: INTERNAL MEDICINE

## 2024-04-17 PROCEDURE — 3079F DIAST BP 80-89 MM HG: CPT | Mod: CPTII,S$GLB,, | Performed by: INTERNAL MEDICINE

## 2024-04-17 PROCEDURE — 3074F SYST BP LT 130 MM HG: CPT | Mod: CPTII,S$GLB,, | Performed by: INTERNAL MEDICINE

## 2024-04-17 PROCEDURE — 1126F AMNT PAIN NOTED NONE PRSNT: CPT | Mod: CPTII,S$GLB,, | Performed by: INTERNAL MEDICINE

## 2024-04-17 PROCEDURE — 1159F MED LIST DOCD IN RCRD: CPT | Mod: CPTII,S$GLB,, | Performed by: INTERNAL MEDICINE

## 2024-04-17 PROCEDURE — 4010F ACE/ARB THERAPY RXD/TAKEN: CPT | Mod: CPTII,S$GLB,, | Performed by: INTERNAL MEDICINE

## 2024-04-17 PROCEDURE — 3044F HG A1C LEVEL LT 7.0%: CPT | Mod: CPTII,S$GLB,, | Performed by: INTERNAL MEDICINE

## 2024-04-17 PROCEDURE — 99214 OFFICE O/P EST MOD 30 MIN: CPT | Mod: S$GLB,,, | Performed by: INTERNAL MEDICINE

## 2024-04-17 PROCEDURE — 1160F RVW MEDS BY RX/DR IN RCRD: CPT | Mod: CPTII,S$GLB,, | Performed by: INTERNAL MEDICINE

## 2024-04-17 PROCEDURE — 99999 PR PBB SHADOW E&M-EST. PATIENT-LVL V: CPT | Mod: PBBFAC,,, | Performed by: INTERNAL MEDICINE

## 2024-04-17 NOTE — PROGRESS NOTES
CHIEF COMPLAINT: MNG  74 y.o.  being seen as a f/u. Had been seeing Dr. Trinidad and last seen by her 3/28/18. Pt opted for observation at that time. No XRT to head/neck. No difficulty swallowing. No change in voice. No tremors    Was at  for colostomy and hernia repair. Appears placed on Hydrocortisone due to a low cortisol. I do not see a cosyntropin stim test. Has been on it atleast a year. Was on Hydrocortisone 10/5. Pt had a normal cosyntropin stim test and steroids stopped.   On synthroid 75 mcg 6 days a week and 1/2 tab on day 7. No Palpitations. No tremors. No N/V.       PAST MEDICAL HISTORY/PAST SURGICAL HISTORY:  Reviewed in [facility]    SOCIAL HISTORY: Smoker and quit in 60's    FAMILY HISTORY:  Sister with thyroid cancer.     MEDICATIONS/ALLERGIES: The patient's MedCard has been updated and reviewed.    \        PE:  GENERAL: Well developed, well nourished.  NECK: Supple, trachea midline, left nodule palpable.   CHEST: Resp even and unlabored, CTA bilateral.  CARDIAC: Irreg Irreg no murmurs, rubs, S3, or S4    US THYROID     CLINICAL HISTORY:  Hypothyroidism, unspecified     TECHNIQUE:  Ultrasound of the thyroid and cervical lymph nodes was performed.     COMPARISON:  Thyroid ultrasound-08/21/2023     FINDINGS:  The right thyroid lobe measures 5.7 x 2.0 x 1.6 cm.  The left thyroid lobe measures 5.2 x 1.5 x 1.8 cm.  The total thyroid weight is 16.8 g.     There is a 31 x 23 x 31 mm smooth, circumscribed, wider than tall, profoundly hypoechoic solid nodule with prominent dystrophic macrocalcifications noted at its inferior margin and partial peripheral calcification appreciated (TI-RADS 5).  There is a 6 x 5 x 6 mm ill-defined, wider than tall, slightly hypoechoic solid nodule present in the right thyroid midpole, similar to the previous study (TI-RADS 4).  There is an 11 x 5 x 10 mm smooth, circumscribed, mixed solid and cystic isoechoic nodule present in the upper pole of the left thyroid lobe, stable when  compared to the previous study (TI-RADS 2).  There is an unchanged 6 x 5 x 6 mm smooth, circumscribed, wider than tall, slightly hypoechoic solid nodule anteriorly within the lower pole of the left thyroid lobe, unchanged (TI-RADS 4).  No new thyroid nodules which meet the criteria for FNA/core biopsy.  No new concerning lymphadenopathy in the neck.     Impression:     Multinodular thyroid gland.  No interval detrimental change when compared to the prior study.  There is continued demonstration of a 31 mm TI-RADS 5 nodule in the lower pole of the right thyroid lobe which shows peripheral rim calcification and internal macrocalcification at its inferior margin.  This nodule meets the criteria for FNA/core biopsy but remains unchanged when compared to the previous study.     Latest Reference Range & Units 03/11/24 10:47   Cortisol ug/dL 8.18   TSH 0.400 - 4.000 uIU/mL 0.871       ASSESSMENT/PLAN:  1. Multiple thyroid Nodules- no XRT.  No obstructive symptoms.  No FNA as patient opted to monitor.  Ultrasound shows no significant change.  Will repeat ultrasound at follow-up    2. Type 2 diabetes- Seeing [facility]NA for DM management.     3. Low cortisol-appears steroids were started based on low cortisol.  Unclear what exactly she was on at the time.  I do not see a cosyntropin stimulation test in the system.  Steroids held and cosyntropin stimulation test done.  Patient had a normal cosyntropin stimulation test.  No longer on hydrocortisone.  Cortisol repeated and normal.  Discussed monitoring for symptoms adrenal insufficiency.    4.  Hypothyroidism-TSH within normal limits.  Symptomatically doing well.  Continue current treatment.    FOLLOWUP  F/U 1 yr with TSH, Thyroid Ultrasound, 8 AM cortisol, ACTH

## 2024-04-19 ENCOUNTER — PATIENT MESSAGE (OUTPATIENT)
Dept: CARDIOLOGY | Facility: CLINIC | Age: 74
End: 2024-04-19
Payer: MEDICARE

## 2024-04-19 ENCOUNTER — OFFICE VISIT (OUTPATIENT)
Dept: FAMILY MEDICINE | Facility: CLINIC | Age: 74
End: 2024-04-19
Payer: MEDICARE

## 2024-04-19 ENCOUNTER — TELEPHONE (OUTPATIENT)
Dept: VASCULAR SURGERY | Facility: CLINIC | Age: 74
End: 2024-04-19
Payer: MEDICARE

## 2024-04-19 VITALS
DIASTOLIC BLOOD PRESSURE: 72 MMHG | BODY MASS INDEX: 28.83 KG/M2 | SYSTOLIC BLOOD PRESSURE: 126 MMHG | HEIGHT: 66 IN | OXYGEN SATURATION: 97 % | WEIGHT: 179.38 LBS | HEART RATE: 74 BPM

## 2024-04-19 DIAGNOSIS — L21.9 SEBORRHEA: ICD-10-CM

## 2024-04-19 DIAGNOSIS — B96.89 ACUTE BACTERIAL SINUSITIS: Primary | ICD-10-CM

## 2024-04-19 DIAGNOSIS — D89.89 OTHER SPECIFIED DISORDERS INVOLVING THE IMMUNE MECHANISM, NOT ELSEWHERE CLASSIFIED: ICD-10-CM

## 2024-04-19 DIAGNOSIS — I10 HYPERTENSION, UNSPECIFIED TYPE: Primary | ICD-10-CM

## 2024-04-19 DIAGNOSIS — J01.90 ACUTE BACTERIAL SINUSITIS: Primary | ICD-10-CM

## 2024-04-19 DIAGNOSIS — I10 ESSENTIAL HYPERTENSION: Primary | ICD-10-CM

## 2024-04-19 PROBLEM — K57.92 DIVERTICULITIS: Status: RESOLVED | Noted: 2022-07-28 | Resolved: 2024-04-19

## 2024-04-19 PROBLEM — E87.6 HYPOKALEMIA: Status: RESOLVED | Noted: 2021-07-30 | Resolved: 2024-04-19

## 2024-04-19 PROBLEM — L30.9 DERMATITIS: Status: RESOLVED | Noted: 2023-02-23 | Resolved: 2024-04-19

## 2024-04-19 PROBLEM — E78.2 MIXED HYPERLIPIDEMIA: Status: ACTIVE | Noted: 2019-09-09

## 2024-04-19 PROBLEM — T81.30XA WOUND DEHISCENCE: Status: RESOLVED | Noted: 2022-09-16 | Resolved: 2024-04-19

## 2024-04-19 PROBLEM — D62 ACUTE BLOOD LOSS ANEMIA: Status: RESOLVED | Noted: 2022-08-12 | Resolved: 2024-04-19

## 2024-04-19 PROCEDURE — 1160F RVW MEDS BY RX/DR IN RCRD: CPT | Mod: CPTII,S$GLB,, | Performed by: FAMILY MEDICINE

## 2024-04-19 PROCEDURE — 3078F DIAST BP <80 MM HG: CPT | Mod: CPTII,S$GLB,, | Performed by: FAMILY MEDICINE

## 2024-04-19 PROCEDURE — 3074F SYST BP LT 130 MM HG: CPT | Mod: CPTII,S$GLB,, | Performed by: FAMILY MEDICINE

## 2024-04-19 PROCEDURE — 3288F FALL RISK ASSESSMENT DOCD: CPT | Mod: CPTII,S$GLB,, | Performed by: FAMILY MEDICINE

## 2024-04-19 PROCEDURE — 99214 OFFICE O/P EST MOD 30 MIN: CPT | Mod: S$GLB,,, | Performed by: FAMILY MEDICINE

## 2024-04-19 PROCEDURE — 1159F MED LIST DOCD IN RCRD: CPT | Mod: CPTII,S$GLB,, | Performed by: FAMILY MEDICINE

## 2024-04-19 PROCEDURE — 3044F HG A1C LEVEL LT 7.0%: CPT | Mod: CPTII,S$GLB,, | Performed by: FAMILY MEDICINE

## 2024-04-19 PROCEDURE — 4010F ACE/ARB THERAPY RXD/TAKEN: CPT | Mod: CPTII,S$GLB,, | Performed by: FAMILY MEDICINE

## 2024-04-19 PROCEDURE — 99999 PR PBB SHADOW E&M-EST. PATIENT-LVL V: CPT | Mod: PBBFAC,,, | Performed by: FAMILY MEDICINE

## 2024-04-19 PROCEDURE — 1101F PT FALLS ASSESS-DOCD LE1/YR: CPT | Mod: CPTII,S$GLB,, | Performed by: FAMILY MEDICINE

## 2024-04-19 PROCEDURE — 1126F AMNT PAIN NOTED NONE PRSNT: CPT | Mod: CPTII,S$GLB,, | Performed by: FAMILY MEDICINE

## 2024-04-19 RX ORDER — DOXYCYCLINE 100 MG/1
100 CAPSULE ORAL 2 TIMES DAILY
Qty: 14 CAPSULE | Refills: 0 | Status: SHIPPED | OUTPATIENT
Start: 2024-04-19 | End: 2024-05-07

## 2024-04-19 RX ORDER — KETOCONAZOLE 20 MG/ML
SHAMPOO, SUSPENSION TOPICAL
Qty: 120 ML | Refills: 3 | Status: SHIPPED | OUTPATIENT
Start: 2024-04-22

## 2024-04-19 RX ORDER — PREDNISONE 20 MG/1
20 TABLET ORAL DAILY
Qty: 3 TABLET | Refills: 0 | Status: SHIPPED | OUTPATIENT
Start: 2024-04-19 | End: 2024-05-07

## 2024-04-19 RX ORDER — CLOTRIMAZOLE 1 %
CREAM (GRAM) TOPICAL 2 TIMES DAILY
Qty: 45 G | Refills: 3 | Status: SHIPPED | OUTPATIENT
Start: 2024-04-19

## 2024-04-19 RX ORDER — LOSARTAN POTASSIUM 25 MG/1
25 TABLET ORAL 2 TIMES DAILY
Qty: 180 TABLET | Refills: 1 | Status: SHIPPED | OUTPATIENT
Start: 2024-04-19 | End: 2025-04-19

## 2024-04-19 RX ORDER — PROMETHAZINE HYDROCHLORIDE AND DEXTROMETHORPHAN HYDROBROMIDE 6.25; 15 MG/5ML; MG/5ML
5 SYRUP ORAL 2 TIMES DAILY PRN
Qty: 120 ML | Refills: 0 | Status: SHIPPED | OUTPATIENT
Start: 2024-04-19 | End: 2024-04-29

## 2024-04-19 NOTE — TELEPHONE ENCOUNTER
Spoke w/ pt spouse. Per Dr. Humphries Losartan changed to 25mg BID. Verified pharmacy. Pt spouse verbalized understanding.

## 2024-04-19 NOTE — PATIENT INSTRUCTIONS
Budesonide at least once daily for the next 2 weeks.     Use the ketoconazole shampoo as a wash on the affected areas 2x/week. Lather and let sit for 5 minutes before rinsing. You may have some mild burning sensation where there are skin openings.   After, mix a small amount of mupirocin with lotrimin and apply to the areas behind the ears.

## 2024-04-19 NOTE — PROGRESS NOTES
Subjective:       Patient ID: Allyson Henriquez is a 74 y.o. female.    Chief Complaint: Follow-up and Cough (Since February 1 st)      Allyson Henriquez is in the office for f/u.    Follow-up  Associated symptoms include coughing, fatigue and a rash.   Cough  Associated symptoms include a rash and shortness of breath.     No updates to health hx.   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 difficile colitis 08/2021    Cancer     lung    CKD (chronic kidney disease), stage III     Colonic diverticular abscess 07/12/2022    COPD (chronic obstructive pulmonary disease)     Encounter for blood transfusion     Hepatic steatosis     History of numbness     transient numbness left hand and foot    History of tobacco abuse     Hyperlipidemia     Hypertension     Lung nodules     and adenopathy    Multinodular goiter (nontoxic) 05/08/2017    Obesity     TRISHA (obstructive sleep apnea)     on bipap at bedtime    Proteinuria      Cough x 3mos. Dry, intermittently productive with clear-white sputum. No wheezing. Off nebulizers. Some shortness of breath.   Has had f/u with endo/wade, wound/kessels and HH re abd wound. Back on keytruda per hem/maronge q3 weeks.     Scalp and behind her ears are itching. Beauty parlor washes every 2 weeks.     Current Outpatient Medications:     ascorbic acid, vitamin C, (VITAMIN C) 500 MG tablet, Take 500 mg by mouth once daily. Indications: inadequate vitamin C, Disp: , Rfl:     blood sugar diagnostic Strp, 1 strip by Misc.(Non-Drug; Combo Route) route Daily., Disp: 100 each, Rfl: 1    blood-glucose meter kit, Use as instructed, Disp: 1 each, Rfl: 0    blood-glucose sensor (DEXCOM G7 SENSOR MISC), Inject 1 each into the skin every 10 days. Continues  glucose monitoring, Disp: , Rfl:     budesonide (PULMICORT) 0.5 mg/2 mL nebulizer solution, Take 2 mLs (0.5 mg total) by nebulization every 12 (twelve) hours. Controller, Disp: 100 mL, Rfl: 0    cetirizine (ZYRTEC) 10 MG tablet, Take 10 mg by mouth once daily., Disp: , Rfl:     CHEST CONGESTION RELIEF DM  mg/5 mL liquid, Take 5 mLs by mouth 2 (two) times a day., Disp: , Rfl:     cholecalciferol, vitamin D3, (VITAMIN D3) 125 mcg (5,000 unit) Tab, Take 5,000 Units by mouth once daily., Disp: , Rfl:     docusate sodium (COLACE) 100 MG capsule, Take 1 capsule (100 mg total) by mouth daily as needed for Constipation., Disp: 90 capsule, Rfl: 3    ELIQUIS 5 mg Tab, TAKE 1 TABLET(5 MG) BY MOUTH TWICE DAILY, Disp: 180 tablet, Rfl: 0    gabapentin (NEURONTIN) 100 MG capsule, Take 100 mg by mouth 2 (two) times daily. Indications: neuropathic pain, Disp: , Rfl:     insulin (LANTUS SOLOSTAR U-100 INSULIN) glargine 100 units/mL SubQ pen, Inject 26 Units into the skin every evening., Disp: , Rfl:     ipratropium (ATROVENT) 0.02 % nebulizer solution, Take 2.5 mLs (500 mcg total) by nebulization every 6 (six) hours as needed (wheeze). Rescue, Disp: 75 mL, Rfl: 0    Lactobacillus acidophilus (PROBIOTIC ORAL), Take 1 capsule by mouth once daily., Disp: , Rfl:     lancets Misc, 1 each by Misc.(Non-Drug; Combo Route) route once daily., Disp: 100 each, Rfl: 1    levothyroxine (SYNTHROID) 75 MCG tablet, 1 tablet 6 days a week and 1/2 tablet on day 7, Disp: 30 tablet, Rfl: 11    levothyroxine (SYNTHROID) 75 MCG tablet, Take 37.5 mcg by mouth every Sunday., Disp: , Rfl:     metoprolol succinate (TOPROL-XL) 25 MG 24 hr tablet, Take 0.5 tablets (12.5 mg total) by mouth 2 (two) times daily., Disp: 180 tablet, Rfl: 1    mv-mn/folic ac/calcium/vit K1 (WOMEN'S 50 PLUS MULTIVITAMIN ORAL), Take 1 tablet by mouth once daily., Disp: , Rfl:     NOVOLOG FLEXPEN U-100 INSULIN 100 unit/mL (3 mL) InPn pen, 7 Units with meals with correction scale.  "Use on premeal readings; 150-200=+2, 201-250=+4; 251-300=+6; 301-350=+8, over 350, + 10 units 50 units a day max, Disp: 15 mL, Rfl: 5    nystatin (MYCOSTATIN) cream, Apply topically 3 (three) times daily as needed (itch)., Disp: , Rfl:     pantoprazole (PROTONIX) 40 MG tablet, Take 1 tablet (40 mg total) by mouth once daily., Disp: 90 tablet, Rfl: 2    pen needle, diabetic 32 gauge x 1/4" Ndle, To use with insulin pens up to 4x day (Patient taking differently: Inject 1 each into the skin 4 (four) times daily as needed (to inject insulin (s)).), Disp: 150 each, Rfl: 12    sertraline (ZOLOFT) 25 MG tablet, TAKE 1 TABLET(25 MG) BY MOUTH EVERY DAY, Disp: 90 tablet, Rfl: 2    simvastatin (ZOCOR) 40 MG tablet, TAKE 1 TABLET(40 MG) BY MOUTH EVERY EVENING, Disp: 90 tablet, Rfl: 1    sodium chloride 3% 3 % nebulizer solution, Take 4 mLs by nebulization 2 (two) times daily., Disp: 300 mL, Rfl: 0    triamcinolone acetonide 0.1% (KENALOG) 0.1 % cream, Apply topically 2 (two) times daily., Disp: 45 g, Rfl: 1    vitamin A 57821 UNIT capsule, Take 10,000 Units by mouth 2 (two) times a day., Disp: , Rfl:     clotrimazole (LOTRIMIN) 1 % cream, Apply topically 2 (two) times daily., Disp: 45 g, Rfl: 3    doxycycline (MONODOX) 100 MG capsule, Take 1 capsule (100 mg total) by mouth 2 (two) times daily., Disp: 14 capsule, Rfl: 0    HYDROcodone-acetaminophen (NORCO)  mg per tablet, Take 1 tablet by mouth every 12 (twelve) hours as needed for Pain. (Patient not taking: Reported on 4/19/2024), Disp: 40 tablet, Rfl: 0    [START ON 4/22/2024] ketoconazole (NIZORAL) 2 % shampoo, Apply topically twice a week. Lather and let sit for 5 minutes before rinsing., Disp: 120 mL, Rfl: 3    LIDOcaine-prilocaine (EMLA) cream, Apply topically as needed (as needed for port access). (Patient not taking: Reported on 4/19/2024), Disp: 30 g, Rfl: 1    losartan (COZAAR) 25 MG tablet, Take 1 tablet (25 mg total) by mouth 2 (two) times a day., Disp: 180 " tablet, Rfl: 1    predniSONE (DELTASONE) 20 MG tablet, Take 1 tablet (20 mg total) by mouth once daily., Disp: 3 tablet, Rfl: 0    promethazine-dextromethorphan (PROMETHAZINE-DM) 6.25-15 mg/5 mL Syrp, Take 5 mLs by mouth 2 (two) times daily as needed., Disp: 120 mL, Rfl: 0  No current facility-administered medications for this visit.    Facility-Administered Medications Ordered in Other Visits:     EUFLEXXA 10 mg/mL(mw 2.4 -3.6 million) injection Syrg 20 mg, 20 mg, Intra-articular, , Jose Rafael Barney MD, 20 mg at 06/14/17 1605    The ASCVD Risk score (Ron DK, et al., 2019) failed to calculate for the following reasons:    The patient has a prior MI or stroke diagnosis     Lab Results   Component Value Date    HGBA1C 5.8 (H) 01/15/2024    HGBA1C 7.0 (H) 08/18/2023    HGBA1C 5.4 03/13/2023     Lab Results   Component Value Date    LDLCALC 63.2 09/11/2023    CREATININE 0.88 04/01/2024   Labs 2024 rev.     Review of Systems   Constitutional:  Positive for fatigue. Negative for unexpected weight change.   Respiratory:  Positive for cough and shortness of breath.    Skin:  Positive for rash.           Objective:      Physical Exam  Vitals and nursing note reviewed.   Constitutional:       General: She is not in acute distress.     Appearance: Normal appearance. She is well-developed.   HENT:      Head: Normocephalic and atraumatic.   Eyes:      General: No scleral icterus.        Right eye: No discharge.         Left eye: No discharge.      Conjunctiva/sclera: Conjunctivae normal.   Cardiovascular:      Rate and Rhythm: Normal rate.   Pulmonary:      Effort: Pulmonary effort is normal. No respiratory distress.      Breath sounds: Decreased breath sounds present. No wheezing or rhonchi.   Abdominal:      Palpations: Abdomen is soft.      Tenderness: There is no guarding.   Musculoskeletal:         General: No deformity or signs of injury.      Cervical back: Neck supple.   Skin:     General: Skin is warm and dry.       Findings: No rash.   Neurological:      General: No focal deficit present.      Mental Status: She is alert and oriented to person, place, and time.   Psychiatric:         Mood and Affect: Mood normal.         Behavior: Behavior normal.             Screening recommendations appropriate to age and health status were reviewed.    Assessment & Plan:    Acute bacterial sinusitis  Comments:  abx/short course of steroids  reviewed budesonide once daily  Orders:  -     promethazine-dextromethorphan (PROMETHAZINE-DM) 6.25-15 mg/5 mL Syrp; Take 5 mLs by mouth 2 (two) times daily as needed.  Dispense: 120 mL; Refill: 0  -     doxycycline (MONODOX) 100 MG capsule; Take 1 capsule (100 mg total) by mouth 2 (two) times daily.  Dispense: 14 capsule; Refill: 0  -     predniSONE (DELTASONE) 20 MG tablet; Take 1 tablet (20 mg total) by mouth once daily.  Dispense: 3 tablet; Refill: 0    Seborrhea  -     ketoconazole (NIZORAL) 2 % shampoo; Apply topically twice a week. Lather and let sit for 5 minutes before rinsing.  Dispense: 120 mL; Refill: 3  -     clotrimazole (LOTRIMIN) 1 % cream; Apply topically 2 (two) times daily.  Dispense: 45 g; Refill: 3    Other specified disorders involving the immune mechanism, not elsewhere classified  Comments:  stable regimen and monitoring per hem/onc

## 2024-04-23 NOTE — PROGRESS NOTES
CC: Ms. Allyson Henriquez arrives today for management of Type 2 DM and review of chronic medical conditions, as listed in the Visit Diagnosis section of this encounter.       HPI: Ms. Allyson Henriquez was diagnosed with Type 2 DM in 4/2023. She was diagnosed based on lab work. Initial treatment consisted of insulin. Denies FH of DM. Denies hospitalizations due to DM.   Low C-peptide noted (<0.08) in 9/2023. Was using Keytruda for ~ 6 months prior to this finding. Now diagnosed at Type 1 DM.   Follows with Dr. Daly for hypothyroidism, multinodular goiter.     Patient was last seen by me in December.     She takes Keytruda (started in 1/2023) for small cell lung cancer. Taking every 3 weeks. Follows with Dr. Gonzalez.     She was recently on three days of prednisone for cough. Her last dose was today. BG has been up to 400 today after breakfast but forgot to take Novolog before this meal.     BG monitoring per Dexcom G7.     Hypoglycemia: Occasional  Hypoglycemic Symptoms: none  Hypoglycemia Treatment: juice     Missing Insulin/PO medication doses: Occasional  Timing prandial insulin 5-15 minutes before meals: sometimes gives after     Dietary Habits:  Eats 3x/day. Reports portions are usually small. Avoids sugary beverages.    Last DM education appointment: 8/2023      CURRENT DIABETIC MEDS:  Lantus 26 units QHS, Novolog 7 units + correction scale, target 150, ISF 25   Vial or pen: pen  Glucometer type:     Previous DM treatments:    Last Eye Exam: > 1 year  Last Podiatry Exam:     REVIEW OF SYSTEMS  Constitutional: no c/o fatigue, weakness. + 5# weight loss   Cardiac: no palpitations or chest pain.  Respiratory: no dyspnea. + cough that comes and goes, alternates between dry and productive. PCP managing this.  GI: no c/o abdominal pain or nausea. Denies h/o pancreatitis.   Skin: no lesions or rashes. + healing abd wound. Following with wound care.    Neuro: + numbness, tingling in hands  Endocrine: denies  "polyphagia, polydipsia, polyuria      Personally reviewed Past Medical, Surgical, Social History.    Vital Signs  /70   Pulse 96   Ht 5' 6" (1.676 m)   Wt 79.9 kg (176 lb 2.4 oz)   BMI 28.43 kg/m²     Personally reviewed the below labs:    Hemoglobin A1C   Date Value Ref Range Status   01/15/2024 5.8 (H) 0.0 - 5.6 % Final     Comment:     Reference Interval:  5.0 - 5.6 Normal   5.7 - 6.4 High Risk   > 6.5 Diabetic      Hgb A1c results are standardized based on the (NGSP) National   Glycohemoglobin Standardization Program.      Hemoglobin A1C levels are related to mean serum/plasma glucose   during the preceding 2-3 months.        08/18/2023 7.0 (H) 4.0 - 5.6 % Final     Comment:     ADA Screening Guidelines:  5.7-6.4%  Consistent with prediabetes  >or=6.5%  Consistent with diabetes    High levels of fetal hemoglobin interfere with the HbA1C  assay. Heterozygous hemoglobin variants (HbS, HgC, etc)do  not significantly interfere with this assay.   However, presence of multiple variants may affect accuracy.     03/13/2023 5.4 0.0 - 5.6 % Final     Comment:     Reference Interval:  5.0 - 5.6 Normal   5.7 - 6.4 High Risk   > 6.5 Diabetic      Hgb A1c results are standardized based on the (NGSP) National   Glycohemoglobin Standardization Program.      Hemoglobin A1C levels are related to mean serum/plasma glucose   during the preceding 2-3 months.            Chemistry        Component Value Date/Time     (L) 04/22/2024 1105    K 4.5 04/22/2024 1105    CL 98 04/22/2024 1105    CO2 28 04/22/2024 1105    BUN 17 04/22/2024 1105    CREATININE 0.90 04/22/2024 1105     (H) 04/22/2024 1105        Component Value Date/Time    CALCIUM 10.0 04/22/2024 1105    ALKPHOS 101 04/22/2024 1105    AST 38 (H) 04/22/2024 1105    ALT 13 04/22/2024 1105    BILITOT 0.6 04/22/2024 1105    ESTGFRAFRICA >60 07/31/2022 0430    EGFRNONAA 55 (A) 07/31/2022 0430          Lab Results   Component Value Date    CHOL 134 09/11/2023 "    CHOL 158 08/19/2022    CHOL 167 07/06/2022     Lab Results   Component Value Date    HDL 38 (L) 09/11/2023    HDL 22 (L) 08/19/2022    HDL 46 07/06/2022     Lab Results   Component Value Date    LDLCALC 63.2 09/11/2023    LDLCALC 103.2 08/19/2022    LDLCALC 81.6 07/06/2022     Lab Results   Component Value Date    TRIG 164 (H) 09/11/2023    TRIG 164 (H) 08/19/2022    TRIG 197 (H) 07/06/2022     Lab Results   Component Value Date    CHOLHDL 28.4 09/11/2023    CHOLHDL 13.9 (L) 08/19/2022    CHOLHDL 27.5 07/06/2022       Lab Results   Component Value Date    MICALBCREAT 427.5 (H) 03/14/2023     Lab Results   Component Value Date    TSH 0.871 03/11/2024       CrCl cannot be calculated (Unknown ideal weight.).    Vit D, 25-Hydroxy   Date Value Ref Range Status   04/03/2023 61 30 - 96 ng/mL Final     Comment:     Vitamin D deficiency.........<10 ng/mL                              Vitamin D insufficiency......10-29 ng/mL       Vitamin D sufficiency........> or equal to 30 ng/mL  Vitamin D toxicity............>100 ng/mL        Latest Reference Range & Units 09/11/23 10:24   C-Peptide 0.78 - 5.19 ng/mL <0.08 (L)   (L): Data is abnormally low    PHYSICAL EXAMINATION  Constitutional: Appears well, no distress  Respiratory: CTA, even and unlabored.  Cardiovascular: RRR, no murmurs, no carotid bruits.  GI: bowel sounds active, no hernia noted  Skin: warm and dry  Neuro: oriented to person, place, time  Feet: appropriate footwear.    DEXCOM G7 DOWNLOAD: Fasting glucoses are often reasonable but some borderline/mild nocturnal hypoglycemia noted. Sporadic prandial excursions noted.        Goals        HEMOGLOBIN A1C < 7               Assessment/Plan  1. Type 1 diabetes mellitus with nephropathy  -- A1c to be obtained with next lab draw.   -- decrease Lantus to 24 units QHS  -- continue Novolog 7 units with meals + correction scale. Discussed proper timing.  -- intensive BG monitoring per Dexcom G7  -- Schedule eye exam     --  Discussed diagnosis of DM, A1c goals, progression of disease, long term complications and tx options.  -- Reviewed hypoglycemia management: treat with 4 oz of juice, 4 oz regular soda, or 4 glucose tablets. Monitor and repeat treatment every 15 minutes until BG is >70 Then have a snack, which includes 15 grams of complex carbohydrates and protein.   Advised patient to check BG before activities, such as driving or exercise.   2. Hypothyroidism, unspecified type  -- follows with Dr. Daly   3. Hypoglycemia unawareness associated with type 1 diabetes mellitus  -- continue Dexcom G7   4. Small cell lung cancer  -- on Keytruda, which may have led to autoimmune DM  -- follows with Dr. Gonzalez           FOLLOW UP  Follow up in about 3 months (around 7/24/2024).   Patient instructed to bring BG logs to each follow up   Patient encouraged to call for any BG/medication issues, concerns, or questions.    Orders Placed This Encounter   Procedures    Hemoglobin A1C

## 2024-04-24 ENCOUNTER — OFFICE VISIT (OUTPATIENT)
Dept: ENDOCRINOLOGY | Facility: CLINIC | Age: 74
End: 2024-04-24
Payer: MEDICARE

## 2024-04-24 VITALS
DIASTOLIC BLOOD PRESSURE: 70 MMHG | WEIGHT: 176.13 LBS | HEART RATE: 96 BPM | BODY MASS INDEX: 28.31 KG/M2 | SYSTOLIC BLOOD PRESSURE: 130 MMHG | HEIGHT: 66 IN

## 2024-04-24 DIAGNOSIS — E03.9 HYPOTHYROIDISM, UNSPECIFIED TYPE: ICD-10-CM

## 2024-04-24 DIAGNOSIS — E10.649 HYPOGLYCEMIA UNAWARENESS ASSOCIATED WITH TYPE 1 DIABETES MELLITUS: ICD-10-CM

## 2024-04-24 DIAGNOSIS — E10.21 TYPE 1 DIABETES MELLITUS WITH NEPHROPATHY: Primary | ICD-10-CM

## 2024-04-24 DIAGNOSIS — C34.90 SMALL CELL LUNG CANCER: ICD-10-CM

## 2024-04-24 PROCEDURE — 95251 CONT GLUC MNTR ANALYSIS I&R: CPT | Mod: S$GLB,,, | Performed by: NURSE PRACTITIONER

## 2024-04-24 PROCEDURE — 1159F MED LIST DOCD IN RCRD: CPT | Mod: CPTII,S$GLB,, | Performed by: NURSE PRACTITIONER

## 2024-04-24 PROCEDURE — 3075F SYST BP GE 130 - 139MM HG: CPT | Mod: CPTII,S$GLB,, | Performed by: NURSE PRACTITIONER

## 2024-04-24 PROCEDURE — 1160F RVW MEDS BY RX/DR IN RCRD: CPT | Mod: CPTII,S$GLB,, | Performed by: NURSE PRACTITIONER

## 2024-04-24 PROCEDURE — 99999 PR PBB SHADOW E&M-EST. PATIENT-LVL V: CPT | Mod: PBBFAC,,, | Performed by: NURSE PRACTITIONER

## 2024-04-24 PROCEDURE — 3288F FALL RISK ASSESSMENT DOCD: CPT | Mod: CPTII,S$GLB,, | Performed by: NURSE PRACTITIONER

## 2024-04-24 PROCEDURE — 3044F HG A1C LEVEL LT 7.0%: CPT | Mod: CPTII,S$GLB,, | Performed by: NURSE PRACTITIONER

## 2024-04-24 PROCEDURE — 1101F PT FALLS ASSESS-DOCD LE1/YR: CPT | Mod: CPTII,S$GLB,, | Performed by: NURSE PRACTITIONER

## 2024-04-24 PROCEDURE — 1126F AMNT PAIN NOTED NONE PRSNT: CPT | Mod: CPTII,S$GLB,, | Performed by: NURSE PRACTITIONER

## 2024-04-24 PROCEDURE — 99214 OFFICE O/P EST MOD 30 MIN: CPT | Mod: S$GLB,,, | Performed by: NURSE PRACTITIONER

## 2024-04-24 PROCEDURE — 4010F ACE/ARB THERAPY RXD/TAKEN: CPT | Mod: CPTII,S$GLB,, | Performed by: NURSE PRACTITIONER

## 2024-04-24 PROCEDURE — 3078F DIAST BP <80 MM HG: CPT | Mod: CPTII,S$GLB,, | Performed by: NURSE PRACTITIONER

## 2024-05-01 ENCOUNTER — PATIENT MESSAGE (OUTPATIENT)
Dept: FAMILY MEDICINE | Facility: CLINIC | Age: 74
End: 2024-05-01
Payer: MEDICARE

## 2024-05-01 DIAGNOSIS — J44.9 CHRONIC OBSTRUCTIVE PULMONARY DISEASE, UNSPECIFIED COPD TYPE: Primary | ICD-10-CM

## 2024-05-03 ENCOUNTER — TELEPHONE (OUTPATIENT)
Dept: ENDOCRINOLOGY | Facility: CLINIC | Age: 74
End: 2024-05-03
Payer: MEDICARE

## 2024-05-07 RX ORDER — FAMOTIDINE 40 MG/1
40 TABLET, FILM COATED ORAL 2 TIMES DAILY
Qty: 60 TABLET | Refills: 11 | Status: SHIPPED | OUTPATIENT
Start: 2024-05-07 | End: 2025-05-07

## 2024-05-07 NOTE — TELEPHONE ENCOUNTER
Next step: add pepcid 40mg BID, update cxr for comparison and schedule f/u with pulm to review whether addl pulm med needed for cough.   The only other consideration is that some people can develop a sensitivity to the losartan family of medicine - rare but possible. If no other sources ID'd would need to check with cards for alternative so we can stop the losartan for a while and see if that affects cough.

## 2024-05-13 DIAGNOSIS — M25.561 RIGHT KNEE PAIN, UNSPECIFIED CHRONICITY: Primary | ICD-10-CM

## 2024-05-15 ENCOUNTER — OFFICE VISIT (OUTPATIENT)
Dept: ORTHOPEDICS | Facility: CLINIC | Age: 74
End: 2024-05-15
Payer: MEDICARE

## 2024-05-15 ENCOUNTER — HOSPITAL ENCOUNTER (OUTPATIENT)
Dept: RADIOLOGY | Facility: HOSPITAL | Age: 74
Discharge: HOME | End: 2024-05-15
Attending: ORTHOPAEDIC SURGERY
Payer: MEDICARE

## 2024-05-15 VITALS — HEIGHT: 66 IN | WEIGHT: 175.5 LBS | BODY MASS INDEX: 28.21 KG/M2

## 2024-05-15 DIAGNOSIS — M25.561 RIGHT KNEE PAIN, UNSPECIFIED CHRONICITY: ICD-10-CM

## 2024-05-15 DIAGNOSIS — S83.241A OTHER TEAR OF MEDIAL MENISCUS OF RIGHT KNEE AS CURRENT INJURY, INITIAL ENCOUNTER: ICD-10-CM

## 2024-05-15 DIAGNOSIS — M25.561 RIGHT KNEE PAIN, UNSPECIFIED CHRONICITY: Primary | ICD-10-CM

## 2024-05-15 DIAGNOSIS — M17.10 ARTHRITIS OF KNEE: ICD-10-CM

## 2024-05-15 DIAGNOSIS — M25.461 KNEE EFFUSION, RIGHT: ICD-10-CM

## 2024-05-15 LAB
APPEARANCE FLD: NORMAL
BODY FLD TYPE: NORMAL
BODY FLD TYPE: NORMAL
COLOR FLD: YELLOW
CRYSTALS FLD MICRO: NEGATIVE
LYMPHOCYTES NFR FLD MANUAL: 4 %
MONOS+MACROS NFR FLD MANUAL: 15 %
NEUTROPHILS NFR FLD MANUAL: 81 %
WBC # FLD: NORMAL /CU MM

## 2024-05-15 PROCEDURE — 89060 EXAM SYNOVIAL FLUID CRYSTALS: CPT | Performed by: ORTHOPAEDIC SURGERY

## 2024-05-15 PROCEDURE — 87075 CULTR BACTERIA EXCEPT BLOOD: CPT | Performed by: ORTHOPAEDIC SURGERY

## 2024-05-15 PROCEDURE — 99999 PR PBB SHADOW E&M-EST. PATIENT-LVL II: CPT | Mod: PBBFAC,,, | Performed by: ORTHOPAEDIC SURGERY

## 2024-05-15 PROCEDURE — 73564 X-RAY EXAM KNEE 4 OR MORE: CPT | Mod: 26,RT,, | Performed by: RADIOLOGY

## 2024-05-15 PROCEDURE — 99204 OFFICE O/P NEW MOD 45 MIN: CPT | Mod: 25,S$GLB,, | Performed by: ORTHOPAEDIC SURGERY

## 2024-05-15 PROCEDURE — 89051 BODY FLUID CELL COUNT: CPT | Performed by: ORTHOPAEDIC SURGERY

## 2024-05-15 PROCEDURE — 3044F HG A1C LEVEL LT 7.0%: CPT | Mod: CPTII,S$GLB,, | Performed by: ORTHOPAEDIC SURGERY

## 2024-05-15 PROCEDURE — 73562 X-RAY EXAM OF KNEE 3: CPT | Mod: 26,59,LT, | Performed by: RADIOLOGY

## 2024-05-15 PROCEDURE — 1125F AMNT PAIN NOTED PAIN PRSNT: CPT | Mod: CPTII,S$GLB,, | Performed by: ORTHOPAEDIC SURGERY

## 2024-05-15 PROCEDURE — 20610 DRAIN/INJ JOINT/BURSA W/O US: CPT | Mod: RT,S$GLB,, | Performed by: ORTHOPAEDIC SURGERY

## 2024-05-15 PROCEDURE — 1159F MED LIST DOCD IN RCRD: CPT | Mod: CPTII,S$GLB,, | Performed by: ORTHOPAEDIC SURGERY

## 2024-05-15 PROCEDURE — 1160F RVW MEDS BY RX/DR IN RCRD: CPT | Mod: CPTII,S$GLB,, | Performed by: ORTHOPAEDIC SURGERY

## 2024-05-15 PROCEDURE — 73562 X-RAY EXAM OF KNEE 3: CPT | Mod: TC,PO,LT

## 2024-05-15 PROCEDURE — 4010F ACE/ARB THERAPY RXD/TAKEN: CPT | Mod: CPTII,S$GLB,, | Performed by: ORTHOPAEDIC SURGERY

## 2024-05-15 PROCEDURE — 87070 CULTURE OTHR SPECIMN AEROBIC: CPT | Performed by: ORTHOPAEDIC SURGERY

## 2024-05-15 RX ORDER — TRIAMCINOLONE ACETONIDE 40 MG/ML
40 INJECTION, SUSPENSION INTRA-ARTICULAR; INTRAMUSCULAR
Status: DISCONTINUED | OUTPATIENT
Start: 2024-05-15 | End: 2024-05-15 | Stop reason: HOSPADM

## 2024-05-15 RX ADMIN — TRIAMCINOLONE ACETONIDE 40 MG: 40 INJECTION, SUSPENSION INTRA-ARTICULAR; INTRAMUSCULAR at 10:05

## 2024-05-15 NOTE — PROGRESS NOTES
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 difficile colitis 08/2021    Cancer     lung    CKD (chronic kidney disease), stage III     Colonic diverticular abscess 07/12/2022    COPD (chronic obstructive pulmonary disease)     Encounter for blood transfusion     Hepatic steatosis     History of numbness     transient numbness left hand and foot    History of tobacco abuse     Hyperlipidemia     Hypertension     Lung nodules     and adenopathy    Multinodular goiter (nontoxic) 05/08/2017    Obesity     TRISHA (obstructive sleep apnea)     on bipap at bedtime    Proteinuria        Past Surgical History:   Procedure Laterality Date    ADENOIDECTOMY      APPENDECTOMY      CATARACT EXTRACTION      COLONOSCOPY N/A 11/14/2016    Procedure: COLONOSCOPY;  Surgeon: Destin Cardona MD;  Location: [facility] ENDO;  Service: Endoscopy;  Laterality: N/A;    COLONOSCOPY N/A 05/14/2021    Procedure: COLONOSCOPY;  Surgeon: Destin Cardona MD;  Location: [facility];  Service: Endoscopy;  Laterality: N/A; hemorrhoids, diverticulosis, Old blood left colon. No active bleeding; Repeat colonoscopy is not recommended for screening purposes.    COLOSTOMY N/A 07/28/2022    Procedure: CREATION, COLOSTOMY;  Surgeon: Nubia Tinoco MD;  Location: [facility] OR;  Service: General;  Laterality: N/A;    DEBRIDEMENT OF WOUND OF ABDOMEN  07/28/2022    Procedure: DEBRIDEMENT, WOUND, ABDOMEN;  Surgeon: Nubia Tinoco MD;  Location: [facility] OR;  Service: General;;    ESOPHAGOGASTRODUODENOSCOPY N/A 05/14/2021    Procedure: EGD (ESOPHAGOGASTRODUODENOSCOPY);  Surgeon: Destin Cardona MD;  Location: [facility];  Service: Endoscopy;  Laterality: N/A; unremarkable    ESOPHAGOGASTRODUODENOSCOPY N/A 08/09/2021     Procedure: ESOPHAGOGASTRODUODENOSCOPY (EGD);  Surgeon: Destin Cardona MD;  Location: [facility] ENDO;  Service: Endoscopy;  Laterality: N/A; Moderately severe radiation esophagitis with no bleeding    EXCISIONAL BIOPSY N/A 02/09/2023    Procedure: EXCISIONAL BIOPSY;  Surgeon: Nubia Tinoco MD;  Location: [facility] OR;  Service: General;  Laterality: N/A;    EYE SURGERY      cataract OU    HERNIA REPAIR      INSERTION OF TUNNELED CENTRAL VENOUS CATHETER (CVC) WITH SUBCUTANEOUS PORT N/A 06/07/2021    Procedure: [id number];  Surgeon: Joe Salinas MD;  Location: [facility] OR;  Service: General;  Laterality: N/A;    INTRALUMINAL GASTROINTESTINAL TRACT IMAGING VIA CAPSULE N/A 12/29/2021    Procedure: IMAGING PROCEDURE, GI TRACT, INTRALUMINAL, VIA CAPSULE  (called for instruction 12/20-may have trouble swallowing);  Surgeon: Floyd Dixon MD;  Location: [facility] ENDO;  Service: Endoscopy;  Laterality: N/A; Diffuse red spots of unclear significance and erosions found in the small bowel (entire small bowel), suspicious for mild radiation enteritis    NEUROMA SURGERY Left     acoustic    TONSILLECTOMY      WOUND EXPLORATION N/A 08/13/2022    Procedure: EXPLORATION, WOUND;  Surgeon: Arnel Olsen MD;  Location: [facility] OR;  Service: General;  Laterality: N/A;    WOUND EXPLORATION Left 02/09/2023    Procedure: EXPLORATION, WOUND - of Ostomy Granuloma - Wound - Abdomen;  Surgeon: Nubia Tinoco MD;  Location: [facility] OR;  Service: General;  Laterality: Left;       Current Outpatient Medications   Medication Sig    ascorbic acid, vitamin C, (VITAMIN C) 500 MG tablet Take 500 mg by mouth once daily. Indications: inadequate vitamin C    blood sugar diagnostic Strp 1 strip by Misc.(Non-Drug; Combo Route) route Daily.    blood-glucose meter kit Use as instructed    blood-glucose sensor (DEXCOM G7 SENSOR MISC) Inject 1 each into the skin every 10 days. Continues glucose monitoring    budesonide (PULMICORT) 0.5 mg/2 mL  nebulizer solution Take 2 mLs (0.5 mg total) by nebulization every 12 (twelve) hours. Controller    cetirizine (ZYRTEC) 10 MG tablet Take 10 mg by mouth once daily.    cholecalciferol, vitamin D3, (VITAMIN D3) 125 mcg (5,000 unit) Tab Take 5,000 Units by mouth once daily.    clotrimazole (LOTRIMIN) 1 % cream Apply topically 2 (two) times daily.    docusate sodium (COLACE) 100 MG capsule Take 1 capsule (100 mg total) by mouth daily as needed for Constipation.    ELIQUIS 5 mg Tab TAKE 1 TABLET(5 MG) BY MOUTH TWICE DAILY    famotidine (PEPCID) 40 MG tablet Take 1 tablet (40 mg total) by mouth 2 (two) times daily.    gabapentin (NEURONTIN) 100 MG capsule Take 200 mg by mouth 2 (two) times daily.    HYDROcodone-acetaminophen (NORCO)  mg per tablet Take 1 tablet by mouth every 12 (twelve) hours as needed for Pain. (Patient not taking: Reported on 4/19/2024)    insulin (LANTUS SOLOSTAR U-100 INSULIN) glargine 100 units/mL SubQ pen Inject 26 Units into the skin every evening.    ipratropium (ATROVENT) 0.02 % nebulizer solution Take 2.5 mLs (500 mcg total) by nebulization every 6 (six) hours as needed (wheeze). Rescue (Patient not taking: Reported on 4/24/2024)    ketoconazole (NIZORAL) 2 % shampoo Apply topically twice a week. Lather and let sit for 5 minutes before rinsing.    Lactobacillus acidophilus (PROBIOTIC ORAL) Take 1 capsule by mouth once daily.    lancets Misc 1 each by Misc.(Non-Drug; Combo Route) route once daily.    levothyroxine (SYNTHROID) 75 MCG tablet 1 tablet 6 days a week and 1/2 tablet on day 7    LIDOcaine-prilocaine (EMLA) cream Apply topically as needed (as needed for port access).    losartan (COZAAR) 25 MG tablet Take 1 tablet (25 mg total) by mouth 2 (two) times a day.    metoprolol succinate (TOPROL-XL) 25 MG 24 hr tablet Take 0.5 tablets (12.5 mg total) by mouth 2 (two) times daily.    mv-mn/folic ac/calcium/vit K1 (WOMEN'S 50 PLUS MULTIVITAMIN ORAL) Take 1 tablet by mouth once daily.     "NOVOLOG FLEXPEN U-100 INSULIN 100 unit/mL (3 mL) InPn pen 7 Units with meals with correction scale. Use on premeal readings; 150-200=+2, 201-250=+4; 251-300=+6; 301-350=+8, over 350, + 10 units 50 units a day max    nystatin (MYCOSTATIN) cream Apply topically 3 (three) times daily as needed (itch). (Patient not taking: Reported on 4/24/2024)    pantoprazole (PROTONIX) 40 MG tablet Take 1 tablet (40 mg total) by mouth once daily.    pen needle, diabetic 32 gauge x 1/4" Ndle To use with insulin pens up to 4x day (Patient taking differently: Inject 1 each into the skin 4 (four) times daily as needed (to inject insulin (s)).)    sertraline (ZOLOFT) 25 MG tablet TAKE 1 TABLET(25 MG) BY MOUTH EVERY DAY    simvastatin (ZOCOR) 40 MG tablet TAKE 1 TABLET(40 MG) BY MOUTH EVERY EVENING    sodium chloride 3% 3 % nebulizer solution Take 4 mLs by nebulization 2 (two) times daily.    triamcinolone acetonide 0.1% (KENALOG) 0.1 % cream Apply topically 2 (two) times daily.    vitamin A 19485 UNIT capsule Take 10,000 Units by mouth 2 (two) times a day.     No current facility-administered medications for this visit.     Facility-Administered Medications Ordered in Other Visits   Medication    EUFLEXXA 10 mg/mL(mw 2.4 -3.6 million) injection Syrg 20 mg       Review of patient's allergies indicates:   Allergen Reactions    Contrast media Anaphylaxis    Iodinated contrast media Anaphylaxis and Rash    Albuterol Other (See Comments)     Used during PFTs and put pt in afib    Dye     Pcn [penicillins]      Pt states did well on cephalexin.  No adverse reaction or side effect.     Doxycycline Palpitations     Tolerated IV Doxy 8/2022       Family History   Problem Relation Name Age of Onset    Heart disease Mother          dissection    GI problems Father          perf colon    Cancer Paternal Uncle          colon cancer    Diabetes Neg Hx      Kidney disease Neg Hx      Stroke Neg Hx      Colon cancer Neg Hx      Crohn's disease Neg Hx   "    Ulcerative colitis Neg Hx      Stomach cancer Neg Hx      Esophageal cancer Neg Hx         Social History     Socioeconomic History    Marital status:    Tobacco Use    Smoking status: Former     Current packs/day: 0.00     Average packs/day: 1 pack/day for 40.0 years (40.0 ttl pk-yrs)     Types: Cigarettes     Start date: 3/6/1976     Quit date: 3/6/2016     Years since quittin.1    Smokeless tobacco: Never   Substance and Sexual Activity    Alcohol use: Not Currently    Drug use: No   Social History Narrative    Works in insurance collection.     Social Determinants of Health     Financial Resource Strain: Low Risk  (2024)    Overall Financial Resource Strain (CARDIA)     Difficulty of Paying Living Expenses: Not hard at all   Food Insecurity: No Food Insecurity (2024)    Hunger Vital Sign     Worried About Running Out of Food in the Last Year: Never true     Ran Out of Food in the Last Year: Never true   Transportation Needs: No Transportation Needs (2024)    OASIS : Transportation     Lack of Transportation (Medical): No     Lack of Transportation (Non-Medical): No     Patient Unable or Declines to Respond: No   Physical Activity: Unknown (10/13/2021)    Exercise Vital Sign     Days of Exercise per Week: 0 days   Stress: No Stress Concern Present (10/13/2021)    [employer] - Occupational Stress Questionnaire     Feeling of Stress : Not at all   Housing Stability: Low Risk  (2024)    Housing Stability Vital Sign     Unable to Pay for Housing in the Last Year: No     Number of Places Lived in the Last Year: 1     Unstable Housing in the Last Year: No       Chief Complaint:   Chief Complaint   Patient presents with    Right Knee - Pain, Swelling       History of present illness:  74-year-old female seen for about 7-10 days of right knee pain and swelling.  No injury or trauma.  Pain mostly with walking.  Pain is over the anterior medial knee.   Pain is 6/10.        Review of Systems:    Constitution: Negative for chills, fever, and sweats.  Negative for unexplained weight loss.    HENT:  Negative for headaches and blurry vision.    Cardiovascular:Negative for chest pain or irregular heart beat. Negative for hypertension.    Respiratory:  Negative for cough and shortness of breath.    Gastrointestinal: Negative for abdominal pain, heartburn, melena, nausea, and vomitting.    Genitourinary:  Negative bladder incontinence and dysuria.    Musculoskeletal:  See HPI    Neurological: Negative for numbness.    Psychiatric/Behavioral: Negative for depression.  The patient is not nervous/anxious.      Endocrine: Negative for polyuria    Hematologic/Lymphatic: Negative for bleeding problem.  Does not bruise/bleed easily.    Skin: Negative for poor would healing and rash      Physical Examination:    Vital Signs:  There were no vitals filed for this visit.    Body mass index is 28.32 kg/m².    This a well-developed, well nourished patient in no acute distress.  They are alert and oriented and cooperative to examination.  Pt. walks without an antalgic gait.      Examination of the right knee shows no rashes or erythema. There are no masses ecchymosis And a small effusion. Patient has full range of motion from 0-130°. Patient is nontender to palpation over lateral joint line and moderately tender to palpation over the medial joint line. Patient has a - Lachman exam, - anterior drawer exam, and - posterior drawer exam. - Apley exam. Knee is stable to varus and valgus stress. 5 out of 5 motor strength. Palpable distal pulses. Intact light touch sensation. Negative Patellofemoral crepitus      X-rays:  X-rays of the right knee are ordered and reviewed which show some moderate tricompartmental degenerative change of both knees.         Assessment:  Possible right degenerative medial meniscal tear with effusion    Plan:  I reviewed the findings with her and her   today.  I think it is possible that she might have a little degenerative meniscal tear versus just arthritis.  I recommended aspiration and injection of her right knee.  Talked about possibly needing an MRI if it continues to bother her significantly or starts causing mechanical symptoms.  We will send the fluid off because it was a little cloudier than typical.            All previous pertinent notes including ER visits, physical therapy visits, other orthopedic visits as well as other care for the same musculoskeletal problem were reviewed.  All pertinent lab values and previous imaging was reviewed pertinent to the current visit.    This note was created using [employer] voice recognition software that occasionally misinterpreted phrases or words.    Consult note is delivered via Epic messaging service.

## 2024-05-15 NOTE — PROCEDURES
Large Joint Aspiration/Injection: R knee    Date/Time: 5/15/2024 10:30 AM    Performed by: Jose Rafael Barney MD  Authorized by: Jose Rafael Barney MD    Consent Done?:  Yes (Verbal)  Indications:  Pain  Site marked: the procedure site was marked    Timeout: prior to procedure the correct patient, procedure, and site was verified    Local anesthetic: Ropivicaine.  Anesthetic total (ml):  3      Details:  Needle Size:  18 G  Approach:  Lateral  Location:  Knee  Site:  R knee  Medications:  40 mg triamcinolone acetonide 40 mg/mL  Aspirate amount (mL):  40  Aspirate:  Serous, yellow and cloudy  Lab: fluid sent for laboratory analysis    Patient tolerance:  Patient tolerated the procedure well with no immediate complications

## 2024-05-16 LAB — PATH INTERP FLD-IMP: NORMAL

## 2024-05-18 LAB — BACTERIA SPEC AEROBE CULT: NO GROWTH

## 2024-05-19 ENCOUNTER — PATIENT MESSAGE (OUTPATIENT)
Dept: FAMILY MEDICINE | Facility: CLINIC | Age: 74
End: 2024-05-19
Payer: MEDICARE

## 2024-05-22 LAB — BACTERIA SPEC ANAEROBE CULT: NORMAL

## 2024-05-29 ENCOUNTER — PATIENT MESSAGE (OUTPATIENT)
Dept: ENDOCRINOLOGY | Facility: CLINIC | Age: 74
End: 2024-05-29
Payer: MEDICARE

## 2024-05-29 ENCOUNTER — PATIENT MESSAGE (OUTPATIENT)
Dept: FAMILY MEDICINE | Facility: CLINIC | Age: 74
End: 2024-05-29
Payer: MEDICARE

## 2024-05-29 DIAGNOSIS — E10.21 TYPE 1 DIABETES MELLITUS WITH NEPHROPATHY: Primary | ICD-10-CM

## 2024-06-05 ENCOUNTER — PATIENT MESSAGE (OUTPATIENT)
Dept: ENDOCRINOLOGY | Facility: CLINIC | Age: 74
End: 2024-06-05
Payer: MEDICARE

## 2024-06-05 ENCOUNTER — HOSPITAL ENCOUNTER (OUTPATIENT)
Dept: RADIOLOGY | Facility: HOSPITAL | Age: 74
Discharge: HOME | End: 2024-06-05
Attending: RADIOLOGY
Payer: MEDICARE

## 2024-06-05 DIAGNOSIS — C34.90 SMALL CELL LUNG CANCER: ICD-10-CM

## 2024-06-05 DIAGNOSIS — C79.31 MALIGNANT NEOPLASM METASTATIC TO BRAIN: ICD-10-CM

## 2024-06-05 PROCEDURE — 76390 MR SPECTROSCOPY: CPT | Mod: TC,PO

## 2024-06-05 PROCEDURE — 76390 MR SPECTROSCOPY: CPT | Mod: 26,,, | Performed by: RADIOLOGY

## 2024-06-05 PROCEDURE — A9585 GADOBUTROL INJECTION: HCPCS | Mod: PO | Performed by: RADIOLOGY

## 2024-06-05 PROCEDURE — 25500020 PHARM REV CODE 255: Mod: PO | Performed by: RADIOLOGY

## 2024-06-05 RX ORDER — GADOBUTROL 604.72 MG/ML
7 INJECTION INTRAVENOUS
Status: COMPLETED | OUTPATIENT
Start: 2024-06-05 | End: 2024-06-05

## 2024-06-05 RX ADMIN — GADOBUTROL 7 ML: 604.72 INJECTION INTRAVENOUS at 10:06

## 2024-06-11 ENCOUNTER — OFFICE VISIT (OUTPATIENT)
Dept: RADIATION ONCOLOGY | Facility: CLINIC | Age: 74
End: 2024-06-11
Payer: MEDICARE

## 2024-06-11 VITALS
DIASTOLIC BLOOD PRESSURE: 88 MMHG | TEMPERATURE: 98 F | BODY MASS INDEX: 28.08 KG/M2 | SYSTOLIC BLOOD PRESSURE: 123 MMHG | WEIGHT: 173.94 LBS | OXYGEN SATURATION: 97 % | HEART RATE: 92 BPM | RESPIRATION RATE: 16 BRPM

## 2024-06-11 DIAGNOSIS — C34.90 SMALL CELL LUNG CANCER: Primary | ICD-10-CM

## 2024-06-11 DIAGNOSIS — C79.31 MALIGNANT NEOPLASM METASTATIC TO BRAIN: ICD-10-CM

## 2024-06-11 PROCEDURE — 3074F SYST BP LT 130 MM HG: CPT | Mod: CPTII,S$GLB,, | Performed by: RADIOLOGY

## 2024-06-11 PROCEDURE — 1126F AMNT PAIN NOTED NONE PRSNT: CPT | Mod: CPTII,S$GLB,, | Performed by: RADIOLOGY

## 2024-06-11 PROCEDURE — 99999 PR PBB SHADOW E&M-EST. PATIENT-LVL V: CPT | Mod: PBBFAC,,, | Performed by: RADIOLOGY

## 2024-06-11 PROCEDURE — 3288F FALL RISK ASSESSMENT DOCD: CPT | Mod: CPTII,S$GLB,, | Performed by: RADIOLOGY

## 2024-06-11 PROCEDURE — 3079F DIAST BP 80-89 MM HG: CPT | Mod: CPTII,S$GLB,, | Performed by: RADIOLOGY

## 2024-06-11 PROCEDURE — 1101F PT FALLS ASSESS-DOCD LE1/YR: CPT | Mod: CPTII,S$GLB,, | Performed by: RADIOLOGY

## 2024-06-11 PROCEDURE — 3044F HG A1C LEVEL LT 7.0%: CPT | Mod: CPTII,S$GLB,, | Performed by: RADIOLOGY

## 2024-06-11 PROCEDURE — 4010F ACE/ARB THERAPY RXD/TAKEN: CPT | Mod: CPTII,S$GLB,, | Performed by: RADIOLOGY

## 2024-06-11 PROCEDURE — 1159F MED LIST DOCD IN RCRD: CPT | Mod: CPTII,S$GLB,, | Performed by: RADIOLOGY

## 2024-06-11 PROCEDURE — 99213 OFFICE O/P EST LOW 20 MIN: CPT | Mod: S$GLB,,, | Performed by: RADIOLOGY

## 2024-06-11 RX ORDER — SERTRALINE HYDROCHLORIDE 25 MG/1
25 TABLET, FILM COATED ORAL
Qty: 90 TABLET | Refills: 3 | Status: SHIPPED | OUTPATIENT
Start: 2024-06-11

## 2024-06-11 NOTE — PROGRESS NOTES
[facility] Department of Radiation Oncology  Follow Up Visit Note    Diagnosis:  Allyson Henriquez is a 74 y.o. 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, status post SRS       Oncologic History:  Oncology History   Small cell lung cancer   5/20/2021 Initial Diagnosis    Small cell carcinoma of lung, right     6/8/2021 - 7/27/2021 Chemotherapy    Treatment Summary   Plan Name: OP CARBOPLATIN (AUC) + ETOPOSIDE Q3W  Treatment Goal: Palliative  Status: Inactive  Start Date: 6/8/2021  End Date: 7/27/2021  Provider: Madhav Cardona MD  Chemotherapy: CARBOplatin (PARAPLATIN) 395 mg in sodium chloride 0.9% 250 mL chemo infusion, 395 mg (100 % of original dose 395 mg), Intravenous, Clinic/HOD 1 time, 3 of 6 cycles  Dose modification:   (original dose 395 mg, Cycle 1, Reason: MD Discretion)  Administration: 395 mg (6/8/2021), 395 mg (6/29/2021), 395 mg (7/20/2021)  etoposide (VEPESID) 100 mg/m2 = 200 mg in sodium chloride 0.9% 500 mL chemo infusion, 100 mg/m2 = 200 mg, Intravenous, Clinic/HOD 1 time, 3 of 6 cycles  Administration: 200 mg (6/8/2021), 200 mg (6/9/2021), 200 mg (6/29/2021), 200 mg (6/10/2021), 200 mg (6/30/2021), 200 mg (7/1/2021), 200 mg (7/20/2021), 200 mg (7/21/2021), 200 mg (7/22/2021)     1/18/2022 - 7/6/2022 Chemotherapy    Treatment Summary   Plan Name: OP PEMBROLIZUMAB 200MG Q3W  Treatment Goal: Palliative  Status: Inactive  Start Date: 1/18/2022  End Date: 7/6/2022  Provider: Madhav Cardona MD  Chemotherapy: [No matching medication found in this treatment plan]     6/28/2022 - 6/28/2022 Radiation Therapy    Treating physician: Aleena Nielson  Total Dose: 22 Gy  Fractions: 1    Single fraction radiosurgery to right cerebellar metastasis.     Iron deficiency anemia   Brain metastasis   6/28/2022 Initial Diagnosis    Brain metastasis     6/28/2022 - 6/28/2022 Radiation Therapy    Treating physician: Aleena Nielson  Total  Dose: 22 Gy  Fractions: 1    Single fraction radiosurgery to right cerebellar metastasis.          Interval History  The patient presents today for a regularly scheduled follow up visit.  She was last seen in our follow up on 3/5/24.  Since that time she has been feeling very well. Ongoing but significantly improving issues with abdominal wounds (had 3- 2 have healed, one improving); no longer using wound vac remains. Off hydrocortisone.     6/5/24 MRI Brain w/Spect: continued slight increase lesion in R cerebellum and marginating vasogenic edema with slight increased central nodular component; radiation necrosis favored  Stable L vestibular schwannoma      HPI: This is a 72 year old female with history of extensive-stage small cell lung cancer metastatic to bone and in thorax who presents with her  for discussion of radiation therapy for new single brain metastasis.  She is followed by Dr. Cardona on Pembro with notable systemic disease response, who reported 2 episodes of LEFT hand spasm and 2 episodes of LEFT foot spasm occurring independently and resolving spontaneously 2 weeks ago.  No other neurologic symptoms.  Dr. Cardona ordered MRI.     MRI Brain 6/17/22 new 9mm RIGHT cerebellar metastasis.  No other intracranial metastases.     Patient reports low grade headache since last Weds which she attributes to stress. She is active at home and uses wheelchair intermittently when fatigued.       She had a prolonged hospital stay (10 weeks) 2/2 esophagitis and C. Dif last year after completing 45Gy thoracic radiation therapy (Turissi regimen), but was unable to complete her course of chemotherapy.  She has recovered significantly from deconditioning related to prolonged hospital stay last year, but is still more easily fatigued than normal..     6/28/22 Single fraction SRS 22Gy to right cerebellar metastasis    she had repeated hospitalizations for diverticular abscess requiring sigmoid colectomy/ostomy and  debridement 7/28/22 and multiple surgeries for washout and several IV and oral antibiotics (she was hospitalized last year with severe esophagitis after chemotherapy-radiation therapy to thorax, complicated by C. Diff colitis).  She was discharged 8/19/22 to [facility], but readmitted 9/15/22 with wound dehiscence.   9/16/22 MRI brain: decrease in size of treated 9mm RIGHT cerebellar mass (now 5 x 3mm)     10/14/22 developed transient Left hand symptoms similar to those at time of presentation    10/23/22 PET with stable hypermetabolic right perihilar consolidation consistent with post-treatment change.     10/26/22 MRI brain stable vs improved R cerebellar lesion    11/2022, she transferred her care to Dr. Gonzalez who has restarted Keytruda     1/23/23 MRI brain: stable appearance of treated right cerebellar metastasis, now punctate in size; no new lesions; stable left IAC vestibular schwannoma    Some ongoing issues with wound healing related to her abdominal surgeries.  Is consulting with a new surgeon for second opinion. Also undergoing endocrine work up for ongoing blood sugar instability (on hydrocortisone for adrenal insufficiency)     4/27/23 MRI brain: right cerebellar small enhancing lesion with interval small focus of increased enhancement medial/inferior, and minimal new edema compared to prior study.  Stable left vestibular schwannoma.     7/2023 ostomy revision, hernia repair, followed by prolonged hospitalization    8/21/23 MRI brain: right cerebellar small enhancing lesion with serial increased size, interval small focus of increased enhancement medial/inferior, concerning for recurrence vs rediation necrossis/treatment effect.  Stable left vestibular schwannoma.     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  Stable left acoustic schwannoma    Admitted 2024 for UTI, hyponatremia, pleural effusion. Has completed antibiotics.    3/1/24 MR Spect: stable enhancing lesion in right cerebellum- spect findings most consistent with treatment change/radiation necrosis    Possibility of pregnancy: No  History of prior irradiation: Yes - LEFT gamma knife, acoustic neuroma at [facility], 10/2003; Chest radiation therapy 45Gy BID (Turissi regimen),  with MBP   History of prior systemic anti-cancer therapy: Yes - most recently Pembro (last 6/15/22)  History of collagen vascular disease: No  Implanted electronic device (pacer/defib/nerve stimulator): No    Review of Systems   Review of Systems   Constitutional:  Negative for chills and fever.   Eyes:  Negative for blurred vision and double vision.   Respiratory:  Negative for cough.    Gastrointestinal:  Negative for abdominal pain (resolved).   Musculoskeletal:  Positive for back pain (tenderness over one area, inferior to left scapula).   Neurological:  Negative for dizziness, tremors (occasional bilat hand numbness on waking), sensory change, speech change, seizures, weakness and headaches.       Social History:  Social History     Tobacco Use    Smoking status: Former     Current packs/day: 0.00     Average packs/day: 1 pack/day for 40.0 years (40.0 ttl pk-yrs)     Types: Cigarettes     Start date: 3/6/1976     Quit date: 3/6/2016     Years since quittin.2    Smokeless tobacco: Never   Substance Use Topics    Alcohol use: Not Currently    Drug use: No       Family History:  Cancer-related family history includes Cancer in her paternal uncle. There is no history of Esophageal cancer.    Exam:  There were no vitals filed for this visit.          Physical Exam  Vitals reviewed. Exam conducted with a chaperone present.   Constitutional:       General: She is not in acute distress.     Appearance: Normal appearance. She is obese. She is not ill-appearing or  toxic-appearing.   HENT:      Head: Normocephalic and atraumatic.   Eyes:      Extraocular Movements: Extraocular movements intact.      Pupils: Pupils are equal, round, and reactive to light.   Abdominal:       Musculoskeletal:         General: No swelling.      Cervical back: No rigidity.   Skin:     General: Skin is warm.   Neurological:      Mental Status: She is alert.      Cranial Nerves: Cranial nerves 2-12 are intact.      Motor: Pronator drift (minimal right) present.      Coordination: Coordination normal. Finger-Nose-Finger Test abnormal (very slight). Rapid alternating movements normal.      Gait: Gait abnormal (ambulates with walker).      Comments: Minimal targeting difficulty R hand          Imaging:  MRI brain 6/5/24 reviewed as detailed above      Assessment:  Continued increased size of R cerebellar enhancement w/indistinct margins, most suspicious for radiation necrosis.    ECOG: (3) Limited self care; confined to bed or chair >50%    Plan:  MRI brain in 3 months (will order with perfusion)  Pt will contact our office if any new neuro symptoms  Immunotherapy/systemic therapy under the care of Dr. Gonzalez, tolerating well  Follow up re: adrenal insufficiency, followed Endocrinologist  Ongoing care for abdominal healing-  follows w/ Colorectal Dr[facility]  Will follow up with Dr. Gonzalez re: interval PET  She was given our contact information, and she was told that she could call our clinic at anytime if she has any questions or concerns.  Follow up with other providers as directed        23 minutes spent in chart/case review, face-to-face interaction, discussion with other providers, and treatment planning/coordination of care.

## 2024-06-11 NOTE — TELEPHONE ENCOUNTER
No care due was identified.  [facility] Embedded Care Due Messages. Reference number: [id number].   6/11/2024 9:32:53 AM CDT

## 2024-06-11 NOTE — TELEPHONE ENCOUNTER
Refill Decision Note   Allyson Henriquez  is requesting a refill authorization.  Brief Assessment and Rationale for Refill:  Approve     Medication Therapy Plan:         Comments:     Note composed:10:15 AM 06/11/2024

## 2024-06-19 ENCOUNTER — PATIENT MESSAGE (OUTPATIENT)
Dept: FAMILY MEDICINE | Facility: CLINIC | Age: 74
End: 2024-06-19
Payer: MEDICARE

## 2024-06-25 ENCOUNTER — PATIENT MESSAGE (OUTPATIENT)
Dept: CARDIOLOGY | Facility: CLINIC | Age: 74
End: 2024-06-25
Payer: MEDICARE

## 2024-07-11 ENCOUNTER — OFFICE VISIT (OUTPATIENT)
Dept: CARDIOLOGY | Facility: CLINIC | Age: 74
End: 2024-07-11
Payer: MEDICARE

## 2024-07-11 VITALS
SYSTOLIC BLOOD PRESSURE: 139 MMHG | HEIGHT: 66 IN | DIASTOLIC BLOOD PRESSURE: 77 MMHG | BODY MASS INDEX: 27.92 KG/M2 | HEART RATE: 101 BPM | WEIGHT: 173.75 LBS

## 2024-07-11 DIAGNOSIS — Z79.01 CURRENT USE OF LONG TERM ANTICOAGULATION: Chronic | ICD-10-CM

## 2024-07-11 DIAGNOSIS — N18.31 STAGE 3A CHRONIC KIDNEY DISEASE: Chronic | ICD-10-CM

## 2024-07-11 DIAGNOSIS — I48.0 PAF (PAROXYSMAL ATRIAL FIBRILLATION): Primary | Chronic | ICD-10-CM

## 2024-07-11 DIAGNOSIS — L81.9 DISCOLORATION OF SKIN OF MULTIPLE SITES OF LOWER EXTREMITY: Chronic | ICD-10-CM

## 2024-07-11 DIAGNOSIS — I13.0 BENIGN HYPERTENSIVE HEART AND KIDNEY DISEASE WITH CHF, NYHA CLASS 1 AND CKD STAGE 3: Chronic | ICD-10-CM

## 2024-07-11 DIAGNOSIS — I34.0 NONRHEUMATIC MITRAL VALVE REGURGITATION: Chronic | ICD-10-CM

## 2024-07-11 DIAGNOSIS — R06.02 SOB (SHORTNESS OF BREATH): Chronic | ICD-10-CM

## 2024-07-11 DIAGNOSIS — E78.2 MIXED HYPERLIPIDEMIA: Chronic | ICD-10-CM

## 2024-07-11 DIAGNOSIS — N18.30 BENIGN HYPERTENSIVE HEART AND KIDNEY DISEASE WITH CHF, NYHA CLASS 1 AND CKD STAGE 3: Chronic | ICD-10-CM

## 2024-07-11 PROCEDURE — 1101F PT FALLS ASSESS-DOCD LE1/YR: CPT | Mod: CPTII,S$GLB,, | Performed by: INTERNAL MEDICINE

## 2024-07-11 PROCEDURE — 4010F ACE/ARB THERAPY RXD/TAKEN: CPT | Mod: CPTII,S$GLB,, | Performed by: INTERNAL MEDICINE

## 2024-07-11 PROCEDURE — 3288F FALL RISK ASSESSMENT DOCD: CPT | Mod: CPTII,S$GLB,, | Performed by: INTERNAL MEDICINE

## 2024-07-11 PROCEDURE — 3044F HG A1C LEVEL LT 7.0%: CPT | Mod: CPTII,S$GLB,, | Performed by: INTERNAL MEDICINE

## 2024-07-11 PROCEDURE — 3078F DIAST BP <80 MM HG: CPT | Mod: CPTII,S$GLB,, | Performed by: INTERNAL MEDICINE

## 2024-07-11 PROCEDURE — 99214 OFFICE O/P EST MOD 30 MIN: CPT | Mod: S$GLB,,, | Performed by: INTERNAL MEDICINE

## 2024-07-11 PROCEDURE — 1159F MED LIST DOCD IN RCRD: CPT | Mod: CPTII,S$GLB,, | Performed by: INTERNAL MEDICINE

## 2024-07-11 PROCEDURE — 99999 PR PBB SHADOW E&M-EST. PATIENT-LVL IV: CPT | Mod: PBBFAC,,, | Performed by: INTERNAL MEDICINE

## 2024-07-11 PROCEDURE — 3075F SYST BP GE 130 - 139MM HG: CPT | Mod: CPTII,S$GLB,, | Performed by: INTERNAL MEDICINE

## 2024-07-11 RX ORDER — AZELASTINE 1 MG/ML
SPRAY, METERED NASAL
COMMUNITY
Start: 2024-06-28

## 2024-07-11 RX ORDER — OMEPRAZOLE 40 MG/1
CAPSULE, DELAYED RELEASE ORAL
COMMUNITY
Start: 2024-06-28

## 2024-07-11 NOTE — PROGRESS NOTES
Subjective:    Patient ID:  Allyson Henriquez is a 74 y.o. 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 difficile colitis 08/2021    Cancer     lung    CKD (chronic kidney disease), stage III     Colonic diverticular abscess 07/12/2022    COPD (chronic obstructive pulmonary disease)     Encounter for blood transfusion     Hepatic steatosis     History of numbness     transient numbness left hand and foot    History of tobacco abuse     Hyperlipidemia     Hypertension     Lung nodules     and adenopathy    Multinodular goiter (nontoxic) 05/08/2017    Obesity     TRISHA (obstructive sleep apnea)     on bipap at bedtime    Proteinuria      Past Surgical History:   Procedure Laterality Date    ADENOIDECTOMY      APPENDECTOMY      CATARACT EXTRACTION      COLONOSCOPY N/A 11/14/2016    Procedure: COLONOSCOPY;  Surgeon: Destin Cardona MD;  Location: [facility] ENDO;  Service: Endoscopy;  Laterality: N/A;    COLONOSCOPY N/A 05/14/2021    Procedure: COLONOSCOPY;  Surgeon: Destin Cardona MD;  Location: [facility] ENDO;  Service: Endoscopy;  Laterality: N/A; hemorrhoids, diverticulosis, Old blood left colon. No active bleeding; Repeat colonoscopy is not recommended for screening purposes.    COLOSTOMY N/A 07/28/2022    Procedure: CREATION, COLOSTOMY;  Surgeon: Nubia Tinoco MD;  Location: [facility] OR;  Service: General;  Laterality: N/A;    DEBRIDEMENT OF WOUND OF ABDOMEN  07/28/2022     Procedure: DEBRIDEMENT, WOUND, ABDOMEN;  Surgeon: Nubia Tinoco MD;  Location: [facility] OR;  Service: General;;    ESOPHAGOGASTRODUODENOSCOPY N/A 05/14/2021    Procedure: EGD (ESOPHAGOGASTRODUODENOSCOPY);  Surgeon: Destin Cardona MD;  Location: [facility];  Service: Endoscopy;  Laterality: N/A; unremarkable    ESOPHAGOGASTRODUODENOSCOPY N/A 08/09/2021    Procedure: ESOPHAGOGASTRODUODENOSCOPY (EGD);  Surgeon: Destin Cardona MD;  Location: [facility] ENDO;  Service: Endoscopy;  Laterality: N/A; Moderately severe radiation esophagitis with no bleeding    EXCISIONAL BIOPSY N/A 02/09/2023    Procedure: EXCISIONAL BIOPSY;  Surgeon: Nubia Tinoco MD;  Location: [facility] OR;  Service: General;  Laterality: N/A;    EYE SURGERY      cataract OU    HERNIA REPAIR      INSERTION OF TUNNELED CENTRAL VENOUS CATHETER (CVC) WITH SUBCUTANEOUS PORT N/A 06/07/2021    Procedure: TSDUCVMIE-CENT-S-CATH;  Surgeon: Joe Salinas MD;  Location: [facility] OR;  Service: General;  Laterality: N/A;    INTRALUMINAL GASTROINTESTINAL TRACT IMAGING VIA CAPSULE N/A 12/29/2021    Procedure: IMAGING PROCEDURE, GI TRACT, INTRALUMINAL, VIA CAPSULE  (called for instruction 12/20-may have trouble swallowing);  Surgeon: Floyd Dixon MD;  Location: [facility];  Service: Endoscopy;  Laterality: N/A; Diffuse red spots of unclear significance and erosions found in the small bowel (entire small bowel), suspicious for mild radiation enteritis    NEUROMA SURGERY Left     acoustic    TONSILLECTOMY      WOUND EXPLORATION N/A 08/13/2022    Procedure: EXPLORATION, WOUND;  Surgeon: Arnel Olsen MD;  Location: [facility] OR;  Service: General;  Laterality: N/A;    WOUND EXPLORATION Left 02/09/2023    Procedure: EXPLORATION, WOUND - of Ostomy Granuloma - Wound - Abdomen;  Surgeon: Nubia Tinoco MD;  Location: [facility] OR;  Service: General;  Laterality: Left;     Family History   Problem Relation Name Age of Onset    Heart disease Mother          dissection    GI  problems Father          perf colon    Cancer Paternal Uncle          colon cancer    Diabetes Neg Hx      Kidney disease Neg Hx      Stroke Neg Hx      Colon cancer Neg Hx      Crohn's disease Neg Hx      Ulcerative colitis Neg Hx      Stomach cancer Neg Hx      Esophageal cancer Neg Hx       Social History     Socioeconomic History    Marital status:    Tobacco Use    Smoking status: Former     Current packs/day: 0.00     Average packs/day: 1 pack/day for 40.0 years (40.0 ttl pk-yrs)     Types: Cigarettes     Start date: 3/6/1976     Quit date: 3/6/2016     Years since quittin.3    Smokeless tobacco: Never   Substance and Sexual Activity    Alcohol use: Not Currently    Drug use: No   Social History Narrative    Works in insurance collection.     Social Determinants of Health     Financial Resource Strain: Low Risk  (2024)    Overall Financial Resource Strain (CARDIA)     Difficulty of Paying Living Expenses: Not hard at all   Food Insecurity: No Food Insecurity (2024)    Hunger Vital Sign     Worried About Running Out of Food in the Last Year: Never true     Ran Out of Food in the Last Year: Never true   Transportation Needs: No Transportation Needs (2024)    OASIS : Transportation     Lack of Transportation (Medical): No     Lack of Transportation (Non-Medical): No     Patient Unable or Declines to Respond: No   Physical Activity: Unknown (10/13/2021)    Exercise Vital Sign     Days of Exercise per Week: 0 days   Stress: No Stress Concern Present (10/13/2021)    [employer] - Occupational Stress Questionnaire     Feeling of Stress : Not at all   Housing Stability: Low Risk  (2024)    Housing Stability Vital Sign     Unable to Pay for Housing in the Last Year: No     Number of Places Lived in the Last Year: 1     Unstable Housing in the Last Year: No       Review of patient's allergies indicates:   Allergen Reactions    Contrast media Anaphylaxis     Iodinated contrast media Anaphylaxis and Rash    Albuterol Other (See Comments)     Used during PFTs and put pt in afib    Dye     Pcn [penicillins]      Pt states did well on cephalexin.  No adverse reaction or side effect.     Doxycycline Palpitations     Tolerated IV Doxy 8/2022       Current Outpatient Medications:     ascorbic acid, vitamin C, (VITAMIN C) 500 MG tablet, Take 500 mg by mouth once daily. Indications: inadequate vitamin C, Disp: , Rfl:     azelastine (ASTELIN) 137 mcg (0.1 %) nasal spray, USE 2 SPRAYS IN EACH NOSTRIL EVERY NIGHT AT BEDTIME, Disp: , Rfl:     blood sugar diagnostic Strp, 1 strip by Misc.(Non-Drug; Combo Route) route Daily., Disp: 100 each, Rfl: 1    blood-glucose meter kit, Use as instructed, Disp: 1 each, Rfl: 0    blood-glucose sensor (DEXCOM G7 SENSOR MISC), Inject 1 each into the skin every 10 days. Continues glucose monitoring, Disp: , Rfl:     budesonide (PULMICORT) 0.5 mg/2 mL nebulizer solution, Take 2 mLs (0.5 mg total) by nebulization every 12 (twelve) hours. Controller, Disp: 100 mL, Rfl: 0    cetirizine (ZYRTEC) 10 MG tablet, Take 10 mg by mouth once daily., Disp: , Rfl:     cholecalciferol, vitamin D3, (VITAMIN D3) 125 mcg (5,000 unit) Tab, Take 5,000 Units by mouth once daily., Disp: , Rfl:     clotrimazole (LOTRIMIN) 1 % cream, Apply topically 2 (two) times daily., Disp: 45 g, Rfl: 3    docusate sodium (COLACE) 100 MG capsule, Take 1 capsule (100 mg total) by mouth daily as needed for Constipation., Disp: 90 capsule, Rfl: 3    ELIQUIS 5 mg Tab, TAKE 1 TABLET(5 MG) BY MOUTH TWICE DAILY, Disp: 180 tablet, Rfl: 0    famotidine (PEPCID) 40 MG tablet, Take 1 tablet (40 mg total) by mouth 2 (two) times daily., Disp: 60 tablet, Rfl: 11    gabapentin (NEURONTIN) 100 MG capsule, Take 300 mg by mouth 2 (two) times daily., Disp: , Rfl:     insulin (LANTUS SOLOSTAR U-100 INSULIN) glargine 100 units/mL SubQ pen, Inject 26 Units into the skin every evening., Disp: , Rfl:      "ipratropium (ATROVENT) 0.02 % nebulizer solution, Take 2.5 mLs (500 mcg total) by nebulization every 6 (six) hours as needed (wheeze). Rescue, Disp: 75 mL, Rfl: 0    ketoconazole (NIZORAL) 2 % shampoo, Apply topically twice a week. Lather and let sit for 5 minutes before rinsing., Disp: 120 mL, Rfl: 3    Lactobacillus acidophilus (PROBIOTIC ORAL), Take 1 capsule by mouth once daily., Disp: , Rfl:     lancets Misc, 1 each by Misc.(Non-Drug; Combo Route) route once daily., Disp: 100 each, Rfl: 1    levothyroxine (SYNTHROID) 75 MCG tablet, 1 tablet 6 days a week and 1/2 tablet on day 7, Disp: 30 tablet, Rfl: 11    LIDOcaine-prilocaine (EMLA) cream, Apply topically as needed (as needed for port access)., Disp: 30 g, Rfl: 1    losartan (COZAAR) 25 MG tablet, Take 1 tablet (25 mg total) by mouth 2 (two) times a day., Disp: 180 tablet, Rfl: 1    metoprolol succinate (TOPROL-XL) 25 MG 24 hr tablet, Take 0.5 tablets (12.5 mg total) by mouth 2 (two) times daily., Disp: 180 tablet, Rfl: 1    mv-mn/folic ac/calcium/vit K1 (WOMEN'S 50 PLUS MULTIVITAMIN ORAL), Take 1 tablet by mouth once daily., Disp: , Rfl:     NOVOLOG FLEXPEN U-100 INSULIN 100 unit/mL (3 mL) InPn pen, 7 Units with meals with correction scale. Use on premeal readings; 150-200=+2, 201-250=+4; 251-300=+6; 301-350=+8, over 350, + 10 units 50 units a day max, Disp: 15 mL, Rfl: 5    nystatin (MYCOSTATIN) cream, Apply topically 3 (three) times daily as needed (itch)., Disp: , Rfl:     omeprazole (PRILOSEC) 40 MG capsule, TAKE 1 CAPSULE BY MOUTH DAILY EVERY MORNING ON AN EMPTY STOMACH 30 MINUTES AFTER THYROID MEDICATION, Disp: , Rfl:     pen needle, diabetic 32 gauge x 1/4" Ndle, To use with insulin pens up to 4x day (Patient taking differently: Inject 1 each into the skin 4 (four) times daily as needed (to inject insulin (s)).), Disp: 150 each, Rfl: 12    sertraline (ZOLOFT) 25 MG tablet, TAKE 1 TABLET(25 MG) BY MOUTH EVERY DAY, Disp: 90 tablet, Rfl: 3    simvastatin " (ZOCOR) 40 MG tablet, TAKE 1 TABLET(40 MG) BY MOUTH EVERY EVENING, Disp: 90 tablet, Rfl: 1    sodium chloride 3% 3 % nebulizer solution, Take 4 mLs by nebulization 2 (two) times daily., Disp: 300 mL, Rfl: 0    triamcinolone acetonide 0.1% (KENALOG) 0.1 % cream, Apply topically 2 (two) times daily., Disp: 45 g, Rfl: 1    HYDROcodone-acetaminophen (NORCO)  mg per tablet, Take 1 tablet by mouth every 12 (twelve) hours as needed for Pain. (Patient not taking: Reported on 4/19/2024), Disp: 40 tablet, Rfl: 0    pantoprazole (PROTONIX) 40 MG tablet, Take 1 tablet (40 mg total) by mouth once daily. (Patient not taking: Reported on 7/11/2024), Disp: 90 tablet, Rfl: 2    vitamin A 65613 UNIT capsule, Take 10,000 Units by mouth 2 (two) times a day. (Patient not taking: Reported on 7/11/2024), Disp: , Rfl:   No current facility-administered medications for this visit.    Facility-Administered Medications Ordered in Other Visits:     EUFLEXXA 10 mg/mL(mw 2.4 -3.6 million) injection Syrg 20 mg, 20 mg, Intra-articular, , Jose Rafael Barney MD, 20 mg at 06/14/17 1605    Review of Systems   Constitutional: Negative for chills, diaphoresis, fever, malaise/fatigue (LESS) and night sweats.   HENT:  Negative for congestion and nosebleeds.    Eyes:  Negative for blurred vision and visual disturbance.   Cardiovascular:  Negative for chest pain, claudication, cyanosis, dyspnea on exertion (MILD, STAIRS), irregular heartbeat, leg swelling, near-syncope, orthopnea, palpitations, paroxysmal nocturnal dyspnea and syncope.   Respiratory:  Positive for cough (SEEN BY PULMONARY) and sputum production. Negative for hemoptysis, shortness of breath and wheezing.    Endocrine: Negative for polyphagia and polyuria.   Hematologic/Lymphatic: Negative for adenopathy. Does not bruise/bleed easily.   Skin:  Positive for color change. Negative for poor wound healing.   Musculoskeletal:  Negative for back pain and falls.   Gastrointestinal:   "Negative for abdominal pain, change in bowel habit, diarrhea, dysphagia, jaundice, melena and nausea.   Genitourinary:  Negative for dysuria and flank pain.   Neurological:  Negative for brief paralysis, focal weakness, light-headedness, loss of balance (LESS), paresthesias and weakness.   Psychiatric/Behavioral:  Negative for altered mental status and memory loss.    Allergic/Immunologic: Negative for hives and persistent infections.        Objective:      Vitals:    07/11/24 1249   BP: 139/77   Pulse: 101   Weight: 78.8 kg (173 lb 11.6 oz)   Height: 5' 6" (1.676 m)     Body mass index is 28.04 kg/m².    Physical Exam  Constitutional:       Appearance: Normal appearance.   HENT:      Head: Normocephalic and atraumatic.      Comments: CONGESTED  Eyes:      General: No scleral icterus.     Extraocular Movements: Extraocular movements intact.      Pupils: Pupils are equal, round, and reactive to light.   Neck:      Vascular: Normal carotid pulses. No carotid bruit or JVD.   Cardiovascular:      Rate and Rhythm: Normal rate and regular rhythm. No extrasystoles are present.     Pulses:           Carotid pulses are 2+ on the right side and 2+ on the left side.       Radial pulses are 2+ on the right side and 2+ on the left side.        Posterior tibial pulses are 2+ on the right side and 2+ on the left side.      Heart sounds: Murmur heard.      Systolic murmur is present with a grade of 1/6 at the lower left sternal border.      No friction rub. No gallop. No S4 sounds.   Pulmonary:      Effort: Pulmonary effort is normal. Prolonged expiration present. No respiratory distress.      Breath sounds: Rhonchi present.   Abdominal:      Palpations: Abdomen is soft.          Comments: WOUND VAC   Musculoskeletal:      Cervical back: Neck supple.      Right lower leg: No edema.      Left lower leg: No edema.      Comments: IN WC   Skin:     General: Skin is warm and dry.      Capillary Refill: Capillary refill takes less than 2 " seconds.   Neurological:      General: No focal deficit present.      Mental Status: She is alert.      Cranial Nerves: Cranial nerves 2-12 are intact. No cranial nerve deficit.   Psychiatric:         Mood and Affect: Mood normal.         Speech: Speech normal.         Behavior: Behavior normal.                 ..    Chemistry        Component Value Date/Time     (L) 06/25/2024 1219    K 4.1 06/25/2024 1219     06/25/2024 1219    CO2 27 06/25/2024 1219    BUN 21 (H) 06/25/2024 1219    CREATININE 1.05 06/25/2024 1219     (H) 06/25/2024 1219        Component Value Date/Time    CALCIUM 9.4 06/25/2024 1219    ALKPHOS 97 06/25/2024 1219    AST 21 06/25/2024 1219    ALT 11 06/25/2024 1219    BILITOT 0.5 06/25/2024 1219    ESTGFRAFRICA >60 07/31/2022 0430    EGFRNONAA 55 (A) 07/31/2022 0430            ..  Lab Results   Component Value Date    CHOL 134 09/11/2023    CHOL 158 08/19/2022    CHOL 167 07/06/2022     Lab Results   Component Value Date    HDL 38 (L) 09/11/2023    HDL 22 (L) 08/19/2022    HDL 46 07/06/2022     Lab Results   Component Value Date    LDLCALC 63.2 09/11/2023    LDLCALC 103.2 08/19/2022    LDLCALC 81.6 07/06/2022     Lab Results   Component Value Date    TRIG 164 (H) 09/11/2023    TRIG 164 (H) 08/19/2022    TRIG 197 (H) 07/06/2022     Lab Results   Component Value Date    CHOLHDL 28.4 09/11/2023    CHOLHDL 13.9 (L) 08/19/2022    CHOLHDL 27.5 07/06/2022     ..  Lab Results   Component Value Date    WBC 8.32 06/25/2024    HGB 9.5 (L) 06/25/2024    HCT 30.9 (L) 06/25/2024    MCV 82 06/25/2024     06/25/2024       Test(s) Reviewed  I have reviewed the following in detail:  [] Stress test   [] Angiography   [] Echocardiogram   [x] Labs   [x] Other:       Assessment:         ICD-10-CM ICD-9-CM   1. PAF (paroxysmal atrial fibrillation)  I48.0 427.31   2. SOB (shortness of breath)  R06.02 786.05   3. Discoloration of skin of multiple sites of lower extremity  L81.9 709.00   4. 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 Nonrheumatic mitral valve regurgitation  I34.0 424.0   6. Mixed hyperlipidemia  E78.2 272.2   7. Stage 3a chronic kidney disease  N18.31 585.3   8. Current use of long term anticoagulation  Z79.01 V58.61     Problem List Items Addressed This Visit          Cardiac/Vascular    Benign hypertensive heart and kidney disease with CHF, NYHA class 1 and CKD stage 3    Overview     Echo, 4/23/18.  suboptimal study, mild concentric LVH, EF 65-70%, impaired diastolic relaxation, trace mitral regurg, RV systolic pressure is normal.         Mixed hyperlipidemia    PAF (paroxysmal atrial fibrillation) - Primary    Relevant Orders    Echo    Nonrheumatic mitral valve regurgitation    Relevant Orders    Echo       Renal/    Stage 3a chronic kidney disease       Hematology    Current use of long term anticoagulation     Other Visit Diagnoses       SOB (shortness of breath)  (Chronic)       AFTER COUGHING SPELL    Relevant Orders    Echo    Discoloration of skin of multiple sites of lower extremity  (Chronic)       Relevant Orders    Ankle Brachial Indices (CHELLY)             Plan:     ECHO, REASSESS EF MITRAL REGURGITATION CHAMBER SIZE, SHORTNESS OF BREATH ONLY AFTER COUGHING SPELLS, CHECK CHELLY,ALL CV CLINICALLY STABLE, NO ANGINA, NO HF, NO TIA, NO CLINICAL ARRHYTHMIA,CONTINUE CURRENT MEDS, EDUCATION, DIET, EXERCISE , INCREASE ACTIVITY INCREASED CV RISK WITH CKD RETURN TO CLINIC IN 6 MO, DISCUSSED PLAN WITH THE PATIENT AND HER , INCREASED CV RISK WITH MALIGNANCY CURRENTLY IN REMISSION      PAF (paroxysmal atrial fibrillation)  -     Echo    SOB (shortness of breath)  Comments:  AFTER COUGHING SPELL  Orders:  -     Echo    Discoloration of skin of multiple sites of lower extremity  -     Ankle Brachial Indices (CHELLY); Future    Benign hypertensive heart and kidney disease with CHF, NYHA class 1 and CKD stage  3    Nonrheumatic mitral valve regurgitation  -     Echo    Mixed hyperlipidemia    Stage 3a chronic kidney disease    Current use of long term anticoagulation    RTC Low level/low impact aerobic exercise 5x's/wk. Heart healthy diet and risk factor modification.    See labs and med orders.    Aerobic exercise 5x's/wk. Heart healthy diet and risk factor modification.    See labs and med orders.

## 2024-07-15 DIAGNOSIS — I48.0 PAF (PAROXYSMAL ATRIAL FIBRILLATION): ICD-10-CM

## 2024-07-15 RX ORDER — APIXABAN 5 MG/1
TABLET, FILM COATED ORAL
Qty: 180 TABLET | Refills: 0 | Status: SHIPPED | OUTPATIENT
Start: 2024-07-15

## 2024-07-29 ENCOUNTER — OFFICE VISIT (OUTPATIENT)
Dept: ORTHOPEDICS | Facility: CLINIC | Age: 74
End: 2024-07-29
Payer: MEDICARE

## 2024-07-29 VITALS — BODY MASS INDEX: 27.92 KG/M2 | WEIGHT: 173.75 LBS | RESPIRATION RATE: 18 BRPM | HEIGHT: 66 IN

## 2024-07-29 DIAGNOSIS — M17.11 PRIMARY OSTEOARTHRITIS OF RIGHT KNEE: Primary | ICD-10-CM

## 2024-07-29 DIAGNOSIS — S83.241A OTHER TEAR OF MEDIAL MENISCUS OF RIGHT KNEE AS CURRENT INJURY, INITIAL ENCOUNTER: ICD-10-CM

## 2024-07-29 DIAGNOSIS — M25.561 RIGHT KNEE PAIN, UNSPECIFIED CHRONICITY: Primary | ICD-10-CM

## 2024-07-29 DIAGNOSIS — M17.10 ARTHRITIS OF KNEE: ICD-10-CM

## 2024-07-29 PROCEDURE — 4010F ACE/ARB THERAPY RXD/TAKEN: CPT | Mod: CPTII,S$GLB,, | Performed by: ORTHOPAEDIC SURGERY

## 2024-07-29 PROCEDURE — 1159F MED LIST DOCD IN RCRD: CPT | Mod: CPTII,S$GLB,, | Performed by: ORTHOPAEDIC SURGERY

## 2024-07-29 PROCEDURE — 99999 PR PBB SHADOW E&M-EST. PATIENT-LVL II: CPT | Mod: PBBFAC,,, | Performed by: ORTHOPAEDIC SURGERY

## 2024-07-29 PROCEDURE — 3044F HG A1C LEVEL LT 7.0%: CPT | Mod: CPTII,S$GLB,, | Performed by: ORTHOPAEDIC SURGERY

## 2024-07-29 PROCEDURE — 99214 OFFICE O/P EST MOD 30 MIN: CPT | Mod: S$GLB,,, | Performed by: ORTHOPAEDIC SURGERY

## 2024-07-29 PROCEDURE — 1160F RVW MEDS BY RX/DR IN RCRD: CPT | Mod: CPTII,S$GLB,, | Performed by: ORTHOPAEDIC SURGERY

## 2024-07-29 PROCEDURE — 1125F AMNT PAIN NOTED PAIN PRSNT: CPT | Mod: CPTII,S$GLB,, | Performed by: ORTHOPAEDIC SURGERY

## 2024-07-29 NOTE — PROGRESS NOTES
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 difficile colitis 08/2021    Cancer     lung    CKD (chronic kidney disease), stage III     Colonic diverticular abscess 07/12/2022    COPD (chronic obstructive pulmonary disease)     Encounter for blood transfusion     Hepatic steatosis     History of numbness     transient numbness left hand and foot    History of tobacco abuse     Hyperlipidemia     Hypertension     Lung nodules     and adenopathy    Multinodular goiter (nontoxic) 05/08/2017    Obesity     TRISHA (obstructive sleep apnea)     on bipap at bedtime    Proteinuria        Past Surgical History:   Procedure Laterality Date    ADENOIDECTOMY      APPENDECTOMY      CATARACT EXTRACTION      COLONOSCOPY N/A 11/14/2016    Procedure: COLONOSCOPY;  Surgeon: Destin Cardona MD;  Location: [facility] ENDO;  Service: Endoscopy;  Laterality: N/A;    COLONOSCOPY N/A 05/14/2021    Procedure: COLONOSCOPY;  Surgeon: Destin Cardona MD;  Location: [facility];  Service: Endoscopy;  Laterality: N/A; hemorrhoids, diverticulosis, Old blood left colon. No active bleeding; Repeat colonoscopy is not recommended for screening purposes.    COLOSTOMY N/A 07/28/2022    Procedure: CREATION, COLOSTOMY;  Surgeon: Nubia Tinoco MD;  Location: [facility] OR;  Service: General;  Laterality: N/A;    DEBRIDEMENT OF WOUND OF ABDOMEN  07/28/2022    Procedure: DEBRIDEMENT, WOUND, ABDOMEN;  Surgeon: Nubia Tinoco MD;  Location: [facility] OR;  Service: General;;    ESOPHAGOGASTRODUODENOSCOPY N/A 05/14/2021    Procedure: EGD (ESOPHAGOGASTRODUODENOSCOPY);  Surgeon: Destin Cardona MD;  Location: [facility];  Service: Endoscopy;  Laterality: N/A; unremarkable    ESOPHAGOGASTRODUODENOSCOPY N/A 08/09/2021     Procedure: ESOPHAGOGASTRODUODENOSCOPY (EGD);  Surgeon: Destin Cardona MD;  Location: [facility] ENDO;  Service: Endoscopy;  Laterality: N/A; Moderately severe radiation esophagitis with no bleeding    EXCISIONAL BIOPSY N/A 02/09/2023    Procedure: EXCISIONAL BIOPSY;  Surgeon: Nubia Tinoco MD;  Location: [facility] OR;  Service: General;  Laterality: N/A;    EYE SURGERY      cataract OU    HERNIA REPAIR      INSERTION OF TUNNELED CENTRAL VENOUS CATHETER (CVC) WITH SUBCUTANEOUS PORT N/A 06/07/2021    Procedure: [id number];  Surgeon: Joe Salinas MD;  Location: [facility] OR;  Service: General;  Laterality: N/A;    INTRALUMINAL GASTROINTESTINAL TRACT IMAGING VIA CAPSULE N/A 12/29/2021    Procedure: IMAGING PROCEDURE, GI TRACT, INTRALUMINAL, VIA CAPSULE  (called for instruction 12/20-may have trouble swallowing);  Surgeon: Floyd Dixon MD;  Location: [facility] ENDO;  Service: Endoscopy;  Laterality: N/A; Diffuse red spots of unclear significance and erosions found in the small bowel (entire small bowel), suspicious for mild radiation enteritis    NEUROMA SURGERY Left     acoustic    TONSILLECTOMY      WOUND EXPLORATION N/A 08/13/2022    Procedure: EXPLORATION, WOUND;  Surgeon: Arnel Olsen MD;  Location: [facility] OR;  Service: General;  Laterality: N/A;    WOUND EXPLORATION Left 02/09/2023    Procedure: EXPLORATION, WOUND - of Ostomy Granuloma - Wound - Abdomen;  Surgeon: Nubia Tinoco MD;  Location: [facility] OR;  Service: General;  Laterality: Left;       Current Outpatient Medications   Medication Sig    ascorbic acid, vitamin C, (VITAMIN C) 500 MG tablet Take 500 mg by mouth once daily. Indications: inadequate vitamin C    azelastine (ASTELIN) 137 mcg (0.1 %) nasal spray USE 2 SPRAYS IN EACH NOSTRIL EVERY NIGHT AT BEDTIME    blood sugar diagnostic Strp 1 strip by Misc.(Non-Drug; Combo Route) route Daily.    blood-glucose meter kit Use as instructed    blood-glucose sensor (DEXCOM G7 SENSOR MISC) Inject 1  each into the skin every 10 days. Continues glucose monitoring    budesonide (PULMICORT) 0.5 mg/2 mL nebulizer solution Take 2 mLs (0.5 mg total) by nebulization every 12 (twelve) hours. Controller    cetirizine (ZYRTEC) 10 MG tablet Take 10 mg by mouth once daily.    cholecalciferol, vitamin D3, (VITAMIN D3) 125 mcg (5,000 unit) Tab Take 5,000 Units by mouth once daily.    clotrimazole (LOTRIMIN) 1 % cream Apply topically 2 (two) times daily.    docusate sodium (COLACE) 100 MG capsule Take 1 capsule (100 mg total) by mouth daily as needed for Constipation.    ELIQUIS 5 mg Tab TAKE 1 TABLET(5 MG) BY MOUTH TWICE DAILY    famotidine (PEPCID) 40 MG tablet Take 1 tablet (40 mg total) by mouth 2 (two) times daily.    gabapentin (NEURONTIN) 100 MG capsule Take 300 mg by mouth 2 (two) times daily.    HYDROcodone-acetaminophen (NORCO)  mg per tablet Take 1 tablet by mouth every 12 (twelve) hours as needed for Pain. (Patient not taking: Reported on 4/19/2024)    insulin (LANTUS SOLOSTAR U-100 INSULIN) glargine 100 units/mL SubQ pen Inject 26 Units into the skin every evening.    ipratropium (ATROVENT) 0.02 % nebulizer solution Take 2.5 mLs (500 mcg total) by nebulization every 6 (six) hours as needed (wheeze). Rescue    ketoconazole (NIZORAL) 2 % shampoo Apply topically twice a week. Lather and let sit for 5 minutes before rinsing.    Lactobacillus acidophilus (PROBIOTIC ORAL) Take 1 capsule by mouth once daily.    lancets Misc 1 each by Misc.(Non-Drug; Combo Route) route once daily.    levothyroxine (SYNTHROID) 75 MCG tablet 1 tablet 6 days a week and 1/2 tablet on day 7    LIDOcaine-prilocaine (EMLA) cream Apply topically as needed (as needed for port access).    losartan (COZAAR) 25 MG tablet Take 1 tablet (25 mg total) by mouth 2 (two) times a day.    metoprolol succinate (TOPROL-XL) 25 MG 24 hr tablet Take 0.5 tablets (12.5 mg total) by mouth 2 (two) times daily.    mv-mn/folic ac/calcium/vit K1 (WOMEN'S 50 PLUS  "MULTIVITAMIN ORAL) Take 1 tablet by mouth once daily.    NOVOLOG FLEXPEN U-100 INSULIN 100 unit/mL (3 mL) InPn pen 7 Units with meals with correction scale. Use on premeal readings; 150-200=+2, 201-250=+4; 251-300=+6; 301-350=+8, over 350, + 10 units 50 units a day max    nystatin (MYCOSTATIN) cream Apply topically 3 (three) times daily as needed (itch).    omeprazole (PRILOSEC) 40 MG capsule TAKE 1 CAPSULE BY MOUTH DAILY EVERY MORNING ON AN EMPTY STOMACH 30 MINUTES AFTER THYROID MEDICATION    pantoprazole (PROTONIX) 40 MG tablet Take 1 tablet (40 mg total) by mouth once daily. (Patient not taking: Reported on 7/11/2024)    pen needle, diabetic 32 gauge x 1/4" Ndle To use with insulin pens up to 4x day (Patient taking differently: Inject 1 each into the skin 4 (four) times daily as needed (to inject insulin (s)).)    sertraline (ZOLOFT) 25 MG tablet TAKE 1 TABLET(25 MG) BY MOUTH EVERY DAY    simvastatin (ZOCOR) 40 MG tablet TAKE 1 TABLET(40 MG) BY MOUTH EVERY EVENING    sodium chloride 3% 3 % nebulizer solution Take 4 mLs by nebulization 2 (two) times daily.    triamcinolone acetonide 0.1% (KENALOG) 0.1 % cream Apply topically 2 (two) times daily.    vitamin A 68802 UNIT capsule Take 10,000 Units by mouth 2 (two) times a day. (Patient not taking: Reported on 7/11/2024)     No current facility-administered medications for this visit.     Facility-Administered Medications Ordered in Other Visits   Medication    EUFLEXXA 10 mg/mL(mw 2.4 -3.6 million) injection Syrg 20 mg       Review of patient's allergies indicates:   Allergen Reactions    Contrast media Anaphylaxis    Iodinated contrast media Anaphylaxis and Rash    Albuterol Other (See Comments)     Used during PFTs and put pt in afib    Dye     Pcn [penicillins]      Pt states did well on cephalexin.  No adverse reaction or side effect.     Doxycycline Palpitations     Tolerated IV Doxy 8/2022       Family History   Problem Relation Name Age of Onset    Heart " disease Mother          dissection    GI problems Father          perf colon    Cancer Paternal Uncle          colon cancer    Diabetes Neg Hx      Kidney disease Neg Hx      Stroke Neg Hx      Colon cancer Neg Hx      Crohn's disease Neg Hx      Ulcerative colitis Neg Hx      Stomach cancer Neg Hx      Esophageal cancer Neg Hx         Social History     Socioeconomic History    Marital status:    Tobacco Use    Smoking status: Former     Current packs/day: 0.00     Average packs/day: 1 pack/day for 40.0 years (40.0 ttl pk-yrs)     Types: Cigarettes     Start date: 3/6/1976     Quit date: 3/6/2016     Years since quittin.4    Smokeless tobacco: Never   Substance and Sexual Activity    Alcohol use: Not Currently    Drug use: No   Social History Narrative    Works in insurance collection.     Social Determinants of Health     Financial Resource Strain: Low Risk  (2024)    Overall Financial Resource Strain (CARDIA)     Difficulty of Paying Living Expenses: Not hard at all   Food Insecurity: No Food Insecurity (2024)    Hunger Vital Sign     Worried About Running Out of Food in the Last Year: Never true     Ran Out of Food in the Last Year: Never true   Transportation Needs: No Transportation Needs (2024)    OASIS : Transportation     Lack of Transportation (Medical): No     Lack of Transportation (Non-Medical): No     Patient Unable or Declines to Respond: No   Physical Activity: Unknown (10/13/2021)    Exercise Vital Sign     Days of Exercise per Week: 0 days   Stress: No Stress Concern Present (10/13/2021)    [employer] [place] [employer] - Occupational Stress Questionnaire     Feeling of Stress : Not at all   Housing Stability: Low Risk  (2024)    Housing Stability Vital Sign     Unable to Pay for Housing in the Last Year: No     Number of Places Lived in the Last Year: 1     Unstable Housing in the Last Year: No       Chief Complaint:   Chief Complaint   Patient  presents with    Right Knee - Pain       History of present illness:  74-year-old female seen for a few months of right knee pain and swelling.  No injury or trauma.  Pain mostly with walking.  Pain is over the anterior medial knee.  Pain is 6/10.  We gave her a cortisone injection back in May.  It worked great for about 2 months then the pain and swelling returned.        Review of Systems:  Musculoskeletal:  See HPI      Physical Examination:    Vital Signs:    Vitals:    07/29/24 1507   Resp: 18       Body mass index is 28.04 kg/m².    This a well-developed, well nourished patient in no acute distress.  They are alert and oriented and cooperative to examination.  Pt. walks without an antalgic gait.      Examination of the right knee shows no rashes or erythema. There are no masses ecchymosis And a small effusion. Patient has full range of motion from 0-130°. Patient is nontender to palpation over lateral joint line and moderately tender to palpation over the medial joint line. Knee is stable to varus and valgus stress. 5 out of 5 motor strength. Palpable distal pulses. Intact light touch sensation. Negative Patellofemoral crepitus      X-rays:  X-rays of the right knee are  reviewed which show some moderate tricompartmental degenerative change of both knees.         Assessment:  Possible right degenerative medial meniscal tear with effusion    Plan:  I reviewed the findings with her and her  today.  I think it is possible that she might have a little degenerative meniscal tear versus just arthritis.  I think she would be a good viscosupplementation candidate.  We will get authorization for that.  We will do an aspiration and injection at that time.  Patient might benefit from an MRI if it continues to swell.    The patient has tried a self guided exercise program that has included walking without significant improvement. Minimal relief with tylenol or OTC Nsaids. Reports less than 8 weeks relief with IA  steroid injection. Kellgren Julio scale of 3. They are not receiving another HA injectable at this time. I will precert for gel injection.               All previous pertinent notes including ER visits, physical therapy visits, other orthopedic visits as well as other care for the same musculoskeletal problem were reviewed.  All pertinent lab values and previous imaging was reviewed pertinent to the current visit.    This note was created using [employer] voice recognition software that occasionally misinterpreted phrases or words.    Consult note is delivered via Epic messaging service.

## 2024-07-30 ENCOUNTER — TELEPHONE (OUTPATIENT)
Dept: ORTHOPEDICS | Facility: CLINIC | Age: 74
End: 2024-07-30
Payer: MEDICARE

## 2024-07-30 NOTE — TELEPHONE ENCOUNTER
----- Message from Annamaria Younger MA sent at 7/30/2024  1:53 PM CDT -----  Contact: pt  Wants to reschedule injection to tomorrow in [place]   Call back

## 2024-08-02 ENCOUNTER — PATIENT MESSAGE (OUTPATIENT)
Dept: RADIATION ONCOLOGY | Facility: CLINIC | Age: 74
End: 2024-08-02
Payer: MEDICARE

## 2024-08-05 ENCOUNTER — HOSPITAL ENCOUNTER (OUTPATIENT)
Dept: CARDIOLOGY | Facility: HOSPITAL | Age: 74
Discharge: HOME | End: 2024-08-05
Attending: INTERNAL MEDICINE
Payer: MEDICARE

## 2024-08-05 VITALS — WEIGHT: 173 LBS | HEIGHT: 66 IN | BODY MASS INDEX: 27.8 KG/M2

## 2024-08-05 DIAGNOSIS — L81.9 DISCOLORATION OF SKIN OF MULTIPLE SITES OF LOWER EXTREMITY: Chronic | ICD-10-CM

## 2024-08-05 LAB
LEFT ABI: 1.19
LEFT ARM BP: 117 MMHG
LEFT DORSALIS PEDIS: 133 MMHG
LEFT POSTERIOR TIBIAL: 139 MMHG
LEFT TBI: 0.35
LEFT TOE PRESSURE: 41 MMHG
RIGHT ABI: 1.11
RIGHT ARM BP: 117 MMHG
RIGHT DORSALIS PEDIS: 120 MMHG
RIGHT POSTERIOR TIBIAL: 130 MMHG
RIGHT TBI: 0.36
RIGHT TOE PRESSURE: 42 MMHG

## 2024-08-05 PROCEDURE — 93306 TTE W/DOPPLER COMPLETE: CPT | Mod: PO

## 2024-08-05 PROCEDURE — 93922 UPR/L XTREMITY ART 2 LEVELS: CPT | Mod: 26,,, | Performed by: INTERNAL MEDICINE

## 2024-08-05 PROCEDURE — 93922 UPR/L XTREMITY ART 2 LEVELS: CPT | Mod: PO

## 2024-08-05 PROCEDURE — 93306 TTE W/DOPPLER COMPLETE: CPT | Mod: 26,,, | Performed by: INTERNAL MEDICINE

## 2024-08-09 ENCOUNTER — PATIENT MESSAGE (OUTPATIENT)
Dept: FAMILY MEDICINE | Facility: CLINIC | Age: 74
End: 2024-08-09
Payer: MEDICARE

## 2024-08-10 DIAGNOSIS — E78.00 HYPERCHOLESTEROLEMIA: Primary | ICD-10-CM

## 2024-08-12 RX ORDER — SIMVASTATIN 40 MG/1
TABLET, FILM COATED ORAL
Qty: 90 TABLET | Refills: 1 | Status: SHIPPED | OUTPATIENT
Start: 2024-08-12

## 2024-08-13 DIAGNOSIS — I10 ESSENTIAL HYPERTENSION: ICD-10-CM

## 2024-08-13 RX ORDER — FLUOCINOLONE ACETONIDE 0.11 MG/ML
4 OIL AURICULAR (OTIC) 2 TIMES DAILY
Qty: 20 ML | Refills: 0 | Status: SHIPPED | OUTPATIENT
Start: 2024-08-13

## 2024-08-13 RX ORDER — LOSARTAN POTASSIUM 25 MG/1
25 TABLET ORAL 2 TIMES DAILY
Qty: 180 TABLET | Refills: 1 | Status: SHIPPED | OUTPATIENT
Start: 2024-08-13

## 2024-08-14 ENCOUNTER — OFFICE VISIT (OUTPATIENT)
Dept: ORTHOPEDICS | Facility: CLINIC | Age: 74
End: 2024-08-14
Payer: MEDICARE

## 2024-08-14 DIAGNOSIS — M17.11 PRIMARY OSTEOARTHRITIS OF RIGHT KNEE: Primary | ICD-10-CM

## 2024-08-14 PROCEDURE — 99999 PR PBB SHADOW E&M-EST. PATIENT-LVL I: CPT | Mod: PBBFAC,,, | Performed by: ORTHOPAEDIC SURGERY

## 2024-08-14 NOTE — PROGRESS NOTES
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 difficile colitis 08/2021    Cancer     lung    CKD (chronic kidney disease), stage III     Colonic diverticular abscess 07/12/2022    COPD (chronic obstructive pulmonary disease)     Encounter for blood transfusion     Hepatic steatosis     History of numbness     transient numbness left hand and foot    History of tobacco abuse     Hyperlipidemia     Hypertension     Lung nodules     and adenopathy    Multinodular goiter (nontoxic) 05/08/2017    Obesity     TRISHA (obstructive sleep apnea)     on bipap at bedtime    Proteinuria        Past Surgical History:   Procedure Laterality Date    ADENOIDECTOMY      APPENDECTOMY      CATARACT EXTRACTION      COLONOSCOPY N/A 11/14/2016    Procedure: COLONOSCOPY;  Surgeon: Destin Cardona MD;  Location: [facility] ENDO;  Service: Endoscopy;  Laterality: N/A;    COLONOSCOPY N/A 05/14/2021    Procedure: COLONOSCOPY;  Surgeon: Destin Cardona MD;  Location: [facility];  Service: Endoscopy;  Laterality: N/A; hemorrhoids, diverticulosis, Old blood left colon. No active bleeding; Repeat colonoscopy is not recommended for screening purposes.    COLOSTOMY N/A 07/28/2022    Procedure: CREATION, COLOSTOMY;  Surgeon: Nubia Tinoco MD;  Location: [facility] OR;  Service: General;  Laterality: N/A;    DEBRIDEMENT OF WOUND OF ABDOMEN  07/28/2022    Procedure: DEBRIDEMENT, WOUND, ABDOMEN;  Surgeon: Nubia Tinoco MD;  Location: [facility] OR;  Service: General;;    ESOPHAGOGASTRODUODENOSCOPY N/A 05/14/2021    Procedure: EGD (ESOPHAGOGASTRODUODENOSCOPY);  Surgeon: Destin Cardona MD;  Location: [facility];  Service: Endoscopy;  Laterality: N/A; unremarkable    ESOPHAGOGASTRODUODENOSCOPY N/A 08/09/2021     Procedure: ESOPHAGOGASTRODUODENOSCOPY (EGD);  Surgeon: Destin Cardona MD;  Location: [facility] ENDO;  Service: Endoscopy;  Laterality: N/A; Moderately severe radiation esophagitis with no bleeding    EXCISIONAL BIOPSY N/A 02/09/2023    Procedure: EXCISIONAL BIOPSY;  Surgeon: Nubia Tinoco MD;  Location: [facility] OR;  Service: General;  Laterality: N/A;    EYE SURGERY      cataract OU    HERNIA REPAIR      INSERTION OF TUNNELED CENTRAL VENOUS CATHETER (CVC) WITH SUBCUTANEOUS PORT N/A 06/07/2021    Procedure: [id number];  Surgeon: Joe Salinas MD;  Location: [facility] OR;  Service: General;  Laterality: N/A;    INTRALUMINAL GASTROINTESTINAL TRACT IMAGING VIA CAPSULE N/A 12/29/2021    Procedure: IMAGING PROCEDURE, GI TRACT, INTRALUMINAL, VIA CAPSULE  (called for instruction 12/20-may have trouble swallowing);  Surgeon: Floyd Dixon MD;  Location: [facility] ENDO;  Service: Endoscopy;  Laterality: N/A; Diffuse red spots of unclear significance and erosions found in the small bowel (entire small bowel), suspicious for mild radiation enteritis    NEUROMA SURGERY Left     acoustic    TONSILLECTOMY      WOUND EXPLORATION N/A 08/13/2022    Procedure: EXPLORATION, WOUND;  Surgeon: Arnel Olsen MD;  Location: [facility] OR;  Service: General;  Laterality: N/A;    WOUND EXPLORATION Left 02/09/2023    Procedure: EXPLORATION, WOUND - of Ostomy Granuloma - Wound - Abdomen;  Surgeon: Nubia Tinoco MD;  Location: [facility] OR;  Service: General;  Laterality: Left;       Current Outpatient Medications   Medication Sig    ascorbic acid, vitamin C, (VITAMIN C) 500 MG tablet Take 500 mg by mouth once daily. Indications: inadequate vitamin C    azelastine (ASTELIN) 137 mcg (0.1 %) nasal spray USE 2 SPRAYS IN EACH NOSTRIL EVERY NIGHT AT BEDTIME    blood sugar diagnostic Strp 1 strip by Misc.(Non-Drug; Combo Route) route Daily.    blood-glucose meter kit Use as instructed    blood-glucose sensor (DEXCOM G7 SENSOR MISC) Inject 1  each into the skin every 10 days. Continues glucose monitoring    budesonide (PULMICORT) 0.5 mg/2 mL nebulizer solution Take 2 mLs (0.5 mg total) by nebulization every 12 (twelve) hours. Controller    cetirizine (ZYRTEC) 10 MG tablet Take 10 mg by mouth once daily.    cholecalciferol, vitamin D3, (VITAMIN D3) 125 mcg (5,000 unit) Tab Take 5,000 Units by mouth once daily.    clotrimazole (LOTRIMIN) 1 % cream Apply topically 2 (two) times daily.    docusate sodium (COLACE) 100 MG capsule Take 1 capsule (100 mg total) by mouth daily as needed for Constipation.    ELIQUIS 5 mg Tab TAKE 1 TABLET(5 MG) BY MOUTH TWICE DAILY    famotidine (PEPCID) 40 MG tablet Take 1 tablet (40 mg total) by mouth 2 (two) times daily.    fluocinolone acetonide oiL 0.01 % Drop Place 4 drops in ear(s) 2 (two) times a day.    gabapentin (NEURONTIN) 100 MG capsule Take 300 mg by mouth 2 (two) times daily.    HYDROcodone-acetaminophen (NORCO)  mg per tablet Take 1 tablet by mouth every 12 (twelve) hours as needed for Pain. (Patient not taking: Reported on 4/19/2024)    insulin (LANTUS SOLOSTAR U-100 INSULIN) glargine 100 units/mL SubQ pen Inject 26 Units into the skin every evening.    ipratropium (ATROVENT) 0.02 % nebulizer solution Take 2.5 mLs (500 mcg total) by nebulization every 6 (six) hours as needed (wheeze). Rescue    ketoconazole (NIZORAL) 2 % shampoo Apply topically twice a week. Lather and let sit for 5 minutes before rinsing.    Lactobacillus acidophilus (PROBIOTIC ORAL) Take 1 capsule by mouth once daily.    lancets Misc 1 each by Misc.(Non-Drug; Combo Route) route once daily.    levothyroxine (SYNTHROID) 75 MCG tablet 1 tablet 6 days a week and 1/2 tablet on day 7    LIDOcaine-prilocaine (EMLA) cream Apply topically as needed (as needed for port access).    losartan (COZAAR) 25 MG tablet TAKE 1 TABLET(25 MG) BY MOUTH TWICE DAILY    metoprolol succinate (TOPROL-XL) 25 MG 24 hr tablet Take 0.5 tablets (12.5 mg total) by mouth 2  "(two) times daily.    mv-mn/folic ac/calcium/vit K1 (WOMEN'S 50 PLUS MULTIVITAMIN ORAL) Take 1 tablet by mouth once daily.    NOVOLOG FLEXPEN U-100 INSULIN 100 unit/mL (3 mL) InPn pen 7 Units with meals with correction scale. Use on premeal readings; 150-200=+2, 201-250=+4; 251-300=+6; 301-350=+8, over 350, + 10 units 50 units a day max    nystatin (MYCOSTATIN) cream Apply topically 3 (three) times daily as needed (itch).    omeprazole (PRILOSEC) 40 MG capsule TAKE 1 CAPSULE BY MOUTH DAILY EVERY MORNING ON AN EMPTY STOMACH 30 MINUTES AFTER THYROID MEDICATION    pantoprazole (PROTONIX) 40 MG tablet Take 1 tablet (40 mg total) by mouth once daily. (Patient not taking: Reported on 7/11/2024)    pen needle, diabetic 32 gauge x 1/4" Ndle To use with insulin pens up to 4x day (Patient taking differently: Inject 1 each into the skin 4 (four) times daily as needed (to inject insulin (s)).)    sertraline (ZOLOFT) 25 MG tablet TAKE 1 TABLET(25 MG) BY MOUTH EVERY DAY    simvastatin (ZOCOR) 40 MG tablet TAKE 1 TABLET(40 MG) BY MOUTH EVERY EVENING    sodium chloride 3% 3 % nebulizer solution Take 4 mLs by nebulization 2 (two) times daily.    triamcinolone acetonide 0.1% (KENALOG) 0.1 % cream Apply topically 2 (two) times daily.    vitamin A 44503 UNIT capsule Take 10,000 Units by mouth 2 (two) times a day. (Patient not taking: Reported on 7/11/2024)     No current facility-administered medications for this visit.     Facility-Administered Medications Ordered in Other Visits   Medication    EUFLEXXA 10 mg/mL(mw 2.4 -3.6 million) injection Syrg 20 mg       Review of patient's allergies indicates:   Allergen Reactions    Contrast media Anaphylaxis    Iodinated contrast media Anaphylaxis and Rash    Albuterol Other (See Comments)     Used during PFTs and put pt in afib    Dye     Pcn [penicillins]      Pt states did well on cephalexin.  No adverse reaction or side effect.     Doxycycline Palpitations     Tolerated IV Doxy 8/2022 "       Family History   Problem Relation Name Age of Onset    Heart disease Mother          dissection    GI problems Father          perf colon    Cancer Paternal Uncle          colon cancer    Diabetes Neg Hx      Kidney disease Neg Hx      Stroke Neg Hx      Colon cancer Neg Hx      Crohn's disease Neg Hx      Ulcerative colitis Neg Hx      Stomach cancer Neg Hx      Esophageal cancer Neg Hx         Social History     Socioeconomic History    Marital status:    Tobacco Use    Smoking status: Former     Current packs/day: 0.00     Average packs/day: 1 pack/day for 40.0 years (40.0 ttl pk-yrs)     Types: Cigarettes     Start date: 3/6/1976     Quit date: 3/6/2016     Years since quittin.4    Smokeless tobacco: Never   Substance and Sexual Activity    Alcohol use: Not Currently    Drug use: No   Social History Narrative    Works in insurance collection.     Social Determinants of Health     Financial Resource Strain: Low Risk  (2024)    Overall Financial Resource Strain (CARDIA)     Difficulty of Paying Living Expenses: Not hard at all   Food Insecurity: No Food Insecurity (2024)    Hunger Vital Sign     Worried About Running Out of Food in the Last Year: Never true     Ran Out of Food in the Last Year: Never true   Transportation Needs: No Transportation Needs (2024)    OASIS : Transportation     Lack of Transportation (Medical): No     Lack of Transportation (Non-Medical): No     Patient Unable or Declines to Respond: No   Physical Activity: Unknown (10/13/2021)    Exercise Vital Sign     Days of Exercise per Week: 0 days   Stress: No Stress Concern Present (10/13/2021)    [employer] - Occupational Stress Questionnaire     Feeling of Stress : Not at all   Housing Stability: Low Risk  (2024)    Housing Stability Vital Sign     Unable to Pay for Housing in the Last Year: No     Number of Places Lived in the Last Year: 1     Unstable Housing in the Last  Year: No       Chief Complaint:   No chief complaint on file.      History of present illness:  74-year-old female seen for a few months of right knee pain and swelling.  No injury or trauma.  Pain mostly with walking.  Pain is over the anterior medial knee.  Pain is 6/10.  We gave her a cortisone injection back in May.  It worked great for about 2 months then the pain and swelling returned.        Review of Systems:  Musculoskeletal:  See HPI      Physical Examination:    Vital Signs:    There were no vitals filed for this visit.      There is no height or weight on file to calculate BMI.    This a well-developed, well nourished patient in no acute distress.  They are alert and oriented and cooperative to examination.  Pt. walks without an antalgic gait.      Examination of the right knee shows no rashes or erythema. There are no masses ecchymosis And a small effusion. Patient has full range of motion from 0-130°. Patient is nontender to palpation over lateral joint line and moderately tender to palpation over the medial joint line. Knee is stable to varus and valgus stress. 5 out of 5 motor strength. Palpable distal pulses. Intact light touch sensation. Negative Patellofemoral crepitus      X-rays:  X-rays of the right knee are  reviewed which show some moderate tricompartmental degenerative change of both knees.         Assessment:  Possible right degenerative medial meniscal tear with effusion    Plan:  I injected her right knee with Euflexxa 1 of 3.  Follow up next week.    The patient has tried a self guided exercise program that has included walking without significant improvement. Minimal relief with tylenol or OTC Nsaids. Reports less than 8 weeks relief with IA steroid injection. Kellgren Julio scale of 3. They are not receiving another HA injectable at this time. I will precert for gel injection.               All previous pertinent notes including ER visits, physical therapy visits, other orthopedic  visits as well as other care for the same musculoskeletal problem were reviewed.  All pertinent lab values and previous imaging was reviewed pertinent to the current visit.    This note was created using YouTube voice recognition software that occasionally misinterpreted phrases or words.    Consult note is delivered via Epic messaging service.

## 2024-08-14 NOTE — PROCEDURES
Large Joint Aspiration/Injection: R knee joint    Date/Time: 8/14/2024 3:45 PM    Performed by: Jose Rafael Barney MD  Authorized by: Jose Rafael Barney MD    Consent Done?:  Yes (Verbal)  Indications:  Pain  Site marked: the procedure site was marked    Timeout: prior to procedure the correct patient, procedure, and site was verified      Details:  Needle Size:  20 G  Approach:  Anterolateral  Location:  Knee  Site:  R knee joint  Medications:  20 mg sodium hyaluronate (EUFLEXXA) 10 mg/mL(mw 2.4 -3.6 million)  Patient tolerance:  Patient tolerated the procedure well with no immediate complications

## 2024-08-21 ENCOUNTER — OFFICE VISIT (OUTPATIENT)
Dept: ORTHOPEDICS | Facility: CLINIC | Age: 74
End: 2024-08-21
Payer: MEDICARE

## 2024-08-21 DIAGNOSIS — M25.561 RIGHT KNEE PAIN, UNSPECIFIED CHRONICITY: ICD-10-CM

## 2024-08-21 DIAGNOSIS — M17.11 PRIMARY OSTEOARTHRITIS OF RIGHT KNEE: Primary | ICD-10-CM

## 2024-08-21 PROCEDURE — 99499 UNLISTED E&M SERVICE: CPT | Mod: 25,S$GLB,, | Performed by: ORTHOPAEDIC SURGERY

## 2024-08-21 PROCEDURE — 99999 PR PBB SHADOW E&M-EST. PATIENT-LVL I: CPT | Mod: PBBFAC,,, | Performed by: ORTHOPAEDIC SURGERY

## 2024-08-21 PROCEDURE — 20610 DRAIN/INJ JOINT/BURSA W/O US: CPT | Mod: RT,S$GLB,, | Performed by: ORTHOPAEDIC SURGERY

## 2024-08-21 NOTE — PROGRESS NOTES
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 difficile colitis 08/2021    Cancer     lung    CKD (chronic kidney disease), stage III     Colonic diverticular abscess 07/12/2022    COPD (chronic obstructive pulmonary disease)     Encounter for blood transfusion     Hepatic steatosis     History of numbness     transient numbness left hand and foot    History of tobacco abuse     Hyperlipidemia     Hypertension     Lung nodules     and adenopathy    Multinodular goiter (nontoxic) 05/08/2017    Obesity     TRISHA (obstructive sleep apnea)     on bipap at bedtime    Proteinuria        Past Surgical History:   Procedure Laterality Date    ADENOIDECTOMY      APPENDECTOMY      CATARACT EXTRACTION      COLONOSCOPY N/A 11/14/2016    Procedure: COLONOSCOPY;  Surgeon: Destin Cardona MD;  Location: [facility] ENDO;  Service: Endoscopy;  Laterality: N/A;    COLONOSCOPY N/A 05/14/2021    Procedure: COLONOSCOPY;  Surgeon: Destin Cardona MD;  Location: [facility];  Service: Endoscopy;  Laterality: N/A; hemorrhoids, diverticulosis, Old blood left colon. No active bleeding; Repeat colonoscopy is not recommended for screening purposes.    COLOSTOMY N/A 07/28/2022    Procedure: CREATION, COLOSTOMY;  Surgeon: Nubia Tinoco MD;  Location: [facility] OR;  Service: General;  Laterality: N/A;    DEBRIDEMENT OF WOUND OF ABDOMEN  07/28/2022    Procedure: DEBRIDEMENT, WOUND, ABDOMEN;  Surgeon: Nubia Tinoco MD;  Location: [facility] OR;  Service: General;;    ESOPHAGOGASTRODUODENOSCOPY N/A 05/14/2021    Procedure: EGD (ESOPHAGOGASTRODUODENOSCOPY);  Surgeon: Destin Cardona MD;  Location: [facility];  Service: Endoscopy;  Laterality: N/A; unremarkable    ESOPHAGOGASTRODUODENOSCOPY N/A 08/09/2021     Procedure: ESOPHAGOGASTRODUODENOSCOPY (EGD);  Surgeon: Destin Cardona MD;  Location: [facility] ENDO;  Service: Endoscopy;  Laterality: N/A; Moderately severe radiation esophagitis with no bleeding    EXCISIONAL BIOPSY N/A 02/09/2023    Procedure: EXCISIONAL BIOPSY;  Surgeon: Nubia Tinoco MD;  Location: [facility] OR;  Service: General;  Laterality: N/A;    EYE SURGERY      cataract OU    HERNIA REPAIR      INSERTION OF TUNNELED CENTRAL VENOUS CATHETER (CVC) WITH SUBCUTANEOUS PORT N/A 06/07/2021    Procedure: [id number];  Surgeon: Joe Salinas MD;  Location: [facility] OR;  Service: General;  Laterality: N/A;    INTRALUMINAL GASTROINTESTINAL TRACT IMAGING VIA CAPSULE N/A 12/29/2021    Procedure: IMAGING PROCEDURE, GI TRACT, INTRALUMINAL, VIA CAPSULE  (called for instruction 12/20-may have trouble swallowing);  Surgeon: Floyd Dixon MD;  Location: [facility] ENDO;  Service: Endoscopy;  Laterality: N/A; Diffuse red spots of unclear significance and erosions found in the small bowel (entire small bowel), suspicious for mild radiation enteritis    NEUROMA SURGERY Left     acoustic    TONSILLECTOMY      WOUND EXPLORATION N/A 08/13/2022    Procedure: EXPLORATION, WOUND;  Surgeon: Arnel Olsen MD;  Location: [facility] OR;  Service: General;  Laterality: N/A;    WOUND EXPLORATION Left 02/09/2023    Procedure: EXPLORATION, WOUND - of Ostomy Granuloma - Wound - Abdomen;  Surgeon: Nubia Tinoco MD;  Location: [facility] OR;  Service: General;  Laterality: Left;       Current Outpatient Medications   Medication Sig    ascorbic acid, vitamin C, (VITAMIN C) 500 MG tablet Take 500 mg by mouth once daily. Indications: inadequate vitamin C    azelastine (ASTELIN) 137 mcg (0.1 %) nasal spray USE 2 SPRAYS IN EACH NOSTRIL EVERY NIGHT AT BEDTIME    blood sugar diagnostic Strp 1 strip by Misc.(Non-Drug; Combo Route) route Daily.    blood-glucose meter kit Use as instructed    blood-glucose sensor (DEXCOM G7 SENSOR MISC) Inject 1  each into the skin every 10 days. Continues glucose monitoring    budesonide (PULMICORT) 0.5 mg/2 mL nebulizer solution Take 2 mLs (0.5 mg total) by nebulization every 12 (twelve) hours. Controller    cetirizine (ZYRTEC) 10 MG tablet Take 10 mg by mouth once daily.    cholecalciferol, vitamin D3, (VITAMIN D3) 125 mcg (5,000 unit) Tab Take 5,000 Units by mouth once daily.    clotrimazole (LOTRIMIN) 1 % cream Apply topically 2 (two) times daily.    docusate sodium (COLACE) 100 MG capsule Take 1 capsule (100 mg total) by mouth daily as needed for Constipation.    ELIQUIS 5 mg Tab TAKE 1 TABLET(5 MG) BY MOUTH TWICE DAILY    famotidine (PEPCID) 40 MG tablet Take 1 tablet (40 mg total) by mouth 2 (two) times daily.    fluocinolone acetonide oiL 0.01 % Drop Place 4 drops in ear(s) 2 (two) times a day.    gabapentin (NEURONTIN) 100 MG capsule Take 300 mg by mouth 2 (two) times daily.    HYDROcodone-acetaminophen (NORCO)  mg per tablet Take 1 tablet by mouth every 12 (twelve) hours as needed for Pain. (Patient not taking: Reported on 4/19/2024)    insulin (LANTUS SOLOSTAR U-100 INSULIN) glargine 100 units/mL SubQ pen Inject 26 Units into the skin every evening.    ipratropium (ATROVENT) 0.02 % nebulizer solution Take 2.5 mLs (500 mcg total) by nebulization every 6 (six) hours as needed (wheeze). Rescue    ketoconazole (NIZORAL) 2 % shampoo Apply topically twice a week. Lather and let sit for 5 minutes before rinsing.    Lactobacillus acidophilus (PROBIOTIC ORAL) Take 1 capsule by mouth once daily.    lancets Misc 1 each by Misc.(Non-Drug; Combo Route) route once daily.    levothyroxine (SYNTHROID) 75 MCG tablet 1 tablet 6 days a week and 1/2 tablet on day 7    LIDOcaine-prilocaine (EMLA) cream Apply topically as needed (as needed for port access).    losartan (COZAAR) 25 MG tablet TAKE 1 TABLET(25 MG) BY MOUTH TWICE DAILY    metoprolol succinate (TOPROL-XL) 25 MG 24 hr tablet Take 0.5 tablets (12.5 mg total) by mouth 2  "(two) times daily.    mv-mn/folic ac/calcium/vit K1 (WOMEN'S 50 PLUS MULTIVITAMIN ORAL) Take 1 tablet by mouth once daily.    NOVOLOG FLEXPEN U-100 INSULIN 100 unit/mL (3 mL) InPn pen 7 Units with meals with correction scale. Use on premeal readings; 150-200=+2, 201-250=+4; 251-300=+6; 301-350=+8, over 350, + 10 units 50 units a day max    nystatin (MYCOSTATIN) cream Apply topically 3 (three) times daily as needed (itch).    omeprazole (PRILOSEC) 40 MG capsule TAKE 1 CAPSULE BY MOUTH DAILY EVERY MORNING ON AN EMPTY STOMACH 30 MINUTES AFTER THYROID MEDICATION    pantoprazole (PROTONIX) 40 MG tablet Take 1 tablet (40 mg total) by mouth once daily. (Patient not taking: Reported on 7/11/2024)    pen needle, diabetic 32 gauge x 1/4" Ndle To use with insulin pens up to 4x day (Patient taking differently: Inject 1 each into the skin 4 (four) times daily as needed (to inject insulin (s)).)    sertraline (ZOLOFT) 25 MG tablet TAKE 1 TABLET(25 MG) BY MOUTH EVERY DAY    simvastatin (ZOCOR) 40 MG tablet TAKE 1 TABLET(40 MG) BY MOUTH EVERY EVENING    sodium chloride 3% 3 % nebulizer solution Take 4 mLs by nebulization 2 (two) times daily.    triamcinolone acetonide 0.1% (KENALOG) 0.1 % cream Apply topically 2 (two) times daily.    vitamin A 47471 UNIT capsule Take 10,000 Units by mouth 2 (two) times a day. (Patient not taking: Reported on 7/11/2024)     No current facility-administered medications for this visit.     Facility-Administered Medications Ordered in Other Visits   Medication    EUFLEXXA 10 mg/mL(mw 2.4 -3.6 million) injection Syrg 20 mg       Review of patient's allergies indicates:   Allergen Reactions    Contrast media Anaphylaxis    Iodinated contrast media Anaphylaxis and Rash    Albuterol Other (See Comments)     Used during PFTs and put pt in afib    Dye     Pcn [penicillins]      Pt states did well on cephalexin.  No adverse reaction or side effect.     Doxycycline Palpitations     Tolerated IV Doxy 8/2022 "       Family History   Problem Relation Name Age of Onset    Heart disease Mother          dissection    GI problems Father          perf colon    Cancer Paternal Uncle          colon cancer    Diabetes Neg Hx      Kidney disease Neg Hx      Stroke Neg Hx      Colon cancer Neg Hx      Crohn's disease Neg Hx      Ulcerative colitis Neg Hx      Stomach cancer Neg Hx      Esophageal cancer Neg Hx         Social History     Socioeconomic History    Marital status:    Tobacco Use    Smoking status: Former     Current packs/day: 0.00     Average packs/day: 1 pack/day for 40.0 years (40.0 ttl pk-yrs)     Types: Cigarettes     Start date: 3/6/1976     Quit date: 3/6/2016     Years since quittin.4    Smokeless tobacco: Never   Substance and Sexual Activity    Alcohol use: Not Currently    Drug use: No   Social History Narrative    Works in insurance collection.     Social Determinants of Health     Financial Resource Strain: Low Risk  (2024)    Overall Financial Resource Strain (CARDIA)     Difficulty of Paying Living Expenses: Not hard at all   Food Insecurity: No Food Insecurity (2024)    Hunger Vital Sign     Worried About Running Out of Food in the Last Year: Never true     Ran Out of Food in the Last Year: Never true   Transportation Needs: No Transportation Needs (2024)    OASIS : Transportation     Lack of Transportation (Medical): No     Lack of Transportation (Non-Medical): No     Patient Unable or Declines to Respond: No   Physical Activity: Unknown (10/13/2021)    Exercise Vital Sign     Days of Exercise per Week: 0 days   Stress: No Stress Concern Present (10/13/2021)    [employer] - Occupational Stress Questionnaire     Feeling of Stress : Not at all   Housing Stability: Low Risk  (2024)    Housing Stability Vital Sign     Unable to Pay for Housing in the Last Year: No     Number of Places Lived in the Last Year: 1     Unstable Housing in the Last  Year: No       Chief Complaint:   No chief complaint on file.      History of present illness:  74-year-old female seen for a few months of right knee pain and swelling.  No injury or trauma.  Pain mostly with walking.  Pain is over the anterior medial knee.  Pain is 6/10.  We gave her a cortisone injection back in May.  It worked great for about 2 months then the pain and swelling returned.        Review of Systems:  Musculoskeletal:  See HPI      Physical Examination:    Vital Signs:    There were no vitals filed for this visit.      There is no height or weight on file to calculate BMI.    This a well-developed, well nourished patient in no acute distress.  They are alert and oriented and cooperative to examination.  Pt. walks without an antalgic gait.      Examination of the right knee shows no rashes or erythema. There are no masses ecchymosis And a small effusion. Patient has full range of motion from 0-130°. Patient is nontender to palpation over lateral joint line and moderately tender to palpation over the medial joint line. Knee is stable to varus and valgus stress. 5 out of 5 motor strength. Palpable distal pulses. Intact light touch sensation. Negative Patellofemoral crepitus      X-rays:  X-rays of the right knee are  reviewed which show some moderate tricompartmental degenerative change of both knees.         Assessment:  Possible right degenerative medial meniscal tear with effusion    Plan:  I injected her right knee with Euflexxa 2 of 3.  Follow up next week.  No real benefit from the 1st injection.  Gave her an exercise guide to start working on.  We also talked about possible physical therapy if not helping.    The patient has tried a self guided exercise program that has included walking without significant improvement. Minimal relief with tylenol or OTC Nsaids. Reports less than 8 weeks relief with IA steroid injection. Kellgren Julio scale of 3. They are not receiving another HA injectable  at this time. I will precert for gel injection.               All previous pertinent notes including ER visits, physical therapy visits, other orthopedic visits as well as other care for the same musculoskeletal problem were reviewed.  All pertinent lab values and previous imaging was reviewed pertinent to the current visit.    This note was created using [email or web address] voice recognition software that occasionally misinterpreted phrases or words.    Consult note is delivered via Epic messaging service.

## 2024-08-21 NOTE — PROCEDURES
Large Joint Aspiration/Injection: R knee joint    Date/Time: 8/21/2024 11:30 AM    Performed by: Jose Rafael Barney MD  Authorized by: Jose Rafael Barney MD    Consent Done?:  Yes (Verbal)  Indications:  Pain  Site marked: the procedure site was marked    Timeout: prior to procedure the correct patient, procedure, and site was verified      Details:  Needle Size:  20 G  Approach:  Anterolateral  Location:  Knee  Site:  R knee joint  Medications:  20 mg sodium hyaluronate (EUFLEXXA) 10 mg/mL(mw 2.4 -3.6 million)  Patient tolerance:  Patient tolerated the procedure well with no immediate complications

## 2024-08-24 DIAGNOSIS — E10.21 TYPE 1 DIABETES MELLITUS WITH NEPHROPATHY: Primary | ICD-10-CM

## 2024-08-26 RX ORDER — HYDROCODONE BITARTRATE AND ACETAMINOPHEN 5; 325 MG/1; MG/1
1 TABLET ORAL EVERY 6 HOURS PRN
Qty: 28 TABLET | Refills: 0 | Status: SHIPPED | OUTPATIENT
Start: 2024-08-26

## 2024-08-26 NOTE — TELEPHONE ENCOUNTER
----- Message from Farshad England sent at 8/26/2024 10:05 AM CDT -----  Regarding: return call  Contact: patient  Type:  Patient Returning Call    Who Called:patient  Who Left Message for Patient:office nurse  Does the patient know what this is regarding?:please call/ knee pain  Would the patient rather a call back or a response via [facility]?   Best Call Back Number:[phone]  Additional Information:

## 2024-08-26 NOTE — TELEPHONE ENCOUNTER
Pt states that since the 2nd HA injection she has increased pain . Unable to bare weight, using walker and wheelchair  to get around. Pt also stating that knee/leg swollen, no heat. Pt has appt Wednesday for last HA. Pt is requesting something be done to stop pain that she has. Please advise.

## 2024-08-27 RX ORDER — BLOOD SUGAR DIAGNOSTIC
STRIP MISCELLANEOUS
Qty: 400 EACH | Refills: 3 | Status: SHIPPED | OUTPATIENT
Start: 2024-08-27

## 2024-08-28 ENCOUNTER — OFFICE VISIT (OUTPATIENT)
Dept: ORTHOPEDICS | Facility: CLINIC | Age: 74
End: 2024-08-28
Payer: MEDICARE

## 2024-08-28 DIAGNOSIS — M17.11 PRIMARY OSTEOARTHRITIS OF RIGHT KNEE: Primary | ICD-10-CM

## 2024-08-28 DIAGNOSIS — M25.461 KNEE EFFUSION, RIGHT: ICD-10-CM

## 2024-08-28 LAB
APPEARANCE FLD: NORMAL
BODY FLD TYPE: NORMAL
BODY FLD TYPE: NORMAL
COLOR FLD: YELLOW
CRYSTALS FLD MICRO: NEGATIVE
LYMPHOCYTES NFR FLD MANUAL: 4 %
MONOS+MACROS NFR FLD MANUAL: 8 %
NEUTROPHILS NFR FLD MANUAL: 88 %
WBC # FLD: NORMAL /CU MM

## 2024-08-28 PROCEDURE — 99999 PR PBB SHADOW E&M-EST. PATIENT-LVL I: CPT | Mod: PBBFAC,,, | Performed by: ORTHOPAEDIC SURGERY

## 2024-08-28 PROCEDURE — 1159F MED LIST DOCD IN RCRD: CPT | Mod: CPTII,S$GLB,, | Performed by: ORTHOPAEDIC SURGERY

## 2024-08-28 PROCEDURE — 99213 OFFICE O/P EST LOW 20 MIN: CPT | Mod: 25,S$GLB,, | Performed by: ORTHOPAEDIC SURGERY

## 2024-08-28 PROCEDURE — 20610 DRAIN/INJ JOINT/BURSA W/O US: CPT | Mod: RT,S$GLB,, | Performed by: ORTHOPAEDIC SURGERY

## 2024-08-28 PROCEDURE — 4010F ACE/ARB THERAPY RXD/TAKEN: CPT | Mod: CPTII,S$GLB,, | Performed by: ORTHOPAEDIC SURGERY

## 2024-08-28 PROCEDURE — 89051 BODY FLUID CELL COUNT: CPT | Performed by: ORTHOPAEDIC SURGERY

## 2024-08-28 PROCEDURE — 87075 CULTR BACTERIA EXCEPT BLOOD: CPT | Performed by: ORTHOPAEDIC SURGERY

## 2024-08-28 PROCEDURE — 87070 CULTURE OTHR SPECIMN AEROBIC: CPT | Performed by: ORTHOPAEDIC SURGERY

## 2024-08-28 PROCEDURE — 3044F HG A1C LEVEL LT 7.0%: CPT | Mod: CPTII,S$GLB,, | Performed by: ORTHOPAEDIC SURGERY

## 2024-08-28 PROCEDURE — 89060 EXAM SYNOVIAL FLUID CRYSTALS: CPT | Performed by: ORTHOPAEDIC SURGERY

## 2024-08-28 PROCEDURE — 1160F RVW MEDS BY RX/DR IN RCRD: CPT | Mod: CPTII,S$GLB,, | Performed by: ORTHOPAEDIC SURGERY

## 2024-08-28 NOTE — PROCEDURES
Large Joint Aspiration/Injection: R knee joint    Date/Time: 8/28/2024 2:00 PM    Performed by: Jose Rafael Barney MD  Authorized by: Jose Rafael Barney MD    Consent Done?:  Yes (Verbal)  Indications:  Pain  Site marked: the procedure site was marked    Timeout: prior to procedure the correct patient, procedure, and site was verified      Details:  Needle Size:  18 G  Approach:  Lateral  Location:  Knee  Site:  R knee joint  Medications:  20 mg sodium hyaluronate (EUFLEXXA) 10 mg/mL(mw 2.4 -3.6 million)  Aspirate amount (mL):  30  Aspirate:  Cloudy  Lab: fluid sent for laboratory analysis    Patient tolerance:  Patient tolerated the procedure well with no immediate complications

## 2024-08-28 NOTE — PROGRESS NOTES
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 difficile colitis 08/2021    Cancer     lung    CKD (chronic kidney disease), stage III     Colonic diverticular abscess 07/12/2022    COPD (chronic obstructive pulmonary disease)     Encounter for blood transfusion     Hepatic steatosis     History of numbness     transient numbness left hand and foot    History of tobacco abuse     Hyperlipidemia     Hypertension     Lung nodules     and adenopathy    Multinodular goiter (nontoxic) 05/08/2017    Obesity     TRISHA (obstructive sleep apnea)     on bipap at bedtime    Proteinuria        Past Surgical History:   Procedure Laterality Date    ADENOIDECTOMY      APPENDECTOMY      CATARACT EXTRACTION      COLONOSCOPY N/A 11/14/2016    Procedure: COLONOSCOPY;  Surgeon: Destin Cardona MD;  Location: [facility] ENDO;  Service: Endoscopy;  Laterality: N/A;    COLONOSCOPY N/A 05/14/2021    Procedure: COLONOSCOPY;  Surgeon: Destin Cardona MD;  Location: [facility];  Service: Endoscopy;  Laterality: N/A; hemorrhoids, diverticulosis, Old blood left colon. No active bleeding; Repeat colonoscopy is not recommended for screening purposes.    COLOSTOMY N/A 07/28/2022    Procedure: CREATION, COLOSTOMY;  Surgeon: Nubia Tinoco MD;  Location: [facility] OR;  Service: General;  Laterality: N/A;    DEBRIDEMENT OF WOUND OF ABDOMEN  07/28/2022    Procedure: DEBRIDEMENT, WOUND, ABDOMEN;  Surgeon: Nubia Tinoco MD;  Location: [facility] OR;  Service: General;;    ESOPHAGOGASTRODUODENOSCOPY N/A 05/14/2021    Procedure: EGD (ESOPHAGOGASTRODUODENOSCOPY);  Surgeon: Destin Cardona MD;  Location: [facility];  Service: Endoscopy;  Laterality: N/A; unremarkable    ESOPHAGOGASTRODUODENOSCOPY N/A 08/09/2021     Procedure: ESOPHAGOGASTRODUODENOSCOPY (EGD);  Surgeon: Destin Cardona MD;  Location: [facility] ENDO;  Service: Endoscopy;  Laterality: N/A; Moderately severe radiation esophagitis with no bleeding    EXCISIONAL BIOPSY N/A 02/09/2023    Procedure: EXCISIONAL BIOPSY;  Surgeon: Nubia Tinoco MD;  Location: [facility] OR;  Service: General;  Laterality: N/A;    EYE SURGERY      cataract OU    HERNIA REPAIR      INSERTION OF TUNNELED CENTRAL VENOUS CATHETER (CVC) WITH SUBCUTANEOUS PORT N/A 06/07/2021    Procedure: [id number];  Surgeon: Joe Salinas MD;  Location: [facility] OR;  Service: General;  Laterality: N/A;    INTRALUMINAL GASTROINTESTINAL TRACT IMAGING VIA CAPSULE N/A 12/29/2021    Procedure: IMAGING PROCEDURE, GI TRACT, INTRALUMINAL, VIA CAPSULE  (called for instruction 12/20-may have trouble swallowing);  Surgeon: Floyd Dixon MD;  Location: [facility] ENDO;  Service: Endoscopy;  Laterality: N/A; Diffuse red spots of unclear significance and erosions found in the small bowel (entire small bowel), suspicious for mild radiation enteritis    NEUROMA SURGERY Left     acoustic    TONSILLECTOMY      WOUND EXPLORATION N/A 08/13/2022    Procedure: EXPLORATION, WOUND;  Surgeon: Arnel Olsen MD;  Location: [facility] OR;  Service: General;  Laterality: N/A;    WOUND EXPLORATION Left 02/09/2023    Procedure: EXPLORATION, WOUND - of Ostomy Granuloma - Wound - Abdomen;  Surgeon: Nubia Tinoco MD;  Location: [facility] OR;  Service: General;  Laterality: Left;       Current Outpatient Medications   Medication Sig    ascorbic acid, vitamin C, (VITAMIN C) 500 MG tablet Take 500 mg by mouth once daily. Indications: inadequate vitamin C    azelastine (ASTELIN) 137 mcg (0.1 %) nasal spray USE 2 SPRAYS IN EACH NOSTRIL EVERY NIGHT AT BEDTIME    blood sugar diagnostic Strp 1 strip by Misc.(Non-Drug; Combo Route) route Daily.    blood-glucose meter kit Use as instructed    blood-glucose sensor (DEXCOM G7 SENSOR MISC) Inject 1  each into the skin every 10 days. Continues glucose monitoring    budesonide (PULMICORT) 0.5 mg/2 mL nebulizer solution Take 2 mLs (0.5 mg total) by nebulization every 12 (twelve) hours. Controller    cetirizine (ZYRTEC) 10 MG tablet Take 10 mg by mouth once daily.    cholecalciferol, vitamin D3, (VITAMIN D3) 125 mcg (5,000 unit) Tab Take 5,000 Units by mouth once daily.    clotrimazole (LOTRIMIN) 1 % cream Apply topically 2 (two) times daily.    docusate sodium (COLACE) 100 MG capsule Take 1 capsule (100 mg total) by mouth daily as needed for Constipation.    ELIQUIS 5 mg Tab TAKE 1 TABLET(5 MG) BY MOUTH TWICE DAILY    famotidine (PEPCID) 40 MG tablet Take 1 tablet (40 mg total) by mouth 2 (two) times daily.    fluocinolone acetonide oiL 0.01 % Drop Place 4 drops in ear(s) 2 (two) times a day.    gabapentin (NEURONTIN) 100 MG capsule Take 300 mg by mouth 2 (two) times daily.    HYDROcodone-acetaminophen (NORCO)  mg per tablet Take 1 tablet by mouth every 12 (twelve) hours as needed for Pain. (Patient not taking: Reported on 4/19/2024)    HYDROcodone-acetaminophen (NORCO) 5-325 mg per tablet Take 1 tablet by mouth every 6 (six) hours as needed for Pain.    insulin (LANTUS SOLOSTAR U-100 INSULIN) glargine 100 units/mL SubQ pen Inject 26 Units into the skin every evening.    ipratropium (ATROVENT) 0.02 % nebulizer solution Take 2.5 mLs (500 mcg total) by nebulization every 6 (six) hours as needed (wheeze). Rescue    ketoconazole (NIZORAL) 2 % shampoo Apply topically twice a week. Lather and let sit for 5 minutes before rinsing.    Lactobacillus acidophilus (PROBIOTIC ORAL) Take 1 capsule by mouth once daily.    lancets Misc 1 each by Misc.(Non-Drug; Combo Route) route once daily.    levothyroxine (SYNTHROID) 75 MCG tablet 1 tablet 6 days a week and 1/2 tablet on day 7    LIDOcaine-prilocaine (EMLA) cream Apply topically as needed (as needed for port access).    losartan (COZAAR) 25 MG tablet TAKE 1 TABLET(25 MG)  "BY MOUTH TWICE DAILY    metoprolol succinate (TOPROL-XL) 25 MG 24 hr tablet Take 0.5 tablets (12.5 mg total) by mouth 2 (two) times daily.    mv-mn/folic ac/calcium/vit K1 (WOMEN'S 50 PLUS MULTIVITAMIN ORAL) Take 1 tablet by mouth once daily.    NOVOLOG FLEXPEN U-100 INSULIN 100 unit/mL (3 mL) InPn pen 7 Units with meals with correction scale. Use on premeal readings; 150-200=+2, 201-250=+4; 251-300=+6; 301-350=+8, over 350, + 10 units 50 units a day max    nystatin (MYCOSTATIN) cream Apply topically 3 (three) times daily as needed (itch).    omeprazole (PRILOSEC) 40 MG capsule TAKE 1 CAPSULE BY MOUTH DAILY EVERY MORNING ON AN EMPTY STOMACH 30 MINUTES AFTER THYROID MEDICATION    pantoprazole (PROTONIX) 40 MG tablet Take 1 tablet (40 mg total) by mouth once daily. (Patient not taking: Reported on 7/11/2024)    pen needle, diabetic (NOVOFINE 32) 32 gauge x 1/4" Ndle TO USE WITH INSULIN PENS FOUR TIMES DAILY    pen needle, diabetic (NOVOFINE 32) 32 gauge x 1/4" Ndle TO USE WITH INSULIN PENS FOUR TIMES DAILY    sertraline (ZOLOFT) 25 MG tablet TAKE 1 TABLET(25 MG) BY MOUTH EVERY DAY    simvastatin (ZOCOR) 40 MG tablet TAKE 1 TABLET(40 MG) BY MOUTH EVERY EVENING    sodium chloride 3% 3 % nebulizer solution Take 4 mLs by nebulization 2 (two) times daily.    triamcinolone acetonide 0.1% (KENALOG) 0.1 % cream Apply topically 2 (two) times daily.    vitamin A 51591 UNIT capsule Take 10,000 Units by mouth 2 (two) times a day. (Patient not taking: Reported on 7/11/2024)     No current facility-administered medications for this visit.     Facility-Administered Medications Ordered in Other Visits   Medication    EUFLEXXA 10 mg/mL(mw 2.4 -3.6 million) injection Syrg 20 mg       Review of patient's allergies indicates:   Allergen Reactions    Contrast media Anaphylaxis    Iodinated contrast media Anaphylaxis and Rash    Albuterol Other (See Comments)     Used during PFTs and put pt in afib    Dye     Pcn [penicillins]      Pt " states did well on cephalexin.  No adverse reaction or side effect.     Doxycycline Palpitations     Tolerated IV Doxy 2022       Family History   Problem Relation Name Age of Onset    Heart disease Mother          dissection    GI problems Father          perf colon    Cancer Paternal Uncle          colon cancer    Diabetes Neg Hx      Kidney disease Neg Hx      Stroke Neg Hx      Colon cancer Neg Hx      Crohn's disease Neg Hx      Ulcerative colitis Neg Hx      Stomach cancer Neg Hx      Esophageal cancer Neg Hx         Social History     Socioeconomic History    Marital status:    Tobacco Use    Smoking status: Former     Current packs/day: 0.00     Average packs/day: 1 pack/day for 40.0 years (40.0 ttl pk-yrs)     Types: Cigarettes     Start date: 3/6/1976     Quit date: 3/6/2016     Years since quittin.4    Smokeless tobacco: Never   Substance and Sexual Activity    Alcohol use: Not Currently    Drug use: No   Social History Narrative    Works in insurance collection.     Social Determinants of Health     Financial Resource Strain: Low Risk  (2024)    Overall Financial Resource Strain (CARDIA)     Difficulty of Paying Living Expenses: Not hard at all   Food Insecurity: No Food Insecurity (2024)    Hunger Vital Sign     Worried About Running Out of Food in the Last Year: Never true     Ran Out of Food in the Last Year: Never true   Transportation Needs: No Transportation Needs (2024)    OASIS : Transportation     Lack of Transportation (Medical): No     Lack of Transportation (Non-Medical): No     Patient Unable or Declines to Respond: No   Physical Activity: Unknown (10/13/2021)    Exercise Vital Sign     Days of Exercise per Week: 0 days   Stress: No Stress Concern Present (10/13/2021)    [employer] - Occupational Stress Questionnaire     Feeling of Stress : Not at all   Housing Stability: Low Risk  (2024)    Housing Stability Vital Sign      Unable to Pay for Housing in the Last Year: No     Number of Places Lived in the Last Year: 1     Unstable Housing in the Last Year: No       Chief Complaint:   No chief complaint on file.      History of present illness:  74-year-old female seen for a few months of right knee pain and swelling.  No injury or trauma.  Pain mostly with walking.  Pain is over the anterior medial knee.  We gave her a cortisone injection back in May.  It worked great for about 2 months then the pain and swelling returned.  We started a Euflexxa series on her a couple weeks ago.  Had more pain after the 2nd injection with significant swelling.  Pain is an 8/10.        Review of Systems:  Musculoskeletal:  See HPI      Physical Examination:    Vital Signs:    There were no vitals filed for this visit.      There is no height or weight on file to calculate BMI.    This a well-developed, well nourished patient in no acute distress.  They are alert and oriented and cooperative to examination.  Pt. walks without an antalgic gait.      Examination of the right knee shows no rashes or erythema. There are no masses ecchymosis And a moderate effusion.  Patient is nontender to palpation over lateral joint line and moderately tender to palpation over the medial joint line. Knee is stable to varus and valgus stress. 5 out of 5 motor strength. Palpable distal pulses. Intact light touch sensation. Negative Patellofemoral crepitus      X-rays:  X-rays of the right knee are  reviewed which show some moderate tricompartmental degenerative change of both knees.         Assessment:  Possible right degenerative medial meniscal tear with effusion    Plan:  I recommended aspiration of her right knee as well as completing the series.  I was able to aspirate about 30 cubic centimeters of slightly cloudy fluid.  We will go ahead and send the fluid off for analysis.  Possible steroid injection prior to a trip to [place] in a few weeks if the fluid is normal and  not improving from the Euflexxa.          All previous pertinent notes including ER visits, physical therapy visits, other orthopedic visits as well as other care for the same musculoskeletal problem were reviewed.  All pertinent lab values and previous imaging was reviewed pertinent to the current visit.    This note was created using [employer] voice recognition software that occasionally misinterpreted phrases or words.    Consult note is delivered via Epic messaging service.

## 2024-08-29 LAB — PATH INTERP FLD-IMP: NORMAL

## 2024-08-30 ENCOUNTER — TELEPHONE (OUTPATIENT)
Dept: ORTHOPEDICS | Facility: CLINIC | Age: 74
End: 2024-08-30
Payer: MEDICARE

## 2024-08-30 LAB — BACTERIA SPEC AEROBE CULT: NO GROWTH

## 2024-08-30 NOTE — TELEPHONE ENCOUNTER
----- Message from Rain Martinez sent at 8/30/2024 11:04 AM CDT -----  Contact: Patient  Type:  Needs Medical Advice    Who Called: Patient       Would the patient rather a call back or a response via [facility]? Call    Best Call Back Number: [phone] (home)     Additional Information: Patient is in need of advise. Patient is still in pain and having trouble walking after the injection. Please call to advise

## 2024-08-31 LAB — BACTERIA SPEC ANAEROBE CULT: NORMAL

## 2024-09-04 ENCOUNTER — OFFICE VISIT (OUTPATIENT)
Dept: ORTHOPEDICS | Facility: CLINIC | Age: 74
End: 2024-09-04
Payer: MEDICARE

## 2024-09-04 VITALS — WEIGHT: 173.06 LBS | BODY MASS INDEX: 27.81 KG/M2 | HEIGHT: 66 IN

## 2024-09-04 DIAGNOSIS — M17.11 PRIMARY OSTEOARTHRITIS OF RIGHT KNEE: Primary | ICD-10-CM

## 2024-09-04 DIAGNOSIS — S83.231D COMPLEX TEAR OF MEDIAL MENISCUS OF RIGHT KNEE AS CURRENT INJURY, SUBSEQUENT ENCOUNTER: Primary | ICD-10-CM

## 2024-09-04 DIAGNOSIS — S83.231D COMPLEX TEAR OF MEDIAL MENISCUS OF RIGHT KNEE AS CURRENT INJURY, SUBSEQUENT ENCOUNTER: ICD-10-CM

## 2024-09-04 PROCEDURE — 1159F MED LIST DOCD IN RCRD: CPT | Mod: CPTII,S$GLB,, | Performed by: ORTHOPAEDIC SURGERY

## 2024-09-04 PROCEDURE — 3044F HG A1C LEVEL LT 7.0%: CPT | Mod: CPTII,S$GLB,, | Performed by: ORTHOPAEDIC SURGERY

## 2024-09-04 PROCEDURE — 1125F AMNT PAIN NOTED PAIN PRSNT: CPT | Mod: CPTII,S$GLB,, | Performed by: ORTHOPAEDIC SURGERY

## 2024-09-04 PROCEDURE — 99214 OFFICE O/P EST MOD 30 MIN: CPT | Mod: S$GLB,,, | Performed by: ORTHOPAEDIC SURGERY

## 2024-09-04 PROCEDURE — 99999 PR PBB SHADOW E&M-EST. PATIENT-LVL II: CPT | Mod: PBBFAC,,, | Performed by: ORTHOPAEDIC SURGERY

## 2024-09-04 PROCEDURE — 1160F RVW MEDS BY RX/DR IN RCRD: CPT | Mod: CPTII,S$GLB,, | Performed by: ORTHOPAEDIC SURGERY

## 2024-09-04 PROCEDURE — 4010F ACE/ARB THERAPY RXD/TAKEN: CPT | Mod: CPTII,S$GLB,, | Performed by: ORTHOPAEDIC SURGERY

## 2024-09-04 NOTE — PROGRESS NOTES
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 difficile colitis 08/2021    Cancer     lung    CKD (chronic kidney disease), stage III     Colonic diverticular abscess 07/12/2022    COPD (chronic obstructive pulmonary disease)     Encounter for blood transfusion     Hepatic steatosis     History of numbness     transient numbness left hand and foot    History of tobacco abuse     Hyperlipidemia     Hypertension     Lung nodules     and adenopathy    Multinodular goiter (nontoxic) 05/08/2017    Obesity     TRISHA (obstructive sleep apnea)     on bipap at bedtime    Proteinuria        Past Surgical History:   Procedure Laterality Date    ADENOIDECTOMY      APPENDECTOMY      CATARACT EXTRACTION      COLONOSCOPY N/A 11/14/2016    Procedure: COLONOSCOPY;  Surgeon: Destin Cardona MD;  Location: [facility] ENDO;  Service: Endoscopy;  Laterality: N/A;    COLONOSCOPY N/A 05/14/2021    Procedure: COLONOSCOPY;  Surgeon: Destin Cardona MD;  Location: [facility];  Service: Endoscopy;  Laterality: N/A; hemorrhoids, diverticulosis, Old blood left colon. No active bleeding; Repeat colonoscopy is not recommended for screening purposes.    COLOSTOMY N/A 07/28/2022    Procedure: CREATION, COLOSTOMY;  Surgeon: Nubia Tinoco MD;  Location: [facility] OR;  Service: General;  Laterality: N/A;    DEBRIDEMENT OF WOUND OF ABDOMEN  07/28/2022    Procedure: DEBRIDEMENT, WOUND, ABDOMEN;  Surgeon: Nubia Tinoco MD;  Location: [facility] OR;  Service: General;;    ESOPHAGOGASTRODUODENOSCOPY N/A 05/14/2021    Procedure: EGD (ESOPHAGOGASTRODUODENOSCOPY);  Surgeon: Destin Cardona MD;  Location: [facility];  Service: Endoscopy;  Laterality: N/A; unremarkable    ESOPHAGOGASTRODUODENOSCOPY N/A 08/09/2021     Procedure: ESOPHAGOGASTRODUODENOSCOPY (EGD);  Surgeon: Destin Cardona MD;  Location: [facility] ENDO;  Service: Endoscopy;  Laterality: N/A; Moderately severe radiation esophagitis with no bleeding    EXCISIONAL BIOPSY N/A 02/09/2023    Procedure: EXCISIONAL BIOPSY;  Surgeon: Nubia Tinoco MD;  Location: [facility] OR;  Service: General;  Laterality: N/A;    EYE SURGERY      cataract OU    HERNIA REPAIR      INSERTION OF TUNNELED CENTRAL VENOUS CATHETER (CVC) WITH SUBCUTANEOUS PORT N/A 06/07/2021    Procedure: [id number];  Surgeon: Joe Salinas MD;  Location: [facility] OR;  Service: General;  Laterality: N/A;    INTRALUMINAL GASTROINTESTINAL TRACT IMAGING VIA CAPSULE N/A 12/29/2021    Procedure: IMAGING PROCEDURE, GI TRACT, INTRALUMINAL, VIA CAPSULE  (called for instruction 12/20-may have trouble swallowing);  Surgeon: Floyd Dixon MD;  Location: [facility] ENDO;  Service: Endoscopy;  Laterality: N/A; Diffuse red spots of unclear significance and erosions found in the small bowel (entire small bowel), suspicious for mild radiation enteritis    NEUROMA SURGERY Left     acoustic    TONSILLECTOMY      WOUND EXPLORATION N/A 08/13/2022    Procedure: EXPLORATION, WOUND;  Surgeon: Arnel Olsen MD;  Location: [facility] OR;  Service: General;  Laterality: N/A;    WOUND EXPLORATION Left 02/09/2023    Procedure: EXPLORATION, WOUND - of Ostomy Granuloma - Wound - Abdomen;  Surgeon: Nubia Tinoco MD;  Location: [facility] OR;  Service: General;  Laterality: Left;       Current Outpatient Medications   Medication Sig    ascorbic acid, vitamin C, (VITAMIN C) 500 MG tablet Take 500 mg by mouth once daily. Indications: inadequate vitamin C    azelastine (ASTELIN) 137 mcg (0.1 %) nasal spray USE 2 SPRAYS IN EACH NOSTRIL EVERY NIGHT AT BEDTIME    blood sugar diagnostic Strp 1 strip by Misc.(Non-Drug; Combo Route) route Daily.    blood-glucose meter kit Use as instructed    blood-glucose sensor (DEXCOM G7 SENSOR MISC) Inject 1  each into the skin every 10 days. Continues glucose monitoring    budesonide (PULMICORT) 0.5 mg/2 mL nebulizer solution Take 2 mLs (0.5 mg total) by nebulization every 12 (twelve) hours. Controller    cetirizine (ZYRTEC) 10 MG tablet Take 10 mg by mouth once daily.    cholecalciferol, vitamin D3, (VITAMIN D3) 125 mcg (5,000 unit) Tab Take 5,000 Units by mouth once daily.    clotrimazole (LOTRIMIN) 1 % cream Apply topically 2 (two) times daily.    docusate sodium (COLACE) 100 MG capsule Take 1 capsule (100 mg total) by mouth daily as needed for Constipation.    ELIQUIS 5 mg Tab TAKE 1 TABLET(5 MG) BY MOUTH TWICE DAILY    famotidine (PEPCID) 40 MG tablet Take 1 tablet (40 mg total) by mouth 2 (two) times daily.    fluocinolone acetonide oiL 0.01 % Drop Place 4 drops in ear(s) 2 (two) times a day.    gabapentin (NEURONTIN) 100 MG capsule Take 300 mg by mouth 2 (two) times daily.    HYDROcodone-acetaminophen (NORCO)  mg per tablet Take 1 tablet by mouth every 12 (twelve) hours as needed for Pain. (Patient not taking: Reported on 4/19/2024)    HYDROcodone-acetaminophen (NORCO) 5-325 mg per tablet Take 1 tablet by mouth every 6 (six) hours as needed for Pain.    insulin (LANTUS SOLOSTAR U-100 INSULIN) glargine 100 units/mL SubQ pen Inject 26 Units into the skin every evening.    ipratropium (ATROVENT) 0.02 % nebulizer solution Take 2.5 mLs (500 mcg total) by nebulization every 6 (six) hours as needed (wheeze). Rescue    ketoconazole (NIZORAL) 2 % shampoo Apply topically twice a week. Lather and let sit for 5 minutes before rinsing.    Lactobacillus acidophilus (PROBIOTIC ORAL) Take 1 capsule by mouth once daily.    lancets Misc 1 each by Misc.(Non-Drug; Combo Route) route once daily.    levothyroxine (SYNTHROID) 75 MCG tablet 1 tablet 6 days a week and 1/2 tablet on day 7    LIDOcaine-prilocaine (EMLA) cream Apply topically as needed (as needed for port access).    losartan (COZAAR) 25 MG tablet TAKE 1 TABLET(25 MG)  "BY MOUTH TWICE DAILY    metoprolol succinate (TOPROL-XL) 25 MG 24 hr tablet Take 0.5 tablets (12.5 mg total) by mouth 2 (two) times daily.    mv-mn/folic ac/calcium/vit K1 (WOMEN'S 50 PLUS MULTIVITAMIN ORAL) Take 1 tablet by mouth once daily.    NOVOLOG FLEXPEN U-100 INSULIN 100 unit/mL (3 mL) InPn pen 7 Units with meals with correction scale. Use on premeal readings; 150-200=+2, 201-250=+4; 251-300=+6; 301-350=+8, over 350, + 10 units 50 units a day max    nystatin (MYCOSTATIN) cream Apply topically 3 (three) times daily as needed (itch).    omeprazole (PRILOSEC) 40 MG capsule TAKE 1 CAPSULE BY MOUTH DAILY EVERY MORNING ON AN EMPTY STOMACH 30 MINUTES AFTER THYROID MEDICATION    pantoprazole (PROTONIX) 40 MG tablet Take 1 tablet (40 mg total) by mouth once daily. (Patient not taking: Reported on 7/11/2024)    pen needle, diabetic (NOVOFINE 32) 32 gauge x 1/4" Ndle TO USE WITH INSULIN PENS FOUR TIMES DAILY    pen needle, diabetic (NOVOFINE 32) 32 gauge x 1/4" Ndle TO USE WITH INSULIN PENS FOUR TIMES DAILY    sertraline (ZOLOFT) 25 MG tablet TAKE 1 TABLET(25 MG) BY MOUTH EVERY DAY    simvastatin (ZOCOR) 40 MG tablet TAKE 1 TABLET(40 MG) BY MOUTH EVERY EVENING    sodium chloride 3% 3 % nebulizer solution Take 4 mLs by nebulization 2 (two) times daily.    triamcinolone acetonide 0.1% (KENALOG) 0.1 % cream Apply topically 2 (two) times daily.    vitamin A 34878 UNIT capsule Take 10,000 Units by mouth 2 (two) times a day. (Patient not taking: Reported on 7/11/2024)     No current facility-administered medications for this visit.     Facility-Administered Medications Ordered in Other Visits   Medication    EUFLEXXA 10 mg/mL(mw 2.4 -3.6 million) injection Syrg 20 mg       Review of patient's allergies indicates:   Allergen Reactions    Contrast media Anaphylaxis    Iodinated contrast media Anaphylaxis and Rash    Albuterol Other (See Comments)     Used during PFTs and put pt in afib    Dye     Pcn [penicillins]      Pt " states did well on cephalexin.  No adverse reaction or side effect.     Doxycycline Palpitations     Tolerated IV Doxy 2022       Family History   Problem Relation Name Age of Onset    Heart disease Mother          dissection    GI problems Father          perf colon    Cancer Paternal Uncle          colon cancer    Diabetes Neg Hx      Kidney disease Neg Hx      Stroke Neg Hx      Colon cancer Neg Hx      Crohn's disease Neg Hx      Ulcerative colitis Neg Hx      Stomach cancer Neg Hx      Esophageal cancer Neg Hx         Social History     Socioeconomic History    Marital status:    Tobacco Use    Smoking status: Former     Current packs/day: 0.00     Average packs/day: 1 pack/day for 40.0 years (40.0 ttl pk-yrs)     Types: Cigarettes     Start date: 3/6/1976     Quit date: 3/6/2016     Years since quittin.5    Smokeless tobacco: Never   Substance and Sexual Activity    Alcohol use: Not Currently    Drug use: No   Social History Narrative    Works in insurance collection.     Social Determinants of Health     Financial Resource Strain: Low Risk  (2024)    Overall Financial Resource Strain (CARDIA)     Difficulty of Paying Living Expenses: Not hard at all   Food Insecurity: No Food Insecurity (2024)    Hunger Vital Sign     Worried About Running Out of Food in the Last Year: Never true     Ran Out of Food in the Last Year: Never true   Transportation Needs: No Transportation Needs (2024)    OASIS : Transportation     Lack of Transportation (Medical): No     Lack of Transportation (Non-Medical): No     Patient Unable or Declines to Respond: No   Physical Activity: Unknown (10/13/2021)    Exercise Vital Sign     Days of Exercise per Week: 0 days   Stress: No Stress Concern Present (10/13/2021)    [employer] - Occupational Stress Questionnaire     Feeling of Stress : Not at all   Housing Stability: Low Risk  (2024)    Housing Stability Vital Sign      Unable to Pay for Housing in the Last Year: No     Number of Places Lived in the Last Year: 1     Unstable Housing in the Last Year: No       Chief Complaint:   No chief complaint on file.      History of present illness:  74-year-old female seen for a few months of right knee pain and swelling.  No injury or trauma.  Pain mostly with walking.  Pain is over the anterior medial knee.  We gave her a cortisone injection back in May.  It worked great for about 2 months then the pain and swelling returned.  We started a Euflexxa series on her a couple weeks ago.  Had more pain after the 2nd injection with significant swelling.  Still was severe pain and swelling.  Pain is a 9/10.        Review of Systems:  Musculoskeletal:  See HPI      Physical Examination:    Vital Signs:    There were no vitals filed for this visit.      There is no height or weight on file to calculate BMI.    This a well-developed, well nourished patient in no acute distress.  They are alert and oriented and cooperative to examination.  Pt. comes in in a wheelchair    Examination of the right knee shows no rashes or erythema. There are no masses ecchymosis And a large effusion.  Patient is nontender to palpation over lateral joint line and moderately tender to palpation over the medial joint line. Knee is stable to varus and valgus stress. 5 out of 5 motor strength. Palpable distal pulses. Intact light touch sensation. Negative Patellofemoral crepitus      X-rays:  X-rays of the right knee are  reviewed which show some moderate tricompartmental degenerative change of both knees.         Assessment:  Right medial meniscal tear with large effusion    Plan:  I recommended getting an MRI to evaluate her knee further.  Patient had some moderate arthritic change but has tried both viscosupplementation and cortisone injection without any significant relief.  Patient has such large effusions and pain that she is unable to walk.  Follow up after the MRI is  completed.        All previous pertinent notes including ER visits, physical therapy visits, other orthopedic visits as well as other care for the same musculoskeletal problem were reviewed.  All pertinent lab values and previous imaging was reviewed pertinent to the current visit.    This note was created using [employer] voice recognition software that occasionally misinterpreted phrases or words.    Consult note is delivered via Epic messaging service.

## 2024-09-04 NOTE — PROGRESS NOTES
"CC: Ms. Allyson Henriquez arrives today for management of Type 2 DM and review of chronic medical conditions, as listed in the Visit Diagnosis section of this encounter.       HPI: Ms. Allyson Henriquez was diagnosed with Type 2 DM in 4/2023. She was diagnosed based on lab work. Initial treatment consisted of insulin. Denies FH of DM. Denies hospitalizations due to DM.   Low C-peptide noted (<0.08) in 9/2023. Was using Keytruda for ~ 6 months prior to this finding. Now diagnosed at Type 1 DM.   Follows with Dr. Daly for hypothyroidism, multinodular goiter.     Patient was last seen by me in April. Lantus dose was decreased at that time.     She takes Keytruda (started in 1/2023) for small cell lung cancer. Taking every 3 weeks. Follows with Dr. Gonzalez.     She has had R knee pain and will be having MRI next week. Seeing Dr. Barney. This has severely affected her mobility. In wheelchair today.     BG monitoring per Dexcom G7.     Hypoglycemia: Occasional  Hypoglycemic Symptoms: none  Hypoglycemia Treatment: juice     Missing Insulin/PO medication doses: Occasional  Timing prandial insulin 5-15 minutes before meals: sometimes gives after     Dietary Habits:  Eats 2-3 meals/day. May skip lunch. Eats small portions. Reports decreased appetite. Avoids sugary beverages.    Last DM education appointment: 8/2023      CURRENT DIABETIC MEDS:  Lantus 24 units QHS, Novolog 7 units + correction scale, target 150, ISF 25   Vial or pen: pen  Glucometer type:     Previous DM treatments:    Last Eye Exam: > 1 year   Last Podiatry Exam:     REVIEW OF SYSTEMS  Constitutional: + fatigue, weakness (due to R knee pain). + 24# weight loss since last visit that she attributes to decreased appetite and dietary change.   Cardiac: no palpitations or chest pain.  Respiratory: no cough, dyspnea.   GI: no c/o abdominal pain or nausea. Denies h/o pancreatitis.   Skin: no lesions or rashes.  states that she had "red spot" on her back and " "he applied Desitin.   Musculoskeletal: + R knee pain.  Neuro: + intermittent, mild numbness, tingling in feet  Endocrine: denies polyphagia, polydipsia, polyuria      Personally reviewed Past Medical, Surgical, Social History.    Vital Signs  /60   Pulse 93   Ht 5' 6" (1.676 m)   Wt 69.3 kg (152 lb 14.2 oz)   BMI 24.68 kg/m²     Personally reviewed the below labs:    Hemoglobin A1C   Date Value Ref Range Status   05/13/2024 6.3 (H) 0.0 - 5.6 % Final     Comment:     Reference Interval:  5.0 - 5.6 Normal   5.7 - 6.4 High Risk   > 6.5 Diabetic      Hgb A1c results are standardized based on the (NGSP) National   Glycohemoglobin Standardization Program.      Hemoglobin A1C levels are related to mean serum/plasma glucose   during the preceding 2-3 months.        01/15/2024 5.8 (H) 0.0 - 5.6 % Final     Comment:     Reference Interval:  5.0 - 5.6 Normal   5.7 - 6.4 High Risk   > 6.5 Diabetic      Hgb A1c results are standardized based on the (NGSP) National   Glycohemoglobin Standardization Program.      Hemoglobin A1C levels are related to mean serum/plasma glucose   during the preceding 2-3 months.        08/18/2023 7.0 (H) 4.0 - 5.6 % Final     Comment:     ADA Screening Guidelines:  5.7-6.4%  Consistent with prediabetes  >or=6.5%  Consistent with diabetes    High levels of fetal hemoglobin interfere with the HbA1C  assay. Heterozygous hemoglobin variants (HbS, HgC, etc)do  not significantly interfere with this assay.   However, presence of multiple variants may affect accuracy.         Chemistry        Component Value Date/Time     (L) 08/26/2024 1204    K 4.7 08/26/2024 1204     08/26/2024 1204    CO2 24 08/26/2024 1204    BUN 25 (H) 08/26/2024 1204    CREATININE 1.14 08/26/2024 1204     (H) 08/26/2024 1204        Component Value Date/Time    CALCIUM 9.6 08/26/2024 1204    ALKPHOS 109 08/26/2024 1204    AST 25 08/26/2024 1204    ALT 17 08/26/2024 1204    BILITOT 0.6 08/26/2024 1204    " ESTGFRAFRICA >60 07/31/2022 0430    EGFRNONAA 55 (A) 07/31/2022 0430          Lab Results   Component Value Date    CHOL 134 09/11/2023    CHOL 158 08/19/2022    CHOL 167 07/06/2022     Lab Results   Component Value Date    HDL 38 (L) 09/11/2023    HDL 22 (L) 08/19/2022    HDL 46 07/06/2022     Lab Results   Component Value Date    LDLCALC 63.2 09/11/2023    LDLCALC 103.2 08/19/2022    LDLCALC 81.6 07/06/2022     Lab Results   Component Value Date    TRIG 164 (H) 09/11/2023    TRIG 164 (H) 08/19/2022    TRIG 197 (H) 07/06/2022     Lab Results   Component Value Date    CHOLHDL 28.4 09/11/2023    CHOLHDL 13.9 (L) 08/19/2022    CHOLHDL 27.5 07/06/2022       Lab Results   Component Value Date    MICALBCREAT 427.5 (H) 03/14/2023     Lab Results   Component Value Date    TSH 0.929 08/26/2024       CrCl cannot be calculated (Patient's most recent lab result is older than the maximum 7 days allowed.).    Vit D, 25-Hydroxy   Date Value Ref Range Status   04/03/2023 61 30 - 96 ng/mL Final     Comment:     Vitamin D deficiency.........<10 ng/mL                              Vitamin D insufficiency......10-29 ng/mL       Vitamin D sufficiency........> or equal to 30 ng/mL  Vitamin D toxicity............>100 ng/mL        Latest Reference Range & Units 06/03/24 07:11   C-Peptide 0.78 - 5.19 ng/mL <0.08 (L)   (L): Data is abnormally low     Latest Reference Range & Units 09/11/23 10:24   C-Peptide 0.78 - 5.19 ng/mL <0.08 (L)   (L): Data is abnormally low    PHYSICAL EXAMINATION  Constitutional: Appears well, no distress  Respiratory: CTA, even and unlabored.  Cardiovascular: RRR, no murmurs, no carotid bruits.  GI: bowel sounds active, no hernia noted  Skin: warm and dry  Neuro: oriented to person, place, time  Feet: appropriate footwear.    DEXCOM G7 DOWNLOAD: Fasting glucoses are mostly acceptable. However, some borderline/mild nocturnal hypoglycemia noted. Prandial excursions noted.          Goals        HEMOGLOBIN A1C < 7                Assessment/Plan  1. Type 1 diabetes mellitus with nephropathy  -- A1c to be obtained with next lab draw.   -- decrease Lantus to 22 units QHS  -- increase Novolog to 8 units with meals + correction scale.   -- intensive BG monitoring per Dexcom G7  -- Schedule eye exam     -- Discussed diagnosis of DM, A1c goals, progression of disease, long term complications and tx options.  -- Reviewed hypoglycemia management: treat with 4 oz of juice, 4 oz regular soda, or 4 glucose tablets. Monitor and repeat treatment every 15 minutes until BG is >70 Then have a snack, which includes 15 grams of complex carbohydrates and protein.   Advised patient to check BG before activities, such as driving or exercise.   2. Hypothyroidism, unspecified type  -- stable  -- follows with Dr. Daly   3. Hypoglycemia unawareness associated with type 1 diabetes mellitus  -- continue Dexcom G7   4. Small cell lung cancer  -- on Keytruda, which may have led to autoimmune DM  -- follows with Dr. Gonzalez           FOLLOW UP  Follow up in about 4 months (around 1/5/2025).   Patient instructed to bring BG logs to each follow up   Patient encouraged to call for any BG/medication issues, concerns, or questions.    Orders Placed This Encounter   Procedures    Hemoglobin A1C    Hemoglobin A1C    Lipid Panel    Comprehensive Metabolic Panel    Microalbumin/Creatinine Ratio, Urine

## 2024-09-05 ENCOUNTER — OFFICE VISIT (OUTPATIENT)
Dept: ENDOCRINOLOGY | Facility: CLINIC | Age: 74
End: 2024-09-05
Payer: MEDICARE

## 2024-09-05 VITALS
SYSTOLIC BLOOD PRESSURE: 116 MMHG | WEIGHT: 152.88 LBS | HEIGHT: 66 IN | DIASTOLIC BLOOD PRESSURE: 60 MMHG | HEART RATE: 93 BPM | BODY MASS INDEX: 24.57 KG/M2

## 2024-09-05 DIAGNOSIS — E10.21 TYPE 1 DIABETES MELLITUS WITH NEPHROPATHY: Primary | ICD-10-CM

## 2024-09-05 DIAGNOSIS — E10.649 HYPOGLYCEMIA UNAWARENESS ASSOCIATED WITH TYPE 1 DIABETES MELLITUS: ICD-10-CM

## 2024-09-05 DIAGNOSIS — Z79.4 TYPE 2 DIABETES MELLITUS WITHOUT COMPLICATION, WITH LONG-TERM CURRENT USE OF INSULIN: ICD-10-CM

## 2024-09-05 DIAGNOSIS — E11.9 TYPE 2 DIABETES MELLITUS WITHOUT COMPLICATION, WITH LONG-TERM CURRENT USE OF INSULIN: ICD-10-CM

## 2024-09-05 DIAGNOSIS — E03.9 HYPOTHYROIDISM, UNSPECIFIED TYPE: ICD-10-CM

## 2024-09-05 DIAGNOSIS — C34.90 SMALL CELL LUNG CANCER: ICD-10-CM

## 2024-09-05 PROCEDURE — 99999 PR PBB SHADOW E&M-EST. PATIENT-LVL V: CPT | Mod: PBBFAC,,, | Performed by: NURSE PRACTITIONER

## 2024-09-05 RX ORDER — INSULIN GLARGINE 100 [IU]/ML
22 INJECTION, SOLUTION SUBCUTANEOUS NIGHTLY
Start: 2024-09-05

## 2024-09-05 RX ORDER — INSULIN ASPART 100 [IU]/ML
INJECTION, SOLUTION INTRAVENOUS; SUBCUTANEOUS
Start: 2024-09-05

## 2024-09-06 ENCOUNTER — TELEPHONE (OUTPATIENT)
Dept: ORTHOPEDICS | Facility: CLINIC | Age: 74
End: 2024-09-06
Payer: MEDICARE

## 2024-09-06 DIAGNOSIS — M17.11 PRIMARY OSTEOARTHRITIS OF RIGHT KNEE: ICD-10-CM

## 2024-09-06 DIAGNOSIS — S83.231D COMPLEX TEAR OF MEDIAL MENISCUS OF RIGHT KNEE AS CURRENT INJURY, SUBSEQUENT ENCOUNTER: Primary | ICD-10-CM

## 2024-09-06 NOTE — TELEPHONE ENCOUNTER
----- Message from Agustina Leal MA sent at 9/6/2024 10:56 AM CDT -----    ----- Message -----  From: Krishan Flor  Sent: 9/6/2024  10:49 AM CDT  To: [facility] Staff    Patient would like a callback from [facility] regarding a sooner appointment for her MRI    [phone]

## 2024-09-07 RX ORDER — HYDROCODONE BITARTRATE AND ACETAMINOPHEN 10; 325 MG/1; MG/1
1 TABLET ORAL EVERY 6 HOURS PRN
Qty: 28 TABLET | Refills: 0 | Status: SHIPPED | OUTPATIENT
Start: 2024-09-07

## 2024-09-09 ENCOUNTER — HOSPITAL ENCOUNTER (OUTPATIENT)
Dept: RADIOLOGY | Facility: HOSPITAL | Age: 74
Discharge: HOME | End: 2024-09-09
Attending: ORTHOPAEDIC SURGERY
Payer: MEDICARE

## 2024-09-09 ENCOUNTER — TELEPHONE (OUTPATIENT)
Dept: ENDOCRINOLOGY | Facility: CLINIC | Age: 74
End: 2024-09-09
Payer: MEDICARE

## 2024-09-09 DIAGNOSIS — S83.231D COMPLEX TEAR OF MEDIAL MENISCUS OF RIGHT KNEE AS CURRENT INJURY, SUBSEQUENT ENCOUNTER: ICD-10-CM

## 2024-09-09 PROCEDURE — 73721 MRI JNT OF LWR EXTRE W/O DYE: CPT | Mod: TC,RT

## 2024-09-09 PROCEDURE — 73721 MRI JNT OF LWR EXTRE W/O DYE: CPT | Mod: 26,RT,, | Performed by: RADIOLOGY

## 2024-09-10 DIAGNOSIS — C34.90 SMALL CELL LUNG CANCER: Primary | ICD-10-CM

## 2024-09-10 DIAGNOSIS — C79.31 MALIGNANT NEOPLASM METASTATIC TO BRAIN: ICD-10-CM

## 2024-09-16 ENCOUNTER — E-CONSULT (OUTPATIENT)
Dept: CARDIOLOGY | Facility: CLINIC | Age: 74
End: 2024-09-16
Payer: MEDICARE

## 2024-09-16 ENCOUNTER — OFFICE VISIT (OUTPATIENT)
Dept: ORTHOPEDICS | Facility: CLINIC | Age: 74
End: 2024-09-16
Payer: MEDICARE

## 2024-09-16 VITALS — WEIGHT: 151.88 LBS | BODY MASS INDEX: 24.41 KG/M2 | HEIGHT: 66 IN

## 2024-09-16 DIAGNOSIS — M25.461 EFFUSION OF RIGHT KNEE JOINT: Primary | ICD-10-CM

## 2024-09-16 DIAGNOSIS — Z79.01 CURRENT USE OF LONG TERM ANTICOAGULATION: ICD-10-CM

## 2024-09-16 DIAGNOSIS — I48.0 PAF (PAROXYSMAL ATRIAL FIBRILLATION): Primary | ICD-10-CM

## 2024-09-16 PROCEDURE — 1125F AMNT PAIN NOTED PAIN PRSNT: CPT | Mod: CPTII,S$GLB,, | Performed by: ORTHOPAEDIC SURGERY

## 2024-09-16 PROCEDURE — 1160F RVW MEDS BY RX/DR IN RCRD: CPT | Mod: CPTII,S$GLB,, | Performed by: ORTHOPAEDIC SURGERY

## 2024-09-16 PROCEDURE — 99999 PR PBB SHADOW E&M-EST. PATIENT-LVL II: CPT | Mod: PBBFAC,,, | Performed by: ORTHOPAEDIC SURGERY

## 2024-09-16 PROCEDURE — 3051F HG A1C>EQUAL 7.0%<8.0%: CPT | Mod: CPTII,S$GLB,, | Performed by: ORTHOPAEDIC SURGERY

## 2024-09-16 PROCEDURE — 1159F MED LIST DOCD IN RCRD: CPT | Mod: CPTII,S$GLB,, | Performed by: ORTHOPAEDIC SURGERY

## 2024-09-16 PROCEDURE — 99214 OFFICE O/P EST MOD 30 MIN: CPT | Mod: 57,S$GLB,, | Performed by: ORTHOPAEDIC SURGERY

## 2024-09-16 PROCEDURE — 4010F ACE/ARB THERAPY RXD/TAKEN: CPT | Mod: CPTII,S$GLB,, | Performed by: ORTHOPAEDIC SURGERY

## 2024-09-16 NOTE — H&P (VIEW-ONLY)
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 difficile colitis 08/2021    Cancer     lung    CKD (chronic kidney disease), stage III     Colonic diverticular abscess 07/12/2022    COPD (chronic obstructive pulmonary disease)     Encounter for blood transfusion     Hepatic steatosis     History of numbness     transient numbness left hand and foot    History of tobacco abuse     Hyperlipidemia     Hypertension     Lung nodules     and adenopathy    Multinodular goiter (nontoxic) 05/08/2017    Obesity     TRISHA (obstructive sleep apnea)     on bipap at bedtime    Proteinuria        Past Surgical History:   Procedure Laterality Date    ADENOIDECTOMY      APPENDECTOMY      CATARACT EXTRACTION      COLONOSCOPY N/A 11/14/2016    Procedure: COLONOSCOPY;  Surgeon: Destin Cardona MD;  Location: [facility] ENDO;  Service: Endoscopy;  Laterality: N/A;    COLONOSCOPY N/A 05/14/2021    Procedure: COLONOSCOPY;  Surgeon: Destin Cardona MD;  Location: [facility];  Service: Endoscopy;  Laterality: N/A; hemorrhoids, diverticulosis, Old blood left colon. No active bleeding; Repeat colonoscopy is not recommended for screening purposes.    COLOSTOMY N/A 07/28/2022    Procedure: CREATION, COLOSTOMY;  Surgeon: Nubia Tinoco MD;  Location: [facility] OR;  Service: General;  Laterality: N/A;    DEBRIDEMENT OF WOUND OF ABDOMEN  07/28/2022    Procedure: DEBRIDEMENT, WOUND, ABDOMEN;  Surgeon: Nubia Tinoco MD;  Location: [facility] OR;  Service: General;;    ESOPHAGOGASTRODUODENOSCOPY N/A 05/14/2021    Procedure: EGD (ESOPHAGOGASTRODUODENOSCOPY);  Surgeon: Destin Cardona MD;  Location: [facility];  Service: Endoscopy;  Laterality: N/A; unremarkable    ESOPHAGOGASTRODUODENOSCOPY N/A 08/09/2021     Procedure: ESOPHAGOGASTRODUODENOSCOPY (EGD);  Surgeon: Destin Cardona MD;  Location: [facility] ENDO;  Service: Endoscopy;  Laterality: N/A; Moderately severe radiation esophagitis with no bleeding    EXCISIONAL BIOPSY N/A 02/09/2023    Procedure: EXCISIONAL BIOPSY;  Surgeon: Nubia Tinoco MD;  Location: [facility] OR;  Service: General;  Laterality: N/A;    EYE SURGERY      cataract OU    HERNIA REPAIR      INSERTION OF TUNNELED CENTRAL VENOUS CATHETER (CVC) WITH SUBCUTANEOUS PORT N/A 06/07/2021    Procedure: MIMVLECEI-RSHO-E-CATH;  Surgeon: Joe Salinas MD;  Location: [facility] OR;  Service: General;  Laterality: N/A;    INTRALUMINAL GASTROINTESTINAL TRACT IMAGING VIA CAPSULE N/A 12/29/2021    Procedure: IMAGING PROCEDURE, GI TRACT, INTRALUMINAL, VIA CAPSULE  (called for instruction 12/20-may have trouble swallowing);  Surgeon: Floyd Dixon MD;  Location: [facility] ENDO;  Service: Endoscopy;  Laterality: N/A; Diffuse red spots of unclear significance and erosions found in the small bowel (entire small bowel), suspicious for mild radiation enteritis    NEUROMA SURGERY Left     acoustic    TONSILLECTOMY      WOUND EXPLORATION N/A 08/13/2022    Procedure: EXPLORATION, WOUND;  Surgeon: Arnel Olsen MD;  Location: [facility] OR;  Service: General;  Laterality: N/A;    WOUND EXPLORATION Left 02/09/2023    Procedure: EXPLORATION, WOUND - of Ostomy Granuloma - Wound - Abdomen;  Surgeon: Nubia Tinoco MD;  Location: [facility] OR;  Service: General;  Laterality: Left;       Current Outpatient Medications   Medication Sig    ascorbic acid, vitamin C, (VITAMIN C) 500 MG tablet Take 500 mg by mouth once daily. Indications: inadequate vitamin C    azelastine (ASTELIN) 137 mcg (0.1 %) nasal spray USE 2 SPRAYS IN EACH NOSTRIL EVERY NIGHT AT BEDTIME    blood sugar diagnostic Strp 1 strip by Misc.(Non-Drug; Combo Route) route Daily.    blood-glucose meter kit Use as instructed    blood-glucose sensor (DEXCOM G7 SENSOR MISC) Inject 1  each into the skin every 10 days. Continues glucose monitoring    budesonide (PULMICORT) 0.5 mg/2 mL nebulizer solution Take 2 mLs (0.5 mg total) by nebulization every 12 (twelve) hours. Controller (Patient not taking: Reported on 9/10/2024)    cetirizine (ZYRTEC) 10 MG tablet Take 10 mg by mouth once daily.    cholecalciferol, vitamin D3, (VITAMIN D3) 125 mcg (5,000 unit) Tab Take 5,000 Units by mouth once daily.    clotrimazole (LOTRIMIN) 1 % cream Apply topically 2 (two) times daily.    docusate sodium (COLACE) 100 MG capsule Take 1 capsule (100 mg total) by mouth daily as needed for Constipation.    ELIQUIS 5 mg Tab TAKE 1 TABLET(5 MG) BY MOUTH TWICE DAILY    fluocinolone acetonide oiL 0.01 % Drop Place 4 drops in ear(s) 2 (two) times a day.    gabapentin (NEURONTIN) 100 MG capsule Take 300 mg by mouth 2 (two) times daily.    HYDROcodone-acetaminophen (NORCO)  mg per tablet Take 1 tablet by mouth every 6 (six) hours as needed for Pain.    insulin glargine U-100, Lantus, (LANTUS SOLOSTAR U-100 INSULIN) 100 unit/mL (3 mL) InPn pen Inject 22 Units into the skin every evening.    ipratropium (ATROVENT) 0.02 % nebulizer solution Take 2.5 mLs (500 mcg total) by nebulization every 6 (six) hours as needed (wheeze). Rescue    ketoconazole (NIZORAL) 2 % shampoo Apply topically twice a week. Lather and let sit for 5 minutes before rinsing. (Patient not taking: Reported on 9/5/2024)    Lactobacillus acidophilus (PROBIOTIC ORAL) Take 1 capsule by mouth once daily.    lancets Misc 1 each by Misc.(Non-Drug; Combo Route) route once daily.    levothyroxine (SYNTHROID) 75 MCG tablet 1 tablet 6 days a week and 1/2 tablet on day 7    LIDOcaine-prilocaine (EMLA) cream Apply topically as needed (as needed for port access).    losartan (COZAAR) 25 MG tablet TAKE 1 TABLET(25 MG) BY MOUTH TWICE DAILY    metoprolol succinate (TOPROL-XL) 25 MG 24 hr tablet Take 0.5 tablets (12.5 mg total) by mouth 2 (two) times daily.    maria luz-mn/folic  "ac/calcium/vit K1 (WOMEN'S 50 PLUS MULTIVITAMIN ORAL) Take 1 tablet by mouth once daily.    NOVOLOG FLEXPEN U-100 INSULIN 100 unit/mL (3 mL) InPn pen Inject 8 Units three times daily with meals with correction scale. Use on premeal readings; 150-200=+2, 201-250=+4; 251-300=+6; 301-350=+8, over 350, + 10 units 54 units a day max    nystatin (MYCOSTATIN) cream Apply topically 3 (three) times daily as needed (itch). (Patient not taking: Reported on 9/5/2024)    omeprazole (PRILOSEC) 40 MG capsule TAKE 1 CAPSULE BY MOUTH DAILY EVERY MORNING ON AN EMPTY STOMACH 30 MINUTES AFTER THYROID MEDICATION    pen needle, diabetic (NOVOFINE 32) 32 gauge x 1/4" Ndle TO USE WITH INSULIN PENS FOUR TIMES DAILY    pen needle, diabetic (NOVOFINE 32) 32 gauge x 1/4" Ndle TO USE WITH INSULIN PENS FOUR TIMES DAILY    sertraline (ZOLOFT) 25 MG tablet TAKE 1 TABLET(25 MG) BY MOUTH EVERY DAY    simvastatin (ZOCOR) 40 MG tablet TAKE 1 TABLET(40 MG) BY MOUTH EVERY EVENING    sodium chloride 3% 3 % nebulizer solution Take 4 mLs by nebulization 2 (two) times daily.    triamcinolone acetonide 0.1% (KENALOG) 0.1 % cream Apply topically 2 (two) times daily.    triamcinolone acetonide 0.1% (KENALOG) 0.1 % ointment Apply topically Daily. for 10 days     No current facility-administered medications for this visit.     Facility-Administered Medications Ordered in Other Visits   Medication    EUFLEXXA 10 mg/mL(mw 2.4 -3.6 million) injection Syrg 20 mg       Review of patient's allergies indicates:   Allergen Reactions    Contrast media Anaphylaxis    Iodinated contrast media Anaphylaxis and Rash    Albuterol Other (See Comments)     Used during PFTs and put pt in afib    Dye     Pcn [penicillins]      Pt states did well on cephalexin.  No adverse reaction or side effect.     Doxycycline Palpitations     Tolerated IV Doxy 8/2022       Family History   Problem Relation Name Age of Onset    Heart disease Mother          dissection    GI problems Father       "    perf colon    Cancer Paternal Uncle          colon cancer    Diabetes Neg Hx      Kidney disease Neg Hx      Stroke Neg Hx      Colon cancer Neg Hx      Crohn's disease Neg Hx      Ulcerative colitis Neg Hx      Stomach cancer Neg Hx      Esophageal cancer Neg Hx         Social History     Socioeconomic History    Marital status:    Tobacco Use    Smoking status: Former     Current packs/day: 0.00     Average packs/day: 1 pack/day for 40.0 years (40.0 ttl pk-yrs)     Types: Cigarettes     Start date: 3/6/1976     Quit date: 3/6/2016     Years since quittin.5    Smokeless tobacco: Never   Substance and Sexual Activity    Alcohol use: Not Currently    Drug use: No   Social History Narrative    Works in insurance collection.     Social Determinants of Health     Financial Resource Strain: Low Risk  (2024)    Overall Financial Resource Strain (CARDIA)     Difficulty of Paying Living Expenses: Not hard at all   Food Insecurity: No Food Insecurity (2024)    Hunger Vital Sign     Worried About Running Out of Food in the Last Year: Never true     Ran Out of Food in the Last Year: Never true   Transportation Needs: No Transportation Needs (2024)    OASIS : Transportation     Lack of Transportation (Medical): No     Lack of Transportation (Non-Medical): No     Patient Unable or Declines to Respond: No   Physical Activity: Unknown (10/13/2021)    Exercise Vital Sign     Days of Exercise per Week: 0 days   Stress: No Stress Concern Present (10/13/2021)    [employer] - Occupational Stress Questionnaire     Feeling of Stress : Not at all   Housing Stability: Low Risk  (2024)    Housing Stability Vital Sign     Unable to Pay for Housing in the Last Year: No     Number of Places Lived in the Last Year: 1     Unstable Housing in the Last Year: No       Chief Complaint:   Chief Complaint   Patient presents with    Results     R knee MRI review        History of  present illness:  74-year-old female seen for a few months of right knee pain and swelling.  No injury or trauma.  Pain mostly with walking.  Pain is over the anterior medial knee.  We gave her a cortisone injection back in May.  It worked great for about 2 months then the pain and swelling returned.  We started a Euflexxa series on her a couple weeks ago.  Had more pain after the 2nd injection with significant swelling.  Still was severe pain and swelling.  Pain is a Alvarez 10.  Still can not walk        Review of Systems:  Musculoskeletal:  See HPI      Physical Examination:    Vital Signs:    There were no vitals filed for this visit.      Body mass index is 24.52 kg/m².    This a well-developed, well nourished patient in no acute distress.  They are alert and oriented and cooperative to examination.  Pt. comes in in a wheelchair    Examination of the right knee shows no rashes or erythema. There are no masses ecchymosis And a large effusion.  Patient is nontender to palpation over lateral joint line and moderately tender to palpation over the medial joint line. Knee is stable to varus and valgus stress. 5 out of 5 motor strength. Palpable distal pulses. Intact light touch sensation. Negative Patellofemoral crepitus    Heart is regular rate without obvious murmurs   Normal respiratory effort without audible wheezing  Abdomen is soft and nontender       X-rays:  X-rays of the right knee are  reviewed which show some moderate tricompartmental degenerative change of both knees.    MRI of the right knee is reviewed and interpreted:No meniscal tear identified.  Degenerative meniscal signal abnormality within the body segment of the lateral meniscus.  Osteoarthritis, including diffuse full-thickness lateral patellofemoral articular cartilage loss.  Multifocal marrow edema is likely reactive in nature.  Large joint effusion.  Mild patellar and quadriceps tendinosis.         Assessment:  Right patellofemoral arthritis with  large effusion  Concern for possible septic arthritis    Plan:  I recommended knee arthroscopy for incision and drainage.  Patient has an elevated CRP and sed rate.  Patient has severe pain with the effusion.  They also mentioned that this could be a side effect of her Keytruda.  They will discuss this with her oncologist.  Plan is for right knee arthroscopy.  Risks, benefits, and alternatives to the procedure were explained to the patient including but not limited to damage to nerves, arteries, blood vessels, bones, tendons, ligaments, stiffness, instability, infection, permanent limb dysfunction, DVT, PE, as well as general anesthetic complications including seizure, stroke, heart attack and even death. The patient understood these risks and wished to proceed and signed the informed consent.           All previous pertinent notes including ER visits, physical therapy visits, other orthopedic visits as well as other care for the same musculoskeletal problem were reviewed.  All pertinent lab values and previous imaging was reviewed pertinent to the current visit.    This note was created using M [employer] voice recognition software that occasionally misinterpreted phrases or words.    Consult note is delivered via Epic messaging service.

## 2024-09-16 NOTE — PROGRESS NOTES
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 difficile colitis 08/2021    Cancer     lung    CKD (chronic kidney disease), stage III     Colonic diverticular abscess 07/12/2022    COPD (chronic obstructive pulmonary disease)     Encounter for blood transfusion     Hepatic steatosis     History of numbness     transient numbness left hand and foot    History of tobacco abuse     Hyperlipidemia     Hypertension     Lung nodules     and adenopathy    Multinodular goiter (nontoxic) 05/08/2017    Obesity     TRISHA (obstructive sleep apnea)     on bipap at bedtime    Proteinuria        Past Surgical History:   Procedure Laterality Date    ADENOIDECTOMY      APPENDECTOMY      CATARACT EXTRACTION      COLONOSCOPY N/A 11/14/2016    Procedure: COLONOSCOPY;  Surgeon: Destin Cardona MD;  Location: [facility] ENDO;  Service: Endoscopy;  Laterality: N/A;    COLONOSCOPY N/A 05/14/2021    Procedure: COLONOSCOPY;  Surgeon: Destin Cardona MD;  Location: [facility];  Service: Endoscopy;  Laterality: N/A; hemorrhoids, diverticulosis, Old blood left colon. No active bleeding; Repeat colonoscopy is not recommended for screening purposes.    COLOSTOMY N/A 07/28/2022    Procedure: CREATION, COLOSTOMY;  Surgeon: Nubia Tinoco MD;  Location: [facility] OR;  Service: General;  Laterality: N/A;    DEBRIDEMENT OF WOUND OF ABDOMEN  07/28/2022    Procedure: DEBRIDEMENT, WOUND, ABDOMEN;  Surgeon: Nubia Tinoco MD;  Location: [facility] OR;  Service: General;;    ESOPHAGOGASTRODUODENOSCOPY N/A 05/14/2021    Procedure: EGD (ESOPHAGOGASTRODUODENOSCOPY);  Surgeon: Destin Cardona MD;  Location: [facility];  Service: Endoscopy;  Laterality: N/A; unremarkable    ESOPHAGOGASTRODUODENOSCOPY N/A 08/09/2021     Procedure: ESOPHAGOGASTRODUODENOSCOPY (EGD);  Surgeon: Destin Cardona MD;  Location: [facility] ENDO;  Service: Endoscopy;  Laterality: N/A; Moderately severe radiation esophagitis with no bleeding    EXCISIONAL BIOPSY N/A 02/09/2023    Procedure: EXCISIONAL BIOPSY;  Surgeon: Nubia Tinoco MD;  Location: [facility] OR;  Service: General;  Laterality: N/A;    EYE SURGERY      cataract OU    HERNIA REPAIR      INSERTION OF TUNNELED CENTRAL VENOUS CATHETER (CVC) WITH SUBCUTANEOUS PORT N/A 06/07/2021    Procedure: [id number]-B-CATH;  Surgeon: Joe Salinas MD;  Location: [facility] OR;  Service: General;  Laterality: N/A;    INTRALUMINAL GASTROINTESTINAL TRACT IMAGING VIA CAPSULE N/A 12/29/2021    Procedure: IMAGING PROCEDURE, GI TRACT, INTRALUMINAL, VIA CAPSULE  (called for instruction 12/20-may have trouble swallowing);  Surgeon: Floyd Dixon MD;  Location: [facility] ENDO;  Service: Endoscopy;  Laterality: N/A; Diffuse red spots of unclear significance and erosions found in the small bowel (entire small bowel), suspicious for mild radiation enteritis    NEUROMA SURGERY Left     acoustic    TONSILLECTOMY      WOUND EXPLORATION N/A 08/13/2022    Procedure: EXPLORATION, WOUND;  Surgeon: Arnel Olsen MD;  Location: [facility] OR;  Service: General;  Laterality: N/A;    WOUND EXPLORATION Left 02/09/2023    Procedure: EXPLORATION, WOUND - of Ostomy Granuloma - Wound - Abdomen;  Surgeon: Nubia Tinoco MD;  Location: [facility] OR;  Service: General;  Laterality: Left;       Current Outpatient Medications   Medication Sig    ascorbic acid, vitamin C, (VITAMIN C) 500 MG tablet Take 500 mg by mouth once daily. Indications: inadequate vitamin C    azelastine (ASTELIN) 137 mcg (0.1 %) nasal spray USE 2 SPRAYS IN EACH NOSTRIL EVERY NIGHT AT BEDTIME    blood sugar diagnostic Strp 1 strip by Misc.(Non-Drug; Combo Route) route Daily.    blood-glucose meter kit Use as instructed    blood-glucose sensor (DEXCOM G7 SENSOR MISC) Inject 1  each into the skin every 10 days. Continues glucose monitoring    budesonide (PULMICORT) 0.5 mg/2 mL nebulizer solution Take 2 mLs (0.5 mg total) by nebulization every 12 (twelve) hours. Controller (Patient not taking: Reported on 9/10/2024)    cetirizine (ZYRTEC) 10 MG tablet Take 10 mg by mouth once daily.    cholecalciferol, vitamin D3, (VITAMIN D3) 125 mcg (5,000 unit) Tab Take 5,000 Units by mouth once daily.    clotrimazole (LOTRIMIN) 1 % cream Apply topically 2 (two) times daily.    docusate sodium (COLACE) 100 MG capsule Take 1 capsule (100 mg total) by mouth daily as needed for Constipation.    ELIQUIS 5 mg Tab TAKE 1 TABLET(5 MG) BY MOUTH TWICE DAILY    fluocinolone acetonide oiL 0.01 % Drop Place 4 drops in ear(s) 2 (two) times a day.    gabapentin (NEURONTIN) 100 MG capsule Take 300 mg by mouth 2 (two) times daily.    HYDROcodone-acetaminophen (NORCO)  mg per tablet Take 1 tablet by mouth every 6 (six) hours as needed for Pain.    insulin glargine U-100, Lantus, (LANTUS SOLOSTAR U-100 INSULIN) 100 unit/mL (3 mL) InPn pen Inject 22 Units into the skin every evening.    ipratropium (ATROVENT) 0.02 % nebulizer solution Take 2.5 mLs (500 mcg total) by nebulization every 6 (six) hours as needed (wheeze). Rescue    ketoconazole (NIZORAL) 2 % shampoo Apply topically twice a week. Lather and let sit for 5 minutes before rinsing. (Patient not taking: Reported on 9/5/2024)    Lactobacillus acidophilus (PROBIOTIC ORAL) Take 1 capsule by mouth once daily.    lancets Misc 1 each by Misc.(Non-Drug; Combo Route) route once daily.    levothyroxine (SYNTHROID) 75 MCG tablet 1 tablet 6 days a week and 1/2 tablet on day 7    LIDOcaine-prilocaine (EMLA) cream Apply topically as needed (as needed for port access).    losartan (COZAAR) 25 MG tablet TAKE 1 TABLET(25 MG) BY MOUTH TWICE DAILY    metoprolol succinate (TOPROL-XL) 25 MG 24 hr tablet Take 0.5 tablets (12.5 mg total) by mouth 2 (two) times daily.    maria luz-mn/folic  "ac/calcium/vit K1 (WOMEN'S 50 PLUS MULTIVITAMIN ORAL) Take 1 tablet by mouth once daily.    NOVOLOG FLEXPEN U-100 INSULIN 100 unit/mL (3 mL) InPn pen Inject 8 Units three times daily with meals with correction scale. Use on premeal readings; 150-200=+2, 201-250=+4; 251-300=+6; 301-350=+8, over 350, + 10 units 54 units a day max    nystatin (MYCOSTATIN) cream Apply topically 3 (three) times daily as needed (itch). (Patient not taking: Reported on 9/5/2024)    omeprazole (PRILOSEC) 40 MG capsule TAKE 1 CAPSULE BY MOUTH DAILY EVERY MORNING ON AN EMPTY STOMACH 30 MINUTES AFTER THYROID MEDICATION    pen needle, diabetic (NOVOFINE 32) 32 gauge x 1/4" Ndle TO USE WITH INSULIN PENS FOUR TIMES DAILY    pen needle, diabetic (NOVOFINE 32) 32 gauge x 1/4" Ndle TO USE WITH INSULIN PENS FOUR TIMES DAILY    sertraline (ZOLOFT) 25 MG tablet TAKE 1 TABLET(25 MG) BY MOUTH EVERY DAY    simvastatin (ZOCOR) 40 MG tablet TAKE 1 TABLET(40 MG) BY MOUTH EVERY EVENING    sodium chloride 3% 3 % nebulizer solution Take 4 mLs by nebulization 2 (two) times daily.    triamcinolone acetonide 0.1% (KENALOG) 0.1 % cream Apply topically 2 (two) times daily.    triamcinolone acetonide 0.1% (KENALOG) 0.1 % ointment Apply topically Daily. for 10 days     No current facility-administered medications for this visit.     Facility-Administered Medications Ordered in Other Visits   Medication    EUFLEXXA 10 mg/mL(mw 2.4 -3.6 million) injection Syrg 20 mg       Review of patient's allergies indicates:   Allergen Reactions    Contrast media Anaphylaxis    Iodinated contrast media Anaphylaxis and Rash    Albuterol Other (See Comments)     Used during PFTs and put pt in afib    Dye     Pcn [penicillins]      Pt states did well on cephalexin.  No adverse reaction or side effect.     Doxycycline Palpitations     Tolerated IV Doxy 8/2022       Family History   Problem Relation Name Age of Onset    Heart disease Mother          dissection    GI problems Father       "    perf colon    Cancer Paternal Uncle          colon cancer    Diabetes Neg Hx      Kidney disease Neg Hx      Stroke Neg Hx      Colon cancer Neg Hx      Crohn's disease Neg Hx      Ulcerative colitis Neg Hx      Stomach cancer Neg Hx      Esophageal cancer Neg Hx         Social History     Socioeconomic History    Marital status:    Tobacco Use    Smoking status: Former     Current packs/day: 0.00     Average packs/day: 1 pack/day for 40.0 years (40.0 ttl pk-yrs)     Types: Cigarettes     Start date: 3/6/1976     Quit date: 3/6/2016     Years since quittin.5    Smokeless tobacco: Never   Substance and Sexual Activity    Alcohol use: Not Currently    Drug use: No   Social History Narrative    Works in insurance collection.     Social Determinants of Health     Financial Resource Strain: Low Risk  (2024)    Overall Financial Resource Strain (CARDIA)     Difficulty of Paying Living Expenses: Not hard at all   Food Insecurity: No Food Insecurity (2024)    Hunger Vital Sign     Worried About Running Out of Food in the Last Year: Never true     Ran Out of Food in the Last Year: Never true   Transportation Needs: No Transportation Needs (2024)    OASIS : Transportation     Lack of Transportation (Medical): No     Lack of Transportation (Non-Medical): No     Patient Unable or Declines to Respond: No   Physical Activity: Unknown (10/13/2021)    Exercise Vital Sign     Days of Exercise per Week: 0 days   Stress: No Stress Concern Present (10/13/2021)    [employer] - Occupational Stress Questionnaire     Feeling of Stress : Not at all   Housing Stability: Low Risk  (2024)    Housing Stability Vital Sign     Unable to Pay for Housing in the Last Year: No     Number of Places Lived in the Last Year: 1     Unstable Housing in the Last Year: No       Chief Complaint:   Chief Complaint   Patient presents with    Results     R knee MRI review        History of  present illness:  74-year-old female seen for a few months of right knee pain and swelling.  No injury or trauma.  Pain mostly with walking.  Pain is over the anterior medial knee.  We gave her a cortisone injection back in May.  It worked great for about 2 months then the pain and swelling returned.  We started a Euflexxa series on her a couple weeks ago.  Had more pain after the 2nd injection with significant swelling.  Still was severe pain and swelling.  Pain is a Alvarez 10.  Still can not walk        Review of Systems:  Musculoskeletal:  See HPI      Physical Examination:    Vital Signs:    There were no vitals filed for this visit.      Body mass index is 24.52 kg/m².    This a well-developed, well nourished patient in no acute distress.  They are alert and oriented and cooperative to examination.  Pt. comes in in a wheelchair    Examination of the right knee shows no rashes or erythema. There are no masses ecchymosis And a large effusion.  Patient is nontender to palpation over lateral joint line and moderately tender to palpation over the medial joint line. Knee is stable to varus and valgus stress. 5 out of 5 motor strength. Palpable distal pulses. Intact light touch sensation. Negative Patellofemoral crepitus    Heart is regular rate without obvious murmurs   Normal respiratory effort without audible wheezing  Abdomen is soft and nontender       X-rays:  X-rays of the right knee are  reviewed which show some moderate tricompartmental degenerative change of both knees.    MRI of the right knee is reviewed and interpreted:No meniscal tear identified.  Degenerative meniscal signal abnormality within the body segment of the lateral meniscus.  Osteoarthritis, including diffuse full-thickness lateral patellofemoral articular cartilage loss.  Multifocal marrow edema is likely reactive in nature.  Large joint effusion.  Mild patellar and quadriceps tendinosis.         Assessment:  Right patellofemoral arthritis with  large effusion  Concern for possible septic arthritis    Plan:  I recommended knee arthroscopy for incision and drainage.  Patient has an elevated CRP and sed rate.  Patient has severe pain with the effusion.  They also mentioned that this could be a side effect of her Keytruda.  They will discuss this with her oncologist.  Plan is for right knee arthroscopy.  Risks, benefits, and alternatives to the procedure were explained to the patient including but not limited to damage to nerves, arteries, blood vessels, bones, tendons, ligaments, stiffness, instability, infection, permanent limb dysfunction, DVT, PE, as well as general anesthetic complications including seizure, stroke, heart attack and even death. The patient understood these risks and wished to proceed and signed the informed consent.           All previous pertinent notes including ER visits, physical therapy visits, other orthopedic visits as well as other care for the same musculoskeletal problem were reviewed.  All pertinent lab values and previous imaging was reviewed pertinent to the current visit.    This note was created using [employer] voice recognition software that occasionally misinterpreted phrases or words.    Consult note is delivered via Epic messaging service.

## 2024-09-16 NOTE — CONSULTS
[facility]  Response for E-Consult     Patient Name: Allyson Henriquez  MRN: [id number]  Primary Care Provider: Gilma Pimentel MD   Requesting Provider: Jose Rafael Barney,*  Consults    Recommendation:  Acceptable CV risk for knee scope, hold Eliquis 2 days prior to surgery    Contingency that warrants a repeat eConsult or referral:     Total time of Consultation: 5 minute    I did not speak to the requesting provider verbally about this.     *This eConsult is based on the clinical data available to me and is furnished without benefit of a physical examination. The eConsult will need to be interpreted in light of any clinical issues or changes in patient status not available to me at the time of filing this eConsults. Significant changes in patient condition or level of acuity should result in immediate formal consultation and reevaluation. Please alert me if you have further questions.    Thank you for this eConsult referral.     Simone Humphries MD  [facility]

## 2024-09-17 ENCOUNTER — TELEPHONE (OUTPATIENT)
Dept: ORTHOPEDICS | Facility: CLINIC | Age: 74
End: 2024-09-17
Payer: MEDICARE

## 2024-09-17 NOTE — TELEPHONE ENCOUNTER
Got message from pre op staff that patient cancelled pre op.  Called and spoke to .  He reports they would like to hold off on surgery right now.  They are waiting to hear back from oncology.  We will place surgery on hold right now and they will call back if they would like to proceed with knee surgery.      Asa

## 2024-09-18 ENCOUNTER — TELEPHONE (OUTPATIENT)
Dept: ORTHOPEDICS | Facility: CLINIC | Age: 74
End: 2024-09-18
Payer: MEDICARE

## 2024-09-18 DIAGNOSIS — M25.461 EFFUSION OF RIGHT KNEE JOINT: Primary | ICD-10-CM

## 2024-09-18 DIAGNOSIS — M25.461 KNEE EFFUSION, RIGHT: ICD-10-CM

## 2024-09-18 RX ORDER — MUPIROCIN 20 MG/G
OINTMENT TOPICAL
OUTPATIENT
Start: 2024-09-18

## 2024-09-18 RX ORDER — INSULIN GLARGINE 100 [IU]/ML
22 INJECTION, SOLUTION SUBCUTANEOUS NIGHTLY
Qty: 15 ML | Refills: 4 | Status: SHIPPED | OUTPATIENT
Start: 2024-09-18

## 2024-09-18 NOTE — TELEPHONE ENCOUNTER
----- Message from Annamaria Younger MA sent at 9/18/2024 11:23 AM CDT -----  Contact:   Wants to reschedule surgery to this Friday   Call back

## 2024-09-19 ENCOUNTER — HOSPITAL ENCOUNTER (OUTPATIENT)
Dept: PREADMISSION TESTING | Facility: HOSPITAL | Age: 74
Discharge: HOME | End: 2024-09-19
Attending: ORTHOPAEDIC SURGERY
Payer: MEDICARE

## 2024-09-19 DIAGNOSIS — Z01.818 PREOP TESTING: ICD-10-CM

## 2024-09-19 DIAGNOSIS — M25.461 EFFUSION OF RIGHT KNEE JOINT: ICD-10-CM

## 2024-09-19 LAB
ANION GAP SERPL CALC-SCNC: 11 MMOL/L (ref 8–16)
APTT PPP: 40.8 SEC (ref 21–32)
BASOPHILS # BLD AUTO: 0.04 K/UL (ref 0–0.2)
BASOPHILS NFR BLD: 0.4 % (ref 0–1.9)
BUN SERPL-MCNC: 17 MG/DL (ref 8–23)
CALCIUM SERPL-MCNC: 9.7 MG/DL (ref 8.7–10.5)
CHLORIDE SERPL-SCNC: 98 MMOL/L (ref 95–110)
CO2 SERPL-SCNC: 23 MMOL/L (ref 23–29)
CREAT SERPL-MCNC: 1 MG/DL (ref 0.5–1.4)
DIFFERENTIAL METHOD BLD: ABNORMAL
EOSINOPHIL # BLD AUTO: 0.1 K/UL (ref 0–0.5)
EOSINOPHIL NFR BLD: 1.2 % (ref 0–8)
ERYTHROCYTE [DISTWIDTH] IN BLOOD BY AUTOMATED COUNT: 17.8 % (ref 11.5–14.5)
EST. GFR  (NO RACE VARIABLE): 59 ML/MIN/1.73 M^2
GLUCOSE SERPL-MCNC: 155 MG/DL (ref 70–110)
HCT VFR BLD AUTO: 33 % (ref 37–48.5)
HGB BLD-MCNC: 10.3 G/DL (ref 12–16)
IMM GRANULOCYTES # BLD AUTO: 0.06 K/UL (ref 0–0.04)
IMM GRANULOCYTES NFR BLD AUTO: 0.6 % (ref 0–0.5)
INR PPP: 1.1 (ref 0.8–1.2)
LYMPHOCYTES # BLD AUTO: 0.8 K/UL (ref 1–4.8)
LYMPHOCYTES NFR BLD: 8 % (ref 18–48)
MCH RBC QN AUTO: 25.1 PG (ref 27–31)
MCHC RBC AUTO-ENTMCNC: 31.2 G/DL (ref 32–36)
MCV RBC AUTO: 80 FL (ref 82–98)
MONOCYTES # BLD AUTO: 0.6 K/UL (ref 0.3–1)
MONOCYTES NFR BLD: 6.3 % (ref 4–15)
NEUTROPHILS # BLD AUTO: 8.2 K/UL (ref 1.8–7.7)
NEUTROPHILS NFR BLD: 83.5 % (ref 38–73)
NRBC BLD-RTO: 0 /100 WBC
PLATELET # BLD AUTO: 307 K/UL (ref 150–450)
PMV BLD AUTO: 9.3 FL (ref 9.2–12.9)
POTASSIUM SERPL-SCNC: 4.6 MMOL/L (ref 3.5–5.1)
PROTHROMBIN TIME: 12.2 SEC (ref 9–12.5)
RBC # BLD AUTO: 4.11 M/UL (ref 4–5.4)
SODIUM SERPL-SCNC: 132 MMOL/L (ref 136–145)
WBC # BLD AUTO: 9.86 K/UL (ref 3.9–12.7)

## 2024-09-19 PROCEDURE — 85610 PROTHROMBIN TIME: CPT | Performed by: ANESTHESIOLOGY

## 2024-09-19 PROCEDURE — 93005 ELECTROCARDIOGRAM TRACING: CPT

## 2024-09-19 PROCEDURE — 36415 COLL VENOUS BLD VENIPUNCTURE: CPT | Performed by: ANESTHESIOLOGY

## 2024-09-19 PROCEDURE — 85730 THROMBOPLASTIN TIME PARTIAL: CPT | Performed by: ANESTHESIOLOGY

## 2024-09-19 PROCEDURE — 85025 COMPLETE CBC W/AUTO DIFF WBC: CPT | Performed by: ORTHOPAEDIC SURGERY

## 2024-09-19 PROCEDURE — 80048 BASIC METABOLIC PNL TOTAL CA: CPT | Performed by: ORTHOPAEDIC SURGERY

## 2024-09-19 RX ORDER — CLINDAMYCIN HYDROCHLORIDE 300 MG/1
300 CAPSULE ORAL 2 TIMES DAILY
COMMUNITY
Start: 2024-09-18

## 2024-09-19 NOTE — DISCHARGE INSTRUCTIONS
To confirm, Your doctor has instructed you that surgery is scheduled for: 9/24/24    Please report to [facility], Registration the morning of surgery. You must check-in and receive a wristband before going to your procedure.  [address] [place] [address] [place] [id number]    Pre-Op will call the afternoon prior to surgery between 1:00 and 6:00 PM with the final arrival time.  Phone number: [phone]    PLEASE NOTE:  The surgery schedule has many variables which may affect the time of your surgery case.  Family members should be available if your surgery time changes.  Plan to be here the day of your procedure between 4-6 hours.    MEDICATIONS:  TAKE ONLY THESE MEDICATIONS WITH A SMALL SIP OF WATER THE MORNING OF YOUR PROCEDURE:    SEE MED LIST          DO NOT TAKE THESE MEDICATIONS 5-7 DAYS PRIOR to your procedure or per your surgeon's request:   ASPIRIN, ALEVE, ADVIL, IBUPROFEN, FISH OIL VITAMIN E, HERBALS  (May take Tylenol)    ONLY if you are prescribed any types of blood thinners such as:  Aspirin, Coumadin, Plavix, Pradaxa, Xarelto, Aggrenox, Effient, Eliquis, Savasya, Brilinta, or any other, ask your surgeon whether you should stop taking them and how long before surgery you should stop.  You may also need to verify with the prescribing physician if it is ok to stop your medication.      INSTRUCTIONS IMPORTANT!!  Do not eat or drink anything between midnight and the time of your procedure- this includes gum, mints, and candy.  Do not smoke or drink alcoholic beverages 24 hours prior to your procedure.  Shower the night before AND the morning of your procedure with a Chlorhexidine wash such as Hibiclens or Dial antibacterial soap from the neck down.  Do not get it on your face or in your eyes.  You may use your own shampoo and face wash. This helps your skin to be as bacteria free as possible.    If you wear contact lenses, dentures, hearing aids or glasses, bring a container to put them in  during surgery and give to a family member for safe keeping.  Please leave all jewelry, piercing's and valuables at home. You must remove your false eyelashes prior to surgery.    DO NOT remove hair from the surgery site.  Do not shave the incision site unless you are given specific instructions to do so.    ONLY if you have been diagnosed with sleep apnea please bring your C-PAP machine.  ONLY if you wear home oxygen please bring your portable oxygen tank the day of your procedure.  ONLY if you have a history of OPEN HEART SURGERY you will need a clearance from your Cardiologist per Anesthesia.      ONLY for patients requiring bowel prep, written instructions will be given by your doctor's office.  ONLY if you have a neuro stimulator, please bring the controller with you the morning of surgery  ONLY if a type and screen test is needed before surgery, please return:  If your doctor has scheduled you for an overnight stay, bring a small overnight bag with any personal items you need.  Make arrangements in advance for transportation home by a responsible adult. You can not go home in an uber or a cab per hospital policy.  It is not safe to drive a vehicle during the 24 hours after anesthesia.          All  facilities and properties are tobacco free.  Smoking is NOT allowed.   If you have any questions about these instructions, call Pre-Op Admit  Nursing at [phone] or the Pre-Op Day Surgery Unit at [phone].

## 2024-09-20 ENCOUNTER — TELEPHONE (OUTPATIENT)
Dept: ORTHOPEDICS | Facility: CLINIC | Age: 74
End: 2024-09-20
Payer: MEDICARE

## 2024-09-20 NOTE — TELEPHONE ENCOUNTER
----- Message from John Paul Echeverria MA sent at 9/20/2024  3:26 PM CDT -----  Contact: /Faustino  Can patient take a few sips of OJ and eat couple of crackers prior to her surgery on 9/24/24.  Patient is Type 1 diabetic.    Call back number is [phone]

## 2024-09-23 ENCOUNTER — ANESTHESIA EVENT (OUTPATIENT)
Dept: SURGERY | Facility: HOSPITAL | Age: 74
End: 2024-09-23
Payer: MEDICARE

## 2024-09-23 RX ORDER — CYCLOBENZAPRINE HCL 10 MG
10 TABLET ORAL 3 TIMES DAILY PRN
Qty: 30 TABLET | Refills: 0 | Status: SHIPPED | OUTPATIENT
Start: 2024-09-23 | End: 2024-10-04

## 2024-09-23 RX ORDER — IBUPROFEN 600 MG/1
600 TABLET ORAL 3 TIMES DAILY PRN
Qty: 30 TABLET | Refills: 0 | Status: SHIPPED | OUTPATIENT
Start: 2024-09-23

## 2024-09-23 RX ORDER — ONDANSETRON 4 MG/1
4 TABLET, FILM COATED ORAL EVERY 6 HOURS PRN
Qty: 30 TABLET | Refills: 0 | Status: SHIPPED | OUTPATIENT
Start: 2024-09-23

## 2024-09-23 RX ORDER — ACETAMINOPHEN 500 MG
1000 TABLET ORAL EVERY 8 HOURS PRN
Qty: 30 TABLET | Refills: 0 | Status: SHIPPED | OUTPATIENT
Start: 2024-09-23

## 2024-09-23 RX ORDER — HYDROCODONE BITARTRATE AND ACETAMINOPHEN 5; 325 MG/1; MG/1
1 TABLET ORAL EVERY 6 HOURS PRN
Qty: 14 TABLET | Refills: 0 | Status: SHIPPED | OUTPATIENT
Start: 2024-09-23

## 2024-09-23 RX ORDER — ASPIRIN 81 MG/1
81 TABLET ORAL 2 TIMES DAILY
Qty: 28 TABLET | Refills: 0 | Status: SHIPPED | OUTPATIENT
Start: 2024-09-23 | End: 2025-09-23

## 2024-09-24 ENCOUNTER — HOSPITAL ENCOUNTER (OUTPATIENT)
Facility: HOSPITAL | Age: 74
Discharge: HOME | End: 2024-09-24
Attending: ORTHOPAEDIC SURGERY | Admitting: ORTHOPAEDIC SURGERY
Payer: MEDICARE

## 2024-09-24 ENCOUNTER — ANESTHESIA (OUTPATIENT)
Dept: SURGERY | Facility: HOSPITAL | Age: 74
End: 2024-09-24
Payer: MEDICARE

## 2024-09-24 VITALS
RESPIRATION RATE: 18 BRPM | BODY MASS INDEX: 27.64 KG/M2 | TEMPERATURE: 97 F | SYSTOLIC BLOOD PRESSURE: 178 MMHG | HEIGHT: 66 IN | WEIGHT: 172 LBS | HEART RATE: 78 BPM | OXYGEN SATURATION: 98 % | DIASTOLIC BLOOD PRESSURE: 75 MMHG

## 2024-09-24 DIAGNOSIS — M25.461 EFFUSION OF RIGHT KNEE JOINT: ICD-10-CM

## 2024-09-24 DIAGNOSIS — Z01.818 PREOP TESTING: ICD-10-CM

## 2024-09-24 DIAGNOSIS — M25.461 KNEE EFFUSION, RIGHT: ICD-10-CM

## 2024-09-24 LAB
BODY FLD TYPE: NORMAL
CRYSTALS FLD MICRO: NEGATIVE
POCT GLUCOSE: 326 MG/DL (ref 70–110)

## 2024-09-24 PROCEDURE — 36000711: Performed by: ORTHOPAEDIC SURGERY

## 2024-09-24 PROCEDURE — 37000008 HC ANESTHESIA 1ST 15 MINUTES: Performed by: ORTHOPAEDIC SURGERY

## 2024-09-24 PROCEDURE — 94799 UNLISTED PULMONARY SVC/PX: CPT

## 2024-09-24 PROCEDURE — 25000003 PHARM REV CODE 250: Performed by: ORTHOPAEDIC SURGERY

## 2024-09-24 PROCEDURE — 89060 EXAM SYNOVIAL FLUID CRYSTALS: CPT | Performed by: ORTHOPAEDIC SURGERY

## 2024-09-24 PROCEDURE — 25000003 PHARM REV CODE 250: Performed by: ANESTHESIOLOGY

## 2024-09-24 PROCEDURE — 71000033 HC RECOVERY, INTIAL HOUR: Performed by: ORTHOPAEDIC SURGERY

## 2024-09-24 PROCEDURE — 71000015 HC POSTOP RECOV 1ST HR: Performed by: ORTHOPAEDIC SURGERY

## 2024-09-24 PROCEDURE — 87102 FUNGUS ISOLATION CULTURE: CPT | Performed by: ORTHOPAEDIC SURGERY

## 2024-09-24 PROCEDURE — 63600175 PHARM REV CODE 636 W HCPCS: Performed by: ORTHOPAEDIC SURGERY

## 2024-09-24 PROCEDURE — 71000016 HC POSTOP RECOV ADDL HR: Performed by: ORTHOPAEDIC SURGERY

## 2024-09-24 PROCEDURE — 63600175 PHARM REV CODE 636 W HCPCS: Mod: JZ,JG | Performed by: ORTHOPAEDIC SURGERY

## 2024-09-24 PROCEDURE — 63600175 PHARM REV CODE 636 W HCPCS: Performed by: ANESTHESIOLOGY

## 2024-09-24 PROCEDURE — 27201423 OPTIME MED/SURG SUP & DEVICES STERILE SUPPLY: Performed by: ORTHOPAEDIC SURGERY

## 2024-09-24 PROCEDURE — 87070 CULTURE OTHR SPECIMN AEROBIC: CPT | Performed by: ORTHOPAEDIC SURGERY

## 2024-09-24 PROCEDURE — 36000710: Performed by: ORTHOPAEDIC SURGERY

## 2024-09-24 PROCEDURE — 87075 CULTR BACTERIA EXCEPT BLOOD: CPT | Performed by: ORTHOPAEDIC SURGERY

## 2024-09-24 PROCEDURE — 37000009 HC ANESTHESIA EA ADD 15 MINS: Performed by: ORTHOPAEDIC SURGERY

## 2024-09-24 PROCEDURE — 29881 ARTHRS KNE SRG MNISECTMY M/L: CPT | Mod: RT,,, | Performed by: ORTHOPAEDIC SURGERY

## 2024-09-24 PROCEDURE — 71000039 HC RECOVERY, EACH ADD'L HOUR: Performed by: ORTHOPAEDIC SURGERY

## 2024-09-24 PROCEDURE — 63600175 PHARM REV CODE 636 W HCPCS: Performed by: NURSE ANESTHETIST, CERTIFIED REGISTERED

## 2024-09-24 PROCEDURE — 25000003 PHARM REV CODE 250: Performed by: NURSE ANESTHETIST, CERTIFIED REGISTERED

## 2024-09-24 RX ORDER — LIDOCAINE HYDROCHLORIDE 20 MG/ML
INJECTION, SOLUTION EPIDURAL; INFILTRATION; INTRACAUDAL; PERINEURAL
Status: DISCONTINUED | OUTPATIENT
Start: 2024-09-24 | End: 2024-09-24

## 2024-09-24 RX ORDER — LIDOCAINE HYDROCHLORIDE 10 MG/ML
1 INJECTION, SOLUTION EPIDURAL; INFILTRATION; INTRACAUDAL; PERINEURAL ONCE
Status: COMPLETED | OUTPATIENT
Start: 2024-09-24 | End: 2024-09-24

## 2024-09-24 RX ORDER — PROPOFOL 10 MG/ML
VIAL (ML) INTRAVENOUS
Status: DISCONTINUED | OUTPATIENT
Start: 2024-09-24 | End: 2024-09-24

## 2024-09-24 RX ORDER — MIDAZOLAM HYDROCHLORIDE 1 MG/ML
INJECTION INTRAMUSCULAR; INTRAVENOUS
Status: DISCONTINUED | OUTPATIENT
Start: 2024-09-24 | End: 2024-09-24

## 2024-09-24 RX ORDER — HYDROCODONE BITARTRATE AND ACETAMINOPHEN 5; 325 MG/1; MG/1
1 TABLET ORAL EVERY 4 HOURS PRN
Status: DISCONTINUED | OUTPATIENT
Start: 2024-09-24 | End: 2024-09-24 | Stop reason: HOSPADM

## 2024-09-24 RX ORDER — CEPHALEXIN 500 MG/1
500 CAPSULE ORAL 4 TIMES DAILY
Qty: 40 CAPSULE | Refills: 0 | Status: SHIPPED | OUTPATIENT
Start: 2024-09-24

## 2024-09-24 RX ORDER — OXYCODONE HYDROCHLORIDE 5 MG/1
5 TABLET ORAL ONCE AS NEEDED
Status: COMPLETED | OUTPATIENT
Start: 2024-09-24 | End: 2024-09-24

## 2024-09-24 RX ORDER — ONDANSETRON HYDROCHLORIDE 2 MG/ML
4 INJECTION, SOLUTION INTRAVENOUS ONCE AS NEEDED
Status: DISCONTINUED | OUTPATIENT
Start: 2024-09-24 | End: 2024-09-24 | Stop reason: HOSPADM

## 2024-09-24 RX ORDER — BUPIVACAINE HYDROCHLORIDE 2.5 MG/ML
INJECTION, SOLUTION EPIDURAL; INFILTRATION; INTRACAUDAL
Status: DISCONTINUED | OUTPATIENT
Start: 2024-09-24 | End: 2024-09-24 | Stop reason: HOSPADM

## 2024-09-24 RX ORDER — DEXMEDETOMIDINE HYDROCHLORIDE 100 UG/ML
INJECTION, SOLUTION INTRAVENOUS
Status: DISCONTINUED | OUTPATIENT
Start: 2024-09-24 | End: 2024-09-24

## 2024-09-24 RX ORDER — FENTANYL CITRATE 50 UG/ML
25 INJECTION, SOLUTION INTRAMUSCULAR; INTRAVENOUS EVERY 5 MIN PRN
Status: COMPLETED | OUTPATIENT
Start: 2024-09-24 | End: 2024-09-24

## 2024-09-24 RX ORDER — MUPIROCIN 20 MG/G
OINTMENT TOPICAL
Status: DISCONTINUED | OUTPATIENT
Start: 2024-09-24 | End: 2024-09-24 | Stop reason: HOSPADM

## 2024-09-24 RX ORDER — ONDANSETRON HYDROCHLORIDE 2 MG/ML
INJECTION, SOLUTION INTRAVENOUS
Status: DISCONTINUED | OUTPATIENT
Start: 2024-09-24 | End: 2024-09-24

## 2024-09-24 RX ORDER — FENTANYL CITRATE 50 UG/ML
INJECTION, SOLUTION INTRAMUSCULAR; INTRAVENOUS
Status: DISCONTINUED | OUTPATIENT
Start: 2024-09-24 | End: 2024-09-24

## 2024-09-24 RX ADMIN — MUPIROCIN 1 G: 20 OINTMENT TOPICAL at 10:09

## 2024-09-24 RX ADMIN — LIDOCAINE HYDROCHLORIDE 10 MG: 10 INJECTION, SOLUTION EPIDURAL; INFILTRATION; INTRACAUDAL; PERINEURAL at 10:09

## 2024-09-24 RX ADMIN — OXYCODONE HYDROCHLORIDE 5 MG: 5 TABLET ORAL at 01:09

## 2024-09-24 RX ADMIN — FENTANYL CITRATE 25 MCG: 50 INJECTION INTRAMUSCULAR; INTRAVENOUS at 12:09

## 2024-09-24 RX ADMIN — DEXMEDETOMIDINE 4 MCG: 200 INJECTION, SOLUTION INTRAVENOUS at 11:09

## 2024-09-24 RX ADMIN — MIDAZOLAM HYDROCHLORIDE 2 MG: 1 INJECTION, SOLUTION INTRAMUSCULAR; INTRAVENOUS at 11:09

## 2024-09-24 RX ADMIN — FENTANYL CITRATE 25 MCG: 50 INJECTION, SOLUTION INTRAMUSCULAR; INTRAVENOUS at 11:09

## 2024-09-24 RX ADMIN — ONDANSETRON 4 MG: 2 INJECTION INTRAMUSCULAR; INTRAVENOUS at 11:09

## 2024-09-24 RX ADMIN — PROPOFOL 150 MG: 10 INJECTION, EMULSION INTRAVENOUS at 11:09

## 2024-09-24 RX ADMIN — OXYCODONE HYDROCHLORIDE 5 MG: 5 TABLET ORAL at 12:09

## 2024-09-24 RX ADMIN — SODIUM CHLORIDE, SODIUM GLUCONATE, SODIUM ACETATE, POTASSIUM CHLORIDE AND MAGNESIUM CHLORIDE: 526; 502; 368; 37; 30 INJECTION, SOLUTION INTRAVENOUS at 10:09

## 2024-09-24 RX ADMIN — CEFAZOLIN 2 G: 2 INJECTION, POWDER, FOR SOLUTION INTRAMUSCULAR; INTRAVENOUS at 11:09

## 2024-09-24 RX ADMIN — FENTANYL CITRATE 50 MCG: 50 INJECTION, SOLUTION INTRAMUSCULAR; INTRAVENOUS at 11:09

## 2024-09-24 RX ADMIN — LIDOCAINE HYDROCHLORIDE 100 MG: 20 INJECTION, SOLUTION EPIDURAL; INFILTRATION; INTRACAUDAL at 11:09

## 2024-09-24 NOTE — OP NOTE
[facility]  Orthopedic Surgery  Operative Note    SUMMARY     Date of Procedure: 9/24/2024     Procedure: Procedure(s) (LRB):  ARTHROSCOPY, KNEE, WITH partial lateral MENISCECTOMY (Right)       Surgeons and Role:     * Jose Rafael Barney MD - Primary    Assistant: Michael MCCAULEY    Pre-Operative Diagnosis: Effusion of right knee joint [M25.461]    Post-Operative Diagnosis: Post-Op Diagnosis Codes:     * Effusion of right knee joint [M25.461]    Anesthesia: General    Diagnostic arthroscopy findings: Diagnostic arthroscopy findings, the patient's medial compartment was thoroughly examined. The patient had very minimal cartilage wear and no distinct meniscal tear. ACL and PCL were both intact with   good tension. Lateral compartment showed some degenerative posterior horn tearing as well with minimal articular   wear. In the patellofemoral joint, the patient had good central tracking but with severe central patellar wear and more moderate trochlear arthritis.  Patient had a fair amount of synovitis as well.    Complications: No    Estimated Blood Loss (EBL): 10 mL    Tourniquet Time: 18min at 300mmHg           Implants: * No implants in log *    Specimens:   Specimen (24h ago, onward)      None                    Condition: Good    Disposition: PACU - hemodynamically stable.    Attestation: I was present and scrubbed for the entire procedure.    INDICATIONS FOR THE PROCEDURE:  74-year-old female who had right knee pain with effusion who failed conservative measures.  Patient was taken back for arthroscopic evaluation of the right knee with possible concern for infection    PROCEDURE IN DETAIL: Risks, benefits and alternatives of the procedure were   explained to the patient including, but not limited to damage to nerves,   arteries, blood vessels. Also explained risk of infection, DVT, PE, continued pain due to arthritis,  as well as   anesthetic complications including seizure,  stroke, heart attack and death. They  understood this and signed informed consent. The patient's Right knee was marked prior to coming to the Operating Room. Once there a formal   timeout was done in which correct patient, procedure and op site were all   correctly identified and confirmed by the entire operating team.Laryngeal mask anesthesia was induced. The   patient's Right lower extremity was prepped and draped in normal sterile fashion.   Leg was then exsanguinated with a tourniquet and tourniquet was inflated up   300 mmHg. Standard inferior lateral portal was then made.  Fluid was captured into a specimen cup to be sent for analysis.  Cultures of the fluid were taken.  Appropriate antibiotic was   given after cultures were taken.   A spinal needle was   used to localize an inferior medial portal and this was made under direct   arthroscopic visualization. Diagnostic arthroscopy was then performed with the   findings listed above. Shaver was used to remove redundant fat pad and   Synovium within the notch.  Shaver was used to perform a thorough synovectomy all throughout the joint including both medial lateral gutter, suprapatellar pouch, the notch and both compartments.  Patient had some tearing of the lateral meniscus and a partial lateral meniscectomy was performed.  Chondroplasties were performed where necessary.  Total of 6 liters of saline were run through the knee    Proceeded with closing. All   excess water was removed from the knee joint. Portals were closed using   nylons. Portal was then injected with 0.25% Marcaine with epinephrine. Sterile   dressing was then applied. They were then extubated and awakened and transferred   from the Operating Room to the Recovery Room in stable condition.     Postop course is for an arthroscopic lavage with partial lateral meniscectomy

## 2024-09-24 NOTE — DISCHARGE SUMMARY
[facility]  Discharge Note  Short Stay    Procedure(s) (LRB):  ARTHROSCOPY, KNEE, WITH MENISCECTOMY (Right)      OUTCOME: Patient tolerated treatment/procedure well without complication and is now ready for discharge.    DISPOSITION: Home or Self Care    FINAL DIAGNOSIS:  <principal problem not specified>    FOLLOWUP: In clinic    DISCHARGE INSTRUCTIONS:    Discharge Procedure Orders   Diet general     Keep surgical extremity elevated     Ice to affected area   Order Comments: using barrier between ice and skin (specify duration&frequency)     Remove dressing in 24 hours     Change dressing (specify)   Order Comments: Dressing change: as needed with waterproof bandaids     Call MD for:  temperature >100.4     Call MD for:  persistent nausea and vomiting     Call MD for:  severe uncontrolled pain     Call MD for:  difficulty breathing, headache or visual disturbances     Call MD for:  redness, tenderness, or signs of infection (pain, swelling, redness, odor or green/yellow discharge around incision site)     Call MD for:  hives     Call MD for:  persistent dizziness or light-headedness     Call MD for:  extreme fatigue     Activity as tolerated     Shower on day dressing removed (No bath)     Weight bearing as tolerated        TIME SPENT ON DISCHARGE: 5 minutes

## 2024-09-24 NOTE — ANESTHESIA POSTPROCEDURE EVALUATION
Anesthesia Post Evaluation    Patient: Allyson Henriquez    Procedure(s) Performed: Procedure(s) (LRB):  ARTHROSCOPY, KNEE, WITH MENISCECTOMY (Right)    Final Anesthesia Type: general      Patient location during evaluation: PACU  Patient participation: Yes- Able to Participate  Level of consciousness: sedated and awake  Post-procedure vital signs: reviewed and stable  Pain management: adequate  Airway patency: patent    PONV status at discharge: No PONV  Anesthetic complications: no      Cardiovascular status: hypertensive, blood pressure returned to baseline and hemodynamically stable  Respiratory status: spontaneous ventilation and nasal cannula  Hydration status: euvolemic  Follow-up not needed.              Vitals Value Taken Time   /81 09/24/24 1222   Temp 36.3 °C (97.3 °F) 09/24/24 1205   Pulse 80 09/24/24 1224   Resp 26 09/24/24 1224   SpO2 100 % 09/24/24 1224   Vitals shown include unfiled device data.      No case tracking events are documented in the log.      Pain/Elizabeth Score: Pain Rating Prior to Med Admin: 4 (9/24/2024 12:12 PM)  Elizabeth Score: 8 (9/24/2024 12:05 PM)

## 2024-09-24 NOTE — DISCHARGE INSTRUCTIONS
1.Diet: Ice chips, clear liquids, and then diet as tolerated. Drink plenty of liquids.  2.Ice the area at least three times a day (20 minutes per session).  3.Elevate the extremity above the level of the heart to help reduce swelling.  4.Pain medication can be taken every four to six hours as needed. It is helpful to take pain medication prior to physical therapy.  Use Tylenol or anti-inflammatories for pain as much as possible.  Pain medication is for pain not responsive to over-the-counter medications.  5.Any activity that requires precise thinking or accuracy should be avoided for a minimum of 72 hours after surgery and while on narcotic pain medication. This includes operating machinery and/or driving a vehicle.  6.All sutures/staples will be removed approximately 14 days from the time of surgery. Leave steri-strips (skin tapes) in place until sutures are removed.  7. If skin glue is used instead of stitches, do not apply ointments or solutions to the incision. Keep the incision dry. The skin glue will peel off in 3-4 weeks.  8. Change dressing on the first post-op day. Use gauze for the first 3 days, then start using Band-Aids over the incision sites.   9. All casts, splints, braces, slings, crutches, abduction pillows, etc... Are to be worn as instructed. Crutches are just to be used as needed. If not needed for soreness or balance, may weight bear as tolerated without the crutches.  10. Keep the incision dry for 10-14 days. A waterproof dressing (purchase at [employer], [employer], etc) can be used to shower. No bath, pool, hot tub until instructed.  11. Call [phone] with any questions or concerns.                  Discharge Instructions: After Your Surgery/Procedure  Youve just had surgery. During surgery you were given medicine called anesthesia to keep you relaxed and free of pain. After surgery you may have some pain or nausea. This is common. Here are some tips for feeling better and getting well after  "surgery.     Stay on schedule with your medication.   Going home  Your doctor or nurse will show you how to take care of yourself when you go home. He or she will also answer your questions. Have an adult family member or friend drive you home.      For your safety we recommend these precaution for the first 24 hours after your procedure:  Do not drive or use heavy equipment.  Do not make important decisions or sign legal papers.  Do not drink alcohol.  Have someone stay with you, if needed. He or she can watch for problems and help keep you safe.  Your concentration, balance, coordination, and judgement may be impaired for many hours after anesthesia.  Use caution when ambulating or standing up.     You may feel weak and "washed out" after anesthesia and surgery.      Subtle residual effects of general anesthesia or sedation with regional / local anesthesia can last more than 24 hours.  Rest for the remainder of the day or longer if your Doctor/Surgeon has advised you to do so.  Although you may feel normal within the first 24 hours, your reflexes and mental ability may be impaired without you realizing it.  You may feel dizzy, lightheaded or sleepy for 24 hours or longer.      Be sure to go to all follow-up visits with your doctor. And rest after your surgery for as long as your doctor tells you to.  Coping with pain  If you have pain after surgery, pain medicine will help you feel better. Take it as told, before pain becomes severe. Also, ask your doctor or pharmacist about other ways to control pain. This might be with heat, ice, or relaxation. And follow any other instructions your surgeon or nurse gives you.  Tips for taking pain medicine  To get the best relief possible, remember these points:  Pain medicines can upset your stomach. Taking them with a little food may help.  Most pain relievers taken by mouth need at least 20 to 30 minutes to start to work.  Taking medicine on a schedule can help you remember " to take it. Try to time your medicine so that you can take it before starting an activity. This might be before you get dressed, go for a walk, or sit down for dinner.  Constipation is a common side effect of pain medicines. Call your doctor before taking any medicines such as laxatives or stool softeners to help ease constipation. Also ask if you should skip any foods. Drinking lots of fluids and eating foods such as fruits and vegetables that are high in fiber can also help. Remember, do not take laxatives unless your surgeon has prescribed them.  Drinking alcohol and taking pain medicine can cause dizziness and slow your breathing. It can even be deadly. Do not drink alcohol while taking pain medicine.  Pain medicine can make you react more slowly to things. Do not drive or run machinery while taking pain medicine.  Your health care provider may tell you to take acetaminophen to help ease your pain. Ask him or her how much you are supposed to take each day. Acetaminophen or other pain relievers may interact with your prescription medicines or other over-the-counter (OTC) drugs. Some prescription medicines have acetaminophen and other ingredients. Using both prescription and OTC acetaminophen for pain can cause you to overdose. Read the labels on your OTC medicines with care. This will help you to clearly know the list of ingredients, how much to take, and any warnings. It may also help you not take too much acetaminophen. If you have questions or do not understand the information, ask your pharmacist or health care provider to explain it to you before you take the OTC medicine.  Managing nausea  Some people have an upset stomach after surgery. This is often because of anesthesia, pain, or pain medicine, or the stress of surgery. These tips will help you handle nausea and eat healthy foods as you get better. If you were on a special food plan before surgery, ask your doctor if you should follow it while you get  better. These tips may help:  Do not push yourself to eat. Your body will tell you when to eat and how much.  Start off with clear liquids and soup. They are easier to digest.  Next try semi-solid foods, such as mashed potatoes, applesauce, and gelatin, as you feel ready.  Slowly move to solid foods. Dont eat fatty, rich, or spicy foods at first.  Do not force yourself to have 3 large meals a day. Instead eat smaller amounts more often.  Take pain medicines with a small amount of solid food, such as crackers or toast, to avoid nausea.     Call your surgeon if  You still have pain an hour after taking medicine. The medicine may not be strong enough.  You feel too sleepy, dizzy, or groggy. The medicine may be too strong.  You have side effects like nausea, vomiting, or skin changes, such as rash, itching, or hives.       If you have obstructive sleep apnea  You were given anesthesia medicine during surgery to keep you comfortable and free of pain. After surgery, you may have more apnea spells because of this medicine and other medicines you were given. The spells may last longer than usual.   At home:  Keep using the continuous positive airway pressure (CPAP) device when you sleep. Unless your health care provider tells you not to, use it when you sleep, day or night. CPAP is a common device used to treat obstructive sleep apnea.  Talk with your provider before taking any pain medicine, muscle relaxants, or sedatives. Your provider will tell you about the possible dangers of taking these medicines.  © 8486-8009 [employer]. [address] [place] [address], [place] [address]. All rights reserved. This information is not intended as a substitute for professional medical care. Always follow your healthcare professional's instructions.                Using an Incentive Spirometer    An incentive spirometer is a device that helps you do deep breathing exercises. These exercises expand your lungs, aid in circulation,  and help prevent pneumonia. Deep breathing exercises also help you breathe better and improve the function of your lungs by:  Keeping your lungs clear  Strengthening your breathing muscles  Helping prevent respiratory complications or problems  The incentive spirometer gives you a way to take an active part in recover. A nurse or therapist will teach you breathing exercises. To do these exercises, you will breathe in through your mouth and not your nose. The incentive spirometer only works correctly if you breathe in through your mouth.  Steps to clear lungs  Step 1. Exhale normally. Then, inhale normally.  Relax and breathe out.  Step 2. Place your lips tightly around the mouthpiece.  Make sure the device is upright and not tilted.  Step 3. Inhale as much air as you can through the mouthpiece (don't breath through your nose).  Inhale slowly and deeply.  Hold your breath long enough to keep the balls or disk raised for at least 3 to 5 seconds, or as instructed by your healthcare provider.  Some spirometers have an indicator to let you know that you are breathing in too fast. If the indicator goes off, breathe in more slowly.  Step 4. Repeat the exercise regularly.  Do this exercise every hour while you're awake, or as instructed by your healthcare provider.  If you were taught deep breathing and coughing exercises, do them regularly as instructed by your healthcare provider.       We hope your stay was comfortable as you heal now, mend and rest.    For we have enjoyed taking care of you by giving your our best.    And as you get better, by regaining your health and strength;   We count it as a privilege to have served you and hope your time at [facility] was well spent.      Thank  You!!!

## 2024-09-24 NOTE — ANESTHESIA PROCEDURE NOTES
Intubation    Date/Time: 9/24/2024 11:19 AM    Performed by: Angela Campbell CRNA  Authorized by: Angela Campbell CRNA    Intubation:     Induction:  Intravenous    Intubated:  Postinduction    Mask Ventilation:  Easy mask    Attempts:  1    Attempted By:  CRNA    Difficult Airway Encountered?: No      Complications:  None    Airway Device:  Supraglottic airway/LMA    Airway Device Size:  3.0    Style/Cuff Inflation:  Cuffed (inflated to minimal occlusive pressure)    Secured at:  The lips    Placement Verified By:  Capnometry    Complicating Factors:  None    Findings Post-Intubation:  BS equal bilateral and atraumatic/condition of teeth unchanged

## 2024-09-24 NOTE — TRANSFER OF CARE
"Anesthesia Transfer of Care Note    Patient: Allyson Henriquez    Procedure(s) Performed: Procedure(s) (LRB):  ARTHROSCOPY, KNEE, WITH MENISCECTOMY (Right)    Patient location: PACU    Anesthesia Type: general    Transport from OR: Transported from OR on 2-3 L/min O2 by NC with adequate spontaneous ventilation    Post pain: adequate analgesia    Post assessment: no apparent anesthetic complications and tolerated procedure well    Post vital signs: stable    Level of consciousness: sedated and responds to stimulation    Nausea/Vomiting: no nausea/vomiting    Complications: none    Transfer of care protocol was followed      Last vitals: Visit Vitals  /63   Pulse 83   Temp 36.3 °C (97.4 °F) (Skin)   Resp 16   Ht 5' 6" (1.676 m)   Wt 78 kg (172 lb)   SpO2 97%   Breastfeeding No   BMI 27.76 kg/m²     "

## 2024-09-24 NOTE — PLAN OF CARE
Discharge instructions given to pt and family/friend, verbalized understanding and questions answered medications delivered to bedside and reviewed with patient and spouse,  instructed patient and spouse per Dr Barney to restart eliquis on 9/25/24 and to discontinue asa 81 mg.  Patient and spouse verbalized understanding.  Handouts provided. Belongings given back to pt. IV removed- catheter intact. Discharge via wheelchair.

## 2024-09-24 NOTE — PROGRESS NOTES
Pt prepared for surgery. Pt resting in bed, with   at bedside. Family signed up for text messaging. Belongings brought over to post op cabinet with wheel chairIS teaching performed. Fall risk and  allergy  armband placed. SCDs on.     1041 Per [employer] 307 Dr Muñoz aware

## 2024-09-24 NOTE — ANESTHESIA PREPROCEDURE EVALUATION
09/24/2024  Allyson Henriquez is a 74 y.o., female.  Allyson Henriquez is a 74 y.o. 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    TRISHA treated with BiPAP    Multinodular goiter (nontoxic)    Primary osteoarthritis of both knees    Overweight (BMI 25.0-29.9)    Essential hypertension    Current use of long term anticoagulation    Agatston CAC score, <100    LVH (left ventricular hypertrophy)    Long term current use of antiarrhythmic drug    Mixed hyperlipidemia    Muscle weakness    Decreased functional mobility    Stage 3a chronic kidney disease    Hypercalcemia    Melena    Lymphadenopathy    Small cell lung cancer    Iron deficiency anemia    Anxiety    Thrombocytopenia due to drugs    Radiation esophagitis    PAF (paroxysmal atrial fibrillation)    Drug-induced pancytopenia    Hypomagnesemia    Severe protein-calorie malnutrition    Bone metastases    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    Pleural metastasis    Esophageal dysphagia    Odynophagia    Nonrheumatic mitral valve regurgitation    Brain metastasis    Rash and nonspecific skin eruption    Soft tissue infection    Age-related physical debility    Colostomy complication    Under care of ostomy nurse    Foreign body granuloma of colon    Altered bowel elimination due to intestinal ostomy    Adrenal insufficiency    Abnormal endocrine laboratory test finding    Hypergranulation    Type 1 diabetes mellitus with nephropathy    Pleural effusion       Past  Surgical History:   Procedure Laterality Date    ADENOIDECTOMY      APPENDECTOMY      CATARACT EXTRACTION      COLONOSCOPY N/A 11/14/2016    Procedure: COLONOSCOPY;  Surgeon: Destin Cardona MD;  Location: [facility];  Service: Endoscopy;  Laterality: N/A;    COLONOSCOPY N/A 05/14/2021    Procedure: COLONOSCOPY;  Surgeon: Destin Cardona MD;  Location: [facility];  Service: Endoscopy;  Laterality: N/A; hemorrhoids, diverticulosis, Old blood left colon. No active bleeding; Repeat colonoscopy is not recommended for screening purposes.    COLOSTOMY N/A 07/28/2022    Procedure: CREATION, COLOSTOMY;  Surgeon: Nubia Tinoco MD;  Location: [facility];  Service: General;  Laterality: N/A;    DEBRIDEMENT OF WOUND OF ABDOMEN  07/28/2022    Procedure: DEBRIDEMENT, WOUND, ABDOMEN;  Surgeon: Nubia Tinoco MD;  Location: [facility];  Service: General;;    ESOPHAGOGASTRODUODENOSCOPY N/A 05/14/2021    Procedure: EGD (ESOPHAGOGASTRODUODENOSCOPY);  Surgeon: Destin Cardona MD;  Location: [facility];  Service: Endoscopy;  Laterality: N/A; unremarkable    ESOPHAGOGASTRODUODENOSCOPY N/A 08/09/2021    Procedure: ESOPHAGOGASTRODUODENOSCOPY (EGD);  Surgeon: Destin Cardona MD;  Location: [facility];  Service: Endoscopy;  Laterality: N/A; Moderately severe radiation esophagitis with no bleeding    EXCISIONAL BIOPSY N/A 02/09/2023    Procedure: EXCISIONAL BIOPSY;  Surgeon: Nubia Tinoco MD;  Location: [facility];  Service: General;  Laterality: N/A;    EYE SURGERY      cataract OU    HERNIA REPAIR      INSERTION OF TUNNELED CENTRAL VENOUS CATHETER (CVC) WITH SUBCUTANEOUS PORT N/A 06/07/2021    Procedure: [id number];  Surgeon: Joe Salinas MD;  Location: [facility];  Service: General;  Laterality: N/A;    INTRALUMINAL GASTROINTESTINAL TRACT IMAGING VIA CAPSULE N/A 12/29/2021    Procedure: IMAGING PROCEDURE, GI TRACT, INTRALUMINAL, VIA CAPSULE  (called for instruction 12/20-may have trouble swallowing);   "Surgeon: Floyd Dixon MD;  Location: [facility];  Service: Endoscopy;  Laterality: N/A; Diffuse red spots of unclear significance and erosions found in the small bowel (entire small bowel), suspicious for mild radiation enteritis    NEUROMA SURGERY Left     acoustic    TONSILLECTOMY      WOUND EXPLORATION N/A 08/13/2022    Procedure: EXPLORATION, WOUND;  Surgeon: Arnel Olsen MD;  Location: [facility] OR;  Service: General;  Laterality: N/A;    WOUND EXPLORATION Left 02/09/2023    Procedure: EXPLORATION, WOUND - of Ostomy Granuloma - Wound - Abdomen;  Surgeon: Nubia Tinoco MD;  Location: [facility] OR;  Service: General;  Laterality: Left;         Vital Signs Range (Last 24H):  Temp:  [36.3 °C (97.4 °F)]   Pulse:  [83]   Resp:  [16]   BP: (131)/(63)   SpO2:  [97 %]           Chemistry        Component Value Date/Time     (L) 09/19/2024 1035    K 4.6 09/19/2024 1035    CL 98 09/19/2024 1035    CO2 23 09/19/2024 1035    BUN 17 09/19/2024 1035    CREATININE 1.0 09/19/2024 1035     (H) 09/19/2024 1035        Component Value Date/Time    CALCIUM 9.7 09/19/2024 1035    ALKPHOS 101 09/09/2024 1210    AST 33 09/09/2024 1210    ALT 15 09/09/2024 1210    BILITOT 0.7 09/09/2024 1210    ESTGFRAFRICA >60 07/31/2022 0430    EGFRNONAA 55 (A) 07/31/2022 0430            Lab Results   Component Value Date    WBC 9.86 09/19/2024    HGB 10.3 (L) 09/19/2024    HCT 33.0 (L) 09/19/2024    MCV 80 (L) 09/19/2024     09/19/2024       No results for input(s): "PT", "INR", "PROTIME", "APTT" in the last 72 hours.        Pre-op Assessment    I have reviewed the Patient Summary Reports.     I have reviewed the Nursing Notes. I have reviewed the NPO Status.   I have reviewed the Medications.   Steroids Taken In Past Year: Hydrocortisone    Review of Systems  Anesthesia Hx:  No problems with previous Anesthesia             Denies Family Hx of Anesthesia complications.    Denies Personal Hx of Anesthesia complications.          "           Social:  Former Smoker       Hematology/Oncology:       -- Anemia:               Hematology Comments: H/H 10/33 after 2 units PRBC's    Current/Recent Cancer. (small cell lung cancer with brain mets)                Cardiovascular:     Hypertension Valvular problems/Murmurs, MR   Dysrhythmias atrial fibrillation  CHF    hyperlipidemia    ECHO 7/2024  ·  Left Ventricle: The left ventricle is normal in size. There is concentric remodeling. There is normal systolic function. Ejection fraction by visual approximation is 60 -65 %. There is normal diastolic function.  ·  Right Ventricle: Normal right ventricular cavity size. Systolic function is normal.  ·  Left Atrium: Left atrium is mildly dilated.  ·  Aortic Valve:  Moderately calcified cusps. no stenosis  ·  Mitral Valve: There is mild bileaflet sclerosis. trace MR  ·  Tricuspid Valve: There is mild regurgitation.  ·  Pulmonary Artery: The estimated pulmonary artery systolic pressure is  less than 20 mmHg.  ·  IVC/SVC: Normal venous pressure at 3 mmHg.  ·  Pericardium: There is a trivial effusion adjacent to the right atrium. No indication of cardiac tamponade.                              Pulmonary:   COPD     Sleep Apnea (BiPAP)                Renal/:  Chronic Renal Disease (CKD3), CRI                Hepatic/GI:      Liver Disease, (hepatic steatosis)      Status post sigmoid colectomy, end colostomy, debridement of abdominal wall 7/28 2/2 diverticular abscess          Musculoskeletal:  Arthritis   Right knee abscess            Neurological:           Brain mets  H/o bell's palsy                            Endocrine:  Diabetes, poorly controlled, type 1         Obesity / BMI > 30      Physical Exam  General: Well nourished, Cooperative and Alert    Airway:  Mallampati: II   Mouth Opening: Normal  TM Distance: Normal  Tongue: Normal  Neck ROM: Normal ROM    Dental:  Intact    Chest/Lungs:  Normal Respiratory Rate, Clear to auscultation    Heart:  Rate:  Normal  Rhythm: Regular Rhythm        Anesthesia Plan  Type of Anesthesia, risks & benefits discussed:    Anesthesia Type: Gen Supraglottic Airway  Intra-op Monitoring Plan: Standard ASA Monitors  Post Op Pain Control Plan: multimodal analgesia and IV/PO Opioids PRN  Induction:  IV  Airway Plan: Direct  Informed Consent: Informed consent signed with the Patient and all parties understand the risks and agree with anesthesia plan.  All questions answered.   ASA Score: 3    Ready For Surgery From Anesthesia Perspective.     .

## 2024-09-26 ENCOUNTER — TELEPHONE (OUTPATIENT)
Dept: ORTHOPEDICS | Facility: CLINIC | Age: 74
End: 2024-09-26
Payer: MEDICARE

## 2024-09-26 ENCOUNTER — HOSPITAL ENCOUNTER (OUTPATIENT)
Dept: RADIOLOGY | Facility: HOSPITAL | Age: 74
Discharge: HOME | End: 2024-09-26
Attending: RADIOLOGY
Payer: MEDICARE

## 2024-09-26 DIAGNOSIS — C34.90 SMALL CELL LUNG CANCER: ICD-10-CM

## 2024-09-26 DIAGNOSIS — C79.31 MALIGNANT NEOPLASM METASTATIC TO BRAIN: ICD-10-CM

## 2024-09-26 PROCEDURE — A9585 GADOBUTROL INJECTION: HCPCS | Mod: PO | Performed by: RADIOLOGY

## 2024-09-26 PROCEDURE — 25500020 PHARM REV CODE 255: Mod: PO | Performed by: RADIOLOGY

## 2024-09-26 PROCEDURE — 70553 MRI BRAIN STEM W/O & W/DYE: CPT | Mod: TC,PO

## 2024-09-26 PROCEDURE — 70553 MRI BRAIN STEM W/O & W/DYE: CPT | Mod: 26,,, | Performed by: RADIOLOGY

## 2024-09-26 RX ORDER — GADOBUTROL 604.72 MG/ML
7 INJECTION INTRAVENOUS
Status: COMPLETED | OUTPATIENT
Start: 2024-09-26 | End: 2024-09-26

## 2024-09-26 RX ADMIN — GADOBUTROL 7 ML: 604.72 INJECTION INTRAVENOUS at 03:09

## 2024-09-26 NOTE — TELEPHONE ENCOUNTER
----- Message from Krishan Flor sent at 9/26/2024  1:16 PM CDT -----   / Faustino would like a callback regarding rescheduling the patient's 2 week Post Op to a later time in the day.    Time conflicts with patient's Oncology appointment.    Soonest is 10/16/24    [phone]

## 2024-09-27 ENCOUNTER — TELEPHONE (OUTPATIENT)
Dept: ENDOCRINOLOGY | Facility: CLINIC | Age: 74
End: 2024-09-27
Payer: MEDICARE

## 2024-09-27 ENCOUNTER — OFFICE VISIT (OUTPATIENT)
Dept: FAMILY MEDICINE | Facility: CLINIC | Age: 74
End: 2024-09-27
Payer: MEDICARE

## 2024-09-27 VITALS
RESPIRATION RATE: 16 BRPM | DIASTOLIC BLOOD PRESSURE: 64 MMHG | OXYGEN SATURATION: 98 % | WEIGHT: 168 LBS | BODY MASS INDEX: 27 KG/M2 | HEART RATE: 96 BPM | HEIGHT: 66 IN | SYSTOLIC BLOOD PRESSURE: 128 MMHG

## 2024-09-27 DIAGNOSIS — E11.22 TYPE 2 DIABETES MELLITUS WITH CHRONIC KIDNEY DISEASE, WITHOUT LONG-TERM CURRENT USE OF INSULIN, UNSPECIFIED CKD STAGE: ICD-10-CM

## 2024-09-27 DIAGNOSIS — Z98.890 H/O ARTHROSCOPY OF RIGHT KNEE: Primary | ICD-10-CM

## 2024-09-27 DIAGNOSIS — C34.90 SMALL CELL LUNG CANCER: ICD-10-CM

## 2024-09-27 LAB — BACTERIA SPEC ANAEROBE CULT: NORMAL

## 2024-09-27 PROCEDURE — 99999 PR PBB SHADOW E&M-EST. PATIENT-LVL V: CPT | Mod: PBBFAC,,, | Performed by: FAMILY MEDICINE

## 2024-09-27 PROCEDURE — 99214 OFFICE O/P EST MOD 30 MIN: CPT | Mod: S$GLB,,, | Performed by: FAMILY MEDICINE

## 2024-09-27 PROCEDURE — 3074F SYST BP LT 130 MM HG: CPT | Mod: CPTII,S$GLB,, | Performed by: FAMILY MEDICINE

## 2024-09-27 PROCEDURE — 4010F ACE/ARB THERAPY RXD/TAKEN: CPT | Mod: CPTII,S$GLB,, | Performed by: FAMILY MEDICINE

## 2024-09-27 PROCEDURE — 1101F PT FALLS ASSESS-DOCD LE1/YR: CPT | Mod: CPTII,S$GLB,, | Performed by: FAMILY MEDICINE

## 2024-09-27 PROCEDURE — 3051F HG A1C>EQUAL 7.0%<8.0%: CPT | Mod: CPTII,S$GLB,, | Performed by: FAMILY MEDICINE

## 2024-09-27 PROCEDURE — 3288F FALL RISK ASSESSMENT DOCD: CPT | Mod: CPTII,S$GLB,, | Performed by: FAMILY MEDICINE

## 2024-09-27 PROCEDURE — 3078F DIAST BP <80 MM HG: CPT | Mod: CPTII,S$GLB,, | Performed by: FAMILY MEDICINE

## 2024-09-27 NOTE — PROGRESS NOTES
Patient ID:   Allyson Henriquez is a 74 y.o. female.    Chief Complaint: Follow-up (Patient is coming in today for follow up )      History of Present Illness      Accompanied by her  for follow-up.  Struggling with pain following knee replacement.  Not quite managing with current pain regimen.  Discussed increased to gabapentin in conjunction with her OTCs.  Encouraged her to continue with physical therapy as tolerated and activities as allowed.  Ensure adequate protein intake.  Continued follow-up with wound care regarding abdomen and buttocks-improving.    Maintains follow-up with Radiation & Oncology regarding lung cancer.  Doing well currently on Keytruda.      CHIEF COMPLAINT:  Allyson presents today for follow up.      ROS:  General: -fever, -chills, -fatigue, -weight gain, -weight loss  Eyes: -vision changes, -redness, -discharge  ENT: -ear pain, -nasal congestion, -sore throat  Cardiovascular: -chest pain, -palpitations, -lower extremity edema  Respiratory: -cough, -shortness of breath  Gastrointestinal: -abdominal pain, -nausea, -vomiting, -diarrhea, -constipation, -blood in stool  Genitourinary: -dysuria, -hematuria, -frequency  Musculoskeletal: -joint pain, -muscle pain  Skin: -rash, -lesion  Neurological: -headache, -dizziness, -numbness, -tingling  Psychiatric: -anxiety, -depression, -sleep difficulty             The ASCVD Risk score (Ron ORDOÑEZ, et al., 2019) failed to calculate for the following reasons:    Risk score cannot be calculated because patient has a medical history suggesting prior/existing ASCVD    Exam:  Physical Exam  Vitals and nursing note reviewed.   Constitutional:       General: She is not in acute distress.     Appearance: Normal appearance. She is well-developed.   HENT:      Head: Normocephalic and atraumatic.   Eyes:      General: No scleral icterus.        Right eye: No discharge.         Left eye: No discharge.      Conjunctiva/sclera: Conjunctivae normal.    Cardiovascular:      Rate and Rhythm: Normal rate.   Pulmonary:      Effort: Pulmonary effort is normal. No respiratory distress.   Abdominal:      Palpations: Abdomen is soft.      Tenderness: There is no guarding.   Musculoskeletal:         General: Swelling (Right knee) and tenderness present. No deformity or signs of injury.      Cervical back: Neck supple.      Right lower leg: No edema.      Left lower leg: No edema.   Skin:     General: Skin is warm and dry.      Findings: No rash.   Neurological:      General: No focal deficit present.      Mental Status: She is alert and oriented to person, place, and time.   Psychiatric:         Mood and Affect: Mood normal.         Behavior: Behavior normal.         Assessment/Plan:  H/O arthroscopy of right knee  Comments:  improving post-op, but she notes continued issues with pain  pain uncontrolled - already on tyl/ibu rotation - increase gabapentin to 3x/day    Small cell lung cancer  Comments:  upcoming appt with onc/maronge to discuss continued therapy vs other given 2 clear scans    Type 1 diabetes mellitus with nephropathy         Assessment & Plan               Visit today included increased complexity associated with the care of the episodic problem knee pain addressed and managing the longitudinal care of the patient due to the serious and/or complex managed problem(s) dm2, smlc.     No follow-ups on file.    This note was generated with the assistance of ambient listening technology. Verbal consent was obtained by the patient and accompanying visitor(s) for the recording of patient appointment to facilitate this note. I attest to having reviewed and edited the generated note for accuracy, though some syntax or spelling errors may persist. Please contact the author of this note for any clarification.

## 2024-09-27 NOTE — TELEPHONE ENCOUNTER
S/w  and notified him he needed to go to the [facility] portal on Oct 1st to schedule appt w/ or call us to do so and then ,we could schedule the US and labs.  verb understanding

## 2024-09-27 NOTE — TELEPHONE ENCOUNTER
----- Message from Veronica Menard sent at 9/27/2024  9:43 AM CDT -----  Contact: Faustino (spouse)  Type:  Needs Medical Advice    Who Called: Faustino    Would the patient rather a call back or a response via [facility]?  Call back    Best Call Back Number: [phone]    Additional Information: has questions about tests to be done    Please call to advise    Thanks

## 2024-09-28 LAB — BACTERIA SPEC AEROBE CULT: NO GROWTH

## 2024-09-30 ENCOUNTER — OFFICE VISIT (OUTPATIENT)
Dept: RADIATION ONCOLOGY | Facility: CLINIC | Age: 74
End: 2024-09-30
Payer: MEDICARE

## 2024-09-30 VITALS
OXYGEN SATURATION: 97 % | DIASTOLIC BLOOD PRESSURE: 63 MMHG | SYSTOLIC BLOOD PRESSURE: 135 MMHG | RESPIRATION RATE: 18 BRPM | TEMPERATURE: 98 F | HEART RATE: 86 BPM

## 2024-09-30 DIAGNOSIS — C34.90 SMALL CELL LUNG CANCER: Primary | ICD-10-CM

## 2024-09-30 DIAGNOSIS — C79.31 MALIGNANT NEOPLASM METASTATIC TO BRAIN: ICD-10-CM

## 2024-09-30 PROCEDURE — 3075F SYST BP GE 130 - 139MM HG: CPT | Mod: CPTII,S$GLB,, | Performed by: RADIOLOGY

## 2024-09-30 PROCEDURE — 99213 OFFICE O/P EST LOW 20 MIN: CPT | Mod: S$GLB,,, | Performed by: RADIOLOGY

## 2024-09-30 PROCEDURE — 1101F PT FALLS ASSESS-DOCD LE1/YR: CPT | Mod: CPTII,S$GLB,, | Performed by: RADIOLOGY

## 2024-09-30 PROCEDURE — 3051F HG A1C>EQUAL 7.0%<8.0%: CPT | Mod: CPTII,S$GLB,, | Performed by: RADIOLOGY

## 2024-09-30 PROCEDURE — 1159F MED LIST DOCD IN RCRD: CPT | Mod: CPTII,S$GLB,, | Performed by: RADIOLOGY

## 2024-09-30 PROCEDURE — 3288F FALL RISK ASSESSMENT DOCD: CPT | Mod: CPTII,S$GLB,, | Performed by: RADIOLOGY

## 2024-09-30 PROCEDURE — 99999 PR PBB SHADOW E&M-EST. PATIENT-LVL V: CPT | Mod: PBBFAC,,, | Performed by: RADIOLOGY

## 2024-09-30 PROCEDURE — 3078F DIAST BP <80 MM HG: CPT | Mod: CPTII,S$GLB,, | Performed by: RADIOLOGY

## 2024-09-30 PROCEDURE — 4010F ACE/ARB THERAPY RXD/TAKEN: CPT | Mod: CPTII,S$GLB,, | Performed by: RADIOLOGY

## 2024-09-30 PROCEDURE — 1125F AMNT PAIN NOTED PAIN PRSNT: CPT | Mod: CPTII,S$GLB,, | Performed by: RADIOLOGY

## 2024-09-30 NOTE — PROGRESS NOTES
[facility] Department of Radiation Oncology  Follow Up Visit Note    Diagnosis:  Allyson Henriquez is a 74 y.o. 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, status post SRS       Oncologic History:  Oncology History   Small cell lung cancer   5/20/2021 Initial Diagnosis    Small cell carcinoma of lung, right     6/8/2021 - 7/27/2021 Chemotherapy    Treatment Summary   Plan Name: OP CARBOPLATIN (AUC) + ETOPOSIDE Q3W  Treatment Goal: Palliative  Status: Inactive  Start Date: 6/8/2021  End Date: 7/27/2021  Provider: Madhav Cardona MD  Chemotherapy: CARBOplatin (PARAPLATIN) 395 mg in sodium chloride 0.9% 250 mL chemo infusion, 395 mg (100 % of original dose 395 mg), Intravenous, Clinic/HOD 1 time, 3 of 6 cycles  Dose modification:   (original dose 395 mg, Cycle 1, Reason: MD Discretion)  Administration: 395 mg (6/8/2021), 395 mg (6/29/2021), 395 mg (7/20/2021)  etoposide (VEPESID) 100 mg/m2 = 200 mg in sodium chloride 0.9% 500 mL chemo infusion, 100 mg/m2 = 200 mg, Intravenous, Clinic/HOD 1 time, 3 of 6 cycles  Administration: 200 mg (6/8/2021), 200 mg (6/9/2021), 200 mg (6/29/2021), 200 mg (6/10/2021), 200 mg (6/30/2021), 200 mg (7/1/2021), 200 mg (7/20/2021), 200 mg (7/21/2021), 200 mg (7/22/2021)     1/18/2022 - 7/6/2022 Chemotherapy    Treatment Summary   Plan Name: OP PEMBROLIZUMAB 200MG Q3W  Treatment Goal: Palliative  Status: Inactive  Start Date: 1/18/2022  End Date: 7/6/2022  Provider: Madhav Cardona MD  Chemotherapy: [No matching medication found in this treatment plan]     6/28/2022 - 6/28/2022 Radiation Therapy    Treating physician: Aleena Nielson  Total Dose: 22 Gy  Fractions: 1    Single fraction radiosurgery to right cerebellar metastasis.     Iron deficiency anemia   Brain metastasis   6/28/2022 Initial Diagnosis    Brain metastasis     6/28/2022 - 6/28/2022 Radiation Therapy    Treating physician: Aleena Nielson  Total  Dose: 22 Gy  Fractions: 1    Single fraction radiosurgery to right cerebellar metastasis.          Interval History  The patient presents today for a regularly scheduled follow up visit.  She was last seen in our follow up on 6/11/24.  Since that time she had been feeling very well until she developed issues with her knee.   9/24/24 right knee arthroscopy for meniscal tear.  Ongoing wound care for abdominal wall and sacral pressure injury, improving (has been released from Wound Care.     Skipped last week of Keytruda 2/2 scans have been clear/for knee surgery.    9/26/24 MRI brain: right cerebellar hemispheric mass similar vs slightly decreased in size with possible mild increase in surrounding T2/FLAIR signal. Stable left vestibular schwannoma    HPI: This is a 72 year old female with history of extensive-stage small cell lung cancer metastatic to bone and in thorax who presents with her  for discussion of radiation therapy for new single brain metastasis.  She is followed by Dr. Cardona on Pembro with notable systemic disease response, who reported 2 episodes of LEFT hand spasm and 2 episodes of LEFT foot spasm occurring independently and resolving spontaneously 2 weeks ago.  No other neurologic symptoms.  Dr. Cardona ordered MRI.     MRI Brain 6/17/22 new 9mm RIGHT cerebellar metastasis.  No other intracranial metastases.     Patient reports low grade headache since last Weds which she attributes to stress. She is active at home and uses wheelchair intermittently when fatigued.       She had a prolonged hospital stay (10 weeks) 2/2 esophagitis and C. Dif last year after completing 45Gy thoracic radiation therapy (Turissi regimen), but was unable to complete her course of chemotherapy.  She has recovered significantly from deconditioning related to prolonged hospital stay last year, but is still more easily fatigued than normal..     6/28/22 Single fraction SRS 22Gy to right cerebellar metastasis    she  had repeated hospitalizations for diverticular abscess requiring sigmoid colectomy/ostomy and debridement 7/28/22 and multiple surgeries for washout and several IV and oral antibiotics (she was hospitalized last year with severe esophagitis after chemotherapy-radiation therapy to thorax, complicated by C. Diff colitis).  She was discharged 8/19/22 to [facility], but readmitted 9/15/22 with wound dehiscence.   9/16/22 MRI brain: decrease in size of treated 9mm RIGHT cerebellar mass (now 5 x 3mm)     10/14/22 developed transient Left hand symptoms similar to those at time of presentation    10/23/22 PET with stable hypermetabolic right perihilar consolidation consistent with post-treatment change.     10/26/22 MRI brain stable vs improved R cerebellar lesion    11/2022, she transferred her care to Dr. Gonzalez who has restarted Keytruda     1/23/23 MRI brain: stable appearance of treated right cerebellar metastasis, now punctate in size; no new lesions; stable left IAC vestibular schwannoma    Some ongoing issues with wound healing related to her abdominal surgeries.  Is consulting with a new surgeon for second opinion. Also undergoing endocrine work up for ongoing blood sugar instability (on hydrocortisone for adrenal insufficiency)     4/27/23 MRI brain: right cerebellar small enhancing lesion with interval small focus of increased enhancement medial/inferior, and minimal new edema compared to prior study.  Stable left vestibular schwannoma.     7/2023 ostomy revision, hernia repair, followed by prolonged hospitalization    8/21/23 MRI brain: right cerebellar small enhancing lesion with serial increased size, interval small focus of increased enhancement medial/inferior, concerning for recurrence vs rediation necrossis/treatment effect.  Stable left vestibular schwannoma.     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  Stable left acoustic schwannoma    Admitted 1/2024 for UTI, hyponatremia, pleural effusion. Has completed antibiotics.    3/1/24 MR Spect: stable enhancing lesion in right cerebellum- spect findings most consistent with treatment change/radiation necrosis    Ongoing but significantly improving issues with abdominal wounds (had 3- 2 have healed, one improving); no longer using wound vac remains. Off hydrocortisone.     6/5/24 MRI Brain w/Spect: continued slight increase lesion in R cerebellum and marginating vasogenic edema with slight increased central nodular component; radiation necrosis favored  Stable L vestibular schwannoma    Possibility of pregnancy: No  History of prior irradiation: Yes - LEFT gamma knife, acoustic neuroma at [facility], 10/2003; Chest radiation therapy 45Gy BID (Turissi regimen), 2021 with MBP   History of prior systemic anti-cancer therapy: Yes - most recently Pembro (last 6/15/22)  History of collagen vascular disease: No  Implanted electronic device (pacer/defib/nerve stimulator): No    Review of Systems   Review of Systems   Constitutional:  Negative for chills and fever.   Eyes:  Negative for blurred vision and double vision.   Respiratory:  Negative for cough.    Gastrointestinal:  Negative for abdominal pain (resolved).   Musculoskeletal:  Positive for back pain (tenderness over one area, inferior to left scapula) and joint pain (recent right knee surgery).   Neurological:  Positive for focal weakness (right hand grasp strength x 3 months). Negative for dizziness, tremors (occasional bilat hand numbness on waking), sensory change, speech change, seizures, weakness and headaches.       Social History:  Social History     Tobacco Use    Smoking status: Former     Current packs/day: 0.00     Average packs/day: 1 pack/day for 40.0 years (40.0 ttl pk-yrs)     Types: Cigarettes     Start date:  3/6/1976     Quit date: 3/6/2016     Years since quittin.5    Smokeless tobacco: Never   Substance Use Topics    Alcohol use: Not Currently    Drug use: No       Family History:  Cancer-related family history includes Cancer in her paternal uncle. There is no history of Esophageal cancer.    Exam:  Vitals:    24 1110   BP: 135/63   Pulse: 86   Resp: 18   Temp: 98 °F (36.7 °C)   SpO2: 97%             Physical Exam  Vitals reviewed. Exam conducted with a chaperone present.   Constitutional:       General: She is not in acute distress.     Appearance: Normal appearance. She is obese. She is not ill-appearing or toxic-appearing.   HENT:      Head: Normocephalic and atraumatic.   Eyes:      Extraocular Movements: Extraocular movements intact.      Pupils: Pupils are equal, round, and reactive to light.   Abdominal:       Musculoskeletal:         General: No swelling.      Cervical back: No rigidity.   Skin:     General: Skin is warm.   Neurological:      Mental Status: She is alert.      Cranial Nerves: Cranial nerves 2-12 are intact.      Motor: No pronator drift.      Coordination: Coordination normal. Finger-Nose-Finger Test normal. Rapid alternating movements normal.      Gait: Gait abnormal (ambulates with wheelchair 2/2 recent knee surg).      Comments: Minimal targeting difficulty R hand          Imaging:  MRI brain 24 reviewed as detailed above      Assessment:  Stable size of R cerebellar enhancement w/indistinct margins, most consistent with radiation necrosis.    ECOG: (3) Limited self care; confined to bed or chair >50%    Plan:  MRI brain in 3 months (likely to change to q6 months if stable)  Pt will contact our office if any new neuro symptoms  Immunotherapy/systemic therapy under the care of Dr. Gonzalez, tolerating well  Follow up re: adrenal insufficiency, followed Endocrinologist  Ongoing care for abdominal healing-  follows w/ Colorectal Dr. Annia Ashton at  [facility]  Follow up  with Ortho as directed  Will follow up with Dr. Gonzalez re: interval PET  She was given our contact information, and she was told that she could call our clinic at anytime if she has any questions or concerns.  Follow up with other providers as directed        25 minutes spent in chart/case review, face-to-face interaction, discussion with other providers, and treatment planning/coordination of care.

## 2024-10-02 ENCOUNTER — TELEPHONE (OUTPATIENT)
Dept: ENDOCRINOLOGY | Facility: CLINIC | Age: 74
End: 2024-10-02
Payer: MEDICARE

## 2024-10-02 NOTE — TELEPHONE ENCOUNTER
----- Message from Geri sent at 10/1/2024  3:32 PM CDT -----  Regarding: appt request  Contact: pt's  mau  Type: Appointment Request    Caller is requesting an appointment.      Name of Caller: pt's  mau    Would the patient rather a call back or a response via [facility]? Call back    Best Call Back Number:[phone]    Additional Information: pt set an appt on April 25. Now they are ready for a test that the doctor wanted to schedule

## 2024-10-02 NOTE — TELEPHONE ENCOUNTER
S/w pt's , US scheduled for April prior to 1 yr f/u, she will do labs at 8 AM same day at [facility].

## 2024-10-03 LAB — FUNGUS SPEC CULT: NORMAL

## 2024-10-09 ENCOUNTER — OFFICE VISIT (OUTPATIENT)
Dept: ORTHOPEDICS | Facility: CLINIC | Age: 74
End: 2024-10-09
Payer: MEDICARE

## 2024-10-09 VITALS — HEIGHT: 66 IN | RESPIRATION RATE: 18 BRPM | BODY MASS INDEX: 27 KG/M2 | WEIGHT: 168 LBS

## 2024-10-09 DIAGNOSIS — M25.461 KNEE EFFUSION, RIGHT: ICD-10-CM

## 2024-10-09 DIAGNOSIS — M25.461 EFFUSION OF RIGHT KNEE JOINT: Primary | ICD-10-CM

## 2024-10-09 DIAGNOSIS — I48.0 PAF (PAROXYSMAL ATRIAL FIBRILLATION): ICD-10-CM

## 2024-10-09 PROCEDURE — 99999 PR PBB SHADOW E&M-EST. PATIENT-LVL IV: CPT | Mod: PBBFAC,,, | Performed by: ORTHOPAEDIC SURGERY

## 2024-10-09 PROCEDURE — 1101F PT FALLS ASSESS-DOCD LE1/YR: CPT | Mod: CPTII,S$GLB,, | Performed by: ORTHOPAEDIC SURGERY

## 2024-10-09 PROCEDURE — 3288F FALL RISK ASSESSMENT DOCD: CPT | Mod: CPTII,S$GLB,, | Performed by: ORTHOPAEDIC SURGERY

## 2024-10-09 PROCEDURE — 99024 POSTOP FOLLOW-UP VISIT: CPT | Mod: S$GLB,,, | Performed by: ORTHOPAEDIC SURGERY

## 2024-10-09 PROCEDURE — 3051F HG A1C>EQUAL 7.0%<8.0%: CPT | Mod: CPTII,S$GLB,, | Performed by: ORTHOPAEDIC SURGERY

## 2024-10-09 PROCEDURE — 1160F RVW MEDS BY RX/DR IN RCRD: CPT | Mod: CPTII,S$GLB,, | Performed by: ORTHOPAEDIC SURGERY

## 2024-10-09 PROCEDURE — 4010F ACE/ARB THERAPY RXD/TAKEN: CPT | Mod: CPTII,S$GLB,, | Performed by: ORTHOPAEDIC SURGERY

## 2024-10-09 PROCEDURE — 1159F MED LIST DOCD IN RCRD: CPT | Mod: CPTII,S$GLB,, | Performed by: ORTHOPAEDIC SURGERY

## 2024-10-09 RX ORDER — APIXABAN 5 MG/1
TABLET, FILM COATED ORAL
Qty: 180 TABLET | Refills: 0 | Status: SHIPPED | OUTPATIENT
Start: 2024-10-09

## 2024-10-09 RX ORDER — PREDNISONE 10 MG/1
10 TABLET ORAL DAILY
Qty: 10 TABLET | Refills: 0 | Status: SHIPPED | OUTPATIENT
Start: 2024-10-09

## 2024-10-09 NOTE — PROGRESS NOTES
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 difficile colitis 08/2021    Cancer     lung    CKD (chronic kidney disease), stage III     Colonic diverticular abscess 07/12/2022    COPD (chronic obstructive pulmonary disease)     Encounter for blood transfusion     Hepatic steatosis     History of numbness     transient numbness left hand and foot    History of tobacco abuse     Hyperlipidemia     Hypertension     Lung nodules     and adenopathy    Multinodular goiter (nontoxic) 05/08/2017    Obesity     TRISHA (obstructive sleep apnea)     on bipap at bedtime    Proteinuria        Past Surgical History:   Procedure Laterality Date    ADENOIDECTOMY      APPENDECTOMY      CATARACT EXTRACTION      COLONOSCOPY N/A 11/14/2016    Procedure: COLONOSCOPY;  Surgeon: Destin Cardona MD;  Location: [facility];  Service: Endoscopy;  Laterality: N/A;    COLONOSCOPY N/A 05/14/2021    Procedure: COLONOSCOPY;  Surgeon: Destin Cardona MD;  Location: [facility];  Service: Endoscopy;  Laterality: N/A; hemorrhoids, diverticulosis, Old blood left colon. No active bleeding; Repeat colonoscopy is not recommended for screening purposes.    COLOSTOMY N/A 07/28/2022    Procedure: CREATION, COLOSTOMY;  Surgeon: Nubia Tinoco MD;  Location: [facility] OR;  Service: General;  Laterality: N/A;    DEBRIDEMENT OF WOUND OF ABDOMEN  07/28/2022    Procedure: DEBRIDEMENT, WOUND, ABDOMEN;  Surgeon: Nubia Tinoco MD;  Location: [facility] OR;  Service: General;;    ESOPHAGOGASTRODUODENOSCOPY N/A 05/14/2021    Procedure: EGD (ESOPHAGOGASTRODUODENOSCOPY);  Surgeon: Destin Cardona MD;  Location: [facility];  Service: Endoscopy;  Laterality: N/A; unremarkable     ESOPHAGOGASTRODUODENOSCOPY N/A 08/09/2021    Procedure: ESOPHAGOGASTRODUODENOSCOPY (EGD);  Surgeon: Destin Cardona MD;  Location: [facility];  Service: Endoscopy;  Laterality: N/A; Moderately severe radiation esophagitis with no bleeding    EXCISIONAL BIOPSY N/A 02/09/2023    Procedure: EXCISIONAL BIOPSY;  Surgeon: Nubia Tinoco MD;  Location: [facility] OR;  Service: General;  Laterality: N/A;    EYE SURGERY      cataract OU    HERNIA REPAIR      INSERTION OF TUNNELED CENTRAL VENOUS CATHETER (CVC) WITH SUBCUTANEOUS PORT N/A 06/07/2021    Procedure: [id number];  Surgeon: Joe Salinas MD;  Location: [facility] OR;  Service: General;  Laterality: N/A;    INTRALUMINAL GASTROINTESTINAL TRACT IMAGING VIA CAPSULE N/A 12/29/2021    Procedure: IMAGING PROCEDURE, GI TRACT, INTRALUMINAL, VIA CAPSULE  (called for instruction 12/20-may have trouble swallowing);  Surgeon: Floyd Dixon MD;  Location: [facility] END[facility];  Service: Endoscopy;  Laterality: N/A; Diffuse red spots of unclear significance and erosions found in the small bowel (entire small bowel), suspicious for mild radiation enteritis    KNEE ARTHROSCOPY W/ MENISCECTOMY Right 9/24/2024    Procedure: ARTHROSCOPY, KNEE, WITH MENISCECTOMY;  Surgeon: Jose Rafael Barney MD;  Location: [facility] OR;  Service: Orthopedics;  Laterality: Right;  Lateral meniscectomy    NEUROMA SURGERY Left     acoustic    TONSILLECTOMY      WOUND EXPLORATION N/A 08/13/2022    Procedure: EXPLORATION, WOUND;  Surgeon: Arnel Olsen MD;  Location: [facility] OR;  Service: General;  Laterality: N/A;    WOUND EXPLORATION Left 02/09/2023    Procedure: EXPLORATION, WOUND - of Ostomy Granuloma - Wound - Abdomen;  Surgeon: Nubia Tinoco MD;  Location: [facility] OR;  Service: General;  Laterality: Left;       Current Outpatient Medications   Medication Sig    acetaminophen (TYLENOL) 500 MG tablet Take 2 tablets (1,000 mg total) by mouth every 8 (eight) hours as needed for Pain.    ascorbic acid,  vitamin C, (VITAMIN C) 500 MG tablet Take 500 mg by mouth once daily. Indications: inadequate vitamin C    azelastine (ASTELIN) 137 mcg (0.1 %) nasal spray USE 2 SPRAYS IN EACH NOSTRIL EVERY NIGHT AT BEDTIME    blood sugar diagnostic Strp 1 strip by Misc.(Non-Drug; Combo Route) route Daily.    blood-glucose meter kit Use as instructed    blood-glucose sensor (DEXCOM G7 SENSOR MISC) Inject 1 each into the skin every 10 days. Continues glucose monitoring    cephALEXin (KEFLEX) 500 MG capsule Take 1 capsule (500 mg total) by mouth 4 (four) times daily.    cetirizine (ZYRTEC) 10 MG tablet Take 10 mg by mouth once daily.    cholecalciferol, vitamin D3, (VITAMIN D3) 125 mcg (5,000 unit) Tab Take 5,000 Units by mouth once daily.    docusate sodium (COLACE) 100 MG capsule Take 1 capsule (100 mg total) by mouth daily as needed for Constipation.    fluocinolone acetonide oiL 0.01 % Drop Place 4 drops in ear(s) 2 (two) times a day.    gabapentin (NEURONTIN) 100 MG capsule Take 300 mg by mouth 2 (two) times daily.    HYDROcodone-acetaminophen (NORCO) 5-325 mg per tablet Take 1 tablet by mouth every 6 (six) hours as needed for Pain.    ibuprofen (ADVIL,MOTRIN) 600 MG tablet Take 1 tablet (600 mg total) by mouth 3 (three) times daily as needed for Pain.    insulin glargine U-100, Lantus, (LANTUS SOLOSTAR U-100 INSULIN) 100 unit/mL (3 mL) InPn pen Inject 22 Units into the skin every evening.    ipratropium (ATROVENT) 0.02 % nebulizer solution Take 2.5 mLs (500 mcg total) by nebulization every 6 (six) hours as needed (wheeze). Rescue    Lactobacillus acidophilus (PROBIOTIC ORAL) Take 1 capsule by mouth once daily.    lancets Misc 1 each by Misc.(Non-Drug; Combo Route) route once daily.    levothyroxine (SYNTHROID) 75 MCG tablet 1 tablet 6 days a week and 1/2 tablet on day 7    LIDOcaine-prilocaine (EMLA) cream Apply topically as needed (as needed for port access).    losartan (COZAAR) 25 MG tablet TAKE 1 TABLET(25 MG) BY MOUTH  "TWICE DAILY    metoprolol succinate (TOPROL-XL) 25 MG 24 hr tablet Take 0.5 tablets (12.5 mg total) by mouth 2 (two) times daily.    mv-mn/folic ac/calcium/vit K1 (WOMEN'S 50 PLUS MULTIVITAMIN ORAL) Take 1 tablet by mouth once daily.    NOVOLOG FLEXPEN U-100 INSULIN 100 unit/mL (3 mL) InPn pen Inject 8 Units three times daily with meals with correction scale. Use on premeal readings; 150-200=+2, 201-250=+4; 251-300=+6; 301-350=+8, over 350, + 10 units 54 units a day max    omeprazole (PRILOSEC) 40 MG capsule TAKE 1 CAPSULE BY MOUTH DAILY EVERY MORNING ON AN EMPTY STOMACH 30 MINUTES AFTER THYROID MEDICATION    pembrolizumab (KEYTRUDA IV) Inject into the vein every 21 days.    pen needle, diabetic (NOVOFINE 32) 32 gauge x 1/4" Ndle TO USE WITH INSULIN PENS FOUR TIMES DAILY    pen needle, diabetic (NOVOFINE 32) 32 gauge x 1/4" Ndle TO USE WITH INSULIN PENS FOUR TIMES DAILY    sertraline (ZOLOFT) 25 MG tablet TAKE 1 TABLET(25 MG) BY MOUTH EVERY DAY    simvastatin (ZOCOR) 40 MG tablet TAKE 1 TABLET(40 MG) BY MOUTH EVERY EVENING    triamcinolone acetonide 0.1% (KENALOG) 0.1 % cream Apply topically 2 (two) times daily.    aspirin (ECOTRIN) 81 MG EC tablet Take 1 tablet (81 mg total) by mouth 2 (two) times daily. (Patient not taking: Reported on 10/9/2024)    budesonide (PULMICORT) 0.5 mg/2 mL nebulizer solution Take 2 mLs (0.5 mg total) by nebulization every 12 (twelve) hours. Controller (Patient not taking: Reported on 10/9/2024)    clotrimazole (LOTRIMIN) 1 % cream Apply topically 2 (two) times daily. (Patient not taking: Reported on 10/9/2024)    ELIQUIS 5 mg Tab TAKE 1 TABLET(5 MG) BY MOUTH TWICE DAILY    HYDROcodone-acetaminophen (NORCO)  mg per tablet Take 1 tablet by mouth every 6 (six) hours as needed for Pain. (Patient not taking: Reported on 10/9/2024)    ketoconazole (NIZORAL) 2 % shampoo Apply topically twice a week. Lather and let sit for 5 minutes before rinsing. (Patient not taking: Reported on " 10/9/2024)    nystatin (MYCOSTATIN) cream Apply topically 3 (three) times daily as needed (itch). (Patient not taking: Reported on 10/9/2024)    ondansetron (ZOFRAN) 4 MG tablet Take 1 tablet (4 mg total) by mouth every 6 (six) hours as needed for Nausea. (Patient not taking: Reported on 10/9/2024)    sodium chloride 3% 3 % nebulizer solution Take 4 mLs by nebulization 2 (two) times daily. (Patient not taking: Reported on 10/9/2024)     No current facility-administered medications for this visit.     Facility-Administered Medications Ordered in Other Visits   Medication    EUFLEXXA 10 mg/mL(mw 2.4 -3.6 million) injection Syrg 20 mg       Review of patient's allergies indicates:   Allergen Reactions    Contrast media Anaphylaxis    Iodinated contrast media Anaphylaxis and Rash    Albuterol Other (See Comments)     Used during PFTs and put pt in afib    Dye     Pcn [penicillins]      Pt states did well on cephalexin.  No adverse reaction or side effect.     Doxycycline Palpitations     Tolerated IV Doxy 2022       Family History   Problem Relation Name Age of Onset    Heart disease Mother          dissection    GI problems Father          perf colon    Cancer Paternal Uncle          colon cancer    Diabetes Neg Hx      Kidney disease Neg Hx      Stroke Neg Hx      Colon cancer Neg Hx      Crohn's disease Neg Hx      Ulcerative colitis Neg Hx      Stomach cancer Neg Hx      Esophageal cancer Neg Hx         Social History     Socioeconomic History    Marital status:    Tobacco Use    Smoking status: Former     Current packs/day: 0.00     Average packs/day: 1 pack/day for 40.0 years (40.0 ttl pk-yrs)     Types: Cigarettes     Start date: 3/6/1976     Quit date: 3/6/2016     Years since quittin.6    Smokeless tobacco: Never   Substance and Sexual Activity    Alcohol use: Not Currently    Drug use: No    Sexual activity: Not Currently   Social History Narrative    Works in insurance collection.     Social  Drivers of Health     Financial Resource Strain: Low Risk  (1/16/2024)    Overall Financial Resource Strain (CARDIA)     Difficulty of Paying Living Expenses: Not hard at all   Food Insecurity: No Food Insecurity (1/16/2024)    Hunger Vital Sign     Worried About Running Out of Food in the Last Year: Never true     Ran Out of Food in the Last Year: Never true   Transportation Needs: No Transportation Needs (5/23/2024)    OASIS : Transportation     Lack of Transportation (Medical): No     Lack of Transportation (Non-Medical): No     Patient Unable or Declines to Respond: No   Physical Activity: Unknown (10/13/2021)    Exercise Vital Sign     Days of Exercise per Week: 0 days   Stress: No Stress Concern Present (10/13/2021)    [employer] - Occupational Stress Questionnaire     Feeling of Stress : Not at all   Housing Stability: Low Risk  (1/16/2024)    Housing Stability Vital Sign     Unable to Pay for Housing in the Last Year: No     Number of Places Lived in the Last Year: 1     Unstable Housing in the Last Year: No       Chief Complaint:   Chief Complaint   Patient presents with    Post-op Evaluation     S/p right knee PLM. Dos 9/24/24       Date of surgery:  September 24, 2024    History of present illness:  74-year-old female underwent right essentially diagnostic arthroscopy for possible septic arthritis of the right knee.  Patient had a lot of pain and swelling in the right knee which we do not have a good reason for.  Cultures taken at the time of surgery have all been negative.  Patient continues to have pain that is a 10/10.  Has not been able to walk in months.  Patient did have significant stiffness of the right knee from disuse.  Did a mild manipulation at the time.      Review of Systems:    Musculoskeletal:  See HPI        Physical Examination:    Vital Signs:    Vitals:    10/09/24 1419   Resp: 18       Body mass index is 27.11 kg/m².    This a well-developed, well  nourished patient in no acute distress.  They are alert and oriented and cooperative to examination.  Pt. comes in in a wheelchair    Examination of the right knee shows well-healed surgical portals.  Patient has range of motion from about  degrees.  Knee appears swollen although difficult to detect because we can not extended enough.    X-rays:  None     Assessment::  Continued right knee swelling and pain    Plan:  I reviewed the findings with her and her  today.  I am unsure why her knee keep swelling and hurting as much as it did.  Patient had some synovitis.  Patient has chronic elevation of all of her inflammatory markers for years.  Her cultures were all negative from her aspirate as well as her surgical aspirate.  I recommended maybe a Medrol taper.  We will put her on prednisone 10 milligrams a day for 10 days.  Attempted to aspirate her knee again today but was unable to get any fluid out.  Encouraged her to start to try and stretch and move her knee as much as possible.  I will see her back in 2 weeks.    This note was created using bright box voice recognition software that occasionally misinterpreted phrases or words.

## 2024-10-23 ENCOUNTER — OFFICE VISIT (OUTPATIENT)
Dept: ORTHOPEDICS | Facility: CLINIC | Age: 74
End: 2024-10-23
Payer: MEDICARE

## 2024-10-23 ENCOUNTER — PATIENT MESSAGE (OUTPATIENT)
Dept: FAMILY MEDICINE | Facility: CLINIC | Age: 74
End: 2024-10-23
Payer: MEDICARE

## 2024-10-23 VITALS — HEIGHT: 66 IN | BODY MASS INDEX: 27 KG/M2 | WEIGHT: 168 LBS

## 2024-10-23 DIAGNOSIS — S83.231D COMPLEX TEAR OF MEDIAL MENISCUS OF RIGHT KNEE AS CURRENT INJURY, SUBSEQUENT ENCOUNTER: ICD-10-CM

## 2024-10-23 DIAGNOSIS — Z98.890 H/O ARTHROSCOPY OF RIGHT KNEE: ICD-10-CM

## 2024-10-23 DIAGNOSIS — M25.461 EFFUSION OF RIGHT KNEE JOINT: Primary | ICD-10-CM

## 2024-10-23 DIAGNOSIS — J44.9 CHRONIC OBSTRUCTIVE PULMONARY DISEASE, UNSPECIFIED COPD TYPE: ICD-10-CM

## 2024-10-23 DIAGNOSIS — M25.561 RIGHT KNEE PAIN, UNSPECIFIED CHRONICITY: ICD-10-CM

## 2024-10-23 DIAGNOSIS — C34.90 SMALL CELL CARCINOMA OF LUNG, UNSPECIFIED LATERALITY, UNSPECIFIED PART OF LUNG: Primary | ICD-10-CM

## 2024-10-23 PROCEDURE — 99999 PR PBB SHADOW E&M-EST. PATIENT-LVL II: CPT | Mod: PBBFAC,,, | Performed by: ORTHOPAEDIC SURGERY

## 2024-10-23 RX ORDER — IBUPROFEN 600 MG/1
600 TABLET ORAL 3 TIMES DAILY PRN
Qty: 90 TABLET | Refills: 3 | Status: SHIPPED | OUTPATIENT
Start: 2024-10-23

## 2024-10-23 NOTE — PROGRESS NOTES
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 difficile colitis 08/2021    Cancer     lung    CKD (chronic kidney disease), stage III     Colonic diverticular abscess 07/12/2022    COPD (chronic obstructive pulmonary disease)     Encounter for blood transfusion     Hepatic steatosis     History of numbness     transient numbness left hand and foot    History of tobacco abuse     Hyperlipidemia     Hypertension     Lung nodules     and adenopathy    Multinodular goiter (nontoxic) 05/08/2017    Obesity     TRISHA (obstructive sleep apnea)     on bipap at bedtime    Proteinuria        Past Surgical History:   Procedure Laterality Date    ADENOIDECTOMY      APPENDECTOMY      CATARACT EXTRACTION      COLONOSCOPY N/A 11/14/2016    Procedure: COLONOSCOPY;  Surgeon: Destin Cardona MD;  Location: [facility];  Service: Endoscopy;  Laterality: N/A;    COLONOSCOPY N/A 05/14/2021    Procedure: COLONOSCOPY;  Surgeon: Destin Cardona MD;  Location: [facility];  Service: Endoscopy;  Laterality: N/A; hemorrhoids, diverticulosis, Old blood left colon. No active bleeding; Repeat colonoscopy is not recommended for screening purposes.    COLOSTOMY N/A 07/28/2022    Procedure: CREATION, COLOSTOMY;  Surgeon: Nubia Tinoco MD;  Location: [facility] OR;  Service: General;  Laterality: N/A;    DEBRIDEMENT OF WOUND OF ABDOMEN  07/28/2022    Procedure: DEBRIDEMENT, WOUND, ABDOMEN;  Surgeon: Nubia Tinoco MD;  Location: [facility] OR;  Service: General;;    ESOPHAGOGASTRODUODENOSCOPY N/A 05/14/2021    Procedure: EGD (ESOPHAGOGASTRODUODENOSCOPY);  Surgeon: Destin Cardona MD;  Location: [facility];  Service: Endoscopy;  Laterality: N/A; unremarkable     ESOPHAGOGASTRODUODENOSCOPY N/A 08/09/2021    Procedure: ESOPHAGOGASTRODUODENOSCOPY (EGD);  Surgeon: Destin Cardona MD;  Location: [facility];  Service: Endoscopy;  Laterality: N/A; Moderately severe radiation esophagitis with no bleeding    EXCISIONAL BIOPSY N/A 02/09/2023    Procedure: EXCISIONAL BIOPSY;  Surgeon: Nubia Tinoco MD;  Location: [facility] OR;  Service: General;  Laterality: N/A;    EYE SURGERY      cataract OU    HERNIA REPAIR      INSERTION OF TUNNELED CENTRAL VENOUS CATHETER (CVC) WITH SUBCUTANEOUS PORT N/A 06/07/2021    Procedure: [id number];  Surgeon: Joe Salinas MD;  Location: [facility] OR;  Service: General;  Laterality: N/A;    INTRALUMINAL GASTROINTESTINAL TRACT IMAGING VIA CAPSULE N/A 12/29/2021    Procedure: IMAGING PROCEDURE, GI TRACT, INTRALUMINAL, VIA CAPSULE  (called for instruction 12/20-may have trouble swallowing);  Surgeon: Floyd Dixon MD;  Location: [facility] ENDO;  Service: Endoscopy;  Laterality: N/A; Diffuse red spots of unclear significance and erosions found in the small bowel (entire small bowel), suspicious for mild radiation enteritis    KNEE ARTHROSCOPY W/ MENISCECTOMY Right 9/24/2024    Procedure: ARTHROSCOPY, KNEE, WITH MENISCECTOMY;  Surgeon: Jose Rafael Barney MD;  Location: [facility] OR;  Service: Orthopedics;  Laterality: Right;  Lateral meniscectomy    NEUROMA SURGERY Left     acoustic    TONSILLECTOMY      WOUND EXPLORATION N/A 08/13/2022    Procedure: EXPLORATION, WOUND;  Surgeon: Arnel Olsen MD;  Location: [facility] OR;  Service: General;  Laterality: N/A;    WOUND EXPLORATION Left 02/09/2023    Procedure: EXPLORATION, WOUND - of Ostomy Granuloma - Wound - Abdomen;  Surgeon: Nubia Tinoco MD;  Location: [facility] OR;  Service: General;  Laterality: Left;       Current Outpatient Medications   Medication Sig    acetaminophen (TYLENOL) 500 MG tablet Take 2 tablets (1,000 mg total) by mouth every 8 (eight) hours as needed for Pain.    ascorbic acid,  vitamin C, (VITAMIN C) 500 MG tablet Take 500 mg by mouth once daily. Indications: inadequate vitamin C    azelastine (ASTELIN) 137 mcg (0.1 %) nasal spray USE 2 SPRAYS IN EACH NOSTRIL EVERY NIGHT AT BEDTIME    blood sugar diagnostic Strp 1 strip by Misc.(Non-Drug; Combo Route) route Daily.    blood-glucose meter kit Use as instructed    blood-glucose sensor (DEXCOM G7 SENSOR MISC) Inject 1 each into the skin every 10 days. Continues glucose monitoring    cephALEXin (KEFLEX) 500 MG capsule Take 1 capsule (500 mg total) by mouth 4 (four) times daily.    cetirizine (ZYRTEC) 10 MG tablet Take 10 mg by mouth once daily.    cholecalciferol, vitamin D3, (VITAMIN D3) 125 mcg (5,000 unit) Tab Take 5,000 Units by mouth once daily.    docusate sodium (COLACE) 100 MG capsule Take 1 capsule (100 mg total) by mouth daily as needed for Constipation.    ELIQUIS 5 mg Tab TAKE 1 TABLET(5 MG) BY MOUTH TWICE DAILY    fluocinolone acetonide oiL 0.01 % Drop Place 4 drops in ear(s) 2 (two) times a day.    gabapentin (NEURONTIN) 100 MG capsule Take 300 mg by mouth 2 (two) times daily.    HYDROcodone-acetaminophen (NORCO) 5-325 mg per tablet Take 1 tablet by mouth every 6 (six) hours as needed for Pain.    ibuprofen (ADVIL,MOTRIN) 600 MG tablet Take 1 tablet (600 mg total) by mouth 3 (three) times daily as needed for Pain.    insulin glargine U-100, Lantus, (LANTUS SOLOSTAR U-100 INSULIN) 100 unit/mL (3 mL) InPn pen Inject 22 Units into the skin every evening.    ipratropium (ATROVENT) 0.02 % nebulizer solution Take 2.5 mLs (500 mcg total) by nebulization every 6 (six) hours as needed (wheeze). Rescue    Lactobacillus acidophilus (PROBIOTIC ORAL) Take 1 capsule by mouth once daily.    lancets Misc 1 each by Misc.(Non-Drug; Combo Route) route once daily.    levothyroxine (SYNTHROID) 75 MCG tablet 1 tablet 6 days a week and 1/2 tablet on day 7    LIDOcaine-prilocaine (EMLA) cream Apply topically as needed (as needed for port access).     "losartan (COZAAR) 25 MG tablet TAKE 1 TABLET(25 MG) BY MOUTH TWICE DAILY    metoprolol succinate (TOPROL-XL) 25 MG 24 hr tablet Take 0.5 tablets (12.5 mg total) by mouth 2 (two) times daily.    mv-mn/folic ac/calcium/vit K1 (WOMEN'S 50 PLUS MULTIVITAMIN ORAL) Take 1 tablet by mouth once daily.    NOVOLOG FLEXPEN U-100 INSULIN 100 unit/mL (3 mL) InPn pen Inject 8 Units three times daily with meals with correction scale. Use on premeal readings; 150-200=+2, 201-250=+4; 251-300=+6; 301-350=+8, over 350, + 10 units 54 units a day max    omeprazole (PRILOSEC) 40 MG capsule TAKE 1 CAPSULE BY MOUTH DAILY EVERY MORNING ON AN EMPTY STOMACH 30 MINUTES AFTER THYROID MEDICATION    pembrolizumab (KEYTRUDA IV) Inject into the vein every 21 days.    pen needle, diabetic (NOVOFINE 32) 32 gauge x 1/4" Ndle TO USE WITH INSULIN PENS FOUR TIMES DAILY    pen needle, diabetic (NOVOFINE 32) 32 gauge x 1/4" Ndle TO USE WITH INSULIN PENS FOUR TIMES DAILY    predniSONE (DELTASONE) 10 MG tablet Take 1 tablet (10 mg total) by mouth once daily.    sertraline (ZOLOFT) 25 MG tablet TAKE 1 TABLET(25 MG) BY MOUTH EVERY DAY    simvastatin (ZOCOR) 40 MG tablet TAKE 1 TABLET(40 MG) BY MOUTH EVERY EVENING    triamcinolone acetonide 0.1% (KENALOG) 0.1 % cream Apply topically 2 (two) times daily.     No current facility-administered medications for this visit.     Facility-Administered Medications Ordered in Other Visits   Medication    EUFLEXXA 10 mg/mL(mw 2.4 -3.6 million) injection Syrg 20 mg       Review of patient's allergies indicates:   Allergen Reactions    Contrast media Anaphylaxis    Iodinated contrast media Anaphylaxis and Rash    Albuterol Other (See Comments)     Used during PFTs and put pt in afib    Dye     Pcn [penicillins]      Pt states did well on cephalexin.  No adverse reaction or side effect.     Doxycycline Palpitations     Tolerated IV Doxy 8/2022       Family History   Problem Relation Name Age of Onset    Heart disease Mother   "        dissection    GI problems Father          perf colon    Cancer Paternal Uncle          colon cancer    Diabetes Neg Hx      Kidney disease Neg Hx      Stroke Neg Hx      Colon cancer Neg Hx      Crohn's disease Neg Hx      Ulcerative colitis Neg Hx      Stomach cancer Neg Hx      Esophageal cancer Neg Hx         Social History     Socioeconomic History    Marital status:    Tobacco Use    Smoking status: Former     Current packs/day: 0.00     Average packs/day: 1 pack/day for 40.0 years (40.0 ttl pk-yrs)     Types: Cigarettes     Start date: 3/6/1976     Quit date: 3/6/2016     Years since quittin.6    Smokeless tobacco: Never   Substance and Sexual Activity    Alcohol use: Not Currently    Drug use: No    Sexual activity: Not Currently   Social History Narrative    Works in insurance collection.     Social Drivers of Health     Financial Resource Strain: Low Risk  (2024)    Overall Financial Resource Strain (CARDIA)     Difficulty of Paying Living Expenses: Not hard at all   Food Insecurity: No Food Insecurity (2024)    Hunger Vital Sign     Worried About Running Out of Food in the Last Year: Never true     Ran Out of Food in the Last Year: Never true   Transportation Needs: No Transportation Needs (2024)    OASIS : Transportation     Lack of Transportation (Medical): No     Lack of Transportation (Non-Medical): No     Patient Unable or Declines to Respond: No   Physical Activity: Unknown (10/13/2021)    Exercise Vital Sign     Days of Exercise per Week: 0 days   Stress: No Stress Concern Present (10/13/2021)    [employer] [employer] - Occupational Stress Questionnaire     Feeling of Stress : Not at all   Housing Stability: Low Risk  (2024)    Housing Stability Vital Sign     Unable to Pay for Housing in the Last Year: No     Number of Places Lived in the Last Year: 1     Unstable Housing in the Last Year: No       Chief Complaint:   Chief Complaint    Patient presents with    Post-op Evaluation     4 wk post op - R knee ATS w/ lateral meniscectomy       Date of surgery:  September 24, 2024    History of present illness:  74-year-old female underwent right diagnostic arthroscopy for possible septic arthritis of the right knee.  Patient had a lot of pain and swelling in the right knee which we do not have a good reason for.  Cultures taken at the time of surgery have all been negative.  Patient continues to have pain that is a 8/10.  Has not been able to walk in months.  Patient did have significant stiffness of the right knee from disuse.  Did a mild manipulation at the time.  Is a little bit better compared to 2 weeks ago.  She just finished her prednisone taper.      Review of Systems:    Musculoskeletal:  See HPI        Physical Examination:    Vital Signs:    There were no vitals filed for this visit.      Body mass index is 27.11 kg/m².    This a well-developed, well nourished patient in no acute distress.  They are alert and oriented and cooperative to examination.  Pt. comes in in a wheelchair    Examination of the right knee shows well-healed surgical portals.  Patient has range of motion from about  degrees.  Knee appears swollen although does not feel overly fluid filled.  Definitely better range of motion unless irritated compared to 2 weeks ago.    X-rays:  None     Assessment::  Continued right knee swelling and pain    Plan:  I reviewed the findings with her and her  today.  We will get her started with some home health PT.  Hopefully they can work on range of motion as well as some generalized conditioning and gait training.  I will see her back in 3 weeks.  Gave her a refill on the prescription ibuprofen.  May continue with that and Tylenol.      This note was created using [employer] voice recognition software that occasionally misinterpreted phrases or words.

## 2024-10-31 ENCOUNTER — TELEPHONE (OUTPATIENT)
Dept: ENDOCRINOLOGY | Facility: CLINIC | Age: 74
End: 2024-10-31
Payer: MEDICARE

## 2024-11-04 ENCOUNTER — PATIENT MESSAGE (OUTPATIENT)
Dept: FAMILY MEDICINE | Facility: CLINIC | Age: 74
End: 2024-11-04
Payer: MEDICARE

## 2024-11-04 DIAGNOSIS — C34.90 SMALL CELL CARCINOMA OF LUNG, UNSPECIFIED LATERALITY, UNSPECIFIED PART OF LUNG: Primary | ICD-10-CM

## 2024-11-06 ENCOUNTER — PATIENT MESSAGE (OUTPATIENT)
Dept: ORTHOPEDICS | Facility: CLINIC | Age: 74
End: 2024-11-06
Payer: MEDICARE

## 2024-11-06 DIAGNOSIS — M25.461 EFFUSION OF RIGHT KNEE JOINT: Primary | ICD-10-CM

## 2024-11-06 DIAGNOSIS — S83.231D COMPLEX TEAR OF MEDIAL MENISCUS OF RIGHT KNEE AS CURRENT INJURY, SUBSEQUENT ENCOUNTER: ICD-10-CM

## 2024-11-11 ENCOUNTER — TELEPHONE (OUTPATIENT)
Dept: FAMILY MEDICINE | Facility: CLINIC | Age: 74
End: 2024-11-11
Payer: MEDICARE

## 2024-11-11 DIAGNOSIS — D50.9 IRON DEFICIENCY ANEMIA, UNSPECIFIED IRON DEFICIENCY ANEMIA TYPE: Primary | ICD-10-CM

## 2024-11-11 NOTE — TELEPHONE ENCOUNTER
Spoke w/ Annia  nurse. She states pt may be able to go have it done at lab tomorrow. Spoke w/ pt's  and these have already been added and resulted. Advised him then there is no need to go have add'l labs drawn tomorrow and that we would let him know as soon as Dr Pimentel reviews the results. He VU

## 2024-11-11 NOTE — TELEPHONE ENCOUNTER
"Called [facility] on [address] to see if this has been added to CMP, as it says "Collected" in pt chart. Was transferred to main switchboard and then to central scheduling which is now closed.     Upon review, CMP was done and is a green tube. CBC and Fe/TIBC cannot be run via green tube. The tests say "collect" but could be because the orders were linked after the pt had been checked in for lab/CMP(several hrs later).    We rec'd another msg from  nurse today in regards to this. Left msg for her to call back.     Is it to ask her to draw CBC and Fe/TIBC when she sees pt this week?  "

## 2024-11-11 NOTE — TELEPHONE ENCOUNTER
----- Message from Maribel sent at 11/11/2024 11:50 AM CST -----  Contact: Annai [phone]  Type: Needs Medical Advice  Who Called:  Annia Alcazar      Best Call Back Number: [phone]    Additional Information: Annia calling to f/u on message about pts lab work. Pls call back and advise

## 2024-11-11 NOTE — TELEPHONE ENCOUNTER
Tried to reach pt. No answer. Left message for pt to call back.     Please see previous msg from today

## 2024-11-13 ENCOUNTER — OFFICE VISIT (OUTPATIENT)
Dept: ORTHOPEDICS | Facility: CLINIC | Age: 74
End: 2024-11-13
Payer: MEDICARE

## 2024-11-13 VITALS — WEIGHT: 171.94 LBS | HEIGHT: 66 IN | BODY MASS INDEX: 27.63 KG/M2

## 2024-11-13 DIAGNOSIS — M17.10 ARTHRITIS OF KNEE: Primary | ICD-10-CM

## 2024-11-13 PROCEDURE — 99999 PR PBB SHADOW E&M-EST. PATIENT-LVL II: CPT | Mod: PBBFAC,,, | Performed by: ORTHOPAEDIC SURGERY

## 2024-11-13 RX ORDER — TRIAMCINOLONE ACETONIDE 40 MG/ML
40 INJECTION, SUSPENSION INTRA-ARTICULAR; INTRAMUSCULAR
Status: DISCONTINUED | OUTPATIENT
Start: 2024-11-13 | End: 2024-11-13 | Stop reason: HOSPADM

## 2024-11-13 RX ADMIN — TRIAMCINOLONE ACETONIDE 40 MG: 40 INJECTION, SUSPENSION INTRA-ARTICULAR; INTRAMUSCULAR at 10:11

## 2024-11-13 NOTE — PROCEDURES
Large Joint Aspiration/Injection: L knee    Date/Time: 11/13/2024 10:15 AM    Performed by: Jose Rafael Barney MD  Authorized by: Jose Rafael Barney MD    Consent Done?:  Yes (Verbal)  Indications:  Pain  Site marked: the procedure site was marked    Timeout: prior to procedure the correct patient, procedure, and site was verified    Local anesthetic:  Lidocaine 1% without epinephrine and bupivacaine 0.25% without epinephrine  Anesthetic total (ml):  6      Details:  Needle Size:  20 G  Approach:  Anterolateral  Location:  Knee  Site:  L knee  Medications:  40 mg triamcinolone acetonide 40 mg/mL  Patient tolerance:  Patient tolerated the procedure well with no immediate complications

## 2024-11-13 NOTE — PROGRESS NOTES
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 difficile colitis 08/2021    Cancer     lung    CKD (chronic kidney disease), stage III     Colonic diverticular abscess 07/12/2022    COPD (chronic obstructive pulmonary disease)     Encounter for blood transfusion     Hepatic steatosis     History of numbness     transient numbness left hand and foot    History of tobacco abuse     Hyperlipidemia     Hypertension     Lung nodules     and adenopathy    Multinodular goiter (nontoxic) 05/08/2017    Obesity     TRISHA (obstructive sleep apnea)     on bipap at bedtime    Proteinuria        Past Surgical History:   Procedure Laterality Date    ADENOIDECTOMY      APPENDECTOMY      CATARACT EXTRACTION      COLONOSCOPY N/A 11/14/2016    Procedure: COLONOSCOPY;  Surgeon: Destin Cardona MD;  Location: [facility];  Service: Endoscopy;  Laterality: N/A;    COLONOSCOPY N/A 05/14/2021    Procedure: COLONOSCOPY;  Surgeon: Destin Cardona MD;  Location: [facility];  Service: Endoscopy;  Laterality: N/A; hemorrhoids, diverticulosis, Old blood left colon. No active bleeding; Repeat colonoscopy is not recommended for screening purposes.    COLOSTOMY N/A 07/28/2022    Procedure: CREATION, COLOSTOMY;  Surgeon: Nubia Tinoco MD;  Location: [facility] OR;  Service: General;  Laterality: N/A;    DEBRIDEMENT OF WOUND OF ABDOMEN  07/28/2022    Procedure: DEBRIDEMENT, WOUND, ABDOMEN;  Surgeon: Nubia Tinoco MD;  Location: [facility] OR;  Service: General;;    ESOPHAGOGASTRODUODENOSCOPY N/A 05/14/2021    Procedure: EGD (ESOPHAGOGASTRODUODENOSCOPY);  Surgeon: Destin Cardona MD;  Location: [facility];  Service: Endoscopy;  Laterality: N/A; unremarkable     ESOPHAGOGASTRODUODENOSCOPY N/A 08/09/2021    Procedure: ESOPHAGOGASTRODUODENOSCOPY (EGD);  Surgeon: Destin Cardona MD;  Location: [facility];  Service: Endoscopy;  Laterality: N/A; Moderately severe radiation esophagitis with no bleeding    EXCISIONAL BIOPSY N/A 02/09/2023    Procedure: EXCISIONAL BIOPSY;  Surgeon: Nubia Tinoco MD;  Location: [facility] OR;  Service: General;  Laterality: N/A;    EYE SURGERY      cataract OU    HERNIA REPAIR      INSERTION OF TUNNELED CENTRAL VENOUS CATHETER (CVC) WITH SUBCUTANEOUS PORT N/A 06/07/2021    Procedure: [id number];  Surgeon: Joe Salinas MD;  Location: [facility] OR;  Service: General;  Laterality: N/A;    INTRALUMINAL GASTROINTESTINAL TRACT IMAGING VIA CAPSULE N/A 12/29/2021    Procedure: IMAGING PROCEDURE, GI TRACT, INTRALUMINAL, VIA CAPSULE  (called for instruction 12/20-may have trouble swallowing);  Surgeon: Floyd Dixon MD;  Location: [facility] ENDO;  Service: Endoscopy;  Laterality: N/A; Diffuse red spots of unclear significance and erosions found in the small bowel (entire small bowel), suspicious for mild radiation enteritis    KNEE ARTHROSCOPY W/ MENISCECTOMY Right 9/24/2024    Procedure: ARTHROSCOPY, KNEE, WITH MENISCECTOMY;  Surgeon: Jose Rafael Barney MD;  Location: [facility] OR;  Service: Orthopedics;  Laterality: Right;  Lateral meniscectomy    NEUROMA SURGERY Left     acoustic    TONSILLECTOMY      WOUND EXPLORATION N/A 08/13/2022    Procedure: EXPLORATION, WOUND;  Surgeon: Arnel Olsen MD;  Location: [facility] OR;  Service: General;  Laterality: N/A;    WOUND EXPLORATION Left 02/09/2023    Procedure: EXPLORATION, WOUND - of Ostomy Granuloma - Wound - Abdomen;  Surgeon: Nubia Tinoco MD;  Location: [facility] OR;  Service: General;  Laterality: Left;       Current Outpatient Medications   Medication Sig    acetaminophen (TYLENOL) 500 MG tablet Take 2 tablets (1,000 mg total) by mouth every 8 (eight) hours as needed for Pain.    ascorbic acid,  vitamin C, (VITAMIN C) 500 MG tablet Take 500 mg by mouth once daily. Indications: inadequate vitamin C    azelastine (ASTELIN) 137 mcg (0.1 %) nasal spray USE 2 SPRAYS IN EACH NOSTRIL EVERY NIGHT AT BEDTIME    blood sugar diagnostic Strp 1 strip by Misc.(Non-Drug; Combo Route) route Daily.    blood-glucose meter kit Use as instructed    blood-glucose sensor (DEXCOM G7 SENSOR MISC) Inject 1 each into the skin every 10 days. Continues glucose monitoring    cetirizine (ZYRTEC) 10 MG tablet Take 10 mg by mouth once daily.    cholecalciferol, vitamin D3, (VITAMIN D3) 125 mcg (5,000 unit) Tab Take 5,000 Units by mouth once daily.    docusate sodium (COLACE) 100 MG capsule Take 1 capsule (100 mg total) by mouth daily as needed for Constipation.    ELIQUIS 5 mg Tab TAKE 1 TABLET(5 MG) BY MOUTH TWICE DAILY    gabapentin (NEURONTIN) 100 MG capsule Take 300 mg by mouth 2 (two) times daily.    HYDROcodone-acetaminophen (NORCO) 5-325 mg per tablet Take 1 tablet by mouth every 6 (six) hours as needed for Pain.    ibuprofen (ADVIL,MOTRIN) 600 MG tablet Take 1 tablet (600 mg total) by mouth 3 (three) times daily as needed for Pain.    insulin glargine U-100, Lantus, (LANTUS SOLOSTAR U-100 INSULIN) 100 unit/mL (3 mL) InPn pen Inject 22 Units into the skin every evening.    ipratropium (ATROVENT) 0.02 % nebulizer solution Take 2.5 mLs (500 mcg total) by nebulization every 6 (six) hours as needed (wheeze). Rescue    Lactobacillus acidophilus (PROBIOTIC ORAL) Take 1 capsule by mouth once daily.    lancets Misc 1 each by Misc.(Non-Drug; Combo Route) route once daily.    levothyroxine (SYNTHROID) 75 MCG tablet 1 tablet 6 days a week and 1/2 tablet on day 7    LIDOcaine-prilocaine (EMLA) cream Apply topically as needed (as needed for port access).    losartan (COZAAR) 25 MG tablet TAKE 1 TABLET(25 MG) BY MOUTH TWICE DAILY    metoprolol succinate (TOPROL-XL) 25 MG 24 hr tablet Take 0.5 tablets (12.5 mg total) by mouth 2 (two) times daily.  "   mv-mn/folic ac/calcium/vit K1 (WOMEN'S 50 PLUS MULTIVITAMIN ORAL) Take 1 tablet by mouth once daily.    NOVOLOG FLEXPEN U-100 INSULIN 100 unit/mL (3 mL) InPn pen Inject 8 Units three times daily with meals with correction scale. Use on premeal readings; 150-200=+2, 201-250=+4; 251-300=+6; 301-350=+8, over 350, + 10 units 54 units a day max    omeprazole (PRILOSEC) 40 MG capsule TAKE 1 CAPSULE BY MOUTH DAILY EVERY MORNING ON AN EMPTY STOMACH 30 MINUTES AFTER THYROID MEDICATION    pembrolizumab (KEYTRUDA IV) Inject into the vein every 21 days.    pen needle, diabetic (NOVOFINE 32) 32 gauge x 1/4" Ndle TO USE WITH INSULIN PENS FOUR TIMES DAILY    pen needle, diabetic (NOVOFINE 32) 32 gauge x 1/4" Ndle TO USE WITH INSULIN PENS FOUR TIMES DAILY    sertraline (ZOLOFT) 25 MG tablet TAKE 1 TABLET(25 MG) BY MOUTH EVERY DAY    simvastatin (ZOCOR) 40 MG tablet TAKE 1 TABLET(40 MG) BY MOUTH EVERY EVENING    triamcinolone acetonide 0.1% (KENALOG) 0.1 % cream Apply topically 2 (two) times daily.     No current facility-administered medications for this visit.     Facility-Administered Medications Ordered in Other Visits   Medication    EUFLEXXA 10 mg/mL(mw 2.4 -3.6 million) injection Syrg 20 mg       Review of patient's allergies indicates:   Allergen Reactions    Contrast media Anaphylaxis    Iodinated contrast media Anaphylaxis and Rash    Albuterol Other (See Comments)     Used during PFTs and put pt in afib    Dye     Pcn [penicillins]      Pt states did well on cephalexin.  No adverse reaction or side effect.     Doxycycline Palpitations     Tolerated IV Doxy 8/2022       Family History   Problem Relation Name Age of Onset    Heart disease Mother          dissection    GI problems Father          perf colon    Cancer Paternal Uncle          colon cancer    Diabetes Neg Hx      Kidney disease Neg Hx      Stroke Neg Hx      Colon cancer Neg Hx      Crohn's disease Neg Hx      Ulcerative colitis Neg Hx      Stomach cancer " Neg Hx      Esophageal cancer Neg Hx         Social History     Socioeconomic History    Marital status:    Tobacco Use    Smoking status: Former     Current packs/day: 0.00     Average packs/day: 1 pack/day for 40.0 years (40.0 ttl pk-yrs)     Types: Cigarettes     Start date: 3/6/1976     Quit date: 3/6/2016     Years since quittin.6    Smokeless tobacco: Never   Substance and Sexual Activity    Alcohol use: Not Currently    Drug use: No    Sexual activity: Not Currently   Social History Narrative    Works in insurance collection.     Social Drivers of Health     Financial Resource Strain: Low Risk  (2024)    Overall Financial Resource Strain (CARDIA)     Difficulty of Paying Living Expenses: Not hard at all   Food Insecurity: No Food Insecurity (2024)    Hunger Vital Sign     Worried About Running Out of Food in the Last Year: Never true     Ran Out of Food in the Last Year: Never true   Transportation Needs: No Transportation Needs (11/10/2024)    OASIS : Transportation     Lack of Transportation (Medical): No     Lack of Transportation (Non-Medical): No     Patient Unable or Declines to Respond: No   Physical Activity: Unknown (10/13/2021)    Exercise Vital Sign     Days of Exercise per Week: 0 days   Stress: No Stress Concern Present (10/13/2021)    [employer] - Occupational Stress Questionnaire     Feeling of Stress : Not at all   Housing Stability: Low Risk  (2024)    Housing Stability Vital Sign     Unable to Pay for Housing in the Last Year: No     Number of Places Lived in the Last Year: 1     Unstable Housing in the Last Year: No       Chief Complaint:   Chief Complaint   Patient presents with    Follow-up     3 wk F/U- R knee ATS w/meniscectomy       Date of surgery:  2024    History of present illness:  74-year-old female underwent right diagnostic arthroscopy for possible septic arthritis of the right knee.  Patient had a lot  of pain and swelling in the right knee which we do not have a good reason for.  Cultures taken at the time of surgery have all been negative.   Has not been able to walk in months.  Patient did have significant stiffness of the right knee from disuse.  Did a mild manipulation at the time.  Is a little bit better.  Tried a Medrol taper.  The knee looks similar to the other knee now.  Having a little more pain in the left knee probably from overuse.  A little pain in the left knee.  We tried to get her started with some home health physical therapy but the patient was diagnosed with recent anemia and just has too much fatigue.  Is scheduled to get a blood transfusion in the next day or 2.  Pain is currently still an 8/10.      Review of Systems:    Musculoskeletal:  See HPI        Physical Examination:    Vital Signs:    There were no vitals filed for this visit.      Body mass index is 27.75 kg/m².    This a well-developed, well nourished patient in no acute distress.  They are alert and oriented and cooperative to examination.  Pt. comes in in a wheelchair    Examination of the right knee shows well-healed surgical portals.  Patient has range of motion from about  degrees.  Knee appears swollen although does not feel overly fluid filled.  Definitely better range of motion unless irritated compared to 2 weeks ago.    Examination left knee shows mild swelling as well.  Range of motion is about 20° to 110°.  Moderate irritability.    X-rays:  None     Assessment::  Continued right knee swelling and pain  Left knee pain and swelling    Plan:  I reviewed the findings with her and her  today.  We will get her started with some home health PT after her blood transfusion.  Hopefully they can work on range of motion as well as some generalized conditioning and gait training.  I will see her back in 2 weeks.  Injected her left knee today to help with the pain and swelling.      This note was created using [employer]  voice recognition software that occasionally misinterpreted phrases or words.

## 2024-11-26 DIAGNOSIS — S83.231D COMPLEX TEAR OF MEDIAL MENISCUS OF RIGHT KNEE AS CURRENT INJURY, SUBSEQUENT ENCOUNTER: ICD-10-CM

## 2024-11-26 DIAGNOSIS — M17.10 ARTHRITIS OF KNEE: Primary | ICD-10-CM

## 2024-11-27 ENCOUNTER — OFFICE VISIT (OUTPATIENT)
Dept: ORTHOPEDICS | Facility: CLINIC | Age: 74
End: 2024-11-27
Payer: MEDICARE

## 2024-11-27 VITALS — WEIGHT: 171.94 LBS | HEIGHT: 66 IN | BODY MASS INDEX: 27.63 KG/M2

## 2024-11-27 DIAGNOSIS — M17.10 ARTHRITIS OF KNEE: Primary | ICD-10-CM

## 2024-11-27 PROCEDURE — 99999 PR PBB SHADOW E&M-EST. PATIENT-LVL II: CPT | Mod: PBBFAC,,, | Performed by: ORTHOPAEDIC SURGERY

## 2024-11-27 NOTE — PROGRESS NOTES
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 difficile colitis 08/2021    Cancer     lung    CKD (chronic kidney disease), stage III     Colonic diverticular abscess 07/12/2022    COPD (chronic obstructive pulmonary disease)     Encounter for blood transfusion     Hepatic steatosis     History of numbness     transient numbness left hand and foot    History of tobacco abuse     Hyperlipidemia     Hypertension     Lung nodules     and adenopathy    Multinodular goiter (nontoxic) 05/08/2017    Obesity     TRISHA (obstructive sleep apnea)     on bipap at bedtime    Proteinuria        Past Surgical History:   Procedure Laterality Date    ADENOIDECTOMY      APPENDECTOMY      CATARACT EXTRACTION      COLONOSCOPY N/A 11/14/2016    Procedure: COLONOSCOPY;  Surgeon: Destin Cardona MD;  [facility];  Service: Endoscopy;  Laterality: N/A;    COLONOSCOPY N/A 05/14/2021    Procedure: COLONOSCOPY;  Surgeon: Destin Cardona MD;  Location: [facility];  Service: Endoscopy;  Laterality: N/A; hemorrhoids, diverticulosis, Old blood left colon. No active bleeding; Repeat colonoscopy is not recommended for screening purposes.    COLOSTOMY N/A 07/28/2022    Procedure: CREATION, COLOSTOMY;  Surgeon: Nubia Tinoco MD;  Location: [facility] OR;  Service: General;  Laterality: N/A;    DEBRIDEMENT OF WOUND OF ABDOMEN  07/28/2022    Procedure: DEBRIDEMENT, WOUND, ABDOMEN;  Surgeon: Nubia Tinoco MD;  Location: [facility] OR;  Service: General;;    ESOPHAGOGASTRODUODENOSCOPY N/A 05/14/2021    Procedure: EGD (ESOPHAGOGASTRODUODENOSCOPY);  Surgeon: Destin Cardona MD;  Location: [facility];  Service: Endoscopy;  Laterality: N/A; unremarkable     ESOPHAGOGASTRODUODENOSCOPY N/A 08/09/2021    Procedure: ESOPHAGOGASTRODUODENOSCOPY (EGD);  Surgeon: Destin Cardona MD;  Location: [facility];  Service: Endoscopy;  Laterality: N/A; Moderately severe radiation esophagitis with no bleeding    EXCISIONAL BIOPSY N/A 02/09/2023    Procedure: EXCISIONAL BIOPSY;  Surgeon: Nubia Tinoco MD;  Location: [facility] OR;  Service: General;  Laterality: N/A;    EYE SURGERY      cataract OU    HERNIA REPAIR      INSERTION OF TUNNELED CENTRAL VENOUS CATHETER (CVC) WITH SUBCUTANEOUS PORT N/A 06/07/2021    Procedure: [id number];  Surgeon: Joe Salinas MD;  Location: [facility] OR;  Service: General;  Laterality: N/A;    INTRALUMINAL GASTROINTESTINAL TRACT IMAGING VIA CAPSULE N/A 12/29/2021    Procedure: IMAGING PROCEDURE, GI TRACT, INTRALUMINAL, VIA CAPSULE  (called for instruction 12/20-may have trouble swallowing);  Surgeon: Floyd Dixon MD;  Location: [facility] ENDO;  Service: Endoscopy;  Laterality: N/A; Diffuse red spots of unclear significance and erosions found in the small bowel (entire small bowel), suspicious for mild radiation enteritis    KNEE ARTHROSCOPY W/ MENISCECTOMY Right 9/24/2024    Procedure: ARTHROSCOPY, KNEE, WITH MENISCECTOMY;  Surgeon: Jose Rafael Barney MD;  Location: [facility] OR;  Service: Orthopedics;  Laterality: Right;  Lateral meniscectomy    NEUROMA SURGERY Left     acoustic    TONSILLECTOMY      WOUND EXPLORATION N/A 08/13/2022    Procedure: EXPLORATION, WOUND;  Surgeon: Arnel Olsen MD;  Location: [facility] OR;  Service: General;  Laterality: N/A;    WOUND EXPLORATION Left 02/09/2023    Procedure: EXPLORATION, WOUND - of Ostomy Granuloma - Wound - Abdomen;  Surgeon: Nubia Tinoco MD;  Location: [facility] OR;  Service: General;  Laterality: Left;       Current Outpatient Medications   Medication Sig    acetaminophen (TYLENOL) 500 MG tablet Take 2 tablets (1,000 mg total) by mouth every 8 (eight) hours as needed for Pain.    ascorbic acid,  vitamin C, (VITAMIN C) 500 MG tablet Take 500 mg by mouth once daily. Indications: inadequate vitamin C    azelastine (ASTELIN) 137 mcg (0.1 %) nasal spray USE 2 SPRAYS IN EACH NOSTRIL EVERY NIGHT AT BEDTIME    blood sugar diagnostic Strp 1 strip by Misc.(Non-Drug; Combo Route) route Daily.    blood-glucose meter kit Use as instructed    blood-glucose sensor (DEXCOM G7 SENSOR MISC) Inject 1 each into the skin every 10 days. Continues glucose monitoring    cetirizine (ZYRTEC) 10 MG tablet Take 10 mg by mouth once daily.    cholecalciferol, vitamin D3, (VITAMIN D3) 125 mcg (5,000 unit) Tab Take 5,000 Units by mouth once daily.    docusate sodium (COLACE) 100 MG capsule Take 1 capsule (100 mg total) by mouth daily as needed for Constipation.    ELIQUIS 5 mg Tab TAKE 1 TABLET(5 MG) BY MOUTH TWICE DAILY    gabapentin (NEURONTIN) 100 MG capsule Take 300 mg by mouth 2 (two) times daily.    HYDROcodone-acetaminophen (NORCO) 5-325 mg per tablet Take 1 tablet by mouth every 6 (six) hours as needed for Pain.    ibuprofen (ADVIL,MOTRIN) 600 MG tablet Take 1 tablet (600 mg total) by mouth 3 (three) times daily as needed for Pain.    insulin glargine U-100, Lantus, (LANTUS SOLOSTAR U-100 INSULIN) 100 unit/mL (3 mL) InPn pen Inject 22 Units into the skin every evening.    ipratropium (ATROVENT) 0.02 % nebulizer solution Take 2.5 mLs (500 mcg total) by nebulization every 6 (six) hours as needed (wheeze). Rescue    Lactobacillus acidophilus (PROBIOTIC ORAL) Take 1 capsule by mouth once daily.    lancets Misc 1 each by Misc.(Non-Drug; Combo Route) route once daily.    levothyroxine (SYNTHROID) 75 MCG tablet 1 tablet 6 days a week and 1/2 tablet on day 7 (Patient taking differently: Take 75 mcg by mouth As instructed (thyroid replacement). 1 tablet 6 days a week and 1/2 tablet on day 7  Indications: a condition with low thyroid hormone levels)    LIDOcaine-prilocaine (EMLA) cream Apply topically as needed (as needed for port access).  "   losartan (COZAAR) 25 MG tablet TAKE 1 TABLET(25 MG) BY MOUTH TWICE DAILY    metoprolol succinate (TOPROL-XL) 25 MG 24 hr tablet Take 0.5 tablets (12.5 mg total) by mouth 2 (two) times daily.    mv-mn/folic ac/calcium/vit K1 (WOMEN'S 50 PLUS MULTIVITAMIN ORAL) Take 1 tablet by mouth once daily.    NOVOLOG FLEXPEN U-100 INSULIN 100 unit/mL (3 mL) InPn pen Inject 8 Units three times daily with meals with correction scale. Use on premeal readings; 150-200=+2, 201-250=+4; 251-300=+6; 301-350=+8, over 350, + 10 units 54 units a day max    omeprazole (PRILOSEC) 40 MG capsule TAKE 1 CAPSULE BY MOUTH DAILY EVERY MORNING ON AN EMPTY STOMACH 30 MINUTES AFTER THYROID MEDICATION    pembrolizumab (KEYTRUDA IV) Inject 200 mg into the vein every 21 days.    pen needle, diabetic (NOVOFINE 32) 32 gauge x 1/4" Ndle TO USE WITH INSULIN PENS FOUR TIMES DAILY    pen needle, diabetic (NOVOFINE 32) 32 gauge x 1/4" Ndle TO USE WITH INSULIN PENS FOUR TIMES DAILY    sertraline (ZOLOFT) 25 MG tablet TAKE 1 TABLET(25 MG) BY MOUTH EVERY DAY    simvastatin (ZOCOR) 40 MG tablet TAKE 1 TABLET(40 MG) BY MOUTH EVERY EVENING    triamcinolone acetonide 0.1% (KENALOG) 0.1 % cream Apply topically 2 (two) times daily.     Current Facility-Administered Medications   Medication    sodium chloride 0.9% flush 10 mL     Facility-Administered Medications Ordered in Other Visits   Medication    EUFLEXXA 10 mg/mL(mw 2.4 -3.6 million) injection Syrg 20 mg       Review of patient's allergies indicates:   Allergen Reactions    Contrast media Anaphylaxis    Iodinated contrast media Anaphylaxis and Rash    Albuterol Other (See Comments)     Used during PFTs and put pt in afib    Dye     Pcn [penicillins]      Pt states did well on cephalexin.  No adverse reaction or side effect.     Doxycycline Palpitations     Tolerated IV Doxy 8/2022       Family History   Problem Relation Name Age of Onset    Heart disease Mother          dissection    GI problems Father         "  perf colon    Cancer Paternal Uncle          colon cancer    Diabetes Neg Hx      Kidney disease Neg Hx      Stroke Neg Hx      Colon cancer Neg Hx      Crohn's disease Neg Hx      Ulcerative colitis Neg Hx      Stomach cancer Neg Hx      Esophageal cancer Neg Hx         Social History     Socioeconomic History    Marital status:    Tobacco Use    Smoking status: Former     Current packs/day: 0.00     Average packs/day: 1 pack/day for 40.0 years (40.0 ttl pk-yrs)     Types: Cigarettes     Start date: 3/6/1976     Quit date: 3/6/2016     Years since quittin.7    Smokeless tobacco: Never   Substance and Sexual Activity    Alcohol use: Not Currently    Drug use: No    Sexual activity: Not Currently   Social History Narrative    Works in insurance collection.     Social Drivers of Health     Financial Resource Strain: Low Risk  (2024)    Overall Financial Resource Strain (CARDIA)     Difficulty of Paying Living Expenses: Not hard at all   Food Insecurity: No Food Insecurity (2024)    Hunger Vital Sign     Worried About Running Out of Food in the Last Year: Never true     Ran Out of Food in the Last Year: Never true   Transportation Needs: No Transportation Needs (11/10/2024)    OASIS : Transportation     Lack of Transportation (Medical): No     Lack of Transportation (Non-Medical): No     Patient Unable or Declines to Respond: No   Physical Activity: Unknown (10/13/2021)    Exercise Vital Sign     Days of Exercise per Week: 0 days   Stress: No Stress Concern Present (10/13/2021)    [employer] [place] [employer] - Occupational Stress Questionnaire     Feeling of Stress : Not at all   Housing Stability: Low Risk  (2024)    Housing Stability Vital Sign     Unable to Pay for Housing in the Last Year: No     Number of Places Lived in the Last Year: 1     Unstable Housing in the Last Year: No       Chief Complaint:   Chief Complaint   Patient presents with    Follow-up     R knee ATS  w/meniscectomy       Date of surgery:  September 24, 2024    History of present illness:  74-year-old female underwent right diagnostic arthroscopy for possible septic arthritis of the right knee.  Patient had a lot of pain and swelling in the right knee which we do not have a good reason for.  Cultures taken at the time of surgery have all been negative.   Has not been able to walk in months.  Patient did have significant stiffness of the right knee from disuse.  Did a mild manipulation at the time.  Is a lot better.  Left knee is gotten back to normal.  She is actually walking a little more normally with a walker.  Finished physical therapy at home in his starting outpatient physical therapy later this week.  Just has a lot of weakness.  Pain is down to a 3/10      Review of Systems:    Musculoskeletal:  See HPI        Physical Examination:    Vital Signs:    There were no vitals filed for this visit.      Body mass index is 27.75 kg/m².    This a well-developed, well nourished patient in no acute distress.  They are alert and oriented and cooperative to examination.  Pt. comes in in a wheelchair    Examination of the right knee shows well-healed surgical portals.  Patient has range of motion from about  degrees.  Knee appears swollen although does not feel overly fluid filled.  Definitely better range of motion unless irritated compared to 2 weeks ago.    Examination left knee shows mild swelling as well.  Range of motion is about 20° to 110°.  Moderate irritability.    X-rays:  None     Assessment::  Continued right knee swelling and pain      Plan:  I am so thankful that she is doing better.  I would like to see her back in 6 weeks after she has a chance to work with the outpatient physical therapist more.       This note was created using [email or web address] voice recognition software that occasionally misinterpreted phrases or words.

## 2024-12-02 ENCOUNTER — TELEPHONE (OUTPATIENT)
Dept: ORTHOPEDICS | Facility: CLINIC | Age: 74
End: 2024-12-02
Payer: MEDICARE

## 2024-12-02 NOTE — TELEPHONE ENCOUNTER
----- Message from Med Assistant Coker sent at 12/2/2024  3:51 PM CST -----  Contact: [facility]  Rec'd post op notes but needs order for PT faxed to: [phone].  This is Stat!    Call back number is [phone]

## 2024-12-03 ENCOUNTER — TELEPHONE (OUTPATIENT)
Dept: ORTHOPEDICS | Facility: CLINIC | Age: 74
End: 2024-12-03
Payer: MEDICARE

## 2024-12-03 DIAGNOSIS — E11.9 TYPE 2 DIABETES MELLITUS WITHOUT COMPLICATION, WITH LONG-TERM CURRENT USE OF INSULIN: ICD-10-CM

## 2024-12-03 DIAGNOSIS — Z79.4 TYPE 2 DIABETES MELLITUS WITHOUT COMPLICATION, WITH LONG-TERM CURRENT USE OF INSULIN: ICD-10-CM

## 2024-12-03 RX ORDER — INSULIN ASPART 100 [IU]/ML
INJECTION, SOLUTION INTRAVENOUS; SUBCUTANEOUS
Qty: 15 ML | Refills: 4 | Status: SHIPPED | OUTPATIENT
Start: 2024-12-03

## 2024-12-03 NOTE — TELEPHONE ENCOUNTER
----- Message from Krishan sent at 12/3/2024  8:45 AM CST -----  [facility] would like a callback regarding needing a referral (Not the office notes) for the patient's Post Op PT.    Please call [phone]

## 2024-12-06 ENCOUNTER — OFFICE VISIT (OUTPATIENT)
Dept: FAMILY MEDICINE | Facility: CLINIC | Age: 74
End: 2024-12-06
Payer: MEDICARE

## 2024-12-06 VITALS
DIASTOLIC BLOOD PRESSURE: 56 MMHG | OXYGEN SATURATION: 99 % | SYSTOLIC BLOOD PRESSURE: 102 MMHG | HEART RATE: 87 BPM | TEMPERATURE: 98 F | BODY MASS INDEX: 25.64 KG/M2 | WEIGHT: 158.81 LBS

## 2024-12-06 DIAGNOSIS — Z00.00 ROUTINE GENERAL MEDICAL EXAMINATION AT A HEALTH CARE FACILITY: Primary | ICD-10-CM

## 2024-12-06 DIAGNOSIS — F43.20 ADJUSTMENT DISORDER, UNSPECIFIED TYPE: ICD-10-CM

## 2024-12-06 DIAGNOSIS — M25.661 DECREASED RANGE OF MOTION (ROM) OF RIGHT KNEE: ICD-10-CM

## 2024-12-06 DIAGNOSIS — Z98.890 H/O ARTHROSCOPY OF RIGHT KNEE: ICD-10-CM

## 2024-12-06 PROCEDURE — 99999 PR PBB SHADOW E&M-EST. PATIENT-LVL V: CPT | Mod: PBBFAC,,, | Performed by: FAMILY MEDICINE

## 2024-12-06 RX ORDER — SERTRALINE HYDROCHLORIDE 50 MG/1
50 TABLET, FILM COATED ORAL DAILY
Qty: 90 TABLET | Refills: 3 | Status: SHIPPED | OUTPATIENT
Start: 2024-12-06

## 2024-12-06 NOTE — PROGRESS NOTES
"  Patient ID:   Allyson Henriquez is a 74 y.o. female.    Chief Complaint: Annual Exam ( is here with patient today)      History of Present Illness    CHIEF COMPLAINT:  Patient presents today for follow-up on multiple health concerns, including fatigue and joint pain.    FATIGUE:  She reports sleeping well, describing sleep as "out like a log." However, she does not feel rejuvenated upon waking and continues to experience fatigue despite adequate sleep. The quality of sleep has remained unchanged over time.    MUSCULOSKELETAL:  She reports knee pain with limited mobility. A previous evaluation by Dr. Ponce found some arthritis under the knee, but the cause of the severe pain remains unclear. She has difficulty walking and climbing stairs due to knee issues, but notes improvement with strengthening exercises. Her current sitting position alleviates knee pain. She also complains of elbow pain, specifically localized to one spot on the elbow, describing a loss of cushioning in the area, possibly related to recent weight loss. She reports pain from the shoulder to the elbow. She denies issues with other joints at this time.    MEDICATIONS:  Current medication regimen includes Eliquis, insulin, tiotropium, levothyroxine, losartan, and metoprolol 25 mg twice daily. Zoloft dosage has been adjusted from 25 mg to 50 mg daily. She reports no issues with current medications.    DIABETES:  She reports fluctuating blood sugar, describing them as "off and on" and "all over the board."    NUTRITION:  She expresses difficulty increasing protein intake. Albumin levels have decreased, potentially indicating insufficient dietary protein consumption. She reports challenges in meeting recommended protein intake despite being conscious of nutritional needs.    GENITOURINARY:  She reports cloudy urine but denies any persistent changes in urinary symptoms or associated concerns.    ENT:  She has obtained new hearing aids, which " she is currently wearing. She expresses satisfaction, noting that they have alleviated some of her tension.      ROS:  General: -fever, -chills, +fatigue, -weight gain, -weight loss  Eyes: -vision changes, -redness, -discharge  ENT: -ear pain, -nasal congestion, -sore throat  Cardiovascular: -chest pain, -palpitations, -lower extremity edema  Respiratory: -cough, -shortness of breath  Gastrointestinal: -abdominal pain, -nausea, -vomiting, -diarrhea, -constipation, -blood in stool  Genitourinary: -dysuria, -hematuria, -frequency, +urine changes  Musculoskeletal: +joint pain, -muscle pain, -joint stiffness  Skin: -rash, -lesion  Neurological: -headache, -dizziness, -numbness, -tingling  Psychiatric: -anxiety, -depression, -sleep difficulty         The ASCVD Risk score (Ron ORDOÑEZ, et al., 2019) failed to calculate for the following reasons:    Risk score cannot be calculated because patient has a medical history suggesting prior/existing ASCVD    Exam:  Physical Exam  Vitals and nursing note reviewed.   Constitutional:       General: She is not in acute distress.     Appearance: Normal appearance. She is well-developed.   HENT:      Head: Normocephalic and atraumatic.   Eyes:      General: No scleral icterus.        Right eye: No discharge.         Left eye: No discharge.      Conjunctiva/sclera: Conjunctivae normal.   Cardiovascular:      Rate and Rhythm: Normal rate and regular rhythm.   Pulmonary:      Effort: Pulmonary effort is normal. No respiratory distress.      Breath sounds: Normal breath sounds.   Abdominal:      Palpations: Abdomen is soft.      Tenderness: There is no guarding.   Musculoskeletal:         General: Tenderness (tender with extended range of motion) present. No deformity or signs of injury.      Cervical back: Neck supple.   Skin:     General: Skin is warm and dry.      Findings: No rash.   Neurological:      General: No focal deficit present.      Mental Status: She is alert and oriented to  person, place, and time.   Psychiatric:         Mood and Affect: Mood normal.         Behavior: Behavior normal.         Assessment/Plan:  Routine general medical examination at a health care facility    Adjustment disorder, unspecified type  -     sertraline (ZOLOFT) 50 MG tablet; Take 1 tablet (50 mg total) by mouth once daily.  Dispense: 90 tablet; Refill: 3    H/O arthroscopy of right knee  -     Ambulatory referral/consult to Rheumatology; Future; Expected date: 12/13/2024    Decreased range of motion (ROM) of right knee  -     Ambulatory referral/consult to Rheumatology; Future; Expected date: 12/13/2024         Assessment & Plan    IMPRESSION:  - Assessed patient's fatigue and joint pain, particularly in elbow and shoulder  - Considered impact of recent weight loss on elbow discomfort  - Evaluated protein intake and albumin levels  - Addressed blood sugar fluctuations  - Increased Zoloft dosage from 25mg to 50mg to better manage emotional fatigue and stress response  - Recommend topical anti-inflammatory for joint pain    PAIN MANAGEMENT (OSTEOARTHRITIS):  - Patient to apply ice to affected joints for pain relief.  - Recommend using pillows on armrests to reduce pressure on elbows when sitting.  - Recommend elevating feet while sleeping to reduce swelling.  - Educated on the difference between Biofreeze and Voltaren gel.  - Started Voltaren gel (diclofenac sodium topical gel).  - Apply to painful elbow and shoulder areas as needed for pain relief.    DEPRESSION AND ANXIETY:  - Increased Zoloft from 25mg to 50mg daily.  - Take two of current 25mg tablets until new prescription is filled, then take 1 50mg tablet daily.    MUSCLE WEAKNESS AND FATIGUE:  - Explained importance of protein intake for recovery and overall health.  - Patient to increase protein intake through various sources (e.g., meat, protein shakes, high-protein snacks).  - Patient to continue with prescribed exercises and physical therapy  routine.    HEARING AID DEPENDENCE:  - Discussed the benefits of hearing aids for cognitive function and environmental awareness.    FOLLOW-UP AND REFERRALS:  - Referred to Dr. Davila's office for rheumatology consultation.       No follow-ups on file.    This note was generated with the assistance of ambient listening technology. Verbal consent was obtained by the patient and accompanying visitor(s) for the recording of patient appointment to facilitate this note. I attest to having reviewed and edited the generated note for accuracy, though some syntax or spelling errors may persist. Please contact the author of this note for any clarification.

## 2024-12-27 ENCOUNTER — HOSPITAL ENCOUNTER (OUTPATIENT)
Dept: RADIOLOGY | Facility: HOSPITAL | Age: 74
Discharge: HOME | End: 2024-12-27
Attending: RADIOLOGY
Payer: MEDICARE

## 2024-12-27 DIAGNOSIS — C79.31 MALIGNANT NEOPLASM METASTATIC TO BRAIN: ICD-10-CM

## 2024-12-27 DIAGNOSIS — C34.90 SMALL CELL LUNG CANCER: ICD-10-CM

## 2024-12-27 PROBLEM — E87.5 HYPERKALEMIA: Status: ACTIVE | Noted: 2024-12-27

## 2024-12-27 PROCEDURE — 25500020 PHARM REV CODE 255: Mod: PO | Performed by: RADIOLOGY

## 2024-12-27 PROCEDURE — 70553 MRI BRAIN STEM W/O & W/DYE: CPT | Mod: 26,,, | Performed by: RADIOLOGY

## 2024-12-27 PROCEDURE — 70553 MRI BRAIN STEM W/O & W/DYE: CPT | Mod: TC,PO

## 2024-12-27 PROCEDURE — A9585 GADOBUTROL INJECTION: HCPCS | Mod: PO | Performed by: RADIOLOGY

## 2024-12-27 RX ORDER — GADOBUTROL 604.72 MG/ML
7 INJECTION INTRAVENOUS
Status: COMPLETED | OUTPATIENT
Start: 2024-12-27 | End: 2024-12-27

## 2024-12-27 RX ADMIN — GADOBUTROL 7 ML: 604.72 INJECTION INTRAVENOUS at 12:12

## 2024-12-30 PROBLEM — Z74.09 OTHER REDUCED MOBILITY: Status: ACTIVE | Noted: 2024-12-30

## 2024-12-31 ENCOUNTER — TELEPHONE (OUTPATIENT)
Dept: NEPHROLOGY | Facility: CLINIC | Age: 74
End: 2024-12-31
Payer: MEDICARE

## 2024-12-31 DIAGNOSIS — N17.9 AKI (ACUTE KIDNEY INJURY): Primary | ICD-10-CM

## 2025-01-02 RX ORDER — LEVOTHYROXINE SODIUM 75 UG/1
TABLET ORAL
Qty: 30 TABLET | Refills: 11 | Status: SHIPPED | OUTPATIENT
Start: 2025-01-02

## 2025-01-03 ENCOUNTER — PATIENT MESSAGE (OUTPATIENT)
Dept: CARDIOLOGY | Facility: CLINIC | Age: 75
End: 2025-01-03
Payer: MEDICARE

## 2025-01-06 ENCOUNTER — PATIENT MESSAGE (OUTPATIENT)
Dept: CARDIOLOGY | Facility: CLINIC | Age: 75
End: 2025-01-06
Payer: MEDICARE

## 2025-01-06 DIAGNOSIS — I48.0 PAF (PAROXYSMAL ATRIAL FIBRILLATION): ICD-10-CM

## 2025-01-08 ENCOUNTER — OFFICE VISIT (OUTPATIENT)
Dept: ORTHOPEDICS | Facility: CLINIC | Age: 75
End: 2025-01-08
Payer: MEDICARE

## 2025-01-08 VITALS — HEIGHT: 66 IN | RESPIRATION RATE: 17 BRPM | BODY MASS INDEX: 25.33 KG/M2 | WEIGHT: 157.63 LBS

## 2025-01-08 DIAGNOSIS — M17.10 ARTHRITIS OF KNEE: ICD-10-CM

## 2025-01-08 DIAGNOSIS — M24.661 ARTHROFIBROSIS OF KNEE JOINT, RIGHT: Primary | ICD-10-CM

## 2025-01-08 DIAGNOSIS — M17.10 ARTHRITIS OF KNEE: Primary | ICD-10-CM

## 2025-01-08 DIAGNOSIS — M24.661 ARTHROFIBROSIS OF KNEE JOINT, RIGHT: ICD-10-CM

## 2025-01-08 PROCEDURE — 1111F DSCHRG MED/CURRENT MED MERGE: CPT | Mod: CPTII,S$GLB,, | Performed by: ORTHOPAEDIC SURGERY

## 2025-01-08 PROCEDURE — 1159F MED LIST DOCD IN RCRD: CPT | Mod: CPTII,S$GLB,, | Performed by: ORTHOPAEDIC SURGERY

## 2025-01-08 PROCEDURE — 1160F RVW MEDS BY RX/DR IN RCRD: CPT | Mod: CPTII,S$GLB,, | Performed by: ORTHOPAEDIC SURGERY

## 2025-01-08 PROCEDURE — 99214 OFFICE O/P EST MOD 30 MIN: CPT | Mod: S$GLB,,, | Performed by: ORTHOPAEDIC SURGERY

## 2025-01-08 PROCEDURE — 99999 PR PBB SHADOW E&M-EST. PATIENT-LVL II: CPT | Mod: PBBFAC,,, | Performed by: ORTHOPAEDIC SURGERY

## 2025-01-08 NOTE — PROGRESS NOTES
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 difficile colitis 08/2021    Cancer     lung    CKD (chronic kidney disease), stage III     Colonic diverticular abscess 07/12/2022    COPD (chronic obstructive pulmonary disease)     Encounter for blood transfusion     Hepatic steatosis     History of numbness     transient numbness left hand and foot    History of tobacco abuse     Hyperlipidemia     Hypertension     Lung nodules     and adenopathy    Multinodular goiter (nontoxic) 05/08/2017    Obesity     TRISHA (obstructive sleep apnea)     on bipap at bedtime    Proteinuria        Past Surgical History:   Procedure Laterality Date    ADENOIDECTOMY      APPENDECTOMY      CATARACT EXTRACTION      COLONOSCOPY N/A 11/14/2016    Procedure: COLONOSCOPY;  Surgeon: Destin Cardona MD;  Location: [facility];  Service: Endoscopy;  Laterality: N/A;    COLONOSCOPY N/A 05/14/2021    Procedure: COLONOSCOPY;  Surgeon: Destin Cardona MD;  Location: [facility];  Service: Endoscopy;  Laterality: N/A; hemorrhoids, diverticulosis, Old blood left colon. No active bleeding; Repeat colonoscopy is not recommended for screening purposes.    COLOSTOMY N/A 07/28/2022    Procedure: CREATION, COLOSTOMY;  Surgeon: Nubia Tinoco MD;  Location: [facility] OR;  Service: General;  Laterality: N/A;    DEBRIDEMENT OF WOUND OF ABDOMEN  07/28/2022    Procedure: DEBRIDEMENT, WOUND, ABDOMEN;  Surgeon: Nubia Tinoco MD;  Location: [facility] OR;  Service: General;;    ESOPHAGOGASTRODUODENOSCOPY N/A 05/14/2021    Procedure: EGD (ESOPHAGOGASTRODUODENOSCOPY);  Surgeon: Destin Cardona MD;  Location: [facility];  Service: Endoscopy;  Laterality: N/A; unremarkable     ESOPHAGOGASTRODUODENOSCOPY N/A 08/09/2021    Procedure: ESOPHAGOGASTRODUODENOSCOPY (EGD);  Surgeon: Destin Cardona MD;  Location: [facility];  Service: Endoscopy;  Laterality: N/A; Moderately severe radiation esophagitis with no bleeding    EXCISIONAL BIOPSY N/A 02/09/2023    Procedure: EXCISIONAL BIOPSY;  Surgeon: Nubia Tinoco MD;  Location: [facility] OR;  Service: General;  Laterality: N/A;    EYE SURGERY      cataract OU    HERNIA REPAIR      INSERTION OF TUNNELED CENTRAL VENOUS CATHETER (CVC) WITH SUBCUTANEOUS PORT N/A 06/07/2021    Procedure: VKAROBCEE-LYSV-W-CATH;  Surgeon: Joe Salinas MD;  Location: [facility] OR;  Service: General;  Laterality: N/A;    INTRALUMINAL GASTROINTESTINAL TRACT IMAGING VIA CAPSULE N/A 12/29/2021    Procedure: IMAGING PROCEDURE, GI TRACT, INTRALUMINAL, VIA CAPSULE  (called for instruction 12/20-may have trouble swallowing);  Surgeon: Floyd Dixon MD;  Location: [facility] ENDO;  Service: Endoscopy;  Laterality: N/A; Diffuse red spots of unclear significance and erosions found in the small bowel (entire small bowel), suspicious for mild radiation enteritis    KNEE ARTHROSCOPY W/ MENISCECTOMY Right 9/24/2024    Procedure: ARTHROSCOPY, KNEE, WITH MENISCECTOMY;  Surgeon: Jose Rafael Barney MD;  Location: [facility] OR;  Service: Orthopedics;  Laterality: Right;  Lateral meniscectomy    NEUROMA SURGERY Left     acoustic    TONSILLECTOMY      WOUND EXPLORATION N/A 08/13/2022    Procedure: EXPLORATION, WOUND;  Surgeon: Arnel Olsen MD;  Location: [facility] OR;  Service: General;  Laterality: N/A;    WOUND EXPLORATION Left 02/09/2023    Procedure: EXPLORATION, WOUND - of Ostomy Granuloma - Wound - Abdomen;  Surgeon: Nubia Tinoco MD;  Location: [facility] OR;  Service: General;  Laterality: Left;       Current Outpatient Medications   Medication Sig    acetaminophen (TYLENOL) 500 MG tablet Take 2 tablets (1,000 mg total) by mouth every 8 (eight) hours as needed for Pain.    apixaban  (ELIQUIS) 5 mg Tab Take 1 tablet (5 mg total) by mouth 2 (two) times daily.    ascorbic acid, vitamin C, (VITAMIN C) 500 MG tablet Take 500 mg by mouth once daily. Indications: inadequate vitamin C    azelastine (ASTELIN) 137 mcg (0.1 %) nasal spray USE 2 SPRAYS IN EACH NOSTRIL EVERY NIGHT AT BEDTIME    blood sugar diagnostic Strp 1 strip by Misc.(Non-Drug; Combo Route) route Daily.    blood-glucose meter kit Use as instructed    blood-glucose sensor (DEXCOM G7 SENSOR MISC) Inject 1 each into the skin every 10 days. Continues glucose monitoring    cetirizine (ZYRTEC) 10 MG tablet Take 10 mg by mouth once daily.    cholecalciferol, vitamin D3, (VITAMIN D3) 125 mcg (5,000 unit) Tab Take 5,000 Units by mouth once daily.    docusate sodium (COLACE) 100 MG capsule Take 1 capsule (100 mg total) by mouth daily as needed for Constipation.    famotidine (PEPCID) 20 MG tablet Take 1 tablet (20 mg total) by mouth once daily.    gabapentin (NEURONTIN) 100 MG capsule Take 300 mg by mouth once daily.    HYDROcodone-acetaminophen (NORCO) 5-325 mg per tablet Take 1 tablet by mouth every 6 (six) hours as needed for Pain.    ibuprofen (ADVIL,MOTRIN) 600 MG tablet Take 1 tablet (600 mg total) by mouth 3 (three) times daily as needed for Pain.    insulin glargine U-100, Lantus, (LANTUS SOLOSTAR U-100 INSULIN) 100 unit/mL (3 mL) InPn pen Inject 22 Units into the skin every evening.    ipratropium (ATROVENT) 0.02 % nebulizer solution Take 2.5 mLs (500 mcg total) by nebulization every 6 (six) hours as needed (wheeze). Rescue    Lactobacillus acidophilus (PROBIOTIC ORAL) Take 1 capsule by mouth once daily.    lancets Misc 1 each by Misc.(Non-Drug; Combo Route) route once daily.    levothyroxine (SYNTHROID) 75 MCG tablet TAKE 1 TABLET BY MOUTH 6 DAYS A WEEK AND TAKE 1/2 TABLET BY MOUTH ON DAY 7    LIDOcaine-prilocaine (EMLA) cream Apply topically as needed (as needed for port access).    metoprolol succinate (TOPROL-XL) 25 MG 24 hr tablet  "Take 0.5 tablets (12.5 mg total) by mouth 2 (two) times daily.    mv-mn/folic ac/calcium/vit K1 (WOMEN'S 50 PLUS MULTIVITAMIN ORAL) Take 1 tablet by mouth once daily.    NOVOLOG FLEXPEN U-100 INSULIN 100 unit/mL (3 mL) InPn pen INJECT 7 UNITS WITH MEALSPREMEAL READINGS: 150-200= 2, 201-250= 4. 251-300= 6, 301-350=8, OVER 350 10 UNITS, MAX 50 UNITS DAILY    pen needle, diabetic (NOVOFINE 32) 32 gauge x 1/4" Ndle TO USE WITH INSULIN PENS FOUR TIMES DAILY    pen needle, diabetic (NOVOFINE 32) 32 gauge x 1/4" Ndle TO USE WITH INSULIN PENS FOUR TIMES DAILY    sertraline (ZOLOFT) 50 MG tablet Take 1 tablet (50 mg total) by mouth once daily.    simvastatin (ZOCOR) 40 MG tablet TAKE 1 TABLET(40 MG) BY MOUTH EVERY EVENING    triamcinolone acetonide 0.1% (KENALOG) 0.1 % cream Apply topically 2 (two) times daily.     Current Facility-Administered Medications   Medication    sodium chloride 0.9% flush 10 mL     Facility-Administered Medications Ordered in Other Visits   Medication    EUFLEXXA 10 mg/mL(mw 2.4 -3.6 million) injection Syrg 20 mg       Review of patient's allergies indicates:   Allergen Reactions    Contrast media Anaphylaxis    Iodinated contrast media Anaphylaxis and Rash    Albuterol Other (See Comments)     Used during PFTs and put pt in afib    Dye     Pcn [penicillins]      Pt states did well on cephalexin.  No adverse reaction or side effect.     Doxycycline Palpitations     Tolerated IV Doxy 8/2022       Family History   Problem Relation Name Age of Onset    Heart disease Mother          dissection    GI problems Father          perf colon    Cancer Paternal Uncle          colon cancer    Diabetes Neg Hx      Kidney disease Neg Hx      Stroke Neg Hx      Colon cancer Neg Hx      Crohn's disease Neg Hx      Ulcerative colitis Neg Hx      Stomach cancer Neg Hx      Esophageal cancer Neg Hx         Social History     Socioeconomic History    Marital status:    Tobacco Use    Smoking status: Former     " Current packs/day: 0.00     Average packs/day: 1 pack/day for 40.0 years (40.0 ttl pk-yrs)     Types: Cigarettes     Start date: 3/6/1976     Quit date: 3/6/2016     Years since quittin.8    Smokeless tobacco: Never   Substance and Sexual Activity    Alcohol use: Not Currently    Drug use: No    Sexual activity: Not Currently   Social History Narrative    Works in insurance collection.     Social Drivers of Health     Financial Resource Strain: Low Risk  (2024)    Overall Financial Resource Strain (CARDIA)     Difficulty of Paying Living Expenses: Not very hard   Food Insecurity: No Food Insecurity (2024)    Hunger Vital Sign     Worried About Running Out of Food in the Last Year: Never true     Ran Out of Food in the Last Year: Never true   Transportation Needs: No Transportation Needs (2024)    TRANSPORTATION NEEDS     Transportation : No   Physical Activity: Unknown (10/13/2021)    Exercise Vital Sign     Days of Exercise per Week: 0 days   Stress: No Stress Concern Present (2024)    [employer] [employer] - Occupational Stress Questionnaire     Feeling of Stress : Not at all   Housing Stability: Low Risk  (2024)    Housing Stability Vital Sign     Unable to Pay for Housing in the Last Year: No     Homeless in the Last Year: No       Chief Complaint:   Chief Complaint   Patient presents with    Post-op Evaluation       Date of surgery:  2024    History of present illness:  74-year-old female underwent right diagnostic arthroscopy for possible septic arthritis of the right knee.  Patient had a lot of pain and swelling in the right knee which we do not have a good reason for.  Cultures taken at the time of surgery have all been negative.   Has not been able to walk in months.  Patient did have significant stiffness of the right knee from disuse.  Did a mild manipulation at the time.  Is a lot better.  Left knee is gotten back to normal.  She is  actually walking a little more normally with a walker.  She is doing outpatient physical therapy which is helping.  Just has a lot of weakness.  Pain is down to a 3/10      Review of Systems:    Musculoskeletal:  See HPI        Physical Examination:    Vital Signs:    Vitals:    01/08/25 1414   Resp: 17         Body mass index is 25.44 kg/m².    This a well-developed, well nourished patient in no acute distress.  They are alert and oriented and cooperative to examination.  Pt. comes in in a wheelchair    Examination of the right knee shows well-healed surgical portals.  Patient has range of motion from about  degrees.  Knee does not have nearly the swelling that it used to.       X-rays:  None     Assessment::  Right knee stiffness      Plan:  Continue the outpatient physical therapy.  I think she might benefit from a Alesia splint.  Patient got some arthrofibrosis from not moving her knee.  Follow up in a couple months.      This note was created using M Modal voice recognition software that occasionally misinterpreted phrases or words.

## 2025-01-09 ENCOUNTER — OFFICE VISIT (OUTPATIENT)
Dept: RADIATION ONCOLOGY | Facility: CLINIC | Age: 75
End: 2025-01-09
Payer: MEDICARE

## 2025-01-09 VITALS
RESPIRATION RATE: 20 BRPM | TEMPERATURE: 98 F | SYSTOLIC BLOOD PRESSURE: 145 MMHG | HEART RATE: 72 BPM | OXYGEN SATURATION: 98 % | DIASTOLIC BLOOD PRESSURE: 66 MMHG | WEIGHT: 160.25 LBS | BODY MASS INDEX: 25.75 KG/M2 | HEIGHT: 66 IN

## 2025-01-09 DIAGNOSIS — C77.1 MALIGNANT NEOPLASM METASTATIC TO INTRATHORACIC LYMPH NODE: Primary | ICD-10-CM

## 2025-01-09 DIAGNOSIS — C79.31 MALIGNANT NEOPLASM METASTATIC TO BRAIN: ICD-10-CM

## 2025-01-09 PROCEDURE — 99999 PR PBB SHADOW E&M-EST. PATIENT-LVL III: CPT | Mod: PBBFAC,,, | Performed by: RADIOLOGY

## 2025-01-09 NOTE — PROGRESS NOTES
[facility] Department of Radiation Oncology  Follow Up Visit Note    Diagnosis:  Allyson Henriquez is a 74 y.o. 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, status post SRS       Oncologic History:  Oncology History   Small cell lung cancer   5/20/2021 Initial Diagnosis    Small cell carcinoma of lung, right     6/8/2021 - 7/27/2021 Chemotherapy    Treatment Summary   Plan Name: OP CARBOPLATIN (AUC) + ETOPOSIDE Q3W  Treatment Goal: Palliative  Status: Inactive  Start Date: 6/8/2021  End Date: 7/27/2021  Provider: Madhav Cardona MD  Chemotherapy: CARBOplatin (PARAPLATIN) 395 mg in sodium chloride 0.9% 250 mL chemo infusion, 395 mg (100 % of original dose 395 mg), Intravenous, Clinic/HOD 1 time, 3 of 6 cycles  Dose modification:   (original dose 395 mg, Cycle 1, Reason: MD Discretion)  Administration: 395 mg (6/8/2021), 395 mg (6/29/2021), 395 mg (7/20/2021)  etoposide (VEPESID) 100 mg/m2 = 200 mg in sodium chloride 0.9% 500 mL chemo infusion, 100 mg/m2 = 200 mg, Intravenous, Clinic/HOD 1 time, 3 of 6 cycles  Administration: 200 mg (6/8/2021), 200 mg (6/9/2021), 200 mg (6/29/2021), 200 mg (6/10/2021), 200 mg (6/30/2021), 200 mg (7/1/2021), 200 mg (7/20/2021), 200 mg (7/21/2021), 200 mg (7/22/2021)     1/18/2022 - 7/6/2022 Chemotherapy    Treatment Summary   Plan Name: OP PEMBROLIZUMAB 200MG Q3W  Treatment Goal: Palliative  Status: Inactive  Start Date: 1/18/2022  End Date: 7/6/2022  Provider: Madhav Cardona MD  Chemotherapy: [No matching medication found in this treatment plan]     6/28/2022 - 6/28/2022 Radiation Therapy    Treating physician: Aleena Nielson  Total Dose: 22 Gy  Fractions: 1    Single fraction radiosurgery to right cerebellar metastasis.     Iron deficiency anemia   Brain metastasis   6/28/2022 Initial Diagnosis    Brain metastasis     6/28/2022 - 6/28/2022 Radiation Therapy    Treating physician: Aleena Nielson  Total  "Dose: 22 Gy  Fractions: 1    Single fraction radiosurgery to right cerebellar metastasis.          Interval History  The patient presents today for a regularly scheduled follow up visit.  She was last seen in our follow up on 9/30/24.  Since that time she had been feeling very well . Had been having some numbness in right hand, especially on waking. It "falls asleep"- no appreciable visible changes. Off Keytruda several months. Still in rehab for knee.    12/27/24 MRI brain: treated r cerebellar lesion smaller       HPI: This is a 72 year old female with history of extensive-stage small cell lung cancer metastatic to bone and in thorax who presents with her  for discussion of radiation therapy for new single brain metastasis.  She is followed by Dr. Cardona on Pembro with notable systemic disease response, who reported 2 episodes of LEFT hand spasm and 2 episodes of LEFT foot spasm occurring independently and resolving spontaneously 2 weeks ago.  No other neurologic symptoms.  Dr. Cardona ordered MRI.     MRI Brain 6/17/22 new 9mm RIGHT cerebellar metastasis.  No other intracranial metastases.     Patient reports low grade headache since last Weds which she attributes to stress. She is active at home and uses wheelchair intermittently when fatigued.       She had a prolonged hospital stay (10 weeks) 2/2 esophagitis and C. Dif last year after completing 45Gy thoracic radiation therapy (Turissi regimen), but was unable to complete her course of chemotherapy.  She has recovered significantly from deconditioning related to prolonged hospital stay last year, but is still more easily fatigued than normal..     6/28/22 Single fraction SRS 22Gy to right cerebellar metastasis    she had repeated hospitalizations for diverticular abscess requiring sigmoid colectomy/ostomy and debridement 7/28/22 and multiple surgeries for washout and several IV and oral antibiotics (she was hospitalized last year with severe " esophagitis after chemotherapy-radiation therapy to thorax, complicated by C. Diff colitis).  She was discharged 8/19/22 to [facility], but readmitted 9/15/22 with wound dehiscence.   9/16/22 MRI brain: decrease in size of treated 9mm RIGHT cerebellar mass (now 5 x 3mm)     10/14/22 developed transient Left hand symptoms similar to those at time of presentation    10/23/22 PET with stable hypermetabolic right perihilar consolidation consistent with post-treatment change.     10/26/22 MRI brain stable vs improved R cerebellar lesion    11/2022, she transferred her care to Dr. Gonzalez who has restarted Keytruda     1/23/23 MRI brain: stable appearance of treated right cerebellar metastasis, now punctate in size; no new lesions; stable left IAC vestibular schwannoma    Some ongoing issues with wound healing related to her abdominal surgeries.  Is consulting with a new surgeon for second opinion. Also undergoing endocrine work up for ongoing blood sugar instability (on hydrocortisone for adrenal insufficiency)     4/27/23 MRI brain: right cerebellar small enhancing lesion with interval small focus of increased enhancement medial/inferior, and minimal new edema compared to prior study.  Stable left vestibular schwannoma.     7/2023 ostomy revision, hernia repair, followed by prolonged hospitalization    8/21/23 MRI brain: right cerebellar small enhancing lesion with serial increased size, interval small focus of increased enhancement medial/inferior, concerning for recurrence vs rediation necrossis/treatment effect.  Stable left vestibular schwannoma.     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  Stable left acoustic schwannoma    Admitted 1/2024 for UTI, hyponatremia, pleural effusion. Has completed antibiotics.    3/1/24 MR  Spect: stable enhancing lesion in right cerebellum- spect findings most consistent with treatment change/radiation necrosis    Ongoing but significantly improving issues with abdominal wounds (had 3- 2 have healed, one improving); no longer using wound vac remains. Off hydrocortisone.     6/5/24 MRI Brain w/Spect: continued slight increase lesion in R cerebellum and marginating vasogenic edema with slight increased central nodular component; radiation necrosis favored  Stable L vestibular schwannoma    9/24/24 right knee arthroscopy for meniscal tear.  Ongoing wound care for abdominal wall and sacral pressure injury, improving (has been released from Wound Care.     Skipped last week of Keytruda 2/2 scans have been clear/for knee surgery.    9/26/24 MRI brain: right cerebellar hemispheric mass similar vs slightly decreased in size with possible mild increase in surrounding T2/FLAIR signal. Stable left vestibular schwannoma    Possibility of pregnancy: No  History of prior irradiation: Yes - LEFT gamma knife, acoustic neuroma at [facility], 10/2003; Chest radiation therapy 45Gy BID (Turissi regimen), 2021 with MBP   History of prior systemic anti-cancer therapy: Yes - most recently Pembro (last 6/15/22)  History of collagen vascular disease: No  Implanted electronic device (pacer/defib/nerve stimulator): No    Review of Systems   Review of Systems   Constitutional:  Negative for chills and fever.   Eyes:  Negative for blurred vision and double vision.   Respiratory:  Negative for cough.    Gastrointestinal:  Negative for abdominal pain (resolved).   Musculoskeletal:  Positive for back pain (tenderness over one area, inferior to left scapula) and joint pain (s/p right knee surgery).   Neurological:  Positive for focal weakness (right hand grasp strength ongoing). Negative for dizziness, tremors (occasional bilat hand numbness on waking), sensory change, speech change, seizures, weakness and headaches.       Social  "History:  Social History     Tobacco Use    Smoking status: Former     Current packs/day: 0.00     Average packs/day: 1 pack/day for 40.0 years (40.0 ttl pk-yrs)     Types: Cigarettes     Start date: 3/6/1976     Quit date: 3/6/2016     Years since quittin.8    Smokeless tobacco: Never   Substance Use Topics    Alcohol use: Not Currently    Drug use: No       Family History:  Cancer-related family history includes Cancer in her paternal uncle. There is no history of Esophageal cancer.    Exam:  Vitals:    25 1344   BP: (!) 145/66   Pulse: 72   Resp: 20   Temp: 98 °F (36.7 °C)   SpO2: 98%   Weight: 72.7 kg (160 lb 4.4 oz)   Height: 5' 6" (1.676 m)             Physical Exam  Vitals reviewed. Exam conducted with a chaperone present.   Constitutional:       General: She is not in acute distress.     Appearance: Normal appearance. She is obese. She is not ill-appearing or toxic-appearing.   HENT:      Head: Normocephalic and atraumatic.   Eyes:      Extraocular Movements: Extraocular movements intact.      Pupils: Pupils are equal, round, and reactive to light.   Musculoskeletal:         General: No swelling.      Cervical back: No rigidity.   Skin:     General: Skin is warm.   Neurological:      Mental Status: She is alert.      Cranial Nerves: Cranial nerves 2-12 are intact.      Motor: No pronator drift.      Coordination: Coordination normal. Finger-Nose-Finger Test normal. Rapid alternating movements normal.      Gait: Gait abnormal (ambulates with wheelchair 2/2 knee surg).      Comments: Minimal targeting difficulty R hand- stable          Imaging:  MRI brain 24 reviewed as detailed above      Assessment:  Stable size of R cerebellar enhancement w/indistinct margins, most consistent with radiation necrosis.    ECOG: (3) Limited self care; confined to bed or chair >50%    Plan:  MRI brain q6 months   Pt will contact our office if any new neuro symptoms  Immunotherapy/systemic therapy completed under " the care of Dr. Gonzalez  Follow up re: adrenal insufficiency, followed Endocrinologist  Ongoing care for abdominal healing-  follows w/ Colorectal Dr. Annia Ashton at  [facility]  Follow up with Ortho as directed  Will follow up with Dr. Gonzalez re: interval imaging  She was given our contact information, and she was told that she could call our clinic at anytime if she has any questions or concerns.  Follow up with other providers as directed        16 minutes spent in chart/case review, face-to-face interaction, discussion with other providers, and treatment planning/coordination of care.

## 2025-01-14 ENCOUNTER — OFFICE VISIT (OUTPATIENT)
Dept: NEPHROLOGY | Facility: CLINIC | Age: 75
End: 2025-01-14
Payer: MEDICARE

## 2025-01-14 VITALS
SYSTOLIC BLOOD PRESSURE: 112 MMHG | DIASTOLIC BLOOD PRESSURE: 54 MMHG | HEIGHT: 66 IN | BODY MASS INDEX: 25.87 KG/M2 | OXYGEN SATURATION: 100 % | HEART RATE: 71 BPM

## 2025-01-14 DIAGNOSIS — G47.33 OSA TREATED WITH BIPAP: ICD-10-CM

## 2025-01-14 DIAGNOSIS — Z87.891 HISTORY OF TOBACCO ABUSE: ICD-10-CM

## 2025-01-14 DIAGNOSIS — I10 ESSENTIAL HYPERTENSION: ICD-10-CM

## 2025-01-14 DIAGNOSIS — N17.9 AKI (ACUTE KIDNEY INJURY): Primary | ICD-10-CM

## 2025-01-14 DIAGNOSIS — I13.0 BENIGN HYPERTENSIVE HEART AND KIDNEY DISEASE WITH CHF, NYHA CLASS 1 AND CKD STAGE 3: ICD-10-CM

## 2025-01-14 DIAGNOSIS — N18.30 BENIGN HYPERTENSIVE HEART AND KIDNEY DISEASE WITH CHF, NYHA CLASS 1 AND CKD STAGE 3: ICD-10-CM

## 2025-01-14 DIAGNOSIS — C34.90 SMALL CELL CARCINOMA OF LUNG, UNSPECIFIED LATERALITY, UNSPECIFIED PART OF LUNG: ICD-10-CM

## 2025-01-14 DIAGNOSIS — E87.20 METABOLIC ACIDOSIS: ICD-10-CM

## 2025-01-14 DIAGNOSIS — J44.9 CHRONIC OBSTRUCTIVE PULMONARY DISEASE, UNSPECIFIED COPD TYPE: ICD-10-CM

## 2025-01-14 PROBLEM — N18.31 STAGE 3A CHRONIC KIDNEY DISEASE: Status: RESOLVED | Noted: 2021-04-05 | Resolved: 2025-01-14

## 2025-01-14 PROCEDURE — 99999 PR PBB SHADOW E&M-EST. PATIENT-LVL II: CPT | Mod: PBBFAC,,, | Performed by: STUDENT IN AN ORGANIZED HEALTH CARE EDUCATION/TRAINING PROGRAM

## 2025-01-14 PROCEDURE — 99417 PROLNG OP E/M EACH 15 MIN: CPT | Mod: S$GLB,,, | Performed by: STUDENT IN AN ORGANIZED HEALTH CARE EDUCATION/TRAINING PROGRAM

## 2025-01-14 PROCEDURE — 1160F RVW MEDS BY RX/DR IN RCRD: CPT | Mod: CPTII,S$GLB,, | Performed by: STUDENT IN AN ORGANIZED HEALTH CARE EDUCATION/TRAINING PROGRAM

## 2025-01-14 PROCEDURE — 1159F MED LIST DOCD IN RCRD: CPT | Mod: CPTII,S$GLB,, | Performed by: STUDENT IN AN ORGANIZED HEALTH CARE EDUCATION/TRAINING PROGRAM

## 2025-01-14 PROCEDURE — 99215 OFFICE O/P EST HI 40 MIN: CPT | Mod: S$GLB,,, | Performed by: STUDENT IN AN ORGANIZED HEALTH CARE EDUCATION/TRAINING PROGRAM

## 2025-01-14 PROCEDURE — 3044F HG A1C LEVEL LT 7.0%: CPT | Mod: CPTII,S$GLB,, | Performed by: STUDENT IN AN ORGANIZED HEALTH CARE EDUCATION/TRAINING PROGRAM

## 2025-01-14 PROCEDURE — 1111F DSCHRG MED/CURRENT MED MERGE: CPT | Mod: CPTII,S$GLB,, | Performed by: STUDENT IN AN ORGANIZED HEALTH CARE EDUCATION/TRAINING PROGRAM

## 2025-01-14 PROCEDURE — 3078F DIAST BP <80 MM HG: CPT | Mod: CPTII,S$GLB,, | Performed by: STUDENT IN AN ORGANIZED HEALTH CARE EDUCATION/TRAINING PROGRAM

## 2025-01-14 PROCEDURE — 3074F SYST BP LT 130 MM HG: CPT | Mod: CPTII,S$GLB,, | Performed by: STUDENT IN AN ORGANIZED HEALTH CARE EDUCATION/TRAINING PROGRAM

## 2025-01-14 RX ORDER — SODIUM BICARBONATE 650 MG/1
650 TABLET ORAL 2 TIMES DAILY
Qty: 60 TABLET | Refills: 11 | Status: SHIPPED | OUTPATIENT
Start: 2025-01-14 | End: 2026-01-14

## 2025-01-14 NOTE — PROGRESS NOTES
[facility]  Nephrology Clinic      Subjective:       HPI ID: Allyson Henriquez is a 74 y.o. female who presents for return patient evaluation for chronic renal failure.   Previously followed by another [facility] nephrologist in this clinical practice setting but lost to follow up in 2021. Re-visited with patient during inpatient encounter last month where she was admitted for abnormal labs/JEAN.   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 Gonzalez. She was sent to [facility] late Dec '24 for evaluation of abnormal labs at her oncology appointment. Lab work was notable for hyponatremia, hyperkalemia, NAGMA, and BUN/Cr of 81/5.11mg/dL. Our nephrology service was consulted for further evaluation. Of note, her renal function was at baseline (eGFR 60ml/min) as recent as 3 weeks prior to presentation. Her history of the previous month was extensively vetted during the consultation and workup did not reveal an inciting etiology. Renal biopsy was offered to explain JEAN of unknown etiology, including DDx of ATN vs AIN. Patient refused renal biopsy. She was eventually discharged to home with supportive care.     In terms of her renal history, she denies hospitalizations for prior JEAN or JEAN requiring RRT. Denies known history of nephrolithiasis or hematuria episodes. No reported history of frequent or recurrent use of NSAIDs/COXI, though she has Rx NSAIDs for recent knee pains - she denies frequent use. No pertinent rheumatologic or AI disease history. Denies any pertinent family history of known kidney disease, or family members with ESRD requiring dialysis.  She typically uses purewick at night d/t urinary frequency and limited mobility/performance status. Denies new medications in last three weeks. Denies acute/recent illness.   Fluid intake in 24hrs: at least 40oz    Interval history:  Since discharge to  "home, she continues to feel at her usual state of health. Previsit labs obtained yesterday show sCr is now improved to 2.04mg/dL. Electrolyte panel is stable. She denies complaints. Does not want to do any further testing / renal biopsy. Denies changes to appetite or decreased fluid intake. Denies dysuria.       Review of Systems   Constitutional:  Negative for chills, diaphoresis and fever.   Respiratory:  Negative for cough and shortness of breath.    Cardiovascular:  Negative for chest pain and leg swelling.   Gastrointestinal:  Negative for abdominal pain, diarrhea, nausea and vomiting.   Genitourinary:  Negative for difficulty urinating, dysuria and hematuria.   Musculoskeletal:  Negative for myalgias.   Neurological:  Negative for headaches.   Hematological:  Does not bruise/bleed easily.        The past medical, family and social histories were reviewed for this encounter.     BP (!) 112/54 (BP Location: Right arm, Patient Position: Sitting)   Pulse 71   Ht 5' 6" (1.676 m)   SpO2 100%   BMI 25.87 kg/m²     Objective:      Physical Exam  Vitals reviewed.   Constitutional:       General: She is not in acute distress.     Appearance: She is well-developed.   Neck:      Vascular: No JVD.   Cardiovascular:      Heart sounds: Normal heart sounds. No murmur heard.     No friction rub. No gallop.   Pulmonary:      Effort: Pulmonary effort is normal. No respiratory distress.      Breath sounds: Normal breath sounds. No wheezing or rales.   Abdominal:      General: Bowel sounds are normal. There is no distension.      Palpations: Abdomen is soft.      Tenderness: There is no abdominal tenderness.   Musculoskeletal:      Cervical back: Neck supple.   Skin:     General: Skin is warm and dry.      Findings: No rash.   Neurological:      Mental Status: She is alert and oriented to person, place, and time.         Assessment:       Lab Results   Component Value Date     (L) 01/13/2025    K 3.8 01/13/2025     " 01/13/2025    CO2 19 (L) 01/13/2025    BUN 49 (H) 01/13/2025    CREATININE 2.04 (H) 01/13/2025    CALCIUM 8.8 01/13/2025    ALBUMIN 2.8 (L) 01/13/2025    PHOS 4.9 (H) 01/06/2025     Lab Results   Component Value Date    MICALBCREAT 401.9 (H) 01/09/2025    MICALBCREAT 427.5 (H) 03/14/2023       1. JEAN (acute kidney injury)    2. Metabolic acidosis    3. History of tobacco abuse    4. Chronic obstructive pulmonary disease, unspecified COPD type    5. 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 TRISHA treated with BiPAP    7. Essential hypertension    8. Small cell carcinoma of lung, unspecified laterality, unspecified part of lung          Plan:   Return to clinic in 4 week  Labs for next visit include RFP in two weeks, RFP UPCR, UACR in 4 weeks.  Baseline creatinine is <1.0mg/dL    Non-oliguric JEAN stage III  -baseline eGFR >60ml/min  -history of JEAN did not point to inciting factor. Serologies also unrevealing  -suspect this was an ATN, though again, no event was described  -imaging w/ mild R hydronephrosis, bilateral ureteral jets present however.   -voiding without issue; tolerating PO  -avoiding NSAIDS  -defer renal biopsy for now as continuing to improve. Patient does not want biopsy anyways  -f/u labs, monitor for recovery with supportive care    Metabolic acidosis, NAGMA  -restart NaHCO3 tabs    COPD  -compensated at this time    CHFpEF- compensated at this time    TRISHA-continue bipap    pAF - on apixaban for primary prevention    Small cell carcinoma of lung - followed by oncology Dr. Gonzalez, s/p systemic immunotherapy. Under imaging surveillance currently.     __________________________  Kin Gallegos MD  [facility] [place] [facility]    Part of this note has been created using [employer] dictation system. Errors in transcription may not be completely avoided.    I spent a total of 70 minutes on the day of the visit.This includes face to face time and non-face to  face time preparing to see the patient (eg, review of tests), obtaining and/or reviewing separately obtained history, documenting clinical information in the electronic or other health record, independently interpreting results and communicating results to the patient/family/caregiver, or care coordinator.

## 2025-01-15 ENCOUNTER — TELEPHONE (OUTPATIENT)
Dept: ORTHOPEDICS | Facility: CLINIC | Age: 75
End: 2025-01-15
Payer: MEDICARE

## 2025-01-15 NOTE — TELEPHONE ENCOUNTER
Called and spoke with mau   Told him we reached out to natalie from [facility] and they have received the order and will be reaching out to the pt tomorrow

## 2025-01-15 NOTE — TELEPHONE ENCOUNTER
----- Message from Veronica sent at 1/15/2025  3:16 PM CST -----  Contact: Faustino (spouse)  Type:  Needs Medical Advice    Who Called:  Faustino    Would the patient rather a call back or a response via [facility]?  Call back\    Best Call Back Number: [phone]    Additional Information: checking status of the knee brace that was supposed to be ordered for pt.    They still have not heard anything about it    Please call to advise    Thanks

## 2025-01-16 ENCOUNTER — OFFICE VISIT (OUTPATIENT)
Dept: ENDOCRINOLOGY | Facility: CLINIC | Age: 75
End: 2025-01-16
Payer: MEDICARE

## 2025-01-16 VITALS
BODY MASS INDEX: 25.3 KG/M2 | WEIGHT: 157.44 LBS | DIASTOLIC BLOOD PRESSURE: 60 MMHG | SYSTOLIC BLOOD PRESSURE: 90 MMHG | HEART RATE: 82 BPM | HEIGHT: 66 IN

## 2025-01-16 DIAGNOSIS — D64.9 ANEMIA, UNSPECIFIED TYPE: ICD-10-CM

## 2025-01-16 DIAGNOSIS — C34.90 SMALL CELL CARCINOMA OF LUNG, UNSPECIFIED LATERALITY, UNSPECIFIED PART OF LUNG: ICD-10-CM

## 2025-01-16 DIAGNOSIS — E03.9 HYPOTHYROIDISM, UNSPECIFIED TYPE: ICD-10-CM

## 2025-01-16 DIAGNOSIS — E10.649 HYPOGLYCEMIA UNAWARENESS ASSOCIATED WITH TYPE 1 DIABETES MELLITUS: ICD-10-CM

## 2025-01-16 DIAGNOSIS — E10.21 TYPE 1 DIABETES MELLITUS WITH NEPHROPATHY: Primary | ICD-10-CM

## 2025-01-16 PROCEDURE — 95251 CONT GLUC MNTR ANALYSIS I&R: CPT | Mod: S$GLB,,, | Performed by: NURSE PRACTITIONER

## 2025-01-16 PROCEDURE — 1160F RVW MEDS BY RX/DR IN RCRD: CPT | Mod: CPTII,S$GLB,, | Performed by: NURSE PRACTITIONER

## 2025-01-16 PROCEDURE — 3044F HG A1C LEVEL LT 7.0%: CPT | Mod: CPTII,S$GLB,, | Performed by: NURSE PRACTITIONER

## 2025-01-16 PROCEDURE — 1101F PT FALLS ASSESS-DOCD LE1/YR: CPT | Mod: CPTII,S$GLB,, | Performed by: NURSE PRACTITIONER

## 2025-01-16 PROCEDURE — 3074F SYST BP LT 130 MM HG: CPT | Mod: CPTII,S$GLB,, | Performed by: NURSE PRACTITIONER

## 2025-01-16 PROCEDURE — 1126F AMNT PAIN NOTED NONE PRSNT: CPT | Mod: CPTII,S$GLB,, | Performed by: NURSE PRACTITIONER

## 2025-01-16 PROCEDURE — 3288F FALL RISK ASSESSMENT DOCD: CPT | Mod: CPTII,S$GLB,, | Performed by: NURSE PRACTITIONER

## 2025-01-16 PROCEDURE — 99214 OFFICE O/P EST MOD 30 MIN: CPT | Mod: S$GLB,,, | Performed by: NURSE PRACTITIONER

## 2025-01-16 PROCEDURE — 1159F MED LIST DOCD IN RCRD: CPT | Mod: CPTII,S$GLB,, | Performed by: NURSE PRACTITIONER

## 2025-01-16 PROCEDURE — 3078F DIAST BP <80 MM HG: CPT | Mod: CPTII,S$GLB,, | Performed by: NURSE PRACTITIONER

## 2025-01-16 PROCEDURE — G2211 COMPLEX E/M VISIT ADD ON: HCPCS | Mod: S$GLB,,, | Performed by: NURSE PRACTITIONER

## 2025-01-16 PROCEDURE — 1111F DSCHRG MED/CURRENT MED MERGE: CPT | Mod: CPTII,S$GLB,, | Performed by: NURSE PRACTITIONER

## 2025-01-16 PROCEDURE — 99999 PR PBB SHADOW E&M-EST. PATIENT-LVL IV: CPT | Mod: PBBFAC,,, | Performed by: NURSE PRACTITIONER

## 2025-01-16 NOTE — PROGRESS NOTES
CC: Ms. Allyson Henriquez arrives today for management of Type 2 DM and review of chronic medical conditions, as listed in the Visit Diagnosis section of this encounter.       HPI: Ms. Allyson Henriquez was diagnosed with Type 2 DM in 4/2023. She was diagnosed based on lab work. Initial treatment consisted of insulin. Denies FH of DM. Denies hospitalizations due to DM.   Low C-peptide noted (<0.08) in 9/2023. Was using Keytruda for ~ 6 months prior to this finding. Now diagnosed at Type 1 DM. Was using  Keytruda (started in 1/2023) for small cell lung cancer. This was stopped in 8/2024.   Follows with Dr. Daly for hypothyroidism, multinodular goiter.   Follows with Dr. Gonzalez for small cell lung cancer.     Patient was last seen by me in September. Insulin doses were adjusted then.    She was hospitalized in December for hyperkalemia. Is following closely with Dr. Gallegos.  reports that she had blood transfusion during this time.     BG monitoring per Dexcom G7.     Hypoglycemia: Occasional  Hypoglycemic Symptoms: none  Hypoglycemia Treatment: juice     Missing Insulin/PO medication doses: Occasional  Timing prandial insulin 5-15 minutes before meals: yes    Dietary Habits:  Eats 2 meals/day. . Avoids sugary beverages.    Last DM education appointment: 8/2023      CURRENT DIABETIC MEDS:  Lantus 22 units QHS, Novolog 8 units + correction scale, target 150, ISF 25   Vial or pen: pen  Glucometer type:     Previous DM treatments:    Last Eye Exam: 11/15/2024, no DR per pt  Last Podiatry Exam:     REVIEW OF SYSTEMS  Constitutional: no recent weight loss. + fatigue, weakness.  Cardiac: no palpitations or chest pain.  Respiratory: no dyspnea. + intermittent dry cough  GI: no c/o abdominal pain or nausea. Denies h/o pancreatitis.   Skin: no lesions or rashes.   Neuro: denies numbness, tingling, paresthesias  Endocrine: denies polyphagia, polydipsia, polyuria      Personally reviewed Past Medical, Surgical, Social  "History.    Vital Signs  BP 90/60   Pulse 82   Ht 5' 6" (1.676 m)   Wt 71.4 kg (157 lb 6.5 oz)   BMI 25.41 kg/m²     Personally reviewed the below labs:    Hemoglobin A1C   Date Value Ref Range Status   01/09/2025 5.7 (H) 4.0 - 5.6 % Final     Comment:     Reference Interval:  5.0 - 5.6 Normal   5.7 - 6.4 High Risk   > 6.5 Diabetic      Hgb A1c results are standardized based on the ([employer]) National   Glycohemoglobin Standardization Program.      Hemoglobin A1C levels are related to mean serum/plasma glucose   during the preceding 2-3 months.        12/27/2024 6.3 (H) 4.0 - 5.6 % Final     Comment:     Reference Interval:  5.0 - 5.6 Normal   5.7 - 6.4 High Risk   > 6.5 Diabetic      Hgb A1c results are standardized based on the ([employer]) National   Glycohemoglobin Standardization Program.      Hemoglobin A1C levels are related to mean serum/plasma glucose   during the preceding 2-3 months.        09/09/2024 7.1 (H) 0.0 - 5.6 % Final     Comment:     Reference Interval:  5.0 - 5.6 Normal   5.7 - 6.4 High Risk   > 6.5 Diabetic      Hgb A1c results are standardized based on the (NGSP) National   Glycohemoglobin Standardization Program.      Hemoglobin A1C levels are related to mean serum/plasma glucose   during the preceding 2-3 months.            Chemistry        Component Value Date/Time     (L) 01/13/2025 1227    K 3.8 01/13/2025 1227     01/13/2025 1227    CO2 19 (L) 01/13/2025 1227    BUN 49 (H) 01/13/2025 1227    CREATININE 2.04 (H) 01/13/2025 1227     (H) 01/13/2025 1227        Component Value Date/Time    CALCIUM 8.8 01/13/2025 1227    ALKPHOS 77 01/13/2025 1227    AST 13 01/13/2025 1227    ALT 11 01/13/2025 1227    BILITOT 0.4 01/13/2025 1227    ESTGFRAFRICA >60 07/31/2022 0430    EGFRNONAA 55 (A) 07/31/2022 0430          Lab Results   Component Value Date    CHOL 111 (L) 01/09/2025    CHOL 134 09/11/2023    CHOL 158 08/19/2022     Lab Results   Component Value Date    HDL 35 (L) 01/09/2025 "    HDL 38 (L) 09/11/2023    HDL 22 (L) 08/19/2022     Lab Results   Component Value Date    LDLCALC 56.4 (L) 01/09/2025    LDLCALC 63.2 09/11/2023    LDLCALC 103.2 08/19/2022     Lab Results   Component Value Date    TRIG 98 01/09/2025    TRIG 164 (H) 09/11/2023    TRIG 164 (H) 08/19/2022     Lab Results   Component Value Date    CHOLHDL 31.5 01/09/2025    CHOLHDL 28.4 09/11/2023    CHOLHDL 13.9 (L) 08/19/2022       Lab Results   Component Value Date    MICALBCREAT 401.9 (H) 01/09/2025     Lab Results   Component Value Date    TSH 0.929 08/26/2024       Estimated Creatinine Clearance: 24.1 mL/min (A) (based on SCr of 2.04 mg/dL (H)).    Vit D, 25-Hydroxy   Date Value Ref Range Status   04/03/2023 61 30 - 96 ng/mL Final     Comment:     Vitamin D deficiency.........<10 ng/mL                              Vitamin D insufficiency......10-29 ng/mL       Vitamin D sufficiency........> or equal to 30 ng/mL  Vitamin D toxicity............>100 ng/mL        Latest Reference Range & Units 06/03/24 07:11   C-Peptide 0.78 - 5.19 ng/mL <0.08 (L)   (L): Data is abnormally low     Latest Reference Range & Units 09/11/23 10:24   C-Peptide 0.78 - 5.19 ng/mL <0.08 (L)   (L): Data is abnormally low      PHYSICAL EXAMINATION  Constitutional: Appears well, no distress  Respiratory: CTA, even and unlabored.  Cardiovascular: RRR, no murmurs, no carotid bruits.  GI: bowel sounds active, no hernia noted  Skin: warm and dry  Neuro: oriented to person, place, time  Feet: appropriate footwear.  Protective Sensation (w/ 10 gram monofilament):  Right: Intact  Left: Intact    Visual Inspection:  Dry Skin -  Bilateral    Pedal Pulses:   Right: Diminished  Left: Diminished    Posterior Tibialis Pulses:   Right:Diminished  Left: Diminished        DEXCOM G7 DOWNLOAD: Fasting glucoses vary but are mostly acceptable. Sporadic prandial excursions noted. Rare hypoglycemia.         Goals        HEMOGLOBIN A1C < 7               Assessment/Plan  1. Type 1  diabetes mellitus with nephropathy  -- A1c likely falsely low, due to anemia. Majority of glucoses are reasonable on CGM report  -- continue Lantus 22 units QHS  -- continue Novolog 8 units with meals + correction scale.   -- intensive BG monitoring per Dexcom G7    -- Discussed diagnosis of DM, A1c goals, progression of disease, long term complications and tx options.  -- Reviewed hypoglycemia management: treat with 4 oz of juice, 4 oz regular soda, or 4 glucose tablets. Monitor and repeat treatment every 15 minutes until BG is >70 Then have a snack, which includes 15 grams of complex carbohydrates and protein.   Advised patient to check BG before activities, such as driving or exercise.   2. Hypothyroidism, unspecified type  -- stable  -- follows with Dr. Daly. Has appt in April   3. Hypoglycemia unawareness associated with type 1 diabetes mellitus  -- continue Dexcom G7   4. Small cell lung cancer  -- previously used Keytruda, which may have led to autoimmune DM  -- follows with Dr. Gonzalez   5. Anemia  -- may falsely lower A1c.           FOLLOW UP  Follow up in about 4 months (around 5/16/2025).   Patient instructed to bring BG logs to each follow up   Patient encouraged to call for any BG/medication issues, concerns, or questions.    Orders Placed This Encounter   Procedures    Hemoglobin A1C    Comprehensive Metabolic Panel

## 2025-01-30 DIAGNOSIS — Z00.00 ENCOUNTER FOR MEDICARE ANNUAL WELLNESS EXAM: ICD-10-CM

## 2025-02-05 ENCOUNTER — HOSPITAL ENCOUNTER (OUTPATIENT)
Dept: RADIOLOGY | Facility: HOSPITAL | Age: 75
Discharge: HOME | End: 2025-02-05
Attending: FAMILY MEDICINE
Payer: MEDICARE

## 2025-02-05 ENCOUNTER — OFFICE VISIT (OUTPATIENT)
Dept: FAMILY MEDICINE | Facility: CLINIC | Age: 75
End: 2025-02-05
Payer: MEDICARE

## 2025-02-05 VITALS
WEIGHT: 56 LBS | DIASTOLIC BLOOD PRESSURE: 70 MMHG | BODY MASS INDEX: 9 KG/M2 | HEIGHT: 66 IN | HEART RATE: 68 BPM | OXYGEN SATURATION: 95 % | SYSTOLIC BLOOD PRESSURE: 116 MMHG

## 2025-02-05 DIAGNOSIS — I73.9 PVD (PERIPHERAL VASCULAR DISEASE): ICD-10-CM

## 2025-02-05 DIAGNOSIS — R60.1 GENERALIZED EDEMA: ICD-10-CM

## 2025-02-05 DIAGNOSIS — M79.89 ARM SWELLING: ICD-10-CM

## 2025-02-05 DIAGNOSIS — D50.9 IRON DEFICIENCY ANEMIA, UNSPECIFIED IRON DEFICIENCY ANEMIA TYPE: ICD-10-CM

## 2025-02-05 DIAGNOSIS — M79.89 ARM SWELLING: Primary | ICD-10-CM

## 2025-02-05 PROBLEM — E87.5 HYPERKALEMIA: Status: RESOLVED | Noted: 2024-12-27 | Resolved: 2025-02-05

## 2025-02-05 PROBLEM — L92.9 HYPERGRANULATION: Status: RESOLVED | Noted: 2023-09-20 | Resolved: 2025-02-05

## 2025-02-05 PROCEDURE — 93970 EXTREMITY STUDY: CPT | Mod: TC,PO

## 2025-02-05 PROCEDURE — 99214 OFFICE O/P EST MOD 30 MIN: CPT | Mod: S$GLB,,, | Performed by: FAMILY MEDICINE

## 2025-02-05 PROCEDURE — 99999 PR PBB SHADOW E&M-EST. PATIENT-LVL IV: CPT | Mod: PBBFAC,,, | Performed by: FAMILY MEDICINE

## 2025-02-05 PROCEDURE — 93922 UPR/L XTREMITY ART 2 LEVELS: CPT | Mod: 26,,, | Performed by: RADIOLOGY

## 2025-02-05 PROCEDURE — 93970 EXTREMITY STUDY: CPT | Mod: 26,59,, | Performed by: RADIOLOGY

## 2025-02-05 PROCEDURE — 3078F DIAST BP <80 MM HG: CPT | Mod: CPTII,S$GLB,, | Performed by: FAMILY MEDICINE

## 2025-02-05 PROCEDURE — 93970 EXTREMITY STUDY: CPT | Mod: 26,,, | Performed by: RADIOLOGY

## 2025-02-05 PROCEDURE — G2211 COMPLEX E/M VISIT ADD ON: HCPCS | Mod: S$GLB,,, | Performed by: FAMILY MEDICINE

## 2025-02-05 PROCEDURE — 1126F AMNT PAIN NOTED NONE PRSNT: CPT | Mod: CPTII,S$GLB,, | Performed by: FAMILY MEDICINE

## 2025-02-05 PROCEDURE — 93922 UPR/L XTREMITY ART 2 LEVELS: CPT | Mod: TC,PO

## 2025-02-05 PROCEDURE — 3074F SYST BP LT 130 MM HG: CPT | Mod: CPTII,S$GLB,, | Performed by: FAMILY MEDICINE

## 2025-02-05 PROCEDURE — 93925 LOWER EXTREMITY STUDY: CPT | Mod: 26,,, | Performed by: RADIOLOGY

## 2025-02-05 NOTE — PROGRESS NOTES
"    Chief Complaint:  Chief Complaint   Patient presents with    Follow-up        HPI:  Allyson is a 75 y.o. year old     History of Present Illness    CHIEF COMPLAINT:  Allyson presents today for follow up of multiple medical concerns including anemia, weakness, and joint issues.    RECENT HOSPITALIZATION:  She was hospitalized for one week in December due to elevated potassium levels that spontaneously resolved. She declined a recommended kidney biopsy and was discharged on December 31st.    ANEMIA:  She has received multiple blood transfusions including two units in January and one prior infusion. Iron levels checked in December showed improvement but remained on the lower end of normal range. The anemia is characterized as a volume issue rather than iron deficiency.    MUSCULOSKELETAL:  She reports right arm swelling with pain extending up the arm and difficulty closing fingers, requiring use of left hand for daily activities. She has not walked since June/July due to right knee issues. Right knee surgery revealed more significant arthritis under the kneecap than expected, with post-surgical swelling that could not be aspirated. She has developed left leg swelling due to compensating for right leg issues. She can only stand for approximately 5 seconds at a time. She manages bed to chair transfers with assistance but has difficulty being lifted from seated position.    CONSTITUTIONAL SYMPTOMS:  She reports persistent fatigue and weakness with ability to take 5-hour naps while maintaining regular night sleep. She experiences constant sensation of feeling cold throughout the day, only achieving warmth when in bed with blankets at night.    SKIN:  She reports a painful sacral wound described as having "dead tissue." Current management includes Desitin application and sacral pad.      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 difficile colitis 08/2021    Cancer     lung    CKD (chronic kidney disease), stage III     Colonic diverticular abscess 07/12/2022    COPD (chronic obstructive pulmonary disease)     Encounter for blood transfusion     Hepatic steatosis     History of numbness     transient numbness left hand and foot    History of tobacco abuse     Hyperlipidemia     Hypertension     Lung nodules     and adenopathy    Multinodular goiter (nontoxic) 05/08/2017    Obesity     TRISHA (obstructive sleep apnea)     on bipap at bedtime    Proteinuria          Vitals:  Vitals:    02/05/25 1114   BP: 116/70   Pulse: 68   SpO2: 95%   Weight: 25.4 kg (56 lb 0.3 oz)   Height: 5' 6" (1.676 m)   PainSc: 0-No pain       BP Readings from Last 5 Encounters:   02/13/25 124/66   02/13/25 137/65   02/05/25 116/70   01/16/25 90/60   01/14/25 (!) 112/54       The ASCVD Risk score (Rno ORDOÑEZ, et al., 2019) failed to calculate for the following reasons:    Risk score cannot be calculated because patient has a medical history suggesting prior/existing ASCVD      Physical Exam:  Physical Exam  Vitals and nursing note reviewed.   Constitutional:       General: She is not in acute distress.     Appearance: Normal appearance. She is well-developed. She is ill-appearing. She is not toxic-appearing.   HENT:      Head: Normocephalic and atraumatic.   Eyes:      General: No scleral icterus.        Right eye: No discharge.         Left eye: No discharge.      Conjunctiva/sclera: Conjunctivae normal.   Cardiovascular:      Rate and Rhythm: Normal rate and regular rhythm.   Pulmonary:      Effort: Pulmonary effort is " normal. No respiratory distress.      Breath sounds: Normal breath sounds.   Abdominal:      Palpations: Abdomen is soft.      Tenderness: There is no guarding.   Musculoskeletal:         General: Swelling (R dorsum hand with nonpitting edema thru the wrist) and tenderness (tender with extended range of motion) present. No deformity or signs of injury.      Cervical back: Neck supple.   Skin:     General: Skin is warm and dry.      Findings: No rash.   Neurological:      General: No focal deficit present.      Mental Status: She is alert and oriented to person, place, and time.   Psychiatric:         Mood and Affect: Mood normal.         Behavior: Behavior normal.             Assessment & Plan:  Swelling in right armpit    Anemia, unspecified type    Arm swelling  -     Cancel: US Upper Extremity Veins Right; Future; Expected date: 02/05/2025  -     US Upper Extremity Veins Bilateral; Future; Expected date: 02/05/2025  -     US Lower Extremity Veins Bilateral; Future; Expected date: 02/05/2025    Iron deficiency anemia, unspecified iron deficiency anemia type    Localized swelling, mass and lump, right upper limb  -     Cancel: US Upper Extremity Veins Right; Future; Expected date: 02/05/2025    Generalized edema  -     US Upper Extremity Veins Bilateral; Future; Expected date: 02/05/2025  -     US Lower Extremity Veins Bilateral; Future; Expected date: 02/05/2025    PVD (peripheral vascular disease)  -     Cancel: US Lower Extremity Arteries Bilateral; Future; Expected date: 02/05/2025  -     US Lower Extrem Arteries Bilat with CHELLY (xpd); Future; Expected date: 02/05/2025         Assessment & Plan    ORDERS:   Ultrasound of right arm ordered to evaluate for blood clot.   Ultrasound of leg arteries ordered to assess blood flow.    IMPRESSION:  Anemia likely contributing to fatigue, weakness, and cold intolerance. Patient functioning on approximately 50% of usual circulating blood volume.  Swelling in right arm  concerning for possible blood clot. Ultrasound ordered to evaluate.  Sacral pressure ulcer being managed with barrier ointments and positioning techniques.  Kidney function improving based on recent labs.  Dr. Lazo suggested bone marrow biopsy to further evaluate persistent anemia; not urgent at this time.  Rheumatology consult may be beneficial to assess for underlying autoimmune conditions affecting multiple systems.  Recent CT reported as clear by Dr. Lazo.  Blood sugars generally well-controlled despite recent medication changes.    PLAN SUMMARY:   Ultrasound of right arm ordered to evaluate for blood clot   Ultrasound of leg arteries ordered to assess blood flow   Contact Dr. Lazo regarding anemia management and potential bone marrow biopsy   Continue Eliquis 2.5 mg twice daily   Continue gabapentin 300 mg twice daily (morning and night)   Continue Lantus 22 units at bedtime   Increase protein intake to support wound healing   Use timer to change position every hour while sitting   Elevate right arm when sitting, keeping hand higher than elbow if tolerated   Sit up in bed at varying angles to relieve pressure on sacrum   Continue drinking 4 bottles of water daily    PATIENT EDUCATION:   Explained anemia's impact on energy levels and cold intolerance.   Discussed importance of protein intake for wound healing and blood sugar stability.   Educated on using different sitting positions and elevating arm to reduce swelling.    ACTION ITEMS/LIFESTYLE:   Allyson to elevate right arm when sitting, keeping hand higher than elbow if tolerated.   Recommend using pillow on armrests of chair to change hand position and reduce shoulder pressure.   Allyson to sit up in bed at varying angles to relieve pressure on sacrum.   Recommend increasing protein intake to support wound healing.   Allyson to continue drinking 4 bottles of water daily.   Recommend using timer to change position every hour while sitting,  alternating pressure between sides using a small pillow.    MEDICATIONS:   Continued Eliquis 2.5 mg twice daily.   Continued gabapentin 300 mg twice daily (morning and night).   Continued Lantus 22 units at bedtime.    FOLLOW UP:   Contact Dr. Lazo regarding anemia management and potential bone marrow biopsy.         Visit today included increased complexity associated with the care of the episodic problem swelling addressed and managing the longitudinal care of the patient due to the serious and/or complex managed problem(s) ayla, hx ca.    This note was generated with the assistance of ambient listening technology. Verbal consent was obtained by the patient and accompanying visitor(s) for the recording of patient appointment to facilitate this note. I attest to having reviewed and edited the generated note for accuracy, though some syntax or spelling errors may persist. Please contact the author of this note for any clarification.

## 2025-02-10 ENCOUNTER — PATIENT MESSAGE (OUTPATIENT)
Dept: NEPHROLOGY | Facility: CLINIC | Age: 75
End: 2025-02-10
Payer: MEDICARE

## 2025-02-10 DIAGNOSIS — R60.1 GENERALIZED EDEMA: Primary | ICD-10-CM

## 2025-02-11 DIAGNOSIS — I10 ESSENTIAL HYPERTENSION: ICD-10-CM

## 2025-02-11 DIAGNOSIS — I48.0 PAF (PAROXYSMAL ATRIAL FIBRILLATION): ICD-10-CM

## 2025-02-11 RX ORDER — METOPROLOL SUCCINATE 25 MG/1
12.5 TABLET, EXTENDED RELEASE ORAL 2 TIMES DAILY
Qty: 180 TABLET | Refills: 1 | Status: SHIPPED | OUTPATIENT
Start: 2025-02-11 | End: 2025-02-13 | Stop reason: SDUPTHER

## 2025-02-13 ENCOUNTER — OFFICE VISIT (OUTPATIENT)
Dept: CARDIOLOGY | Facility: CLINIC | Age: 75
End: 2025-02-13
Attending: INTERNAL MEDICINE
Payer: MEDICARE

## 2025-02-13 VITALS
HEART RATE: 75 BPM | WEIGHT: 161.81 LBS | HEIGHT: 66 IN | DIASTOLIC BLOOD PRESSURE: 66 MMHG | SYSTOLIC BLOOD PRESSURE: 124 MMHG | BODY MASS INDEX: 26.01 KG/M2

## 2025-02-13 DIAGNOSIS — I13.0 BENIGN HYPERTENSIVE HEART AND KIDNEY DISEASE WITH CHF, NYHA CLASS 1 AND CKD STAGE 3: Chronic | ICD-10-CM

## 2025-02-13 DIAGNOSIS — R26.89 DECREASED FUNCTIONAL MOBILITY: Chronic | ICD-10-CM

## 2025-02-13 DIAGNOSIS — I34.0 NONRHEUMATIC MITRAL VALVE REGURGITATION: Chronic | ICD-10-CM

## 2025-02-13 DIAGNOSIS — I10 ESSENTIAL HYPERTENSION: Chronic | ICD-10-CM

## 2025-02-13 DIAGNOSIS — N18.30 BENIGN HYPERTENSIVE HEART AND KIDNEY DISEASE WITH CHF, NYHA CLASS 1 AND CKD STAGE 3: Chronic | ICD-10-CM

## 2025-02-13 DIAGNOSIS — Z79.01 CURRENT USE OF LONG TERM ANTICOAGULATION: Chronic | ICD-10-CM

## 2025-02-13 DIAGNOSIS — I48.0 PAF (PAROXYSMAL ATRIAL FIBRILLATION): Primary | Chronic | ICD-10-CM

## 2025-02-13 PROCEDURE — 3078F DIAST BP <80 MM HG: CPT | Mod: CPTII,S$GLB,, | Performed by: INTERNAL MEDICINE

## 2025-02-13 PROCEDURE — 1126F AMNT PAIN NOTED NONE PRSNT: CPT | Mod: CPTII,S$GLB,, | Performed by: INTERNAL MEDICINE

## 2025-02-13 PROCEDURE — 3074F SYST BP LT 130 MM HG: CPT | Mod: CPTII,S$GLB,, | Performed by: INTERNAL MEDICINE

## 2025-02-13 PROCEDURE — 3288F FALL RISK ASSESSMENT DOCD: CPT | Mod: CPTII,S$GLB,, | Performed by: INTERNAL MEDICINE

## 2025-02-13 PROCEDURE — 1159F MED LIST DOCD IN RCRD: CPT | Mod: CPTII,S$GLB,, | Performed by: INTERNAL MEDICINE

## 2025-02-13 PROCEDURE — 1101F PT FALLS ASSESS-DOCD LE1/YR: CPT | Mod: CPTII,S$GLB,, | Performed by: INTERNAL MEDICINE

## 2025-02-13 PROCEDURE — 99214 OFFICE O/P EST MOD 30 MIN: CPT | Mod: S$GLB,,, | Performed by: INTERNAL MEDICINE

## 2025-02-13 PROCEDURE — 99999 PR PBB SHADOW E&M-EST. PATIENT-LVL IV: CPT | Mod: PBBFAC,,, | Performed by: INTERNAL MEDICINE

## 2025-02-13 RX ORDER — METOPROLOL SUCCINATE 25 MG/1
12.5 TABLET, EXTENDED RELEASE ORAL 2 TIMES DAILY
Qty: 90 TABLET | Refills: 1 | Status: SHIPPED | OUTPATIENT
Start: 2025-02-13

## 2025-02-13 NOTE — PROGRESS NOTES
Subjective:    Patient ID:  Allyson Henriquez is a 75 y.o. 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 difficile colitis 08/2021    Cancer     lung    CKD (chronic kidney disease), stage III     Colonic diverticular abscess 07/12/2022    COPD (chronic obstructive pulmonary disease)     Encounter for blood transfusion     Hepatic steatosis     History of numbness     transient numbness left hand and foot    History of tobacco abuse     Hyperlipidemia     Hypertension     Lung nodules     and adenopathy    Multinodular goiter (nontoxic) 05/08/2017    Obesity     TRISHA (obstructive sleep apnea)     on bipap at bedtime    Proteinuria      Past Surgical History:   Procedure Laterality Date    ADENOIDECTOMY      APPENDECTOMY      CATARACT EXTRACTION      COLONOSCOPY N/A 11/14/2016    Procedure: COLONOSCOPY;  Surgeon: Destin Cardona MD;  Location: [facility];  Service: Endoscopy;  Laterality: N/A;    COLONOSCOPY N/A 05/14/2021    Procedure: COLONOSCOPY;  Surgeon: Destin Cardona MD;  Location: [facility];  Service: Endoscopy;  Laterality: N/A; hemorrhoids, diverticulosis, Old blood left colon. No active bleeding; Repeat colonoscopy is not recommended for screening  purposes.    COLOSTOMY N/A 07/28/2022    Procedure: CREATION, COLOSTOMY;  Surgeon: Nubia Tinoco MD;  Location: [facility] OR;  Service: General;  Laterality: N/A;    DEBRIDEMENT OF WOUND OF ABDOMEN  07/28/2022    Procedure: DEBRIDEMENT, WOUND, ABDOMEN;  Surgeon: Nubia Tinoco MD;  Location: [facility] OR;  Service: General;;    ESOPHAGOGASTRODUODENOSCOPY N/A 05/14/2021    Procedure: EGD (ESOPHAGOGASTRODUODENOSCOPY);  Surgeon: Destin Cardona MD;  Location: [facility];  Service: Endoscopy;  Laterality: N/A; unremarkable    ESOPHAGOGASTRODUODENOSCOPY N/A 08/09/2021    Procedure: ESOPHAGOGASTRODUODENOSCOPY (EGD);  Surgeon: Destin Cardona MD;  Location: [facility] ENDO;  Service: Endoscopy;  Laterality: N/A; Moderately severe radiation esophagitis with no bleeding    EXCISIONAL BIOPSY N/A 02/09/2023    Procedure: EXCISIONAL BIOPSY;  Surgeon: Nubia Tinoco MD;  Location: [facility] OR;  Service: General;  Laterality: N/A;    EYE SURGERY      cataract OU    HERNIA REPAIR      INSERTION OF TUNNELED CENTRAL VENOUS CATHETER (CVC) WITH SUBCUTANEOUS PORT N/A 06/07/2021    Procedure: XERUPKLYK-ECRK-B-CATH;  Surgeon: Joe Salinas MD;  Location: [facility] OR;  Service: General;  Laterality: N/A;    INTRALUMINAL GASTROINTESTINAL TRACT IMAGING VIA CAPSULE N/A 12/29/2021    Procedure: IMAGING PROCEDURE, GI TRACT, INTRALUMINAL, VIA CAPSULE  (called for instruction 12/20-may have trouble swallowing);  Surgeon: Floyd Dixon MD;  Location: [facility] ENDO;  Service: Endoscopy;  Laterality: N/A; Diffuse red spots of unclear significance and erosions found in the small bowel (entire small bowel), suspicious for mild radiation enteritis    KNEE ARTHROSCOPY W/ MENISCECTOMY Right 9/24/2024    Procedure: ARTHROSCOPY, KNEE, WITH MENISCECTOMY;  Surgeon: Jose Rafael Barney MD;  Location: [facility];  Service: Orthopedics;  Laterality: Right;  Lateral meniscectomy    NEUROMA SURGERY Left     acoustic    TONSILLECTOMY      WOUND EXPLORATION  N/A 2022    Procedure: EXPLORATION, WOUND;  Surgeon: Arnel Olsen MD;  Location: [facility] OR;  Service: General;  Laterality: N/A;    WOUND EXPLORATION Left 2023    Procedure: EXPLORATION, WOUND - of Ostomy Granuloma - Wound - Abdomen;  Surgeon: Nubia Tinoco MD;  Location: [facility] OR;  Service: General;  Laterality: Left;     Family History   Problem Relation Name Age of Onset    Heart disease Mother          dissection    GI problems Father          perf colon    Cancer Paternal Uncle          colon cancer    Diabetes Neg Hx      Kidney disease Neg Hx      Stroke Neg Hx      Colon cancer Neg Hx      Crohn's disease Neg Hx      Ulcerative colitis Neg Hx      Stomach cancer Neg Hx      Esophageal cancer Neg Hx       Social History     Socioeconomic History    Marital status:    Tobacco Use    Smoking status: Former     Current packs/day: 0.00     Average packs/day: 1 pack/day for 40.0 years (40.0 ttl pk-yrs)     Types: Cigarettes     Start date: 3/6/1976     Quit date: 3/6/2016     Years since quittin.9    Smokeless tobacco: Never   Substance and Sexual Activity    Alcohol use: Not Currently    Drug use: No    Sexual activity: Not Currently   Social History Narrative    Works in insurance collection.     Social Drivers of Health     Financial Resource Strain: Low Risk  (2024)    Overall Financial Resource Strain (CARDIA)     Difficulty of Paying Living Expenses: Not very hard   Food Insecurity: No Food Insecurity (2024)    Hunger Vital Sign     Worried About Running Out of Food in the Last Year: Never true     Ran Out of Food in the Last Year: Never true   Transportation Needs: No Transportation Needs (2024)    TRANSPORTATION NEEDS     Transportation : No   Physical Activity: Unknown (10/13/2021)    Exercise Vital Sign     Days of Exercise per Week: 0 days   Stress: No Stress Concern Present (2024)    [employer] - Occupational Stress  Questionnaire     Feeling of Stress : Not at all   Housing Stability: Unknown (12/29/2024)    Housing Stability Vital Sign     Unable to Pay for Housing in the Last Year: No     Homeless in the Last Year: No       Review of patient's allergies indicates:   Allergen Reactions    Contrast media Anaphylaxis    Iodinated contrast media Anaphylaxis and Rash    Albuterol Other (See Comments)     Used during PFTs and put pt in afib    Dye     Pcn [penicillins]      Pt states did well on cephalexin.  No adverse reaction or side effect.     Doxycycline Palpitations     Tolerated IV Doxy 8/2022       Current Outpatient Medications:     apixaban (ELIQUIS) 5 mg Tab, Take 1 tablet (5 mg total) by mouth 2 (two) times daily., Disp: 180 tablet, Rfl: 0    ascorbic acid, vitamin C, (VITAMIN C) 500 MG tablet, Take 500 mg by mouth once daily. Indications: inadequate vitamin C, Disp: , Rfl:     azelastine (ASTELIN) 137 mcg (0.1 %) nasal spray, USE 2 SPRAYS IN EACH NOSTRIL EVERY NIGHT AT BEDTIME, Disp: , Rfl:     blood sugar diagnostic Strp, 1 strip by Misc.(Non-Drug; Combo Route) route Daily., Disp: 100 each, Rfl: 1    blood-glucose meter kit, Use as instructed, Disp: 1 each, Rfl: 0    blood-glucose sensor (DEXCOM G7 SENSOR MISC), Inject 1 each into the skin every 10 days. Continues glucose monitoring, Disp: , Rfl:     cetirizine (ZYRTEC) 10 MG tablet, Take 10 mg by mouth once daily., Disp: , Rfl:     cholecalciferol, vitamin D3, (VITAMIN D3) 125 mcg (5,000 unit) Tab, Take 5,000 Units by mouth once daily., Disp: , Rfl:     docusate sodium (COLACE) 100 MG capsule, Take 1 capsule (100 mg total) by mouth daily as needed for Constipation., Disp: 90 capsule, Rfl: 3    famotidine (PEPCID) 20 MG tablet, Take 1 tablet (20 mg total) by mouth once daily., Disp: 30 tablet, Rfl: 11    gabapentin (NEURONTIN) 100 MG capsule, Take 300 mg by mouth once daily., Disp: , Rfl:     insulin glargine U-100, Lantus, (LANTUS SOLOSTAR U-100 INSULIN) 100 unit/mL  (3 mL) InPn pen, Inject 22 Units into the skin every evening., Disp: 15 mL, Rfl: 4    Lactobacillus acidophilus (PROBIOTIC ORAL), Take 1 capsule by mouth once daily., Disp: , Rfl:     levothyroxine (SYNTHROID) 75 MCG tablet, TAKE 1 TABLET BY MOUTH 6 DAYS A WEEK AND TAKE 1/2 TABLET BY MOUTH ON DAY 7, Disp: 30 tablet, Rfl: 11    LIDOcaine-prilocaine (EMLA) cream, Apply topically as needed (as needed for port access)., Disp: 30 g, Rfl: 1    mv-mn/folic ac/calcium/vit K1 (WOMEN'S 50 PLUS MULTIVITAMIN ORAL), Take 1 tablet by mouth once daily., Disp: , Rfl:     NOVOLOG FLEXPEN U-100 INSULIN 100 unit/mL (3 mL) InPn pen, INJECT 7 UNITS WITH MEALSPREMEAL READINGS: 150-200= 2, 201-250= 4. 251-300= 6, 301-350=8, OVER 350 10 UNITS, MAX 50 UNITS DAILY, Disp: 15 mL, Rfl: 4    sertraline (ZOLOFT) 50 MG tablet, Take 1 tablet (50 mg total) by mouth once daily., Disp: 90 tablet, Rfl: 3    simvastatin (ZOCOR) 40 MG tablet, TAKE 1 TABLET(40 MG) BY MOUTH EVERY EVENING, Disp: 90 tablet, Rfl: 1    sodium bicarbonate 650 MG tablet, Take 1 tablet (650 mg total) by mouth 2 (two) times daily., Disp: 60 tablet, Rfl: 11    triamcinolone acetonide 0.1% (KENALOG) 0.1 % cream, Apply topically 2 (two) times daily., Disp: 45 g, Rfl: 1    HYDROcodone-acetaminophen (NORCO) 5-325 mg per tablet, Take 1 tablet by mouth every 6 (six) hours as needed for Pain. (Patient not taking: Reported on 2/13/2025), Disp: 14 tablet, Rfl: 0    ibuprofen (ADVIL,MOTRIN) 600 MG tablet, Take 1 tablet (600 mg total) by mouth 3 (three) times daily as needed for Pain. (Patient not taking: Reported on 2/13/2025), Disp: 90 tablet, Rfl: 3    ipratropium (ATROVENT) 0.02 % nebulizer solution, Take 2.5 mLs (500 mcg total) by nebulization every 6 (six) hours as needed (wheeze). Rescue (Patient not taking: Reported on 2/13/2025), Disp: 75 mL, Rfl: 0    lancets Misc, 1 each by Misc.(Non-Drug; Combo Route) route once daily. (Patient not taking: Reported on 2/13/2025), Disp: 100 each,  "Rfl: 1    metoprolol succinate (TOPROL-XL) 25 MG 24 hr tablet, Take 0.5 tablets (12.5 mg total) by mouth 2 (two) times daily., Disp: 90 tablet, Rfl: 1    pen needle, diabetic (NOVOFINE 32) 32 gauge x 1/4" Ndle, TO USE WITH INSULIN PENS FOUR TIMES DAILY (Patient not taking: Reported on 2/13/2025), Disp: 400 each, Rfl: 3    pen needle, diabetic (NOVOFINE 32) 32 gauge x 1/4" Ndle, TO USE WITH INSULIN PENS FOUR TIMES DAILY (Patient not taking: Reported on 2/13/2025), Disp: 400 each, Rfl: 3    Current Facility-Administered Medications:     sodium chloride 0.9% flush 10 mL, 10 mL, Intravenous, PRN, Bessie Gonzalez MD, 10 mL at 11/14/24 0824    Facility-Administered Medications Ordered in Other Visits:     EUFLEXXA 10 mg/mL(mw 2.4 -3.6 million) injection Syrg 20 mg, 20 mg, Intra-articular, , BarneyJose Rafael MD, 20 mg at 06/14/17 1605    Review of Systems   Constitutional: Positive for malaise/fatigue. Negative for chills, diaphoresis, fever and night sweats.   HENT:  Negative for congestion and nosebleeds.    Eyes:  Negative for blurred vision and visual disturbance.   Cardiovascular:  Negative for chest pain, claudication, cyanosis, dyspnea on exertion (MILD, STAIRS), irregular heartbeat, leg swelling, near-syncope, orthopnea, palpitations, paroxysmal nocturnal dyspnea and syncope.   Respiratory:  Positive for cough (SEEN BY PULMONARY). Negative for hemoptysis, shortness of breath, sputum production and wheezing.    Endocrine: Negative for polyphagia and polyuria.   Hematologic/Lymphatic: Negative for adenopathy. Does not bruise/bleed easily.   Skin:  Positive for color change. Negative for poor wound healing.   Musculoskeletal:  Positive for muscle weakness (LEGS). Negative for back pain and falls.   Gastrointestinal:  Negative for abdominal pain, change in bowel habit, diarrhea, dysphagia, jaundice, melena and nausea.   Genitourinary:  Negative for dysuria and flank pain.   Neurological:  Positive for " "loss of balance (LESS) and weakness. Negative for brief paralysis, focal weakness, light-headedness and paresthesias.   Psychiatric/Behavioral:  Negative for altered mental status and memory loss.    Allergic/Immunologic: Negative for hives and persistent infections.        Objective:      Vitals:    02/13/25 1537   BP: 124/66   Pulse: 75   Weight: 73.4 kg (161 lb 13.1 oz)   Height: 5' 6" (1.676 m)   PainSc: 0-No pain     Body mass index is 26.12 kg/m².    Physical Exam  Constitutional:       Appearance: Normal appearance.      Comments: CHRONICALLY ILL   HENT:      Head: Normocephalic and atraumatic.   Eyes:      General: No scleral icterus.     Extraocular Movements: Extraocular movements intact.      Pupils: Pupils are equal, round, and reactive to light.   Neck:      Vascular: Normal carotid pulses. No carotid bruit or JVD.   Cardiovascular:      Rate and Rhythm: Normal rate and regular rhythm. No extrasystoles are present.     Pulses:           Carotid pulses are 2+ on the right side and 2+ on the left side.       Radial pulses are 2+ on the right side and 2+ on the left side.        Posterior tibial pulses are 2+ on the right side and 2+ on the left side.      Heart sounds: Murmur heard.      Systolic murmur is present with a grade of 1/6 at the lower left sternal border and apex.      No friction rub. No gallop. No S4 sounds.   Pulmonary:      Effort: Pulmonary effort is normal. No respiratory distress.   Abdominal:       Musculoskeletal:      Cervical back: Neck supple.      Right lower leg: No edema.      Left lower leg: No edema.      Comments: IN WC, MILD EDEMA R HAND   Skin:     General: Skin is warm and dry.      Capillary Refill: Capillary refill takes less than 2 seconds.      Coloration: Skin is mottled and pale.   Neurological:      General: No focal deficit present.      Mental Status: She is alert.      Cranial Nerves: Cranial nerves 2-12 are intact. No cranial nerve deficit.   Psychiatric:        "  Mood and Affect: Mood normal.         Speech: Speech normal.                 ..    Chemistry        Component Value Date/Time     02/12/2025 1107     02/12/2025 1107     02/12/2025 1107    K 3.8 02/12/2025 1107    K 3.8 02/12/2025 1107    K 3.8 02/12/2025 1107     02/12/2025 1107     02/12/2025 1107     02/12/2025 1107    CO2 20 (L) 02/12/2025 1107    CO2 20 (L) 02/12/2025 1107    CO2 20 (L) 02/12/2025 1107    BUN 29 (H) 02/12/2025 1107    BUN 29 (H) 02/12/2025 1107    BUN 29 (H) 02/12/2025 1107    CREATININE 1.37 02/12/2025 1107    CREATININE 1.37 02/12/2025 1107    CREATININE 1.37 02/12/2025 1107     (H) 02/12/2025 1107     (H) 02/12/2025 1107     (H) 02/12/2025 1107        Component Value Date/Time    CALCIUM 8.9 02/12/2025 1107    CALCIUM 8.9 02/12/2025 1107    CALCIUM 8.9 02/12/2025 1107    ALKPHOS 99 02/12/2025 1107    ALKPHOS 99 02/12/2025 1107    AST 23 02/12/2025 1107    AST 23 02/12/2025 1107    ALT 26 02/12/2025 1107    ALT 26 02/12/2025 1107    BILITOT 0.3 02/12/2025 1107    BILITOT 0.3 02/12/2025 1107    ESTGFRAFRICA >60 07/31/2022 0430    EGFRNONAA 55 (A) 07/31/2022 0430            ..  Lab Results   Component Value Date    CHOL 111 (L) 01/09/2025    CHOL 134 09/11/2023    CHOL 158 08/19/2022     Lab Results   Component Value Date    HDL 35 (L) 01/09/2025    HDL 38 (L) 09/11/2023    HDL 22 (L) 08/19/2022     Lab Results   Component Value Date    LDLCALC 56.4 (L) 01/09/2025    LDLCALC 63.2 09/11/2023    LDLCALC 103.2 08/19/2022     Lab Results   Component Value Date    TRIG 98 01/09/2025    TRIG 164 (H) 09/11/2023    TRIG 164 (H) 08/19/2022     Lab Results   Component Value Date    CHOLHDL 31.5 01/09/2025    CHOLHDL 28.4 09/11/2023    CHOLHDL 13.9 (L) 08/19/2022     ..  Lab Results   Component Value Date    WBC 9.24 02/12/2025    HGB 7.0 (L) 02/12/2025    HCT 23.3 (L) 02/12/2025    MCV 88 02/12/2025     02/12/2025       Test(s) Reviewed  I  have reviewed the following in detail:  [] Stress test   [] Angiography   [] Echocardiogram   [x] Labs   [x] Other:       Assessment:         ICD-10-CM ICD-9-CM   1. PAF (paroxysmal atrial fibrillation)  I48.0 427.31   2. Nonrheumatic mitral valve regurgitation  I34.0 424.0   3. Benign hypertensive heart and kidney disease with CHF, NYHA class 1 and CKD stage 3  I13.0 404.11    N18.30 428.0     585.3   4. Current use of long term anticoagulation  Z79.01 V58.61   5. Essential hypertension  I10 401.9   6. Decreased functional mobility  R26.89 781.99     Problem List Items Addressed This Visit          Cardiac/Vascular    Benign hypertensive heart and kidney disease with CHF, NYHA class 1 and CKD stage 3    Overview     Echo, 4/23/18.  suboptimal study, mild concentric LVH, EF 65-70%, impaired diastolic relaxation, trace mitral regurg, RV systolic pressure is normal.         Essential hypertension    Relevant Medications    metoprolol succinate (TOPROL-XL) 25 MG 24 hr tablet    PAF (paroxysmal atrial fibrillation) - Primary    Relevant Medications    metoprolol succinate (TOPROL-XL) 25 MG 24 hr tablet    Nonrheumatic mitral valve regurgitation       Hematology    Current use of long term anticoagulation       Other    Decreased functional mobility        Plan:     LEG EXERCISES, PATIENT MOBILITY IS VERY LIMITED CAUSING POOLING OF BLOOD IN THE LEGS NO SIGNIFICANT PERIPHERAL VASCULAR DISEASE, BLOOD PRESSURE OK, OKAY FOR BONE MARROW BIOPSY IF NEEDED HOLD ELIQUIS 2 DAYS    ALL CV CLINICALLY RELATIVE STABLE, NO ANGINA, NO HF, NO TIA, NO CLINICAL ARRHYTHMIA,CONTINUE CURRENT MEDS, EDUCATION, DIET, EXERCISE AS MUCH AS POSSIBLE, RETURN TO CLINIC IN 6 MONTHS DISCUSSED PLAN WITH THE PATIENT AND HER         PAF (paroxysmal atrial fibrillation)  -     metoprolol succinate (TOPROL-XL) 25 MG 24 hr tablet; Take 0.5 tablets (12.5 mg total) by mouth 2 (two) times daily.  Dispense: 90 tablet; Refill: 1    Nonrheumatic mitral valve  regurgitation    Benign hypertensive heart and kidney disease with CHF, NYHA class 1 and CKD stage 3    Current use of long term anticoagulation    Essential hypertension  -     metoprolol succinate (TOPROL-XL) 25 MG 24 hr tablet; Take 0.5 tablets (12.5 mg total) by mouth 2 (two) times daily.  Dispense: 90 tablet; Refill: 1    Decreased functional mobility    RTC Low level/low impact aerobic exercise 5x's/wk. Heart healthy diet and risk factor modification.    See labs and med orders.    Aerobic exercise 5x's/wk. Heart healthy diet and risk factor modification.    See labs and med orders.

## 2025-02-18 ENCOUNTER — OFFICE VISIT (OUTPATIENT)
Dept: NEPHROLOGY | Facility: CLINIC | Age: 75
End: 2025-02-18
Payer: MEDICARE

## 2025-02-18 VITALS
HEART RATE: 81 BPM | SYSTOLIC BLOOD PRESSURE: 94 MMHG | DIASTOLIC BLOOD PRESSURE: 44 MMHG | OXYGEN SATURATION: 96 % | HEIGHT: 66 IN | BODY MASS INDEX: 26.12 KG/M2

## 2025-02-18 DIAGNOSIS — I10 ESSENTIAL HYPERTENSION: ICD-10-CM

## 2025-02-18 DIAGNOSIS — C34.90 SMALL CELL CARCINOMA OF LUNG, UNSPECIFIED LATERALITY, UNSPECIFIED PART OF LUNG: ICD-10-CM

## 2025-02-18 DIAGNOSIS — N18.30 BENIGN HYPERTENSIVE HEART AND KIDNEY DISEASE WITH CHF, NYHA CLASS 1 AND CKD STAGE 3: ICD-10-CM

## 2025-02-18 DIAGNOSIS — I13.0 BENIGN HYPERTENSIVE HEART AND KIDNEY DISEASE WITH CHF, NYHA CLASS 1 AND CKD STAGE 3: ICD-10-CM

## 2025-02-18 DIAGNOSIS — N17.9 AKI (ACUTE KIDNEY INJURY): Primary | ICD-10-CM

## 2025-02-18 DIAGNOSIS — G47.33 OSA TREATED WITH BIPAP: ICD-10-CM

## 2025-02-18 DIAGNOSIS — E87.20 METABOLIC ACIDOSIS: ICD-10-CM

## 2025-02-18 DIAGNOSIS — J44.9 CHRONIC OBSTRUCTIVE PULMONARY DISEASE, UNSPECIFIED COPD TYPE: ICD-10-CM

## 2025-02-18 RX ORDER — INSULIN LISPRO 100 [IU]/ML
INJECTION, SOLUTION INTRAVENOUS; SUBCUTANEOUS
COMMUNITY

## 2025-02-18 NOTE — PROGRESS NOTES
[facility]  Nephrology Clinic      Subjective:       HPI ID: Allyson Henriquez is a 75 y.o. female who presents for return patient evaluation for chronic renal failure.   Previously followed by another [facility] nephrologist in this clinical practice setting but lost to follow up in 2021. Re-visited with patient during inpatient encounter last month where she was admitted for abnormal labs/JEAN.   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 Gonzalez. She was sent to [facility] late Dec '24 for evaluation of abnormal labs at her oncology appointment. Lab work was notable for hyponatremia, hyperkalemia, NAGMA, and BUN/Cr of 81/5.11mg/dL. Our nephrology service was consulted for further evaluation. Of note, her renal function was at baseline (eGFR 60ml/min) as recent as 3 weeks prior to presentation. Her history of the previous month was extensively vetted during the consultation and workup did not reveal an inciting etiology. Renal biopsy was offered to explain JEAN of unknown etiology, including DDx of ATN vs AIN. Patient refused renal biopsy. She was eventually discharged to home with supportive care.     In terms of her renal history, she denies hospitalizations for prior JEAN or JEAN requiring RRT. Denies known history of nephrolithiasis or hematuria episodes. No reported history of frequent or recurrent use of NSAIDs/COXI, though she has Rx NSAIDs for recent knee pains - she denies frequent use. No pertinent rheumatologic or AI disease history. Denies any pertinent family history of known kidney disease, or family members with ESRD requiring dialysis.  She typically uses purewick at night d/t urinary frequency and limited mobility/performance status. Denies new medications in last three weeks. Denies acute/recent illness.   Fluid intake in 24hrs: at least 40oz    Since discharge to home, she continues to  "feel at her usual state of health. Previsit labs obtained yesterday show sCr is now improved to 2.04mg/dL. Electrolyte panel is stable. She denies complaints. Does not want to do any further testing / renal biopsy. Denies changes to appetite or decreased fluid intake. Denies dysuria.     Interval history:  02/18/25 - here today for f/u. She is feeling better since last visit. Renal fxn based on serum creatinine trend is improving, almost near baseline values now. We discussed her diet, increasing more protein intake. Last Keytruda infusion 8/2024. Biggest QOL issue for her currently is her RUE swelling. DVT study did not reveal blood clots. She did establish with Rheumatology yesterday to evaluate further.  We discussed ways to elevate her arm and non pharmacologic ways to manage edema  Discussed BP trends at home; low BP trends though asymptomatic.   Proteinuria trends improving      Review of Systems   Constitutional:  Positive for appetite change (Appetite poor). Negative for chills, diaphoresis and fever.   Respiratory:  Negative for cough and shortness of breath.    Cardiovascular:  Negative for chest pain and leg swelling.   Gastrointestinal:  Negative for abdominal pain, diarrhea, nausea and vomiting.   Genitourinary:  Negative for difficulty urinating, dysuria and hematuria.   Musculoskeletal:  Negative for myalgias.   Neurological:  Negative for headaches.   Hematological:  Does not bruise/bleed easily.        The past medical, family and social histories were reviewed for this encounter.     BP (!) 94/44 (BP Location: Right arm, Patient Position: Sitting)   Pulse 81   Ht 5' 6" (1.676 m)   SpO2 96%   BMI 26.12 kg/m²     Objective:      Physical Exam  Vitals reviewed.   Constitutional:       General: She is not in acute distress.     Appearance: She is well-developed.   Cardiovascular:      Heart sounds: Normal heart sounds. No murmur heard.     No friction rub. No gallop.   Pulmonary:      Effort: " Pulmonary effort is normal. No respiratory distress.      Breath sounds: Normal breath sounds. No wheezing or rales.   Abdominal:      General: Bowel sounds are normal. There is no distension.      Palpations: Abdomen is soft.      Tenderness: There is no abdominal tenderness.   Musculoskeletal:      Right forearm: Swelling and edema present.   Skin:     General: Skin is warm and dry.      Findings: No rash.   Neurological:      Mental Status: She is alert and oriented to person, place, and time.         Assessment:       Lab Results   Component Value Date     02/12/2025     02/12/2025     02/12/2025    K 3.8 02/12/2025    K 3.8 02/12/2025    K 3.8 02/12/2025     02/12/2025     02/12/2025     02/12/2025    CO2 20 (L) 02/12/2025    CO2 20 (L) 02/12/2025    CO2 20 (L) 02/12/2025    BUN 29 (H) 02/12/2025    BUN 29 (H) 02/12/2025    BUN 29 (H) 02/12/2025    CREATININE 1.37 02/12/2025    CREATININE 1.37 02/12/2025    CREATININE 1.37 02/12/2025    CALCIUM 8.9 02/12/2025    CALCIUM 8.9 02/12/2025    CALCIUM 8.9 02/12/2025    ALBUMIN 2.4 (L) 02/12/2025    ALBUMIN 2.4 (L) 02/12/2025    ALBUMIN 2.4 (L) 02/12/2025    PHOS 2.9 02/12/2025     Lab Results   Component Value Date    MICALBCREAT 207.0 (H) 01/28/2025    MICALBCREAT 401.9 (H) 01/09/2025    MICALBCREAT 427.5 (H) 03/14/2023       1. JEAN (acute kidney injury)    2. Metabolic acidosis    3. Chronic obstructive pulmonary disease, unspecified COPD type    4. Benign hypertensive heart and kidney disease with CHF, NYHA class 1 and CKD stage 3    5. TRISHA treated with BiPAP    6. Essential hypertension    7. Small cell carcinoma of lung, unspecified laterality, unspecified part of lung            Plan:   Return to clinic in 3 months  Labs for next visit include RFP in two weeks, RFP UPCR, UACR in 4 weeks.  Baseline creatinine is 0.9-1.1mg/dL    Non-oliguric JEAN stage III -> improving  -baseline eGFR >60ml/min  -history of JEAN did not point to  inciting factor. Serologies also unrevealing  -suspect this was an ATN, though again, no event was described  -hospital imaging w/ mild R hydronephrosis, bilateral ureteral jets present however.   -voiding without issue; tolerating PO  -avoiding NSAIDS  -no plans for renal biopsy.   -continuing to improve; approaching baseline.    Metabolic acidosis, NAGMA  -continue NaHCO3 tabs    COPD  -compensated at this time    CHFpEF- compensated at this time    TRISHA-continue bipap    pAF - on apixaban for primary prevention    Small cell carcinoma of lung - followed by oncology Dr. Gonzalez, s/p systemic immunotherapy. Under imaging surveillance currently.     Right upper extremity swelling-establishing with rheumatology.  Imaging reviewed.  No DVT.  Instructed patient to elevate arm.  Warm compress with non direct to skin heating pad.  Tub soak    Hypoalbuminemia-diet appears to be advancing.  She does have known wounds which are likely sequestering her albumin.  Continued advanced protein in the diet as she tolerates.    __________________________  Kin Gallegos MD  [facility] [place]    Part of this note has been created using [employer] dictation system. Errors in transcription may not be completely avoided.        KFRE 2-Year: 1.4% at 2/12/2025 11:07 AM  Calculated from:  Serum Creatinine: 1.37 mg/dL at 2/12/2025 11:07 AM  Urine Albumin Creatinine Ratio: 207.0 ug/mg at 1/28/2025  2:33 PM  Age: 75 years  Sex: Female at 2/12/2025 11:07 AM  Has CKD-3 to CKD-5: Yes    KFRE 5-Year: 4.2% at 2/12/2025 11:07 AM  Calculated from:  Serum Creatinine: 1.37 mg/dL at 2/12/2025 11:07 AM  Urine Albumin Creatinine Ratio: 207.0 ug/mg at 1/28/2025  2:33 PM  Age: 75 years  Sex: Female at 2/12/2025 11:07 AM  Has CKD-3 to CKD-5: Yes

## 2025-02-20 ENCOUNTER — PATIENT MESSAGE (OUTPATIENT)
Dept: ENDOCRINOLOGY | Facility: CLINIC | Age: 75
End: 2025-02-20
Payer: MEDICARE

## 2025-02-20 RX ORDER — INSULIN GLARGINE 100 [IU]/ML
20 INJECTION, SOLUTION SUBCUTANEOUS NIGHTLY
Start: 2025-02-20

## 2025-03-04 DIAGNOSIS — I48.0 PAF (PAROXYSMAL ATRIAL FIBRILLATION): ICD-10-CM

## 2025-03-05 ENCOUNTER — OFFICE VISIT (OUTPATIENT)
Dept: ORTHOPEDICS | Facility: CLINIC | Age: 75
End: 2025-03-05
Payer: MEDICARE

## 2025-03-05 VITALS — BODY MASS INDEX: 26.01 KG/M2 | HEIGHT: 66 IN | WEIGHT: 161.81 LBS

## 2025-03-05 DIAGNOSIS — M17.10 ARTHRITIS OF KNEE: ICD-10-CM

## 2025-03-05 DIAGNOSIS — M24.661 ARTHROFIBROSIS OF KNEE JOINT, RIGHT: Primary | ICD-10-CM

## 2025-03-05 PROCEDURE — 1160F RVW MEDS BY RX/DR IN RCRD: CPT | Mod: CPTII,S$GLB,, | Performed by: ORTHOPAEDIC SURGERY

## 2025-03-05 PROCEDURE — 1159F MED LIST DOCD IN RCRD: CPT | Mod: CPTII,S$GLB,, | Performed by: ORTHOPAEDIC SURGERY

## 2025-03-05 PROCEDURE — 1125F AMNT PAIN NOTED PAIN PRSNT: CPT | Mod: CPTII,S$GLB,, | Performed by: ORTHOPAEDIC SURGERY

## 2025-03-05 PROCEDURE — 99999 PR PBB SHADOW E&M-EST. PATIENT-LVL II: CPT | Mod: PBBFAC,,, | Performed by: ORTHOPAEDIC SURGERY

## 2025-03-05 PROCEDURE — 99214 OFFICE O/P EST MOD 30 MIN: CPT | Mod: S$GLB,,, | Performed by: ORTHOPAEDIC SURGERY

## 2025-03-05 RX ORDER — APIXABAN 5 MG/1
5 TABLET, FILM COATED ORAL 2 TIMES DAILY
Qty: 180 TABLET | Refills: 0 | Status: SHIPPED | OUTPATIENT
Start: 2025-03-05

## 2025-03-05 NOTE — PROGRESS NOTES
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 difficile colitis 08/2021    Cancer     lung    CKD (chronic kidney disease), stage III     Colonic diverticular abscess 07/12/2022    COPD (chronic obstructive pulmonary disease)     Encounter for blood transfusion     Hepatic steatosis     History of numbness     transient numbness left hand and foot    History of tobacco abuse     Hyperlipidemia     Hypertension     Lung nodules     and adenopathy    Multinodular goiter (nontoxic) 05/08/2017    Obesity     TRISHA (obstructive sleep apnea)     on bipap at bedtime    Proteinuria        Past Surgical History:   Procedure Laterality Date    ADENOIDECTOMY      APPENDECTOMY      CATARACT EXTRACTION      COLONOSCOPY N/A 11/14/2016    Procedure: COLONOSCOPY;  Surgeon: Destin Cardona MD;  Location: [facility];  Service: Endoscopy;  Laterality: N/A;    COLONOSCOPY N/A 05/14/2021    Procedure: COLONOSCOPY;  Surgeon: Destin Cardona MD;  Location: [facility];  Service: Endoscopy;  Laterality: N/A; hemorrhoids, diverticulosis, Old blood left colon. No active bleeding; Repeat colonoscopy is not recommended for screening purposes.    COLOSTOMY N/A 07/28/2022    Procedure: CREATION, COLOSTOMY;  Surgeon: Nubia Tinoco MD;  Location: [facility] OR;  Service: General;  Laterality: N/A;    DEBRIDEMENT OF WOUND OF ABDOMEN  07/28/2022    Procedure: DEBRIDEMENT, WOUND, ABDOMEN;  Surgeon: Nubia Tinoco MD;  Location: [facility] OR;  Service: General;;    ESOPHAGOGASTRODUODENOSCOPY N/A 05/14/2021    Procedure: EGD (ESOPHAGOGASTRODUODENOSCOPY);  Surgeon: Destin Cardona MD;  Location: [facility];  Service: Endoscopy;  Laterality: N/A; unremarkable     ESOPHAGOGASTRODUODENOSCOPY N/A 08/09/2021    Procedure: ESOPHAGOGASTRODUODENOSCOPY (EGD);  Surgeon: Destin Cardona MD;  Location: [facility];  Service: Endoscopy;  Laterality: N/A; Moderately severe radiation esophagitis with no bleeding    EXCISIONAL BIOPSY N/A 02/09/2023    Procedure: EXCISIONAL BIOPSY;  Surgeon: Nubia Tinoco MD;  Location: [facility] OR;  Service: General;  Laterality: N/A;    EYE SURGERY      cataract OU    HERNIA REPAIR      INSERTION OF TUNNELED CENTRAL VENOUS CATHETER (CVC) WITH SUBCUTANEOUS PORT N/A 06/07/2021    Procedure: [id number];  Surgeon: Joe Salinas MD;  Location: [facility] OR;  Service: General;  Laterality: N/A;    INTRALUMINAL GASTROINTESTINAL TRACT IMAGING VIA CAPSULE N/A 12/29/2021    Procedure: IMAGING PROCEDURE, GI TRACT, INTRALUMINAL, VIA CAPSULE  (called for instruction 12/20-may have trouble swallowing);  Surgeon: Floyd Dixon MD;  Location: [facility] ENDO;  Service: Endoscopy;  Laterality: N/A; Diffuse red spots of unclear significance and erosions found in the small bowel (entire small bowel), suspicious for mild radiation enteritis    KNEE ARTHROSCOPY W/ MENISCECTOMY Right 9/24/2024    Procedure: ARTHROSCOPY, KNEE, WITH MENISCECTOMY;  Surgeon: Jose Rafael Barney MD;  Location: [facility] OR;  Service: Orthopedics;  Laterality: Right;  Lateral meniscectomy    NEUROMA SURGERY Left     acoustic    TONSILLECTOMY      WOUND EXPLORATION N/A 08/13/2022    Procedure: EXPLORATION, WOUND;  Surgeon: Arnel Olsen MD;  Location: [facility] OR;  Service: General;  Laterality: N/A;    WOUND EXPLORATION Left 02/09/2023    Procedure: EXPLORATION, WOUND - of Ostomy Granuloma - Wound - Abdomen;  Surgeon: Nubia Tinoco MD;  Location: [facility] OR;  Service: General;  Laterality: Left;       Current Outpatient Medications   Medication Sig    ascorbic acid, vitamin C, (VITAMIN C) 500 MG tablet Take 500 mg by mouth once daily. Indications: inadequate vitamin C    azelastine  (ASTELIN) 137 mcg (0.1 %) nasal spray USE 2 SPRAYS IN EACH NOSTRIL EVERY NIGHT AT BEDTIME    blood sugar diagnostic Strp 1 strip by Misc.(Non-Drug; Combo Route) route Daily.    blood-glucose meter kit Use as instructed    blood-glucose sensor (DEXCOM G7 SENSOR MISC) Inject 1 each into the skin every 10 days. Continues glucose monitoring    cetirizine (ZYRTEC) 10 MG tablet Take 10 mg by mouth once daily.    cholecalciferol, vitamin D3, (VITAMIN D3) 125 mcg (5,000 unit) Tab Take 5,000 Units by mouth once daily.    docusate sodium (COLACE) 100 MG capsule Take 1 capsule (100 mg total) by mouth daily as needed for Constipation.    ELIQUIS 5 mg Tab TAKE 1 TABLET(5 MG) BY MOUTH TWICE DAILY    famotidine (PEPCID) 20 MG tablet Take 1 tablet (20 mg total) by mouth once daily.    gabapentin (NEURONTIN) 100 MG capsule Take 300 mg by mouth once daily.    HYDROcodone-acetaminophen (NORCO) 5-325 mg per tablet Take 1 tablet by mouth every 6 (six) hours as needed for Pain. (Patient not taking: Reported on 2/18/2025)    ibuprofen (ADVIL,MOTRIN) 600 MG tablet Take 1 tablet (600 mg total) by mouth 3 (three) times daily as needed for Pain. (Patient not taking: Reported on 2/18/2025)    insulin glargine U-100, Lantus, (LANTUS SOLOSTAR U-100 INSULIN) 100 unit/mL (3 mL) InPn pen Inject 20 Units into the skin every evening.    insulin lispro 100 unit/mL pen USE ON PREMEAL READING AS DIRECTED. MAXIMUM UNITS 50 PER DAY.    ipratropium (ATROVENT) 0.02 % nebulizer solution Take 2.5 mLs (500 mcg total) by nebulization every 6 (six) hours as needed (wheeze). Rescue (Patient not taking: Reported on 2/18/2025)    Lactobacillus acidophilus (PROBIOTIC ORAL) Take 1 capsule by mouth once daily.    lancets Misc 1 each by Misc.(Non-Drug; Combo Route) route once daily.    levothyroxine (SYNTHROID) 75 MCG tablet TAKE 1 TABLET BY MOUTH 6 DAYS A WEEK AND TAKE 1/2 TABLET BY MOUTH ON DAY 7    LIDOcaine-prilocaine (EMLA) cream Apply topically as needed (as  "needed for port access).    metoprolol succinate (TOPROL-XL) 25 MG 24 hr tablet Take 0.5 tablets (12.5 mg total) by mouth 2 (two) times daily.    mv-mn/folic ac/calcium/vit K1 (WOMEN'S 50 PLUS MULTIVITAMIN ORAL) Take 1 tablet by mouth once daily.    NOVOLOG FLEXPEN U-100 INSULIN 100 unit/mL (3 mL) InPn pen INJECT 7 UNITS WITH MEALSPREMEAL READINGS: 150-200= 2, 201-250= 4. 251-300= 6, 301-350=8, OVER 350 10 UNITS, MAX 50 UNITS DAILY    pen needle, diabetic (NOVOFINE 32) 32 gauge x 1/4" Ndle TO USE WITH INSULIN PENS FOUR TIMES DAILY    pen needle, diabetic (NOVOFINE 32) 32 gauge x 1/4" Ndle TO USE WITH INSULIN PENS FOUR TIMES DAILY    sertraline (ZOLOFT) 50 MG tablet Take 1 tablet (50 mg total) by mouth once daily.    simvastatin (ZOCOR) 40 MG tablet TAKE 1 TABLET(40 MG) BY MOUTH EVERY EVENING    sodium bicarbonate 650 MG tablet Take 1 tablet (650 mg total) by mouth 2 (two) times daily.    triamcinolone acetonide 0.1% (KENALOG) 0.1 % cream Apply topically 2 (two) times daily.     Current Facility-Administered Medications   Medication    sodium chloride 0.9% flush 10 mL     Facility-Administered Medications Ordered in Other Visits   Medication    EUFLEXXA 10 mg/mL(mw 2.4 -3.6 million) injection Syrg 20 mg       Review of patient's allergies indicates:   Allergen Reactions    Contrast media Anaphylaxis    Iodinated contrast media Anaphylaxis and Rash    Albuterol Other (See Comments)     Used during PFTs and put pt in afib    Dye     Pcn [penicillins]      Pt states did well on cephalexin.  No adverse reaction or side effect.     Doxycycline Palpitations     Tolerated IV Doxy 8/2022       Family History   Problem Relation Name Age of Onset    Heart disease Mother          dissection    GI problems Father          perf colon    Cancer Paternal Uncle          colon cancer    Diabetes Neg Hx      Kidney disease Neg Hx      Stroke Neg Hx      Colon cancer Neg Hx      Crohn's disease Neg Hx      Ulcerative colitis Neg Hx   "    Stomach cancer Neg Hx      Esophageal cancer Neg Hx         Social History     Socioeconomic History    Marital status:    Tobacco Use    Smoking status: Former     Current packs/day: 0.00     Average packs/day: 1 pack/day for 40.0 years (40.0 ttl pk-yrs)     Types: Cigarettes     Start date: 3/6/1976     Quit date: 3/6/2016     Years since quittin.0    Smokeless tobacco: Never   Substance and Sexual Activity    Alcohol use: Not Currently    Drug use: No    Sexual activity: Not Currently   Social History Narrative    Works in insurance collection.     Social Drivers of Health     Financial Resource Strain: Low Risk  (2024)    Overall Financial Resource Strain (CARDIA)     Difficulty of Paying Living Expenses: Not very hard   Food Insecurity: No Food Insecurity (2024)    Hunger Vital Sign     Worried About Running Out of Food in the Last Year: Never true     Ran Out of Food in the Last Year: Never true   Transportation Needs: No Transportation Needs (2024)    TRANSPORTATION NEEDS     Transportation : No   Physical Activity: Unknown (10/13/2021)    Exercise Vital Sign     Days of Exercise per Week: 0 days   Stress: No Stress Concern Present (2024)    [employer] - Occupational Stress Questionnaire     Feeling of Stress : Not at all   Housing Stability: Unknown (2024)    Housing Stability Vital Sign     Unable to Pay for Housing in the Last Year: No     Homeless in the Last Year: No       Chief Complaint:   No chief complaint on file.      Date of surgery:  2024    History:  75-year-old female underwent right diagnostic arthroscopy for possible septic arthritis of the right knee.  Patient had a lot of pain and swelling in the right knee which we do not have a good reason for.  Cultures taken at the time of surgery have all been negative. Patient did have significant stiffness of the right knee from disuse.  Did a mild manipulation at  the time.      History of present illness:  She is doing well the last time I saw her but is back to the kind of her baseline where she is really unable to walk.  Has a lot of pain and swelling even in the left knee.  They kind of alternate.  Patient is seeing her rheumatologist next week for not only the knee pain but also right wrist and hand pain and swelling.  It certainly sounds like something more systemic like an autoimmune disorder, inflammatory condition such as diet or allergy.  I do not see anything structurally related for me to treat surgically.  She has tried injections without significant success.  She continues to do some therapy.      Review of Systems:    Musculoskeletal:  See HPI        Physical Examination:    Vital Signs:    There were no vitals filed for this visit.        There is no height or weight on file to calculate BMI.    This a well-developed, well nourished patient in no acute distress.  They are alert and oriented and cooperative to examination.  Pt. comes in in a wheelchair    Examination of the right knee shows well-healed surgical portals.  Patient has range of motion from about  degrees.  Knee does not have nearly the swelling that it used to.     Left knee is swollen and painful again.  Globally tender around the front of the knee.  Range of motion is about 10° to 95°.    X-rays:  None     Assessment::  Bilateral knee pain and stiffness.    Bilateral knee inflammation and swelling      Plan:  Continue the outpatient physical therapy.  I would love to see with the rheumatologist has to say.  It certainly sounds like something more autoimmune .  It sounds more like a polyarthralgia problem then something structural that I can fix.  Follow up in 6 weeks.      This note was created using [employer] voice recognition software that occasionally misinterpreted phrases or words.

## 2025-03-14 ENCOUNTER — PATIENT MESSAGE (OUTPATIENT)
Dept: ORTHOPEDICS | Facility: CLINIC | Age: 75
End: 2025-03-14
Payer: MEDICARE

## 2025-03-14 ENCOUNTER — PATIENT MESSAGE (OUTPATIENT)
Dept: FAMILY MEDICINE | Facility: CLINIC | Age: 75
End: 2025-03-14
Payer: MEDICARE

## 2025-03-18 DIAGNOSIS — Z79.4 TYPE 2 DIABETES MELLITUS WITHOUT COMPLICATION, WITH LONG-TERM CURRENT USE OF INSULIN: ICD-10-CM

## 2025-03-18 DIAGNOSIS — E11.9 TYPE 2 DIABETES MELLITUS WITHOUT COMPLICATION, WITH LONG-TERM CURRENT USE OF INSULIN: ICD-10-CM

## 2025-03-18 RX ORDER — LANCETS 33 GAUGE
EACH MISCELLANEOUS
Qty: 100 EACH | Refills: 1 | Status: SHIPPED | OUTPATIENT
Start: 2025-03-18

## 2025-03-18 RX ORDER — BLOOD-GLUCOSE METER
EACH MISCELLANEOUS
Qty: 100 STRIP | Refills: 1 | Status: SHIPPED | OUTPATIENT
Start: 2025-03-18

## 2025-04-06 ENCOUNTER — PATIENT MESSAGE (OUTPATIENT)
Dept: ORTHOPEDICS | Facility: CLINIC | Age: 75
End: 2025-04-06
Payer: MEDICARE

## 2025-04-16 ENCOUNTER — OFFICE VISIT (OUTPATIENT)
Dept: ORTHOPEDICS | Facility: CLINIC | Age: 75
End: 2025-04-16
Payer: MEDICARE

## 2025-04-16 VITALS — HEIGHT: 66 IN | WEIGHT: 161.81 LBS | BODY MASS INDEX: 26.01 KG/M2

## 2025-04-16 DIAGNOSIS — M17.10 ARTHRITIS OF KNEE: ICD-10-CM

## 2025-04-16 DIAGNOSIS — M24.661 ARTHROFIBROSIS OF KNEE JOINT, RIGHT: Primary | ICD-10-CM

## 2025-04-16 PROCEDURE — 99214 OFFICE O/P EST MOD 30 MIN: CPT | Mod: S$GLB,,, | Performed by: ORTHOPAEDIC SURGERY

## 2025-04-16 PROCEDURE — 1125F AMNT PAIN NOTED PAIN PRSNT: CPT | Mod: CPTII,S$GLB,, | Performed by: ORTHOPAEDIC SURGERY

## 2025-04-16 PROCEDURE — 99999 PR PBB SHADOW E&M-EST. PATIENT-LVL II: CPT | Mod: PBBFAC,,, | Performed by: ORTHOPAEDIC SURGERY

## 2025-04-16 PROCEDURE — 1159F MED LIST DOCD IN RCRD: CPT | Mod: CPTII,S$GLB,, | Performed by: ORTHOPAEDIC SURGERY

## 2025-04-16 PROCEDURE — 1160F RVW MEDS BY RX/DR IN RCRD: CPT | Mod: CPTII,S$GLB,, | Performed by: ORTHOPAEDIC SURGERY

## 2025-04-16 NOTE — PROGRESS NOTES
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 difficile colitis 08/2021    Cancer     lung    CKD (chronic kidney disease), stage III     Colonic diverticular abscess 07/12/2022    COPD (chronic obstructive pulmonary disease)     Encounter for blood transfusion     Hepatic steatosis     History of numbness     transient numbness left hand and foot    History of tobacco abuse     Hyperlipidemia     Hypertension     Lung nodules     and adenopathy    Multinodular goiter (nontoxic) 05/08/2017    Obesity     TRISHA (obstructive sleep apnea)     on bipap at bedtime    Proteinuria        Past Surgical History:   Procedure Laterality Date    ADENOIDECTOMY      APPENDECTOMY      CATARACT EXTRACTION      COLONOSCOPY N/A 11/14/2016    Procedure: COLONOSCOPY;  Surgeon: Destin Cardona MD;  Location: [facility];  Service: Endoscopy;  Laterality: N/A;    COLONOSCOPY N/A 05/14/2021    Procedure: COLONOSCOPY;  Surgeon: Destin Cardona MD;  Location: [facility];  Service: Endoscopy;  Laterality: N/A; hemorrhoids, diverticulosis, Old blood left colon. No active bleeding; Repeat colonoscopy is not recommended for screening purposes.    COLOSTOMY N/A 07/28/2022    Procedure: CREATION, COLOSTOMY;  Surgeon: Nubia Tinoco MD;  Location: [facility] OR;  Service: General;  Laterality: N/A;    DEBRIDEMENT OF WOUND OF ABDOMEN  07/28/2022    Procedure: DEBRIDEMENT, WOUND, ABDOMEN;  Surgeon: Nubia Tinoco MD;  Location: [facility] OR;  Service: General;;    ESOPHAGOGASTRODUODENOSCOPY N/A 05/14/2021    Procedure: EGD (ESOPHAGOGASTRODUODENOSCOPY);  Surgeon: Destin Cardona MD;  Location: [facility];  Service: Endoscopy;  Laterality: N/A; unremarkable     ESOPHAGOGASTRODUODENOSCOPY N/A 08/09/2021    Procedure: ESOPHAGOGASTRODUODENOSCOPY (EGD);  Surgeon: Destin Cardona MD;  Location: [facility];  Service: Endoscopy;  Laterality: N/A; Moderately severe radiation esophagitis with no bleeding    EXCISIONAL BIOPSY N/A 02/09/2023    Procedure: EXCISIONAL BIOPSY;  Surgeon: Nubia Tinoco MD;  Location: [facility] OR;  Service: General;  Laterality: N/A;    EYE SURGERY      cataract OU    HERNIA REPAIR      INSERTION OF TUNNELED CENTRAL VENOUS CATHETER (CVC) WITH SUBCUTANEOUS PORT N/A 06/07/2021    Procedure: [id number];  Surgeon: Joe Salinas MD;  Location: [facility] OR;  Service: General;  Laterality: N/A;    INTRALUMINAL GASTROINTESTINAL TRACT IMAGING VIA CAPSULE N/A 12/29/2021    Procedure: IMAGING PROCEDURE, GI TRACT, INTRALUMINAL, VIA CAPSULE  (called for instruction 12/20-may have trouble swallowing);  Surgeon: Floyd Dixon MD;  Location: [facility] ENDO;  Service: Endoscopy;  Laterality: N/A; Diffuse red spots of unclear significance and erosions found in the small bowel (entire small bowel), suspicious for mild radiation enteritis    KNEE ARTHROSCOPY W/ MENISCECTOMY Right 9/24/2024    Procedure: ARTHROSCOPY, KNEE, WITH MENISCECTOMY;  Surgeon: Jose Rafael Barney MD;  Location: [facility] OR;  Service: Orthopedics;  Laterality: Right;  Lateral meniscectomy    NEUROMA SURGERY Left     acoustic    TONSILLECTOMY      WOUND EXPLORATION N/A 08/13/2022    Procedure: EXPLORATION, WOUND;  Surgeon: Arnel Olsne MD;  Location: [facility] OR;  Service: General;  Laterality: N/A;    WOUND EXPLORATION Left 02/09/2023    Procedure: EXPLORATION, WOUND - of Ostomy Granuloma - Wound - Abdomen;  Surgeon: Nubia Tinoco MD;  Location: [facility] OR;  Service: General;  Laterality: Left;       Current Outpatient Medications   Medication Sig    ascorbic acid, vitamin C, (VITAMIN C) 500 MG tablet Take 500 mg by mouth once daily. Indications: inadequate vitamin C    azelastine  (ASTELIN) 137 mcg (0.1 %) nasal spray USE 2 SPRAYS IN EACH NOSTRIL EVERY NIGHT AT BEDTIME    blood-glucose meter kit Use as instructed    blood-glucose sensor (DEXCOM G7 SENSOR MISC) Inject 1 each into the skin every 10 days. Continues glucose monitoring    cetirizine (ZYRTEC) 10 MG tablet Take 10 mg by mouth once daily.    cholecalciferol, vitamin D3, (VITAMIN D3) 125 mcg (5,000 unit) Tab Take 5,000 Units by mouth once daily.    docusate sodium (COLACE) 100 MG capsule Take 1 capsule (100 mg total) by mouth daily as needed for Constipation.    ELIQUIS 5 mg Tab TAKE 1 TABLET(5 MG) BY MOUTH TWICE DAILY    famotidine (PEPCID) 20 MG tablet Take 1 tablet (20 mg total) by mouth once daily.    gabapentin (NEURONTIN) 100 MG capsule Take 300 mg by mouth once daily.    HYDROcodone-acetaminophen (NORCO) 5-325 mg per tablet Take 1 tablet by mouth every 6 (six) hours as needed for Pain. (Patient not taking: Reported on 2/18/2025)    ibuprofen (ADVIL,MOTRIN) 600 MG tablet Take 1 tablet (600 mg total) by mouth 3 (three) times daily as needed for Pain. (Patient not taking: Reported on 2/18/2025)    insulin glargine U-100, Lantus, (LANTUS SOLOSTAR U-100 INSULIN) 100 unit/mL (3 mL) InPn pen Inject 20 Units into the skin every evening.    insulin lispro 100 unit/mL pen USE ON PREMEAL READING AS DIRECTED. MAXIMUM UNITS 50 PER DAY.    ipratropium (ATROVENT) 0.02 % nebulizer solution Take 2.5 mLs (500 mcg total) by nebulization every 6 (six) hours as needed (wheeze). Rescue (Patient not taking: Reported on 2/18/2025)    Lactobacillus acidophilus (PROBIOTIC ORAL) Take 1 capsule by mouth once daily.    levothyroxine (SYNTHROID) 75 MCG tablet TAKE 1 TABLET BY MOUTH 6 DAYS A WEEK AND TAKE 1/2 TABLET BY MOUTH ON DAY 7    LIDOcaine-prilocaine (EMLA) cream Apply topically as needed (as needed for port access).    metoprolol succinate (TOPROL-XL) 25 MG 24 hr tablet Take 0.5 tablets (12.5 mg total) by mouth 2 (two) times daily.    mv-mn/folic  "ac/calcium/vit K1 (WOMEN'S 50 PLUS MULTIVITAMIN ORAL) Take 1 tablet by mouth once daily.    NOVOLOG FLEXPEN U-100 INSULIN 100 unit/mL (3 mL) InPn pen INJECT 7 UNITS WITH MEALSPREMEAL READINGS: 150-200= 2, 201-250= 4. 251-300= 6, 301-350=8, OVER 350 10 UNITS, MAX 50 UNITS DAILY    ONETOUCH DELICA PLUS LANCET 33 gauge Misc USE 1 LANCET TO TEST BLOOD GLUCOSE DAILY    ONETOUCH VERIO TEST STRIPS Strp USE 1 TEST STRIP TO CHECK BLOOD GLUCOSE DAILY    pen needle, diabetic (NOVOFINE 32) 32 gauge x 1/4" Ndle TO USE WITH INSULIN PENS FOUR TIMES DAILY    pen needle, diabetic (NOVOFINE 32) 32 gauge x 1/4" Ndle TO USE WITH INSULIN PENS FOUR TIMES DAILY    sertraline (ZOLOFT) 50 MG tablet Take 1 tablet (50 mg total) by mouth once daily.    simvastatin (ZOCOR) 40 MG tablet TAKE 1 TABLET(40 MG) BY MOUTH EVERY EVENING    sodium bicarbonate 650 MG tablet Take 1 tablet (650 mg total) by mouth 2 (two) times daily.    triamcinolone acetonide 0.1% (KENALOG) 0.1 % cream Apply topically 2 (two) times daily.     Current Facility-Administered Medications   Medication    sodium chloride 0.9% flush 10 mL     Facility-Administered Medications Ordered in Other Visits   Medication    EUFLEXXA 10 mg/mL(mw 2.4 -3.6 million) injection Syrg 20 mg       Review of patient's allergies indicates:   Allergen Reactions    Contrast media Anaphylaxis    Iodinated contrast media Anaphylaxis and Rash    Albuterol Other (See Comments)     Used during PFTs and put pt in afib    Dye     Pcn [penicillins]      Pt states did well on cephalexin.  No adverse reaction or side effect.     Doxycycline Palpitations     Tolerated IV Doxy 8/2022       Family History   Problem Relation Name Age of Onset    Heart disease Mother          dissection    GI problems Father          perf colon    Cancer Paternal Uncle          colon cancer    Diabetes Neg Hx      Kidney disease Neg Hx      Stroke Neg Hx      Colon cancer Neg Hx      Crohn's disease Neg Hx      Ulcerative colitis " Neg Hx      Stomach cancer Neg Hx      Esophageal cancer Neg Hx         Social History     Socioeconomic History    Marital status:    Tobacco Use    Smoking status: Former     Current packs/day: 0.00     Average packs/day: 1 pack/day for 40.0 years (40.0 ttl pk-yrs)     Types: Cigarettes     Start date: 3/6/1976     Quit date: 3/6/2016     Years since quittin.1    Smokeless tobacco: Never   Substance and Sexual Activity    Alcohol use: Not Currently    Drug use: No    Sexual activity: Not Currently   Social History Narrative    Works in insurance collection.     Social Drivers of Health     Financial Resource Strain: Low Risk  (2024)    Overall Financial Resource Strain (CARDIA)     Difficulty of Paying Living Expenses: Not very hard   Food Insecurity: No Food Insecurity (2024)    Hunger Vital Sign     Worried About Running Out of Food in the Last Year: Never true     Ran Out of Food in the Last Year: Never true   Transportation Needs: No Transportation Needs (2024)    TRANSPORTATION NEEDS     Transportation : No   Physical Activity: Unknown (10/13/2021)    Exercise Vital Sign     Days of Exercise per Week: 0 days   Stress: No Stress Concern Present (2024)    [employer] - Occupational Stress Questionnaire     Feeling of Stress : Not at all   Housing Stability: Unknown (2024)    Housing Stability Vital Sign     Unable to Pay for Housing in the Last Year: No     Homeless in the Last Year: No       Chief Complaint:   No chief complaint on file.      Date of surgery:  2024    History:  75-year-old female underwent right diagnostic arthroscopy for possible septic arthritis of the right knee.  Patient had a lot of pain and swelling in the right knee which we do not have a good reason for.  Cultures taken at the time of surgery have all been negative. Patient did have significant stiffness of the right knee from disuse.  Did a mild  manipulation at the time.      History of present illness:  She is doing well the last time I saw her but is back to the kind of her baseline where she is really unable to walk.  Has a lot of pain and swelling even in the left knee.  They kind of alternate.  Patient is seeing her rheumatologist next week for not only the knee pain but also right wrist and hand pain and swelling.  It certainly sounds like something more systemic like an autoimmune disorder, inflammatory condition such as diet or allergy.  I do not see anything structurally related for me to treat surgically.  She has tried injections without significant success.  She continues to do some therapy.  She was seen by Rheumatology who diagnosed her with seronegative rheumatoid arthritis.  She is currently on methotrexate which does seem to be helping.      Review of Systems:    Musculoskeletal:  See HPI        Physical Examination:    Vital Signs:    There were no vitals filed for this visit.        There is no height or weight on file to calculate BMI.    This a well-developed, well nourished patient in no acute distress.  They are alert and oriented and cooperative to examination.  Pt. comes in in a wheelchair    Examination of the right knee shows well-healed surgical portals.  Patient has range of motion from about  degrees.  Knee does not have nearly the swelling that it used to.     Left knee is swollen and painful again.  Globally tender around the front of the knee.  Range of motion is about 10° to 95°.    X-rays:  None     Assessment::  Bilateral knee pain and stiffness.    Bilateral knee inflammation and swelling  Rheumatoid arthritis      Plan:  Continue the outpatient physical therapy.  Continue with the methotrexate.  Also complaining of more right shoulder pain recently.  Continue with the therapy for that.  X-ray of the right shoulder at the next visit if shoulder is still symptomatic.    This note was created using M Mod[employer] voice  recognition software that occasionally misinterpreted phrases or words.

## 2025-04-17 ENCOUNTER — TELEPHONE (OUTPATIENT)
Dept: ENDOCRINOLOGY | Facility: CLINIC | Age: 75
End: 2025-04-17
Payer: MEDICARE

## 2025-04-17 DIAGNOSIS — E04.2 MULTINODULAR GOITER: Primary | ICD-10-CM

## 2025-04-22 DIAGNOSIS — E78.00 HYPERCHOLESTEROLEMIA: ICD-10-CM

## 2025-04-22 RX ORDER — SIMVASTATIN 40 MG/1
40 TABLET, FILM COATED ORAL NIGHTLY
Qty: 90 TABLET | Refills: 1 | Status: SHIPPED | OUTPATIENT
Start: 2025-04-22

## 2025-04-25 ENCOUNTER — OFFICE VISIT (OUTPATIENT)
Dept: ENDOCRINOLOGY | Facility: CLINIC | Age: 75
End: 2025-04-25
Payer: MEDICARE

## 2025-04-25 VITALS
SYSTOLIC BLOOD PRESSURE: 108 MMHG | OXYGEN SATURATION: 95 % | HEIGHT: 66 IN | HEART RATE: 68 BPM | BODY MASS INDEX: 25.26 KG/M2 | DIASTOLIC BLOOD PRESSURE: 60 MMHG | WEIGHT: 157.19 LBS

## 2025-04-25 DIAGNOSIS — E04.2 MULTINODULAR GOITER: Primary | ICD-10-CM

## 2025-04-25 DIAGNOSIS — E03.9 HYPOTHYROIDISM, UNSPECIFIED TYPE: ICD-10-CM

## 2025-04-25 DIAGNOSIS — R68.89 ABNORMAL ENDOCRINE LABORATORY TEST FINDING: ICD-10-CM

## 2025-04-25 PROCEDURE — 99999 PR PBB SHADOW E&M-EST. PATIENT-LVL V: CPT | Mod: PBBFAC,,, | Performed by: INTERNAL MEDICINE

## 2025-04-25 RX ORDER — METHOTREXATE 2.5 MG/1
10 TABLET ORAL
COMMUNITY
Start: 2025-04-22

## 2025-04-25 RX ORDER — FOLIC ACID 1 MG/1
1 TABLET ORAL DAILY
COMMUNITY
Start: 2025-04-22

## 2025-04-25 NOTE — PROGRESS NOTES
CHIEF COMPLAINT: MNG  75 y.o.  being seen as a f/u. Had been seeing Dr. Trinidad and last seen by her 3/28/18. Pt opted for observation at that time. No XRT to head/neck. No difficulty swallowing. No change in voice. No tremors    Was at  for colostomy and hernia repair. Appears placed on Hydrocortisone due to a low cortisol. I do not see a cosyntropin stim test. Has been on it atleast a year. Was on Hydrocortisone 10/5. Pt had a normal cosyntropin stim test and steroids stopped. NO longer on any steroids.       On synthroid 75 mcg 6 days a week and 1/2 tab on day 7. No Palpitations. No tremors. No N/V.       PAST MEDICAL HISTORY/PAST SURGICAL HISTORY:  Reviewed in [facility]    SOCIAL HISTORY: Smoker and quit in 60's    FAMILY HISTORY:  Sister with thyroid cancer.     MEDICATIONS/ALLERGIES: The patient's MedCard has been updated and reviewed.       PE:  GENERAL: Well developed, well nourished.  NECK: Supple, trachea midline, left nodule palpable.   CHEST: Resp even and unlabored, CTA bilateral.  CARDIAC: Irreg Irreg no murmurs, rubs, S3, or S4     Latest Reference Range & Units 04/21/25 07:43   Cortisol, 8 AM 4.30 - 22.40 ug/dL 18.63   ACTH <=46 pg/mL 23   TSH 0.400 - 4.000 uIU/mL 2.154     US SOFT TISSUE HEAD NECK     CLINICAL HISTORY:  .  Hypothyroidism, unspecified     TECHNIQUE:  Ultrasound of the thyroid and cervical lymph nodes was performed.     COMPARISON:  Ultrasound 04/11/2024.     FINDINGS:  Right lobe of the thyroid measures 5.4 x 2.1 x 1.9 cm.  Left lobe of the thyroid measures 4.9 x 1.5 x 1.5 cm.  Mildly heterogeneous thyroid parenchyma.     Right inferior pole nodule with peripheral calcifications obscuring the internal components measures 26 x 25 x 24 mm (TR 4), previously 31 x 31 x 23 mm.     Left superior pole nodule with macrocalcifications measures 7 x 7 x 6 mm (TR 4), previously 11 x 10 x 5 mm.     Left inferior pole solid, hypoechoic nodule without calcifications measures 7 x 7 x 6 mm (TR 4),  previously 6 x 6 x 5 mm.     Impression:     Multinodular thyroid gland.  Right inferior pole nodule with peripheral calcifications meets TI-RADS criteria for FNA.    ASSESSMENT/PLAN:  1. Multiple thyroid Nodules- no XRT.  No obstructive symptoms.  No FNA as patient opted to monitor.  Ultrasound shows no significant change.  At this point the ultrasound has been followed for several years.  She also states if there is thyroid cancer, she is unlikely to have surgery due to her other medical conditions.  At this point, we can stop monitoring ultrasound.  Okay to follow up with PCP.  Should have thyroid palpated manually.  If any obstructive symptoms, could consider doing another ultrasound    2. Type 2 diabetes- Seeing [facility] for DM management.     3. Low cortisol-appears steroids were started based on low cortisol.  Unclear what exactly she was on at the time.  I do not see a cosyntropin stimulation test in the system.  Steroids held and cosyntropin stimulation test done.  Patient had a normal cosyntropin stimulation test.  Repeat cortisol normal.  Does not need any further cortisol testing at this time    4.  Hypothyroidism-TSH within normal limits.  Symptomatically doing well.  Continue current treatment.  Okay to follow up with PCP    FOLLOWUP  AVS- can f/u with PCP

## 2025-05-08 ENCOUNTER — TELEPHONE (OUTPATIENT)
Dept: NEPHROLOGY | Facility: CLINIC | Age: 75
End: 2025-05-08
Payer: MEDICARE

## 2025-05-08 ENCOUNTER — TELEPHONE (OUTPATIENT)
Dept: FAMILY MEDICINE | Facility: CLINIC | Age: 75
End: 2025-05-08
Payer: MEDICARE

## 2025-05-08 ENCOUNTER — PATIENT MESSAGE (OUTPATIENT)
Dept: FAMILY MEDICINE | Facility: CLINIC | Age: 75
End: 2025-05-08
Payer: MEDICARE

## 2025-05-08 NOTE — TELEPHONE ENCOUNTER
"Returned call to patient. Patient reports hematuria starting yesterday. Patient denies dysuria, abdominal pain, flank pain, changes in urinary frequency, fever, and chills at this time.     Patient states," Dr. Gallegos ordered some labs for my appointment next week. When I went to do the urine test there was a lot of blood. The person at the lab said to call my doctor." Patient advised to contact PCP or go to Urgent Care for further testing and treatment. Patient verbalized understanding.   "

## 2025-05-08 NOTE — TELEPHONE ENCOUNTER
----- Message from Christy sent at 5/8/2025  1:22 PM CDT -----  Contact: pt  Type: Needs Medical Advice Who Called:pt Best Call Back Number: [phone] Information: Requesting a call back regarding pt is asking for Dr Gallegos to call her please. Pt said she is having a issue and would like to talk to him about it .Please Advise- Thank you

## 2025-05-08 NOTE — TELEPHONE ENCOUNTER
----- Message from Fabby sent at 5/8/2025  1:47 PM CDT -----  Contact: self  Type:  Needs Medical AdviceWho Called: selfSymptoms (please be specific): pt sts she needs to speak to  herself if possible, pt wouldn't give me any more info.  Would the patient rather a call back or a response via [facility]? callBest Call Back Number: [phone] (home) Additional Information: please advise and thank you.

## 2025-05-12 ENCOUNTER — HOSPITAL ENCOUNTER (OUTPATIENT)
Dept: RADIOLOGY | Facility: HOSPITAL | Age: 75
Discharge: HOME | End: 2025-05-12
Attending: FAMILY MEDICINE
Payer: MEDICARE

## 2025-05-12 ENCOUNTER — OFFICE VISIT (OUTPATIENT)
Dept: FAMILY MEDICINE | Facility: CLINIC | Age: 75
End: 2025-05-12
Payer: MEDICARE

## 2025-05-12 VITALS
BODY MASS INDEX: 25.63 KG/M2 | HEIGHT: 66 IN | HEART RATE: 72 BPM | SYSTOLIC BLOOD PRESSURE: 112 MMHG | DIASTOLIC BLOOD PRESSURE: 50 MMHG | WEIGHT: 159.5 LBS

## 2025-05-12 DIAGNOSIS — B96.89 ACUTE BACTERIAL SIMPLE CYSTITIS: Primary | ICD-10-CM

## 2025-05-12 DIAGNOSIS — M06.00 SERONEGATIVE RHEUMATOID ARTHRITIS: ICD-10-CM

## 2025-05-12 DIAGNOSIS — R05.9 COUGH, UNSPECIFIED TYPE: ICD-10-CM

## 2025-05-12 DIAGNOSIS — Z79.60 LONG-TERM USE OF IMMUNOSUPPRESSANT MEDICATION: ICD-10-CM

## 2025-05-12 DIAGNOSIS — N30.80 ACUTE BACTERIAL SIMPLE CYSTITIS: Primary | ICD-10-CM

## 2025-05-12 PROCEDURE — 3074F SYST BP LT 130 MM HG: CPT | Mod: CPTII,S$GLB,, | Performed by: FAMILY MEDICINE

## 2025-05-12 PROCEDURE — 1159F MED LIST DOCD IN RCRD: CPT | Mod: CPTII,S$GLB,, | Performed by: FAMILY MEDICINE

## 2025-05-12 PROCEDURE — 3078F DIAST BP <80 MM HG: CPT | Mod: CPTII,S$GLB,, | Performed by: FAMILY MEDICINE

## 2025-05-12 PROCEDURE — 99999 PR PBB SHADOW E&M-EST. PATIENT-LVL V: CPT | Mod: PBBFAC,,, | Performed by: FAMILY MEDICINE

## 2025-05-12 PROCEDURE — 1160F RVW MEDS BY RX/DR IN RCRD: CPT | Mod: CPTII,S$GLB,, | Performed by: FAMILY MEDICINE

## 2025-05-12 PROCEDURE — G2211 COMPLEX E/M VISIT ADD ON: HCPCS | Mod: S$GLB,,, | Performed by: FAMILY MEDICINE

## 2025-05-12 PROCEDURE — 99214 OFFICE O/P EST MOD 30 MIN: CPT | Mod: S$GLB,,, | Performed by: FAMILY MEDICINE

## 2025-05-12 PROCEDURE — 71046 X-RAY EXAM CHEST 2 VIEWS: CPT | Mod: TC,PN

## 2025-05-12 PROCEDURE — 1101F PT FALLS ASSESS-DOCD LE1/YR: CPT | Mod: CPTII,S$GLB,, | Performed by: FAMILY MEDICINE

## 2025-05-12 PROCEDURE — 3288F FALL RISK ASSESSMENT DOCD: CPT | Mod: CPTII,S$GLB,, | Performed by: FAMILY MEDICINE

## 2025-05-12 PROCEDURE — 71046 X-RAY EXAM CHEST 2 VIEWS: CPT | Mod: 26,,, | Performed by: RADIOLOGY

## 2025-05-12 PROCEDURE — 1126F AMNT PAIN NOTED NONE PRSNT: CPT | Mod: CPTII,S$GLB,, | Performed by: FAMILY MEDICINE

## 2025-05-12 NOTE — PROGRESS NOTES
Chief Complaint:  Chief Complaint   Patient presents with    Cough     Cough x 3wk    Hematuria     Hematuria x 4-5d. No abd or back pain. No other S/S        HPI:  Allyson is a 75 y.o. year old     History of Present Illness    CHIEF COMPLAINT:  Allyson presents today for blood in urine and persistent cough    URINARY SYMPTOMS:  She reports onset of gross hematuria since Thursday with significant amount of visible blood in urine. She denies other urinary symptoms. Initially presented to ER where Bactrim was prescribed, but medication was changed to Cipro due to interaction with methotrexate.    RESPIRATORY:  She reports a three-week history of cough with worsening symptoms at night when supine. The cough produces clear thick mucus. She denies shortness of breath. The cough occurs frequently but not constantly. She expresses concern that her current cough symptoms remind her of her previous cancer onset.    RHEUMATOID ARTHRITIS:  She has been on methotrexate since mid-March for rheumatoid arthritis. She reports right hand stiffness with limited movement and inability to use her right hand. She denies numbness but experiences pain with hand rotation. She attends physical therapy 3 times weekly. She reports improved mobility but still requires walker assistance and is not yet steady. Knee swelling has resolved with improving range of motion. She denies increased coughing during physical therapy.    PAST MEDICAL HISTORY:  History of cancer. History of blood transfusions in November and December (2 units), with most recent transfusion on February 13th (2 units).    LABS:  Hemoglobin was 8.4 on April 17th.    CURRENT MEDICATIONS:  She takes Astelin nasal spray daily in each nostril for nasal drip, particularly prominent in mornings. She takes Famotidine 3 times weekly.      ROS:  ENT: +nasal discharge  Respiratory: +cough, no shortness of breath, +productive cough, +waking at night coughing  Genitourinary:  "+hematuria  Musculoskeletal: +joint pain, +joint stiffness, +pain with movement, +difficulty walking, +abnormal gait           Review of Systems   Constitutional:  Positive for fatigue.   Respiratory:  Positive for cough (clear, thick mucous).          Past Medical History:  Past Medical History:   Diagnosis Date    Acoustic neuroma 2003    left ear    Acquired deafness of left ear     Altered bowel elimination due to intestinal ostomy     Anticoagulant long-term use     Atrial fibrillation     Bell's palsy 2003    with fatigue and stress    Brain lesion     left side base of skull    C. difficile colitis 08/2021    Cancer     lung    CKD (chronic kidney disease), stage III     Colonic diverticular abscess 07/12/2022    COPD (chronic obstructive pulmonary disease)     Encounter for blood transfusion     Hepatic steatosis     History of numbness     transient numbness left hand and foot    History of tobacco abuse     Hyperlipidemia     Hypertension     Lung nodules     and adenopathy    Multinodular goiter (nontoxic) 05/08/2017    Obesity     TRISHA (obstructive sleep apnea)     on bipap at bedtime    Proteinuria          Vitals:  Vitals:    05/12/25 1307   BP: (!) 112/50   Pulse: 72   Weight: 72.3 kg (159 lb 8 oz)   Height: 5' 6" (1.676 m)   PainSc: 0-No pain       BP Readings from Last 5 Encounters:   05/12/25 (!) 112/50   05/08/25 118/64   04/25/25 108/60   02/18/25 (!) 94/44   02/13/25 124/66       The ASCVD Risk score (Ron ORDOÑEZ, et al., 2019) failed to calculate for the following reasons:    Risk score cannot be calculated because patient has a medical history suggesting prior/existing ASCVD      Physical Exam:  Physical Exam  Vitals and nursing note reviewed.   Constitutional:       General: She is not in acute distress.     Appearance: Normal appearance. She is well-developed.   HENT:      Head: Normocephalic and atraumatic.      Right Ear: Tympanic membrane normal.      Left Ear: Tympanic membrane normal. "   Eyes:      General: No scleral icterus.        Right eye: No discharge.         Left eye: No discharge.      Conjunctiva/sclera: Conjunctivae normal.   Cardiovascular:      Rate and Rhythm: Normal rate.   Pulmonary:      Effort: Pulmonary effort is normal. No respiratory distress.      Breath sounds: Normal breath sounds. No rhonchi.      Comments: Productive cough observed intermittently  Abdominal:      Palpations: Abdomen is soft.      Tenderness: There is no guarding.   Musculoskeletal:         General: No deformity or signs of injury.      Cervical back: Neck supple.   Skin:     General: Skin is warm and dry.      Findings: No rash.   Neurological:      General: No focal deficit present.      Mental Status: She is alert and oriented to person, place, and time.   Psychiatric:         Mood and Affect: Mood normal.         Behavior: Behavior normal.             Assessment & Plan:  Acute bacterial simple cystitis  Comments:  continue cipro per ER, cx with no specific growth    Cough, unspecified type  Comments:  ok to resume (plain) mucinex  Orders:  -     X-Ray Chest PA And Lateral; Future; Expected date: 05/12/2025    Seronegative rheumatoid arthritis  -     CBC auto differential; Future; Expected date: 05/12/2025    Long-term use of immunosuppressant medication  -     CBC auto differential; Future; Expected date: 05/12/2025  -     Comprehensive metabolic panel; Future; Expected date: 05/12/2025         Assessment & Plan    ORDERS:   Ordered XR Chest to rule out pneumonia.   Ordered CBC in 1-2 weeks.   Ordered renal function panel in 1-2 weeks.    IMPRESSION:  Assessed UTI treatment with Cipro, appropriate given multiple bacteria present and improvement in symptoms. Continue Cipro as prescribed for UTI, likely through Thursday (7 day course).  Evaluated persistent cough, likely due to post-nasal drip. Ordered XR Chest to rule out pneumonia.  Blood in urine likely due to UTI, exacerbated by  anticoagulants.  Reviewed recent lab values, including hemoglobin levels post-transfusion.  No need for immediate steroid or breathing treatments based on current lung exam.    PLAN SUMMARY:   Order CBC and renal function panel in 1-2 weeks   Order XR Chest to rule out pneumonia   Start using steam or humidifier   Increase fluid intake   Continue physical therapy exercises as tolerated   Decrease Famotidine to 3 times per week   Start plain Mucinex   Continue Astelin nasal spray as prescribed   Contact office if urine not clear of blood by Thursday   Follow up with Dr. Rodriguez regarding renal function test results   Follow up with Dr. Lazo regarding CBC results    PATIENT EDUCATION:   Explained blood in urine from UTI is due to bacteria adhering to bladder wall, causing micro-capillary bleeding.   Advised few drops of blood can discolor entire urine sample.   Discussed proper clean catch urine sample technique for future tests.   Explained asymptomatic UTIs are common in older women.    ACTION ITEMS/LIFESTYLE:   Allyson to increase fluid intake to help flush urinary system.   Recommend using steam or humidifier to help thin mucus.   Allyson to continue physical therapy exercises as tolerated.    MEDICATIONS:   Started plain Mucinex to help break up mucus.   Continued Astelin nasal spray as currently prescribed.   Decreased Famotidine to 3 times per week from daily dosing.    FOLLOW UP:   Contact office if urine is not clear of blood by Thursday to potentially extend antibiotic course.   Follow up with Dr. Lazo regarding CBC results.   Follow up with Dr. Rodriguez regarding renal function test results.         Visit today included increased complexity associated with the care of the episodic problem recurrent uti addressed and managing the longitudinal care of the patient due to the serious and/or complex managed problem(s) cough, RA, chf.     This note was generated with the assistance of ambient listening  technology. Verbal consent was obtained by the patient and accompanying visitor(s) for the recording of patient appointment to facilitate this note. I attest to having reviewed and edited the generated note for accuracy, though some syntax or spelling errors may persist. Please contact the author of this note for any clarification.

## 2025-05-13 ENCOUNTER — RESULTS FOLLOW-UP (OUTPATIENT)
Dept: FAMILY MEDICINE | Facility: CLINIC | Age: 75
End: 2025-05-13

## 2025-05-15 ENCOUNTER — TELEPHONE (OUTPATIENT)
Dept: FAMILY MEDICINE | Facility: CLINIC | Age: 75
End: 2025-05-15
Payer: MEDICARE

## 2025-05-15 ENCOUNTER — OFFICE VISIT (OUTPATIENT)
Dept: NEPHROLOGY | Facility: CLINIC | Age: 75
End: 2025-05-15
Payer: MEDICARE

## 2025-05-15 VITALS — DIASTOLIC BLOOD PRESSURE: 65 MMHG | SYSTOLIC BLOOD PRESSURE: 121 MMHG | HEART RATE: 78 BPM | OXYGEN SATURATION: 99 %

## 2025-05-15 DIAGNOSIS — C34.90 SMALL CELL CARCINOMA OF LUNG, UNSPECIFIED LATERALITY, UNSPECIFIED PART OF LUNG: ICD-10-CM

## 2025-05-15 DIAGNOSIS — G47.33 OSA TREATED WITH BIPAP: ICD-10-CM

## 2025-05-15 DIAGNOSIS — Z87.891 HISTORY OF TOBACCO ABUSE: ICD-10-CM

## 2025-05-15 DIAGNOSIS — I10 ESSENTIAL HYPERTENSION: ICD-10-CM

## 2025-05-15 DIAGNOSIS — N18.32 STAGE 3B CHRONIC KIDNEY DISEASE: Primary | ICD-10-CM

## 2025-05-15 DIAGNOSIS — J44.9 CHRONIC OBSTRUCTIVE PULMONARY DISEASE, UNSPECIFIED COPD TYPE: ICD-10-CM

## 2025-05-15 DIAGNOSIS — E87.20 METABOLIC ACIDOSIS: ICD-10-CM

## 2025-05-15 DIAGNOSIS — B96.89 ACUTE BACTERIAL SIMPLE CYSTITIS: Primary | ICD-10-CM

## 2025-05-15 DIAGNOSIS — N30.80 ACUTE BACTERIAL SIMPLE CYSTITIS: Primary | ICD-10-CM

## 2025-05-15 PROCEDURE — 1159F MED LIST DOCD IN RCRD: CPT | Mod: CPTII,S$GLB,, | Performed by: STUDENT IN AN ORGANIZED HEALTH CARE EDUCATION/TRAINING PROGRAM

## 2025-05-15 PROCEDURE — 1126F AMNT PAIN NOTED NONE PRSNT: CPT | Mod: CPTII,S$GLB,, | Performed by: STUDENT IN AN ORGANIZED HEALTH CARE EDUCATION/TRAINING PROGRAM

## 2025-05-15 PROCEDURE — 99999 PR PBB SHADOW E&M-EST. PATIENT-LVL III: CPT | Mod: PBBFAC,,, | Performed by: STUDENT IN AN ORGANIZED HEALTH CARE EDUCATION/TRAINING PROGRAM

## 2025-05-15 PROCEDURE — 3074F SYST BP LT 130 MM HG: CPT | Mod: CPTII,S$GLB,, | Performed by: STUDENT IN AN ORGANIZED HEALTH CARE EDUCATION/TRAINING PROGRAM

## 2025-05-15 PROCEDURE — 99214 OFFICE O/P EST MOD 30 MIN: CPT | Mod: S$GLB,,, | Performed by: STUDENT IN AN ORGANIZED HEALTH CARE EDUCATION/TRAINING PROGRAM

## 2025-05-15 PROCEDURE — 3078F DIAST BP <80 MM HG: CPT | Mod: CPTII,S$GLB,, | Performed by: STUDENT IN AN ORGANIZED HEALTH CARE EDUCATION/TRAINING PROGRAM

## 2025-05-15 RX ORDER — CIPROFLOXACIN 500 MG/1
500 TABLET, FILM COATED ORAL 2 TIMES DAILY
Qty: 14 TABLET | Refills: 0 | Status: SHIPPED | OUTPATIENT
Start: 2025-05-15 | End: 2025-05-22

## 2025-05-15 NOTE — PROGRESS NOTES
[facility]  Nephrology Clinic    Subjective:       HPI ID: Allyson Henriquez is a 75 y.o. female who presents for return patient evaluation for chronic renal failure.   Previously followed by another [facility] nephrologist in this clinical practice setting but lost to follow up in 2021. Re-visited with patient during inpatient encounter last month where she was admitted for abnormal labs/JEAN.   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 Gonzalez. She was sent to [facility] late Dec '24 for evaluation of abnormal labs at her oncology appointment. Lab work was notable for hyponatremia, hyperkalemia, NAGMA, and BUN/Cr of 81/5.11mg/dL. Our nephrology service was consulted for further evaluation. Of note, her renal function was at baseline (eGFR 60ml/min) as recent as 3 weeks prior to presentation. Her history of the previous month was extensively vetted during the consultation and workup did not reveal an inciting etiology. Renal biopsy was offered to explain JEAN of unknown etiology, including DDx of ATN vs AIN. Patient refused renal biopsy. She was eventually discharged to home with supportive care.     In terms of her renal history, she denies hospitalizations for prior JEAN or JEAN requiring RRT. Denies known history of nephrolithiasis or hematuria episodes. No reported history of frequent or recurrent use of NSAIDs/COXI, though she has Rx NSAIDs for recent knee pains - she denies frequent use. No pertinent rheumatologic or AI disease history. Denies any pertinent family history of known kidney disease, or family members with ESRD requiring dialysis.  She typically uses purewick at night d/t urinary frequency and limited mobility/performance status. Denies new medications in last three weeks. Denies acute/recent illness.   Fluid intake in 24hrs: at least 40oz    Since discharge to home, she continues to  feel at her usual state of health. Previsit labs obtained yesterday show sCr is now improved to 2.04mg/dL. Electrolyte panel is stable. She denies complaints. Does not want to do any further testing / renal biopsy. Denies changes to appetite or decreased fluid intake. Denies dysuria.     Interval history:  02/18/25 - here today for f/u. She is feeling better since last visit. Renal fxn based on serum creatinine trend is improving, almost near baseline values now. We discussed her diet, increasing more protein intake. Last Keytruda infusion 8/2024. Biggest QOL issue for her currently is her RUE swelling. DVT study did not reveal blood clots. She did establish with Rheumatology yesterday to evaluate further.  We discussed ways to elevate her arm and non pharmacologic ways to manage edema  Discussed BP trends at home; low BP trends though asymptomatic.   Proteinuria trends improving    5/15/25 - here for f/u evaluation. Recently having issues with UTI with hematuria and on ciprofloxacin course via PCP. Urine culture with multiple organisms isolated. She is doing well with PT. She is doing her best with fluid intake. Going to follow up with oncology at the end of next month. No other complaints today. Reports appetite is improving. We reviewed recent labs at chairside.      Review of Systems   Constitutional:  Positive for appetite change (Appetite improving). Negative for chills, diaphoresis and fever.   Respiratory:  Negative for cough and shortness of breath.    Cardiovascular:  Negative for chest pain and leg swelling.   Gastrointestinal:  Negative for abdominal pain, diarrhea, nausea and vomiting.   Genitourinary:  Negative for difficulty urinating, dysuria and hematuria.   Musculoskeletal:  Negative for myalgias.   Neurological:  Negative for headaches.   Hematological:  Does not bruise/bleed easily.        The past medical, family and social histories were reviewed for this encounter.     /65 (Patient  Position: Sitting)   Pulse 78   SpO2 99%     Objective:      Physical Exam  Vitals reviewed.   Constitutional:       General: She is not in acute distress.     Appearance: She is well-developed.   Cardiovascular:      Heart sounds: Normal heart sounds. No murmur heard.     No friction rub. No gallop.   Pulmonary:      Effort: Pulmonary effort is normal. No respiratory distress.      Breath sounds: Normal breath sounds. No wheezing or rales.   Abdominal:      General: There is no distension.      Palpations: Abdomen is soft.   Musculoskeletal:      Right forearm: Swelling and edema present.   Skin:     General: Skin is warm and dry.      Findings: No rash.   Neurological:      Mental Status: She is alert and oriented to person, place, and time.         Assessment:       Lab Results   Component Value Date     (L) 05/08/2025    K 5.0 05/08/2025     05/08/2025    CO2 21 (L) 05/08/2025    BUN 45 (H) 05/08/2025    CREATININE 1.36 05/08/2025    CALCIUM 9.1 05/08/2025    ALBUMIN 3.3 (L) 05/08/2025    PHOS 3.2 05/08/2025     Lab Results   Component Value Date    MICALBCREAT 1237.1 (H) 05/08/2025    MICALBCREAT 207.0 (H) 01/28/2025    MICALBCREAT 401.9 (H) 01/09/2025    MICALBCREAT 427.5 (H) 03/14/2023       No diagnosis found.          Plan:   Return to clinic in 3 months  Baseline creatinine is 0.9-1.2mg/dL, 1.2-1.3mg/dL  Orders Placed This Encounter   Procedures    Protein/Creatinine Ratio, Urine    PTH, Intact    Renal Function Panel    Microalbumin/Creatinine Ratio, Urine    Magnesium       CKD stage G3b / A?  -baseline eGFR >60ml/min, lately eGFR 30-40s >90 days  -hospital imaging w/ mild R hydronephrosis, bilateral ureteral jets present however.   -voiding without issue; tolerating PO  -avoiding NSAIDS  -no plans for renal biopsy -she previously refused  -continuing to improve; approaching new baseline.    Metabolic acidosis, NAGMA  -continue NaHCO3 tabs    COPD  -compensated at this time    CHFpEF-  compensated at this time    TRISHA-continue bipap    pAF - on apixaban for primary prevention    Small cell carcinoma of lung - followed by oncology Dr. Gonzalez, s/p systemic immunotherapy. Under imaging surveillance currently. F/u next month     Right upper extremity swelling-establishing with rheumatology.  Imaging reviewed.  No DVT.  Instructed patient to elevate arm.  Warm compress with non direct to skin heating pad.      Hypoalbuminemia-diet appears to be advancing.  Advancing protein in the diet as she tolerates.     __________________________  Kin Gallegos MD  [facility]    Part of this note has been created using [employer] dictation system. Errors in transcription may not be completely avoided.        KFRE 2-Year: 3% at 5/8/2025 11:59 AM  Calculated from:  Serum Creatinine: 1.36 mg/dL at 5/8/2025 11:44 AM  Urine Albumin Creatinine Ratio: 1,237.1 ug/mg at 5/8/2025 11:59 AM  Age: 75 years  Sex: Female at 5/8/2025 11:59 AM  Has CKD-3 to CKD-5: Yes    KFRE 5-Year: 9.2% at 5/8/2025 11:59 AM  Calculated from:  Serum Creatinine: 1.36 mg/dL at 5/8/2025 11:44 AM  Urine Albumin Creatinine Ratio: 1,237.1 ug/mg at 5/8/2025 11:59 AM  Age: 75 years  Sex: Female at 5/8/2025 11:59 AM  Has CKD-3 to CKD-5: Yes

## 2025-05-15 NOTE — TELEPHONE ENCOUNTER
Patient stated she does need an extension of refills on the Ciprofloxacin sent to [facility] on [address].

## 2025-05-15 NOTE — TELEPHONE ENCOUNTER
----- Message from Elda sent at 5/15/2025 10:39 AM CDT -----  Type:  Needs Medical AdviceWho Called: pt  Pharmacy name and phone #:  [id number] - [place] [address] [address] AT [address] [address]Rtitr: [phone] Fax: [phone] the patient rather a call back or a response via [facility]? Call back Best Call Back Number: [phone] Additional Information: was informed at appt by elder to call if they needed antibiotics extended.    Please call back to advise. Thank you.

## 2025-05-20 ENCOUNTER — LAB VISIT (OUTPATIENT)
Dept: LAB | Facility: HOSPITAL | Age: 75
End: 2025-05-20
Attending: FAMILY MEDICINE
Payer: MEDICARE

## 2025-05-20 DIAGNOSIS — Z79.60 LONG-TERM USE OF IMMUNOSUPPRESSANT MEDICATION: ICD-10-CM

## 2025-05-20 DIAGNOSIS — M06.00 SERONEGATIVE RHEUMATOID ARTHRITIS: ICD-10-CM

## 2025-05-20 LAB
ABSOLUTE EOSINOPHIL (OHS): 0.22 K/UL
ABSOLUTE MONOCYTE (OHS): 0.86 K/UL (ref 0.3–1)
ABSOLUTE NEUTROPHIL COUNT (OHS): 4.97 K/UL (ref 1.8–7.7)
ALBUMIN SERPL BCP-MCNC: 3.1 G/DL (ref 3.5–5.2)
ALP SERPL-CCNC: 65 UNIT/L (ref 40–150)
ALT SERPL W/O P-5'-P-CCNC: 9 UNIT/L (ref 10–44)
ANION GAP (OHS): 8 MMOL/L (ref 8–16)
AST SERPL-CCNC: 14 UNIT/L (ref 11–45)
BASOPHILS # BLD AUTO: 0.06 K/UL
BASOPHILS NFR BLD AUTO: 0.8 %
BILIRUB SERPL-MCNC: 0.3 MG/DL (ref 0.1–1)
BUN SERPL-MCNC: 38 MG/DL (ref 8–23)
CALCIUM SERPL-MCNC: 9.4 MG/DL (ref 8.7–10.5)
CHLORIDE SERPL-SCNC: 108 MMOL/L (ref 95–110)
CO2 SERPL-SCNC: 19 MMOL/L (ref 23–29)
CREAT SERPL-MCNC: 1.7 MG/DL (ref 0.5–1.4)
ERYTHROCYTE [DISTWIDTH] IN BLOOD BY AUTOMATED COUNT: 19.6 % (ref 11.5–14.5)
GFR SERPLBLD CREATININE-BSD FMLA CKD-EPI: 31 ML/MIN/1.73/M2
GLUCOSE SERPL-MCNC: 142 MG/DL (ref 70–110)
HCT VFR BLD AUTO: 26 % (ref 37–48.5)
HGB BLD-MCNC: 7.9 GM/DL (ref 12–16)
IMM GRANULOCYTES # BLD AUTO: 0.06 K/UL (ref 0–0.04)
IMM GRANULOCYTES NFR BLD AUTO: 0.8 % (ref 0–0.5)
LYMPHOCYTES # BLD AUTO: 0.89 K/UL (ref 1–4.8)
MCH RBC QN AUTO: 29.2 PG (ref 27–31)
MCHC RBC AUTO-ENTMCNC: 30.4 G/DL (ref 32–36)
MCV RBC AUTO: 96 FL (ref 82–98)
NUCLEATED RBC (/100WBC) (OHS): 0 /100 WBC
PLATELET # BLD AUTO: 205 K/UL (ref 150–450)
PMV BLD AUTO: 10.8 FL (ref 9.2–12.9)
POTASSIUM SERPL-SCNC: 4 MMOL/L (ref 3.5–5.1)
PROT SERPL-MCNC: 6.3 GM/DL (ref 6–8.4)
RBC # BLD AUTO: 2.71 M/UL (ref 4–5.4)
RELATIVE EOSINOPHIL (OHS): 3.1 %
RELATIVE LYMPHOCYTE (OHS): 12.6 % (ref 18–48)
RELATIVE MONOCYTE (OHS): 12.2 % (ref 4–15)
RELATIVE NEUTROPHIL (OHS): 70.5 % (ref 38–73)
SODIUM SERPL-SCNC: 135 MMOL/L (ref 136–145)
WBC # BLD AUTO: 7.06 K/UL (ref 3.9–12.7)

## 2025-05-20 PROCEDURE — 36415 COLL VENOUS BLD VENIPUNCTURE: CPT | Mod: PN

## 2025-05-20 PROCEDURE — 80053 COMPREHEN METABOLIC PANEL: CPT

## 2025-05-20 PROCEDURE — 85025 COMPLETE CBC W/AUTO DIFF WBC: CPT

## 2025-05-22 ENCOUNTER — PATIENT MESSAGE (OUTPATIENT)
Dept: FAMILY MEDICINE | Facility: CLINIC | Age: 75
End: 2025-05-22
Payer: MEDICARE

## 2025-05-22 DIAGNOSIS — Z79.60 LONG-TERM USE OF IMMUNOSUPPRESSANT MEDICATION: ICD-10-CM

## 2025-05-22 DIAGNOSIS — B96.89 ACUTE BACTERIAL SIMPLE CYSTITIS: Primary | ICD-10-CM

## 2025-05-22 DIAGNOSIS — N30.80 ACUTE BACTERIAL SIMPLE CYSTITIS: Primary | ICD-10-CM

## 2025-05-22 DIAGNOSIS — M06.00 SERONEGATIVE RHEUMATOID ARTHRITIS: ICD-10-CM

## 2025-05-22 RX ORDER — CEPHALEXIN 500 MG/1
500 CAPSULE ORAL EVERY 8 HOURS
Qty: 15 CAPSULE | Refills: 0 | Status: SHIPPED | OUTPATIENT
Start: 2025-05-22

## 2025-06-02 ENCOUNTER — PATIENT MESSAGE (OUTPATIENT)
Dept: CARDIOLOGY | Facility: CLINIC | Age: 75
End: 2025-06-02
Payer: MEDICARE

## 2025-06-03 ENCOUNTER — TELEPHONE (OUTPATIENT)
Dept: FAMILY MEDICINE | Facility: CLINIC | Age: 75
End: 2025-06-03
Payer: MEDICARE

## 2025-06-03 ENCOUNTER — RESULTS FOLLOW-UP (OUTPATIENT)
Dept: ENDOCRINOLOGY | Facility: CLINIC | Age: 75
End: 2025-06-03

## 2025-06-03 DIAGNOSIS — N30.80 ACUTE BACTERIAL SIMPLE CYSTITIS: ICD-10-CM

## 2025-06-03 DIAGNOSIS — B96.89 ACUTE BACTERIAL SIMPLE CYSTITIS: ICD-10-CM

## 2025-06-03 DIAGNOSIS — E10.21 TYPE 1 DIABETES MELLITUS WITH NEPHROPATHY: Primary | ICD-10-CM

## 2025-06-05 ENCOUNTER — RESULTS FOLLOW-UP (OUTPATIENT)
Dept: FAMILY MEDICINE | Facility: CLINIC | Age: 75
End: 2025-06-05

## 2025-06-05 DIAGNOSIS — C79.51 SECONDARY MALIGNANT NEOPLASM OF BONE AND BONE MARROW: ICD-10-CM

## 2025-06-05 DIAGNOSIS — M06.00 SERONEGATIVE RHEUMATOID ARTHRITIS: ICD-10-CM

## 2025-06-05 DIAGNOSIS — C79.31 SECONDARY MALIGNANT NEOPLASM OF BRAIN AND SPINAL CORD: ICD-10-CM

## 2025-06-05 DIAGNOSIS — C79.52 SECONDARY MALIGNANT NEOPLASM OF BONE AND BONE MARROW: ICD-10-CM

## 2025-06-05 DIAGNOSIS — F06.4 ORGANIC ANXIETY DISORDER: ICD-10-CM

## 2025-06-05 DIAGNOSIS — C79.49 SECONDARY MALIGNANT NEOPLASM OF BRAIN AND SPINAL CORD: ICD-10-CM

## 2025-06-05 DIAGNOSIS — C77.1 SECONDARY AND UNSPECIFIED MALIGNANT NEOPLASM OF INTRATHORACIC LYMPH NODES: ICD-10-CM

## 2025-06-05 DIAGNOSIS — Z79.60 LONG-TERM USE OF IMMUNOSUPPRESSANT MEDICATION: ICD-10-CM

## 2025-06-05 DIAGNOSIS — C34.91 PRIMARY LUNG CANCER WITH METASTASIS FROM LUNG TO OTHER SITE, RIGHT: ICD-10-CM

## 2025-06-09 ENCOUNTER — PATIENT MESSAGE (OUTPATIENT)
Dept: ENDOCRINOLOGY | Facility: CLINIC | Age: 75
End: 2025-06-09
Payer: MEDICARE

## 2025-06-10 ENCOUNTER — OFFICE VISIT (OUTPATIENT)
Dept: ENDOCRINOLOGY | Facility: CLINIC | Age: 75
End: 2025-06-10
Payer: MEDICARE

## 2025-06-10 ENCOUNTER — TELEPHONE (OUTPATIENT)
Dept: ENDOCRINOLOGY | Facility: CLINIC | Age: 75
End: 2025-06-10

## 2025-06-10 VITALS
WEIGHT: 163.13 LBS | BODY MASS INDEX: 26.22 KG/M2 | SYSTOLIC BLOOD PRESSURE: 120 MMHG | DIASTOLIC BLOOD PRESSURE: 60 MMHG | HEART RATE: 74 BPM | HEIGHT: 66 IN

## 2025-06-10 DIAGNOSIS — E10.21 TYPE 1 DIABETES MELLITUS WITH NEPHROPATHY: Primary | ICD-10-CM

## 2025-06-10 DIAGNOSIS — C34.90 SMALL CELL CARCINOMA OF LUNG, UNSPECIFIED LATERALITY, UNSPECIFIED PART OF LUNG: ICD-10-CM

## 2025-06-10 DIAGNOSIS — D64.9 ANEMIA, UNSPECIFIED TYPE: ICD-10-CM

## 2025-06-10 DIAGNOSIS — E10.649 HYPOGLYCEMIA UNAWARENESS ASSOCIATED WITH TYPE 1 DIABETES MELLITUS: ICD-10-CM

## 2025-06-10 DIAGNOSIS — E03.9 HYPOTHYROIDISM, UNSPECIFIED TYPE: ICD-10-CM

## 2025-06-10 PROCEDURE — 3074F SYST BP LT 130 MM HG: CPT | Mod: CPTII,S$GLB,, | Performed by: NURSE PRACTITIONER

## 2025-06-10 PROCEDURE — 1159F MED LIST DOCD IN RCRD: CPT | Mod: CPTII,S$GLB,, | Performed by: NURSE PRACTITIONER

## 2025-06-10 PROCEDURE — 99214 OFFICE O/P EST MOD 30 MIN: CPT | Mod: S$GLB,,, | Performed by: NURSE PRACTITIONER

## 2025-06-10 PROCEDURE — 1160F RVW MEDS BY RX/DR IN RCRD: CPT | Mod: CPTII,S$GLB,, | Performed by: NURSE PRACTITIONER

## 2025-06-10 PROCEDURE — 1101F PT FALLS ASSESS-DOCD LE1/YR: CPT | Mod: CPTII,S$GLB,, | Performed by: NURSE PRACTITIONER

## 2025-06-10 PROCEDURE — 99999 PR PBB SHADOW E&M-EST. PATIENT-LVL V: CPT | Mod: PBBFAC,,, | Performed by: NURSE PRACTITIONER

## 2025-06-10 PROCEDURE — 3288F FALL RISK ASSESSMENT DOCD: CPT | Mod: CPTII,S$GLB,, | Performed by: NURSE PRACTITIONER

## 2025-06-10 PROCEDURE — 3078F DIAST BP <80 MM HG: CPT | Mod: CPTII,S$GLB,, | Performed by: NURSE PRACTITIONER

## 2025-06-10 PROCEDURE — 95251 CONT GLUC MNTR ANALYSIS I&R: CPT | Mod: S$GLB,,, | Performed by: NURSE PRACTITIONER

## 2025-06-10 PROCEDURE — 1126F AMNT PAIN NOTED NONE PRSNT: CPT | Mod: CPTII,S$GLB,, | Performed by: NURSE PRACTITIONER

## 2025-06-10 PROCEDURE — G2211 COMPLEX E/M VISIT ADD ON: HCPCS | Mod: S$GLB,,, | Performed by: NURSE PRACTITIONER

## 2025-06-10 RX ORDER — INSULIN PUMP SYRINGE, 3 ML
EACH MISCELLANEOUS
Qty: 1 EACH | Refills: 0 | Status: SHIPPED | OUTPATIENT
Start: 2025-06-10

## 2025-06-10 RX ORDER — LANCETS
1 EACH MISCELLANEOUS DAILY
Qty: 100 EACH | Refills: 3 | Status: SHIPPED | OUTPATIENT
Start: 2025-06-10

## 2025-06-10 RX ORDER — INSULIN GLARGINE 100 [IU]/ML
18 INJECTION, SOLUTION SUBCUTANEOUS NIGHTLY
Start: 2025-06-10

## 2025-06-10 NOTE — TELEPHONE ENCOUNTER
Should we send her a brand new meter and diabetic supplies to pharmacy?   

## 2025-06-10 NOTE — PROGRESS NOTES
CC: Ms. Allyson Henriquez arrives today for management of Type 2 DM and review of chronic medical conditions, as listed in the Visit Diagnosis section of this encounter.       HPI: Ms. Allyson Henriquez was diagnosed with Type 2 DM in 4/2023. She was diagnosed based on lab work. Initial treatment consisted of insulin. Denies FH of DM. Denies hospitalizations due to DM.   Low C-peptide noted (<0.08) in 9/2023. Was using Keytruda for ~ 6 months prior to this finding. Now diagnosed at Type 1 DM. Was using  Keytruda (started in 1/2023) for small cell lung cancer. This was stopped in 8/2024.   Follows with Dr. Daly for hypothyroidism, multinodular goiter.   Follows with Dr. Gonzalez for small cell lung cancer.     Patient was last seen by me in January.    BG monitoring per Dexcom G7. She has had issues with sensor failures recently. She is requesting a new meter and testing supplies, due to error message that occurs when she does a fingerstick.     Hypoglycemia: Rare   Hypoglycemic Symptoms: none  Hypoglycemia Treatment: juice     Missing Insulin/PO medication doses: Occasional  Timing prandial insulin 5-15 minutes before meals: yes    Dietary Habits: Eats 2 meals/day. Has been eating meals from [employer].  Avoids sugary beverages.    Last DM education appointment: 8/2023    Follows with Rheum at [place] for RA. Taking methotrexate.       CURRENT DIABETIC MEDS: Lantus 20 units QHS, Novolog 8 units + correction scale, target 150, ISF 25   Vial or pen: pen  Glucometer type:     Previous DM treatments:    Last Eye Exam: 11/15/2024, no DR per pt  Last Podiatry Exam:     REVIEW OF SYSTEMS  Constitutional: no recent weight loss. + fatigue, weakness. + weight gain  Cardiac: no palpitations or chest pain.  Respiratory: no dyspnea. + intermittent dry cough that she attributes to sinus drip.  GI: no c/o abdominal pain or nausea. Denies h/o pancreatitis.   Skin: no lesions or rashes.   Neuro: denies numbness, tingling,  "paresthesias. + nerve pain to back that she attributes post herpetic neuralgia. Takes gabapentin.   Endocrine: denies polyphagia, polydipsia, polyuria      Personally reviewed Past Medical, Surgical, Social History.    Vital Signs  /60   Pulse 74   Ht 5' 6" (1.676 m)   Wt 74 kg (163 lb 2.3 oz)   BMI 26.33 kg/m²     Personally reviewed the below labs:    Hemoglobin A1C   Date Value Ref Range Status   06/03/2025 5.0 4.0 - 5.6 % Final     Comment:     Reference Interval:  5.0 - 5.6 Normal   5.7 - 6.4 High Risk   > 6.5 Diabetic      Hgb A1c results are standardized based on the (NGSP) National   Glycohemoglobin Standardization Program.      Hemoglobin A1C levels are related to mean serum/plasma glucose   during the preceding 2-3 months.        02/12/2025 5.4 4.0 - 5.6 % Final     Comment:     Reference Interval:  5.0 - 5.6 Normal   5.7 - 6.4 High Risk   > 6.5 Diabetic      Hgb A1c results are standardized based on the (NGSP) National   Glycohemoglobin Standardization Program.      Hemoglobin A1C levels are related to mean serum/plasma glucose   during the preceding 2-3 months.        01/09/2025 5.7 (H) 4.0 - 5.6 % Final     Comment:     Reference Interval:  5.0 - 5.6 Normal   5.7 - 6.4 High Risk   > 6.5 Diabetic      Hgb A1c results are standardized based on the (NGSP) National   Glycohemoglobin Standardization Program.      Hemoglobin A1C levels are related to mean serum/plasma glucose   during the preceding 2-3 months.            Chemistry        Component Value Date/Time     06/03/2025 0757    K 3.9 06/03/2025 0757     06/03/2025 0757    CO2 23 06/03/2025 0757    BUN 37 (H) 06/03/2025 0757    BUN 38 (H) 05/20/2025 1128    CREATININE 1.35 06/03/2025 0757    CREATININE 1.7 (H) 05/20/2025 1128     (H) 06/03/2025 0757        Component Value Date/Time    CALCIUM 9.4 06/03/2025 0757    ALKPHOS 77 06/03/2025 0757    AST 16 06/03/2025 0757    ALT 9 (L) 06/03/2025 0757    BILITOT 0.3 06/03/2025 " 0757    ESTGFRAFRICA >60 07/31/2022 0430    EGFRNONAA 55 (A) 07/31/2022 0430          Lab Results   Component Value Date    CHOL 111 (L) 01/09/2025    CHOL 134 09/11/2023    CHOL 158 08/19/2022     Lab Results   Component Value Date    HDL 35 (L) 01/09/2025    HDL 38 (L) 09/11/2023    HDL 22 (L) 08/19/2022     Lab Results   Component Value Date    LDLCALC 56.4 (L) 01/09/2025    LDLCALC 63.2 09/11/2023    LDLCALC 103.2 08/19/2022     Lab Results   Component Value Date    TRIG 98 01/09/2025    TRIG 164 (H) 09/11/2023    TRIG 164 (H) 08/19/2022     Lab Results   Component Value Date    CHOLHDL 31.5 01/09/2025    CHOLHDL 28.4 09/11/2023    CHOLHDL 13.9 (L) 08/19/2022       Lab Results   Component Value Date    MICALBCREAT 1237.1 (H) 05/08/2025     Lab Results   Component Value Date    TSH 2.154 04/21/2025       CrCl cannot be calculated (Unknown ideal weight.).    Vit D, 25-Hydroxy   Date Value Ref Range Status   04/03/2023 61 30 - 96 ng/mL Final     Comment:     Vitamin D deficiency.........<10 ng/mL                              Vitamin D insufficiency......10-29 ng/mL       Vitamin D sufficiency........> or equal to 30 ng/mL  Vitamin D toxicity............>100 ng/mL        Latest Reference Range & Units 06/03/24 07:11   C-Peptide 0.78 - 5.19 ng/mL <0.08 (L)   (L): Data is abnormally low     Latest Reference Range & Units 09/11/23 10:24   C-Peptide 0.78 - 5.19 ng/mL <0.08 (L)   (L): Data is abnormally low      PHYSICAL EXAMINATION  Constitutional: Appears well, no distress  Respiratory: CTA, even and unlabored.  Cardiovascular: RRR, no murmurs, no carotid bruits.  GI: bowel sounds active, no hernia noted  Skin: warm and dry  Neuro: oriented to person, place, time  Feet: appropriate footwear.        DEXCOM G7 DOWNLOAD: Fasting glucoses are stable. Some borderline fasting hypoglycemia noted.  Sporadic prandial excursions noted.          Goals        HEMOGLOBIN A1C < 7               Assessment/Plan  1. Type 1 diabetes  mellitus with nephropathy  -- A1c likely falsely low, due to anemia. Majority of glucoses are reasonable on CGM report  -- decrease Lantus to 18 units QHS, due to some mornings < 100  -- continue Novolog 8 units with meals + correction scale.   -- BG monitoring per Dexcom G7. Will send this chart note to [employer]  -- new meter, testing supplies sent to pharmacy to use for back up.     -- Discussed diagnosis of DM, A1c goals, progression of disease, long term complications and tx options.  -- Reviewed hypoglycemia management: treat with 4 oz of juice, 4 oz regular soda, or 4 glucose tablets. Monitor and repeat treatment every 15 minutes until BG is >70 Then have a snack, which includes 15 grams of complex carbohydrates and protein.   Advised patient to check BG before activities, such as driving or exercise.   2. Hypothyroidism, unspecified type  -- stable  -- follows with Dr. Daly.    3. Hypoglycemia unawareness associated with type 1 diabetes mellitus  -- continue Dexcom G7   4. Small cell lung cancer  -- previously used Keytruda, which may have led to autoimmune DM  -- follows with Dr. Gonzalez   5. Anemia  -- improving  -- may falsely lower A1c.           FOLLOW UP  Follow up in about 3 months (around 9/10/2025).   Patient instructed to bring BG logs to each follow up   Patient encouraged to call for any BG/medication issues, concerns, or questions.    Orders Placed This Encounter   Procedures    Hemoglobin A1C    Comprehensive Metabolic Panel

## 2025-06-25 ENCOUNTER — OFFICE VISIT (OUTPATIENT)
Dept: ORTHOPEDICS | Facility: CLINIC | Age: 75
End: 2025-06-25
Payer: MEDICARE

## 2025-06-25 ENCOUNTER — HOSPITAL ENCOUNTER (OUTPATIENT)
Dept: RADIOLOGY | Facility: HOSPITAL | Age: 75
Discharge: HOME | End: 2025-06-25
Attending: ORTHOPAEDIC SURGERY
Payer: MEDICARE

## 2025-06-25 VITALS — RESPIRATION RATE: 16 BRPM

## 2025-06-25 DIAGNOSIS — M25.511 RIGHT SHOULDER PAIN, UNSPECIFIED CHRONICITY: Primary | ICD-10-CM

## 2025-06-25 DIAGNOSIS — M25.511 RIGHT SHOULDER PAIN, UNSPECIFIED CHRONICITY: ICD-10-CM

## 2025-06-25 DIAGNOSIS — M17.10 ARTHRITIS OF KNEE: ICD-10-CM

## 2025-06-25 DIAGNOSIS — M25.561 CHRONIC PAIN OF BOTH KNEES: ICD-10-CM

## 2025-06-25 DIAGNOSIS — M19.011 PRIMARY OSTEOARTHRITIS OF RIGHT SHOULDER: Primary | ICD-10-CM

## 2025-06-25 DIAGNOSIS — M25.562 CHRONIC PAIN OF BOTH KNEES: ICD-10-CM

## 2025-06-25 DIAGNOSIS — G89.29 CHRONIC PAIN OF BOTH KNEES: ICD-10-CM

## 2025-06-25 DIAGNOSIS — M24.661 ARTHROFIBROSIS OF KNEE JOINT, RIGHT: ICD-10-CM

## 2025-06-25 PROCEDURE — 73030 X-RAY EXAM OF SHOULDER: CPT | Mod: TC,PO,RT

## 2025-06-25 PROCEDURE — 1159F MED LIST DOCD IN RCRD: CPT | Mod: CPTII,S$GLB,, | Performed by: ORTHOPAEDIC SURGERY

## 2025-06-25 PROCEDURE — 1160F RVW MEDS BY RX/DR IN RCRD: CPT | Mod: CPTII,S$GLB,, | Performed by: ORTHOPAEDIC SURGERY

## 2025-06-25 PROCEDURE — 99999 PR PBB SHADOW E&M-EST. PATIENT-LVL II: CPT | Mod: PBBFAC,,, | Performed by: ORTHOPAEDIC SURGERY

## 2025-06-25 PROCEDURE — 73030 X-RAY EXAM OF SHOULDER: CPT | Mod: 26,RT,, | Performed by: RADIOLOGY

## 2025-06-25 PROCEDURE — 1125F AMNT PAIN NOTED PAIN PRSNT: CPT | Mod: CPTII,S$GLB,, | Performed by: ORTHOPAEDIC SURGERY

## 2025-06-25 PROCEDURE — 99214 OFFICE O/P EST MOD 30 MIN: CPT | Mod: S$GLB,,, | Performed by: ORTHOPAEDIC SURGERY

## 2025-06-25 NOTE — PROGRESS NOTES
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 difficile colitis 08/2021    Cancer     lung    CKD (chronic kidney disease), stage III     Colonic diverticular abscess 07/12/2022    COPD (chronic obstructive pulmonary disease)     Encounter for blood transfusion     Hepatic steatosis     History of numbness     transient numbness left hand and foot    History of tobacco abuse     Hyperlipidemia     Hypertension     Lung nodules     and adenopathy    Multinodular goiter (nontoxic) 05/08/2017    Obesity     TRISHA (obstructive sleep apnea)     on bipap at bedtime    Proteinuria        Past Surgical History:   Procedure Laterality Date    ADENOIDECTOMY      APPENDECTOMY      CATARACT EXTRACTION      COLONOSCOPY N/A 11/14/2016    Procedure: COLONOSCOPY;  Surgeon: Destin Cardona MD;  Location: [facility];  Service: Endoscopy;  Laterality: N/A;    COLONOSCOPY N/A 05/14/2021    Procedure: COLONOSCOPY;  Surgeon: Destin Cardona MD;  Location: [facility];  Service: Endoscopy;  Laterality: N/A; hemorrhoids, diverticulosis, Old blood left colon. No active bleeding; Repeat colonoscopy is not recommended for screening purposes.    COLOSTOMY N/A 07/28/2022    Procedure: CREATION, COLOSTOMY;  Surgeon: Nubia Tinoco MD;  Location: [facility] OR;  Service: General;  Laterality: N/A;    DEBRIDEMENT OF WOUND OF ABDOMEN  07/28/2022    Procedure: DEBRIDEMENT, WOUND, ABDOMEN;  Surgeon: Nubia Tinoco MD;  Location: [facility] OR;  Service: General;;    ESOPHAGOGASTRODUODENOSCOPY N/A 05/14/2021    Procedure: EGD (ESOPHAGOGASTRODUODENOSCOPY);  Surgeon: Destin Cardona MD;  Location: [facility];  Service: Endoscopy;  Laterality: N/A; unremarkable     ESOPHAGOGASTRODUODENOSCOPY N/A 08/09/2021    Procedure: ESOPHAGOGASTRODUODENOSCOPY (EGD);  Surgeon: Destin Cardona MD;  Location: [facility];  Service: Endoscopy;  Laterality: N/A; Moderately severe radiation esophagitis with no bleeding    EXCISIONAL BIOPSY N/A 02/09/2023    Procedure: EXCISIONAL BIOPSY;  Surgeon: Nubia Tinoco MD;  Location: [facility] OR;  Service: General;  Laterality: N/A;    EYE SURGERY      cataract OU    HERNIA REPAIR      INSERTION OF TUNNELED CENTRAL VENOUS CATHETER (CVC) WITH SUBCUTANEOUS PORT N/A 06/07/2021    Procedure: YIVAMXCAA-LCCU-E-CATH;  Surgeon: Joe Salinas MD;  Location: [facility] OR;  Service: General;  Laterality: N/A;    INTRALUMINAL GASTROINTESTINAL TRACT IMAGING VIA CAPSULE N/A 12/29/2021    Procedure: IMAGING PROCEDURE, GI TRACT, INTRALUMINAL, VIA CAPSULE  (called for instruction 12/20-may have trouble swallowing);  Surgeon: Floyd Dixon MD;  Location: [facility] ENDO;  Service: Endoscopy;  Laterality: N/A; Diffuse red spots of unclear significance and erosions found in the small bowel (entire small bowel), suspicious for mild radiation enteritis    KNEE ARTHROSCOPY W/ MENISCECTOMY Right 9/24/2024    Procedure: ARTHROSCOPY, KNEE, WITH MENISCECTOMY;  Surgeon: Jose Rafael Barney MD;  Location: [facility] OR;  Service: Orthopedics;  Laterality: Right;  Lateral meniscectomy    NEUROMA SURGERY Left     acoustic    TONSILLECTOMY      WOUND EXPLORATION N/A 08/13/2022    Procedure: EXPLORATION, WOUND;  Surgeon: Arnel Olsen MD;  Location: [facility] OR;  Service: General;  Laterality: N/A;    WOUND EXPLORATION Left 02/09/2023    Procedure: EXPLORATION, WOUND - of Ostomy Granuloma - Wound - Abdomen;  Surgeon: Nubia Tinoco MD;  Location: [facility] OR;  Service: General;  Laterality: Left;       Current Outpatient Medications   Medication Sig    ascorbic acid, vitamin C, (VITAMIN C) 500 MG tablet Take 500 mg by mouth once daily. Indications: inadequate vitamin C    azelastine  (ASTELIN) 137 mcg (0.1 %) nasal spray USE 2 SPRAYS IN EACH NOSTRIL EVERY NIGHT AT BEDTIME    blood sugar diagnostic Strp 1 strip by Misc.(Non-Drug; Combo Route) route once daily.    blood-glucose meter kit Use as instructed    blood-glucose sensor (DEXCOM G7 SENSOR MISC) Inject 1 each into the skin every 10 days. Continues glucose monitoring    cetirizine (ZYRTEC) 10 MG tablet Take 10 mg by mouth once daily.    cholecalciferol, vitamin D3, (VITAMIN D3) 125 mcg (5,000 unit) Tab Take 5,000 Units by mouth once daily.    docusate sodium (COLACE) 100 MG capsule Take 1 capsule (100 mg total) by mouth daily as needed for Constipation.    ELIQUIS 5 mg Tab TAKE 1 TABLET(5 MG) BY MOUTH TWICE DAILY    famotidine (PEPCID) 20 MG tablet Take 1 tablet (20 mg total) by mouth once daily.    folic acid (FOLVITE) 1 MG tablet Take 1 mg by mouth once daily.    gabapentin (NEURONTIN) 100 MG capsule Take 300 mg by mouth 2 (two) times daily.    insulin glargine U-100, Lantus, (LANTUS SOLOSTAR U-100 INSULIN) 100 unit/mL (3 mL) InPn pen Inject 18 Units into the skin every evening.    insulin lispro 100 unit/mL pen USE ON PREMEAL READING AS DIRECTED. MAXIMUM UNITS 50 PER DAY. (Patient not taking: Reported on 6/10/2025)    ipratropium (ATROVENT) 0.02 % nebulizer solution Take 2.5 mLs (500 mcg total) by nebulization every 6 (six) hours as needed (wheeze). Rescue (Patient not taking: Reported on 2/18/2025)    Lactobacillus acidophilus (PROBIOTIC ORAL) Take 1 capsule by mouth once daily.    lancets Misc 1 each by Misc.(Non-Drug; Combo Route) route once daily.    levothyroxine (SYNTHROID) 75 MCG tablet TAKE 1 TABLET BY MOUTH 6 DAYS A WEEK AND TAKE 1/2 TABLET BY MOUTH ON DAY 7    LIDOcaine-prilocaine (EMLA) cream Apply topically as needed (as needed for port access).    methotrexate 2.5 MG Tab Take 10 mg by mouth every 7 days.    metoprolol succinate (TOPROL-XL) 25 MG 24 hr tablet Take 0.5 tablets (12.5 mg total) by mouth 2 (two) times daily.     "mv-mn/folic ac/calcium/vit K1 (WOMEN'S 50 PLUS MULTIVITAMIN ORAL) Take 1 tablet by mouth once daily.    NOVOLOG FLEXPEN U-100 INSULIN 100 unit/mL (3 mL) InPn pen INJECT 7 UNITS WITH MEALSPREMEAL READINGS: 150-200= 2, 201-250= 4. 251-300= 6, 301-350=8, OVER 350 10 UNITS, MAX 50 UNITS DAILY (Patient taking differently: INJECT 8 UNITS WITH MEALSPREMEAL READINGS: 150-200= 2, 201-250= 4. 251-300= 6, 301-350=8, OVER 350 10 UNITS, MAX 50 UNITS DAILY)    pen needle, diabetic (NOVOFINE 32) 32 gauge x 1/4" Ndle TO USE WITH INSULIN PENS FOUR TIMES DAILY    pen needle, diabetic (NOVOFINE 32) 32 gauge x 1/4" Ndle TO USE WITH INSULIN PENS FOUR TIMES DAILY    sertraline (ZOLOFT) 50 MG tablet Take 1 tablet (50 mg total) by mouth once daily.    simvastatin (ZOCOR) 40 MG tablet TAKE 1 TABLET(40 MG) BY MOUTH EVERY EVENING    sodium bicarbonate 650 MG tablet Take 1 tablet (650 mg total) by mouth 2 (two) times daily. (Patient taking differently: Take 650 mg by mouth once daily.)    triamcinolone acetonide 0.1% (KENALOG) 0.1 % cream Apply topically 2 (two) times daily.     No current facility-administered medications for this visit.     Facility-Administered Medications Ordered in Other Visits   Medication    EUFLEXXA 10 mg/mL(mw 2.4 -3.6 million) injection Syrg 20 mg       Review of patient's allergies indicates:   Allergen Reactions    Contrast media Anaphylaxis    Iodinated contrast media Anaphylaxis and Rash    Albuterol Other (See Comments)     Used during PFTs and put pt in afib    Dye     Pcn [penicillins]      Pt states did well on cephalexin.  No adverse reaction or side effect.     Doxycycline Palpitations     Tolerated IV Doxy 8/2022       Family History   Problem Relation Name Age of Onset    Heart disease Mother          dissection    GI problems Father          perf colon    Cancer Paternal Uncle          colon cancer    Diabetes Neg Hx      Kidney disease Neg Hx      Stroke Neg Hx      Colon cancer Neg Hx      Crohn's " disease Neg Hx      Ulcerative colitis Neg Hx      Stomach cancer Neg Hx      Esophageal cancer Neg Hx         Social History     Socioeconomic History    Marital status:    Tobacco Use    Smoking status: Former     Current packs/day: 0.00     Average packs/day: 1 pack/day for 40.0 years (40.0 ttl pk-yrs)     Types: Cigarettes     Start date: 3/6/1976     Quit date: 3/6/2016     Years since quittin.3    Smokeless tobacco: Never   Substance and Sexual Activity    Alcohol use: Not Currently    Drug use: No    Sexual activity: Not Currently   Social History Narrative    Works in insurance collection.     Social Drivers of Health     Financial Resource Strain: Low Risk  (2024)    Overall Financial Resource Strain (CARDIA)     Difficulty of Paying Living Expenses: Not very hard   Food Insecurity: No Food Insecurity (2024)    Hunger Vital Sign     Worried About Running Out of Food in the Last Year: Never true     Ran Out of Food in the Last Year: Never true   Transportation Needs: No Transportation Needs (2024)    TRANSPORTATION NEEDS     Transportation : No   Physical Activity: Unknown (10/13/2021)    Exercise Vital Sign     Days of Exercise per Week: 0 days   Stress: No Stress Concern Present (2024)    [employer] [place] [employer] - Occupational Stress Questionnaire     Feeling of Stress : Not at all   Housing Stability: Unknown (2024)    Housing Stability Vital Sign     Unable to Pay for Housing in the Last Year: No     Homeless in the Last Year: No       Chief Complaint:   No chief complaint on file.      Date of surgery:  2024    History:  75-year-old female underwent right diagnostic arthroscopy for possible septic arthritis of the right knee.  Patient had a lot of pain and swelling in the right knee which we do not have a good reason for.  Cultures taken at the time of surgery have all been negative. Patient did have significant stiffness of the  right knee from disuse.  Did a mild manipulation at the time.   She was seen by Rheumatology who diagnosed her with seronegative rheumatoid arthritis.     History of present illness:  She is having more right shoulder pain.  Has not done any formal treatment for the shoulder.  Knee does not hurt as much but still has trouble straightening the knee out.      Review of Systems:    Musculoskeletal:  See HPI        Physical Examination:    Vital Signs:    There were no vitals filed for this visit.        There is no height or weight on file to calculate BMI.    This a well-developed, well nourished patient in no acute distress.  They are alert and oriented and cooperative to examination.  Pt. comes in in a wheelchair    Examination of the right knee shows well-healed surgical portals.  Patient has range of motion from about  degrees.  Knee does not have nearly the swelling that it used to.     Examination of the right shoulder shows no rashes or erythema. There are no masses, ecchymosis, or atrophy. The patient has full range of motion in forward flexion, external rotation, and internal rotation to the mid T-spine. The patient has positive Osorio and Neer test.  Positive crepitus.- Pennington's test. - Speeds test. Nontender to palpation over a.c. joint. Normal stability anteriorly, posteriorly, and negative sulcus sign. Passive range of motion: Forward flexion of 180°, external rotation at 90° of 90°, internal rotation of 50°, and external rotation at 0° of 50°. 2+ radial pulse. Intact axillary, radial, median and ulnar sensation. 5 out of 5 resisted forward flexion, external rotation, and negative lift off test.      X-rays:  X-ray of the right shoulder is ordered and review which show some mild shoulder arthritis     Assessment::  Bilateral knee pain and stiffness.    Bilateral knee inflammation and swelling  Rheumatoid arthritis  Right shoulder arthritis      Plan:  We will restart some physical therapy for both  the right knee and the right shoulder.  Patient just recently restarted the methotrexate.  Thinks that the shoulder we will get better once back on her normal schedule.  Had to stop it because of a UTI.  I will see her back in 6 weeks.    This note was created using [employer] voice recognition software that occasionally misinterpreted phrases or words.

## 2025-07-07 ENCOUNTER — HOSPITAL ENCOUNTER (OUTPATIENT)
Dept: RADIOLOGY | Facility: HOSPITAL | Age: 75
Discharge: HOME | End: 2025-07-07
Attending: RADIOLOGY
Payer: MEDICARE

## 2025-07-07 DIAGNOSIS — C77.1 MALIGNANT NEOPLASM METASTATIC TO INTRATHORACIC LYMPH NODE: ICD-10-CM

## 2025-07-07 DIAGNOSIS — C79.31 MALIGNANT NEOPLASM METASTATIC TO BRAIN: ICD-10-CM

## 2025-07-07 PROCEDURE — 70553 MRI BRAIN STEM W/O & W/DYE: CPT | Mod: TC,PO

## 2025-07-07 PROCEDURE — 25500020 PHARM REV CODE 255: Mod: PO | Performed by: RADIOLOGY

## 2025-07-07 PROCEDURE — A9585 GADOBUTROL INJECTION: HCPCS | Mod: PO | Performed by: RADIOLOGY

## 2025-07-07 PROCEDURE — 70553 MRI BRAIN STEM W/O & W/DYE: CPT | Mod: 26,,, | Performed by: RADIOLOGY

## 2025-07-07 RX ORDER — GADOBUTROL 604.72 MG/ML
7 INJECTION INTRAVENOUS
Status: COMPLETED | OUTPATIENT
Start: 2025-07-07 | End: 2025-07-07

## 2025-07-07 RX ADMIN — GADOBUTROL 7 ML: 604.72 INJECTION INTRAVENOUS at 12:07

## 2025-07-10 ENCOUNTER — OFFICE VISIT (OUTPATIENT)
Dept: RADIATION ONCOLOGY | Facility: CLINIC | Age: 75
End: 2025-07-10
Payer: MEDICARE

## 2025-07-10 VITALS
SYSTOLIC BLOOD PRESSURE: 177 MMHG | TEMPERATURE: 98 F | BODY MASS INDEX: 26.26 KG/M2 | OXYGEN SATURATION: 99 % | RESPIRATION RATE: 18 BRPM | HEIGHT: 66 IN | DIASTOLIC BLOOD PRESSURE: 74 MMHG | WEIGHT: 163.38 LBS | HEART RATE: 77 BPM

## 2025-07-10 DIAGNOSIS — C34.90 SMALL CELL CARCINOMA OF LUNG, UNSPECIFIED LATERALITY, UNSPECIFIED PART OF LUNG: ICD-10-CM

## 2025-07-10 DIAGNOSIS — C79.31 MALIGNANT NEOPLASM METASTATIC TO BRAIN: Primary | ICD-10-CM

## 2025-07-10 PROCEDURE — 99999 PR PBB SHADOW E&M-EST. PATIENT-LVL III: CPT | Mod: PBBFAC,,, | Performed by: RADIOLOGY

## 2025-07-10 NOTE — PROGRESS NOTES
[facility] Department of Radiation Oncology  Follow Up Visit Note    Diagnosis:  Allyson Henriquez is a 75 y.o. 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, status post SRS       Oncologic History:  Oncology History   Small cell lung cancer   5/20/2021 Initial Diagnosis    Small cell carcinoma of lung, right     6/8/2021 - 7/27/2021 Chemotherapy    Treatment Summary   Plan Name: OP CARBOPLATIN (AUC) + ETOPOSIDE Q3W  Treatment Goal: Palliative  Status: Inactive  Start Date: 6/8/2021  End Date: 7/27/2021  Provider: Madhav Cardona MD  Chemotherapy: CARBOplatin (PARAPLATIN) 395 mg in sodium chloride 0.9% 250 mL chemo infusion, 395 mg (100 % of original dose 395 mg), Intravenous, Clinic/HOD 1 time, 3 of 6 cycles  Dose modification:   (original dose 395 mg, Cycle 1, Reason: MD Discretion)  Administration: 395 mg (6/8/2021), 395 mg (6/29/2021), 395 mg (7/20/2021)  etoposide (VEPESID) 100 mg/m2 = 200 mg in sodium chloride 0.9% 500 mL chemo infusion, 100 mg/m2 = 200 mg, Intravenous, Clinic/HOD 1 time, 3 of 6 cycles  Administration: 200 mg (6/8/2021), 200 mg (6/9/2021), 200 mg (6/29/2021), 200 mg (6/10/2021), 200 mg (6/30/2021), 200 mg (7/1/2021), 200 mg (7/20/2021), 200 mg (7/21/2021), 200 mg (7/22/2021)     1/18/2022 - 7/6/2022 Chemotherapy    Treatment Summary   Plan Name: OP PEMBROLIZUMAB 200MG Q3W  Treatment Goal: Palliative  Status: Inactive  Start Date: 1/18/2022  End Date: 7/6/2022  Provider: Madhav Cardona MD  Chemotherapy: [No matching medication found in this treatment plan]     6/28/2022 - 6/28/2022 Radiation Therapy    Treating physician: Aleena Nielson  Total Dose: 22 Gy  Fractions: 1    Single fraction radiosurgery to right cerebellar metastasis.     Iron deficiency anemia   Brain metastasis   6/28/2022 Initial Diagnosis    Brain metastasis     6/28/2022 - 6/28/2022 Radiation Therapy    Treating physician: Aleena Nielson  Total  Dose: 22 Gy  Fractions: 1    Single fraction radiosurgery to right cerebellar metastasis.          Interval History  The patient presents today for a regularly scheduled follow up visit.  She was last seen in our follow up on 1/9/25.  Since that time she had been feeling very well.     3/31/25 Heme Onc visit:  PET LOLIS  Dx seronegative RA. Started on Mtx and folic acid by Rheumatology, slow improvement, considering prednisone  Fatigue/weakness- in PT      7/7/25 MRI brain:  No significant interval change aside from slightly more defiend areas of central enhancement      HPI: This is a 72 year old female with history of extensive-stage small cell lung cancer metastatic to bone and in thorax who presents with her  for discussion of radiation therapy for new single brain metastasis.  She is followed by Dr. Cardona on Pembro with notable systemic disease response, who reported 2 episodes of LEFT hand spasm and 2 episodes of LEFT foot spasm occurring independently and resolving spontaneously 2 weeks ago.  No other neurologic symptoms.  Dr. Cardona ordered MRI.     MRI Brain 6/17/22 new 9mm RIGHT cerebellar metastasis.  No other intracranial metastases.     Patient reports low grade headache since last Weds which she attributes to stress. She is active at home and uses wheelchair intermittently when fatigued.       She had a prolonged hospital stay (10 weeks) 2/2 esophagitis and C. Dif last year after completing 45Gy thoracic radiation therapy (Turissi regimen), but was unable to complete her course of chemotherapy.  She has recovered significantly from deconditioning related to prolonged hospital stay last year, but is still more easily fatigued than normal..     6/28/22 Single fraction SRS 22Gy to right cerebellar metastasis    she had repeated hospitalizations for diverticular abscess requiring sigmoid colectomy/ostomy and debridement 7/28/22 and multiple surgeries for washout and several IV and oral antibiotics  (she was hospitalized last year with severe esophagitis after chemotherapy-radiation therapy to thorax, complicated by C. Diff colitis).  She was discharged 8/19/22 to [facility], but readmitted 9/15/22 with wound dehiscence.   9/16/22 MRI brain: decrease in size of treated 9mm RIGHT cerebellar mass (now 5 x 3mm)     10/14/22 developed transient Left hand symptoms similar to those at time of presentation    10/23/22 PET with stable hypermetabolic right perihilar consolidation consistent with post-treatment change.     10/26/22 MRI brain stable vs improved R cerebellar lesion    11/2022, she transferred her care to Dr. Gonzalez who has restarted Keytruda     1/23/23 MRI brain: stable appearance of treated right cerebellar metastasis, now punctate in size; no new lesions; stable left IAC vestibular schwannoma    Some ongoing issues with wound healing related to her abdominal surgeries.  Is consulting with a new surgeon for second opinion. Also undergoing endocrine work up for ongoing blood sugar instability (on hydrocortisone for adrenal insufficiency)     4/27/23 MRI brain: right cerebellar small enhancing lesion with interval small focus of increased enhancement medial/inferior, and minimal new edema compared to prior study.  Stable left vestibular schwannoma.     7/2023 ostomy revision, hernia repair, followed by prolonged hospitalization    8/21/23 MRI brain: right cerebellar small enhancing lesion with serial increased size, interval small focus of increased enhancement medial/inferior, concerning for recurrence vs rediation necrossis/treatment effect.  Stable left vestibular schwannoma.     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  Stable left acoustic schwannoma    Admitted 1/2024 for UTI, hyponatremia, pleural  "effusion. Has completed antibiotics.    3/1/24 MR Spect: stable enhancing lesion in right cerebellum- spect findings most consistent with treatment change/radiation necrosis    Ongoing but significantly improving issues with abdominal wounds (had 3- 2 have healed, one improving); no longer using wound vac remains. Off hydrocortisone.     6/5/24 MRI Brain w/Spect: continued slight increase lesion in R cerebellum and marginating vasogenic edema with slight increased central nodular component; radiation necrosis favored  Stable L vestibular schwannoma    9/24/24 right knee arthroscopy for meniscal tear.  Ongoing wound care for abdominal wall and sacral pressure injury, improving (has been released from Wound Care.     Skipped last week of Keytruda 2/2 scans have been clear/for knee surgery.    9/26/24 MRI brain: right cerebellar hemispheric mass similar vs slightly decreased in size with possible mild increase in surrounding T2/FLAIR signal. Stable left vestibular schwannoma    Had been having some numbness in right hand, especially on waking. It "falls asleep"- no appreciable visible changes. Off Keytruda several months. Still in rehab for knee.    12/27/24 MRI brain: treated r cerebellar lesion smaller       Possibility of pregnancy: No  History of prior irradiation: Yes - LEFT gamma knife, acoustic neuroma at [facility], 10/2003; Chest radiation therapy 45Gy BID (Turissi regimen), 2021 with MBP   History of prior systemic anti-cancer therapy: Yes - most recently Pembro (last 6/15/22)  History of collagen vascular disease: No  Implanted electronic device (pacer/defib/nerve stimulator): No    Review of Systems   Review of Systems   Constitutional:  Positive for malaise/fatigue. Negative for chills and fever.   Eyes:  Negative for blurred vision and double vision.   Respiratory:  Negative for cough.    Gastrointestinal:  Negative for abdominal pain (resolved), nausea and vomiting.   Musculoskeletal:  Positive for back pain " "(resolved) and joint pain (RA, right hand, right knee).   Neurological:  Positive for focal weakness (right hand grasp strength ongoing). Negative for dizziness, tremors (occasional bilat hand numbness on waking), sensory change, speech change, seizures, weakness and headaches.       Social History:  Social History     Tobacco Use    Smoking status: Former     Current packs/day: 0.00     Average packs/day: 1 pack/day for 40.0 years (40.0 ttl pk-yrs)     Types: Cigarettes     Start date: 3/6/1976     Quit date: 3/6/2016     Years since quittin.3    Smokeless tobacco: Never   Substance Use Topics    Alcohol use: Not Currently    Drug use: No       Family History:  Cancer-related family history includes Cancer in her paternal uncle. There is no history of Esophageal cancer.    Exam:  Vitals:    07/10/25 1435   BP: (!) 177/74   Pulse: 77   Resp: 18   Temp: 98.2 °F (36.8 °C)   SpO2: 99%   Weight: 74.1 kg (163 lb 5.8 oz)   Height: 5' 6" (1.676 m)               Physical Exam  Vitals reviewed. Exam conducted with a chaperone present.   Constitutional:       General: She is not in acute distress.     Appearance: Normal appearance. She is not ill-appearing or toxic-appearing.   HENT:      Head: Normocephalic and atraumatic.   Eyes:      Extraocular Movements: Extraocular movements intact.      Pupils: Pupils are equal, round, and reactive to light.   Musculoskeletal:         General: No swelling.      Cervical back: No rigidity.   Skin:     General: Skin is warm.   Neurological:      Mental Status: She is alert.      Cranial Nerves: Cranial nerves 2-12 are intact.      Motor: No pronator drift.      Coordination: Coordination normal. Finger-Nose-Finger Test normal. Rapid alternating movements normal.      Gait: Gait abnormal (ambulates with wheelchair 2/2 knee surg).      Comments: Minimal targeting difficulty R hand- stable          Imaging:  MRI brain 25 reviewed as detailed above      Assessment:  Stable size of R " cerebellar enhancement w/indistinct margins, most consistent with radiation necrosis.    ECOG: (3) Limited self care; confined to bed or chair >50%    Plan:  MRI brain q6 months   Pt will contact our office if any new neuro symptoms  Ongoing follow up with Dr. Gonzalez  Follow up w/Endocrinologist as directed  Ongoing care for abdominal healing-  follows w/ Colorectal Dr. Annia Ashton at  [facility]  Follow up with Ortho as directed  Will follow up with Dr. Gonzalez re: interval imaging in August for PET  She was given our contact information, and she was told that she could call our clinic at anytime if she has any questions or concerns.  Follow up with other providers as directed        27 minutes spent in chart/case review, face-to-face interaction, discussion with other providers, and treatment planning/coordination of care.

## 2025-07-31 ENCOUNTER — TELEPHONE (OUTPATIENT)
Dept: CARDIOLOGY | Facility: CLINIC | Age: 75
End: 2025-07-31
Payer: MEDICARE

## 2025-08-07 ENCOUNTER — OFFICE VISIT (OUTPATIENT)
Dept: NEPHROLOGY | Facility: CLINIC | Age: 75
End: 2025-08-07
Payer: MEDICARE

## 2025-08-07 VITALS
SYSTOLIC BLOOD PRESSURE: 140 MMHG | OXYGEN SATURATION: 99 % | HEART RATE: 81 BPM | DIASTOLIC BLOOD PRESSURE: 70 MMHG | WEIGHT: 167 LBS | HEIGHT: 66 IN | BODY MASS INDEX: 26.84 KG/M2

## 2025-08-07 DIAGNOSIS — Z87.891 HISTORY OF TOBACCO ABUSE: ICD-10-CM

## 2025-08-07 DIAGNOSIS — I10 ESSENTIAL HYPERTENSION: ICD-10-CM

## 2025-08-07 DIAGNOSIS — E87.20 METABOLIC ACIDOSIS: ICD-10-CM

## 2025-08-07 DIAGNOSIS — N18.32 STAGE 3B CHRONIC KIDNEY DISEASE: Primary | ICD-10-CM

## 2025-08-07 DIAGNOSIS — J44.9 CHRONIC OBSTRUCTIVE PULMONARY DISEASE, UNSPECIFIED COPD TYPE: ICD-10-CM

## 2025-08-07 DIAGNOSIS — G47.33 OSA TREATED WITH BIPAP: ICD-10-CM

## 2025-08-07 DIAGNOSIS — C34.90 SMALL CELL CARCINOMA OF LUNG, UNSPECIFIED LATERALITY, UNSPECIFIED PART OF LUNG: ICD-10-CM

## 2025-08-07 PROCEDURE — 3078F DIAST BP <80 MM HG: CPT | Mod: CPTII,S$GLB,, | Performed by: STUDENT IN AN ORGANIZED HEALTH CARE EDUCATION/TRAINING PROGRAM

## 2025-08-07 PROCEDURE — 99214 OFFICE O/P EST MOD 30 MIN: CPT | Mod: S$GLB,,, | Performed by: STUDENT IN AN ORGANIZED HEALTH CARE EDUCATION/TRAINING PROGRAM

## 2025-08-07 PROCEDURE — 3077F SYST BP >= 140 MM HG: CPT | Mod: CPTII,S$GLB,, | Performed by: STUDENT IN AN ORGANIZED HEALTH CARE EDUCATION/TRAINING PROGRAM

## 2025-08-07 PROCEDURE — 99999 PR PBB SHADOW E&M-EST. PATIENT-LVL II: CPT | Mod: PBBFAC,,, | Performed by: STUDENT IN AN ORGANIZED HEALTH CARE EDUCATION/TRAINING PROGRAM

## 2025-08-07 PROCEDURE — 1159F MED LIST DOCD IN RCRD: CPT | Mod: CPTII,S$GLB,, | Performed by: STUDENT IN AN ORGANIZED HEALTH CARE EDUCATION/TRAINING PROGRAM

## 2025-08-07 NOTE — PROGRESS NOTES
[facility]  Nephrology Clinic    Subjective:       HPI ID: [id number] Henriquez is a 75 y.o. female who presents for return patient evaluation for chronic renal failure.   Previously followed by another [facility] nephrologist in this clinical practice setting but lost to follow up in 2021. Re-visited with patient during inpatient encounter last month where she was admitted for abnormal labs/JEAN.   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 Gonzalez. She was sent to [facility] late Dec '24 for evaluation of abnormal labs at her oncology appointment. Lab work was notable for hyponatremia, hyperkalemia, NAGMA, and BUN/Cr of 81/5.11mg/dL. Our nephrology service was consulted for further evaluation. Of note, her renal function was at baseline (eGFR 60ml/min) as recent as 3 weeks prior to presentation. Her history of the previous month was extensively vetted during the consultation and workup did not reveal an inciting etiology. Renal biopsy was offered to explain JEAN of unknown etiology, including DDx of ATN vs AIN. Patient refused renal biopsy. She was eventually discharged to home with supportive care.     In terms of her renal history, she denies hospitalizations for prior JEAN or JEAN requiring RRT. Denies known history of nephrolithiasis or hematuria episodes. No reported history of frequent or recurrent use of NSAIDs/COXI, though she has Rx NSAIDs for recent knee pains - she denies frequent use. No pertinent rheumatologic or AI disease history. Denies any pertinent family history of known kidney disease, or family members with ESRD requiring dialysis.  She typically uses purewick at night d/t urinary frequency and limited mobility/performance status. Denies new medications in last three weeks. Denies acute/recent illness.   Fluid intake in 24hrs: at least 40oz    Since discharge to home, she continues to  feel at her usual state of health. Previsit labs obtained yesterday show sCr is now improved to 2.04mg/dL. Electrolyte panel is stable. She denies complaints. Does not want to do any further testing / renal biopsy. Denies changes to appetite or decreased fluid intake. Denies dysuria.     Interval history:  02/18/25 - here today for f/u. She is feeling better since last visit. Renal fxn based on serum creatinine trend is improving, almost near baseline values now. We discussed her diet, increasing more protein intake. Last Keytruda infusion 8/2024. Biggest QOL issue for her currently is her RUE swelling. DVT study did not reveal blood clots. She did establish with Rheumatology yesterday to evaluate further.  We discussed ways to elevate her arm and non pharmacologic ways to manage edema  Discussed BP trends at home; low BP trends though asymptomatic.   Proteinuria trends improving    5/15/25 - here for f/u evaluation. Recently having issues with UTI with hematuria and on ciprofloxacin course via PCP. Urine culture with multiple organisms isolated. She is doing well with PT. She is doing her best with fluid intake. Going to follow up with oncology at the end of next month. No other complaints today. Reports appetite is improving. We reviewed recent labs at chairside.    8/7/25 - here for f/u evaluation. Feels well. Recently completed PT therapy. She reports she is going for imaging at the end of the month for tumor surveillance. Reports current tumor burden is not active. Following with rheumatology for arthritic issues believed related to keytruda.   -incredibly has made significant strides with nutrition; albumin is back up to 3.9.  -we discussed BP trends and renal trends this visit. She is adamant that she does not want any additional medications at this time.     Review of Systems   Constitutional:  Positive for appetite change (Appetite improving). Negative for chills, diaphoresis and fever.   Respiratory:   "Negative for cough and shortness of breath.    Cardiovascular:  Negative for chest pain and leg swelling.   Gastrointestinal:  Negative for abdominal pain, diarrhea, nausea and vomiting.   Genitourinary:  Negative for difficulty urinating, dysuria and hematuria.   Musculoskeletal:  Negative for myalgias.   Neurological:  Negative for headaches.   Hematological:  Does not bruise/bleed easily.        The past medical, family and social histories were reviewed for this encounter.     BP (!) 140/70 (BP Location: Right arm, Patient Position: Sitting)   Pulse 81   Ht 5' 6" (1.676 m)   Wt 75.8 kg (167 lb)   SpO2 99%   BMI 26.95 kg/m²     Objective:      Physical Exam  Vitals reviewed.   Constitutional:       General: She is not in acute distress.     Appearance: She is well-developed.   Cardiovascular:      Heart sounds: Normal heart sounds. No murmur heard.     No friction rub. No gallop.   Pulmonary:      Effort: Pulmonary effort is normal. No respiratory distress.      Breath sounds: Normal breath sounds. No wheezing or rales.   Abdominal:      General: There is no distension.      Palpations: Abdomen is soft.   Musculoskeletal:      Right forearm: Swelling and edema present.   Skin:     General: Skin is warm and dry.      Findings: No rash.   Neurological:      Mental Status: She is alert and oriented to person, place, and time.         Assessment:       Lab Results   Component Value Date     07/29/2025    K 4.4 07/29/2025     07/29/2025    CO2 26 07/29/2025    BUN 39 (H) 07/29/2025    CREATININE 1.41 (H) 07/29/2025    CALCIUM 9.6 07/29/2025    ALBUMIN 3.9 07/29/2025    PHOS 3.3 07/29/2025     Lab Results   Component Value Date    MICALBCREAT 243.1 (H) 07/30/2025    MICALBCREAT 1237.1 (H) 05/08/2025    MICALBCREAT 207.0 (H) 01/28/2025    MICALBCREAT 401.9 (H) 01/09/2025    MICALBCREAT 427.5 (H) 03/14/2023       1. Stage 3b chronic kidney disease    2. Essential hypertension    3. Small cell carcinoma " of lung, unspecified laterality, unspecified part of lung    4. History of tobacco abuse    5. TRISHA treated with BiPAP    6. Metabolic acidosis    7. Chronic obstructive pulmonary disease, unspecified COPD type              Plan:   Return to clinic in 6 months  Baseline creatinine is 1.2-1.3mg/dL  Orders Placed This Encounter   Procedures    Microalbumin/Creatinine Ratio, Urine    Renal Function Panel       CKD G3b / A2  -baseline eGFR eGFR 30-40s for >90 days  -hospital imaging w/ mild R hydronephrosis, bilateral ureteral jets present however.   -voiding without issue; tolerating PO  -avoiding NSAIDS  -no plans for renal biopsy -she previously refused  -continuing to improve; approaching new baseline, continues to not want to do additional medications.    Metabolic acidosis, NAGMA   -improved w/ NaHCO3 tab    COPD -appears compensated at this time    CHFpEF- appears compensated at this time    TRIHSA -continue bipap    pAF - on apixaban for primary prevention    Small cell carcinoma of lung - followed by oncology Dr. Gonzalez, s/p systemic immunotherapy. Under imaging surveillance currently. Due for imaging at the end of this month.    Right upper extremity swelling-establishing with rheumatology.  Imaging reviewed.  No DVT.  Instructed patient to elevate arm.  Warm compress with non direct to skin heating pad.      Hypoalbuminemia-improved. diet appears to be advancing.  Advancing protein in the diet as she tolerates.     __________________________  Kin Gallegos MD  [facility]    Part of this note has been created using [employer] dictation system. Errors in transcription may not be completely avoided.        KFRE 2-Year: 1.6% at 7/30/2025 10:40 AM  Calculated from:  Serum Creatinine: 1.41 mg/dL at 7/29/2025 12:00 PM  Urine Albumin Creatinine Ratio: 243.1 ug/mg at 7/30/2025 10:40 AM  Age: 75 years  Sex: Female at 7/30/2025 10:40 AM  Has CKD-3 to CKD-5: Yes    KFRE 5-Year: 5% at 7/30/2025 10:40  AM  Calculated from:  Serum Creatinine: 1.41 mg/dL at 7/29/2025 12:00 PM  Urine Albumin Creatinine Ratio: 243.1 ug/mg at 7/30/2025 10:40 AM  Age: 75 years  Sex: Female at 7/30/2025 10:40 AM  Has CKD-3 to CKD-5: Yes

## 2025-08-18 ENCOUNTER — PATIENT MESSAGE (OUTPATIENT)
Dept: ORTHOPEDICS | Facility: CLINIC | Age: 75
End: 2025-08-18
Payer: MEDICARE

## 2025-08-20 ENCOUNTER — OFFICE VISIT (OUTPATIENT)
Dept: ORTHOPEDICS | Facility: CLINIC | Age: 75
End: 2025-08-20
Payer: MEDICARE

## 2025-08-20 VITALS — WEIGHT: 167.13 LBS | HEIGHT: 66 IN | BODY MASS INDEX: 26.86 KG/M2

## 2025-08-20 DIAGNOSIS — M25.511 RIGHT SHOULDER PAIN, UNSPECIFIED CHRONICITY: Primary | ICD-10-CM

## 2025-08-20 DIAGNOSIS — M19.011 PRIMARY OSTEOARTHRITIS OF RIGHT SHOULDER: ICD-10-CM

## 2025-08-20 PROCEDURE — 1125F AMNT PAIN NOTED PAIN PRSNT: CPT | Mod: CPTII,S$GLB,, | Performed by: ORTHOPAEDIC SURGERY

## 2025-08-20 PROCEDURE — 99999 PR PBB SHADOW E&M-EST. PATIENT-LVL II: CPT | Mod: PBBFAC,,, | Performed by: ORTHOPAEDIC SURGERY

## 2025-08-20 PROCEDURE — 1160F RVW MEDS BY RX/DR IN RCRD: CPT | Mod: CPTII,S$GLB,, | Performed by: ORTHOPAEDIC SURGERY

## 2025-08-20 PROCEDURE — 1159F MED LIST DOCD IN RCRD: CPT | Mod: CPTII,S$GLB,, | Performed by: ORTHOPAEDIC SURGERY

## 2025-08-20 PROCEDURE — 99214 OFFICE O/P EST MOD 30 MIN: CPT | Mod: S$GLB,,, | Performed by: ORTHOPAEDIC SURGERY

## 2025-08-31 DIAGNOSIS — I48.0 PAF (PAROXYSMAL ATRIAL FIBRILLATION): ICD-10-CM

## 2025-09-02 RX ORDER — APIXABAN 5 MG/1
5 TABLET, FILM COATED ORAL 2 TIMES DAILY
Qty: 180 TABLET | Refills: 0 | Status: SHIPPED | OUTPATIENT
Start: 2025-09-02

## (undated) DEVICE — SPONGE BULKEE II ABSRB 6X6.75

## (undated) DEVICE — APPLICATOR CHLORAPREP ORN 26ML

## (undated) DEVICE — PACK SIRUS BASIC V SURG STRL

## (undated) DEVICE — SOL NACL IRR 1000ML BTL

## (undated) DEVICE — GLOVE SENSICARE PI ALOE 8

## (undated) DEVICE — LINER SUCTION 3000CC

## (undated) DEVICE — PAD ABDOMINAL STERILE 8X10IN

## (undated) DEVICE — BNDG COFLEX FOAM LF2 ST 6X5YD

## (undated) DEVICE — SOCKINETTE IMPERVIOUS 12X48IN

## (undated) DEVICE — SUT ETHILON 3-0 FS-1 30

## (undated) DEVICE — PADDING CAST SPECIALIST 6X4YD

## (undated) DEVICE — BANDAGE ESMARK ELASTIC ST 6X9

## (undated) DEVICE — RESECTOR 5.5MM

## (undated) DEVICE — GLOVE SENSICARE PI GRN 7

## (undated) DEVICE — DRAPE U SPLIT SHEET 54X76IN

## (undated) DEVICE — TOURNIQUET SB QC DP 34X4IN

## (undated) DEVICE — BANDAGE MATRIX HK LOOP 6IN 5YD

## (undated) DEVICE — ELECTRODE REM PLYHSV RETURN 9

## (undated) DEVICE — DRESSING N ADH OIL EMUL 3X3

## (undated) DEVICE — GLOVE SENSICARE PI GRN 7.5

## (undated) DEVICE — SLEEVE SCD EXPRESS KNEE MEDIUM

## (undated) DEVICE — PACK CUSTOM UNIV BASIN SLI

## (undated) DEVICE — DRAPE EXTREMITY ORTHOMAX

## (undated) DEVICE — GOWN POLY REINF BRTH SLV LG

## (undated) DEVICE — DRAPE STERI U-SHAPED 47X51IN

## (undated) DEVICE — TOWEL OR DISP STRL BLUE 4/PK